# Patient Record
Sex: FEMALE | Race: BLACK OR AFRICAN AMERICAN | NOT HISPANIC OR LATINO | Employment: OTHER | URBAN - METROPOLITAN AREA
[De-identification: names, ages, dates, MRNs, and addresses within clinical notes are randomized per-mention and may not be internally consistent; named-entity substitution may affect disease eponyms.]

---

## 2017-01-03 ENCOUNTER — ANESTHESIA EVENT (OUTPATIENT)
Dept: PERIOP | Facility: HOSPITAL | Age: 72
End: 2017-01-03

## 2017-01-04 ENCOUNTER — APPOINTMENT (OUTPATIENT)
Dept: GENERAL RADIOLOGY | Facility: HOSPITAL | Age: 72
End: 2017-01-04

## 2017-01-04 ENCOUNTER — ANESTHESIA (OUTPATIENT)
Dept: PERIOP | Facility: HOSPITAL | Age: 72
End: 2017-01-04

## 2017-01-04 PROBLEM — M48.062 SPINAL STENOSIS, LUMBAR REGION, WITH NEUROGENIC CLAUDICATION: Status: ACTIVE | Noted: 2017-01-04

## 2017-01-04 PROCEDURE — 25010000002 FENTANYL CITRATE (PF) 100 MCG/2ML SOLUTION: Performed by: NURSE ANESTHETIST, CERTIFIED REGISTERED

## 2017-01-04 PROCEDURE — 76001 HC FLUORO OVER 1 HOUR: CPT

## 2017-01-04 PROCEDURE — 25010000002 DEXAMETHASONE PER 1 MG: Performed by: NURSE ANESTHETIST, CERTIFIED REGISTERED

## 2017-01-04 PROCEDURE — 25010000002 ONDANSETRON PER 1 MG: Performed by: NURSE ANESTHETIST, CERTIFIED REGISTERED

## 2017-01-04 PROCEDURE — 25010000002 PHENYLEPHRINE PER 1 ML: Performed by: NURSE ANESTHETIST, CERTIFIED REGISTERED

## 2017-01-04 PROCEDURE — 76000 FLUOROSCOPY <1 HR PHYS/QHP: CPT

## 2017-01-04 PROCEDURE — 25010000002 PROPOFOL 10 MG/ML EMULSION: Performed by: NURSE ANESTHETIST, CERTIFIED REGISTERED

## 2017-01-04 PROCEDURE — 25010000002 PROPOFOL 1000 MG/ML EMULSION: Performed by: NURSE ANESTHETIST, CERTIFIED REGISTERED

## 2017-01-04 RX ORDER — FENTANYL CITRATE 50 UG/ML
INJECTION, SOLUTION INTRAMUSCULAR; INTRAVENOUS AS NEEDED
Status: DISCONTINUED | OUTPATIENT
Start: 2017-01-04 | End: 2017-01-04 | Stop reason: SURG

## 2017-01-04 RX ORDER — DEXAMETHASONE SODIUM PHOSPHATE 10 MG/ML
INJECTION INTRAMUSCULAR; INTRAVENOUS AS NEEDED
Status: DISCONTINUED | OUTPATIENT
Start: 2017-01-04 | End: 2017-01-04 | Stop reason: SURG

## 2017-01-04 RX ORDER — ATRACURIUM BESYLATE 10 MG/ML
INJECTION, SOLUTION INTRAVENOUS AS NEEDED
Status: DISCONTINUED | OUTPATIENT
Start: 2017-01-04 | End: 2017-01-04 | Stop reason: SURG

## 2017-01-04 RX ORDER — PROPOFOL 10 MG/ML
VIAL (ML) INTRAVENOUS AS NEEDED
Status: DISCONTINUED | OUTPATIENT
Start: 2017-01-04 | End: 2017-01-04 | Stop reason: SURG

## 2017-01-04 RX ORDER — ONDANSETRON 2 MG/ML
INJECTION INTRAMUSCULAR; INTRAVENOUS AS NEEDED
Status: DISCONTINUED | OUTPATIENT
Start: 2017-01-04 | End: 2017-01-04 | Stop reason: SURG

## 2017-01-04 RX ORDER — LIDOCAINE HYDROCHLORIDE 10 MG/ML
INJECTION, SOLUTION EPIDURAL; INFILTRATION; INTRACAUDAL; PERINEURAL AS NEEDED
Status: DISCONTINUED | OUTPATIENT
Start: 2017-01-04 | End: 2017-01-04 | Stop reason: SURG

## 2017-01-04 RX ADMIN — FENTANYL CITRATE 100 MCG: 50 INJECTION, SOLUTION INTRAMUSCULAR; INTRAVENOUS at 11:10

## 2017-01-04 RX ADMIN — ONDANSETRON 4 MG: 2 INJECTION INTRAMUSCULAR; INTRAVENOUS at 10:58

## 2017-01-04 RX ADMIN — PROPOFOL 25 MCG/KG/MIN: 10 INJECTION, EMULSION INTRAVENOUS at 08:45

## 2017-01-04 RX ADMIN — SODIUM CHLORIDE, POTASSIUM CHLORIDE, SODIUM LACTATE AND CALCIUM CHLORIDE: 600; 310; 30; 20 INJECTION, SOLUTION INTRAVENOUS at 10:05

## 2017-01-04 RX ADMIN — PHENYLEPHRINE HYDROCHLORIDE 100 MCG: 10 INJECTION INTRAVENOUS at 09:05

## 2017-01-04 RX ADMIN — ATRACURIUM BESYLATE 30 MG: 10 INJECTION, SOLUTION INTRAVENOUS at 09:00

## 2017-01-04 RX ADMIN — PHENYLEPHRINE HYDROCHLORIDE 200 MCG: 10 INJECTION INTRAVENOUS at 09:15

## 2017-01-04 RX ADMIN — LIDOCAINE HYDROCHLORIDE 30 MG: 10 INJECTION, SOLUTION EPIDURAL; INFILTRATION; INTRACAUDAL; PERINEURAL at 08:26

## 2017-01-04 RX ADMIN — FENTANYL CITRATE 50 MCG: 50 INJECTION, SOLUTION INTRAMUSCULAR; INTRAVENOUS at 11:40

## 2017-01-04 RX ADMIN — FENTANYL CITRATE 100 MCG: 50 INJECTION, SOLUTION INTRAMUSCULAR; INTRAVENOUS at 08:26

## 2017-01-04 RX ADMIN — PHENYLEPHRINE HYDROCHLORIDE 1 MCG/KG/MIN: 10 INJECTION INTRAVENOUS at 09:31

## 2017-01-04 RX ADMIN — ATRACURIUM BESYLATE 40 MG: 10 INJECTION, SOLUTION INTRAVENOUS at 08:26

## 2017-01-04 RX ADMIN — PHENYLEPHRINE HYDROCHLORIDE 100 MCG: 10 INJECTION INTRAVENOUS at 09:09

## 2017-01-04 RX ADMIN — PHENYLEPHRINE HYDROCHLORIDE 100 MCG: 10 INJECTION INTRAVENOUS at 09:12

## 2017-01-04 RX ADMIN — PROPOFOL 100 MG: 10 INJECTION, EMULSION INTRAVENOUS at 08:26

## 2017-01-04 RX ADMIN — DEXAMETHASONE SODIUM PHOSPHATE 8 MG: 10 INJECTION INTRAMUSCULAR; INTRAVENOUS at 08:26

## 2017-01-04 NOTE — ANESTHESIA PREPROCEDURE EVALUATION
Anesthesia Evaluation     Patient summary reviewed and Nursing notes reviewed    Airway   Mallampati: I  TM distance: >3 FB  Neck ROM: full  Dental      Pulmonary    (+) sleep apnea,   Cardiovascular   (+) hypertension, CAD,     ECG reviewed    Neuro/Psych  (+) headaches, numbness,    GI/Hepatic/Renal/Endo    (+)  GERD, diabetes mellitus,     Musculoskeletal     (+) myalgias,   Abdominal    Substance History - negative use     OB/GYN negative ob/gyn ROS         Other   (+) arthritis                          Anesthesia Plan    ASA 3     general     intravenous induction   Anesthetic plan and risks discussed with patient.    Plan discussed with CRNA.

## 2017-01-04 NOTE — ANESTHESIA POSTPROCEDURE EVALUATION
Patient: Arminda Krishnan    Procedure Summary     Date Anesthesia Start Anesthesia Stop Room / Location    01/04/17 0823   [facility] / [facility]       Procedure Diagnosis Surgeon Provider    LUMBAR LAMINECTOMY AND DECOMRESSION  L3 AND L4 (N/A Spine Lumbar) Spinal stenosis, lumbar region, with neurogenic claudication  (Spinal stenosis, lumbar region, with neurogenic claudication [M48.06]) MD Arun Barrow MD          Anesthesia Type: general  Last vitals  /66   Temp   97.4   Pulse  84   Resp   16   SpO2   100     Post Anesthesia Care and Evaluation    Patient location during evaluation: PACU  Patient participation: complete - patient participated  Level of consciousness: awake and alert  Pain score: 0  Pain management: adequate  Airway patency: patent  Anesthetic complications: no  Cardiovascular status: hemodynamically stable and acceptable  Respiratory status: nonlabored ventilation, acceptable and nasal cannula  Hydration status: acceptable

## 2017-01-04 NOTE — ANESTHESIA PROCEDURE NOTES
Airway  Urgency: elective    Airway not difficult    General Information and Staff    Patient location during procedure: OR  Anesthesiologist: SHYLA MARTINEZ  CRNA: ARYAN BERGER    Indications and Patient Condition  Indications for airway management: airway protection    Preoxygenated: yes  MILS not maintained throughout  Mask difficulty assessment: 1 - vent by mask    Final Airway Details  Final airway type: endotracheal airway      Successful airway: ETT  Cuffed: yes   Successful intubation technique: direct laryngoscopy  Endotracheal tube insertion site: oral  Blade: Esteban  Blade size: #2  ETT size: 7.0 mm  Cormack-Lehane Classification: grade I - full view of glottis  Placement verified by: chest auscultation and capnometry   Cuff volume (mL): 5  Measured from: lips  ETT to lips (cm): 21  Number of attempts at approach: 1    Additional Comments  Negative epigastric sounds, Breath sound equal bilaterally with symmetric chest rise and fall

## 2017-02-06 ENCOUNTER — OFFICE VISIT (OUTPATIENT)
Dept: NEUROSURGERY | Facility: CLINIC | Age: 72
End: 2017-02-06

## 2017-02-06 VITALS
TEMPERATURE: 97.1 F | DIASTOLIC BLOOD PRESSURE: 62 MMHG | SYSTOLIC BLOOD PRESSURE: 122 MMHG | WEIGHT: 166 LBS | HEIGHT: 60 IN | BODY MASS INDEX: 32.59 KG/M2

## 2017-02-06 DIAGNOSIS — M48.062 SPINAL STENOSIS, LUMBAR REGION, WITH NEUROGENIC CLAUDICATION: ICD-10-CM

## 2017-02-06 DIAGNOSIS — M51.36 DEGENERATIVE DISC DISEASE, LUMBAR: Primary | ICD-10-CM

## 2017-02-06 PROCEDURE — 99024 POSTOP FOLLOW-UP VISIT: CPT | Performed by: PHYSICIAN ASSISTANT

## 2017-02-06 RX ORDER — LINAGLIPTIN 5 MG/1
TABLET, FILM COATED ORAL
COMMUNITY
Start: 2017-01-19 | End: 2018-02-27 | Stop reason: ALTCHOICE

## 2017-02-06 NOTE — PROGRESS NOTES
Subjective   Patient ID: Arminda Krishnan is a 71 y.o. female  [id number]    Chief Complaint   Patient presents with   • Follow-up   • Post-op       Patient Active Problem List   Diagnosis   • Cerebral vascular disease   • Degenerative disc disease, lumbar   • Degenerative disc disease, cervical   • Spinal stenosis, lumbar region, with neurogenic claudication       History of Present Illness   Patient is here for postop visit after undergoing lumbar decompression for spinal stenosis.  The patient reports that she is doing well, home physical therapy is coming once a week, she is doing some of her exercises on a daily basis and others she is not.  She has family who is living with her and helping her out.  She is using her walker when she is out and using a cane at the house.  After a little discussion she is not doing much walking for exercise and she is not doing her band strengthening exercises.    Review of Systems   Constitutional: Positive for appetite change.   HENT: Positive for congestion, dental problem and sinus pressure.    Eyes: Negative.    Respiratory: Negative.    Cardiovascular: Negative.    Gastrointestinal: Positive for constipation.   Endocrine: Positive for cold intolerance and polydipsia.   Genitourinary: Negative.    Musculoskeletal: Positive for joint swelling.   Allergic/Immunologic: Negative.    Neurological: Positive for headaches.   All other systems reviewed and are negative.      Physical Exam   Well-developed well-nourished American female in no apparent distress at the time of examination.  She looks well and states that she feels better now than she has in years    Neurologic Exam   Motor exam is intact.  She takes small light steps when ambulating, she is in a slightly flexed forward position on ambulation.      Medical Decision Making:      Since the patient is currently having physical therapy come to the house she is getting her strengthening exercises once a week I have  encouraged the patient she needs to be doing these on a daily basis and her family that is staying with her will be reporting to the patient's daughter who day referred to as the general.  At some point she may need to go to formal physical therapy for progression of her strengthening exercises.   Overall the patient is really doing well but slow to progress on her strengthening.  Our plan will be to see the patient back in the office in 6 weeks.      Gracie Perez PA-C

## 2017-03-01 ENCOUNTER — TELEPHONE (OUTPATIENT)
Dept: NEUROSURGERY | Facility: CLINIC | Age: 72
End: 2017-03-01

## 2017-03-01 NOTE — TELEPHONE ENCOUNTER
Would you ask Apro if she would mind seeing he patient tomorrow, if not, then call the patient and see if she can come in tomorrow. Thanks, DC

## 2017-03-01 NOTE — TELEPHONE ENCOUNTER
Called pt- no answers at any of the numbers provided. I did go ahead and place pt on with Apro for [phone]. I left pt a detailed message and asked them to call me directly in the am.

## 2017-03-01 NOTE — TELEPHONE ENCOUNTER
Spoke with patient.  She has had increase of back pain as well as left hip and leg pain since fall two weeks ago (she fell in bathroom).  She is able to move and stand on legs.  She denies any bowel or bladder changes from her baseline...she has had chronic urinary incontinence that is unchanged since the fall.  She currently denies loss of perineal sensation.  She denies numbness in legs.  She has chronic numbness in feet from peripheral neuropathy. Will discuss with Nay Perez/forward this message on to her.

## 2017-03-01 NOTE — TELEPHONE ENCOUNTER
Provider:  Davy  Caller:   Samuel  Phone #:  [phone]  Surgery:  lumbar laminectomy and decompression L3-4  Surgery Date: 1-4-17   Last visit:   2-6-17    TARA:         Reason for call:           ----- Message from Kat White sent at 3/1/2017 10:51 AM EST -----  Regarding: PA/DAVY  Contact: [phone]  Last seen:  2-6-17  Surgery:  1-4-17 -- lumbar laminectomy and decompression L3-4  Next appt:  3-21-17    Message:  Someone named Samuel called on patient's behalf, left a message on voicemail that the patient fell 2 weeks ago. She did not state the patient was having any symptoms or issues. They would like to move her appointment up from 3/21/17. Okay to reschedule?

## 2017-03-28 ENCOUNTER — OFFICE VISIT (OUTPATIENT)
Dept: NEUROSURGERY | Facility: CLINIC | Age: 72
End: 2017-03-28

## 2017-03-28 VITALS
TEMPERATURE: 97.3 F | SYSTOLIC BLOOD PRESSURE: 138 MMHG | HEART RATE: 85 BPM | WEIGHT: 162 LBS | DIASTOLIC BLOOD PRESSURE: 62 MMHG | BODY MASS INDEX: 31.8 KG/M2 | HEIGHT: 60 IN

## 2017-03-28 DIAGNOSIS — M48.061 SPINAL STENOSIS OF LUMBAR REGION: ICD-10-CM

## 2017-03-28 DIAGNOSIS — M48.062 SPINAL STENOSIS, LUMBAR REGION, WITH NEUROGENIC CLAUDICATION: Primary | ICD-10-CM

## 2017-03-28 DIAGNOSIS — Z98.890 S/P LAMINECTOMY: ICD-10-CM

## 2017-03-28 PROCEDURE — 99024 POSTOP FOLLOW-UP VISIT: CPT | Performed by: NEUROLOGICAL SURGERY

## 2017-03-28 NOTE — PROGRESS NOTES
Arminda Krishnan  1945  [id number]                       CURRENT WORKING DIAGNOSIS:  [S/P laminectomy for spinal stenosis ]         MEDICAL HISTORY SINCE LAST ENCOUNTER:  [She has shown significant improvement however is quite deconditioned.  Is going to take a while for her to become independent if she ever does.  She has multiple aches and pains but clearly markedly better than her preoperative status. ]           Past Medical History:   Diagnosis Date   • Anemia    • Arthritis    • Back problem    • CAD (coronary artery disease)    • Cancer     Right breast   • Chronic back pain    • Chronically on opiate therapy    • Diabetes mellitus     DX 14 years ago- checks fsbs weekly   • Fibromyalgia    • Gastroparesis    • GERD (gastroesophageal reflux disease)    • Headache     emotional/tension   • HTN (hypertension)    • Hypercholesteremia    • IBS (irritable bowel syndrome)    • Incontinence of urine     urgency   • Migraine headache    • Myalgia and myositis    • Peripheral neuropathy    • Sleep apnea    • Sleep apnea     does not wear cpap              Past Surgical History:   Procedure Laterality Date   • APPENDECTOMY     • BACK SURGERY      3 x per provided history   • BRAIN TUMOR EXCISION  1988   • BREAST BIOPSY     • CHOLECYSTECTOMY     • COLONOSCOPY      2015   • CRANIOTOMY FOR TUMOR     • EYE SURGERY      bilateral cataracts removed   • HEMORRHOIDECTOMY     • LUMBAR FUSION N/A 1/4/2017    Procedure: LUMBAR LAMINECTOMY AND DECOMRESSION  L3 AND L4;  Surgeon: Nishant Bhatti MD;  Location: [facility];  Service:    • TOTAL ABDOMINAL HYSTERECTOMY     • TRIGGER FINGER RELEASE                Family History   Problem Relation Age of Onset   • Cancer Other    • Diabetes Other    • Hyperlipidemia Other    • Heart attack Other    • Hypertension Other    • Heart attack Mother    • Cancer Father    • Heart attack Sister    • Cancer Brother    • Heart attack Sister    • Cancer Brother               Social History      Social History   • Marital status:      Spouse name: N/A   • Number of children: N/A   • Years of education: N/A     Occupational History   • Not on file.     Social History Main Topics   • Smoking status: Current Every Day Smoker     Packs/day: 0.50     Types: Cigarettes   • Smokeless tobacco: Never Used   • Alcohol use No   • Drug use: No   • Sexual activity: Defer     Other Topics Concern   • Not on file     Social History Narrative              Allergies   Allergen Reactions   • Ceclor [Cefaclor] Rash              Current Outpatient Prescriptions:   •  albuterol (PROVENTIL) (2.5 MG/3ML) 0.083% nebulizer solution, Take 2.5 mg by nebulization Every 4 (Four) Hours As Needed for wheezing or shortness of air., Disp: , Rfl:   •  amphetamine-dextroamphetamine (ADDERALL) 20 MG tablet, Take 30 mg by mouth 2 (Two) Times a Day. Morning and noon, Disp: , Rfl:   •  aspirin 81 MG EC tablet, Take 81 mg by mouth daily., Disp: , Rfl:   •  atorvastatin (LIPITOR) 40 MG tablet, Take 40 mg by mouth daily., Disp: , Rfl:   •  carvedilol (COREG) 25 MG tablet, Take 25 mg by mouth 2 (two) times a day with meals., Disp: , Rfl:   •  docusate sodium (COLACE) 250 MG capsule, Take 250 mg by mouth 2 (two) times a day., Disp: , Rfl:   •  DULoxetine (CYMBALTA) 60 MG capsule, Take 60 mg by mouth daily., Disp: , Rfl:   •  esomeprazole (NexIUM) 40 MG capsule, Take 40 mg by mouth every morning before breakfast., Disp: , Rfl:   •  ferrous sulfate 325 (65 FE) MG tablet, Take 325 mg by mouth 3 (three) times a day with meals., Disp: , Rfl:   •  fluticasone (FLONASE) 50 MCG/ACT nasal spray, 1 spray into each nostril Daily., Disp: , Rfl:   •  furosemide (LASIX) 40 MG tablet, Take 40 mg by mouth daily., Disp: , Rfl:   •  isosorbide mononitrate (IMDUR) 60 MG 24 hr tablet, Take 60 mg by mouth daily., Disp: , Rfl:   •  lidocaine (LIDODERM) 5 %, Place 1 patch on the skin every day. Remove & Discard patch within 12 hours or as directed by MD, Disp:  , Rfl:   •  losartan (COZAAR) 100 MG tablet, Take 100 mg by mouth daily., Disp: , Rfl:   •  meloxicam (MOBIC) 15 MG tablet, Take 15 mg by mouth daily., Disp: , Rfl:   •  methadone (DOLOPHINE) 10 MG tablet, Take 10 mg by mouth Every 8 (Eight) Hours As Needed for moderate pain (4-6) , , Disp: , Rfl:   •  montelukast (SINGULAIR) 10 MG tablet, Take 10 mg by mouth every night., Disp: , Rfl:   •  oxyCODONE-acetaminophen (PERCOCET) 7.5-325 MG per tablet, Take 1 tablet by mouth Every 6 (Six) Hours As Needed for severe pain (7-10)., Disp: 60 tablet, Rfl: 0  •  polyethylene glycol (MIRALAX) packet, Take 17 g by mouth daily., Disp: , Rfl:   •  potassium chloride (K-DUR,KLOR-CON) 10 MEQ ER tablet, Take 20 mEq by mouth 4 (Four) Times a Day., Disp: , Rfl:   •  pregabalin (LYRICA) 50 MG capsule, Take 50 mg by mouth 2 (two) times a day., Disp: , Rfl:   •  sitaGLIPtin-metFORMIN (JANUMET)  MG per tablet, Take 1 tablet by mouth 2 (two) times a day with meals., Disp: , Rfl:   •  tolterodine LA (DETROL LA) 4 MG 24 hr capsule, Take 4 mg by mouth Daily., Disp: , Rfl:   •  TRADJENTA 5 MG tablet tablet, , Disp: , Rfl:   •  vitamin D (ERGOCALCIFEROL) 32371 UNITS capsule capsule, Take 50,000 Units by mouth 1 (One) Time Per Week., Disp: , Rfl:          Review of Systems   Constitutional: Positive for activity change. Negative for appetite change, chills, diaphoresis, fatigue, fever and unexpected weight change.   HENT: Positive for congestion, postnasal drip and voice change. Negative for dental problem, drooling, ear discharge, ear pain, facial swelling, hearing loss, mouth sores, nosebleeds, rhinorrhea, sinus pressure, sneezing, sore throat, tinnitus and trouble swallowing.    Eyes: Positive for photophobia and visual disturbance. Negative for pain, discharge, redness and itching.   Respiratory: Positive for cough, shortness of breath and wheezing. Negative for apnea, choking, chest tightness and stridor.    Cardiovascular: Positive  "for leg swelling. Negative for chest pain and palpitations.   Gastrointestinal: Positive for nausea and vomiting. Negative for abdominal distention, abdominal pain, anal bleeding, blood in stool, constipation, diarrhea and rectal pain.   Endocrine: Positive for cold intolerance. Negative for heat intolerance, polydipsia, polyphagia and polyuria.   Genitourinary: Positive for difficulty urinating and urgency. Negative for decreased urine volume, dysuria, enuresis, flank pain, frequency, genital sores and hematuria.   Musculoskeletal: Positive for back pain. Negative for arthralgias, gait problem, joint swelling, myalgias, neck pain and neck stiffness.   Skin: Positive for rash. Negative for color change, pallor and wound.   Allergic/Immunologic: Negative for environmental allergies, food allergies and immunocompromised state.   Neurological: Positive for weakness, numbness and headaches. Negative for dizziness, tremors, seizures, syncope, facial asymmetry, speech difficulty and light-headedness.   Hematological: Negative for adenopathy. Bruises/bleeds easily.   Psychiatric/Behavioral: Negative for agitation, behavioral problems, confusion, decreased concentration, dysphoric mood, hallucinations, self-injury, sleep disturbance and suicidal ideas. The patient is not nervous/anxious and is not hyperactive.    All other systems reviewed and are negative.              Vitals:    03/28/17 1258   BP: 138/62   Pulse: 85   Temp: 97.3 °F (36.3 °C)   Weight: 162 lb (73.5 kg)   Height: 60\" (152.4 cm)               EXAMINATION: [She has generalized weakness however with formal testing of her lower extremity each group is quite powerful.  She is hyporeflexic.  Straight leg raising is negative. ]            MEDICAL DECISION MAKING: [ Status post laminectomy for spinal stenosis]           ASSESSMENT/DISPOSITION: [She needs continue with rehabilitation.  I have given her a prescription for physical therapy at her home town.  She will " call me in 6 weeks.              I APPRECIATE THE OPPORTUNITY OF THIS REFERRAL. PLEASE CALL IF ANY  QUESTIONS [phone]    Scribed for Nishant Bhatti MD by Mohsen Enriquez CMA. 3/28/2017  1:28 PM      I have read and concur with the information provided by the scribe.  Nishant Bhatti MD

## 2017-03-28 NOTE — PATIENT INSTRUCTIONS
In six weeks on a Monday, call Dr. Bhatti and leave a message with Yasmin (). Dr. Bhatti will call you back at the end of the day as soon as he can.

## 2017-05-22 ENCOUNTER — TELEPHONE (OUTPATIENT)
Dept: NEUROSURGERY | Facility: CLINIC | Age: 72
End: 2017-05-22

## 2018-02-27 ENCOUNTER — OFFICE VISIT (OUTPATIENT)
Dept: NEUROLOGY | Facility: CLINIC | Age: 73
End: 2018-02-27

## 2018-02-27 VITALS
HEIGHT: 60 IN | DIASTOLIC BLOOD PRESSURE: 68 MMHG | WEIGHT: 160 LBS | BODY MASS INDEX: 31.41 KG/M2 | SYSTOLIC BLOOD PRESSURE: 138 MMHG | HEART RATE: 84 BPM | OXYGEN SATURATION: 98 %

## 2018-02-27 DIAGNOSIS — G62.9 POLYNEUROPATHY: Primary | ICD-10-CM

## 2018-02-27 PROCEDURE — 99204 OFFICE O/P NEW MOD 45 MIN: CPT | Performed by: PSYCHIATRY & NEUROLOGY

## 2018-02-27 RX ORDER — LORATADINE 10 MG/1
10 TABLET ORAL DAILY
COMMUNITY
End: 2022-06-15

## 2018-02-27 RX ORDER — HYDROXYZINE PAMOATE 25 MG/1
25 CAPSULE ORAL 4 TIMES DAILY PRN
COMMUNITY
End: 2021-08-16

## 2018-02-27 RX ORDER — AMITRIPTYLINE HYDROCHLORIDE 10 MG/1
10 TABLET, FILM COATED ORAL NIGHTLY
Qty: 30 TABLET | Refills: 3 | Status: SHIPPED | OUTPATIENT
Start: 2018-02-27 | End: 2018-07-03 | Stop reason: SDUPTHER

## 2018-02-27 RX ORDER — NALOXONE HYDROCHLORIDE 4 MG/.1ML
1 SPRAY NASAL AS NEEDED
COMMUNITY

## 2018-02-27 RX ORDER — NITROGLYCERIN 400 UG/1
1 SPRAY ORAL
COMMUNITY
End: 2022-06-15

## 2018-02-27 RX ORDER — PANTOPRAZOLE SODIUM 40 MG/1
1 TABLET, DELAYED RELEASE ORAL DAILY
COMMUNITY
Start: 2018-02-13 | End: 2020-04-30

## 2018-02-27 RX ORDER — MIRTAZAPINE 30 MG/1
1 TABLET, FILM COATED ORAL DAILY
COMMUNITY
Start: 2018-02-13 | End: 2020-04-30

## 2018-02-27 RX ORDER — PROMETHAZINE HYDROCHLORIDE 25 MG/1
25 TABLET ORAL EVERY 6 HOURS PRN
COMMUNITY
End: 2020-04-30

## 2018-02-27 NOTE — PROGRESS NOTES
[facility] NEUROLOGY [address] CONSULTATION   History of Present Illness     Date: 2/27/2018    Patient Identification  Arminda Krishnan is a 72 y.o. female.    Patient information was obtained from patient.  History/Exam limitations: none.    CONSULTATION requested by: Aiden Spencer*      Chief Complaint   Pain (NP, in office today with c/o neuropathic pain in feet. Pt states sx started approx 2 years ago, have worsened. Pt c/o decreased quality of life due to pain, difficult to leave house )      History of Present Illness   This is a very pleasant 72 y.o. Female who comes in today with a greater than 2 year history of numbness in the lower extremities. This sensation began in the RLE but is now bilateral. The numbness is intermittent and followed by a burning and then coolness that will extend skilled nursing up the calf. She feels that she is unsteady when she walks, and currently walks with a ashley. She suffered a fall 4 weeks ago and state that her chronic back pain has worsened sense. She also reports pain with ambulation that localizes to the 1st and 2nd toe of the right foot. She says this pain will shoot up her leg. She states that she is not regular in taking her diabetic medication or with checking her blood sugars. She also reduced the number of doses she takes of her lyrica due to drowsiness.     PMH:   Past Medical History:   Diagnosis Date   • Anemia    • Arthritis    • Back problem    • CAD (coronary artery disease)    • Cancer     Right breast   • Chronic back pain    • Chronically on opiate therapy    • Diabetes mellitus     DX 14 years ago- checks fsbs weekly   • Fibromyalgia    • Gastroparesis    • GERD (gastroesophageal reflux disease)    • Headache     emotional/tension   • HTN (hypertension)    • Hypercholesteremia    • IBS (irritable bowel syndrome)    • Incontinence of urine     urgency   • Migraine headache    • Myalgia and myositis    • Peripheral neuropathy    • Sleep apnea    •  Sleep apnea     does not wear cpap       Past Surgical History:   Past Surgical History:   Procedure Laterality Date   • APPENDECTOMY     • BACK SURGERY      3 x per provided history   • BRAIN TUMOR EXCISION  1988   • BREAST BIOPSY     • CHOLECYSTECTOMY     • COLONOSCOPY      2015   • CRANIOTOMY FOR TUMOR     • EYE SURGERY      bilateral cataracts removed   • HEMORRHOIDECTOMY     • LUMBAR FUSION N/A 1/4/2017    Procedure: LUMBAR LAMINECTOMY AND DECOMRESSION  L3 AND L4;  Surgeon: Nishant Bhatti MD;  Location: [facility];  Service:    • TOTAL ABDOMINAL HYSTERECTOMY     • TRIGGER FINGER RELEASE         Family Hisotry:   Family History   Problem Relation Age of Onset   • Cancer Other    • Diabetes Other    • Hyperlipidemia Other    • Heart attack Other    • Hypertension Other    • Heart attack Mother    • Diabetes Mother    • Heart disease Mother    • Hypertension Mother    • Cancer Father    • Alcohol abuse Father    • Heart attack Sister    • Diabetes Sister    • Heart disease Sister    • Hypertension Sister    • Cancer Brother    • Diabetes Brother    • Hypertension Brother    • Heart attack Sister    • Stroke Sister    • Cancer Brother        Social History:   Social History     Social History   • Marital status:      Spouse name: N/A   • Number of children: N/A   • Years of education: N/A     Occupational History   • Not on file.     Social History Main Topics   • Smoking status: Current Every Day Smoker     Packs/day: 0.50     Types: Cigarettes   • Smokeless tobacco: Never Used   • Alcohol use No   • Drug use: No   • Sexual activity: Defer     Other Topics Concern   • Not on file     Social History Narrative       Medications:   Current Outpatient Prescriptions   Medication Sig Dispense Refill   • albuterol (PROVENTIL) (2.5 MG/3ML) 0.083% nebulizer solution Take 2.5 mg by nebulization Every 4 (Four) Hours As Needed for wheezing or shortness of air.     • amphetamine-dextroamphetamine (ADDERALL) 20 MG  tablet Take 30 mg by mouth 2 (Two) Times a Day. Morning and noon     • ANORO ELLIPTA 62.5-25 MCG/INH aerosol powder  inhaler      • aspirin 81 MG EC tablet Take 81 mg by mouth daily.     • atorvastatin (LIPITOR) 40 MG tablet Take 40 mg by mouth daily.     • carvedilol (COREG) 25 MG tablet Take 25 mg by mouth 2 (two) times a day with meals.     • docusate sodium (COLACE) 250 MG capsule Take 250 mg by mouth 2 (two) times a day.     • DULoxetine (CYMBALTA) 60 MG capsule Take 60 mg by mouth daily.     • fluticasone (FLONASE) 50 MCG/ACT nasal spray 1 spray into each nostril Daily.     • furosemide (LASIX) 40 MG tablet Take 40 mg by mouth daily.     • hydrOXYzine (VISTARIL) 25 MG capsule Take 25 mg by mouth 4 (Four) Times a Day As Needed for Itching.     • isosorbide mononitrate (IMDUR) 60 MG 24 hr tablet Take 60 mg by mouth daily.     • lidocaine (LIDODERM) 5 % Place 1 patch on the skin every day. Remove & Discard patch within 12 hours or as directed by MD     • lidocaine viscous (XYLOCAINE) 2 % solution Take  by mouth As Needed for Mild Pain .     • loratadine (CLARITIN) 10 MG tablet Take 10 mg by mouth Daily.     • losartan (COZAAR) 100 MG tablet Take 100 mg by mouth daily.     • LYRICA 75 MG capsule 1 capsule 2 (Two) Times a Day.     • methadone (DOLOPHINE) 10 MG tablet Take 10 mg by mouth Every 8 (Eight) Hours As Needed for moderate pain (4-6) ,      • mirtazapine (REMERON) 30 MG tablet 1 tablet Daily.     • montelukast (SINGULAIR) 10 MG tablet Take 10 mg by mouth every night.     • naloxone (NARCAN) 4 MG/0.1ML nasal spray 1 spray into each nostril As Needed.     • nitroglycerin (NITROLINGUAL) 0.4 MG/SPRAY spray Place 1 spray under the tongue Every 5 (Five) Minutes As Needed for Chest Pain.     • pantoprazole (PROTONIX) 40 MG EC tablet 1 tablet Daily.     • polyethylene glycol (MIRALAX) packet Take 17 g by mouth daily.     • potassium chloride (K-DUR,KLOR-CON) 10 MEQ ER tablet Take 20 mEq by mouth 4 (Four) Times a  Day.     • promethazine (PHENERGAN) 25 MG tablet Take 25 mg by mouth Every 6 (Six) Hours As Needed for Nausea or Vomiting.     • sitaGLIPtin-metFORMIN (JANUMET)  MG per tablet Take 1 tablet by mouth 2 (two) times a day with meals.     • tolterodine LA (DETROL LA) 4 MG 24 hr capsule Take 4 mg by mouth Daily.     • vitamin D (ERGOCALCIFEROL) 69685 UNITS capsule capsule Take 50,000 Units by mouth 1 (One) Time Per Week.     • amitriptyline (ELAVIL) 10 MG tablet Take 1 tablet by mouth Every Night. 30 tablet 3     No current facility-administered medications for this visit.        Allergy:   Allergies   Allergen Reactions   • Ceclor [Cefaclor] Rash       Review of Systems:  Review of Systems   Constitutional: Negative for chills and fever.   HENT: Negative for congestion, ear pain, hearing loss, rhinorrhea and sore throat.    Eyes: Negative for pain, discharge and redness.   Respiratory: Negative for cough, shortness of breath, wheezing and stridor.    Cardiovascular: Negative for chest pain, palpitations and leg swelling.   Gastrointestinal: Negative for abdominal pain, constipation, nausea and vomiting.   Endocrine: Negative for cold intolerance, heat intolerance and polyphagia.   Genitourinary: Negative for dysuria, flank pain, frequency and urgency.   Musculoskeletal: Positive for gait problem. Negative for joint swelling, myalgias, neck pain and neck stiffness.   Skin: Negative for pallor, rash and wound.   Allergic/Immunologic: Negative for environmental allergies.   Neurological: Positive for weakness and numbness. Negative for dizziness, tremors, seizures, syncope, facial asymmetry, speech difficulty, light-headedness and headaches.   Hematological: Negative for adenopathy.   Psychiatric/Behavioral: Positive for sleep disturbance. Negative for confusion and hallucinations. The patient is not nervous/anxious.        Physical Exam     Vitals:    02/27/18 0940   BP: 138/68   Pulse: 84   SpO2: 98%   Weight:  "72.6 kg (160 lb)   Height: 152.4 cm (60\")     GENERAL: Patient is pleasant, cooperative, appears to be stated age.  Body habitus is endomorphic.  SKIN AND EXTREMITIES:  No skin rashes or lesions are noted.  No cyanosis, clubbing or edema of the extremities.    HEAD:  Head is normocephalic and atraumatic.    NECK: Neck are non-tender without thyromegaly or adenopathy.  Carotic upstrokes are 1+/4.  No cranial or cervical bruits.  The neck is supple with a full range of motion.   ENT: palate elevate symmetrically, no evidence of high arch palate, tongue midline erythema in posterior pharynx, Mallampati Classification Class III   CARDIOVASCULAR:  Regular rate and rhythm with normal S1 and S2 without rub or gallop.  RESPIRATORY:  Clear to auscultation without wheezes or crackle   ABDOMEN:  Soft and non-tender, positive bowel sound without hepatosplenomegaly  BACK:  Back is straight without midline defect.    PSYCH:  Higher cortical function/mental status:  The patient is alert.  She is oriented x3 to time, place and person.  Recent and the remote memory appear normal.  The patient has a good fund of knowledge.  There is no visual or auditory hallucination or suicidal or homicidal ideation.  SPEECH:There is no gross evidence of aphasia, dysarthria or agnosia.      CRANIAL NERVES:  Pupils are 4mm, equal round reactive to light, reacting briskly to 2mm without afferent pupillary defect.  Visual fields are intact to confrontation testing.  Fundoscopic examination reveals sharp disk margins with normal vasculature.  No papilledema, hemorrhages or exudates.  Extraocular movements are full and smooth with normal pursuits and saccades.  No nystagmus noted.  The face is symmetric. palate elevate symmetrically, Tongue midline, positive gag reflex. The remainder of the cranial nerves are intact and symmetrical.    MOTOR: Strength is 5/5 throughout with normal tone and bulk with the following exceptions, 4/5 intrinsic muscles of the " hands and feet.  No involuntary movements noted.    Deep Tendon Reflexes: are 1/4 and symmetrical in the upper extremities, 1/4 and symmetrical at the knees and 0/4 and symmetrical at the Achilles tendon.  Plantar responses were down-going bilaterally.    SENSATION:  Stocking glove sensory deficit   COORDINATION AND GAIT:  Patient ambulates with a cautious gait  MUSCULOSKELETAL: Range of motion normal, no clubbing, cyanosis, or edema.  No joint swelling.            Records Reviewed: I have personally reviewed her previous medical record.    Arminda was seen today for pain.    Diagnoses and all orders for this visit:    Polyneuropathy    Other orders  -     amitriptyline (ELAVIL) 10 MG tablet; Take 1 tablet by mouth Every Night.      Treatments:  1. We have counseled patient the importance of tight blood sugar control  2.  Encourage patient should have a close blood sugar monitoring  Discussion:  I have counseled patient extensively on peripheral neuropathy.  The prevalence of peripheral neuropathy in a general practice is 8 percent in persons 55 years and older.    Peripheral neuropathy can be caused by a variety of systemic diseases, toxic exposures, medications, infections, and hereditary disorder. The most common treatable causes are diabetes, hypothyroidism, and nutritional deficiencies.  When a patient presents with symptoms of distal numbness, tingling and pain, or weakness, the first step is to determine whether the symptoms are the result of peripheral neuropathy or of other part of  the CNS, such as nerve roots, or nerve plexus. CNS lesions may be associated with other features, such as speech difficulty, double vision, ataxia, cranial nerve involvement, or, in cases of myelopathy, impairment of bowel and bladder functions. Deep tendon reflexes are usually brisk, and muscle tone is spastic. Lesions of the peripheral nerve roots are typically asymmetric, follow a dermatomal pattern of sensory symptoms, and may  have associated neck and low back pain. Lesions of the plexus are asymmetric with sensorimotor involvement of multiple nerves in one extremity. The presence of neuropathic symptoms, decreased ankle reflexes, and decreased distal sensations, regardless of distal muscle weakness and atrophy, makes the diagnosis of peripheral neuropathy likely.   The next step is to find the etiology and exclude potentially treatable causes, such as acquired toxic, nutritional, inflammatory, or immune-mediated demyelinating disorders. The neuropathies must be further characterized by onset and chronicity of symptoms, the pattern and extent of involvement, and the type of nerve fibers involved (i.e., sensory, motor, or autonomic).   The evaluation of a patient with peripheral neuropathy starts with simple blood tests, including a complete blood count, comprehensive metabolic profile, and measurement of erythrocyte sedimentation rate and fasting blood glucose, vitamin B12, and thyroid-stimulating hormone levels. Electrodiagnostic studies are recommended if the diagnosis remains unclear after initial diagnostic testing and a careful history and physical examination.  Treatment of peripheral neuropathy has two goals:  first, is to eliminate the offending agents, such as toxins or medications; correcting a nutritional deficiency; or treating the underlying disease (e.g., corticosteroid therapy for immune-mediated neuropathy). These steps are important to halt the progression of neuropathy, and they may improve symptoms. Second, is to help patients control troublesome symptoms of peripheral neuropathy, such as severe numbness and pain, as well as to alleviate disability resulting from weakness. Several pharmacologic options exist to treat neuropathic pain, including gabapentin [Neurontin], topiramate [Topamax], carbamazepine [Tegretol], pregabalin [Lyrica]) and antidepressants (e.g., amitriptyline). Topical patches and sprays containing  lidocaine (Lidoderm) or capsaicin (Zostrix) also may relieve pain in some patients.Other supportive measures, such as foot care, weight reduction, and shoe selection, may also be helpful.    This Document is signed by Alfredo Jules MD, FAAN, FAASM February 27, 20188:22 PM

## 2018-02-27 NOTE — PROGRESS NOTES
[facility] NEUROLOGY [place] CONSULTATION   History of Present Illness     Date: 2/27/2018    Patient Identification  Arminda Krishnan is a 72 y.o. female.    Patient information was obtained from patient.  History/Exam limitations: none.    CONSULTATION requested by: Aiden Spencer*      Chief Complaint   Pain (NP, in office today with c/o neuropathic pain in feet. Pt states sx started approx 2 years ago, have worsened. Pt c/o decreased quality of life due to pain, difficult to leave house )      History of Present Illness     PMH:   Past Medical History:   Diagnosis Date   • Anemia    • Arthritis    • Back problem    • CAD (coronary artery disease)    • Cancer     Right breast   • Chronic back pain    • Chronically on opiate therapy    • Diabetes mellitus     DX 14 years ago- checks fsbs weekly   • Fibromyalgia    • Gastroparesis    • GERD (gastroesophageal reflux disease)    • Headache     emotional/tension   • HTN (hypertension)    • Hypercholesteremia    • IBS (irritable bowel syndrome)    • Incontinence of urine     urgency   • Migraine headache    • Myalgia and myositis    • Peripheral neuropathy    • Sleep apnea    • Sleep apnea     does not wear cpap       Past Surgical History:   Past Surgical History:   Procedure Laterality Date   • APPENDECTOMY     • BACK SURGERY      3 x per provided history   • BRAIN TUMOR EXCISION  1988   • BREAST BIOPSY     • CHOLECYSTECTOMY     • COLONOSCOPY      2015   • CRANIOTOMY FOR TUMOR     • EYE SURGERY      bilateral cataracts removed   • HEMORRHOIDECTOMY     • LUMBAR FUSION N/A 1/4/2017    Procedure: LUMBAR LAMINECTOMY AND DECOMRESSION  L3 AND L4;  Surgeon: Nishant Bhatti MD;  Location: [facility];  Service:    • TOTAL ABDOMINAL HYSTERECTOMY     • TRIGGER FINGER RELEASE         Family Hisotry:   Family History   Problem Relation Age of Onset   • Cancer Other    • Diabetes Other    • Hyperlipidemia Other    • Heart attack Other    • Hypertension Other    •  Heart attack Mother    • Diabetes Mother    • Heart disease Mother    • Hypertension Mother    • Cancer Father    • Alcohol abuse Father    • Heart attack Sister    • Diabetes Sister    • Heart disease Sister    • Hypertension Sister    • Cancer Brother    • Diabetes Brother    • Hypertension Brother    • Heart attack Sister    • Stroke Sister    • Cancer Brother        Social History:   Social History     Social History   • Marital status:      Spouse name: N/A   • Number of children: N/A   • Years of education: N/A     Occupational History   • Not on file.     Social History Main Topics   • Smoking status: Current Every Day Smoker     Packs/day: 0.50     Types: Cigarettes   • Smokeless tobacco: Never Used   • Alcohol use No   • Drug use: No   • Sexual activity: Defer     Other Topics Concern   • Not on file     Social History Narrative       Medications:   Current Outpatient Prescriptions   Medication Sig Dispense Refill   • albuterol (PROVENTIL) (2.5 MG/3ML) 0.083% nebulizer solution Take 2.5 mg by nebulization Every 4 (Four) Hours As Needed for wheezing or shortness of air.     • amphetamine-dextroamphetamine (ADDERALL) 20 MG tablet Take 30 mg by mouth 2 (Two) Times a Day. Morning and noon     • ANORO ELLIPTA 62.5-25 MCG/INH aerosol powder  inhaler      • aspirin 81 MG EC tablet Take 81 mg by mouth daily.     • atorvastatin (LIPITOR) 40 MG tablet Take 40 mg by mouth daily.     • carvedilol (COREG) 25 MG tablet Take 25 mg by mouth 2 (two) times a day with meals.     • docusate sodium (COLACE) 250 MG capsule Take 250 mg by mouth 2 (two) times a day.     • DULoxetine (CYMBALTA) 60 MG capsule Take 60 mg by mouth daily.     • fluticasone (FLONASE) 50 MCG/ACT nasal spray 1 spray into each nostril Daily.     • furosemide (LASIX) 40 MG tablet Take 40 mg by mouth daily.     • hydrOXYzine (VISTARIL) 25 MG capsule Take 25 mg by mouth 4 (Four) Times a Day As Needed for Itching.     • isosorbide mononitrate (IMDUR)  "60 MG 24 hr tablet Take 60 mg by mouth daily.     • lidocaine (LIDODERM) 5 % Place 1 patch on the skin every day. Remove & Discard patch within 12 hours or as directed by MD     • lidocaine viscous (XYLOCAINE) 2 % solution Take  by mouth As Needed for Mild Pain .     • loratadine (CLARITIN) 10 MG tablet Take 10 mg by mouth Daily.     • losartan (COZAAR) 100 MG tablet Take 100 mg by mouth daily.     • LYRICA 75 MG capsule 1 capsule 2 (Two) Times a Day.     • methadone (DOLOPHINE) 10 MG tablet Take 10 mg by mouth Every 8 (Eight) Hours As Needed for moderate pain (4-6) ,      • mirtazapine (REMERON) 30 MG tablet 1 tablet Daily.     • montelukast (SINGULAIR) 10 MG tablet Take 10 mg by mouth every night.     • naloxone (NARCAN) 4 MG/0.1ML nasal spray 1 spray into each nostril As Needed.     • nitroglycerin (NITROLINGUAL) 0.4 MG/SPRAY spray Place 1 spray under the tongue Every 5 (Five) Minutes As Needed for Chest Pain.     • pantoprazole (PROTONIX) 40 MG EC tablet 1 tablet Daily.     • polyethylene glycol (MIRALAX) packet Take 17 g by mouth daily.     • potassium chloride (K-DUR,KLOR-CON) 10 MEQ ER tablet Take 20 mEq by mouth 4 (Four) Times a Day.     • promethazine (PHENERGAN) 25 MG tablet Take 25 mg by mouth Every 6 (Six) Hours As Needed for Nausea or Vomiting.     • sitaGLIPtin-metFORMIN (JANUMET)  MG per tablet Take 1 tablet by mouth 2 (two) times a day with meals.     • tolterodine LA (DETROL LA) 4 MG 24 hr capsule Take 4 mg by mouth Daily.     • vitamin D (ERGOCALCIFEROL) 97439 UNITS capsule capsule Take 50,000 Units by mouth 1 (One) Time Per Week.       No current facility-administered medications for this visit.        Allergy:   Allergies   Allergen Reactions   • Ceclor [Cefaclor] Rash       Review of Systems:  Review of Systems    Physical Exam     Vitals:    02/27/18 0940   BP: 138/68   Pulse: 84   SpO2: 98%   Weight: 72.6 kg (160 lb)   Height: 152.4 cm (60\")     GENERAL: Patient is pleasant, " cooperative, appears to be stated age.  Body habitus is endomorphic.  SKIN AND EXTREMITIES:  No skin rashes or lesions are noted.  No cyanosis, clubbing or edema of the extremities.    HEAD:  Head is normocephalic and atraumatic.    NECK: Neck are non-tender without thyromegaly or adenopathy.  Carotic upstrokes are 1+/4.  No cranial or cervical bruits.  The neck is supple with a full range of motion.   ENT: palate elevate symmetrically, no evidence of high arch palate, tongue midline erythema in posterior pharynx, Mallampati Classification Class III   CARDIOVASCULAR:  Regular rate and rhythm with normal S1 and S2 without rub or gallop.  RESPIRATORY:  Clear to auscultation without wheezes or crackle   ABDOMEN:  Soft and non-tender, positive bowel sound without hepatosplenomegaly  BACK:  Back is straight without midline defect.    PSYCH:  Higher cortical function/mental status:  The patient is alert.  She is oriented x3 to time, place and person.  Recent and the remote memory appear normal.  The patient has a good fund of knowledge.  There is no visual or auditory hallucination or suicidal or homicidal ideation.  SPEECH:There is no gross evidence of aphasia, dysarthria or agnosia.      CRANIAL NERVES:  Pupils are 4mm, equal round reactive to light, reacting briskly to 2mm without afferent pupillary defect.  Visual fields are intact to confrontation testing.  Fundoscopic examination reveals sharp disk margins with normal vasculature.  No papilledema, hemorrhages or exudates.  Extraocular movements are full and smooth with normal pursuits and saccades.  No nystagmus noted.  The face is symmetric. palate elevate symmetrically, Tongue midline, positive gag reflex. The remainder of the cranial nerves are intact and symmetrical.    MOTOR: Strength is 5/5 throughout with normal tone and bulk with the following exceptions, 4/5 intrinsic muscles of the hands and feet.  No involuntary movements noted.    Deep Tendon Reflexes:  are 2/4 and symmetrical in the upper extremities, 2/4 and symmetrical at the knees and 1/4 and symmetrical at the Achilles tendon.  Plantar responses were down-going bilaterally.    SENSATION:  Intact to pinprick, light touch, vibration and proprioception.  Coordination:  The patient normally performs finger-nose-finger, heel-to-knee-to-shin and rapid alternating movements in symmetrical fashion.    COORDINATION AND GAIT:  The patient walks with a narrow-based gait.  Patient is able to heel-toe and tandem walk forward and backwards without difficulty.  Romberg and monopedal  Romberg are negative.    MUSCULOSKELETAL: Range of motion normal, no clubbing, cyanosis, or edema.  No joint swelling.            Studies: I have personally reviewed the following and discussed with the patient.  Results for orders placed or performed during the hospital encounter of 01/04/17   OR Potassium   Result Value Ref Range    Potassium, OR 4.33 3.5 - 5.3 mmol/L   Hemoglobin A1c   Result Value Ref Range    Hemoglobin A1C 6.60 (H) 4.80 - 5.60 %   CBC Auto Differential   Result Value Ref Range    WBC 16.01 (H) 3.50 - 10.80 10*3/mm3    RBC 3.28 (L) 3.89 - 5.14 10*6/mm3    Hemoglobin 9.4 (L) 11.5 - 15.5 g/dL    Hematocrit 28.7 (L) 34.5 - 44.0 %    MCV 87.5 80.0 - 99.0 fL    MCH 28.7 27.0 - 31.0 pg    MCHC 32.8 32.0 - 36.0 g/dL    RDW 14.8 (H) 11.3 - 14.5 %    RDW-SD 47.7 37.0 - 54.0 fl    MPV 10.6 6.0 - 12.0 fL    Platelets 181 150 - 450 10*3/mm3    Neutrophil % 85.4 (H) 41.0 - 71.0 %    Lymphocyte % 8.0 (L) 24.0 - 44.0 %    Monocyte % 6.1 0.0 - 12.0 %    Eosinophil % 0.0 0.0 - 3.0 %    Basophil % 0.1 0.0 - 1.0 %    Immature Grans % 0.4 0.0 - 0.6 %    Neutrophils, Absolute 13.68 (H) 1.50 - 8.30 10*3/mm3    Lymphocytes, Absolute 1.28 0.60 - 4.80 10*3/mm3    Monocytes, Absolute 0.97 0.00 - 1.00 10*3/mm3    Eosinophils, Absolute 0.00 (L) 0.10 - 0.30 10*3/mm3    Basophils, Absolute 0.01 0.00 - 0.20 10*3/mm3    Immature Grans, Absolute 0.07 (H)  0.00 - 0.03 10*3/mm3   Basic Metabolic Panel   Result Value Ref Range    Glucose 169 (H) 70 - 100 mg/dL    BUN 27 (H) 9 - 23 mg/dL    Creatinine 1.40 (H) 0.60 - 1.30 mg/dL    Sodium 138 132 - 146 mmol/L    Potassium 4.0 3.5 - 5.5 mmol/L    Chloride 99 99 - 109 mmol/L    CO2 32.0 (H) 20.0 - 31.0 mmol/L    Calcium 9.2 8.7 - 10.4 mg/dL    eGFR  African Amer 45 (L) >60 mL/min/1.73    BUN/Creatinine Ratio 19.3 7.0 - 25.0    Anion Gap 7.0 3.0 - 11.0 mmol/L   CBC Auto Differential   Result Value Ref Range    WBC 14.95 (H) 3.50 - 10.80 10*3/mm3    RBC 2.91 (L) 3.89 - 5.14 10*6/mm3    Hemoglobin 8.4 (L) 11.5 - 15.5 g/dL    Hematocrit 25.5 (L) 34.5 - 44.0 %    MCV 87.6 80.0 - 99.0 fL    MCH 28.9 27.0 - 31.0 pg    MCHC 32.9 32.0 - 36.0 g/dL    RDW 14.8 (H) 11.3 - 14.5 %    RDW-SD 47.4 37.0 - 54.0 fl    MPV 10.5 6.0 - 12.0 fL    Platelets 179 150 - 450 10*3/mm3    Neutrophil % 63.2 41.0 - 71.0 %    Lymphocyte % 23.8 (L) 24.0 - 44.0 %    Monocyte % 11.7 0.0 - 12.0 %    Eosinophil % 0.7 0.0 - 3.0 %    Basophil % 0.2 0.0 - 1.0 %    Immature Grans % 0.4 0.0 - 0.6 %    Neutrophils, Absolute 9.45 (H) 1.50 - 8.30 10*3/mm3    Lymphocytes, Absolute 3.56 0.60 - 4.80 10*3/mm3    Monocytes, Absolute 1.75 (H) 0.00 - 1.00 10*3/mm3    Eosinophils, Absolute 0.10 0.10 - 0.30 10*3/mm3    Basophils, Absolute 0.03 0.00 - 0.20 10*3/mm3    Immature Grans, Absolute 0.06 (H) 0.00 - 0.03 10*3/mm3   POC Glucose Fingerstick   Result Value Ref Range    Glucose 107 70 - 130 mg/dL   POC Glucose Fingerstick   Result Value Ref Range    Glucose 443 (H) 70 - 130 mg/dL   POC Glucose Fingerstick   Result Value Ref Range    Glucose 415 (H) 70 - 130 mg/dL   POC Glucose Fingerstick   Result Value Ref Range    Glucose 256 (H) 70 - 130 mg/dL   POC Glucose Fingerstick   Result Value Ref Range    Glucose 196 (H) 70 - 130 mg/dL   POC Glucose Fingerstick   Result Value Ref Range    Glucose 170 (H) 70 - 130 mg/dL   POC Glucose Fingerstick   Result Value Ref Range     "Glucose 176 (H) 70 - 130 mg/dL   POC Glucose Fingerstick   Result Value Ref Range    Glucose 121 70 - 130 mg/dL   POC Glucose Fingerstick   Result Value Ref Range    Glucose 216 (H) 70 - 130 mg/dL   POC Glucose Fingerstick   Result Value Ref Range    Glucose 195 (H) 70 - 130 mg/dL   POC Glucose Fingerstick   Result Value Ref Range    Glucose 205 (H) 70 - 130 mg/dL   POC Glucose Fingerstick   Result Value Ref Range    Glucose 162 (H) 70 - 130 mg/dL   POC Glucose Fingerstick   Result Value Ref Range    Glucose 239 (H) 70 - 130 mg/dL   POC Glucose Fingerstick   Result Value Ref Range    Glucose 174 (H) 70 - 130 mg/dL   POC Glucose Fingerstick   Result Value Ref Range    Glucose 249 (H) 70 - 130 mg/dL   POC Glucose Fingerstick   Result Value Ref Range    Glucose 157 (H) 70 - 130 mg/dL   POC Glucose Fingerstick   Result Value Ref Range    Glucose 147 (H) 70 - 130 mg/dL   Type & Screen   Result Value Ref Range    ABO Type O     RH type Positive     Antibody Screen Negative        Review of Imaging: I have personally reviewed the following images and discussed with the patient.  No results found.      Records Reviewed: I have personally reviewed her previous medical record.    There are no diagnoses linked to this encounter.    Treatments:    Discussion:  Migraine Headache  Migraine headaches are a major public health problem affecting more than 28 million persons in this country. Nearly 25 percent of women and 9 percent of men experience disabling migraines.    Migraine treatment depends on the duration and severity of pain, associated symptoms, degree of disability, and initial response to therapy.  A widely prescribed and effective class of medications for migraines is the 5-HT1 receptor-specific agonists (\"triptans\").  Contraindications to their use include ischemic vascular conditions, vasospastic coronary disease, uncontrolled hypertension, or other significant cardiovascular disease.  Following appropriate " management of acute migraine, patients should be evaluated for initiation of preventive therapy. Factors that should prompt consideration of preventive therapy include the occurrence of two or more migraines per month with disability lasting three or more days per month; failure of, contraindication for, or adverse events from acute treatments; use of abortive medication more than twice per week; and uncommon migraine conditions (e.g., hemiplegic migraine, migraine with prolonged aura, migrainous infarction). Patient preference and cost also should be considered.  Evidence consistently supports the use of the beta blocker propranolol (Inderal) in migraine prophylaxis. Amitriptyline is a first-line agent for migraine prophylaxis and is the only antidepressant with consistent evidence supporting its effectiveness for this use. Divalproex (Depakote) and sodium valproate are well supported by evidence for use in migraine prevention.  Topamax has also been studies for migraine prophylaxis  in open label studies and double blind studies. Evidence does not support the use of diltiazem (Cardizem) in migraine prevention, and the evidence for several other calcium channel blockers, such as nifedipine (Procardia), is poor and suggests only modest effect  Menstrual Migraine can present a challenge to clinician.  Estrogen withdrawal has been shown to precipitate migraine headaches, and a sustained elevated level of estrogen will postpone the migraine. Transdermal estrogen started just before menstruation can provide a sustained low level of estrogen, decreasing the degree of estrogen decline, and thus may prevent induction of migraines    This Document is signed by Alfredo Jules MD, FAAN, FAASM February 27, 201810:06 AM

## 2018-07-06 RX ORDER — AMITRIPTYLINE HYDROCHLORIDE 10 MG/1
TABLET, FILM COATED ORAL
Qty: 30 TABLET | Refills: 3 | Status: SHIPPED | OUTPATIENT
Start: 2018-07-06 | End: 2018-10-23 | Stop reason: SDUPTHER

## 2018-10-24 RX ORDER — AMITRIPTYLINE HYDROCHLORIDE 10 MG/1
TABLET, FILM COATED ORAL
Qty: 30 TABLET | Refills: 3 | Status: SHIPPED | OUTPATIENT
Start: 2018-10-24 | End: 2021-08-16

## 2020-04-30 ENCOUNTER — OFFICE VISIT (OUTPATIENT)
Dept: GASTROENTEROLOGY | Facility: CLINIC | Age: 75
End: 2020-04-30

## 2020-04-30 DIAGNOSIS — N18.9 CHRONIC KIDNEY DISEASE, UNSPECIFIED CKD STAGE: ICD-10-CM

## 2020-04-30 DIAGNOSIS — Z72.0 TOBACCO USE: ICD-10-CM

## 2020-04-30 DIAGNOSIS — G62.9 POLYNEUROPATHY: ICD-10-CM

## 2020-04-30 DIAGNOSIS — K59.03 DRUG-INDUCED CONSTIPATION: Primary | ICD-10-CM

## 2020-04-30 DIAGNOSIS — I77.1 CELIAC ARTERY STENOSIS (HCC): ICD-10-CM

## 2020-04-30 DIAGNOSIS — R10.84 GENERALIZED ABDOMINAL PAIN: ICD-10-CM

## 2020-04-30 PROCEDURE — 99442 PR PHYS/QHP TELEPHONE EVALUATION 11-20 MIN: CPT | Performed by: INTERNAL MEDICINE

## 2020-04-30 RX ORDER — ESOMEPRAZOLE MAGNESIUM 40 MG/1
40 CAPSULE, DELAYED RELEASE ORAL
COMMUNITY
End: 2022-06-15

## 2020-04-30 NOTE — PROGRESS NOTES
PCP: Aiden Spencer MD    Chief Complaint   Patient presents with   • Constipation   • Abdominal Pain       History of Present Illness:   HPI  This was a telephone visit.  The patient consented for telephone visit.  Ms. Krishnan is a 75-year-old with a history of coronary disease, breast cancer, diabetes and hypertension.  She is on methadone for chronic back pain.  The patient has greater than a 60-pack-year history of tobacco use.  She has been followed over the years by Dr. Brunner for chronic constipation.  The records that were available demonstrate the patient was tried on Linzess without relief.  She also apparently took Amitiza without success with regard to regulating bowel function.  The patient is not sure if she has actually been on Relistor or Movantik in the past.  Her last colon examination was 2011 and there are no abnormalities other than hemorrhoids.  The patient will have 1 bowel movement a week that is large in caliber.  There is no history of maia blood in the stool.  She does admit to some occasional crampy abdominal pain that can occur 3 to 4 days a week.  The discomfort can be related to meals at times.  Ms. Krishnan denies any vomiting or nausea.  There is no history of unexplained weight loss.  She admits that due to her multiple medical problems she is not very ambulatory.  The patient does use a cane.  The patient states that she would generally try Dulcolax in addition to some Metamucil.  Ms. Krishnan admits that her water intake is not significant on a daily basis.  She primarily will drink fruit juice.  Her dietary habits include some vegetables but not on a daily basis.  Past Medical History:   Diagnosis Date   • Anemia    • Arthritis    • Back problem    • CAD (coronary artery disease)    • Cancer (CMS/HCC)     Right breast   • Chronic back pain    • Chronically on opiate therapy    • Diabetes mellitus (CMS/HCC)     DX 14 years ago- checks fsbs weekly   • Fibromyalgia     • Gastroparesis    • GERD (gastroesophageal reflux disease)    • Headache     emotional/tension   • HTN (hypertension)    • Hypercholesteremia    • IBS (irritable bowel syndrome)    • Incontinence of urine     urgency   • Migraine headache    • Myalgia and myositis    • Peripheral neuropathy    • Sleep apnea    • Sleep apnea     does not wear cpap       Past Surgical History:   Procedure Laterality Date   • APPENDECTOMY     • BACK SURGERY      3 x per provided history   • BRAIN TUMOR EXCISION  1988   • BREAST BIOPSY     • CHOLECYSTECTOMY     • COLONOSCOPY      2015   • CRANIOTOMY FOR TUMOR     • EYE SURGERY      bilateral cataracts removed   • HEMORRHOIDECTOMY     • LUMBAR FUSION N/A 1/4/2017    Procedure: LUMBAR LAMINECTOMY AND DECOMRESSION  L3 AND L4;  Surgeon: Nishant Bhatti MD;  Location: [facility];  Service:    • TOTAL ABDOMINAL HYSTERECTOMY     • TRIGGER FINGER RELEASE           Current Outpatient Medications:   •  albuterol (PROVENTIL) (2.5 MG/3ML) 0.083% nebulizer solution, Take 2.5 mg by nebulization Every 4 (Four) Hours As Needed for wheezing or shortness of air., Disp: , Rfl:   •  amitriptyline (ELAVIL) 10 MG tablet, TAKE ONE TABLET BY MOUTH AT BEDTIME, Disp: 30 tablet, Rfl: 3  •  amphetamine-dextroamphetamine (ADDERALL) 20 MG tablet, Take 30 mg by mouth 2 (Two) Times a Day. Morning and noon, Disp: , Rfl:   •  ANORO ELLIPTA 62.5-25 MCG/INH aerosol powder  inhaler, , Disp: , Rfl:   •  aspirin 81 MG EC tablet, Take 81 mg by mouth daily., Disp: , Rfl:   •  atorvastatin (LIPITOR) 40 MG tablet, Take 40 mg by mouth daily., Disp: , Rfl:   •  carvedilol (COREG) 25 MG tablet, Take 25 mg by mouth 2 (two) times a day with meals., Disp: , Rfl:   •  docusate sodium (COLACE) 250 MG capsule, Take 250 mg by mouth 2 (two) times a day., Disp: , Rfl:   •  esomeprazole (nexIUM) 40 MG capsule, Take 40 mg by mouth Every Morning Before Breakfast., Disp: , Rfl:   •  fluticasone (FLONASE) 50 MCG/ACT nasal spray, 1 spray  into each nostril Daily., Disp: , Rfl:   •  furosemide (LASIX) 40 MG tablet, Take 40 mg by mouth daily., Disp: , Rfl:   •  hydrOXYzine (VISTARIL) 25 MG capsule, Take 25 mg by mouth 4 (Four) Times a Day As Needed for Itching., Disp: , Rfl:   •  isosorbide mononitrate (IMDUR) 60 MG 24 hr tablet, Take 60 mg by mouth daily., Disp: , Rfl:   •  lidocaine (LIDODERM) 5 %, Place 1 patch on the skin every day. Remove & Discard patch within 12 hours or as directed by MD, Disp: , Rfl:   •  lidocaine viscous (XYLOCAINE) 2 % solution, Take  by mouth As Needed for Mild Pain ., Disp: , Rfl:   •  loratadine (CLARITIN) 10 MG tablet, Take 10 mg by mouth Daily., Disp: , Rfl:   •  losartan (COZAAR) 100 MG tablet, Take 100 mg by mouth daily., Disp: , Rfl:   •  methadone (DOLOPHINE) 10 MG tablet, Take 10 mg by mouth Every 8 (Eight) Hours As Needed for moderate pain (4-6) , , Disp: , Rfl:   •  montelukast (SINGULAIR) 10 MG tablet, Take 10 mg by mouth every night., Disp: , Rfl:   •  naloxone (NARCAN) 4 MG/0.1ML nasal spray, 1 spray into each nostril As Needed., Disp: , Rfl:   •  nitroglycerin (NITROLINGUAL) 0.4 MG/SPRAY spray, Place 1 spray under the tongue Every 5 (Five) Minutes As Needed for Chest Pain., Disp: , Rfl:   •  polyethylene glycol (MIRALAX) packet, Take 17 g by mouth daily., Disp: , Rfl:   •  potassium chloride (K-DUR,KLOR-CON) 10 MEQ ER tablet, Take 20 mEq by mouth 4 (Four) Times a Day., Disp: , Rfl:   •  SITagliptin (JANUVIA) 50 MG tablet, Take 50 mg by mouth Daily., Disp: , Rfl:   •  tolterodine LA (DETROL LA) 4 MG 24 hr capsule, Take 4 mg by mouth Daily., Disp: , Rfl:   •  vitamin D (ERGOCALCIFEROL) 90423 UNITS capsule capsule, Take 50,000 Units by mouth 1 (One) Time Per Week., Disp: , Rfl:     Allergies   Allergen Reactions   • Ceclor [Cefaclor] Rash       Family History   Problem Relation Age of Onset   • Cancer Other    • Diabetes Other    • Hyperlipidemia Other    • Heart attack Other    • Hypertension Other    • Heart  attack Mother    • Diabetes Mother    • Heart disease Mother    • Hypertension Mother    • Cancer Father    • Alcohol abuse Father    • Heart attack Sister    • Diabetes Sister    • Heart disease Sister    • Hypertension Sister    • Cancer Brother    • Diabetes Brother    • Hypertension Brother    • Heart attack Sister    • Stroke Sister    • Cancer Brother        Social History     Socioeconomic History   • Marital status:      Spouse name: Not on file   • Number of children: Not on file   • Years of education: Not on file   • Highest education level: Not on file   Tobacco Use   • Smoking status: Current Every Day Smoker     Packs/day: 0.50     Types: Cigarettes   • Smokeless tobacco: Never Used   Substance and Sexual Activity   • Alcohol use: No   • Drug use: No   • Sexual activity: Defer       Review of Systems   Constitutional: Negative for appetite change, fatigue, fever and unexpected weight change.   HENT: Negative for dental problem, mouth sores, postnasal drip, sneezing, trouble swallowing and voice change.    Eyes: Negative for pain, redness and itching.   Respiratory: Negative for cough, shortness of breath and wheezing.    Cardiovascular: Negative for chest pain, palpitations and leg swelling.   Gastrointestinal: Positive for abdominal pain and constipation. Negative for abdominal distention, anal bleeding, blood in stool, diarrhea, nausea, rectal pain and vomiting.   Endocrine: Negative for cold intolerance, heat intolerance, polydipsia and polyuria.   Genitourinary: Negative for dysuria, enuresis, flank pain, hematuria and urgency.   Musculoskeletal: Negative for arthralgias, joint swelling and myalgias.   Skin: Negative for color change, pallor and rash.   Allergic/Immunologic: Negative for environmental allergies, food allergies and immunocompromised state.   Neurological: Positive for numbness. Negative for dizziness, tremors, seizures, facial asymmetry and headaches.    Psychiatric/Behavioral: Negative for behavioral problems, dysphoric mood, hallucinations and self-injury.       There were no vitals filed for this visit.    Physical Exam    Arminda was seen today for constipation and abdominal pain.    Diagnoses and all orders for this visit:    Drug-induced constipation    Generalized abdominal pain  -     Duplex Mesenteric Complete CAR; Future    Celiac artery stenosis (CMS/HCC)  -     Duplex Mesenteric Complete CAR; Future    Tobacco use    Polyneuropathy    Chronic kidney disease, unspecified CKD stage    Patient has a history of constipation that is chronic.  The etiology is opioid-induced constipation.  This is complicated by diabetes mellitus and generalized neuropathy.  I reviewed CT scan from 6 years ago and there was evidence of severe celiac artery narrowing.  This patient with concomitant use of tobacco is high risk for intestinal ischemia.  There was also elevation of creatinine from 2017.  I do not have any labs to review to determine if the renal function is stable.  The patient is unaware of her lab data.      Plan: Will schedule the patient for a duplex scan to evaluate the celiac artery trunk and superior mesenteric artery.           Encouraged tobacco cessation.           Will ask for copy of labs from primary care office.           Consider the use of Relistor or Movantik.           Encouraged augmentation of water consumption to 4 glasses daily and enhance fruit and vegetable intake.    This was a telephone visit for 20 minutes.

## 2020-04-30 NOTE — PROGRESS NOTES
You have chosen to receive care through a telephone visit. Do you consent to use a telephone visit for your medical care today? Yes    PCP: Aiden Spencer MD    Chief Complaint   Patient presents with   • Constipation   • Abdominal Pain       History of Present Illness:   HPI    Past Medical History:   Diagnosis Date   • Anemia    • Arthritis    • Back problem    • CAD (coronary artery disease)    • Cancer (CMS/HCC)     Right breast   • Chronic back pain    • Chronically on opiate therapy    • Diabetes mellitus (CMS/HCC)     DX 14 years ago- checks fsbs weekly   • Fibromyalgia    • Gastroparesis    • GERD (gastroesophageal reflux disease)    • Headache     emotional/tension   • HTN (hypertension)    • Hypercholesteremia    • IBS (irritable bowel syndrome)    • Incontinence of urine     urgency   • Migraine headache    • Myalgia and myositis    • Peripheral neuropathy    • Sleep apnea    • Sleep apnea     does not wear cpap       Past Surgical History:   Procedure Laterality Date   • APPENDECTOMY     • BACK SURGERY      3 x per provided history   • BRAIN TUMOR EXCISION  1988   • BREAST BIOPSY     • CHOLECYSTECTOMY     • COLONOSCOPY      2015   • CRANIOTOMY FOR TUMOR     • EYE SURGERY      bilateral cataracts removed   • HEMORRHOIDECTOMY     • LUMBAR FUSION N/A 1/4/2017    Procedure: LUMBAR LAMINECTOMY AND DECOMRESSION  L3 AND L4;  Surgeon: Nishant Bhatti MD;  Location: [facility];  Service:    • TOTAL ABDOMINAL HYSTERECTOMY     • TRIGGER FINGER RELEASE           Current Outpatient Medications:   •  albuterol (PROVENTIL) (2.5 MG/3ML) 0.083% nebulizer solution, Take 2.5 mg by nebulization Every 4 (Four) Hours As Needed for wheezing or shortness of air., Disp: , Rfl:   •  amitriptyline (ELAVIL) 10 MG tablet, TAKE ONE TABLET BY MOUTH AT BEDTIME, Disp: 30 tablet, Rfl: 3  •  amphetamine-dextroamphetamine (ADDERALL) 20 MG tablet, Take 30 mg by mouth 2 (Two) Times a Day. Morning and noon, Disp: , Rfl:   •   ANORO ELLIPTA 62.5-25 MCG/INH aerosol powder  inhaler, , Disp: , Rfl:   •  aspirin 81 MG EC tablet, Take 81 mg by mouth daily., Disp: , Rfl:   •  atorvastatin (LIPITOR) 40 MG tablet, Take 40 mg by mouth daily., Disp: , Rfl:   •  carvedilol (COREG) 25 MG tablet, Take 25 mg by mouth 2 (two) times a day with meals., Disp: , Rfl:   •  docusate sodium (COLACE) 250 MG capsule, Take 250 mg by mouth 2 (two) times a day., Disp: , Rfl:   •  esomeprazole (nexIUM) 40 MG capsule, Take 40 mg by mouth Every Morning Before Breakfast., Disp: , Rfl:   •  fluticasone (FLONASE) 50 MCG/ACT nasal spray, 1 spray into each nostril Daily., Disp: , Rfl:   •  furosemide (LASIX) 40 MG tablet, Take 40 mg by mouth daily., Disp: , Rfl:   •  hydrOXYzine (VISTARIL) 25 MG capsule, Take 25 mg by mouth 4 (Four) Times a Day As Needed for Itching., Disp: , Rfl:   •  isosorbide mononitrate (IMDUR) 60 MG 24 hr tablet, Take 60 mg by mouth daily., Disp: , Rfl:   •  lidocaine (LIDODERM) 5 %, Place 1 patch on the skin every day. Remove & Discard patch within 12 hours or as directed by MD, Disp: , Rfl:   •  lidocaine viscous (XYLOCAINE) 2 % solution, Take  by mouth As Needed for Mild Pain ., Disp: , Rfl:   •  loratadine (CLARITIN) 10 MG tablet, Take 10 mg by mouth Daily., Disp: , Rfl:   •  losartan (COZAAR) 100 MG tablet, Take 100 mg by mouth daily., Disp: , Rfl:   •  methadone (DOLOPHINE) 10 MG tablet, Take 10 mg by mouth Every 8 (Eight) Hours As Needed for moderate pain (4-6) , , Disp: , Rfl:   •  montelukast (SINGULAIR) 10 MG tablet, Take 10 mg by mouth every night., Disp: , Rfl:   •  naloxone (NARCAN) 4 MG/0.1ML nasal spray, 1 spray into each nostril As Needed., Disp: , Rfl:   •  nitroglycerin (NITROLINGUAL) 0.4 MG/SPRAY spray, Place 1 spray under the tongue Every 5 (Five) Minutes As Needed for Chest Pain., Disp: , Rfl:   •  polyethylene glycol (MIRALAX) packet, Take 17 g by mouth daily., Disp: , Rfl:   •  potassium chloride (K-DUR,KLOR-CON) 10 MEQ ER  tablet, Take 20 mEq by mouth 4 (Four) Times a Day., Disp: , Rfl:   •  SITagliptin (JANUVIA) 50 MG tablet, Take 50 mg by mouth Daily., Disp: , Rfl:   •  tolterodine LA (DETROL LA) 4 MG 24 hr capsule, Take 4 mg by mouth Daily., Disp: , Rfl:   •  vitamin D (ERGOCALCIFEROL) 07003 UNITS capsule capsule, Take 50,000 Units by mouth 1 (One) Time Per Week., Disp: , Rfl:     Allergies   Allergen Reactions   • Ceclor [Cefaclor] Rash       Family History   Problem Relation Age of Onset   • Cancer Other    • Diabetes Other    • Hyperlipidemia Other    • Heart attack Other    • Hypertension Other    • Heart attack Mother    • Diabetes Mother    • Heart disease Mother    • Hypertension Mother    • Cancer Father    • Alcohol abuse Father    • Heart attack Sister    • Diabetes Sister    • Heart disease Sister    • Hypertension Sister    • Cancer Brother    • Diabetes Brother    • Hypertension Brother    • Heart attack Sister    • Stroke Sister    • Cancer Brother        Social History     Socioeconomic History   • Marital status:      Spouse name: Not on file   • Number of children: Not on file   • Years of education: Not on file   • Highest education level: Not on file   Tobacco Use   • Smoking status: Current Every Day Smoker     Packs/day: 0.50     Types: Cigarettes   • Smokeless tobacco: Never Used   Substance and Sexual Activity   • Alcohol use: No   • Drug use: No   • Sexual activity: Defer       Review of Systems    There were no vitals filed for this visit.    Physical Exam    There are no diagnoses linked to this encounter.      No follow-ups on file.

## 2020-05-13 ENCOUNTER — TELEPHONE (OUTPATIENT)
Dept: GASTROENTEROLOGY | Facility: CLINIC | Age: 75
End: 2020-05-13

## 2020-05-13 NOTE — TELEPHONE ENCOUNTER
Voicemail regarding patient test that was ordered from her telephone visit on 4/30/2020. Returned call in regards to vm, no answer, recording states this number needs  assistance

## 2020-06-08 ENCOUNTER — HOSPITAL ENCOUNTER (OUTPATIENT)
Dept: CARDIOLOGY | Facility: HOSPITAL | Age: 75
End: 2020-06-08

## 2020-07-02 ENCOUNTER — HOSPITAL ENCOUNTER (OUTPATIENT)
Dept: CARDIOLOGY | Facility: HOSPITAL | Age: 75
Discharge: HOME | End: 2020-07-02
Admitting: INTERNAL MEDICINE

## 2020-07-02 VITALS — BODY MASS INDEX: 31.41 KG/M2 | WEIGHT: 160 LBS | HEIGHT: 60 IN

## 2020-07-02 DIAGNOSIS — Z72.0 TOBACCO USE: ICD-10-CM

## 2020-07-02 DIAGNOSIS — R10.84 GENERALIZED ABDOMINAL PAIN: ICD-10-CM

## 2020-07-02 DIAGNOSIS — I77.1 CELIAC ARTERY STENOSIS (HCC): ICD-10-CM

## 2020-07-02 PROCEDURE — 93975 VASCULAR STUDY: CPT

## 2020-07-06 LAB
BH CV VAS SMA 1ST PP TIME: 15 MIN
BH CV VAS SMA 2ND PP TIME: 30 MIN
BH CV VAS SMA 3RD PP TIME: 45 MIN
BH CV VAS SMA AORTA EDV: 11.9 CM/S
BH CV VAS SMA AORTA PSV: 86.9 CM/S
BH CV VAS SMA CELIAC MID EDV: 119 CM/S
BH CV VAS SMA CELIAC MID PSV: 538 CM/S
BH CV VAS SMA CELIAC ORIGIN EDV: 122 CM/S
BH CV VAS SMA CELIAC ORIGIN PSV: 589 CM/S
BH CV VAS SMA CELIAC PROX EDV: 146 CM/S
BH CV VAS SMA CELIAC PROX PSV: 600 CM/S
BH CV VAS SMA HEPATIC EDV: 21.7 CM/S
BH CV VAS SMA HEPATIC PSV: 160 CM/S
BH CV VAS SMA IMA PSV: 229 CM/S
BH CV VAS SMA ORIGIN EDV: 26.9 CM/S
BH CV VAS SMA ORIGIN PSV: 275 CM/S
BH CV VAS SMA SMA DIST PSV: 143 CM/S
BH CV VAS SMA SMA MID EDV: 19.6 CM/S
BH CV VAS SMA SMA MID PSV: 147 CM/S
BH CV VAS SMA SMA PROX EDV: 24.4 CM/S
BH CV VAS SMA SMA PROX PSV: 270 CM/S
BH CV VAS SMA SPLENIC EDV: 19.2 CM/S
BH CV VAS SMA SPLENIC PSV: 110 CM/S

## 2020-07-07 ENCOUNTER — TELEPHONE (OUTPATIENT)
Dept: GASTROENTEROLOGY | Facility: CLINIC | Age: 75
End: 2020-07-07

## 2020-07-07 NOTE — TELEPHONE ENCOUNTER
I called and discussed the results of the duplex scan of the celiac and SMA.  There was evidence of high-grade stenosis involving the celiac artery trunk.  I discussed this case with the patient.  She does have some abdominal pain with meals and also complains of constipation.  The constipation is related to opioid medication.  She does agree to have a consultation with vascular surgery or radiology to discuss options.

## 2020-07-29 ENCOUNTER — PREP FOR SURGERY (OUTPATIENT)
Dept: OTHER | Facility: HOSPITAL | Age: 75
End: 2020-07-29

## 2020-07-29 ENCOUNTER — TELEPHONE (OUTPATIENT)
Dept: GASTROENTEROLOGY | Facility: CLINIC | Age: 75
End: 2020-07-29

## 2020-07-29 DIAGNOSIS — K55.1 INTESTINAL ANGINA (HCC): ICD-10-CM

## 2020-07-29 DIAGNOSIS — M48.062 SPINAL STENOSIS, LUMBAR REGION, WITH NEUROGENIC CLAUDICATION: Primary | ICD-10-CM

## 2020-07-29 NOTE — TELEPHONE ENCOUNTER
DR. DORSEY,  I SPOKE WITH ROXANNE REGARDING THIS PATIENT. DR. GAFFNEY WOULD LIKE TO HAVE PATIENT GET CT ABD PELVIS ANGIOGRAM BEFORE SCHEDULING. AFTER PATIENT HAS IMAGING DONE, SHE SAID  PLEASE CALL DR. GAFFNEY [phone] TO GET PATIENT SCHEDULED. HE LIKES TO SPEAK WITH PHYSICIANS ABOUT THE PATIENT.    THANKS   SEKOU    I HAVE ORDERED IMAGING FOR YOU TO SIGN.

## 2020-07-31 NOTE — TELEPHONE ENCOUNTER
I UNDERSTAND THAT, HIS  SPOKE WITH HIM AND HE WANTED THE IMAGING TO CHECK RENAL FAILURE BEFORE THEY SCHEDULE.

## 2020-08-20 DIAGNOSIS — R00.2 PALPITATIONS: Primary | ICD-10-CM

## 2020-10-12 ENCOUNTER — HOSPITAL ENCOUNTER (OUTPATIENT)
Dept: CT IMAGING | Facility: HOSPITAL | Age: 75
Discharge: HOME | End: 2020-10-12

## 2020-10-12 DIAGNOSIS — K55.1 INTESTINAL ANGINA (HCC): ICD-10-CM

## 2020-10-30 ENCOUNTER — APPOINTMENT (OUTPATIENT)
Dept: CT IMAGING | Facility: HOSPITAL | Age: 75
End: 2020-10-30

## 2020-11-05 ENCOUNTER — APPOINTMENT (OUTPATIENT)
Dept: CT IMAGING | Facility: HOSPITAL | Age: 75
End: 2020-11-05

## 2020-12-16 ENCOUNTER — APPOINTMENT (OUTPATIENT)
Dept: CT IMAGING | Facility: HOSPITAL | Age: 75
End: 2020-12-16

## 2021-03-04 ENCOUNTER — HOSPITAL ENCOUNTER (OUTPATIENT)
Dept: CT IMAGING | Facility: HOSPITAL | Age: 76
Discharge: HOME | End: 2021-03-04
Admitting: INTERNAL MEDICINE

## 2021-03-04 PROCEDURE — 74174 CTA ABD&PLVS W/CONTRAST: CPT

## 2021-03-04 PROCEDURE — 82565 ASSAY OF CREATININE: CPT

## 2021-03-04 PROCEDURE — 0 IOPAMIDOL PER 1 ML: Performed by: INTERNAL MEDICINE

## 2021-03-04 RX ADMIN — IOPAMIDOL 85 ML: 755 INJECTION, SOLUTION INTRAVENOUS at 18:09

## 2021-03-05 LAB — CREAT BLDA-MCNC: 1.6 MG/DL (ref 0.6–1.3)

## 2021-03-09 ENCOUNTER — TELEPHONE (OUTPATIENT)
Dept: GASTROENTEROLOGY | Facility: CLINIC | Age: 76
End: 2021-03-09

## 2021-03-09 NOTE — TELEPHONE ENCOUNTER
I called and left a message for Ms. Krishnan.  I stated that there is narrowing in the celiac artery which from a symptom standpoint can cause pain in the abdomen with meals.  The next step would be to consider a stent placed by interventional radiology or cardiology.

## 2021-03-09 NOTE — TELEPHONE ENCOUNTER
----- Message from Cr De Jesus MD sent at 3/8/2021  5:03 PM EST -----  Let MsJames Ariella know that there is stenosis that is severe at the celiac axis.  She will need to see Dr. Martinez with interventional radiology or  an interventional cardiologist.

## 2021-08-16 ENCOUNTER — OFFICE VISIT (OUTPATIENT)
Dept: GASTROENTEROLOGY | Facility: CLINIC | Age: 76
End: 2021-08-16

## 2021-08-16 VITALS
SYSTOLIC BLOOD PRESSURE: 124 MMHG | BODY MASS INDEX: 26.15 KG/M2 | HEIGHT: 60 IN | HEART RATE: 111 BPM | DIASTOLIC BLOOD PRESSURE: 76 MMHG | OXYGEN SATURATION: 97 % | WEIGHT: 133.2 LBS | TEMPERATURE: 97.8 F

## 2021-08-16 DIAGNOSIS — K59.04 CHRONIC IDIOPATHIC CONSTIPATION: Primary | ICD-10-CM

## 2021-08-16 DIAGNOSIS — K59.03 DRUG-INDUCED CONSTIPATION: ICD-10-CM

## 2021-08-16 DIAGNOSIS — R10.84 GENERALIZED ABDOMINAL PAIN: ICD-10-CM

## 2021-08-16 DIAGNOSIS — I77.1 CELIAC ARTERY STENOSIS (HCC): ICD-10-CM

## 2021-08-16 PROCEDURE — 99214 OFFICE O/P EST MOD 30 MIN: CPT | Performed by: NURSE PRACTITIONER

## 2021-08-16 RX ORDER — TRAMADOL HYDROCHLORIDE 50 MG/1
100 TABLET ORAL 2 TIMES DAILY PRN
COMMUNITY
Start: 2021-08-02 | End: 2023-02-16

## 2021-08-16 RX ORDER — HYDROCODONE BITARTRATE AND ACETAMINOPHEN 7.5; 325 MG/1; MG/1
1 TABLET ORAL 3 TIMES DAILY PRN
COMMUNITY
Start: 2021-08-02 | End: 2021-08-16

## 2021-08-16 RX ORDER — TEMAZEPAM 15 MG/1
15 CAPSULE ORAL
COMMUNITY
Start: 2021-07-27 | End: 2021-08-16

## 2021-08-16 RX ORDER — AMOXICILLIN 250 MG
2 CAPSULE ORAL DAILY
Qty: 60 TABLET | Refills: 11 | Status: SHIPPED | OUTPATIENT
Start: 2021-08-16 | End: 2022-02-16

## 2021-08-16 RX ORDER — DULOXETIN HYDROCHLORIDE 60 MG/1
60 CAPSULE, DELAYED RELEASE ORAL DAILY
COMMUNITY
Start: 2021-08-12 | End: 2022-09-02

## 2021-08-16 RX ORDER — LUBIPROSTONE 24 UG/1
24 CAPSULE ORAL 2 TIMES DAILY WITH MEALS
Qty: 60 CAPSULE | Refills: 5 | Status: SHIPPED | OUTPATIENT
Start: 2021-08-16 | End: 2022-02-16

## 2021-08-16 NOTE — PROGRESS NOTES
Follow Up      Patient Name: Jo-Ann Krishnan  : 1945   MRN: [id number]     Chief Complaint:    Chief Complaint   Patient presents with   • Constipation       History of Present Illness: Jo-Ann Krishnan is a 76 y.o. female who is here today for follow up on constipation.     Jo-Ann underwent CT angio abdomen in April due to postprandial abdominal pain which revealed severe stenosis of the celiac axis.  But he is on a statin, and aspirin, and blood pressure medication.  She does unfortunately use tobacco.  She is unsure if she would like a referral to vascular surgery at this time as she does not feel she can go through another surgery.  She continues to remain constipated.  Previously was on MiraLAX but cannot stand the taste any longer.  She believes she was on Amitiza in the past and does not remember if this worked.  It may have been too expensive.  She has had constipation most of her life even before pain medication.  Subjective      Review of Systems:   Review of Systems   Constitutional: Negative for appetite change and unexpected weight loss.   HENT: Negative for trouble swallowing.    Gastrointestinal: Positive for abdominal distention, abdominal pain and constipation. Negative for anal bleeding, blood in stool, diarrhea, nausea, rectal pain, vomiting, GERD and indigestion.       Medications:     Current Outpatient Medications:   •  empagliflozin (JARDIANCE) 25 MG tablet tablet, Take  by mouth Daily., Disp: , Rfl:   •  albuterol (PROVENTIL) (2.5 MG/3ML) 0.083% nebulizer solution, Take 2.5 mg by nebulization Every 4 (Four) Hours As Needed for wheezing or shortness of air., Disp: , Rfl:   •  ANORO ELLIPTA 62.5-25 MCG/INH aerosol powder  inhaler, , Disp: , Rfl:   •  aspirin 81 MG EC tablet, Take 81 mg by mouth daily., Disp: , Rfl:   •  atorvastatin (LIPITOR) 40 MG tablet, Take 40 mg by mouth daily., Disp: , Rfl:   •  carvedilol (COREG) 25 MG tablet, Take 25 mg by mouth 2 (two) times a day with  meals., Disp: , Rfl:   •  DULoxetine (CYMBALTA) 60 MG capsule, Take 60 mg by mouth Daily., Disp: , Rfl:   •  esomeprazole (nexIUM) 40 MG capsule, Take 40 mg by mouth Every Morning Before Breakfast., Disp: , Rfl:   •  fluticasone (FLONASE) 50 MCG/ACT nasal spray, 1 spray into each nostril Daily., Disp: , Rfl:   •  isosorbide mononitrate (IMDUR) 60 MG 24 hr tablet, Take 60 mg by mouth daily., Disp: , Rfl:   •  lidocaine (LIDODERM) 5 %, Place 1 patch on the skin every day. Remove & Discard patch within 12 hours or as directed by MD, Disp: , Rfl:   •  lidocaine viscous (XYLOCAINE) 2 % solution, Take  by mouth As Needed for Mild Pain ., Disp: , Rfl:   •  loratadine (CLARITIN) 10 MG tablet, Take 10 mg by mouth Daily., Disp: , Rfl:   •  losartan (COZAAR) 100 MG tablet, Take 50 mg by mouth Daily., Disp: , Rfl:   •  lubiprostone (Amitiza) 24 MCG capsule, Take 1 capsule by mouth 2 (Two) Times a Day With Meals., Disp: 60 capsule, Rfl: 5  •  naloxone (NARCAN) 4 MG/0.1ML nasal spray, 1 spray into each nostril As Needed., Disp: , Rfl:   •  nitroglycerin (NITROLINGUAL) 0.4 MG/SPRAY spray, Place 1 spray under the tongue Every 5 (Five) Minutes As Needed for Chest Pain., Disp: , Rfl:   •  sennosides-docusate (senna-docusate sodium) 8.6-50 MG per tablet, Take 2 tablets by mouth Daily., Disp: 60 tablet, Rfl: 11  •  traMADol (ULTRAM) 50 MG tablet, TAKE ONE TABLET BY MOUTH TWO TIMES A DAY AS NEEDED FOR PAIN, Disp: , Rfl:   •  vitamin D (ERGOCALCIFEROL) 53320 UNITS capsule capsule, Take 50,000 Units by mouth 1 (One) Time Per Week., Disp: , Rfl:     Allergies:   Allergies   Allergen Reactions   • Ceclor [Cefaclor] Rash       Social History:   Social History     Socioeconomic History   • Marital status:      Spouse name: Not on file   • Number of children: Not on file   • Years of education: Not on file   • Highest education level: Not on file   Tobacco Use   • Smoking status: Current Every Day Smoker     Packs/day: 0.50     Types:  "Cigarettes   • Smokeless tobacco: Never Used   Vaping Use   • Vaping Use: Never used   Substance and Sexual Activity   • Alcohol use: No   • Drug use: No   • Sexual activity: Defer        Surgical History:   Past Surgical History:   Procedure Laterality Date   • APPENDECTOMY     • BACK SURGERY      3 x per provided history   • BRAIN TUMOR EXCISION  1988   • BREAST BIOPSY     • CHOLECYSTECTOMY     • COLONOSCOPY      2015   • CRANIOTOMY FOR TUMOR     • EYE SURGERY      bilateral cataracts removed   • HEMORRHOIDECTOMY     • LUMBAR FUSION N/A 1/4/2017    Procedure: LUMBAR LAMINECTOMY AND DECOMRESSION  L3 AND L4;  Surgeon: Nishant Bhatti MD;  Location: [facility];  Service:    • TOTAL ABDOMINAL HYSTERECTOMY     • TRIGGER FINGER RELEASE          Medical History:   Past Medical History:   Diagnosis Date   • Anemia    • Arthritis    • Back problem    • CAD (coronary artery disease)    • Cancer (CMS/HCC)     Right breast   • Chronic back pain    • Chronically on opiate therapy    • Diabetes mellitus (CMS/HCC)     DX 14 years ago- checks fsbs weekly   • Fibromyalgia    • Gastroparesis    • GERD (gastroesophageal reflux disease)    • Headache     emotional/tension   • HTN (hypertension)    • Hypercholesteremia    • IBS (irritable bowel syndrome)    • Incontinence of urine     urgency   • Migraine headache    • Myalgia and myositis    • Peripheral neuropathy    • Sleep apnea    • Sleep apnea     does not wear cpap        Objective     Physical Exam:  Vital Signs:   Vitals:    08/16/21 1257   BP: 124/76   BP Location: Left arm   Patient Position: Sitting   Cuff Size: Adult   Pulse: 111   Temp: 97.8 °F (36.6 °C)   TempSrc: Temporal   SpO2: 97%   Weight: 60.4 kg (133 lb 3.2 oz)   Height: 152.4 cm (60\")     Body mass index is 26.01 kg/m².     Physical Exam  Vitals and nursing note reviewed.   Constitutional:       General: She is not in acute distress.     Appearance: She is well-developed.   Pulmonary:      Effort: Pulmonary " effort is normal. No accessory muscle usage or respiratory distress.   Abdominal:      General: Bowel sounds are normal. There is no distension.      Palpations: Abdomen is soft.      Tenderness: There is no abdominal tenderness.   Musculoskeletal:      Comments: In wheelchair   Skin:     Coloration: Skin is not pale.      Findings: No erythema.   Neurological:      Mental Status: She is alert and oriented to person, place, and time.   Psychiatric:         Speech: Speech normal.         Behavior: Behavior normal.         Thought Content: Thought content normal.         Judgment: Judgment normal.         Assessment / Plan      Assessment/Plan:   Diagnoses and all orders for this visit:    1. Chronic idiopathic constipation (Primary)  -     sennosides-docusate (senna-docusate sodium) 8.6-50 MG per tablet; Take 2 tablets by mouth Daily.  Dispense: 60 tablet; Refill: 11  We will treat both chronic idiopathic constipation and drug-induced constipation.  Will start senna docusate in addition to Amitiza.  Counseled on importance of hydration and adequate fruits and vegetables in diet  2. Drug-induced constipation  -     lubiprostone (Amitiza) 24 MCG capsule; Take 1 capsule by mouth 2 (Two) Times a Day With Meals.  Dispense: 60 capsule; Refill: 5  -     sennosides-docusate (senna-docusate sodium) 8.6-50 MG per tablet; Take 2 tablets by mouth Daily.  Dispense: 60 tablet; Refill: 11    3. Celiac artery stenosis (CMS/HCC)  -     Ambulatory Referral to Ct surg        -Patient reports she does not want to have any more surgeries.  We did discuss the high risk for intestinal ischemia as well as continued abdominal pain.  I advised the patient that there is an increased risk of death with intestinal ischemia.  I recommend she at least speak with the surgeon so she may fully understand all of her options.  She is agreeable to referral at this time.  .  4. Generalized abdominal pain  Secondary to # 1, 2, and 3         Follow Up:    Return in about 3 months (around 11/16/2021).    Plan of care reviewed with the patient at the conclusion of today's visit.  Education was provided regarding diagnosis, management, and any prescribed or recommended OTC medications.  Patient verbalized understanding of and agreement with management plan.          BALAJI Stewart  [facility]

## 2021-09-09 ENCOUNTER — OFFICE VISIT (OUTPATIENT)
Dept: CARDIAC SURGERY | Facility: CLINIC | Age: 76
End: 2021-09-09

## 2021-09-09 VITALS
HEART RATE: 84 BPM | DIASTOLIC BLOOD PRESSURE: 58 MMHG | BODY MASS INDEX: 27.13 KG/M2 | SYSTOLIC BLOOD PRESSURE: 117 MMHG | HEIGHT: 60 IN | OXYGEN SATURATION: 97 % | WEIGHT: 138.2 LBS | TEMPERATURE: 99.3 F

## 2021-09-09 DIAGNOSIS — M48.062 SPINAL STENOSIS, LUMBAR REGION, WITH NEUROGENIC CLAUDICATION: ICD-10-CM

## 2021-09-09 DIAGNOSIS — K55.1 SUPERIOR MESENTERIC ARTERY STENOSIS (HCC): ICD-10-CM

## 2021-09-09 DIAGNOSIS — I77.1 CELIAC ARTERY STENOSIS (HCC): Primary | ICD-10-CM

## 2021-09-09 PROCEDURE — 99204 OFFICE O/P NEW MOD 45 MIN: CPT | Performed by: STUDENT IN AN ORGANIZED HEALTH CARE EDUCATION/TRAINING PROGRAM

## 2021-09-09 RX ORDER — ACETAMINOPHEN 500 MG
500 TABLET ORAL EVERY 6 HOURS PRN
COMMUNITY

## 2021-09-09 RX ORDER — GABAPENTIN 100 MG/1
100 CAPSULE ORAL
COMMUNITY
Start: 2021-08-24 | End: 2022-06-15

## 2021-09-09 NOTE — PROGRESS NOTES
Date: 2021   Patient Name: Jo-Ann Krishnan  Address: [address] [place], [place] [address]  : 1945   Phone #: [unfilled]   MRN: [id number]     Referring Physician: No ref. provider found    Chief Complaint:    Chief Complaint   Patient presents with   • Consult     Referred by BALAJI Stewart for celiac artery stenosis        History of Present Illness: Jo-Ann Krishnan is a 76 y.o. female who is here today to establish care with Cardiothoracic Surgery.  Pleasant female who is undergoing a rough patch as she just lost a child.  She is accompanied by her sister.  She has had back flank abdominal and leg pain for quite some time.  She has not lost weight.  She was worked up by the gastroenterologist and found to have significant celiac stenosis and SMA stenosis.  She cannot tell me her food symptoms although she does say sometimes she has pain for weeks at a time but cannot really describe it.  In the office she doubled over with pain to 3 times but said it radiated from her back to her legs.  She has known back issues.  She has actually gained weight recently.    Review of Systems:   Review of Systems   Constitutional: Positive for malaise/fatigue. Negative for chills, fever, night sweats and weight loss.   HENT: Negative for hearing loss, odynophagia and sore throat.    Cardiovascular: Positive for chest pain and leg swelling. Negative for dyspnea on exertion, orthopnea and palpitations.   Respiratory: Positive for cough. Negative for hemoptysis.    Endocrine: Negative for cold intolerance, heat intolerance, polydipsia, polyphagia and polyuria.   Hematologic/Lymphatic: Does not bruise/bleed easily.   Skin: Negative for itching and rash.   Musculoskeletal: Positive for back pain and joint pain. Negative for joint swelling and myalgias.   Gastrointestinal: Positive for abdominal pain and constipation. Negative for diarrhea, hematemesis, hematochezia, melena, nausea and vomiting.    Genitourinary: Positive for hesitancy. Negative for dysuria, frequency and hematuria.   Neurological: Positive for headaches and loss of balance. Negative for focal weakness, numbness and seizures.   Psychiatric/Behavioral: Positive for depression. Negative for suicidal ideas.   All other systems reviewed and are negative.       Past Medical History:   Past Medical History:   Diagnosis Date   • Anemia    • Arthritis    • Back problem    • CAD (coronary artery disease)    • Cancer (CMS/HCC)     Right breast   • Chronic back pain    • Chronically on opiate therapy    • Depression    • Diabetes mellitus (CMS/HCC)     DX 14 years ago- checks fsbs weekly   • Fibromyalgia    • Gastroparesis    • GERD (gastroesophageal reflux disease)    • Headache     emotional/tension   • HTN (hypertension)    • Hypercholesteremia    • IBS (irritable bowel syndrome)    • Incontinence of urine     urgency   • Migraine headache    • Myalgia and myositis    • Peripheral neuropathy    • Sleep apnea    • Sleep apnea     does not wear cpap   • UTI (urinary tract infection)        Past Surgical History:   Past Surgical History:   Procedure Laterality Date   • APPENDECTOMY     • BACK SURGERY      3 x per provided history   • BRAIN TUMOR EXCISION  1988   • BREAST BIOPSY     • CARPAL TUNNEL RELEASE Bilateral    • CHOLECYSTECTOMY     • COLONOSCOPY      2015   • CRANIOTOMY FOR TUMOR     • EYE SURGERY      bilateral cataracts removed   • HEMORRHOIDECTOMY     • LUMBAR FUSION N/A 1/4/2017    Procedure: LUMBAR LAMINECTOMY AND DECOMRESSION  L3 AND L4;  Surgeon: Nishant Bhatti MD;  Location: [facility];  Service:    • TOTAL ABDOMINAL HYSTERECTOMY     • TRIGGER FINGER RELEASE         Family History:   Family History   Problem Relation Age of Onset   • Cancer Other    • Diabetes Other    • Hyperlipidemia Other    • Heart attack Other    • Hypertension Other    • Heart attack Mother    • Diabetes Mother    • Heart disease Mother    • Hypertension Mother     • Cancer Father    • Alcohol abuse Father    • Heart attack Sister    • Diabetes Sister    • Heart disease Sister    • Hypertension Sister    • Cancer Brother    • Diabetes Brother    • Hypertension Brother    • Heart attack Sister    • Stroke Sister    • Cancer Brother        Social History:   Social History     Socioeconomic History   • Marital status:      Spouse name: Not on file   • Number of children: 2   • Years of education: Not on file   • Highest education level: Not on file   Tobacco Use   • Smoking status: Current Every Day Smoker     Packs/day: 0.50     Years: 62.00     Pack years: 31.00     Types: Cigarettes   • Smokeless tobacco: Never Used   Vaping Use   • Vaping Use: Never used   Substance and Sexual Activity   • Alcohol use: No   • Drug use: No   • Sexual activity: Defer       Medications:     Current Outpatient Medications:   •  acetaminophen (TYLENOL) 500 MG tablet, Take 500 mg by mouth Every 6 (Six) Hours As Needed for Mild Pain ., Disp: , Rfl:   •  albuterol (PROVENTIL) (2.5 MG/3ML) 0.083% nebulizer solution, Take 2.5 mg by nebulization Every 4 (Four) Hours As Needed for wheezing or shortness of air., Disp: , Rfl:   •  ANORO ELLIPTA 62.5-25 MCG/INH aerosol powder  inhaler, , Disp: , Rfl:   •  aspirin 81 MG EC tablet, Take 81 mg by mouth daily., Disp: , Rfl:   •  atorvastatin (LIPITOR) 40 MG tablet, Take 40 mg by mouth daily., Disp: , Rfl:   •  carvedilol (COREG) 25 MG tablet, Take 25 mg by mouth 2 (two) times a day with meals., Disp: , Rfl:   •  DULoxetine (CYMBALTA) 60 MG capsule, Take 60 mg by mouth Daily., Disp: , Rfl:   •  esomeprazole (nexIUM) 40 MG capsule, Take 40 mg by mouth Every Morning Before Breakfast., Disp: , Rfl:   •  fluticasone (FLONASE) 50 MCG/ACT nasal spray, 1 spray into each nostril Daily., Disp: , Rfl:   •  gabapentin (NEURONTIN) 100 MG capsule, Take 100 mg by mouth every night at bedtime., Disp: , Rfl:   •  isosorbide mononitrate (IMDUR) 60 MG 24 hr tablet, Take  "60 mg by mouth daily., Disp: , Rfl:   •  lidocaine (LIDODERM) 5 %, Place 1 patch on the skin every day. Remove & Discard patch within 12 hours or as directed by MD, Disp: , Rfl:   •  lidocaine viscous (XYLOCAINE) 2 % solution, Take  by mouth As Needed for Mild Pain ., Disp: , Rfl:   •  losartan (COZAAR) 100 MG tablet, Take 50 mg by mouth Daily., Disp: , Rfl:   •  lubiprostone (Amitiza) 24 MCG capsule, Take 1 capsule by mouth 2 (Two) Times a Day With Meals., Disp: 60 capsule, Rfl: 5  •  naloxone (NARCAN) 4 MG/0.1ML nasal spray, 1 spray into each nostril As Needed., Disp: , Rfl:   •  nitroglycerin (NITROLINGUAL) 0.4 MG/SPRAY spray, Place 1 spray under the tongue Every 5 (Five) Minutes As Needed for Chest Pain., Disp: , Rfl:   •  sennosides-docusate (senna-docusate sodium) 8.6-50 MG per tablet, Take 2 tablets by mouth Daily., Disp: 60 tablet, Rfl: 11  •  SITagliptin (JANUVIA) 50 MG tablet, Take 50 mg by mouth Daily., Disp: , Rfl:   •  traMADol (ULTRAM) 50 MG tablet, TAKE ONE TABLET BY MOUTH TWO TIMES A DAY AS NEEDED FOR PAIN, Disp: , Rfl:   •  vitamin D (ERGOCALCIFEROL) 07768 UNITS capsule capsule, Take 50,000 Units by mouth 1 (One) Time Per Week., Disp: , Rfl:   •  empagliflozin (JARDIANCE) 25 MG tablet tablet, Take  by mouth Daily. (Patient not taking: Reported on 9/9/2021), Disp: , Rfl:   •  loratadine (CLARITIN) 10 MG tablet, Take 10 mg by mouth Daily. (Patient not taking: Reported on 9/9/2021), Disp: , Rfl:     Allergies:   Allergies   Allergen Reactions   • Ceclor [Cefaclor] Rash       Physical Exam:  Vital Signs:   Vitals:    09/09/21 0931   BP: 117/58   BP Location: Right arm   Patient Position: Sitting   Pulse: 84   Temp: 99.3 °F (37.4 °C)   SpO2: 97%   Weight: 62.7 kg (138 lb 3.2 oz)   Height: 152.4 cm (60\")     Body mass index is 26.99 kg/m².     Physical Exam  Vitals and nursing note reviewed.   Constitutional:       Appearance: She is well-developed. She is obese. She is not toxic-appearing.   HENT:      " Head: Normocephalic.   Eyes:      Conjunctiva/sclera: Conjunctivae normal.      Pupils: Pupils are equal, round, and reactive to light.   Neck:      Vascular: No carotid bruit or JVD.   Cardiovascular:      Rate and Rhythm: Normal rate and regular rhythm.      Pulses:           Carotid pulses are 2+ on the right side and 2+ on the left side.       Radial pulses are 2+ on the right side and 2+ on the left side.        Femoral pulses are 2+ on the right side and 2+ on the left side.       Popliteal pulses are 2+ on the right side and 2+ on the left side.        Dorsalis pedis pulses are 2+ on the right side and 2+ on the left side.        Posterior tibial pulses are 2+ on the right side and 2+ on the left side.      Heart sounds: Normal heart sounds. No murmur heard.   No systolic murmur is present.   No diastolic murmur is present.   No friction rub. No gallop.    Pulmonary:      Effort: Pulmonary effort is normal. No respiratory distress.      Breath sounds: Normal breath sounds. No wheezing, rhonchi or rales.   Chest:      Chest wall: No tenderness.   Abdominal:      General: Bowel sounds are normal. There is no distension.      Palpations: Abdomen is soft. There is no mass.      Tenderness: There is no abdominal tenderness. There is no guarding.   Musculoskeletal:         General: Normal range of motion.      Cervical back: Normal range of motion.   Lymphadenopathy:      Cervical: No cervical adenopathy.   Skin:     General: Skin is warm and dry.      Capillary Refill: Capillary refill takes less than 2 seconds.      Findings: No rash.   Neurological:      Mental Status: She is alert and oriented to person, place, and time.      Cranial Nerves: No cranial nerve deficit.   Psychiatric:         Speech: Speech normal.         Behavior: Behavior normal.         Thought Content: Thought content normal.         Judgment: Judgment normal.         Assessment/Plan:   A 76 y.o. female with the following:     Diagnoses and  all orders for this visit:    1. Celiac artery stenosis (CMS/HCC) (Primary)    2. Superior mesenteric artery stenosis (CMS/HCC)    3. Spinal stenosis, lumbar region, with neurogenic claudication        1.  She has symptomatic, chronic mesenteric ischemia.  She has celiac velocities greater than 600 and SMA velocities of 275.  She does have chronic abdominal pain.  She also has significant back pain that wraps into her legs.  When I told her that I did not think that a procedure would take care of all of her pains she became very hesitant to have anything else.  In addition she is very upset with recent loss of her child and says that she does not think she can undergo another surgery.  I told her that this is not an emergent procedure and while I would recommend that it does not have to be done at this time.  She is going to come back and see me in 3 months.  Anatomically, her celiac comes off in a nice angle be approached from the arm but is quite tight.  Her SMA with increased velocities do not have any large discrete lesion but could be considered for stenting to ameliorate any of her other symptoms if the celiac is unable to be accessed.  She does continue to smoke and does not have any plans to quit.  3.  She has chronic low back pain with radiculopathy.  She has pains that radiate from her back and her legs and this was demonstrated in the office.      Follow Up:   Return in about 3 months (around 12/9/2021).    The ROS, past medical history, surgical history, family history, social history and vitals were reviewed by myself and corrected as needed.      Nic Garcia MD  [facility] Cardioth[facility]acic [facility]   Electronically signed by Nic Garcia MD, 09/09/21, 9:43 AM EDT.

## 2021-12-23 ENCOUNTER — OFFICE VISIT (OUTPATIENT)
Dept: CARDIAC SURGERY | Facility: CLINIC | Age: 76
End: 2021-12-23

## 2021-12-23 VITALS
SYSTOLIC BLOOD PRESSURE: 128 MMHG | WEIGHT: 141 LBS | DIASTOLIC BLOOD PRESSURE: 72 MMHG | OXYGEN SATURATION: 98 % | TEMPERATURE: 98.8 F | HEIGHT: 60 IN | BODY MASS INDEX: 27.68 KG/M2 | HEART RATE: 99 BPM

## 2021-12-23 DIAGNOSIS — K55.1 MESENTERIC ANGINA (HCC): Primary | ICD-10-CM

## 2021-12-23 PROCEDURE — 99213 OFFICE O/P EST LOW 20 MIN: CPT | Performed by: STUDENT IN AN ORGANIZED HEALTH CARE EDUCATION/TRAINING PROGRAM

## 2021-12-23 RX ORDER — MULTIPLE VITAMINS W/ MINERALS TAB 9MG-400MCG
1 TAB ORAL DAILY
Status: ON HOLD | COMMUNITY
End: 2022-06-16

## 2021-12-23 NOTE — PROGRESS NOTES
Date: 2021   Patient Name: Jo-Ann Krishnan  Address: [address] [place], [place] [address]  : 1945   Phone #: [unfilled]   MRN: [id number]     Referring Physician: No ref. provider found    Chief Complaint:    Chief Complaint   Patient presents with   • Follow-up     3 month follow up. Pt states that she is having abdomin pains and back as well. Pt states that she fell 3 weeks and f/u w an x-ray of her back. Lower leg swelling right leg is worse.        History of Present Illness: Jo-Ann Krishnan is a 76 y.o. female who is here today for follow up for mesenteric angina.  This is a 3-month follow-up.  She continues to have pain and some food fear though she and her daughter offer alternative viewpoint.  She does say that she has poor appetite and can only once a day due to pain.  She has multiple sites of pain including her back and legs as well as her stomach.  It is worse after eating.  She does not have any changes in her weight.    Review of Systems:   Review of Systems   Constitutional: Positive for malaise/fatigue. Negative for chills, fever, night sweats and weight loss.   HENT: Negative for congestion, hearing loss, nosebleeds and odynophagia.    Cardiovascular: Positive for dyspnea on exertion, leg swelling and palpitations. Negative for chest pain, claudication, orthopnea and syncope.   Respiratory: Negative for cough, hemoptysis, shortness of breath and wheezing.    Endocrine: Negative for cold intolerance, heat intolerance, polydipsia, polyphagia and polyuria.   Hematologic/Lymphatic: Does not bruise/bleed easily.   Skin: Negative for itching, poor wound healing and rash.   Musculoskeletal: Positive for arthritis and back pain. Negative for joint pain, joint swelling and myalgias.   Gastrointestinal: Positive for bloating, abdominal pain and constipation. Negative for diarrhea, hematemesis, melena, nausea and vomiting.   Genitourinary: Negative for dysuria, frequency, hematuria,  nocturia and urgency.   Neurological: Positive for dizziness and loss of balance. Negative for light-headedness and numbness.   Psychiatric/Behavioral: Negative for depression and suicidal ideas. The patient has insomnia and is nervous/anxious.    Allergic/Immunologic: Positive for environmental allergies. Negative for HIV exposure.        I have reviewed and the following portions of the patient's history were updated as appropriate: past family history, past medical history, past social history, past surgical history and problem list.    Medications:     Current Outpatient Medications:   •  acetaminophen (TYLENOL) 500 MG tablet, Take 500 mg by mouth Every 6 (Six) Hours As Needed for Mild Pain ., Disp: , Rfl:   •  albuterol (PROVENTIL) (2.5 MG/3ML) 0.083% nebulizer solution, Take 2.5 mg by nebulization Every 4 (Four) Hours As Needed for wheezing or shortness of air., Disp: , Rfl:   •  ANORO ELLIPTA 62.5-25 MCG/INH aerosol powder  inhaler, , Disp: , Rfl:   •  aspirin 81 MG EC tablet, Take 81 mg by mouth daily., Disp: , Rfl:   •  atorvastatin (LIPITOR) 40 MG tablet, Take 40 mg by mouth daily., Disp: , Rfl:   •  carvedilol (COREG) 25 MG tablet, Take 25 mg by mouth 2 (two) times a day with meals., Disp: , Rfl:   •  DULoxetine (CYMBALTA) 60 MG capsule, Take 60 mg by mouth Daily., Disp: , Rfl:   •  empagliflozin (JARDIANCE) 25 MG tablet tablet, Take  by mouth Daily., Disp: , Rfl:   •  esomeprazole (nexIUM) 40 MG capsule, Take 40 mg by mouth Every Morning Before Breakfast., Disp: , Rfl:   •  fluticasone (FLONASE) 50 MCG/ACT nasal spray, 1 spray into each nostril Daily., Disp: , Rfl:   •  gabapentin (NEURONTIN) 100 MG capsule, Take 100 mg by mouth every night at bedtime., Disp: , Rfl:   •  isosorbide mononitrate (IMDUR) 60 MG 24 hr tablet, Take 60 mg by mouth daily., Disp: , Rfl:   •  lidocaine (LIDODERM) 5 %, Place 1 patch on the skin every day. Remove & Discard patch within 12 hours or as directed by MD, Disp: , Rfl:   •  " lidocaine viscous (XYLOCAINE) 2 % solution, Take  by mouth As Needed for Mild Pain ., Disp: , Rfl:   •  loratadine (CLARITIN) 10 MG tablet, Take 10 mg by mouth Daily., Disp: , Rfl:   •  losartan (COZAAR) 100 MG tablet, Take 50 mg by mouth Daily., Disp: , Rfl:   •  lubiprostone (Amitiza) 24 MCG capsule, Take 1 capsule by mouth 2 (Two) Times a Day With Meals., Disp: 60 capsule, Rfl: 5  •  multivitamin with minerals tablet tablet, Take 1 tablet by mouth Daily., Disp: , Rfl:   •  naloxone (NARCAN) 4 MG/0.1ML nasal spray, 1 spray into each nostril As Needed., Disp: , Rfl:   •  nitroglycerin (NITROLINGUAL) 0.4 MG/SPRAY spray, Place 1 spray under the tongue Every 5 (Five) Minutes As Needed for Chest Pain., Disp: , Rfl:   •  sennosides-docusate (senna-docusate sodium) 8.6-50 MG per tablet, Take 2 tablets by mouth Daily., Disp: 60 tablet, Rfl: 11  •  SITagliptin (JANUVIA) 50 MG tablet, Take 50 mg by mouth Daily., Disp: , Rfl:   •  traMADol (ULTRAM) 50 MG tablet, TAKE ONE TABLET BY MOUTH TWO TIMES A DAY AS NEEDED FOR PAIN, Disp: , Rfl:   •  vitamin D (ERGOCALCIFEROL) 25731 UNITS capsule capsule, Take 50,000 Units by mouth 1 (One) Time Per Week., Disp: , Rfl:     Allergies:   Allergies   Allergen Reactions   • Ceclor [Cefaclor] Rash       Physical Exam:  Vital Signs:   Vitals:    12/23/21 1024   BP: 128/72   Pulse: 99   Temp: 98.8 °F (37.1 °C)   SpO2: 98%   Weight: 64 kg (141 lb)   Height: 152.4 cm (60\")     Body mass index is 27.54 kg/m².     Physical Exam  Vitals and nursing note reviewed.   Constitutional:       Appearance: She is well-developed. She is not toxic-appearing.   HENT:      Head: Normocephalic.   Eyes:      Conjunctiva/sclera: Conjunctivae normal.      Pupils: Pupils are equal, round, and reactive to light.   Neck:      Vascular: No carotid bruit or JVD.   Cardiovascular:      Rate and Rhythm: Normal rate and regular rhythm.      Pulses:           Carotid pulses are 2+ on the right side and 2+ on the left " side.       Radial pulses are 2+ on the right side and 2+ on the left side.        Femoral pulses are 2+ on the right side and 2+ on the left side.       Popliteal pulses are 2+ on the right side and 2+ on the left side.        Dorsalis pedis pulses are 2+ on the right side and 2+ on the left side.        Posterior tibial pulses are 2+ on the right side and 2+ on the left side.      Heart sounds: Normal heart sounds. No murmur heard.   No systolic murmur is present.   No diastolic murmur is present.  No friction rub. No gallop.    Pulmonary:      Effort: Pulmonary effort is normal. No respiratory distress.      Breath sounds: Normal breath sounds. No wheezing, rhonchi or rales.   Chest:      Chest wall: No tenderness.   Abdominal:      General: Bowel sounds are normal. There is no distension.      Palpations: Abdomen is soft. There is no mass.      Tenderness: There is no abdominal tenderness. There is no guarding.   Musculoskeletal:         General: Normal range of motion.      Cervical back: Normal range of motion.   Lymphadenopathy:      Cervical: No cervical adenopathy.   Skin:     General: Skin is warm and dry.      Capillary Refill: Capillary refill takes less than 2 seconds.      Findings: No rash.   Neurological:      Mental Status: She is alert and oriented to person, place, and time.      Cranial Nerves: No cranial nerve deficit.   Psychiatric:         Speech: Speech normal.         Behavior: Behavior normal.         Thought Content: Thought content normal.         Judgment: Judgment normal.            Results:   I have reviewed all pertinent radiographic studies, laboratory results and/or pathology as available.   No radiology results for the last 7 days     Assessment/Plan:   Diagnoses and all orders for this visit:    1. 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  Her celiac velocities are greater than 600 and SMA is 275.  She is chronic abdominal pain.   She now says that it is concerning and would like to be considered for an intervention.  I would discuss this case with Dr. Neil for coordinated mesenteric revascularization.    Follow Up:   Return in about 4 weeks (around 1/20/2022).    The ROS, past medical history, surgical history, family history, social history and vitals were reviewed by myself and corrected as needed.      Nic Garcia MD  [facility]   Electronically signed by Nic Garcia MD, 12/23/21, 10:27 AM EST.

## 2022-01-11 ENCOUNTER — OFFICE VISIT (OUTPATIENT)
Dept: CARDIAC SURGERY | Facility: CLINIC | Age: 77
End: 2022-01-11

## 2022-01-11 VITALS
OXYGEN SATURATION: 98 % | TEMPERATURE: 97.8 F | BODY MASS INDEX: 29.25 KG/M2 | SYSTOLIC BLOOD PRESSURE: 130 MMHG | HEIGHT: 60 IN | WEIGHT: 149 LBS | DIASTOLIC BLOOD PRESSURE: 70 MMHG | HEART RATE: 63 BPM

## 2022-01-11 DIAGNOSIS — K55.1 MESENTERIC ARTERY STENOSIS: Primary | ICD-10-CM

## 2022-01-11 PROCEDURE — 99214 OFFICE O/P EST MOD 30 MIN: CPT | Performed by: THORACIC SURGERY (CARDIOTHORACIC VASCULAR SURGERY)

## 2022-01-11 NOTE — PROGRESS NOTES
01/11/2022  Patient Information  Jo-Ann Krishnan                                                                                          [address] [facility] [address]   1945  'PCP/Referring Physician'  Aiden Spencer MD  [phone]  No ref. provider found    Chief Complaint   Patient presents with   • Follow-up     Current pt and referred by Dr. Garcia for mesenteric/celiac stenosis. Pt states that she is very fatigued and gets SOB very easily.        History of Present Illness: 76-year-old -American female with a history of hypertension, hyperlipidemia, diabetes mellitus, right breast cancer, sleep apnea, coronary artery disease and chronic back pain/chronic opiate use who presents with abdominal pain. For the past several years, the patient notes persistent periumbilical pain. This pain is a sharp sensation radiating to the left flank that is exacerbated with p.o. intake. The patient has abdominal pain every day. Last year, the patient lost approximately 20 to 30 pounds. She denies nausea, vomiting, melena or hematochezia.      Patient Active Problem List   Diagnosis   • Cerebral vascular disease   • Degenerative disc disease, lumbar   • Degenerative disc disease, cervical   • Spinal stenosis, lumbar region, with neurogenic claudication     Past Medical History:   Diagnosis Date   • Anemia    • Arthritis    • Back problem    • CAD (coronary artery disease)    • Cancer (HCC)     Right breast   • Chronic back pain    • Chronically on opiate therapy    • Depression    • Diabetes mellitus (HCC)     DX 14 years ago- checks fsbs weekly   • Fibromyalgia    • Gastroparesis    • GERD (gastroesophageal reflux disease)    • Headache     emotional/tension   • HTN (hypertension)    • Hypercholesteremia    • IBS (irritable bowel syndrome)    • Incontinence of urine     urgency   • Migraine headache    • Myalgia and myositis    • Peripheral neuropathy    • Sleep apnea    • Sleep apnea      does not wear cpap   • UTI (urinary tract infection)      Past Surgical History:   Procedure Laterality Date   • APPENDECTOMY     • BACK SURGERY      3 x per provided history   • BRAIN TUMOR EXCISION  1988   • BREAST BIOPSY     • CARPAL TUNNEL RELEASE Bilateral    • CHOLECYSTECTOMY     • COLONOSCOPY      2015   • CRANIOTOMY FOR TUMOR     • EYE SURGERY      bilateral cataracts removed   • HEMORRHOIDECTOMY     • LUMBAR FUSION N/A 1/4/2017    Procedure: LUMBAR LAMINECTOMY AND DECOMRESSION  L3 AND L4;  Surgeon: Nishant Bhatti MD;  Location: [facility];  Service:    • TOTAL ABDOMINAL HYSTERECTOMY     • TRIGGER FINGER RELEASE         Current Outpatient Medications:   •  acetaminophen (TYLENOL) 500 MG tablet, Take 500 mg by mouth Every 6 (Six) Hours As Needed for Mild Pain ., Disp: , Rfl:   •  albuterol (PROVENTIL) (2.5 MG/3ML) 0.083% nebulizer solution, Take 2.5 mg by nebulization Every 4 (Four) Hours As Needed for wheezing or shortness of air., Disp: , Rfl:   •  ANORO ELLIPTA 62.5-25 MCG/INH aerosol powder  inhaler, , Disp: , Rfl:   •  aspirin 81 MG EC tablet, Take 81 mg by mouth daily., Disp: , Rfl:   •  atorvastatin (LIPITOR) 40 MG tablet, Take 40 mg by mouth daily., Disp: , Rfl:   •  carvedilol (COREG) 25 MG tablet, Take 25 mg by mouth 2 (two) times a day with meals., Disp: , Rfl:   •  DULoxetine (CYMBALTA) 60 MG capsule, Take 60 mg by mouth Daily., Disp: , Rfl:   •  empagliflozin (JARDIANCE) 25 MG tablet tablet, Take  by mouth Daily., Disp: , Rfl:   •  esomeprazole (nexIUM) 40 MG capsule, Take 40 mg by mouth Every Morning Before Breakfast., Disp: , Rfl:   •  fluticasone (FLONASE) 50 MCG/ACT nasal spray, 1 spray into each nostril Daily., Disp: , Rfl:   •  gabapentin (NEURONTIN) 100 MG capsule, Take 100 mg by mouth every night at bedtime., Disp: , Rfl:   •  isosorbide mononitrate (IMDUR) 60 MG 24 hr tablet, Take 60 mg by mouth daily., Disp: , Rfl:   •  lidocaine (LIDODERM) 5 %, Place 1 patch on the skin every day.  Remove & Discard patch within 12 hours or as directed by MD, Disp: , Rfl:   •  lidocaine viscous (XYLOCAINE) 2 % solution, Take  by mouth As Needed for Mild Pain ., Disp: , Rfl:   •  loratadine (CLARITIN) 10 MG tablet, Take 10 mg by mouth Daily., Disp: , Rfl:   •  losartan (COZAAR) 100 MG tablet, Take 50 mg by mouth Daily., Disp: , Rfl:   •  lubiprostone (Amitiza) 24 MCG capsule, Take 1 capsule by mouth 2 (Two) Times a Day With Meals., Disp: 60 capsule, Rfl: 5  •  multivitamin with minerals tablet tablet, Take 1 tablet by mouth Daily., Disp: , Rfl:   •  naloxone (NARCAN) 4 MG/0.1ML nasal spray, 1 spray into each nostril As Needed., Disp: , Rfl:   •  nitroglycerin (NITROLINGUAL) 0.4 MG/SPRAY spray, Place 1 spray under the tongue Every 5 (Five) Minutes As Needed for Chest Pain., Disp: , Rfl:   •  sennosides-docusate (senna-docusate sodium) 8.6-50 MG per tablet, Take 2 tablets by mouth Daily., Disp: 60 tablet, Rfl: 11  •  SITagliptin (JANUVIA) 50 MG tablet, Take 50 mg by mouth Daily., Disp: , Rfl:   •  traMADol (ULTRAM) 50 MG tablet, TAKE ONE TABLET BY MOUTH TWO TIMES A DAY AS NEEDED FOR PAIN, Disp: , Rfl:   •  vitamin D (ERGOCALCIFEROL) 88785 UNITS capsule capsule, Take 50,000 Units by mouth 1 (One) Time Per Week., Disp: , Rfl:   Allergies   Allergen Reactions   • Ceclor [Cefaclor] Rash     Social History     Socioeconomic History   • Marital status:    • Number of children: 2   Tobacco Use   • Smoking status: Current Every Day Smoker     Packs/day: 0.50     Years: 62.00     Pack years: 31.00     Types: Cigarettes   • Smokeless tobacco: Never Used   Vaping Use   • Vaping Use: Never used   Substance and Sexual Activity   • Alcohol use: No   • Drug use: No   • Sexual activity: Defer     Family History   Problem Relation Age of Onset   • Cancer Other    • Diabetes Other    • Hyperlipidemia Other    • Heart attack Other    • Hypertension Other    • Heart attack Mother    • Diabetes Mother    • Heart disease Mother  "   • Hypertension Mother    • Cancer Father    • Alcohol abuse Father    • Heart attack Sister    • Diabetes Sister    • Heart disease Sister    • Hypertension Sister    • Cancer Brother    • Diabetes Brother    • Hypertension Brother    • Heart attack Sister    • Stroke Sister    • Cancer Brother      Review of Systems   Constitutional: Positive for malaise/fatigue. Negative for chills, decreased appetite, fever, weight gain and weight loss.   HENT: Negative for hearing loss.    Cardiovascular: Positive for chest pain (chest pain in the sternum) and dyspnea on exertion. Negative for claudication, cyanosis, irregular heartbeat, leg swelling, near-syncope, orthopnea, palpitations, paroxysmal nocturnal dyspnea and syncope.   Endocrine: Negative for polydipsia, polyphagia and polyuria.   Hematologic/Lymphatic: Negative for adenopathy. Does not bruise/bleed easily.   Musculoskeletal: Positive for arthritis, back pain, joint pain (hios, shoulders and knees ), joint swelling and muscle weakness. Negative for falls, gout and myalgias.   Gastrointestinal: Positive for bloating, abdominal pain and heartburn. Negative for anorexia, dysphagia, nausea and vomiting.   Genitourinary: Negative for dysuria and hematuria.   Neurological: Positive for dizziness, loss of balance, numbness (bilateral numbness neuropathy) and weakness. Negative for focal weakness, headaches, seizures and vertigo.   Psychiatric/Behavioral: Negative for altered mental status, depression, substance abuse and suicidal ideas. The patient is nervous/anxious.    Allergic/Immunologic: Negative for HIV exposure and persistent infections.     Vitals:    01/11/22 1028   BP: 130/70   Pulse: 63   Temp: 97.8 °F (36.6 °C)   SpO2: 98%   Weight: 67.6 kg (149 lb)   Height: 152.4 cm (60\")      Physical Exam  Vitals reviewed.   Constitutional:       General: She is not in acute distress.     Appearance: She is well-developed. She is not diaphoretic.      Comments: " -American female who appears stated age and is present with her daughter. The patient is in a wheelchair.   HENT:      Head: Normocephalic and atraumatic.   Eyes:      General: No scleral icterus.     Conjunctiva/sclera: Conjunctivae normal.   Neck:      Vascular: No JVD.      Trachea: No tracheal deviation.   Cardiovascular:      Rate and Rhythm: Normal rate and regular rhythm.      Heart sounds: Normal heart sounds. No murmur heard.  No friction rub. No gallop.    Pulmonary:      Effort: Pulmonary effort is normal. No respiratory distress.      Breath sounds: Normal breath sounds. No wheezing or rales.   Abdominal:      General: There is no distension.      Palpations: Abdomen is soft. There is no mass.      Tenderness: There is abdominal tenderness. There is no guarding or rebound.      Comments: Mild diffuse tenderness on palpation. No rebound or guarding.   Musculoskeletal:         General: Normal range of motion.      Cervical back: Neck supple.   Skin:     General: Skin is warm and dry.      Findings: No erythema or rash.   Neurological:      Mental Status: She is alert and oriented to person, place, and time.   Psychiatric:         Behavior: Behavior normal.         Thought Content: Thought content normal.         Judgment: Judgment normal.         The ROS, past medical history, surgical history, family history, social history and vitals were reviewed by myself and corrected as needed.      Labs/Imaging:  -Mesenteric duplex performed 7/2/2020, personally reviewed, demonstrates elevated peak velocities in the celiac axis around 600 cm/s. There is elevation in the SMA with velocities measuring 275 cm/s. Findings are consistent with high-grade stenosis of the celiac artery and approximately 70% stenosis of the SMA.  -CTA of the abdomen and pelvis performed 3/4/2021, personally reviewed, demonstrates greater than 90% stenosis of the proximal celiac artery. Atherosclerosis is also present at the origin of  the superior mesenteric artery. On personal review of sagittal images 69 of 152, reveals at least 50% proximal SMA stenosis.    Assessment/Plan:   76-year-old -American female with a history of hypertension, hyperlipidemia, diabetes mellitus, right breast cancer, sleep apnea, coronary artery disease and chronic back pain/chronic opiate use who presents with abdominal pain. The patient has weight loss, postprandial abdominal pain and two-vessel disease of the mesenteric vasculature leading to chronic mesenteric ischemia.  I discussed with the patient possible angioplasty/stenting of the celiac artery and SMA as requested by Dr. Garcia as a second opinion.  The risks and benefits of the procedure were discussed with the patient including pain, bleeding, infection, stent restenosis/occlusion, life-threatening bowel ischemia, arm numbness/weakness, myocardial infarction and death.  I discussed with the patient the critical importance of smoking cessation and lifelong Plavix administration after stenting. The patient understood these risks and wished to proceed with surgery. Cardiac clearance will be obtained prior to surgery.    Patient Active Problem List   Diagnosis   • Cerebral vascular disease   • Degenerative disc disease, lumbar   • Degenerative disc disease, cervical   • Spinal stenosis, lumbar region, with neurogenic claudication

## 2022-02-07 ENCOUNTER — TELEPHONE (OUTPATIENT)
Dept: CARDIAC SURGERY | Facility: CLINIC | Age: 77
End: 2022-02-07

## 2022-02-07 NOTE — TELEPHONE ENCOUNTER
This pt is currently on Dr. Neil pending list, once the restrictions have been lifted from the hospital, I will contact the pt with surgery information.

## 2022-02-16 ENCOUNTER — OFFICE VISIT (OUTPATIENT)
Dept: GASTROENTEROLOGY | Facility: CLINIC | Age: 77
End: 2022-02-16

## 2022-02-16 VITALS
BODY MASS INDEX: 30.59 KG/M2 | TEMPERATURE: 97.5 F | DIASTOLIC BLOOD PRESSURE: 72 MMHG | SYSTOLIC BLOOD PRESSURE: 120 MMHG | HEIGHT: 60 IN | WEIGHT: 155.8 LBS | OXYGEN SATURATION: 97 % | HEART RATE: 81 BPM

## 2022-02-16 DIAGNOSIS — R10.84 GENERALIZED ABDOMINAL PAIN: ICD-10-CM

## 2022-02-16 DIAGNOSIS — K59.09 OTHER CONSTIPATION: Primary | ICD-10-CM

## 2022-02-16 DIAGNOSIS — I77.1 CELIAC ARTERY STENOSIS: ICD-10-CM

## 2022-02-16 PROCEDURE — 99214 OFFICE O/P EST MOD 30 MIN: CPT | Performed by: NURSE PRACTITIONER

## 2022-02-16 RX ORDER — TOLTERODINE 4 MG/1
4 CAPSULE, EXTENDED RELEASE ORAL DAILY
COMMUNITY
Start: 2022-01-31 | End: 2022-09-30 | Stop reason: SDUPTHER

## 2022-02-16 RX ORDER — PREDNISONE 20 MG/1
20 TABLET ORAL DAILY
COMMUNITY
Start: 2022-02-09 | End: 2022-06-15

## 2022-02-16 RX ORDER — LOSARTAN POTASSIUM 50 MG/1
50 TABLET ORAL DAILY
COMMUNITY
Start: 2022-01-25 | End: 2023-02-17

## 2022-02-16 RX ORDER — LACTULOSE 10 G/15ML
20 SOLUTION ORAL 2 TIMES DAILY
Qty: 946 ML | Refills: 5 | Status: SHIPPED | OUTPATIENT
Start: 2022-02-16 | End: 2022-06-15

## 2022-02-16 NOTE — PROGRESS NOTES
Follow Up      Patient Name: Jo-Ann Krishnan  : 1945   MRN: [id number]     Chief Complaint:    Chief Complaint   Patient presents with   • Follow-up     Chronic Idiopathic Constipation        History of Present Illness: Jo-Ann Krishnan is a 76 y.o. female who is here today for follow up on abdominal pain and constipation.     Jo-Ann is here today accompanied by her caregiver.  Unfortunately Jo-Ann has fallen asleep several times during exam and caregiver is on the telephone so history is limited.   She is no longer on senna or Amitiza.  Neither of these helped with her constipation.  Currently she is taking Ex-Lax every few days.  Her last BM was yesterday.  She is passing gas.  She is being considered for angioplasty and stenting of the celiac artery and SMA but is awaiting a surgical date.    Subjective      Review of Systems:   Review of Systems   Constitutional: Negative for appetite change and unexpected weight loss.   HENT: Negative for trouble swallowing.    Gastrointestinal: Positive for abdominal distention, abdominal pain and constipation. Negative for anal bleeding, blood in stool, diarrhea, nausea, rectal pain, vomiting, GERD and indigestion.       Medications:     Current Outpatient Medications:   •  acetaminophen (TYLENOL) 500 MG tablet, Take 500 mg by mouth Every 6 (Six) Hours As Needed for Mild Pain ., Disp: , Rfl:   •  albuterol (PROVENTIL) (2.5 MG/3ML) 0.083% nebulizer solution, Take 2.5 mg by nebulization Every 4 (Four) Hours As Needed for wheezing or shortness of air., Disp: , Rfl:   •  ANORO ELLIPTA 62.5-25 MCG/INH aerosol powder  inhaler, , Disp: , Rfl:   •  aspirin 81 MG EC tablet, Take 81 mg by mouth daily., Disp: , Rfl:   •  atorvastatin (LIPITOR) 40 MG tablet, Take 40 mg by mouth daily., Disp: , Rfl:   •  carvedilol (COREG) 25 MG tablet, Take 25 mg by mouth 2 (two) times a day with meals., Disp: , Rfl:   •  Diclofenac Sodium (VOLTAREN) 1 % gel gel, APPLY TO AFFECTED AREA  FOUR TIMES DAILY, Disp: , Rfl:   •  DULoxetine (CYMBALTA) 60 MG capsule, Take 60 mg by mouth Daily., Disp: , Rfl:   •  empagliflozin (JARDIANCE) 25 MG tablet tablet, Take  by mouth Daily., Disp: , Rfl:   •  esomeprazole (nexIUM) 40 MG capsule, Take 40 mg by mouth Every Morning Before Breakfast., Disp: , Rfl:   •  fluticasone (FLONASE) 50 MCG/ACT nasal spray, 1 spray into each nostril Daily., Disp: , Rfl:   •  gabapentin (NEURONTIN) 100 MG capsule, Take 100 mg by mouth every night at bedtime., Disp: , Rfl:   •  isosorbide mononitrate (IMDUR) 60 MG 24 hr tablet, Take 60 mg by mouth daily., Disp: , Rfl:   •  lidocaine (LIDODERM) 5 %, Place 1 patch on the skin every day. Remove & Discard patch within 12 hours or as directed by MD, Disp: , Rfl:   •  lidocaine viscous (XYLOCAINE) 2 % solution, Take  by mouth As Needed for Mild Pain ., Disp: , Rfl:   •  loratadine (CLARITIN) 10 MG tablet, Take 10 mg by mouth Daily., Disp: , Rfl:   •  losartan (COZAAR) 100 MG tablet, Take 50 mg by mouth Daily., Disp: , Rfl:   •  losartan (COZAAR) 50 MG tablet, Take 50 mg by mouth Daily., Disp: , Rfl:   •  multivitamin with minerals tablet tablet, Take 1 tablet by mouth Daily., Disp: , Rfl:   •  naloxone (NARCAN) 4 MG/0.1ML nasal spray, 1 spray into each nostril As Needed., Disp: , Rfl:   •  nitroglycerin (NITROLINGUAL) 0.4 MG/SPRAY spray, Place 1 spray under the tongue Every 5 (Five) Minutes As Needed for Chest Pain., Disp: , Rfl:   •  predniSONE (DELTASONE) 20 MG tablet, Take 20 mg by mouth Daily., Disp: , Rfl:   •  SITagliptin (JANUVIA) 50 MG tablet, Take 50 mg by mouth Daily., Disp: , Rfl:   •  tolterodine LA (DETROL LA) 4 MG 24 hr capsule, Take 4 mg by mouth Daily., Disp: , Rfl:   •  traMADol (ULTRAM) 50 MG tablet, TAKE ONE TABLET BY MOUTH TWO TIMES A DAY AS NEEDED FOR PAIN, Disp: , Rfl:   •  vitamin D (ERGOCALCIFEROL) 92234 UNITS capsule capsule, Take 50,000 Units by mouth 1 (One) Time Per Week., Disp: , Rfl:   •  lactulose (CHRONULAC)  10 GM/15ML solution, Take 30 mL by mouth 2 (Two) Times a Day., Disp: 946 mL, Rfl: 5    Allergies:   Allergies   Allergen Reactions   • Ceclor [Cefaclor] Rash       Social History:   Social History     Socioeconomic History   • Marital status:    • Number of children: 2   Tobacco Use   • Smoking status: Former Smoker     Packs/day: 0.50     Years: 62.00     Pack years: 31.00     Types: Cigarettes   • Smokeless tobacco: Never Used   • Tobacco comment: 1/17/2022 quit    Vaping Use   • Vaping Use: Never used   Substance and Sexual Activity   • Alcohol use: No   • Drug use: No   • Sexual activity: Defer        Surgical History:   Past Surgical History:   Procedure Laterality Date   • APPENDECTOMY     • BACK SURGERY      3 x per provided history   • BRAIN TUMOR EXCISION  1988   • BREAST BIOPSY     • CARPAL TUNNEL RELEASE Bilateral    • CHOLECYSTECTOMY     • COLONOSCOPY      2015   • CRANIOTOMY FOR TUMOR     • EYE SURGERY      bilateral cataracts removed   • HEMORRHOIDECTOMY     • LUMBAR FUSION N/A 1/4/2017    Procedure: LUMBAR LAMINECTOMY AND DECOMRESSION  L3 AND L4;  Surgeon: Nishant Bhatti MD;  Location: [facility];  Service:    • TOTAL ABDOMINAL HYSTERECTOMY     • TRIGGER FINGER RELEASE          Medical History:   Past Medical History:   Diagnosis Date   • Anemia    • Arthritis    • Back problem    • CAD (coronary artery disease)    • Cancer (HCC)     Right breast   • Chronic back pain    • Chronically on opiate therapy    • Depression    • Diabetes mellitus (HCC)     DX 14 years ago- checks fsbs weekly   • Fibromyalgia    • Gastroparesis    • GERD (gastroesophageal reflux disease)    • Headache     emotional/tension   • HTN (hypertension)    • Hypercholesteremia    • IBS (irritable bowel syndrome)    • Incontinence of urine     urgency   • Migraine headache    • Myalgia and myositis    • Peripheral neuropathy    • Sleep apnea    • Sleep apnea     does not wear cpap   • UTI (urinary tract infection)      "    Objective     Physical Exam:  Vital Signs:   Vitals:    02/16/22 1318   BP: 120/72   BP Location: Right arm   Patient Position: Sitting   Cuff Size: Adult   Pulse: 81   Temp: 97.5 °F (36.4 °C)   TempSrc: Temporal   SpO2: 97%   Weight: 70.7 kg (155 lb 12.8 oz)   Height: 152.4 cm (60\")     Body mass index is 30.43 kg/m².     Physical Exam  Vitals and nursing note reviewed.   Constitutional:       General: She is sleeping. She is not in acute distress.  Pulmonary:      Effort: Pulmonary effort is normal. No accessory muscle usage or respiratory distress.   Abdominal:      General: Bowel sounds are normal. There is no distension.      Palpations: Abdomen is soft.      Tenderness: There is no abdominal tenderness.   Musculoskeletal:      Comments: In wheelchair   Skin:     Coloration: Skin is not pale.      Findings: No erythema.   Neurological:      Mental Status: She is oriented to person, place, and time and easily aroused.   Psychiatric:         Speech: Speech normal.         Behavior: Behavior normal.         Thought Content: Thought content normal.         Judgment: Judgment normal.         Assessment / Plan      Assessment/Plan:   Diagnoses and all orders for this visit:    1. Other constipation (Primary)  -     lactulose (CHRONULAC) 10 GM/15ML solution; Take 30 mL by mouth 2 (Two) Times a Day.  Dispense: 946 mL; Refill: 5    2. Celiac artery stenosis (HCC)    3. Generalized abdominal pain       Continue with small frequent meals and adequate hydration.  Continue with CT surgery.  Has failed multiple other treatment options.  Would recommend trying lactulose for constipation.    Follow Up:   Return if symptoms worsen or fail to improve.    Plan of care reviewed with the patient at the conclusion of today's visit.  Education was provided regarding diagnosis, management, and any prescribed or recommended OTC medications.  Patient verbalized understanding of and agreement with management plan.     .     Noemí " BALAJI Machuca  [facility] [place] [facility]

## 2022-03-22 ENCOUNTER — PREP FOR SURGERY (OUTPATIENT)
Dept: OTHER | Facility: HOSPITAL | Age: 77
End: 2022-03-22

## 2022-03-22 DIAGNOSIS — K55.1 MESENTERIC ARTERY STENOSIS: Primary | ICD-10-CM

## 2022-04-06 ENCOUNTER — APPOINTMENT (OUTPATIENT)
Dept: PREADMISSION TESTING | Facility: HOSPITAL | Age: 77
End: 2022-04-06

## 2022-06-15 ENCOUNTER — PRE-ADMISSION TESTING (OUTPATIENT)
Dept: PREADMISSION TESTING | Facility: HOSPITAL | Age: 77
End: 2022-06-15

## 2022-06-15 VITALS — HEIGHT: 60 IN | BODY MASS INDEX: 32.12 KG/M2 | WEIGHT: 163.58 LBS

## 2022-06-15 DIAGNOSIS — K55.1 MESENTERIC ARTERY STENOSIS: ICD-10-CM

## 2022-06-15 LAB
ANION GAP SERPL CALCULATED.3IONS-SCNC: 8 MMOL/L (ref 5–15)
APTT PPP: 32.5 SECONDS (ref 22–39)
BUN SERPL-MCNC: 30 MG/DL (ref 8–23)
BUN/CREAT SERPL: 13.6 (ref 7–25)
CALCIUM SPEC-SCNC: 8.9 MG/DL (ref 8.6–10.5)
CHLORIDE SERPL-SCNC: 102 MMOL/L (ref 98–107)
CO2 SERPL-SCNC: 26 MMOL/L (ref 22–29)
CREAT SERPL-MCNC: 2.2 MG/DL (ref 0.57–1)
DEPRECATED RDW RBC AUTO: 52.1 FL (ref 37–54)
EGFRCR SERPLBLD CKD-EPI 2021: 22.3 ML/MIN/1.73
ERYTHROCYTE [DISTWIDTH] IN BLOOD BY AUTOMATED COUNT: 15.2 % (ref 12.3–15.4)
GLUCOSE SERPL-MCNC: 170 MG/DL (ref 65–99)
HBA1C MFR BLD: 7.1 % (ref 4.8–5.6)
HCT VFR BLD AUTO: 30.2 % (ref 34–46.6)
HGB BLD-MCNC: 9.5 G/DL (ref 12–15.9)
INR PPP: 1.14 (ref 0.84–1.13)
MCH RBC QN AUTO: 29.3 PG (ref 26.6–33)
MCHC RBC AUTO-ENTMCNC: 31.5 G/DL (ref 31.5–35.7)
MCV RBC AUTO: 93.2 FL (ref 79–97)
PLATELET # BLD AUTO: 207 10*3/MM3 (ref 140–450)
PMV BLD AUTO: 9.7 FL (ref 6–12)
POTASSIUM SERPL-SCNC: 4.6 MMOL/L (ref 3.5–5.2)
PROTHROMBIN TIME: 14.6 SECONDS (ref 11.4–14.4)
QT INTERVAL: 382 MS
QTC INTERVAL: 438 MS
RBC # BLD AUTO: 3.24 10*6/MM3 (ref 3.77–5.28)
SARS-COV-2 RNA PNL SPEC NAA+PROBE: NOT DETECTED
SODIUM SERPL-SCNC: 136 MMOL/L (ref 136–145)
WBC NRBC COR # BLD: 7.1 10*3/MM3 (ref 3.4–10.8)

## 2022-06-15 PROCEDURE — U0005 INFEC AGEN DETEC AMPLI PROBE: HCPCS

## 2022-06-15 PROCEDURE — 85610 PROTHROMBIN TIME: CPT

## 2022-06-15 PROCEDURE — 93005 ELECTROCARDIOGRAM TRACING: CPT

## 2022-06-15 PROCEDURE — 85730 THROMBOPLASTIN TIME PARTIAL: CPT

## 2022-06-15 PROCEDURE — 83036 HEMOGLOBIN GLYCOSYLATED A1C: CPT

## 2022-06-15 PROCEDURE — 93010 ELECTROCARDIOGRAM REPORT: CPT | Performed by: INTERNAL MEDICINE

## 2022-06-15 PROCEDURE — 80048 BASIC METABOLIC PNL TOTAL CA: CPT

## 2022-06-15 PROCEDURE — U0004 COV-19 TEST NON-CDC HGH THRU: HCPCS

## 2022-06-15 PROCEDURE — C9803 HOPD COVID-19 SPEC COLLECT: HCPCS

## 2022-06-15 PROCEDURE — 36415 COLL VENOUS BLD VENIPUNCTURE: CPT

## 2022-06-15 PROCEDURE — 85027 COMPLETE CBC AUTOMATED: CPT

## 2022-06-15 RX ORDER — BUSPIRONE HYDROCHLORIDE 5 MG/1
5 TABLET ORAL 3 TIMES DAILY
COMMUNITY

## 2022-06-15 RX ORDER — PANTOPRAZOLE SODIUM 40 MG/1
40 TABLET, DELAYED RELEASE ORAL DAILY
COMMUNITY

## 2022-06-15 RX ORDER — HYDROCODONE BITARTRATE AND ACETAMINOPHEN 7.5; 325 MG/1; MG/1
1 TABLET ORAL 2 TIMES DAILY PRN
COMMUNITY
End: 2022-10-31

## 2022-06-15 RX ORDER — INSULIN GLARGINE 300 U/ML
12 INJECTION, SOLUTION SUBCUTANEOUS 2 TIMES DAILY
COMMUNITY
End: 2023-02-16

## 2022-06-15 RX ORDER — MULTIPLE VITAMINS W/ MINERALS TAB 9MG-400MCG
1 TAB ORAL DAILY
COMMUNITY

## 2022-06-15 NOTE — PAT
Patient viewed general [facility] education video as instructed in their preoperative information received from their surgeon.  Patient stated the general [facility] education video was viewed in its entirety and survey completed.  Copies of [facility] general education handouts (Incentive Spirometry, Meds to Beds Program, Patient Belongings, Pre-op skin preparation instructions, Blood Glucose testing, Visitor policy, Surgery FAQ, Code H) distributed to patient if not printed. Education related to the PAT pass and skin preparation for surgery (if applicable) completed in [facility] as a reinforcement to PAT education video. Patient instructed to return PAT pass provided today as well as completed skin preparation sheet (if applicable) on the day of procedure.     Additionally if patient had not viewed video yet but intended to view it at home or in our waiting area, then referred them to the handout with QR code/link provided during PAT visit.  Instructed patient to complete survey after viewing the video in its entirety.  Encouraged patient/family to read [facility] general education handouts thoroughly and notify PAT staff with any questions or concerns. Patient verbalized understanding of all information and priority content.    Covid test done in PAT.

## 2022-06-16 ENCOUNTER — APPOINTMENT (OUTPATIENT)
Dept: GENERAL RADIOLOGY | Facility: HOSPITAL | Age: 77
End: 2022-06-16

## 2022-06-16 ENCOUNTER — ANESTHESIA EVENT (OUTPATIENT)
Dept: PERIOP | Facility: HOSPITAL | Age: 77
End: 2022-06-16

## 2022-06-16 ENCOUNTER — HOSPITAL ENCOUNTER (OUTPATIENT)
Facility: HOSPITAL | Age: 77
Discharge: HOME | End: 2022-06-17
Attending: THORACIC SURGERY (CARDIOTHORACIC VASCULAR SURGERY) | Admitting: THORACIC SURGERY (CARDIOTHORACIC VASCULAR SURGERY)

## 2022-06-16 ENCOUNTER — ANESTHESIA (OUTPATIENT)
Dept: PERIOP | Facility: HOSPITAL | Age: 77
End: 2022-06-16

## 2022-06-16 DIAGNOSIS — K55.1 MESENTERIC ARTERY STENOSIS: ICD-10-CM

## 2022-06-16 LAB
GLUCOSE BLDC GLUCOMTR-MCNC: 134 MG/DL (ref 70–130)
GLUCOSE BLDC GLUCOMTR-MCNC: 175 MG/DL (ref 70–130)

## 2022-06-16 PROCEDURE — 25010000002 PROPOFOL 10 MG/ML EMULSION: Performed by: NURSE ANESTHETIST, CERTIFIED REGISTERED

## 2022-06-16 PROCEDURE — 25010000002 HEPARIN (PORCINE) PER 1000 UNITS: Performed by: NURSE ANESTHETIST, CERTIFIED REGISTERED

## 2022-06-16 PROCEDURE — 82962 GLUCOSE BLOOD TEST: CPT

## 2022-06-16 PROCEDURE — C1887 CATHETER, GUIDING: HCPCS | Performed by: THORACIC SURGERY (CARDIOTHORACIC VASCULAR SURGERY)

## 2022-06-16 PROCEDURE — 63710000001 CARVEDILOL 12.5 MG TABLET: Performed by: PHYSICIAN ASSISTANT

## 2022-06-16 PROCEDURE — 25010000002 PROTAMINE SULFATE PER 10 MG: Performed by: NURSE ANESTHETIST, CERTIFIED REGISTERED

## 2022-06-16 PROCEDURE — C1894 INTRO/SHEATH, NON-LASER: HCPCS | Performed by: THORACIC SURGERY (CARDIOTHORACIC VASCULAR SURGERY)

## 2022-06-16 PROCEDURE — 25010000002 FENTANYL CITRATE (PF) 50 MCG/ML SOLUTION: Performed by: NURSE ANESTHETIST, CERTIFIED REGISTERED

## 2022-06-16 PROCEDURE — 25010000002 HEPARIN (PORCINE) PER 1000 UNITS: Performed by: THORACIC SURGERY (CARDIOTHORACIC VASCULAR SURGERY)

## 2022-06-16 PROCEDURE — 25010000002 IOPAMIDOL 61 % SOLUTION: Performed by: THORACIC SURGERY (CARDIOTHORACIC VASCULAR SURGERY)

## 2022-06-16 PROCEDURE — 37236 OPEN/PERQ PLACE STENT 1ST: CPT | Performed by: THORACIC SURGERY (CARDIOTHORACIC VASCULAR SURGERY)

## 2022-06-16 PROCEDURE — 25010000002 SUCCINYLCHOLINE PER 20 MG: Performed by: NURSE ANESTHETIST, CERTIFIED REGISTERED

## 2022-06-16 PROCEDURE — C1876 STENT, NON-COA/NON-COV W/DEL: HCPCS | Performed by: THORACIC SURGERY (CARDIOTHORACIC VASCULAR SURGERY)

## 2022-06-16 PROCEDURE — C1769 GUIDE WIRE: HCPCS | Performed by: THORACIC SURGERY (CARDIOTHORACIC VASCULAR SURGERY)

## 2022-06-16 PROCEDURE — 25010000002 ONDANSETRON PER 1 MG: Performed by: NURSE ANESTHETIST, CERTIFIED REGISTERED

## 2022-06-16 PROCEDURE — 25010000002 DEXAMETHASONE PER 1 MG: Performed by: NURSE ANESTHETIST, CERTIFIED REGISTERED

## 2022-06-16 PROCEDURE — A9270 NON-COVERED ITEM OR SERVICE: HCPCS | Performed by: PHYSICIAN ASSISTANT

## 2022-06-16 PROCEDURE — C1760 CLOSURE DEV, VASC: HCPCS | Performed by: THORACIC SURGERY (CARDIOTHORACIC VASCULAR SURGERY)

## 2022-06-16 PROCEDURE — 75625 CONTRAST EXAM ABDOMINL AORTA: CPT | Performed by: THORACIC SURGERY (CARDIOTHORACIC VASCULAR SURGERY)

## 2022-06-16 PROCEDURE — 63710000001 OXYBUTYNIN 5 MG TABLET: Performed by: PHYSICIAN ASSISTANT

## 2022-06-16 PROCEDURE — 63710000001 BUSPIRONE 10 MG TABLET: Performed by: PHYSICIAN ASSISTANT

## 2022-06-16 DEVICE — PREMOUNTED STENT SYSTEM
Type: IMPLANTABLE DEVICE | Site: ARTERIAL | Status: FUNCTIONAL
Brand: EXPRESS® LD ILIAC / BILIARY

## 2022-06-16 RX ORDER — TRAMADOL HYDROCHLORIDE 50 MG/1
100 TABLET ORAL 2 TIMES DAILY PRN
Status: DISCONTINUED | OUTPATIENT
Start: 2022-06-16 | End: 2022-06-17 | Stop reason: HOSPADM

## 2022-06-16 RX ORDER — HYDROCODONE BITARTRATE AND ACETAMINOPHEN 7.5; 325 MG/1; MG/1
1 TABLET ORAL EVERY 4 HOURS PRN
Status: DISCONTINUED | OUTPATIENT
Start: 2022-06-16 | End: 2022-06-17 | Stop reason: HOSPADM

## 2022-06-16 RX ORDER — PROPOFOL 10 MG/ML
VIAL (ML) INTRAVENOUS AS NEEDED
Status: DISCONTINUED | OUTPATIENT
Start: 2022-06-16 | End: 2022-06-16 | Stop reason: SURG

## 2022-06-16 RX ORDER — ASPIRIN 81 MG/1
81 TABLET ORAL DAILY
Status: DISCONTINUED | OUTPATIENT
Start: 2022-06-17 | End: 2022-06-17 | Stop reason: HOSPADM

## 2022-06-16 RX ORDER — SODIUM CHLORIDE 0.9 % (FLUSH) 0.9 %
3-10 SYRINGE (ML) INJECTION AS NEEDED
Status: DISCONTINUED | OUTPATIENT
Start: 2022-06-16 | End: 2022-06-16 | Stop reason: HOSPADM

## 2022-06-16 RX ORDER — ALBUTEROL SULFATE 2.5 MG/3ML
2.5 SOLUTION RESPIRATORY (INHALATION) EVERY 4 HOURS PRN
Status: DISCONTINUED | OUTPATIENT
Start: 2022-06-16 | End: 2022-06-17 | Stop reason: HOSPADM

## 2022-06-16 RX ORDER — ONDANSETRON 2 MG/ML
INJECTION INTRAMUSCULAR; INTRAVENOUS AS NEEDED
Status: DISCONTINUED | OUTPATIENT
Start: 2022-06-16 | End: 2022-06-16 | Stop reason: SURG

## 2022-06-16 RX ORDER — SODIUM CHLORIDE 0.9 % (FLUSH) 0.9 %
10 SYRINGE (ML) INJECTION AS NEEDED
Status: DISCONTINUED | OUTPATIENT
Start: 2022-06-16 | End: 2022-06-16 | Stop reason: HOSPADM

## 2022-06-16 RX ORDER — HYDROCODONE BITARTRATE AND ACETAMINOPHEN 5; 325 MG/1; MG/1
1 TABLET ORAL ONCE AS NEEDED
Status: DISCONTINUED | OUTPATIENT
Start: 2022-06-16 | End: 2022-06-16 | Stop reason: HOSPADM

## 2022-06-16 RX ORDER — DROPERIDOL 2.5 MG/ML
0.62 INJECTION, SOLUTION INTRAMUSCULAR; INTRAVENOUS
Status: DISCONTINUED | OUTPATIENT
Start: 2022-06-16 | End: 2022-06-16 | Stop reason: HOSPADM

## 2022-06-16 RX ORDER — ISOSORBIDE MONONITRATE 60 MG/1
60 TABLET, EXTENDED RELEASE ORAL DAILY
Status: DISCONTINUED | OUTPATIENT
Start: 2022-06-17 | End: 2022-06-17 | Stop reason: HOSPADM

## 2022-06-16 RX ORDER — HYDROMORPHONE HYDROCHLORIDE 1 MG/ML
0.5 INJECTION, SOLUTION INTRAMUSCULAR; INTRAVENOUS; SUBCUTANEOUS
Status: DISCONTINUED | OUTPATIENT
Start: 2022-06-16 | End: 2022-06-16 | Stop reason: HOSPADM

## 2022-06-16 RX ORDER — PROTAMINE SULFATE 10 MG/ML
INJECTION, SOLUTION INTRAVENOUS AS NEEDED
Status: DISCONTINUED | OUTPATIENT
Start: 2022-06-16 | End: 2022-06-16 | Stop reason: SURG

## 2022-06-16 RX ORDER — IPRATROPIUM BROMIDE AND ALBUTEROL SULFATE 2.5; .5 MG/3ML; MG/3ML
3 SOLUTION RESPIRATORY (INHALATION) ONCE AS NEEDED
Status: DISCONTINUED | OUTPATIENT
Start: 2022-06-16 | End: 2022-06-16 | Stop reason: HOSPADM

## 2022-06-16 RX ORDER — SODIUM CHLORIDE, SODIUM LACTATE, POTASSIUM CHLORIDE, CALCIUM CHLORIDE 600; 310; 30; 20 MG/100ML; MG/100ML; MG/100ML; MG/100ML
9 INJECTION, SOLUTION INTRAVENOUS CONTINUOUS
Status: DISCONTINUED | OUTPATIENT
Start: 2022-06-16 | End: 2022-06-17

## 2022-06-16 RX ORDER — ONDANSETRON 2 MG/ML
4 INJECTION INTRAMUSCULAR; INTRAVENOUS EVERY 6 HOURS PRN
Status: DISCONTINUED | OUTPATIENT
Start: 2022-06-16 | End: 2022-06-17 | Stop reason: HOSPADM

## 2022-06-16 RX ORDER — ACETAMINOPHEN 325 MG/1
650 TABLET ORAL EVERY 4 HOURS PRN
Status: DISCONTINUED | OUTPATIENT
Start: 2022-06-16 | End: 2022-06-17 | Stop reason: HOSPADM

## 2022-06-16 RX ORDER — ATORVASTATIN CALCIUM 40 MG/1
40 TABLET, FILM COATED ORAL DAILY
Status: DISCONTINUED | OUTPATIENT
Start: 2022-06-17 | End: 2022-06-17 | Stop reason: HOSPADM

## 2022-06-16 RX ORDER — BUSPIRONE HYDROCHLORIDE 10 MG/1
5 TABLET ORAL 3 TIMES DAILY
Status: DISCONTINUED | OUTPATIENT
Start: 2022-06-16 | End: 2022-06-17 | Stop reason: HOSPADM

## 2022-06-16 RX ORDER — LIDOCAINE HYDROCHLORIDE 10 MG/ML
0.5 INJECTION, SOLUTION EPIDURAL; INFILTRATION; INTRACAUDAL; PERINEURAL ONCE AS NEEDED
Status: COMPLETED | OUTPATIENT
Start: 2022-06-16 | End: 2022-06-16

## 2022-06-16 RX ORDER — DROPERIDOL 2.5 MG/ML
0.62 INJECTION, SOLUTION INTRAMUSCULAR; INTRAVENOUS ONCE AS NEEDED
Status: DISCONTINUED | OUTPATIENT
Start: 2022-06-16 | End: 2022-06-16 | Stop reason: HOSPADM

## 2022-06-16 RX ORDER — CLINDAMYCIN PHOSPHATE 900 MG/50ML
900 INJECTION INTRAVENOUS ONCE
Status: COMPLETED | OUTPATIENT
Start: 2022-06-16 | End: 2022-06-16

## 2022-06-16 RX ORDER — ONDANSETRON 2 MG/ML
4 INJECTION INTRAMUSCULAR; INTRAVENOUS ONCE AS NEEDED
Status: DISCONTINUED | OUTPATIENT
Start: 2022-06-16 | End: 2022-06-16 | Stop reason: HOSPADM

## 2022-06-16 RX ORDER — SODIUM CHLORIDE 0.9 % (FLUSH) 0.9 %
10 SYRINGE (ML) INJECTION EVERY 12 HOURS SCHEDULED
Status: DISCONTINUED | OUTPATIENT
Start: 2022-06-16 | End: 2022-06-16 | Stop reason: HOSPADM

## 2022-06-16 RX ORDER — PROMETHAZINE HYDROCHLORIDE 25 MG/1
25 SUPPOSITORY RECTAL ONCE AS NEEDED
Status: DISCONTINUED | OUTPATIENT
Start: 2022-06-16 | End: 2022-06-16 | Stop reason: HOSPADM

## 2022-06-16 RX ORDER — FENTANYL CITRATE 50 UG/ML
INJECTION, SOLUTION INTRAMUSCULAR; INTRAVENOUS AS NEEDED
Status: DISCONTINUED | OUTPATIENT
Start: 2022-06-16 | End: 2022-06-16 | Stop reason: SURG

## 2022-06-16 RX ORDER — INSULIN LISPRO 100 [IU]/ML
0-9 INJECTION, SOLUTION INTRAVENOUS; SUBCUTANEOUS
Status: DISCONTINUED | OUTPATIENT
Start: 2022-06-17 | End: 2022-06-17 | Stop reason: HOSPADM

## 2022-06-16 RX ORDER — MIDAZOLAM HYDROCHLORIDE 1 MG/ML
0.5 INJECTION INTRAMUSCULAR; INTRAVENOUS
Status: DISCONTINUED | OUTPATIENT
Start: 2022-06-16 | End: 2022-06-16 | Stop reason: HOSPADM

## 2022-06-16 RX ORDER — HEPARIN SODIUM 1000 [USP'U]/ML
INJECTION, SOLUTION INTRAVENOUS; SUBCUTANEOUS AS NEEDED
Status: DISCONTINUED | OUTPATIENT
Start: 2022-06-16 | End: 2022-06-16 | Stop reason: SURG

## 2022-06-16 RX ORDER — SODIUM CHLORIDE 9 MG/ML
INJECTION, SOLUTION INTRAVENOUS CONTINUOUS PRN
Status: DISCONTINUED | OUTPATIENT
Start: 2022-06-16 | End: 2022-06-16 | Stop reason: SURG

## 2022-06-16 RX ORDER — SODIUM CHLORIDE 0.9 % (FLUSH) 0.9 %
3 SYRINGE (ML) INJECTION EVERY 12 HOURS SCHEDULED
Status: DISCONTINUED | OUTPATIENT
Start: 2022-06-16 | End: 2022-06-16 | Stop reason: HOSPADM

## 2022-06-16 RX ORDER — CLOPIDOGREL BISULFATE 75 MG/1
75 TABLET ORAL DAILY
Status: DISCONTINUED | OUTPATIENT
Start: 2022-06-17 | End: 2022-06-17 | Stop reason: HOSPADM

## 2022-06-16 RX ORDER — FAMOTIDINE 10 MG/ML
20 INJECTION, SOLUTION INTRAVENOUS ONCE
Status: CANCELLED | OUTPATIENT
Start: 2022-06-16 | End: 2022-06-16

## 2022-06-16 RX ORDER — NICARDIPINE HYDROCHLORIDE 2.5 MG/ML
INJECTION INTRAVENOUS
Status: DISPENSED
Start: 2022-06-16 | End: 2022-06-17

## 2022-06-16 RX ORDER — DEXAMETHASONE SODIUM PHOSPHATE 4 MG/ML
INJECTION, SOLUTION INTRA-ARTICULAR; INTRALESIONAL; INTRAMUSCULAR; INTRAVENOUS; SOFT TISSUE AS NEEDED
Status: DISCONTINUED | OUTPATIENT
Start: 2022-06-16 | End: 2022-06-16 | Stop reason: SURG

## 2022-06-16 RX ORDER — SUCCINYLCHOLINE CHLORIDE 20 MG/ML
INJECTION INTRAMUSCULAR; INTRAVENOUS AS NEEDED
Status: DISCONTINUED | OUTPATIENT
Start: 2022-06-16 | End: 2022-06-16 | Stop reason: SURG

## 2022-06-16 RX ORDER — ROCURONIUM BROMIDE 10 MG/ML
INJECTION, SOLUTION INTRAVENOUS AS NEEDED
Status: DISCONTINUED | OUTPATIENT
Start: 2022-06-16 | End: 2022-06-16 | Stop reason: SURG

## 2022-06-16 RX ORDER — LOSARTAN POTASSIUM 50 MG/1
50 TABLET ORAL DAILY
Status: DISCONTINUED | OUTPATIENT
Start: 2022-06-17 | End: 2022-06-17 | Stop reason: HOSPADM

## 2022-06-16 RX ORDER — NICOTINE POLACRILEX 4 MG
15 LOZENGE BUCCAL
Status: DISCONTINUED | OUTPATIENT
Start: 2022-06-16 | End: 2022-06-17 | Stop reason: HOSPADM

## 2022-06-16 RX ORDER — OXYBUTYNIN CHLORIDE 5 MG/1
2.5 TABLET ORAL 2 TIMES DAILY
Status: DISCONTINUED | OUTPATIENT
Start: 2022-06-16 | End: 2022-06-17 | Stop reason: HOSPADM

## 2022-06-16 RX ORDER — LABETALOL HYDROCHLORIDE 5 MG/ML
5 INJECTION, SOLUTION INTRAVENOUS
Status: DISCONTINUED | OUTPATIENT
Start: 2022-06-16 | End: 2022-06-16 | Stop reason: HOSPADM

## 2022-06-16 RX ORDER — FAMOTIDINE 20 MG/1
20 TABLET, FILM COATED ORAL ONCE
Status: DISCONTINUED | OUTPATIENT
Start: 2022-06-16 | End: 2022-06-16

## 2022-06-16 RX ORDER — PANTOPRAZOLE SODIUM 40 MG/1
40 TABLET, DELAYED RELEASE ORAL DAILY
Status: DISCONTINUED | OUTPATIENT
Start: 2022-06-17 | End: 2022-06-17 | Stop reason: HOSPADM

## 2022-06-16 RX ORDER — SODIUM CHLORIDE 450 MG/100ML
100 INJECTION, SOLUTION INTRAVENOUS CONTINUOUS
Status: DISCONTINUED | OUTPATIENT
Start: 2022-06-16 | End: 2022-06-17

## 2022-06-16 RX ORDER — ONDANSETRON 4 MG/1
4 TABLET, FILM COATED ORAL EVERY 6 HOURS PRN
Status: DISCONTINUED | OUTPATIENT
Start: 2022-06-16 | End: 2022-06-17 | Stop reason: HOSPADM

## 2022-06-16 RX ORDER — FENTANYL CITRATE 50 UG/ML
50 INJECTION, SOLUTION INTRAMUSCULAR; INTRAVENOUS
Status: DISCONTINUED | OUTPATIENT
Start: 2022-06-16 | End: 2022-06-16 | Stop reason: HOSPADM

## 2022-06-16 RX ORDER — DULOXETIN HYDROCHLORIDE 60 MG/1
60 CAPSULE, DELAYED RELEASE ORAL DAILY
Status: DISCONTINUED | OUTPATIENT
Start: 2022-06-17 | End: 2022-06-17 | Stop reason: HOSPADM

## 2022-06-16 RX ORDER — PROMETHAZINE HYDROCHLORIDE 25 MG/1
25 TABLET ORAL ONCE AS NEEDED
Status: DISCONTINUED | OUTPATIENT
Start: 2022-06-16 | End: 2022-06-16 | Stop reason: HOSPADM

## 2022-06-16 RX ORDER — MORPHINE SULFATE 1 MG/ML
2 INJECTION, SOLUTION EPIDURAL; INTRATHECAL; INTRAVENOUS
Status: DISCONTINUED | OUTPATIENT
Start: 2022-06-16 | End: 2022-06-17 | Stop reason: HOSPADM

## 2022-06-16 RX ORDER — NALOXONE HCL 0.4 MG/ML
0.4 VIAL (ML) INJECTION AS NEEDED
Status: DISCONTINUED | OUTPATIENT
Start: 2022-06-16 | End: 2022-06-16 | Stop reason: HOSPADM

## 2022-06-16 RX ORDER — HYDRALAZINE HYDROCHLORIDE 20 MG/ML
5 INJECTION INTRAMUSCULAR; INTRAVENOUS
Status: DISCONTINUED | OUTPATIENT
Start: 2022-06-16 | End: 2022-06-16 | Stop reason: HOSPADM

## 2022-06-16 RX ORDER — CARVEDILOL 12.5 MG/1
25 TABLET ORAL 2 TIMES DAILY WITH MEALS
Status: DISCONTINUED | OUTPATIENT
Start: 2022-06-16 | End: 2022-06-17 | Stop reason: HOSPADM

## 2022-06-16 RX ORDER — DEXTROSE MONOHYDRATE 25 G/50ML
25 INJECTION, SOLUTION INTRAVENOUS
Status: DISCONTINUED | OUTPATIENT
Start: 2022-06-16 | End: 2022-06-17 | Stop reason: HOSPADM

## 2022-06-16 RX ORDER — LIDOCAINE HYDROCHLORIDE 10 MG/ML
INJECTION, SOLUTION EPIDURAL; INFILTRATION; INTRACAUDAL; PERINEURAL AS NEEDED
Status: DISCONTINUED | OUTPATIENT
Start: 2022-06-16 | End: 2022-06-16 | Stop reason: SURG

## 2022-06-16 RX ADMIN — HEPARIN SODIUM 5000 UNITS: 1000 INJECTION, SOLUTION INTRAVENOUS; SUBCUTANEOUS at 15:04

## 2022-06-16 RX ADMIN — ONDANSETRON 4 MG: 2 INJECTION INTRAMUSCULAR; INTRAVENOUS at 14:48

## 2022-06-16 RX ADMIN — ROCURONIUM BROMIDE 30 MG: 10 INJECTION, SOLUTION INTRAVENOUS at 15:30

## 2022-06-16 RX ADMIN — LIDOCAINE HYDROCHLORIDE 50 MG: 10 INJECTION, SOLUTION EPIDURAL; INFILTRATION; INTRACAUDAL; PERINEURAL at 14:27

## 2022-06-16 RX ADMIN — FENTANYL CITRATE 50 MCG: 50 INJECTION, SOLUTION INTRAMUSCULAR; INTRAVENOUS at 14:55

## 2022-06-16 RX ADMIN — SODIUM CHLORIDE: 9 INJECTION, SOLUTION INTRAVENOUS at 14:20

## 2022-06-16 RX ADMIN — PROPOFOL 130 MG: 10 INJECTION, EMULSION INTRAVENOUS at 14:27

## 2022-06-16 RX ADMIN — Medication 5 MG/HR: at 19:02

## 2022-06-16 RX ADMIN — DEXAMETHASONE SODIUM PHOSPHATE 4 MG: 4 INJECTION, SOLUTION INTRA-ARTICULAR; INTRALESIONAL; INTRAMUSCULAR; INTRAVENOUS; SOFT TISSUE at 14:38

## 2022-06-16 RX ADMIN — CLINDAMYCIN PHOSPHATE 900 MG: 900 INJECTION, SOLUTION INTRAVENOUS at 15:05

## 2022-06-16 RX ADMIN — FENTANYL CITRATE 50 MCG: 50 INJECTION, SOLUTION INTRAMUSCULAR; INTRAVENOUS at 15:47

## 2022-06-16 RX ADMIN — LIDOCAINE HYDROCHLORIDE 0.5 ML: 10 INJECTION, SOLUTION EPIDURAL; INFILTRATION; INTRACAUDAL; PERINEURAL at 13:54

## 2022-06-16 RX ADMIN — OXYBUTYNIN CHLORIDE 2.5 MG: 5 TABLET ORAL at 21:21

## 2022-06-16 RX ADMIN — CARVEDILOL 25 MG: 12.5 TABLET, FILM COATED ORAL at 21:21

## 2022-06-16 RX ADMIN — SUGAMMADEX 200 MG: 100 INJECTION, SOLUTION INTRAVENOUS at 15:58

## 2022-06-16 RX ADMIN — SUCCINYLCHOLINE CHLORIDE 100 MG: 20 INJECTION, SOLUTION INTRAMUSCULAR; INTRAVENOUS at 14:28

## 2022-06-16 RX ADMIN — BUSPIRONE HYDROCHLORIDE 5 MG: 10 TABLET ORAL at 21:20

## 2022-06-16 RX ADMIN — ROCURONIUM BROMIDE 20 MG: 10 INJECTION, SOLUTION INTRAVENOUS at 14:39

## 2022-06-16 RX ADMIN — PROTAMINE SULFATE 50 MG: 10 INJECTION, SOLUTION INTRAVENOUS at 15:56

## 2022-06-16 NOTE — ANESTHESIA PROCEDURE NOTES
Airway  Urgency: elective    Date/Time: 6/16/2022 2:28 PM  Airway not difficult    General Information and Staff    Patient location during procedure: OR  CRNA/CAA: Suha Mcnulty CRNA    Indications and Patient Condition  Indications for airway management: airway protection    Preoxygenated: yes  MILS not maintained throughout  Mask difficulty assessment: 0 - not attempted    Final Airway Details  Final airway type: endotracheal airway      Successful airway: ETT  Cuffed: yes   Successful intubation technique: direct laryngoscopy and RSI  Facilitating devices/methods: cricoid pressure  Endotracheal tube insertion site: oral  Blade: Griselda  Blade size: 3  ETT size (mm): 7.0  Cormack-Lehane Classification: grade I - full view of glottis  Placement verified by: chest auscultation and capnometry   Measured from: lips  ETT/EBT  to lips (cm): 20  Number of attempts at approach: 1  Assessment: lips, teeth, and gum same as pre-op and atraumatic intubation    Additional Comments  Negative epigastric sounds, Breath sound equal bilaterally with symmetric chest rise and fall

## 2022-06-16 NOTE — H&P
Pre-Op H&P  Jo-Ann Krishnan  [id number]  1945      Chief complaint: abd pain      Subjective:  Patient is a 77 y.o.female presents for scheduled surgery by Dr. Neil. She anticipates a DIAGNOSTIC ARTERIOGRAM MESENTERIC WITH POSSIBLE STENT PLACEMENT today. She has persistent periumbilical pain for several years. Pain is worse after meals. She denies nausea, vomiting or changes in bowel habits. CT abd 3/4/21 showed Severe stenosis identified at the origin of the celiac axis. The stenosis is greater than 90%.        Review of Systems:  Constitutional-- No fever, chills or sweats. + fatigue.  CV-- No chest pain, palpitation or syncope. +HTN, HLD, CAD  Resp-- No cough, hemoptysis. +SOB, BLAKE no cpap  Skin--No rashes or lesions      Allergies:   Allergies   Allergen Reactions   • Ceclor [Cefaclor] Rash         Home Meds:  Medications Prior to Admission   Medication Sig Dispense Refill Last Dose   • acetaminophen (TYLENOL) 500 MG tablet Take 500 mg by mouth Every 6 (Six) Hours As Needed for Mild Pain .      • albuterol (PROVENTIL) (2.5 MG/3ML) 0.083% nebulizer solution Take 2.5 mg by nebulization Every 4 (Four) Hours As Needed for wheezing or shortness of air.      • aspirin 81 MG EC tablet Take 81 mg by mouth daily.      • atorvastatin (LIPITOR) 40 MG tablet Take 40 mg by mouth daily.      • busPIRone (BUSPAR) 5 MG tablet Take 5 mg by mouth 3 (Three) Times a Day.      • carvedilol (COREG) 25 MG tablet Take 25 mg by mouth 2 (two) times a day with meals.      • DULoxetine (CYMBALTA) 60 MG capsule Take 60 mg by mouth Daily.      • Ferrous Sulfate 27 MG tablet Take 1 tablet by mouth Daily.      • HYDROcodone-acetaminophen (NORCO) 7.5-325 MG per tablet Take 1 tablet by mouth Every 6 (Six) Hours As Needed for Moderate Pain .      • Insulin Glargine, 1 Unit Dial, (Toujeo SoloStar) 300 UNIT/ML solution pen-injector injection Inject 10 Units under the skin into the appropriate area as directed Every Night. 10 units  nightly      • isosorbide mononitrate (IMDUR) 60 MG 24 hr tablet Take 60 mg by mouth daily.      • losartan (COZAAR) 50 MG tablet Take 50 mg by mouth Daily. Takes 1/2 tablet per pt.      • multivitamin with minerals (HAIR SKIN & NAILS ADVANCED PO) Take 1 tablet by mouth Daily.      • multivitamin with minerals tablet tablet Take 1 tablet by mouth Daily.      • naloxone (NARCAN) 4 MG/0.1ML nasal spray 1 spray into each nostril As Needed.      • pantoprazole (PROTONIX) 40 MG EC tablet Take 40 mg by mouth Daily.      • SITagliptin (JANUVIA) 50 MG tablet Take 50 mg by mouth Daily.      • tolterodine LA (DETROL LA) 4 MG 24 hr capsule Take 4 mg by mouth Daily.      • traMADol (ULTRAM) 50 MG tablet Take 50 mg by mouth Every 6 (Six) Hours As Needed.      • vitamin B-12 (CYANOCOBALAMIN) 1000 MCG tablet Take 1,000 mcg by mouth Daily.            PMH:   Past Medical History:   Diagnosis Date   • Anemia    • Arthritis    • Back problem    • CAD (coronary artery disease)    • Cancer (HCC)     Right breast   • Chronic back pain    • Chronically on opiate therapy    • Depression    • Diabetes mellitus (HCC)     DX 14 years ago- checks fsbs weekly   • Fibromyalgia    • Gastroparesis    • GERD (gastroesophageal reflux disease)    • Headache     emotional/tension   • History of transfusion     [facility]   • HTN (hypertension)    • Hypercholesteremia    • IBS (irritable bowel syndrome)    • Incontinence of urine     urgency   • Migraine headache    • Myalgia and myositis    • Peripheral neuropathy    • Sleep apnea    • Sleep apnea     does not wear cpap   • UTI (urinary tract infection)      PSH:    Past Surgical History:   Procedure Laterality Date   • APPENDECTOMY     • BACK SURGERY      3 x per provided history   • BRAIN TUMOR EXCISION  1988   • BREAST BIOPSY     • CARPAL TUNNEL RELEASE Bilateral    • CHOLECYSTECTOMY     • COLONOSCOPY      2015   • CRANIOTOMY FOR TUMOR     • EYE SURGERY      bilateral cataracts removed    • HEMORRHOIDECTOMY     • LUMBAR FUSION N/A 1/4/2017    Procedure: LUMBAR LAMINECTOMY AND DECOMRESSION  L3 AND L4;  Surgeon: Nishant Bhatti MD;  Location: [facility];  Service:    • TOTAL ABDOMINAL HYSTERECTOMY     • TRIGGER FINGER RELEASE         Immunization History:  Influenza: 2021  Pneumococcal: UTD  Tetanus: UTD  Covid 1 of 1 +booster: 2021    Social History:   Tobacco:   Social History     Tobacco Use   Smoking Status Current Every Day Smoker   • Packs/day: 1.00   • Years: 62.00   • Pack years: 62.00   • Types: Cigarettes   Smokeless Tobacco Never Used   Tobacco Comment    1/17/2022 quit       Alcohol:     Social History     Substance and Sexual Activity   Alcohol Use No         Physical Exam: VS: /81  HR 76  RR 16  T 96.6  Sat 97%RA      General Appearance:    Alert, cooperative, no distress, appears stated age   Head:    Normocephalic, without obvious abnormality, atraumatic   Lungs:     Clear to auscultation bilaterally, respirations unlabored    Heart:   Regular rate and rhythm, S1 and S2 normal    Abdomen:    Soft without tenderness   Extremities:   Extremities normal, atraumatic, no cyanosis or edema   Skin:   Skin color, texture, turgor normal, no rashes or lesions   Neurologic:   Grossly intact     Results Review:     LABS:  Lab Results   Component Value Date    WBC 7.10 06/15/2022    HGB 9.5 (L) 06/15/2022    HCT 30.2 (L) 06/15/2022    MCV 93.2 06/15/2022     06/15/2022    NEUTROABS 9.45 (H) 01/06/2017    GLUCOSE 170 (H) 06/15/2022    BUN 30 (H) 06/15/2022    CREATININE 2.20 (H) 06/15/2022    EGFRIFAFRI 45 (L) 01/06/2017     06/15/2022    K 4.6 06/15/2022     06/15/2022    CO2 26.0 06/15/2022    CALCIUM 8.9 06/15/2022    ALBUMIN 3.90 12/28/2016    AST 18 12/28/2016    ALT 5 (L) 12/28/2016    BILITOT 0.1 (L) 12/28/2016       RADIOLOGY:  3/4/21 CT angio:  FINDINGS: There is severe stenosis identified of the celiac axis. There  is mild narrowing identified of the origin of  the superior mesenteric  artery with only minimal calcification identified. No significant  stenosis identified of the origin of the superior mesenteric artery.  Both renal arteries are patent. No significant stenosis identified of  the origin. The YESICA is patent. Vascular calcifications seen within the  abdominal aorta. Small hiatal hernia identified. The lung bases are  grossly clear. The liver is homogeneous. No abdominal or retroperitoneal  lymphadenopathy. The abdominal portion of the gastrointestinal tract  within normal limits. Kidneys and adrenal glands within normal limits.     Pelvis: The pelvic organs are unremarkable. The pelvic portion of the  gastrointestinal tract within normal limits. No free fluid or free air.  No pelvic adenopathy. No abnormal mass or fluid collections identified.     IMPRESSION:  Severe stenosis identified at the origin of the celiac axis.  The stenosis is greater than 90%. There is mild narrowing identified and  atherosclerotic disease of the origin of the superior mesenteric artery  with no significant stenosis. YESICA is patent. There is no acute  intra-abdominal or pelvic abnormality present. Stool seen scattered  throughout the colon suggesting clinical presentation of constipation.  Degenerative changes seen within the spine.    I reviewed the patient's new clinical results.    Cancer Staging (if applicable)  Cancer Patient: __ yes __no __unknown; If yes, clinical stage T:__ N:__M:__, stage group or __N/A      Impression: mesenteric artery stenosis       Plan: DIAGNOSTIC ARTERIOGRAM MESENTERIC WITH POSSIBLE STENT PLACEMENT      BALAJI Chavez   6/16/2022   12:55 EDT

## 2022-06-16 NOTE — ANESTHESIA POSTPROCEDURE EVALUATION
Patient: Jo-Ann Krishnan    Procedure Summary     Date: 06/16/22 Room / Location: [facility] OR 01 / [facility]BR[facility]    Anesthesia Start: 1420 Anesthesia Stop:     Procedure: DIAGNOSTIC ARTERIOGRAM WITH CELIAC STENT PLACEMENT (N/A ) Diagnosis:       Mesenteric artery stenosis (HCC)      (Mesenteric artery stenosis (HCC) [K55.1])    Surgeons: Neo Neil MD Provider: Sharan Best MD    Anesthesia Type: general ASA Status: 3          Anesthesia Type: general    Vitals  No vitals data found for the desired time range.          Post Anesthesia Care and Evaluation    Patient location during evaluation: PACU  Patient participation: complete - patient participated  Level of consciousness: awake and alert  Pain score: 0  Pain management: adequate    Airway patency: patent  Anesthetic complications: No anesthetic complications  PONV Status: none  Cardiovascular status: hemodynamically stable and acceptable  Respiratory status: nonlabored ventilation, acceptable and nasal cannula  Hydration status: acceptable

## 2022-06-16 NOTE — ANESTHESIA PREPROCEDURE EVALUATION
Anesthesia Evaluation     Patient summary reviewed and Nursing notes reviewed   no history of anesthetic complications:  NPO Solid Status: > 8 hours  NPO Liquid Status: > 8 hours           Airway   Mallampati: II  TM distance: >3 FB  Neck ROM: full  No difficulty expected  Dental      Pulmonary - normal exam   (+) sleep apnea,   Cardiovascular - normal exam    (+) hypertension, CAD, PVD, hyperlipidemia,       Neuro/Psych  (+) headaches, numbness, psychiatric history,    GI/Hepatic/Renal/Endo    (+)  GERD,  diabetes mellitus,     Musculoskeletal     Abdominal    Substance History      OB/GYN          Other   arthritis,    history of cancer                    Anesthesia Plan    ASA 3     general     intravenous induction     Anesthetic plan, risks, benefits, and alternatives have been provided, discussed and informed consent has been obtained with: patient.    Plan discussed with CRNA.        CODE STATUS:

## 2022-06-17 VITALS
BODY MASS INDEX: 32 KG/M2 | HEART RATE: 88 BPM | WEIGHT: 163 LBS | HEIGHT: 60 IN | OXYGEN SATURATION: 97 % | RESPIRATION RATE: 18 BRPM | TEMPERATURE: 98.6 F | SYSTOLIC BLOOD PRESSURE: 131 MMHG | DIASTOLIC BLOOD PRESSURE: 61 MMHG

## 2022-06-17 LAB
ANION GAP SERPL CALCULATED.3IONS-SCNC: 10 MMOL/L (ref 5–15)
ANION GAP SERPL CALCULATED.3IONS-SCNC: 9 MMOL/L (ref 5–15)
BASOPHILS # BLD AUTO: 0.02 10*3/MM3 (ref 0–0.2)
BASOPHILS NFR BLD AUTO: 0.2 % (ref 0–1.5)
BUN SERPL-MCNC: 34 MG/DL (ref 8–23)
BUN SERPL-MCNC: 35 MG/DL (ref 8–23)
BUN/CREAT SERPL: 15.8 (ref 7–25)
BUN/CREAT SERPL: 18.3 (ref 7–25)
CALCIUM SPEC-SCNC: 8.7 MG/DL (ref 8.6–10.5)
CALCIUM SPEC-SCNC: 9 MG/DL (ref 8.6–10.5)
CHLORIDE SERPL-SCNC: 104 MMOL/L (ref 98–107)
CHLORIDE SERPL-SCNC: 104 MMOL/L (ref 98–107)
CO2 SERPL-SCNC: 22 MMOL/L (ref 22–29)
CO2 SERPL-SCNC: 23 MMOL/L (ref 22–29)
CREAT SERPL-MCNC: 1.91 MG/DL (ref 0.57–1)
CREAT SERPL-MCNC: 2.15 MG/DL (ref 0.57–1)
DEPRECATED RDW RBC AUTO: 51.1 FL (ref 37–54)
EGFRCR SERPLBLD CKD-EPI 2021: 22.9 ML/MIN/1.73
EGFRCR SERPLBLD CKD-EPI 2021: 26.4 ML/MIN/1.73
EOSINOPHIL # BLD AUTO: 0 10*3/MM3 (ref 0–0.4)
EOSINOPHIL NFR BLD AUTO: 0 % (ref 0.3–6.2)
ERYTHROCYTE [DISTWIDTH] IN BLOOD BY AUTOMATED COUNT: 15.1 % (ref 12.3–15.4)
GLUCOSE BLDC GLUCOMTR-MCNC: 206 MG/DL (ref 70–130)
GLUCOSE BLDC GLUCOMTR-MCNC: 269 MG/DL (ref 70–130)
GLUCOSE SERPL-MCNC: 161 MG/DL (ref 65–99)
GLUCOSE SERPL-MCNC: 274 MG/DL (ref 65–99)
HCT VFR BLD AUTO: 29.2 % (ref 34–46.6)
HGB BLD-MCNC: 9.2 G/DL (ref 12–15.9)
IMM GRANULOCYTES # BLD AUTO: 0.09 10*3/MM3 (ref 0–0.05)
IMM GRANULOCYTES NFR BLD AUTO: 0.8 % (ref 0–0.5)
LYMPHOCYTES # BLD AUTO: 1.14 10*3/MM3 (ref 0.7–3.1)
LYMPHOCYTES NFR BLD AUTO: 10.5 % (ref 19.6–45.3)
MCH RBC QN AUTO: 29 PG (ref 26.6–33)
MCHC RBC AUTO-ENTMCNC: 31.5 G/DL (ref 31.5–35.7)
MCV RBC AUTO: 92.1 FL (ref 79–97)
MONOCYTES # BLD AUTO: 0.48 10*3/MM3 (ref 0.1–0.9)
MONOCYTES NFR BLD AUTO: 4.4 % (ref 5–12)
NEUTROPHILS NFR BLD AUTO: 84.1 % (ref 42.7–76)
NEUTROPHILS NFR BLD AUTO: 9.12 10*3/MM3 (ref 1.7–7)
NRBC BLD AUTO-RTO: 0 /100 WBC (ref 0–0.2)
PLATELET # BLD AUTO: 200 10*3/MM3 (ref 140–450)
PMV BLD AUTO: 10 FL (ref 6–12)
POTASSIUM SERPL-SCNC: 4.5 MMOL/L (ref 3.5–5.2)
POTASSIUM SERPL-SCNC: 5.2 MMOL/L (ref 3.5–5.2)
RBC # BLD AUTO: 3.17 10*6/MM3 (ref 3.77–5.28)
SODIUM SERPL-SCNC: 136 MMOL/L (ref 136–145)
SODIUM SERPL-SCNC: 136 MMOL/L (ref 136–145)
WBC NRBC COR # BLD: 10.85 10*3/MM3 (ref 3.4–10.8)

## 2022-06-17 PROCEDURE — A9270 NON-COVERED ITEM OR SERVICE: HCPCS | Performed by: PHYSICIAN ASSISTANT

## 2022-06-17 PROCEDURE — 80048 BASIC METABOLIC PNL TOTAL CA: CPT

## 2022-06-17 PROCEDURE — 63710000001 ISOSORBIDE MONONITRATE 60 MG TABLET SUSTAINED-RELEASE 24 HOUR: Performed by: PHYSICIAN ASSISTANT

## 2022-06-17 PROCEDURE — 63710000001 INSULIN LISPRO (HUMAN) PER 5 UNITS: Performed by: PHYSICIAN ASSISTANT

## 2022-06-17 PROCEDURE — 63710000001 OXYBUTYNIN 5 MG TABLET: Performed by: PHYSICIAN ASSISTANT

## 2022-06-17 PROCEDURE — 99213 OFFICE O/P EST LOW 20 MIN: CPT

## 2022-06-17 PROCEDURE — 63710000001 PANTOPRAZOLE 40 MG TABLET DELAYED-RELEASE: Performed by: PHYSICIAN ASSISTANT

## 2022-06-17 PROCEDURE — 85025 COMPLETE CBC W/AUTO DIFF WBC: CPT | Performed by: PHYSICIAN ASSISTANT

## 2022-06-17 PROCEDURE — 82962 GLUCOSE BLOOD TEST: CPT

## 2022-06-17 PROCEDURE — 63710000001 BUSPIRONE 5 MG TABLET: Performed by: PHYSICIAN ASSISTANT

## 2022-06-17 PROCEDURE — 63710000001 ASPIRIN 81 MG TABLET DELAYED-RELEASE: Performed by: PHYSICIAN ASSISTANT

## 2022-06-17 PROCEDURE — 63710000001 CLOPIDOGREL 75 MG TABLET: Performed by: PHYSICIAN ASSISTANT

## 2022-06-17 PROCEDURE — 63710000001 CARVEDILOL 12.5 MG TABLET: Performed by: PHYSICIAN ASSISTANT

## 2022-06-17 PROCEDURE — 63710000001 LOSARTAN 50 MG TABLET: Performed by: PHYSICIAN ASSISTANT

## 2022-06-17 PROCEDURE — 63710000001 HYDROCODONE-ACETAMINOPHEN 7.5-325 MG TABLET: Performed by: PHYSICIAN ASSISTANT

## 2022-06-17 PROCEDURE — 63710000001 DULOXETINE 60 MG CAPSULE DELAYED-RELEASE PARTICLES: Performed by: PHYSICIAN ASSISTANT

## 2022-06-17 PROCEDURE — 80048 BASIC METABOLIC PNL TOTAL CA: CPT | Performed by: PHYSICIAN ASSISTANT

## 2022-06-17 PROCEDURE — 63710000001 ATORVASTATIN 40 MG TABLET: Performed by: PHYSICIAN ASSISTANT

## 2022-06-17 RX ORDER — CLOPIDOGREL BISULFATE 75 MG/1
75 TABLET ORAL DAILY
Qty: 30 TABLET | Refills: 6 | Status: SHIPPED | OUTPATIENT
Start: 2022-06-18

## 2022-06-17 RX ADMIN — CARVEDILOL 25 MG: 12.5 TABLET, FILM COATED ORAL at 09:22

## 2022-06-17 RX ADMIN — ATORVASTATIN CALCIUM 40 MG: 40 TABLET, FILM COATED ORAL at 09:23

## 2022-06-17 RX ADMIN — INSULIN LISPRO 4 UNITS: 100 INJECTION, SOLUTION INTRAVENOUS; SUBCUTANEOUS at 12:33

## 2022-06-17 RX ADMIN — HYDROCODONE BITARTRATE AND ACETAMINOPHEN 1 TABLET: 7.5; 325 TABLET ORAL at 14:18

## 2022-06-17 RX ADMIN — LOSARTAN POTASSIUM 50 MG: 50 TABLET, FILM COATED ORAL at 09:22

## 2022-06-17 RX ADMIN — BUSPIRONE HYDROCHLORIDE 5 MG: 10 TABLET ORAL at 09:24

## 2022-06-17 RX ADMIN — ISOSORBIDE MONONITRATE 60 MG: 60 TABLET, EXTENDED RELEASE ORAL at 09:22

## 2022-06-17 RX ADMIN — CLOPIDOGREL BISULFATE 75 MG: 75 TABLET ORAL at 09:22

## 2022-06-17 RX ADMIN — PANTOPRAZOLE SODIUM 40 MG: 40 TABLET, DELAYED RELEASE ORAL at 09:24

## 2022-06-17 RX ADMIN — ASPIRIN 81 MG: 81 TABLET, COATED ORAL at 09:23

## 2022-06-17 RX ADMIN — INSULIN LISPRO 6 UNITS: 100 INJECTION, SOLUTION INTRAVENOUS; SUBCUTANEOUS at 09:24

## 2022-06-17 RX ADMIN — DULOXETINE HYDROCHLORIDE 60 MG: 60 CAPSULE, DELAYED RELEASE ORAL at 12:32

## 2022-06-17 RX ADMIN — OXYBUTYNIN CHLORIDE 2.5 MG: 5 TABLET ORAL at 09:23

## 2022-06-17 NOTE — CASE MANAGEMENT/SOCIAL WORK
Continued Stay Note  [facility]     Patient Name: Jo-Ann Krishnan  MRN: [id number]  Today's Date: 6/17/2022    Admit Date: 6/16/2022     Discharge Plan     Row Name 06/17/22 1420       Plan    Plan Home    Patient/Family in Agreement with Plan yes    Final Discharge Disposition Code 01 - home or self-care    Final Note Spoke with patient at bedside.  Patient denied any needs, and said that her sister is going to transport her home.               Discharge Codes    No documentation.               Expected Discharge Date and Time     Expected Discharge Date Expected Discharge Time    Jun 17, 2022             Clarice Paulino RN

## 2022-06-17 NOTE — PROGRESS NOTES
[facility] Cardiothoracic Surgery In-Patient Progress Note     #1 Day Post-Op s/p celiac artery stent & CO2 aortogram     LOS: 0 days       Subjective  No complaints. Lethargic this morning. Able to answer all questions appropriately. On room air.      Objective  Vital Signs  Temp:  [96.6 °F (35.9 °C)-98.2 °F (36.8 °C)] 98.2 °F (36.8 °C)  Heart Rate:  [65-88] 88  Resp:  [14-18] 18  BP: ()/(46-87) 122/52    Physical Exam:   General Appearance: lethargic; appears stated age and cooperative   Lungs: respirations regular, respirations even and respirations unlabored   Heart: normal S1, S2, no murmur, no gallop, no rub and no click   Abd: Soft and non-tender    Skin: Incision c/d/i; no hematoma    Ext: warm and viable; dopplerable DP and PT bilaterally     Results   Results from last 7 days   Lab Units 06/17/22  0353   WBC 10*3/mm3 10.85*   HEMOGLOBIN g/dL 9.2*   HEMATOCRIT % 29.2*   PLATELETS 10*3/mm3 200     Results from last 7 days   Lab Units 06/17/22  0353   SODIUM mmol/L 136   POTASSIUM mmol/L 5.2   CHLORIDE mmol/L 104   CO2 mmol/L 22.0   BUN mg/dL 34*   CREATININE mg/dL 2.15*   GLUCOSE mg/dL 274*   CALCIUM mg/dL 8.7       Assessment  POD # 1 celiac artery stent & CO2 aortogram    Plan   D/C home today  Recheck BMP in 2 days with PCP  ASA/Plavix  Wean off of cardene gtt  D/C IVF  Ambulate      BALAJI Stanley  06/17/22  07:16 EDT

## 2022-06-17 NOTE — DISCHARGE SUMMARY
[facility] Cardiothoracic Surgery Discharge Summary    Name:  Jo-Ann Krishnan  MRN Number:  [id number]  Date of Admission: 6/16/2022  Date of Discharge:  6/17/2022    Referred By: Neo Neil MD  PCP: Aiden Spencer MD  IP Care Team:  Patient Care Team:  Aiden Spencer MD as PCP - General (Family Medicine)  Sujatha Carmona MD as Consulting Physician (Pain Medicine)  Cr De Jesus MD as Consulting Physician (Gastroenterology)  Noemí Machuca APRN as Nurse Practitioner (Nurse Practitioner)    Discharge Diagnosis:  Past Medical History:   Diagnosis Date   • Anemia    • Arthritis    • Back problem    • CAD (coronary artery disease)    • Cancer (HCC)     Right breast   • Chronic back pain    • Chronically on opiate therapy    • Depression    • Diabetes mellitus (HCC)     DX 14 years ago- checks fsbs weekly   • Fibromyalgia    • Gastroparesis    • GERD (gastroesophageal reflux disease)    • Headache     emotional/tension   • History of transfusion     [facility]   • HTN (hypertension)    • Hypercholesteremia    • IBS (irritable bowel syndrome)    • Incontinence of urine     urgency   • Migraine headache    • Myalgia and myositis    • Peripheral neuropathy    • Sleep apnea     does not wear cpap   • UTI (urinary tract infection)      Active Hospital Problems    Diagnosis  POA   • **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 For the past several  years, the patient notes persistent periumbilical pain. This pain is a sharp sensation radiating to the left flank that is exacerbated with p.o. intake. The patient has abdominal pain every day. Last year, the patient lost approximately 20 to 30 pounds. She denies nausea, vomiting, melena or hematochezia.    Hospital Course:  Patient was admitted to [facility] on 6/16/2022 for scheduled aortogram with CO2 contrast and celiac artery stent placement with Dr. Neil. She was extubated without complication and sent to the telemetry floor for further observation.   POD 1: No acute events overnight. She was weaned off of cardene drip.  Creatinine slightly elevated with morning labs at 2.15.  Recheck revealed creatinine decreased to 1.91.  Preoperatively was 2.20.  Plans to recheck BMP with PCP next week as well.  Encouraged patient and family member to push oral hydration due to patient being likely dehydrated. She was discharged home in stable condition with aspirin and Plavix.     Discharge Medications:     Discharge Medications      New Medications      Instructions Start Date   clopidogrel 75 MG tablet  Commonly known as: PLAVIX   75 mg, Oral, Daily   Start Date: June 18, 2022        Continue These Medications      Instructions Start Date   acetaminophen 500 MG tablet  Commonly known as: TYLENOL   500 mg, Oral, Every 6 Hours PRN      albuterol (2.5 MG/3ML) 0.083% nebulizer solution  Commonly known as: PROVENTIL   2.5 mg, Nebulization, Every 4 Hours PRN      aspirin 81 MG EC tablet   81 mg, Oral, Daily      atorvastatin 40 MG tablet  Commonly known as: LIPITOR   40 mg, Oral, Daily      busPIRone 5 MG tablet  Commonly known as: BUSPAR   5 mg, Oral, 3 Times Daily      carvedilol 25 MG tablet  Commonly known as: COREG   25 mg, Oral, 2 Times Daily With Meals      DULoxetine 60 MG capsule  Commonly known as: CYMBALTA   60 mg, Oral, Daily      Ferrous Sulfate 27 MG tablet   1 tablet, Oral, Daily      HYDROcodone-acetaminophen  7.5-325 MG per tablet  Commonly known as: NORCO   1 tablet, Oral, Every 6 Hours PRN      isosorbide mononitrate 60 MG 24 hr tablet  Commonly known as: IMDUR   60 mg, Oral, Daily      losartan 50 MG tablet  Commonly known as: COZAAR   50 mg, Oral, Daily      multivitamin with minerals tablet tablet   1 tablet, Oral, Daily      naloxone 4 MG/0.1ML nasal spray  Commonly known as: NARCAN   1 spray, Nasal, As Needed      pantoprazole 40 MG EC tablet  Commonly known as: PROTONIX   40 mg, Oral, Daily      SITagliptin 50 MG tablet  Commonly known as: JANUVIA   50 mg, Oral, Daily      tolterodine LA 4 MG 24 hr capsule  Commonly known as: DETROL LA   4 mg, Oral, Daily      Toujeo SoloStar 300 UNIT/ML solution pen-injector injection  Generic drug: Insulin Glargine (1 Unit Dial)   10 Units, Subcutaneous, Nightly, 10 units nightly      traMADol 50 MG tablet  Commonly known as: ULTRAM   100 mg, Oral, 2 Times Daily PRN      VITAMIN B-12 PO   2,500 mcg, Oral, Daily             Allergies:  Allergies   Allergen Reactions   • Ceclor [Cefaclor] Rash       Discharge Diet:  Diet Instructions     Diet: Consistent Carbohydrate, Cardiac      Discharge Diet:  Consistent Carbohydrate  Cardiac             Activity at Discharge:  Activity Instructions     Activity as Tolerated      Bathing Restrictions      Type of Restriction: Bathing    Bathing Restrictions: No Tub Bath    Driving Restrictions      Type of Restriction: Driving    Driving Restrictions: No Driving While Taking Narcotics    Lifting Restrictions      Type of Restriction: Lifting    Lifting Restrictions: Lifting Restriction (Indicate Limit)    Weight Limit (Pounds): 10    Length of Lifting Restriction: until seen at next follow-up appointment          Discharge Education:  Post-Op Education:  Patient was advised on responsible use of opioids in the post-surgical setting.  Patient was advised these medications are highly addictive.  Patient advised on proper disposal of unused  opioid medication and was urged to discard any unused opioid medication.  Wound Care:  Patient educated regarding care of post-operative wounds.  Patient is to wash with plain soap and water and pat dry.  Patient is not to use salves on the incision sites.  Patient instructed regarding the signs and symptoms of infection and when to call the office with any concerns.    Follow up Appointments:   No future appointments.  Additional Instructions for the Follow-ups that You Need to Schedule     Call MD With Problems / Concerns   As directed      Instructions:   Please call the CT Surgery office with any questions or concerns you may have [phone]    Order Comments: Instructions: Please call the [facility] Surgery office with any questions or concerns you may have [phone]          Discharge Follow-up with PCP   As directed       Currently Documented PCP:    Aiden Spencer MD    PCP Phone Number:    [phone]     Follow Up Details: Follow-up with your PCP within 1-2 weeks         Discharge Follow-up with Specialty: Cardiothoracic Surgery; 2 Weeks   As directed      Specialty: Cardiothoracic Surgery    Follow Up: 2 Weeks    Follow Up Details: Follow-up within 2 weeks                 BALAJI Lewis  06/17/22  13:52 EDT    Time: I spent 20 minutes on this discharge activity which included: face-to-face encounter with the patient, reviewing the data in the system, coordination of the care with the nursing staff as well as consultants, documentation, and entering orders.

## 2022-06-17 NOTE — OP NOTE
DATE OF PROCEDURE: 6/16/2022      PREOPERATIVE DIAGNOSES:  1. Celiac artery stenosis  2. Chronic mesenteric ischemia  3. Chronic kidney disease    POSTOPERATIVE DIAGNOSES:    1. Celiac artery stenosis  2. Chronic mesenteric ischemia  3. Chronic kidney disease    PROCEDURES PERFORMED:    1. Aortogram with carbon dioxide contrast  2. Celiac artery stent placement     SURGEON: Neo Neil MD        ASSISTANTS:    1. Artur Mckeon MD was responsible for performing the following activities: Retraction and their skilled assistance was necessary for the success of this case.  2. Tobin Carreno PA was responsible for performing the following activities: Retraction and Placing Dressing and their skilled assistance was necessary for the success of this case.      ANESTHESIA: General endotracheal anesthesia with Suha Mcnulty CRNA     ESTIMATED BLOOD LOSS:  Less than 10 mL     TOTAL FLUOROSCOPY TIME: 16:00     EXPOSURE: 2384 mGy     CONTRAST:  20 mL      INDICATIONS:  76-year-old -American female with a history of hypertension, hyperlipidemia, diabetes mellitus, right breast cancer, sleep apnea, coronary artery disease and chronic back pain/chronic opiate use who presented with abdominal pain.  She was found to have severe celiac artery stenosis and history consistent with chronic mesenteric ischemia.  The patient was felt to be a reasonable candidate for aortogram with celiac and possible SMA stent placement. The risks and benefits of surgery were discussed with the patient including pain, bleeding, infection, renal failure, myocardial infarction, loss of limb and death. The patient understood these risks and wished to proceed with surgery.       DESCRIPTION OF PROCEDURE:  The patient was taken to the operating room and placed under general endotracheal anesthesia.  She was prepped and draped in the usual sterile fashion and a timeout was performed including the patient's name, procedure and antibiotic  administration.  Left brachial artery access was obtained using bedside ultrasound guidance.  5000 units of heparin was administered systemically.  A 5 [place] sheath was placed using modified Seldinger technique and wire access of the proximal abdominal aorta was obtained using an angled glide wire.  Carbon dioxide was utilized to perform an aortogram that demonstrated critical 99% ostial stenosis to the celiac artery.  No significant stenosis was appreciated in the SMA.  The celiac artery was engaged with a Berenstein catheter and angled glide wire.  A selective angiogram was performed of the celiac artery given limited visualization on aortogram from the critical stenosis.  A Arreola wire was anchored in the celiac artery and sheath upsized to 6 Fr.  An 8 mm x 17 mm [employer] biliary stent was selected and deployed with balloon inflation.  Completion aortogram was performed with contrast to better define the anatomy and this demonstrated no residual stenosis.  I attempted to deploy a Perclose in the brachial artery and due to its small caliber, the foot plate would not open fully.  This was not deployed and decision was made to apply manual pressure after removing the sheath.  The patient was subsequently transported to recovery in stable condition, extubated.

## 2022-07-07 ENCOUNTER — OFFICE VISIT (OUTPATIENT)
Dept: CARDIAC SURGERY | Facility: CLINIC | Age: 77
End: 2022-07-07

## 2022-07-07 VITALS
WEIGHT: 166 LBS | HEART RATE: 86 BPM | TEMPERATURE: 98.7 F | HEIGHT: 60 IN | SYSTOLIC BLOOD PRESSURE: 148 MMHG | DIASTOLIC BLOOD PRESSURE: 88 MMHG | BODY MASS INDEX: 32.59 KG/M2 | OXYGEN SATURATION: 99 %

## 2022-07-07 DIAGNOSIS — I77.1 CELIAC ARTERY STENOSIS: Primary | ICD-10-CM

## 2022-07-07 PROBLEM — K55.1 MESENTERIC ARTERY STENOSIS: Status: RESOLVED | Noted: 2022-01-11 | Resolved: 2022-07-07

## 2022-07-07 PROCEDURE — 99215 OFFICE O/P EST HI 40 MIN: CPT | Performed by: NURSE PRACTITIONER

## 2022-07-07 NOTE — PROGRESS NOTES
"     [facility] Cardiothoracic Surgery Office Follow Up Note     Date of Encounter: 2022     Name: Jo-Ann Krishnan  : 1945     Referred By: No ref. provider found  PCP: Aiden Spencer MD    Chief Complaint:    Chief Complaint   Patient presents with   • Follow-up     Follow up for abdominal pain    • Hospital Follow Up Visit     Follow up for abdominal pain, s/p arteriogram w/ celiac stent 22-for celiac artery stenosis. Pt states that the pain has been going on for 4 days, it's constant and in the right side of the abdomin and the lower back also fatigue, weak and dizzy.        Subjective      History of Present Illness:    Jo-Ann Krishnan is a 77 y.o. female current smoker, hypertension, hyperlipidemia, diabetes mellitus, right breast cancer, chronic kidney disease, sleep apnea, CAD, chronic back pain/opiate use and weight loss/postprandial abdominal pain and two vessel chronic mesenteric ischemia s/p diagnostic arteriogram with celiac stent placement (no significant stenosis of the SMA noted) by Dr. Neil on 21.  Postoperatively, patient had slightly elevated creatinine on morning labs that improved with instructions to recheck BMP with PCP after discharge.  Patient was discharged on POD #1 with no readmissions.  Patient presents today in early follow-up for complaints of abdominal pain.  Patient reports 4-day history of worsening abdominal pain.  When asked if this is similar to her preoperative abdominal pain, she states \"I am not sure.\"  She reports that the pain is \"all over\" but worst to the left of her umbilical area.  She denies any fevers, chills, or body aches.  She denies any nausea, vomiting, or diarrhea.  She had recent BM last night.  She reports she has not been eating much, but did eat some soup last night and she stated the pain did not get worse specifically after eating.  She denies any dysuria or flank pain.  She has not been seen postoperatively by " her PCP. Patient is present in wheelchair with her sister and is reserved and somnolent between questions. She reports compliance with ASA/Plavix.    Review of Systems:  Review of Systems   Constitutional: Positive for malaise/fatigue. Negative for chills, decreased appetite, diaphoresis, fever, night sweats and weight loss.   HENT: Positive for congestion (allergies ). Negative for hoarse voice, sore throat and stridor.    Cardiovascular: Positive for palpitations. Negative for chest pain, claudication, dyspnea on exertion, irregular heartbeat, leg swelling, near-syncope, orthopnea, paroxysmal nocturnal dyspnea and syncope.   Respiratory: Negative for cough, hemoptysis, shortness of breath, sleep disturbances due to breathing, snoring, sputum production and wheezing.    Hematologic/Lymphatic: Negative for adenopathy and bleeding problem. Does not bruise/bleed easily.   Skin: Negative for color change, dry skin, itching, poor wound healing and rash.   Musculoskeletal: Positive for back pain, joint pain and neck pain. Negative for arthritis, falls and muscle weakness.   Gastrointestinal: Positive for abdominal pain. Negative for anorexia, constipation, diarrhea, hematochezia, melena, nausea and vomiting.   Neurological: Positive for dizziness, light-headedness and numbness (fingers and toes bilateral from time to time numb). Negative for difficulty with concentration, disturbances in coordination, loss of balance, seizures, vertigo and weakness.   Psychiatric/Behavioral: Positive for memory loss. Negative for altered mental status, depression and substance abuse. The patient has insomnia. The patient is not nervous/anxious.    Allergic/Immunologic: Positive for environmental allergies. Negative for persistent infections.       I have reviewed the following portions of the patient's history: allergies, current medications, past family history, past medical history, past social history, past surgical history, problem  list and ROS and confirm it's accurate.    Allergies:  Allergies   Allergen Reactions   • Ceclor [Cefaclor] Rash       Medications:      Current Outpatient Medications:   •  acetaminophen (TYLENOL) 500 MG tablet, Take 500 mg by mouth Every 6 (Six) Hours As Needed for Mild Pain ., Disp: , Rfl:   •  albuterol (PROVENTIL) (2.5 MG/3ML) 0.083% nebulizer solution, Take 2.5 mg by nebulization Every 4 (Four) Hours As Needed for wheezing or shortness of air., Disp: , Rfl:   •  aspirin 81 MG EC tablet, Take 81 mg by mouth daily., Disp: , Rfl:   •  atorvastatin (LIPITOR) 40 MG tablet, Take 40 mg by mouth daily., Disp: , Rfl:   •  busPIRone (BUSPAR) 5 MG tablet, Take 5 mg by mouth 3 (Three) Times a Day., Disp: , Rfl:   •  carvedilol (COREG) 25 MG tablet, Take 25 mg by mouth 2 (two) times a day with meals., Disp: , Rfl:   •  clopidogrel (PLAVIX) 75 MG tablet, Take 1 tablet by mouth Daily., Disp: 30 tablet, Rfl: 6  •  Cyanocobalamin (VITAMIN B-12 PO), Take 2,500 mcg by mouth Daily., Disp: , Rfl:   •  DULoxetine (CYMBALTA) 60 MG capsule, Take 60 mg by mouth Daily., Disp: , Rfl:   •  Ferrous Sulfate 27 MG tablet, Take 1 tablet by mouth Daily., Disp: , Rfl:   •  HYDROcodone-acetaminophen (NORCO) 7.5-325 MG per tablet, Take 1 tablet by mouth Every 6 (Six) Hours As Needed for Moderate Pain ., Disp: , Rfl:   •  Insulin Glargine, 1 Unit Dial, (Toujeo SoloStar) 300 UNIT/ML solution pen-injector injection, Inject 10 Units under the skin into the appropriate area as directed Every Night. 10 units nightly, Disp: , Rfl:   •  isosorbide mononitrate (IMDUR) 60 MG 24 hr tablet, Take 60 mg by mouth daily., Disp: , Rfl:   •  losartan (COZAAR) 50 MG tablet, Take 50 mg by mouth Daily., Disp: , Rfl:   •  multivitamin with minerals tablet tablet, Take 1 tablet by mouth Daily., Disp: , Rfl:   •  naloxone (NARCAN) 4 MG/0.1ML nasal spray, 1 spray into each nostril As Needed., Disp: , Rfl:   •  pantoprazole (PROTONIX) 40 MG EC tablet, Take 40 mg by mouth  Daily., Disp: , Rfl:   •  SITagliptin (JANUVIA) 50 MG tablet, Take 50 mg by mouth Daily., Disp: , Rfl:   •  tolterodine LA (DETROL LA) 4 MG 24 hr capsule, Take 4 mg by mouth Daily., Disp: , Rfl:   •  traMADol (ULTRAM) 50 MG tablet, Take 100 mg by mouth 2 (Two) Times a Day As Needed., Disp: , Rfl:     History:   Past Medical History:   Diagnosis Date   • Anemia    • Arthritis    • Back problem    • CAD (coronary artery disease)    • Cancer (HCC)     Right breast   • Chronic back pain    • Chronically on opiate therapy    • Depression    • Diabetes mellitus (HCC)     DX 14 years ago- checks fsbs weekly   • Fibromyalgia    • Gastroparesis    • GERD (gastroesophageal reflux disease)    • Headache     emotional/tension   • History of transfusion     [facility]   • HTN (hypertension)    • Hypercholesteremia    • IBS (irritable bowel syndrome)    • Incontinence of urine     urgency   • Migraine headache    • Myalgia and myositis    • Peripheral neuropathy    • Sleep apnea     does not wear cpap   • UTI (urinary tract infection)        Past Surgical History:   Procedure Laterality Date   • APPENDECTOMY     • ARTERIOGRAM MESENTERIC N/A 6/16/2022    Procedure: DIAGNOSTIC ARTERIOGRAM WITH CELIAC STENT PLACEMENT;  Surgeon: Neo Neil MD;  Location: [facility];  Service: Vascular;  Laterality: N/A;  FLUORO: 16 MIN  DOSE: 2384 MGY  CONTRAST:  20 ML   • BACK SURGERY      5x per patient   • BRAIN TUMOR EXCISION  1988   • BREAST BIOPSY     • CARPAL TUNNEL RELEASE Bilateral    • CHOLECYSTECTOMY     • COLONOSCOPY      2015   • CRANIOTOMY FOR TUMOR     • EYE SURGERY      bilateral cataracts removed   • HEMORRHOIDECTOMY     • LUMBAR FUSION N/A 01/04/2017    Procedure: LUMBAR LAMINECTOMY AND DECOMRESSION  L3 AND L4;  Surgeon: Nishant Bhatti MD;  Location: [facility] OR;  Service:    • TOTAL ABDOMINAL HYSTERECTOMY     • TRIGGER FINGER RELEASE         Social History     Socioeconomic History   • Marital status:  "   • Number of children: 2   Tobacco Use   • Smoking status: Current Every Day Smoker     Packs/day: 1.00     Years: 62.00     Pack years: 62.00     Types: Cigarettes   • Smokeless tobacco: Never Used   • Tobacco comment: 1/17/2022 quit    Vaping Use   • Vaping Use: Never used   Substance and Sexual Activity   • Alcohol use: No   • Drug use: No   • Sexual activity: Defer        Family History   Problem Relation Age of Onset   • Cancer Other    • Diabetes Other    • Hyperlipidemia Other    • Heart attack Other    • Hypertension Other    • Heart attack Mother    • Diabetes Mother    • Heart disease Mother    • Hypertension Mother    • Cancer Father    • Alcohol abuse Father    • Heart attack Sister    • Diabetes Sister    • Heart disease Sister    • Hypertension Sister    • Cancer Brother    • Diabetes Brother    • Hypertension Brother    • Heart attack Sister    • Stroke Sister    • Cancer Brother        Objective     Physical Exam:  Vitals:    07/07/22 1422   BP: 148/88   Pulse: 86   Temp: 98.7 °F (37.1 °C)   SpO2: 99%   Weight: 75.3 kg (166 lb)   Height: 152.4 cm (60\")      Body mass index is 32.42 kg/m².    Physical Exam  Vitals and nursing note reviewed.   Constitutional:       Appearance: Normal appearance. She is well-developed.   HENT:      Head: Normocephalic and atraumatic.   Eyes:      Pupils: Pupils are equal, round, and reactive to light.   Neck:      Vascular: No carotid bruit.   Cardiovascular:      Rate and Rhythm: Normal rate and regular rhythm.      Pulses: Normal pulses.      Heart sounds: Normal heart sounds, S1 normal and S2 normal. No murmur heard.  Pulmonary:      Effort: Pulmonary effort is normal.      Breath sounds: Normal breath sounds.   Abdominal:      General: Bowel sounds are normal. There is no distension.      Palpations: Abdomen is soft.      Tenderness: There is generalized abdominal tenderness. There is guarding.   Musculoskeletal:         General: No swelling.      Cervical " "back: Neck supple.      Right lower leg: No edema.      Left lower leg: No edema.   Skin:     General: Skin is warm and dry.      Capillary Refill: Capillary refill takes less than 2 seconds.      Findings: No bruising.      Comments: Groin incisions healed   Neurological:      General: No focal deficit present.      Mental Status: She is alert and oriented to person, place, and time. Mental status is at baseline.      GCS: GCS eye subscore is 4. GCS verbal subscore is 5. GCS motor subscore is 6.      Motor: Motor function is intact.      Coordination: Coordination is intact.      Gait: Gait is intact.   Psychiatric:         Mood and Affect: Mood normal.         Speech: Speech normal.         Behavior: Behavior normal. Behavior is cooperative.         Cognition and Memory: Cognition normal.         Imaging/Labs:    No radiology results for the last 30 days.       Assessment / Plan      Assessment / Plan:  Diagnoses and all orders for this visit:    1. Celiac artery stenosis (HCC) (Primary)       1. Chronic mesenteric ischemia s/p diagnostic arteriogram with celiac stent placement (no significant stenosis of the SMA noted) by Dr. Neil on 6/16/21:Patient presents today in early follow-up for complaints of abdominal pain.  Patient reports 4-day history of worsening abdominal pain.  When asked if this is similar to her preoperative abdominal pain, she states \"I am not sure.\"  She reports that the pain is \"all over\" but worst to the left of her umbilical area.  She denies any fevers, chills, or body aches.  She denies any nausea, vomiting, or diarrhea.  She had recent BM last night.  She reports she has not been eating much, but did eat some soup last night and she stated the pain did not get worse specifically after eating.  She denies any dysuria or flank pain.  She has not been seen postoperatively by her PCP. Patient is present in wheelchair with her sister and is reserved and somnolent between questions. Poor " historian and difficulty with characterizing her abdominal pain. On physical exam, she has diffuse abdominal tenderness. She is not in any acute distress with normal VS and has been able to tolerate PO fluids and some food. Patient did not want to go to the ER today. Will try to obtain CTA abdomen/pelvis in the next 7 days to further evaluate, complicated by CKD and elevated Cr above baseline at discharge. Since she has not had repeat labs, Will obtain BMP as well prior to obtaining CTA to ensure Cr okay. Will follow up with patient after results obtained. Instructed patient to go to ER for worsening abdominal pain, unable to tolerate PO, fevers, chills, body aches.   Continue ASA/Plavix.     Follow Up:   Return for F/u with imaging.   Or sooner for any further concerns or worsening sign and symptoms. If unable to reach us in the office please dial 911 or go to the nearest emergency department.      Fide CARPIO  [facility] Cardiothoracic Surgery    Time Spent: I spent 40 minutes caring for Jo-Ann on this date of service. This time includes time spent by me in the following activities: preparing for the visit, reviewing tests, obtaining and/or reviewing a separately obtained history, performing a medically appropriate examination and/or evaluation, counseling and educating the patient/family/caregiver, ordering medications, tests, or procedures, documenting information in the medical record, independently interpreting results and communicating that information with the patient/family/caregiver and care coordination.

## 2022-07-13 DIAGNOSIS — N28.9 ABNORMAL KIDNEY FUNCTION: Primary | ICD-10-CM

## 2022-07-14 ENCOUNTER — HOSPITAL ENCOUNTER (OUTPATIENT)
Dept: CT IMAGING | Facility: HOSPITAL | Age: 77
End: 2022-07-14

## 2022-07-21 ENCOUNTER — APPOINTMENT (OUTPATIENT)
Dept: CT IMAGING | Facility: HOSPITAL | Age: 77
End: 2022-07-21

## 2022-07-22 ENCOUNTER — HOSPITAL ENCOUNTER (OUTPATIENT)
Dept: CT IMAGING | Facility: HOSPITAL | Age: 77
Discharge: HOME | End: 2022-07-22
Admitting: RADIOLOGY

## 2022-07-22 VITALS
HEART RATE: 94 BPM | WEIGHT: 167.8 LBS | SYSTOLIC BLOOD PRESSURE: 131 MMHG | HEIGHT: 60 IN | TEMPERATURE: 96.8 F | OXYGEN SATURATION: 100 % | BODY MASS INDEX: 32.94 KG/M2 | DIASTOLIC BLOOD PRESSURE: 46 MMHG | RESPIRATION RATE: 18 BRPM

## 2022-07-22 DIAGNOSIS — I77.1 CELIAC ARTERY STENOSIS: ICD-10-CM

## 2022-07-22 PROCEDURE — 74174 CTA ABD&PLVS W/CONTRAST: CPT

## 2022-07-22 PROCEDURE — 0 IOPAMIDOL PER 1 ML: Performed by: NURSE PRACTITIONER

## 2022-07-22 RX ADMIN — SODIUM CHLORIDE 1000 ML: 9 INJECTION, SOLUTION INTRAVENOUS at 08:53

## 2022-07-22 RX ADMIN — IOPAMIDOL 75 ML: 755 INJECTION, SOLUTION INTRAVENOUS at 10:21

## 2022-07-26 ENCOUNTER — OFFICE VISIT (OUTPATIENT)
Dept: CARDIAC SURGERY | Facility: CLINIC | Age: 77
End: 2022-07-26

## 2022-07-26 VITALS
DIASTOLIC BLOOD PRESSURE: 60 MMHG | HEART RATE: 82 BPM | TEMPERATURE: 97.8 F | HEIGHT: 60 IN | BODY MASS INDEX: 32.77 KG/M2 | SYSTOLIC BLOOD PRESSURE: 112 MMHG | OXYGEN SATURATION: 99 %

## 2022-07-26 DIAGNOSIS — I77.1 STENOSIS OF CELIAC ARTERY: Primary | ICD-10-CM

## 2022-07-26 PROCEDURE — 99214 OFFICE O/P EST MOD 30 MIN: CPT | Performed by: NURSE PRACTITIONER

## 2022-07-26 NOTE — PROGRESS NOTES
[facility] Cardiothoracic Surgery Office Follow Up Note     Date of Encounter: 2022     Name: Jo-nAn Krishnan  : 1945     Referred By: No ref. provider found  PCP: Aiden Spencer MD    Chief Complaint:    Chief Complaint   Patient presents with   • Follow-up     Follow up after CTA abd/pel - s/p celiac artery stent        Subjective      History of Present Illness:    Jo-Ann Krishnan is a 77 y.o. female current smoker, hypertension, hyperlipidemia, diabetes mellitus, right breast cancer, chronic kidney disease, sleep apnea, CAD, chronic back pain/opiate use and weight loss/postprandial abdominal pain and two vessel chronic mesenteric ischemia s/p diagnostic arteriogram with celiac stent placement (no significant stenosis of the SMA noted) by Dr. Neil on 21.  Postoperatively, patient had slightly elevated creatinine on morning labs that improved with instructions to recheck BMP with PCP after discharge.  Patient was discharged on POD #1 with no readmissions. Patient was seen in early follow up 22 with complaints of abdominal pain. At that time, she was somnolent and could not characterize her pain and was indicating it was similar to her preoperative abdominal pain symptoms. CTA abdomen/pelvis was pursued and patient presents today for follow up to discuss results. Patient reports improvement in her abdominal abdominal pain but still reports lower abdominal/pelvic pain. She is reporting worsening urinary frequency but denies any dysuria or hematuria. She denies any postprandial abdominal pain, nausea, vomiting or diarrhea. She reports regular daily BMs. Denies any blood in her stool. She is much more alert and agreeable today than she was at last visit. She has been compliant with ASA/Plavix.     Review of Systems:  Review of Systems   Constitutional: Positive for malaise/fatigue. Negative for chills, decreased appetite, diaphoresis, fever, night sweats, weight gain  and weight loss.   HENT: Negative for hoarse voice.    Eyes: Negative for blurred vision, double vision and visual disturbance.   Cardiovascular: Negative for chest pain, claudication, dyspnea on exertion, irregular heartbeat, leg swelling, near-syncope, orthopnea, palpitations, paroxysmal nocturnal dyspnea and syncope.   Respiratory: Negative for cough, hemoptysis, shortness of breath, sputum production and wheezing.    Hematologic/Lymphatic: Negative for adenopathy and bleeding problem. Does not bruise/bleed easily.   Skin: Negative for color change, nail changes, poor wound healing and rash.   Musculoskeletal: Negative for back pain, falls and muscle cramps.   Gastrointestinal: Positive for abdominal pain. Negative for dysphagia and heartburn.   Genitourinary: Negative for flank pain.   Neurological: Positive for dizziness and weakness. Negative for brief paralysis, disturbances in coordination, focal weakness, headaches, light-headedness, loss of balance, numbness, paresthesias, sensory change and vertigo.   Psychiatric/Behavioral: Negative for depression and suicidal ideas.   Allergic/Immunologic: Negative for persistent infections.       I have reviewed the following portions of the patient's history: allergies, current medications, past family history, past medical history, past social history, past surgical history, problem list and ROS and confirm it's accurate.    Allergies:  Allergies   Allergen Reactions   • Ceclor [Cefaclor] Rash       Medications:      Current Outpatient Medications:   •  acetaminophen (TYLENOL) 500 MG tablet, Take 500 mg by mouth Every 6 (Six) Hours As Needed for Mild Pain ., Disp: , Rfl:   •  albuterol (PROVENTIL) (2.5 MG/3ML) 0.083% nebulizer solution, Take 2.5 mg by nebulization Every 4 (Four) Hours As Needed for wheezing or shortness of air., Disp: , Rfl:   •  aspirin 81 MG EC tablet, Take 81 mg by mouth daily., Disp: , Rfl:   •  atorvastatin (LIPITOR) 40 MG tablet, Take 40 mg by  mouth daily., Disp: , Rfl:   •  busPIRone (BUSPAR) 5 MG tablet, Take 5 mg by mouth 3 (Three) Times a Day., Disp: , Rfl:   •  carvedilol (COREG) 25 MG tablet, Take 25 mg by mouth 2 (two) times a day with meals., Disp: , Rfl:   •  clopidogrel (PLAVIX) 75 MG tablet, Take 1 tablet by mouth Daily., Disp: 30 tablet, Rfl: 6  •  Cyanocobalamin (VITAMIN B-12 PO), Take 2,500 mcg by mouth Daily., Disp: , Rfl:   •  DULoxetine (CYMBALTA) 60 MG capsule, Take 60 mg by mouth Daily., Disp: , Rfl:   •  Ferrous Sulfate 27 MG tablet, Take 1 tablet by mouth Daily., Disp: , Rfl:   •  HYDROcodone-acetaminophen (NORCO) 7.5-325 MG per tablet, Take 1 tablet by mouth Every 6 (Six) Hours As Needed for Moderate Pain ., Disp: , Rfl:   •  Insulin Glargine, 1 Unit Dial, (Toujeo SoloStar) 300 UNIT/ML solution pen-injector injection, Inject 10 Units under the skin into the appropriate area as directed Every Night. 10 units nightly, Disp: , Rfl:   •  isosorbide mononitrate (IMDUR) 60 MG 24 hr tablet, Take 60 mg by mouth daily., Disp: , Rfl:   •  losartan (COZAAR) 50 MG tablet, Take 50 mg by mouth Daily., Disp: , Rfl:   •  multivitamin with minerals tablet tablet, Take 1 tablet by mouth Daily., Disp: , Rfl:   •  naloxone (NARCAN) 4 MG/0.1ML nasal spray, 1 spray into each nostril As Needed., Disp: , Rfl:   •  pantoprazole (PROTONIX) 40 MG EC tablet, Take 40 mg by mouth Daily., Disp: , Rfl:   •  SITagliptin (JANUVIA) 50 MG tablet, Take 50 mg by mouth Daily., Disp: , Rfl:   •  tolterodine LA (DETROL LA) 4 MG 24 hr capsule, Take 4 mg by mouth Daily., Disp: , Rfl:   •  traMADol (ULTRAM) 50 MG tablet, Take 100 mg by mouth 2 (Two) Times a Day As Needed., Disp: , Rfl:     History:   Past Medical History:   Diagnosis Date   • Anemia    • Arthritis    • Back problem    • CAD (coronary artery disease)    • Cancer (HCC)     Right breast   • Chronic back pain    • Chronically on opiate therapy    • Depression    • Diabetes mellitus (HCC)     DX 14 years ago-  checks fsbs weekly   • Fibromyalgia    • Gastroparesis    • GERD (gastroesophageal reflux disease)    • Headache     emotional/tension   • History of transfusion     [facility]   • HTN (hypertension)    • Hypercholesteremia    • IBS (irritable bowel syndrome)    • Incontinence of urine     urgency   • Migraine headache    • Myalgia and myositis    • Peripheral neuropathy    • Sleep apnea     does not wear cpap   • UTI (urinary tract infection)        Past Surgical History:   Procedure Laterality Date   • APPENDECTOMY     • ARTERIOGRAM MESENTERIC N/A 6/16/2022    Procedure: DIAGNOSTIC ARTERIOGRAM WITH CELIAC STENT PLACEMENT;  Surgeon: Neo Neil MD;  Location: [facility];  Service: Vascular;  Laterality: N/A;  FLUORO: 16 MIN  DOSE: 2384 MGY  CONTRAST:  20 ML   • BACK SURGERY      5x per patient   • BRAIN TUMOR EXCISION  1988   • BREAST BIOPSY     • CARPAL TUNNEL RELEASE Bilateral    • CHOLECYSTECTOMY     • COLONOSCOPY      2015   • CRANIOTOMY FOR TUMOR     • EYE SURGERY      bilateral cataracts removed   • HEMORRHOIDECTOMY     • LUMBAR FUSION N/A 01/04/2017    Procedure: LUMBAR LAMINECTOMY AND DECOMRESSION  L3 AND L4;  Surgeon: Nishant Bhatti MD;  Location: [facility] OR;  Service:    • TOTAL ABDOMINAL HYSTERECTOMY     • TRIGGER FINGER RELEASE         Social History     Socioeconomic History   • Marital status:    • Number of children: 2   Tobacco Use   • Smoking status: Current Every Day Smoker     Packs/day: 1.00     Years: 62.00     Pack years: 62.00     Types: Cigarettes   • Smokeless tobacco: Never Used   • Tobacco comment: 1/17/2022 quit    Vaping Use   • Vaping Use: Never used   Substance and Sexual Activity   • Alcohol use: No   • Drug use: No   • Sexual activity: Defer        Family History   Problem Relation Age of Onset   • Cancer Other    • Diabetes Other    • Hyperlipidemia Other    • Heart attack Other    • Hypertension Other    • Heart attack Mother    • Diabetes Mother   "  • Heart disease Mother    • Hypertension Mother    • Cancer Father    • Alcohol abuse Father    • Heart attack Sister    • Diabetes Sister    • Heart disease Sister    • Hypertension Sister    • Cancer Brother    • Diabetes Brother    • Hypertension Brother    • Heart attack Sister    • Stroke Sister    • Cancer Brother        Objective     Physical Exam:  Vitals:    07/26/22 1531   BP: 112/60   BP Location: Right arm   Patient Position: Sitting   Pulse: 82   Temp: 97.8 °F (36.6 °C)   SpO2: 99%   Height: 152.4 cm (60\")      Body mass index is 32.77 kg/m².    Physical Exam  Vitals and nursing note reviewed.   Constitutional:       Appearance: Normal appearance. She is well-developed.   HENT:      Head: Normocephalic and atraumatic.   Eyes:      Pupils: Pupils are equal, round, and reactive to light.   Neck:      Vascular: No carotid bruit.   Cardiovascular:      Rate and Rhythm: Normal rate and regular rhythm.      Pulses: Normal pulses.      Heart sounds: Normal heart sounds, S1 normal and S2 normal. No murmur heard.  Pulmonary:      Effort: Pulmonary effort is normal.      Breath sounds: Normal breath sounds.   Abdominal:      General: Bowel sounds are normal. There is no distension.      Palpations: Abdomen is soft.      Tenderness: There is generalized abdominal tenderness and tenderness in the suprapubic area.   Musculoskeletal:         General: No swelling.      Cervical back: Neck supple.      Right lower leg: No edema.      Left lower leg: No edema.   Skin:     General: Skin is warm and dry.      Capillary Refill: Capillary refill takes less than 2 seconds.      Findings: No bruising.      Comments: Groin incisions healed   Neurological:      General: No focal deficit present.      Mental Status: She is alert and oriented to person, place, and time. Mental status is at baseline.      GCS: GCS eye subscore is 4. GCS verbal subscore is 5. GCS motor subscore is 6.      Motor: Motor function is intact.      " Coordination: Coordination is intact.      Gait: Gait is intact.   Psychiatric:         Mood and Affect: Mood normal.         Speech: Speech normal.         Behavior: Behavior normal. Behavior is cooperative.         Cognition and Memory: Cognition normal.         Imaging/Labs:    CT Angiogram Abdomen Pelvis    Result Date: 7/22/2022   1. Widely patent stented celiac trunk. No significant in-stent restenosis is observed at this time. 2. Patent superior mesenteric artery with scattered calcific plaquing. No obvious angiographic significant stenosis is suggested. 3. Patent inferior mesenteric artery, likely with a significant ostial stenosis present. 4. Unusual appearing linear filling defect within the distal right common iliac artery, potentially representing a localized intimal dissection. Similar findings were identified in this region on the prior study from March, 2021, lending to some degree of chronicity. Recommend correlation clinically. 5. Moderate grade atheromatous luminal irregularity throughout the abdominal aorta, especially distally, with creation of an angiographically borderline significant moderate grade stenosis just proximal to the aortic bifurcation. This is best seen on sagittal images. 6. Numerous additional findings, both vascular and nonvascular, as described above detail.  This report was finalized on 7/22/2022 4:38 PM by Teri Bah MD.       Op note 6/16/22:   The patient was taken to the operating room and placed under general endotracheal anesthesia.  She was prepped and draped in the usual sterile fashion and a timeout was performed including the patient's name, procedure and antibiotic administration.  Left brachial artery access was obtained using bedside ultrasound guidance.  5000 units of heparin was administered systemically.  A 5 Surinamese sheath was placed using modified Seldinger technique and wire access of the proximal abdominal aorta was obtained using an angled glide wire.   Carbon dioxide was utilized to perform an aortogram that demonstrated critical 99% ostial stenosis to the celiac artery.  No significant stenosis was appreciated in the SMA.  The celiac artery was engaged with a Berenstein catheter and angled glide wire.  A selective angiogram was performed of the celiac artery given limited visualization on aortogram from the critical stenosis.  A Arreola wire was anchored in the celiac artery and sheath upsized to 6 Fr.  An 8 mm x 17 mm [employer] biliary stent was selected and deployed with balloon inflation.  Completion aortogram was performed with contrast to better define the anatomy and this demonstrated no residual stenosis.  I attempted to deploy a Perclose in the brachial artery and due to its small caliber, the foot plate would not open fully.  This was not deployed and decision was made to apply manual pressure after removing the sheath.  The patient was subsequently transported to recovery in stable condition, extubated.      Assessment / Plan      Assessment / Plan:  Diagnoses and all orders for this visit:    1. Stenosis of celiac artery (HCC) (Primary)  -     CT Angiogram Abdomen Pelvis With & Without Contrast; Future       1. Chronic mesenteric ischemia s/p diagnostic arteriogram with celiac stent placement (no significant stenosis of the SMA noted) by Dr. Neil on 6/16/21:  Patient was seen in early follow up 7/7/22 with complaints of abdominal pain. At that time, she was somnolent and could not characterize her pain and was indicating it was similar to her preoperative abdominal pain symptoms. CTA abdomen/pelvis was pursued and patient presents today for follow up to discuss results. Patient reports improvement in her abdominal abdominal pain but still reports lower abdominal/pelvic pain. She is reporting worsening urinary frequency but denies any dysuria or hematuria. She denies any postprandial abdominal pain, nausea, vomiting or diarrhea. She reports  "regular daily BMs. Denies any blood in her stool. She is much more alert and agreeable today than she was at last visit, but is still a relatively poor historian.  Patient reporting she has abdominal pain \"all the time.\" She has been compliant with ASA/Plavix.  Discussed findings of CT abdomen/pelvis which reveal widely patent stented celiac trunk with no significant in-stent restenosis observed.  Imaging also notes unusual appearing linear filling defect within the distal right common iliac artery, potentially representing a localized intimal dissection. Noted that similar findings were identified in this region on the prior study from March, 2021, lending to some degree of chronicity.  Do not feel that patient's abdominal pain is related to any further mesenteric ischemia.  Advised that patient contact her PCP to rule out UTI based on her symptoms. Will plan to repeat imaging with CTA A/P in 3 months for surveillance of concern for localized intimal dissection.       Follow Up:   Return in about 3 months (around 10/26/2022).   Or sooner for any further concerns or worsening sign and symptoms. If unable to reach us in the office please dial 911 or go to the nearest emergency department.      Fide CARPIO  [facility] Cardiothoracic Surgery    Time Spent: I spent 32 minutes caring for Jo-Ann on this date of service. This time includes time spent by me in the following activities: preparing for the visit, reviewing tests, obtaining and/or reviewing a separately obtained history, performing a medically appropriate examination and/or evaluation, counseling and educating the patient/family/caregiver, ordering medications, tests, or procedures, referring and communicating with other health care professionals, documenting information in the medical record, independently interpreting results and communicating that information with the patient/family/caregiver and care coordination.    "

## 2022-09-02 RX ORDER — ISOSORBIDE MONONITRATE 60 MG/1
TABLET, EXTENDED RELEASE ORAL
Qty: 30 TABLET | Refills: 12 | Status: SHIPPED | OUTPATIENT
Start: 2022-09-02

## 2022-09-02 RX ORDER — DULOXETIN HYDROCHLORIDE 60 MG/1
CAPSULE, DELAYED RELEASE ORAL
Qty: 30 CAPSULE | Refills: 11 | Status: SHIPPED | OUTPATIENT
Start: 2022-09-02

## 2022-09-04 ENCOUNTER — HOSPITAL ENCOUNTER (EMERGENCY)
Facility: HOSPITAL | Age: 77
Discharge: HOME | End: 2022-09-04
Attending: EMERGENCY MEDICINE | Admitting: EMERGENCY MEDICINE

## 2022-09-04 ENCOUNTER — APPOINTMENT (OUTPATIENT)
Dept: CT IMAGING | Facility: HOSPITAL | Age: 77
End: 2022-09-04

## 2022-09-04 ENCOUNTER — APPOINTMENT (OUTPATIENT)
Dept: GENERAL RADIOLOGY | Facility: HOSPITAL | Age: 77
End: 2022-09-04

## 2022-09-04 VITALS
OXYGEN SATURATION: 100 % | SYSTOLIC BLOOD PRESSURE: 170 MMHG | HEIGHT: 60 IN | DIASTOLIC BLOOD PRESSURE: 67 MMHG | RESPIRATION RATE: 20 BRPM | BODY MASS INDEX: 31.41 KG/M2 | WEIGHT: 160 LBS | HEART RATE: 90 BPM | TEMPERATURE: 98.3 F

## 2022-09-04 DIAGNOSIS — Z72.0 TOBACCO USE: ICD-10-CM

## 2022-09-04 DIAGNOSIS — N18.9 CHRONIC KIDNEY DISEASE, UNSPECIFIED CKD STAGE: ICD-10-CM

## 2022-09-04 DIAGNOSIS — J44.1 COPD EXACERBATION: Primary | ICD-10-CM

## 2022-09-04 LAB
ALBUMIN SERPL-MCNC: 4.1 G/DL (ref 3.5–5.2)
ALBUMIN/GLOB SERPL: 0.9 G/DL
ALP SERPL-CCNC: 116 U/L (ref 39–117)
ALT SERPL W P-5'-P-CCNC: 10 U/L (ref 1–33)
ANION GAP SERPL CALCULATED.3IONS-SCNC: 10 MMOL/L (ref 5–15)
AST SERPL-CCNC: 17 U/L (ref 1–32)
BACTERIA UR QL AUTO: ABNORMAL /HPF
BASOPHILS # BLD AUTO: 0.06 10*3/MM3 (ref 0–0.2)
BASOPHILS NFR BLD AUTO: 0.8 % (ref 0–1.5)
BILIRUB SERPL-MCNC: 0.3 MG/DL (ref 0–1.2)
BILIRUB UR QL STRIP: NEGATIVE
BUN SERPL-MCNC: 39 MG/DL (ref 8–23)
BUN/CREAT SERPL: 19.2 (ref 7–25)
CALCIUM SPEC-SCNC: 9.8 MG/DL (ref 8.6–10.5)
CHLORIDE SERPL-SCNC: 105 MMOL/L (ref 98–107)
CLARITY UR: CLEAR
CO2 SERPL-SCNC: 24 MMOL/L (ref 22–29)
COLOR UR: YELLOW
CREAT SERPL-MCNC: 2.03 MG/DL (ref 0.57–1)
D-LACTATE SERPL-SCNC: 1.1 MMOL/L (ref 0.5–2)
DEPRECATED RDW RBC AUTO: 51 FL (ref 37–54)
EGFRCR SERPLBLD CKD-EPI 2021: 24.6 ML/MIN/1.73
EOSINOPHIL # BLD AUTO: 0.17 10*3/MM3 (ref 0–0.4)
EOSINOPHIL NFR BLD AUTO: 2.3 % (ref 0.3–6.2)
ERYTHROCYTE [DISTWIDTH] IN BLOOD BY AUTOMATED COUNT: 15.1 % (ref 12.3–15.4)
FLUAV RNA RESP QL NAA+PROBE: NOT DETECTED
FLUBV RNA RESP QL NAA+PROBE: NOT DETECTED
GLOBULIN UR ELPH-MCNC: 4.4 GM/DL
GLUCOSE SERPL-MCNC: 192 MG/DL (ref 65–99)
GLUCOSE UR STRIP-MCNC: NEGATIVE MG/DL
HCT VFR BLD AUTO: 31.2 % (ref 34–46.6)
HGB BLD-MCNC: 10 G/DL (ref 12–15.9)
HGB UR QL STRIP.AUTO: ABNORMAL
HOLD SPECIMEN: NORMAL
HYALINE CASTS UR QL AUTO: ABNORMAL /LPF
IMM GRANULOCYTES # BLD AUTO: 0.04 10*3/MM3 (ref 0–0.05)
IMM GRANULOCYTES NFR BLD AUTO: 0.5 % (ref 0–0.5)
KETONES UR QL STRIP: NEGATIVE
LEUKOCYTE ESTERASE UR QL STRIP.AUTO: NEGATIVE
LYMPHOCYTES # BLD AUTO: 1.39 10*3/MM3 (ref 0.7–3.1)
LYMPHOCYTES NFR BLD AUTO: 18.7 % (ref 19.6–45.3)
MCH RBC QN AUTO: 29.5 PG (ref 26.6–33)
MCHC RBC AUTO-ENTMCNC: 32.1 G/DL (ref 31.5–35.7)
MCV RBC AUTO: 92 FL (ref 79–97)
MONOCYTES # BLD AUTO: 0.85 10*3/MM3 (ref 0.1–0.9)
MONOCYTES NFR BLD AUTO: 11.4 % (ref 5–12)
NEUTROPHILS NFR BLD AUTO: 4.92 10*3/MM3 (ref 1.7–7)
NEUTROPHILS NFR BLD AUTO: 66.3 % (ref 42.7–76)
NITRITE UR QL STRIP: NEGATIVE
NRBC BLD AUTO-RTO: 0 /100 WBC (ref 0–0.2)
NT-PROBNP SERPL-MCNC: 255.1 PG/ML (ref 0–1800)
PH UR STRIP.AUTO: 6 [PH] (ref 5–8)
PLATELET # BLD AUTO: 223 10*3/MM3 (ref 140–450)
PMV BLD AUTO: 10.1 FL (ref 6–12)
POTASSIUM SERPL-SCNC: 5.1 MMOL/L (ref 3.5–5.2)
PROCALCITONIN SERPL-MCNC: 0.04 NG/ML (ref 0–0.25)
PROT SERPL-MCNC: 8.5 G/DL (ref 6–8.5)
PROT UR QL STRIP: ABNORMAL
RBC # BLD AUTO: 3.39 10*6/MM3 (ref 3.77–5.28)
RBC # UR STRIP: ABNORMAL /HPF
REF LAB TEST METHOD: ABNORMAL
SARS-COV-2 RNA RESP QL NAA+PROBE: NOT DETECTED
SODIUM SERPL-SCNC: 139 MMOL/L (ref 136–145)
SP GR UR STRIP: 1.01 (ref 1–1.03)
SQUAMOUS #/AREA URNS HPF: ABNORMAL /HPF
TROPONIN T SERPL-MCNC: <0.01 NG/ML (ref 0–0.03)
UROBILINOGEN UR QL STRIP: ABNORMAL
WBC # UR STRIP: ABNORMAL /HPF
WBC NRBC COR # BLD: 7.43 10*3/MM3 (ref 3.4–10.8)
WHOLE BLOOD HOLD COAG: NORMAL
WHOLE BLOOD HOLD SPECIMEN: NORMAL

## 2022-09-04 PROCEDURE — 83605 ASSAY OF LACTIC ACID: CPT | Performed by: PHYSICIAN ASSISTANT

## 2022-09-04 PROCEDURE — 87636 SARSCOV2 & INF A&B AMP PRB: CPT | Performed by: PHYSICIAN ASSISTANT

## 2022-09-04 PROCEDURE — 87040 BLOOD CULTURE FOR BACTERIA: CPT | Performed by: PHYSICIAN ASSISTANT

## 2022-09-04 PROCEDURE — 80053 COMPREHEN METABOLIC PANEL: CPT | Performed by: EMERGENCY MEDICINE

## 2022-09-04 PROCEDURE — 25010000002 METHYLPREDNISOLONE PER 125 MG: Performed by: PHYSICIAN ASSISTANT

## 2022-09-04 PROCEDURE — 94640 AIRWAY INHALATION TREATMENT: CPT

## 2022-09-04 PROCEDURE — 84145 PROCALCITONIN (PCT): CPT | Performed by: PHYSICIAN ASSISTANT

## 2022-09-04 PROCEDURE — 93005 ELECTROCARDIOGRAM TRACING: CPT | Performed by: EMERGENCY MEDICINE

## 2022-09-04 PROCEDURE — 71250 CT THORAX DX C-: CPT

## 2022-09-04 PROCEDURE — 83880 ASSAY OF NATRIURETIC PEPTIDE: CPT | Performed by: EMERGENCY MEDICINE

## 2022-09-04 PROCEDURE — 85025 COMPLETE CBC W/AUTO DIFF WBC: CPT | Performed by: EMERGENCY MEDICINE

## 2022-09-04 PROCEDURE — 36415 COLL VENOUS BLD VENIPUNCTURE: CPT

## 2022-09-04 PROCEDURE — 81001 URINALYSIS AUTO W/SCOPE: CPT | Performed by: PHYSICIAN ASSISTANT

## 2022-09-04 PROCEDURE — 71045 X-RAY EXAM CHEST 1 VIEW: CPT

## 2022-09-04 PROCEDURE — 84484 ASSAY OF TROPONIN QUANT: CPT | Performed by: EMERGENCY MEDICINE

## 2022-09-04 PROCEDURE — 96374 THER/PROPH/DIAG INJ IV PUSH: CPT

## 2022-09-04 PROCEDURE — 99284 EMERGENCY DEPT VISIT MOD MDM: CPT

## 2022-09-04 RX ORDER — METHYLPREDNISOLONE SODIUM SUCCINATE 125 MG/2ML
125 INJECTION, POWDER, LYOPHILIZED, FOR SOLUTION INTRAMUSCULAR; INTRAVENOUS ONCE
Status: COMPLETED | OUTPATIENT
Start: 2022-09-04 | End: 2022-09-04

## 2022-09-04 RX ORDER — IPRATROPIUM BROMIDE AND ALBUTEROL SULFATE 2.5; .5 MG/3ML; MG/3ML
3 SOLUTION RESPIRATORY (INHALATION) ONCE
Status: COMPLETED | OUTPATIENT
Start: 2022-09-04 | End: 2022-09-04

## 2022-09-04 RX ORDER — PREDNISONE 20 MG/1
40 TABLET ORAL DAILY
Qty: 6 TABLET | Refills: 0 | Status: SHIPPED | OUTPATIENT
Start: 2022-09-04 | End: 2022-09-06

## 2022-09-04 RX ORDER — SODIUM CHLORIDE 0.9 % (FLUSH) 0.9 %
10 SYRINGE (ML) INJECTION AS NEEDED
Status: DISCONTINUED | OUTPATIENT
Start: 2022-09-04 | End: 2022-09-04 | Stop reason: HOSPADM

## 2022-09-04 RX ORDER — LEVOFLOXACIN 500 MG/1
500 TABLET, FILM COATED ORAL EVERY OTHER DAY
COMMUNITY
End: 2022-09-07

## 2022-09-04 RX ORDER — ALBUTEROL SULFATE 90 UG/1
2 AEROSOL, METERED RESPIRATORY (INHALATION) EVERY 4 HOURS PRN
COMMUNITY

## 2022-09-04 RX ADMIN — METHYLPREDNISOLONE SODIUM SUCCINATE 125 MG: 125 INJECTION, POWDER, FOR SOLUTION INTRAMUSCULAR; INTRAVENOUS at 14:50

## 2022-09-04 RX ADMIN — IPRATROPIUM BROMIDE AND ALBUTEROL SULFATE 3 ML: .5; 3 SOLUTION RESPIRATORY (INHALATION) at 14:20

## 2022-09-04 NOTE — ED PROVIDER NOTES
Subjective   Pt is a 76 yo female presenting to ED with complaints of SOB. PMHx significant for CAD, HTN, HLD, DM (insulin), BLAKE, Gastroparesis, IBS, Neuropathy, Breast cancer, Migraines and CBP. Pt reports being SOB with productive cough for about a week. She went to [facility] on Wednesday and was diagnosed with pneumonia. She has been taking Levaquin with little relief. She also has been using a Ventolin inhaler that was prescribed but has not been using her Neb treatments because she wasn't sure she was supposed to do both. She denies fever, congestion, sore throat, CP, N/V/D, abdominal pain or leg swelling. She does not wear O2 at home. She uses tobacco and denies ETOH or drug use.       History provided by:  Medical records and patient      Review of Systems   Constitutional: Positive for fatigue. Negative for chills and fever.   HENT: Negative for congestion, sore throat and trouble swallowing.    Eyes: Negative for visual disturbance.   Respiratory: Positive for cough and shortness of breath.    Cardiovascular: Negative for chest pain and leg swelling.   Gastrointestinal: Negative for abdominal pain, diarrhea, nausea and vomiting.   Genitourinary: Negative for difficulty urinating, dysuria, flank pain and hematuria.   Musculoskeletal: Negative for arthralgias and back pain.   Skin: Negative for rash and wound.   Neurological: Negative for dizziness, syncope, speech difficulty, weakness, numbness and headaches.   Psychiatric/Behavioral: Negative for confusion.   All other systems reviewed and are negative.      Past Medical History:   Diagnosis Date   • Anemia    • Arthritis    • Back problem    • CAD (coronary artery disease)    • Cancer (HCC)     Right breast   • Chronic back pain    • Chronically on opiate therapy    • Depression    • Diabetes mellitus (HCC)     DX 14 years ago- checks fsbs weekly   • Fibromyalgia    • Gastroparesis    • GERD (gastroesophageal reflux disease)    • Headache      emotional/tension   • History of transfusion     [facility]   • HTN (hypertension)    • Hypercholesteremia    • IBS (irritable bowel syndrome)    • Incontinence of urine     urgency   • Migraine headache    • Myalgia and myositis    • Peripheral neuropathy    • Sleep apnea     does not wear cpap   • UTI (urinary tract infection)        Allergies   Allergen Reactions   • Ceclor [Cefaclor] Rash       Past Surgical History:   Procedure Laterality Date   • APPENDECTOMY     • ARTERIOGRAM MESENTERIC N/A 6/16/2022    Procedure: DIAGNOSTIC ARTERIOGRAM WITH CELIAC STENT PLACEMENT;  Surgeon: Neo Neil MD;  Location: [facility];  Service: Vascular;  Laterality: N/A;  FLUORO: 16 MIN  DOSE: 2384 MGY  CONTRAST:  20 ML   • BACK SURGERY      5x per patient   • BRAIN TUMOR EXCISION  1988   • BREAST BIOPSY     • CARPAL TUNNEL RELEASE Bilateral    • CHOLECYSTECTOMY     • COLONOSCOPY      2015   • CRANIOTOMY FOR TUMOR     • EYE SURGERY      bilateral cataracts removed   • HEMORRHOIDECTOMY     • LUMBAR FUSION N/A 01/04/2017    Procedure: LUMBAR LAMINECTOMY AND DECOMRESSION  L3 AND L4;  Surgeon: Nishant Bhatti MD;  Location: [facility] OR;  Service:    • TOTAL ABDOMINAL HYSTERECTOMY     • TRIGGER FINGER RELEASE         Family History   Problem Relation Age of Onset   • Cancer Other    • Diabetes Other    • Hyperlipidemia Other    • Heart attack Other    • Hypertension Other    • Heart attack Mother    • Diabetes Mother    • Heart disease Mother    • Hypertension Mother    • Cancer Father    • Alcohol abuse Father    • Heart attack Sister    • Diabetes Sister    • Heart disease Sister    • Hypertension Sister    • Cancer Brother    • Diabetes Brother    • Hypertension Brother    • Heart attack Sister    • Stroke Sister    • Cancer Brother        Social History     Socioeconomic History   • Marital status:    • Number of children: 2   Tobacco Use   • Smoking status: Current Every Day Smoker     Packs/day:  1.00     Years: 62.00     Pack years: 62.00     Types: Cigarettes   • Smokeless tobacco: Never Used   • Tobacco comment: 1/17/2022 quit    Vaping Use   • Vaping Use: Never used   Substance and Sexual Activity   • Alcohol use: No   • Drug use: No   • Sexual activity: Defer           Objective   Physical Exam  Vitals and nursing note reviewed.   Constitutional:       Appearance: She is well-developed.   HENT:      Head: Atraumatic.      Nose: Nose normal.   Eyes:      General: Lids are normal.      Conjunctiva/sclera: Conjunctivae normal.      Pupils: Pupils are equal, round, and reactive to light.   Cardiovascular:      Rate and Rhythm: Normal rate and regular rhythm.      Heart sounds: Normal heart sounds.   Pulmonary:      Effort: Tachypnea, accessory muscle usage and respiratory distress (mild, speaking in short phrases) present.      Breath sounds: Wheezing (diffuse) present.   Abdominal:      General: There is no distension.      Palpations: Abdomen is soft.      Tenderness: There is no abdominal tenderness. There is no guarding or rebound.   Musculoskeletal:         General: No tenderness. Normal range of motion.      Cervical back: Normal range of motion and neck supple.   Skin:     General: Skin is warm and dry.      Findings: No erythema or rash.   Neurological:      Mental Status: She is alert and oriented to person, place, and time.      Sensory: No sensory deficit.   Psychiatric:         Speech: Speech normal.         Behavior: Behavior normal.         Procedures           ED Course      Re-examined patient several times in ED. Pt has had persistent wheezing but states she feels better and vitals stable. She has Neb at home but hasn't been using. Discussed need to use every 4-6 hours at home. Pt given Solumedrol in ED and will dc home on 3 days of Prednisone. Discharged home on pulse ox and she understands to return to ED if O2 92 or below. Family in ED with patient and agreeable. Offered admission for  COPD exacerbation but she declined.     Discussed patient with Dr. Miller who is agreeable with ED course and tx plan.     Reviewed old records.     Recent Results (from the past 24 hour(s))   ECG 12 Lead    Collection Time: 09/04/22  1:35 PM   Result Value Ref Range    QT Interval 354 ms    QTC Interval 415 ms   COVID-19 and FLU A/B PCR - Swab, Nasopharynx    Collection Time: 09/04/22  1:43 PM    Specimen: Nasopharynx; Swab   Result Value Ref Range    COVID19 Not Detected Not Detected - Ref. Range    Influenza A PCR Not Detected Not Detected    Influenza B PCR Not Detected Not Detected   Comprehensive Metabolic Panel    Collection Time: 09/04/22  1:53 PM    Specimen: Blood   Result Value Ref Range    Glucose 192 (H) 65 - 99 mg/dL    BUN 39 (H) 8 - 23 mg/dL    Creatinine 2.03 (H) 0.57 - 1.00 mg/dL    Sodium 139 136 - 145 mmol/L    Potassium 5.1 3.5 - 5.2 mmol/L    Chloride 105 98 - 107 mmol/L    CO2 24.0 22.0 - 29.0 mmol/L    Calcium 9.8 8.6 - 10.5 mg/dL    Total Protein 8.5 6.0 - 8.5 g/dL    Albumin 4.10 3.50 - 5.20 g/dL    ALT (SGPT) 10 1 - 33 U/L    AST (SGOT) 17 1 - 32 U/L    Alkaline Phosphatase 116 39 - 117 U/L    Total Bilirubin 0.3 0.0 - 1.2 mg/dL    Globulin 4.4 gm/dL    A/G Ratio 0.9 g/dL    BUN/Creatinine Ratio 19.2 7.0 - 25.0    Anion Gap 10.0 5.0 - 15.0 mmol/L    eGFR 24.6 (L) >60.0 mL/min/1.73   BNP    Collection Time: 09/04/22  1:53 PM    Specimen: Blood   Result Value Ref Range    proBNP 255.1 0.0 - 1,800.0 pg/mL   Troponin    Collection Time: 09/04/22  1:53 PM    Specimen: Blood   Result Value Ref Range    Troponin T <0.010 0.000 - 0.030 ng/mL   Green Top (Gel)    Collection Time: 09/04/22  1:53 PM   Result Value Ref Range    Extra Tube Hold for add-ons.    Lavender Top    Collection Time: 09/04/22  1:53 PM   Result Value Ref Range    Extra Tube     Gold Top - SST    Collection Time: 09/04/22  1:53 PM   Result Value Ref Range    Extra Tube Hold for add-ons.    Light Blue Top    Collection Time:  09/04/22  1:53 PM   Result Value Ref Range    Extra Tube Hold for add-ons.    CBC Auto Differential    Collection Time: 09/04/22  1:53 PM    Specimen: Blood   Result Value Ref Range    WBC 7.43 3.40 - 10.80 10*3/mm3    RBC 3.39 (L) 3.77 - 5.28 10*6/mm3    Hemoglobin 10.0 (L) 12.0 - 15.9 g/dL    Hematocrit 31.2 (L) 34.0 - 46.6 %    MCV 92.0 79.0 - 97.0 fL    MCH 29.5 26.6 - 33.0 pg    MCHC 32.1 31.5 - 35.7 g/dL    RDW 15.1 12.3 - 15.4 %    RDW-SD 51.0 37.0 - 54.0 fl    MPV 10.1 6.0 - 12.0 fL    Platelets 223 140 - 450 10*3/mm3    Neutrophil % 66.3 42.7 - 76.0 %    Lymphocyte % 18.7 (L) 19.6 - 45.3 %    Monocyte % 11.4 5.0 - 12.0 %    Eosinophil % 2.3 0.3 - 6.2 %    Basophil % 0.8 0.0 - 1.5 %    Immature Grans % 0.5 0.0 - 0.5 %    Neutrophils, Absolute 4.92 1.70 - 7.00 10*3/mm3    Lymphocytes, Absolute 1.39 0.70 - 3.10 10*3/mm3    Monocytes, Absolute 0.85 0.10 - 0.90 10*3/mm3    Eosinophils, Absolute 0.17 0.00 - 0.40 10*3/mm3    Basophils, Absolute 0.06 0.00 - 0.20 10*3/mm3    Immature Grans, Absolute 0.04 0.00 - 0.05 10*3/mm3    nRBC 0.0 0.0 - 0.2 /100 WBC   Lactic Acid, Plasma    Collection Time: 09/04/22  1:53 PM    Specimen: Blood   Result Value Ref Range    Lactate 1.1 0.5 - 2.0 mmol/L   Procalcitonin    Collection Time: 09/04/22  1:53 PM    Specimen: Blood   Result Value Ref Range    Procalcitonin 0.04 0.00 - 0.25 ng/mL   Urinalysis With Culture If Indicated - Urine, Clean Catch    Collection Time: 09/04/22  4:23 PM    Specimen: Urine, Clean Catch   Result Value Ref Range    Color, UA Yellow Yellow, Straw    Appearance, UA Clear Clear    pH, UA 6.0 5.0 - 8.0    Specific Gravity, UA 1.014 1.001 - 1.030    Glucose, UA Negative Negative    Ketones, UA Negative Negative    Bilirubin, UA Negative Negative    Blood, UA Moderate (2+) (A) Negative    Protein, UA Trace (A) Negative    Leuk Esterase, UA Negative Negative    Nitrite, UA Negative Negative    Urobilinogen, UA 0.2 E.U./dL 0.2 - 1.0 E.U./dL   Urinalysis,  Microscopic Only - Urine, Clean Catch    Collection Time: 09/04/22  4:23 PM    Specimen: Urine, Clean Catch   Result Value Ref Range    RBC, UA Too Numerous to Count (A) None Seen, 0-2 /HPF    WBC, UA 0-2 None Seen, 0-2 /HPF    Bacteria, UA None Seen None Seen, Trace /HPF    Squamous Epithelial Cells, UA 0-2 None Seen, 0-2 /HPF    Hyaline Casts, UA None Seen 0 - 6 /LPF    Methodology Automated Microscopy      Note: In addition to lab results from this visit, the labs listed above may include labs taken at another facility or during a different encounter within the last 24 hours. Please correlate lab times with ED admission and discharge times for further clarification of the services performed during this visit.    CT Chest Without Contrast Diagnostic   Final Result   No acute intrathoracic findings.       This report was finalized on 9/4/2022 5:23 PM by Jhony Cha MD.          XR Chest 1 View   Final Result   Mild cardiomegaly without acute cardiopulmonary findings.       This report was finalized on 9/4/2022 2:33 PM by Jhony Cha MD.            Vitals:    09/04/22 1530 09/04/22 1600 09/04/22 1608 09/04/22 1733   BP: 155/90 170/67     BP Location:       Patient Position:       Pulse: 78 80 81 90   Resp:   20 20   Temp:       TempSrc:       SpO2: 94% 93% 93% 100%   Weight:       Height:         Medications   sodium chloride 0.9 % flush 10 mL (has no administration in time range)   methylPREDNISolone sodium succinate (SOLU-Medrol) injection 125 mg (125 mg Intravenous Given 9/4/22 1450)   ipratropium-albuterol (DUO-NEB) nebulizer solution 3 mL (3 mL Nebulization Given 9/4/22 1420)     ECG/EMG Results (last 24 hours)     Procedure Component Value Units Date/Time    ECG 12 Lead [[id number]] Collected: 09/04/22 1335     Updated: 09/04/22 1335     QT Interval 354 ms      QTC Interval 415 ms     Narrative:      Test Reason : SOA Protocol  Blood Pressure :   */*   mmHG  Vent. Rate :  83 BPM     Atrial Rate :  83  BPM     P-R Int : 126 ms          QRS Dur :  72 ms      QT Int : 354 ms       P-R-T Axes :  71  57  55 degrees     QTc Int : 415 ms    Normal sinus rhythm  Normal ECG  When compared with ECG of 15-BILLIE-2022 14:45,  No significant change was found    Referred By:            Confirmed By:         ECG 12 Lead   Preliminary Result   Test Reason : SOA Protocol   Blood Pressure :   */*   mmHG   Vent. Rate :  83 BPM     Atrial Rate :  83 BPM      P-R Int : 126 ms          QRS Dur :  72 ms       QT Int : 354 ms       P-R-T Axes :  71  57  55 degrees      QTc Int : 415 ms      Normal sinus rhythm   Normal ECG   When compared with ECG of 15-BILLIE-2022 14:45,   No significant change was found      Referred By:            Confirmed By:           DISCHARGE    Patient discharged in stable condition.    Reviewed implications of results, diagnosis, meds, responsibility to follow up, warning signs and symptoms of possible worsening, potential complications and reasons to return to ER.    Patient/Family voiced understanding of above instructions.    Discussed plan for discharge, as there is no emergent indication for admission.  Pt/family is agreeable and understands need for follow up and possible repeat testing.  Pt/family is aware that discharge does not mean that nothing is wrong but that it indicates no emergency is currently present that requires admission and they must continue care with follow-up as given below or with a physician of their choice.     FOLLOW-UP  Aiden Spencer MD  [facility] [place] [address] [phone]    Schedule an appointment as soon as possible for a visit       [facility] Emergency Department  [address] [facility] [address] [phone]    If symptoms worsen         Medication List      New Prescriptions    predniSONE 20 MG tablet  Commonly known as: DELTASONE  Take 2 tablets by mouth Daily for 3 days.           Where to Get Your Medications       These medications were sent to [place], [place] - [address] - [phone] PH - [phone] FX  [address] [facility] [address]    Phone: [phone]   · predniSONE 20 MG tablet                                            MDM    Final diagnoses:   COPD exacerbation (HCC)   Chronic kidney disease, unspecified CKD stage   Tobacco use       ED Disposition  ED Disposition     ED Disposition   Discharge    Condition   Stable    Comment   --             Aiden Spencer MD  [facility] [place]  [place] [id number] [phone]    Schedule an appointment as soon as possible for a visit       [facility] Emergency Department  [address] [facility] [address] [phone]    If symptoms worsen         Medication List      New Prescriptions    predniSONE 20 MG tablet  Commonly known as: DELTASONE  Take 2 tablets by mouth Daily for 3 days.           Where to Get Your Medications      These medications were sent to [place], [place] - [address] - [phone]  - [phone] FX  [address] [facility] [address]    Phone: [phone]   · predniSONE 20 MG tablet          Katelyn Aguirre PA  09/04/22 173

## 2022-09-06 ENCOUNTER — TELEPHONE (OUTPATIENT)
Dept: OTHER | Facility: HOSPITAL | Age: 77
End: 2022-09-06

## 2022-09-06 ENCOUNTER — OFFICE VISIT (OUTPATIENT)
Dept: FAMILY MEDICINE CLINIC | Facility: CLINIC | Age: 77
End: 2022-09-06

## 2022-09-06 ENCOUNTER — TELEPHONE (OUTPATIENT)
Dept: FAMILY MEDICINE CLINIC | Facility: CLINIC | Age: 77
End: 2022-09-06

## 2022-09-06 VITALS
TEMPERATURE: 97.8 F | SYSTOLIC BLOOD PRESSURE: 180 MMHG | RESPIRATION RATE: 22 BRPM | BODY MASS INDEX: 31.41 KG/M2 | HEART RATE: 84 BPM | OXYGEN SATURATION: 94 % | WEIGHT: 160 LBS | HEIGHT: 60 IN | DIASTOLIC BLOOD PRESSURE: 74 MMHG

## 2022-09-06 DIAGNOSIS — J44.1 CHRONIC OBSTRUCTIVE PULMONARY DISEASE WITH ACUTE EXACERBATION: Primary | ICD-10-CM

## 2022-09-06 PROCEDURE — 99214 OFFICE O/P EST MOD 30 MIN: CPT | Performed by: FAMILY MEDICINE

## 2022-09-06 PROCEDURE — 94640 AIRWAY INHALATION TREATMENT: CPT | Performed by: FAMILY MEDICINE

## 2022-09-06 RX ORDER — IPRATROPIUM BROMIDE AND ALBUTEROL SULFATE 2.5; .5 MG/3ML; MG/3ML
3 SOLUTION RESPIRATORY (INHALATION) EVERY 4 HOURS PRN
Qty: 360 ML | Refills: 11 | Status: SHIPPED | OUTPATIENT
Start: 2022-09-06 | End: 2022-09-30 | Stop reason: SDUPTHER

## 2022-09-06 RX ORDER — ATORVASTATIN CALCIUM 40 MG/1
TABLET, FILM COATED ORAL
Qty: 30 TABLET | Refills: 3 | Status: SHIPPED | OUTPATIENT
Start: 2022-09-06 | End: 2022-09-24

## 2022-09-06 RX ORDER — PREDNISONE 20 MG/1
60 TABLET ORAL DAILY
Qty: 15 TABLET | Refills: 0 | Status: SHIPPED | OUTPATIENT
Start: 2022-09-06 | End: 2022-09-30 | Stop reason: SDUPTHER

## 2022-09-06 RX ORDER — IPRATROPIUM BROMIDE AND ALBUTEROL SULFATE 2.5; .5 MG/3ML; MG/3ML
3 SOLUTION RESPIRATORY (INHALATION)
Status: SHIPPED | OUTPATIENT
Start: 2022-09-06

## 2022-09-06 NOTE — PROGRESS NOTES
Follow Up Office Visit      Date: 2022   Patient Name: Jo-Ann Krishnan  : 1945   MRN: [id number]     Chief Complaint:    Chief Complaint   Patient presents with   • Hospital Follow Up Visit     pnemonia       History of Present Illness: Jo-Ann Krishnan is a 77 y.o. female who is here today for follow-up of recent emergency room visits.  Patient was in her usual state of health until approximately 2 weeks prior to presentation to the clinic when she developed a complaint of cough wheeze and shortness of breath.  Patient was seen in 2 separate emergency departments.  He did have a chest x-ray obtained that stated that she had a pneumonia and was started on levofloxacin as well as prednisone.  She was also felt to have a COPD exacerbation and was started on nebulization treatment.    Since starting her treatments she has not had much improvement of her symptomatology.  However she did not have the prednisone or her to [place] until 2 days after her emergency room visit.  She remains short of breath and has a cough that does keep her awake at night.  She states that her cough is productive and is described as thick but without any blood.  She does continue to wheeze and has noted decrease in activity because of this.  She denies any change to bowel bladder habits or other symptomatology.  He does have occasional chest pain associated with the cough but is not associated with nausea vomiting or diaphoresis.  No other complaints of been noted currently.    Subjective      Review of Systems:   Review of Systems   Constitutional: Positive for fatigue. Negative for activity change and appetite change.   Respiratory: Positive for cough, shortness of breath and wheezing.    Cardiovascular: Positive for chest pain. Negative for palpitations and leg swelling.   Gastrointestinal: Negative for abdominal pain, constipation, diarrhea, nausea and vomiting.   Genitourinary: Negative for dysuria, flank pain,  frequency and urgency.   Neurological: Positive for weakness. Negative for dizziness and memory problem.       I have reviewed the patients family history, social history, past medical history, past surgical history and have updated it as appropriate.     Medications:     Current Outpatient Medications:   •  acetaminophen (TYLENOL) 500 MG tablet, Take 500 mg by mouth Every 6 (Six) Hours As Needed for Mild Pain ., Disp: , Rfl:   •  albuterol (PROVENTIL) (2.5 MG/3ML) 0.083% nebulizer solution, Take 2.5 mg by nebulization Every 4 (Four) Hours As Needed for wheezing or shortness of air., Disp: , Rfl:   •  albuterol sulfate  (90 Base) MCG/ACT inhaler, Inhale 2 puffs Every 4 (Four) Hours As Needed for Wheezing., Disp: , Rfl:   •  aspirin 81 MG EC tablet, Take 81 mg by mouth daily., Disp: , Rfl:   •  busPIRone (BUSPAR) 5 MG tablet, Take 5 mg by mouth 3 (Three) Times a Day., Disp: , Rfl:   •  carvedilol (COREG) 25 MG tablet, Take 25 mg by mouth 2 (two) times a day with meals., Disp: , Rfl:   •  clopidogrel (PLAVIX) 75 MG tablet, Take 1 tablet by mouth Daily., Disp: 30 tablet, Rfl: 6  •  Cyanocobalamin (VITAMIN B-12 PO), Take 2,500 mcg by mouth Daily., Disp: , Rfl:   •  DULoxetine (CYMBALTA) 60 MG capsule, TAKE ONE CAPSULE BY MOUTH EVERY DAY, Disp: 30 capsule, Rfl: 11  •  Ferrous Sulfate 27 MG tablet, Take 1 tablet by mouth Daily., Disp: , Rfl:   •  HYDROcodone-acetaminophen (NORCO) 7.5-325 MG per tablet, Take 1 tablet by mouth 2 (Two) Times a Day As Needed for Moderate Pain., Disp: , Rfl:   •  Insulin Glargine, 1 Unit Dial, (Toujeo SoloStar) 300 UNIT/ML solution pen-injector injection, Inject 10 Units under the skin into the appropriate area as directed Every Night. 10 units nightly, Disp: , Rfl:   •  isosorbide mononitrate (IMDUR) 60 MG 24 hr tablet, TAKE ONE TABLET DAILY, Disp: 30 tablet, Rfl: 12  •  losartan (COZAAR) 50 MG tablet, Take 50 mg by mouth Daily., Disp: , Rfl:   •  multivitamin with minerals tablet  "tablet, Take 1 tablet by mouth Daily., Disp: , Rfl:   •  naloxone (NARCAN) 4 MG/0.1ML nasal spray, 1 spray into each nostril As Needed., Disp: , Rfl:   •  pantoprazole (PROTONIX) 40 MG EC tablet, Take 40 mg by mouth Daily., Disp: , Rfl:   •  SITagliptin (JANUVIA) 50 MG tablet, Take 50 mg by mouth Daily., Disp: , Rfl:   •  tolterodine LA (DETROL LA) 4 MG 24 hr capsule, Take 4 mg by mouth Daily., Disp: , Rfl:   •  traMADol (ULTRAM) 50 MG tablet, Take 100 mg by mouth 2 (Two) Times a Day As Needed., Disp: , Rfl:   •  atorvastatin (LIPITOR) 40 MG tablet, TAKE ONE TABLET BY MOUTH EVERY DAY, Disp: 30 tablet, Rfl: 3  •  ipratropium-albuterol (DUO-NEB) 0.5-2.5 mg/3 ml nebulizer, Take 3 mL by nebulization Every 4 (Four) Hours As Needed for Wheezing., Disp: 360 mL, Rfl: 11  •  predniSONE (DELTASONE) 20 MG tablet, Take 3 tablets by mouth Daily., Disp: 15 tablet, Rfl: 0    Current Facility-Administered Medications:   •  ipratropium-albuterol (DUO-NEB) nebulizer solution 3 mL, 3 mL, Nebulization, 4x Daily - RT, Aiden Spencer MD, 3 mL at 09/08/22 1328    Allergies:   Allergies   Allergen Reactions   • Ceclor [Cefaclor] Rash       Immunizations:   Immunization History   Administered Date(s) Administered   • COVID-19 (VINNIE) 03/16/2021   • COVID-19 (UNSPECIFIED) 03/01/2021, 04/01/2021   • Fluzone High Dose =>65 Years ([facility] ONLY) 10/16/2018   • Fluzone Quad >6mos (Multi-dose) 11/15/2016, 02/05/2020   • Pneumococcal Conjugate 13-Valent (PCV13) 07/07/2016        Objective     Physical Exam: Please see above  Vital Signs:   Vitals:    09/06/22 1610   BP: 180/74   Pulse: 84   Resp: 22   Temp: 97.8 °F (36.6 °C)   SpO2: 94%   Weight: 72.6 kg (160 lb)   Height: 152.4 cm (60\")     Body mass index is 31.25 kg/m².  BMI is >= 30 and <35. (Class 1 Obesity). The following options were offered after discussion;: nutrition counseling/recommendations       Physical Exam  Constitutional:       Appearance: Normal appearance.   HENT: "      Head: Normocephalic and atraumatic.      Nose: Nose normal.      Mouth/Throat:      Pharynx: Oropharynx is clear.   Eyes:      Extraocular Movements: Extraocular movements intact.      Pupils: Pupils are equal, round, and reactive to light.   Neck:      Thyroid: No thyroid mass or thyromegaly.      Trachea: Trachea normal.   Cardiovascular:      Rate and Rhythm: Normal rate and regular rhythm.      Pulses: Normal pulses. No decreased pulses.      Heart sounds: Normal heart sounds.   Pulmonary:      Effort: Pulmonary effort is normal. Prolonged expiration present.      Breath sounds: Decreased air movement present. Examination of the right-upper field reveals decreased breath sounds and wheezing. Examination of the left-upper field reveals decreased breath sounds and wheezing. Examination of the right-middle field reveals decreased breath sounds and wheezing. Examination of the right-lower field reveals decreased breath sounds and wheezing. Examination of the left-lower field reveals decreased breath sounds. Decreased breath sounds and wheezing present.   Abdominal:      General: Abdomen is flat. Bowel sounds are normal.      Palpations: Abdomen is soft.      Tenderness: There is no abdominal tenderness.   Musculoskeletal:      Cervical back: Neck supple.      Right lower leg: No edema.      Left lower leg: No edema.   Lymphadenopathy:      Cervical: No cervical adenopathy.   Skin:     General: Skin is warm and dry.   Neurological:      General: No focal deficit present.      Mental Status: She is alert and oriented to person, place, and time.      Cranial Nerves: Cranial nerves are intact.      Sensory: Sensation is intact.      Motor: Motor function is intact.      Coordination: Coordination is intact.   Psychiatric:         Attention and Perception: Attention normal.         Mood and Affect: Mood normal.         Speech: Speech normal.         Behavior: Behavior normal.         Procedures    Results:   Labs:    Hemoglobin A1C   Date Value Ref Range Status   06/15/2022 7.10 (H) 4.80 - 5.60 % Final        POCT Results (if applicable):   Results for orders placed or performed during the hospital encounter of 09/04/22   Blood Culture - Blood, Arm, Left    Specimen: Arm, Left; Blood   Result Value Ref Range    Blood Culture No growth at 3 days    Blood Culture - Blood, Arm, Right    Specimen: Arm, Right; Blood   Result Value Ref Range    Blood Culture No growth at 3 days    COVID-19 and FLU A/B PCR - Swab, Nasopharynx    Specimen: Nasopharynx; Swab   Result Value Ref Range    COVID19 Not Detected Not Detected - Ref. Range    Influenza A PCR Not Detected Not Detected    Influenza B PCR Not Detected Not Detected   Comprehensive Metabolic Panel    Specimen: Blood   Result Value Ref Range    Glucose 192 (H) 65 - 99 mg/dL    BUN 39 (H) 8 - 23 mg/dL    Creatinine 2.03 (H) 0.57 - 1.00 mg/dL    Sodium 139 136 - 145 mmol/L    Potassium 5.1 3.5 - 5.2 mmol/L    Chloride 105 98 - 107 mmol/L    CO2 24.0 22.0 - 29.0 mmol/L    Calcium 9.8 8.6 - 10.5 mg/dL    Total Protein 8.5 6.0 - 8.5 g/dL    Albumin 4.10 3.50 - 5.20 g/dL    ALT (SGPT) 10 1 - 33 U/L    AST (SGOT) 17 1 - 32 U/L    Alkaline Phosphatase 116 39 - 117 U/L    Total Bilirubin 0.3 0.0 - 1.2 mg/dL    Globulin 4.4 gm/dL    A/G Ratio 0.9 g/dL    BUN/Creatinine Ratio 19.2 7.0 - 25.0    Anion Gap 10.0 5.0 - 15.0 mmol/L    eGFR 24.6 (L) >60.0 mL/min/1.73   BNP    Specimen: Blood   Result Value Ref Range    proBNP 255.1 0.0 - 1,800.0 pg/mL   Troponin    Specimen: Blood   Result Value Ref Range    Troponin T <0.010 0.000 - 0.030 ng/mL   CBC Auto Differential    Specimen: Blood   Result Value Ref Range    WBC 7.43 3.40 - 10.80 10*3/mm3    RBC 3.39 (L) 3.77 - 5.28 10*6/mm3    Hemoglobin 10.0 (L) 12.0 - 15.9 g/dL    Hematocrit 31.2 (L) 34.0 - 46.6 %    MCV 92.0 79.0 - 97.0 fL    MCH 29.5 26.6 - 33.0 pg    MCHC 32.1 31.5 - 35.7 g/dL    RDW 15.1 12.3 - 15.4 %    RDW-SD 51.0 37.0 - 54.0 fl     MPV 10.1 6.0 - 12.0 fL    Platelets 223 140 - 450 10*3/mm3    Neutrophil % 66.3 42.7 - 76.0 %    Lymphocyte % 18.7 (L) 19.6 - 45.3 %    Monocyte % 11.4 5.0 - 12.0 %    Eosinophil % 2.3 0.3 - 6.2 %    Basophil % 0.8 0.0 - 1.5 %    Immature Grans % 0.5 0.0 - 0.5 %    Neutrophils, Absolute 4.92 1.70 - 7.00 10*3/mm3    Lymphocytes, Absolute 1.39 0.70 - 3.10 10*3/mm3    Monocytes, Absolute 0.85 0.10 - 0.90 10*3/mm3    Eosinophils, Absolute 0.17 0.00 - 0.40 10*3/mm3    Basophils, Absolute 0.06 0.00 - 0.20 10*3/mm3    Immature Grans, Absolute 0.04 0.00 - 0.05 10*3/mm3    nRBC 0.0 0.0 - 0.2 /100 WBC   Urinalysis With Culture If Indicated - Urine, Clean Catch    Specimen: Urine, Clean Catch   Result Value Ref Range    Color, UA Yellow Yellow, Straw    Appearance, UA Clear Clear    pH, UA 6.0 5.0 - 8.0    Specific Gravity, UA 1.014 1.001 - 1.030    Glucose, UA Negative Negative    Ketones, UA Negative Negative    Bilirubin, UA Negative Negative    Blood, UA Moderate (2+) (A) Negative    Protein, UA Trace (A) Negative    Leuk Esterase, UA Negative Negative    Nitrite, UA Negative Negative    Urobilinogen, UA 0.2 E.U./dL 0.2 - 1.0 E.U./dL   Lactic Acid, Plasma    Specimen: Blood   Result Value Ref Range    Lactate 1.1 0.5 - 2.0 mmol/L   Procalcitonin    Specimen: Blood   Result Value Ref Range    Procalcitonin 0.04 0.00 - 0.25 ng/mL   Urinalysis, Microscopic Only - Urine, Clean Catch    Specimen: Urine, Clean Catch   Result Value Ref Range    RBC, UA Too Numerous to Count (A) None Seen, 0-2 /HPF    WBC, UA 0-2 None Seen, 0-2 /HPF    Bacteria, UA None Seen None Seen, Trace /HPF    Squamous Epithelial Cells, UA 0-2 None Seen, 0-2 /HPF    Hyaline Casts, UA None Seen 0 - 6 /LPF    Methodology Automated Microscopy    ECG 12 Lead   Result Value Ref Range    QT Interval 354 ms    QTC Interval 415 ms   Green Top (Gel)   Result Value Ref Range    Extra Tube Hold for add-ons.    Lavender Top   Result Value Ref Range    Extra Tube      Gold Top - SST   Result Value Ref Range    Extra Tube Hold for add-ons.    Gray Top   Result Value Ref Range    Extra Tube Hold for add-ons.    Light Blue Top   Result Value Ref Range    Extra Tube Hold for add-ons.        Imaging:   No valid procedures specified.     Measures:   Advanced Care Planning:   Will discuss at next appointment.    Smoking Cessation:   less than 3 minutes spent counseling Will try to cut down    Assessment / Plan      Assessment/Plan:   Diagnoses and all orders for this visit:    1. Chronic obstructive pulmonary disease with acute exacerbation (HCC) (Primary)  Assessment & Plan:  Patient returns to clinic after she has been seen in the emergency department twice for treatment of her COPD exacerbation.  She was noted to be wheezing upon examination and did receive a DuoNeb while in the office that does show slight improvement.  She has been instructed to continue with the treatments every 4 hours while at home and to increase her prednisone dose up to 80 mg today and 60 mg tomorrow.  She has again been instructed to discontinue her smoking and we will reassess her tomorrow.  If patient has any worsening of her symptoms she is to present to the emergency department for further evaluation and treatment.      Orders:  -     ipratropium-albuterol (DUO-NEB) nebulizer solution 3 mL  -     ipratropium-albuterol (DUO-NEB) 0.5-2.5 mg/3 ml nebulizer; Take 3 mL by nebulization Every 4 (Four) Hours As Needed for Wheezing.  Dispense: 360 mL; Refill: 11  -     predniSONE (DELTASONE) 20 MG tablet; Take 3 tablets by mouth Daily.  Dispense: 15 tablet; Refill: 0  -     Discontinue: HYDROcod Polst-CPM Polst ER (Tussionex Pennkinetic ER) 10-8 MG/5ML ER suspension; Take 5 mL by mouth Every 12 (Twelve) Hours As Needed (as needed for cough).  Dispense: 120 mL; Refill: 0   Procedure note.  Sent received a DuoNeb (0.5 mg / 3 mg per 3 mL) nebulization treatment while in the office.  He was noted to be quite  dyspneic as well as diffuse wheeze prior to the procedure.  He was noted to have slight improvement after procedure.  Tolerated well.      Follow Up:   Return in about 1 day (around 9/7/2022).      At [facility], we believe that sharing information builds trust and better relationships. You are receiving this note because you recently visited [facility]. It is possible you will see health information before a provider has talked with you about it. This kind of information can be easy to misunderstand. To help you fully understand what it means for your health, we urge you to discuss this note with your provider.    Aiden Spencer MD  [facility]

## 2022-09-06 NOTE — TELEPHONE ENCOUNTER
Patient's sister has called office requesting cough syrup to be called in for patient.    Patient's sister is not aware of what the name of the medication is.    Verified current phone number and pharmacy on file for patient.

## 2022-09-06 NOTE — ASSESSMENT & PLAN NOTE
Patient returns to clinic after she has been seen in the emergency department twice for treatment of her COPD exacerbation.  She was noted to be wheezing upon examination and did receive a DuoNeb while in the office that does show slight improvement.  She has been instructed to continue with the treatments every 4 hours while at home and to increase her prednisone dose up to 80 mg today and 60 mg tomorrow.  She has again been instructed to discontinue her smoking and we will reassess her tomorrow.  If patient has any worsening of her symptoms she is to present to the emergency department for further evaluation and treatment.

## 2022-09-06 NOTE — TELEPHONE ENCOUNTER
COPD Nurse Navigator called patient in reference to recent ED visit.  Patient reports that symptoms are about the same.  Patient reports that they have the medications they were prescribed.  She started them this morning.  Patient verbalized understanding of need to follow up with provider and schedule an appointment.  COPD action plan reviewed.  NN continues to follow.

## 2022-09-07 ENCOUNTER — OFFICE VISIT (OUTPATIENT)
Dept: FAMILY MEDICINE CLINIC | Facility: CLINIC | Age: 77
End: 2022-09-07

## 2022-09-07 VITALS
SYSTOLIC BLOOD PRESSURE: 160 MMHG | HEIGHT: 60 IN | WEIGHT: 156 LBS | RESPIRATION RATE: 18 BRPM | OXYGEN SATURATION: 96 % | DIASTOLIC BLOOD PRESSURE: 90 MMHG | HEART RATE: 82 BPM | BODY MASS INDEX: 30.63 KG/M2

## 2022-09-07 DIAGNOSIS — Z72.0 TOBACCO ABUSE: ICD-10-CM

## 2022-09-07 DIAGNOSIS — J44.1 CHRONIC OBSTRUCTIVE PULMONARY DISEASE WITH ACUTE EXACERBATION: Primary | ICD-10-CM

## 2022-09-07 PROCEDURE — 99213 OFFICE O/P EST LOW 20 MIN: CPT | Performed by: FAMILY MEDICINE

## 2022-09-07 NOTE — PROGRESS NOTES
Follow Up Office Visit      Date: 2022   Patient Name: Jo-Ann Krishnan  : 1945   MRN: [id number]     Chief Complaint:    Chief Complaint   Patient presents with   • Shortness of Breath     Follow up from yesterday's visit       History of Present Illness: Jo-Ann Krishnan is a 77 y.o. female who is here today for for recent clinic appointment yesterday for her exacerbation of her COPD.  Patient did receive a nebulization treatment in the office that did improve her symptoms with DuoNeb.  She was increased her dose of prednisone and continue with the duo nebs at home every 4 hours.    Patient returns today feeling much improved.  She states that she still continues to have a cough but with minimal wheeze.  She denies any production with respect to her cough including hemoptysis.  She does continue to remain short of breath and does continue to have decrease in activity.  Her appetite has been unchanged.  She has continued to require sleeping in the recliner because of her cough.  She has not had any side effects of DuoNeb and does continue to do well with the prednisone.  She denies any chest pain PND or orthopnea.  Her bowels and bladder have been unchanged.  No other counts complaints have been noted present time.    Subjective      Review of Systems:   Review of Systems   Constitutional: Positive for fatigue. Negative for activity change and appetite change.   Respiratory: Positive for cough, chest tightness, shortness of breath and wheezing.    Cardiovascular: Negative for chest pain, palpitations and leg swelling.   Gastrointestinal: Negative for abdominal pain, constipation, diarrhea, nausea and vomiting.   Genitourinary: Negative for dysuria, flank pain, frequency and urgency.   Musculoskeletal: Positive for arthralgias.   Neurological: Positive for weakness. Negative for dizziness and memory problem.   Psychiatric/Behavioral: Positive for hallucinations. Negative for agitation.       I  have reviewed the patients family history, social history, past medical history, past surgical history and have updated it as appropriate.     Medications:     Current Outpatient Medications:   •  acetaminophen (TYLENOL) 500 MG tablet, Take 500 mg by mouth Every 6 (Six) Hours As Needed for Mild Pain ., Disp: , Rfl:   •  albuterol (PROVENTIL) (2.5 MG/3ML) 0.083% nebulizer solution, Take 2.5 mg by nebulization Every 4 (Four) Hours As Needed for wheezing or shortness of air., Disp: , Rfl:   •  albuterol sulfate  (90 Base) MCG/ACT inhaler, Inhale 2 puffs Every 4 (Four) Hours As Needed for Wheezing., Disp: , Rfl:   •  aspirin 81 MG EC tablet, Take 81 mg by mouth daily., Disp: , Rfl:   •  atorvastatin (LIPITOR) 40 MG tablet, TAKE ONE TABLET BY MOUTH EVERY DAY, Disp: 30 tablet, Rfl: 3  •  busPIRone (BUSPAR) 5 MG tablet, Take 5 mg by mouth 3 (Three) Times a Day., Disp: , Rfl:   •  carvedilol (COREG) 25 MG tablet, Take 25 mg by mouth 2 (two) times a day with meals., Disp: , Rfl:   •  clopidogrel (PLAVIX) 75 MG tablet, Take 1 tablet by mouth Daily., Disp: 30 tablet, Rfl: 6  •  Cyanocobalamin (VITAMIN B-12 PO), Take 2,500 mcg by mouth Daily., Disp: , Rfl:   •  DULoxetine (CYMBALTA) 60 MG capsule, TAKE ONE CAPSULE BY MOUTH EVERY DAY, Disp: 30 capsule, Rfl: 11  •  Ferrous Sulfate 27 MG tablet, Take 1 tablet by mouth Daily., Disp: , Rfl:   •  HYDROcodone-acetaminophen (NORCO) 7.5-325 MG per tablet, Take 1 tablet by mouth 2 (Two) Times a Day As Needed for Moderate Pain., Disp: , Rfl:   •  Insulin Glargine, 1 Unit Dial, (Toujeo SoloStar) 300 UNIT/ML solution pen-injector injection, Inject 10 Units under the skin into the appropriate area as directed Every Night. 10 units nightly, Disp: , Rfl:   •  ipratropium-albuterol (DUO-NEB) 0.5-2.5 mg/3 ml nebulizer, Take 3 mL by nebulization Every 4 (Four) Hours As Needed for Wheezing., Disp: 360 mL, Rfl: 11  •  isosorbide mononitrate (IMDUR) 60 MG 24 hr tablet, TAKE ONE TABLET DAILY,  "Disp: 30 tablet, Rfl: 12  •  losartan (COZAAR) 50 MG tablet, Take 50 mg by mouth Daily., Disp: , Rfl:   •  multivitamin with minerals tablet tablet, Take 1 tablet by mouth Daily., Disp: , Rfl:   •  naloxone (NARCAN) 4 MG/0.1ML nasal spray, 1 spray into each nostril As Needed., Disp: , Rfl:   •  pantoprazole (PROTONIX) 40 MG EC tablet, Take 40 mg by mouth Daily., Disp: , Rfl:   •  predniSONE (DELTASONE) 20 MG tablet, Take 3 tablets by mouth Daily., Disp: 15 tablet, Rfl: 0  •  SITagliptin (JANUVIA) 50 MG tablet, Take 50 mg by mouth Daily., Disp: , Rfl:   •  tolterodine LA (DETROL LA) 4 MG 24 hr capsule, Take 4 mg by mouth Daily., Disp: , Rfl:   •  traMADol (ULTRAM) 50 MG tablet, Take 100 mg by mouth 2 (Two) Times a Day As Needed., Disp: , Rfl:     Current Facility-Administered Medications:   •  ipratropium-albuterol (DUO-NEB) nebulizer solution 3 mL, 3 mL, Nebulization, 4x Daily - RT, Aiden Spencer MD    Allergies:   Allergies   Allergen Reactions   • Ceclor [Cefaclor] Rash       Immunizations:   Immunization History   Administered Date(s) Administered   • COVID-19 (VINNIE) 03/16/2021   • COVID-19 (UNSPECIFIED) 03/01/2021, 04/01/2021   • Fluzone High Dose =>65 Years ([facility] ONLY) 10/16/2018   • Fluzone Quad >6mos (Multi-dose) 11/15/2016, 02/05/2020   • Pneumococcal Conjugate 13-Valent (PCV13) 07/07/2016        Objective     Physical Exam: Please see above  Vital Signs:   Vitals:    09/07/22 1549   BP: 160/90   BP Location: Left arm   Pulse: 82   Resp: 18   SpO2: 96%   Weight: 70.8 kg (156 lb)   Height: 152.4 cm (60\")     Body mass index is 30.47 kg/m².  BMI is >= 30 and <35. (Class 1 Obesity). The following options were offered after discussion;: exercise counseling/recommendations and nutrition counseling/recommendations       Physical Exam  Vitals and nursing note reviewed.   Constitutional:       Appearance: Normal appearance.   HENT:      Head: Normocephalic and atraumatic.      Nose: Nose normal. "      Mouth/Throat:      Pharynx: Oropharynx is clear.   Eyes:      Extraocular Movements: Extraocular movements intact.      Pupils: Pupils are equal, round, and reactive to light.   Neck:      Thyroid: No thyroid mass or thyromegaly.      Trachea: Trachea normal.   Cardiovascular:      Rate and Rhythm: Normal rate and regular rhythm.      Pulses: Normal pulses. No decreased pulses.      Heart sounds: Normal heart sounds.   Pulmonary:      Effort: Pulmonary effort is normal.      Breath sounds: Examination of the right-upper field reveals decreased breath sounds. Examination of the left-upper field reveals decreased breath sounds. Examination of the right-middle field reveals decreased breath sounds. Examination of the right-lower field reveals decreased breath sounds and wheezing. Examination of the left-lower field reveals decreased breath sounds and wheezing. Decreased breath sounds and wheezing present.   Abdominal:      General: Abdomen is flat. Bowel sounds are normal.      Palpations: Abdomen is soft.      Tenderness: There is no abdominal tenderness.   Musculoskeletal:      Cervical back: Neck supple.      Right lower leg: No edema.      Left lower leg: No edema.   Lymphadenopathy:      Cervical: No cervical adenopathy.   Skin:     General: Skin is warm and dry.   Neurological:      General: No focal deficit present.      Mental Status: She is alert and oriented to person, place, and time.      Cranial Nerves: Cranial nerves are intact.      Sensory: Sensation is intact.      Motor: Motor function is intact.      Coordination: Coordination is intact.   Psychiatric:         Attention and Perception: Attention normal.         Mood and Affect: Mood normal.         Speech: Speech normal.         Behavior: Behavior normal.         Procedures    Results:   Labs:   Hemoglobin A1C   Date Value Ref Range Status   06/15/2022 7.10 (H) 4.80 - 5.60 % Final        POCT Results (if applicable):   Results for orders placed  or performed during the hospital encounter of 09/04/22   Blood Culture - Blood, Arm, Left    Specimen: Arm, Left; Blood   Result Value Ref Range    Blood Culture No growth at 3 days    Blood Culture - Blood, Arm, Right    Specimen: Arm, Right; Blood   Result Value Ref Range    Blood Culture No growth at 3 days    COVID-19 and FLU A/B PCR - Swab, Nasopharynx    Specimen: Nasopharynx; Swab   Result Value Ref Range    COVID19 Not Detected Not Detected - Ref. Range    Influenza A PCR Not Detected Not Detected    Influenza B PCR Not Detected Not Detected   Comprehensive Metabolic Panel    Specimen: Blood   Result Value Ref Range    Glucose 192 (H) 65 - 99 mg/dL    BUN 39 (H) 8 - 23 mg/dL    Creatinine 2.03 (H) 0.57 - 1.00 mg/dL    Sodium 139 136 - 145 mmol/L    Potassium 5.1 3.5 - 5.2 mmol/L    Chloride 105 98 - 107 mmol/L    CO2 24.0 22.0 - 29.0 mmol/L    Calcium 9.8 8.6 - 10.5 mg/dL    Total Protein 8.5 6.0 - 8.5 g/dL    Albumin 4.10 3.50 - 5.20 g/dL    ALT (SGPT) 10 1 - 33 U/L    AST (SGOT) 17 1 - 32 U/L    Alkaline Phosphatase 116 39 - 117 U/L    Total Bilirubin 0.3 0.0 - 1.2 mg/dL    Globulin 4.4 gm/dL    A/G Ratio 0.9 g/dL    BUN/Creatinine Ratio 19.2 7.0 - 25.0    Anion Gap 10.0 5.0 - 15.0 mmol/L    eGFR 24.6 (L) >60.0 mL/min/1.73   BNP    Specimen: Blood   Result Value Ref Range    proBNP 255.1 0.0 - 1,800.0 pg/mL   Troponin    Specimen: Blood   Result Value Ref Range    Troponin T <0.010 0.000 - 0.030 ng/mL   CBC Auto Differential    Specimen: Blood   Result Value Ref Range    WBC 7.43 3.40 - 10.80 10*3/mm3    RBC 3.39 (L) 3.77 - 5.28 10*6/mm3    Hemoglobin 10.0 (L) 12.0 - 15.9 g/dL    Hematocrit 31.2 (L) 34.0 - 46.6 %    MCV 92.0 79.0 - 97.0 fL    MCH 29.5 26.6 - 33.0 pg    MCHC 32.1 31.5 - 35.7 g/dL    RDW 15.1 12.3 - 15.4 %    RDW-SD 51.0 37.0 - 54.0 fl    MPV 10.1 6.0 - 12.0 fL    Platelets 223 140 - 450 10*3/mm3    Neutrophil % 66.3 42.7 - 76.0 %    Lymphocyte % 18.7 (L) 19.6 - 45.3 %    Monocyte % 11.4 5.0  - 12.0 %    Eosinophil % 2.3 0.3 - 6.2 %    Basophil % 0.8 0.0 - 1.5 %    Immature Grans % 0.5 0.0 - 0.5 %    Neutrophils, Absolute 4.92 1.70 - 7.00 10*3/mm3    Lymphocytes, Absolute 1.39 0.70 - 3.10 10*3/mm3    Monocytes, Absolute 0.85 0.10 - 0.90 10*3/mm3    Eosinophils, Absolute 0.17 0.00 - 0.40 10*3/mm3    Basophils, Absolute 0.06 0.00 - 0.20 10*3/mm3    Immature Grans, Absolute 0.04 0.00 - 0.05 10*3/mm3    nRBC 0.0 0.0 - 0.2 /100 WBC   Urinalysis With Culture If Indicated - Urine, Clean Catch    Specimen: Urine, Clean Catch   Result Value Ref Range    Color, UA Yellow Yellow, Straw    Appearance, UA Clear Clear    pH, UA 6.0 5.0 - 8.0    Specific Gravity, UA 1.014 1.001 - 1.030    Glucose, UA Negative Negative    Ketones, UA Negative Negative    Bilirubin, UA Negative Negative    Blood, UA Moderate (2+) (A) Negative    Protein, UA Trace (A) Negative    Leuk Esterase, UA Negative Negative    Nitrite, UA Negative Negative    Urobilinogen, UA 0.2 E.U./dL 0.2 - 1.0 E.U./dL   Lactic Acid, Plasma    Specimen: Blood   Result Value Ref Range    Lactate 1.1 0.5 - 2.0 mmol/L   Procalcitonin    Specimen: Blood   Result Value Ref Range    Procalcitonin 0.04 0.00 - 0.25 ng/mL   Urinalysis, Microscopic Only - Urine, Clean Catch    Specimen: Urine, Clean Catch   Result Value Ref Range    RBC, UA Too Numerous to Count (A) None Seen, 0-2 /HPF    WBC, UA 0-2 None Seen, 0-2 /HPF    Bacteria, UA None Seen None Seen, Trace /HPF    Squamous Epithelial Cells, UA 0-2 None Seen, 0-2 /HPF    Hyaline Casts, UA None Seen 0 - 6 /LPF    Methodology Automated Microscopy    ECG 12 Lead   Result Value Ref Range    QT Interval 354 ms    QTC Interval 415 ms   Green Top (Gel)   Result Value Ref Range    Extra Tube Hold for add-ons.    Lavender Top   Result Value Ref Range    Extra Tube     Gold Top - SST   Result Value Ref Range    Extra Tube Hold for add-ons.    Gray Top   Result Value Ref Range    Extra Tube Hold for add-ons.    Light Blue Top    Result Value Ref Range    Extra Tube Hold for add-ons.        Imaging:   No valid procedures specified.     Measures:   Advanced Care Planning:   Patient does not have an advance directive, information provided.    Smoking Cessation:   less than 3 minutes spent counseling Agreeable to stop    Assessment / Plan      Assessment/Plan:   Diagnoses and all orders for this visit:    1. Chronic obstructive pulmonary disease with acute exacerbation (HCC) (Primary)  Assessment & Plan:  Patient is much improved compared to yesterday.  She does continue to have some decrease in breath sounds but with only minimal wheeze.  She has been instructed to continue with 60 mg of prednisone daily until she follows up in 2 days.  She will also continue with the DuoNeb nebulization treatments every 4 hours.  If her symptoms does worsen or she becomes hypoxic while at home she is to present to the emergency department for possible admission.        2. Tobacco abuse  Assessment & Plan:  Patient states that she has not smoked a cigarette for the previous 6 days.  She states that she has been unable to smoke due to her current pulmonary condition.  Patient is amendable to discontinue her smoking.  We did give her instruction on steps that she can take and have offered medications which she wishes to decline at present time.  If she does have any questions or wishes to start a medication she will contact the office.        Follow Up:   Return in about 2 days (around 9/9/2022).      At [facility], we believe that sharing information builds trust and better relationships. You are receiving this note because you recently visited [facility]. It is possible you will see health information before a provider has talked with you about it. This kind of information can be easy to misunderstand. To help you fully understand what it means for your health, we urge you to discuss this note with your provider.    Aiden Spencer MD  [facility]  [place]

## 2022-09-07 NOTE — PATIENT INSTRUCTIONS
Tobacco Use Disorder  Tobacco use disorder (TUD) occurs when a person craves, seeks, and uses tobacco, regardless of the consequences. This disorder can cause problems with mental and physical health. It can affect your ability to have healthy relationships, and it can keep you from meeting your responsibilities at work, home, or school.  Tobacco may be:  Smoked as a cigarette or cigar.  Inhaled using e-cigarettes.  Smoked in a pipe or hookah.  Chewed as smokeless tobacco.  Inhaled into the nostrils as snuff.  Tobacco products contain a dangerous chemical called nicotine, which is very addictive. Nicotine triggers hormones that make the body feel stimulated and works on areas of the brain that make you feel good. These effects can make it hard for people to quit nicotine.  Tobacco contains many other unsafe chemicals that can damage almost every organ in the body. Smoking tobacco also puts others in danger due to fire risk and possible health problems caused by breathing in secondhand smoke.  What are the signs or symptoms?  Symptoms of TUD may include:  Being unable to slow down or stop your tobacco use.  Spending an abnormal amount of time getting or using tobacco.  Craving tobacco. Cravings may last for up to 6 months after quitting.  Tobacco use that:  Interferes with your work, school, or home life.  Interferes with your personal and social relationships.  Makes you give up activities that you once enjoyed or found important.  Using tobacco even though you know that it is:  Dangerous or bad for your health or someone else's health.  Causing problems in your life.  Needing more and more of the substance to get the same effect (developing tolerance).  Experiencing unpleasant symptoms if you do not use the substance (withdrawal). Withdrawal symptoms may include:  Depressed, anxious, or irritable mood.  Difficulty concentrating.  Increased appetite.  Restlessness or trouble sleeping.  Using the substance to avoid  withdrawal.  How is this diagnosed?  This condition may be diagnosed based on:  Your current and past tobacco use. Your health care provider may ask questions about how your tobacco use affects your life.  A physical exam.  You may be diagnosed with TUD if you have at least two symptoms within a 12-month period.  How is this treated?  This condition is treated by stopping tobacco use. Many people are unable to quit on their own and need help. Treatment may include:  Nicotine replacement therapy (NRT). NRT provides nicotine without the other harmful chemicals in tobacco. NRT gradually lowers the dosage of nicotine in the body and reduces withdrawal symptoms. NRT is available as:  Over-the-counter gums, lozenges, and skin patches.  Prescription mouth inhalers and nasal sprays.  Medicine that acts on the brain to reduce cravings and withdrawal symptoms.  A type of talk therapy that examines your triggers for tobacco use, how to avoid them, and how to cope with cravings (behavioral therapy).  Hypnosis. This may help with withdrawal symptoms.  Joining a support group for others coping with TUD.  The best treatment for TUD is usually a combination of medicine, talk therapy, and support groups. Recovery can be a long process. Many people start using tobacco again after stopping (relapse). If you relapse, it does not mean that treatment will not work.  Follow these instructions at home:    Lifestyle  Do not use any products that contain nicotine or tobacco, such as cigarettes and e-cigarettes.  Avoid things that trigger tobacco use as much as you can. Triggers include people and situations that usually cause you to use tobacco.  Avoid drinks that contain caffeine, including coffee. These may worsen some withdrawal symptoms.  Find ways to manage stress. Wanting to smoke may cause stress, and stress can make you want to smoke. Relaxation techniques such as deep breathing, meditation, and yoga may help.  Attend support groups  as needed. These groups are an important part of long-term recovery for many people.  General instructions  Take over-the-counter and prescription medicines only as told by your health care provider.  Check with your health care provider before taking any new prescription or over-the-counter medicines.  Decide on a friend, family member, or smoking quit-line (such as 1-800-QUIT-NOW in the [place] that you can call or text when you feel the urge to smoke or when you need help coping with cravings.  Keep all follow-up visits as told by your health care provider and therapist. This is important.  Contact a health care provider if:  You are not able to take your medicines as prescribed.  Your symptoms get worse, even with treatment.  Summary  Tobacco use disorder (TUD) occurs when a person craves, seeks, and uses tobacco regardless of the consequences.  This condition may be diagnosed based on your current and past tobacco use and a physical exam.  Many people are unable to quit on their own and need help. Recovery can be a long process.  The most effective treatment for TUD is usually a combination of medicine, talk therapy, and support groups.  This information is not intended to replace advice given to you by your health care provider. Make sure you discuss any questions you have with your health care provider.  Document Revised: 12/05/2018 Document Reviewed: 12/05/2018  [employer] Patient Education © 2021 [employer]   Advance Care Planning and Advance Directives     You make decisions on a daily basis - decisions about where you want to live, your career, your home, your life. Perhaps one of the most important decisions you face is your choice for future medical care. Take time to talk with your family and your healthcare team and start planning today.  Advance Care Planning is a process that can help you:  Understand possible future healthcare decisions in light of your own experiences  Reflect on those decision in  light of your goals and values  Discuss your decisions with those closest to you and the healthcare professionals that care for you  Make a plan by creating a document that reflects your wishes    Surrogate Decision Maker  In the event of a medical emergency, which has left you unable to communicate or to make your own decisions, you would need someone to make decisions for you.  It is important to discuss your preferences for medical treatment with this person while you are in good health.     Qualities of a surrogate decision maker:  Willing to take on this role and responsibility  Knows what you want for future medical care  Willing to follow your wishes even if they don't agree with them  Able to make difficult medical decisions under stressful circumstances    Advance Directives  These are legal documents you can create that will guide your healthcare team and decision maker(s) when needed. These documents can be stored in the electronic medical record.    Living Will - a legal document to guide your care if you have a terminal condition or a serious illness and are unable to communicate. States vary by statute in document names/types, but most forms may include one or more of the following:        -  Directions regarding life-prolonging treatments        -  Directions regarding artificially provided nutrition/hydration        -  Choosing a healthcare decision maker        -  Direction regarding organ/tissue donation    Durable Power of  for Healthcare - this document names an -in-fact to make medical decisions for you, but it may also allow this person to make personal and financial decisions for you. Please seek the advice of an  if you need this type of document.    **Advance Directives are not required and no one may discriminate against you if you do not sign one.    Medical Orders  Many states allow specific forms/orders signed by your physician to record your wishes for medical  treatment in your current state of health. This form, signed in personal communication with your physician, addresses resuscitation and other medical interventions that you may or may not want.      For more information or to schedule a time with a [facility] Advance Care Planning Facilitator contact: [facility] or call [phone] and someone will contact you directly.

## 2022-09-08 RX ADMIN — IPRATROPIUM BROMIDE AND ALBUTEROL SULFATE 3 ML: 2.5; .5 SOLUTION RESPIRATORY (INHALATION) at 13:28

## 2022-09-08 NOTE — ASSESSMENT & PLAN NOTE
Patient is much improved compared to yesterday.  She does continue to have some decrease in breath sounds but with only minimal wheeze.  She has been instructed to continue with 60 mg of prednisone daily until she follows up in 2 days.  She will also continue with the DuoNeb nebulization treatments every 4 hours.  If her symptoms does worsen or she becomes hypoxic while at home she is to present to the emergency department for possible admission.

## 2022-09-08 NOTE — ASSESSMENT & PLAN NOTE
Patient states that she has not smoked a cigarette for the previous 6 days.  She states that she has been unable to smoke due to her current pulmonary condition.  Patient is amendable to discontinue her smoking.  We did give her instruction on steps that she can take and have offered medications which she wishes to decline at present time.  If she does have any questions or wishes to start a medication she will contact the office.

## 2022-09-09 ENCOUNTER — OFFICE VISIT (OUTPATIENT)
Dept: FAMILY MEDICINE CLINIC | Facility: CLINIC | Age: 77
End: 2022-09-09

## 2022-09-09 VITALS
OXYGEN SATURATION: 99 % | HEART RATE: 79 BPM | SYSTOLIC BLOOD PRESSURE: 138 MMHG | BODY MASS INDEX: 30.23 KG/M2 | HEIGHT: 60 IN | WEIGHT: 154 LBS | DIASTOLIC BLOOD PRESSURE: 74 MMHG | TEMPERATURE: 97.7 F

## 2022-09-09 DIAGNOSIS — Z72.0 TOBACCO ABUSE: ICD-10-CM

## 2022-09-09 DIAGNOSIS — R35.89 POLYURIA: ICD-10-CM

## 2022-09-09 DIAGNOSIS — J44.1 CHRONIC OBSTRUCTIVE PULMONARY DISEASE WITH ACUTE EXACERBATION: Primary | ICD-10-CM

## 2022-09-09 LAB
BACTERIA SPEC AEROBE CULT: NORMAL
BACTERIA SPEC AEROBE CULT: NORMAL

## 2022-09-09 PROCEDURE — 99213 OFFICE O/P EST LOW 20 MIN: CPT | Performed by: FAMILY MEDICINE

## 2022-09-09 NOTE — PROGRESS NOTES
Follow Up Office Visit      Date: 2022   Patient Name: Jo-Ann Krishnan  : 1945   MRN: [id number]     Chief Complaint:    Chief Complaint   Patient presents with   • Follow-up   • Cough       History of Present Illness: Jo-Ann Krishnan is a 77 y.o. female who is here today for follow-up of her recent clinic appointment for a COPD exacerbation.  Since last being seen she has been taking the the prednisone and the nebulization treatments every 4 hours.  She states she is much improved.  She does continue with her productive cough but has noted no fever or blood in her sputum.  Patient is activity has improved, eating has improved, sleep has not improved as well as her overall mental health.  Patient believes that she can discontinue her smoking as she has not had a cigarette now for 7 days.  Patient also has been doing well with respect to    Subjective      Review of Systems:   Review of Systems   Constitutional: Negative for activity change, appetite change and fatigue.   Respiratory: Positive for cough and shortness of breath.    Cardiovascular: Negative for chest pain, palpitations and leg swelling.   Gastrointestinal: Negative for abdominal pain, constipation, diarrhea, nausea and vomiting.   Genitourinary: Positive for frequency. Negative for dysuria, flank pain and urgency.   Neurological: Positive for weakness. Negative for dizziness and memory problem.       I have reviewed the patients family history, social history, past medical history, past surgical history and have updated it as appropriate.     Medications:     Current Outpatient Medications:   •  acetaminophen (TYLENOL) 500 MG tablet, Take 500 mg by mouth Every 6 (Six) Hours As Needed for Mild Pain ., Disp: , Rfl:   •  albuterol (PROVENTIL) (2.5 MG/3ML) 0.083% nebulizer solution, Take 2.5 mg by nebulization Every 4 (Four) Hours As Needed for wheezing or shortness of air., Disp: , Rfl:   •  albuterol sulfate  (90 Base)  MCG/ACT inhaler, Inhale 2 puffs Every 4 (Four) Hours As Needed for Wheezing., Disp: , Rfl:   •  aspirin 81 MG EC tablet, Take 81 mg by mouth daily., Disp: , Rfl:   •  atorvastatin (LIPITOR) 40 MG tablet, TAKE ONE TABLET BY MOUTH EVERY DAY, Disp: 30 tablet, Rfl: 3  •  busPIRone (BUSPAR) 5 MG tablet, Take 5 mg by mouth 3 (Three) Times a Day., Disp: , Rfl:   •  carvedilol (COREG) 25 MG tablet, Take 25 mg by mouth 2 (two) times a day with meals., Disp: , Rfl:   •  clopidogrel (PLAVIX) 75 MG tablet, Take 1 tablet by mouth Daily., Disp: 30 tablet, Rfl: 6  •  Cyanocobalamin (VITAMIN B-12 PO), Take 2,500 mcg by mouth Daily., Disp: , Rfl:   •  DULoxetine (CYMBALTA) 60 MG capsule, TAKE ONE CAPSULE BY MOUTH EVERY DAY, Disp: 30 capsule, Rfl: 11  •  Ferrous Sulfate 27 MG tablet, Take 1 tablet by mouth Daily., Disp: , Rfl:   •  HYDROcodone-acetaminophen (NORCO) 7.5-325 MG per tablet, Take 1 tablet by mouth 2 (Two) Times a Day As Needed for Moderate Pain., Disp: , Rfl:   •  Insulin Glargine, 1 Unit Dial, (Toujeo SoloStar) 300 UNIT/ML solution pen-injector injection, Inject 10 Units under the skin into the appropriate area as directed Every Night. 10 units nightly, Disp: , Rfl:   •  ipratropium-albuterol (DUO-NEB) 0.5-2.5 mg/3 ml nebulizer, Take 3 mL by nebulization Every 4 (Four) Hours As Needed for Wheezing., Disp: 360 mL, Rfl: 11  •  isosorbide mononitrate (IMDUR) 60 MG 24 hr tablet, TAKE ONE TABLET DAILY, Disp: 30 tablet, Rfl: 12  •  losartan (COZAAR) 50 MG tablet, Take 50 mg by mouth Daily., Disp: , Rfl:   •  multivitamin with minerals tablet tablet, Take 1 tablet by mouth Daily., Disp: , Rfl:   •  naloxone (NARCAN) 4 MG/0.1ML nasal spray, 1 spray into each nostril As Needed., Disp: , Rfl:   •  pantoprazole (PROTONIX) 40 MG EC tablet, Take 40 mg by mouth Daily., Disp: , Rfl:   •  predniSONE (DELTASONE) 20 MG tablet, Take 3 tablets by mouth Daily., Disp: 15 tablet, Rfl: 0  •  SITagliptin (JANUVIA) 50 MG tablet, Take 50 mg by  "mouth Daily., Disp: , Rfl:   •  tolterodine LA (DETROL LA) 4 MG 24 hr capsule, Take 4 mg by mouth Daily., Disp: , Rfl:   •  traMADol (ULTRAM) 50 MG tablet, Take 100 mg by mouth 2 (Two) Times a Day As Needed., Disp: , Rfl:     Current Facility-Administered Medications:   •  ipratropium-albuterol (DUO-NEB) nebulizer solution 3 mL, 3 mL, Nebulization, 4x Daily - RT, Aiden Spencer MD, 3 mL at 09/08/22 1328    Allergies:   Allergies   Allergen Reactions   • Ceclor [Cefaclor] Rash       Immunizations:   Immunization History   Administered Date(s) Administered   • COVID-19 (VINNIE) 03/16/2021   • COVID-19 (UNSPECIFIED) 03/01/2021, 04/01/2021   • Fluzone High Dose =>65 Years ([facility] ONLY) 10/16/2018   • Fluzone Quad >6mos (Multi-dose) 11/15/2016, 02/05/2020   • Pneumococcal Conjugate 13-Valent (PCV13) 07/07/2016        Objective     Physical Exam: Please see above  Vital Signs:   Vitals:    09/09/22 1201   BP: 138/74   Pulse: 79   Temp: 97.7 °F (36.5 °C)   SpO2: 99%   Weight: 69.9 kg (154 lb)   Height: 152.4 cm (60\")   PainSc: 0-No pain     Body mass index is 30.08 kg/m².  We did not discuss her weight loss during today's visit.  We have previously addressed.       Physical Exam  Vitals and nursing note reviewed.   Constitutional:       Appearance: Normal appearance.   HENT:      Head: Normocephalic and atraumatic.      Nose: Nose normal.      Mouth/Throat:      Pharynx: Oropharynx is clear.   Eyes:      Extraocular Movements: Extraocular movements intact.      Pupils: Pupils are equal, round, and reactive to light.   Neck:      Thyroid: No thyroid mass or thyromegaly.      Trachea: Trachea normal.   Cardiovascular:      Rate and Rhythm: Normal rate and regular rhythm.      Pulses: Normal pulses. No decreased pulses.      Heart sounds: Normal heart sounds.   Pulmonary:      Effort: Pulmonary effort is normal.      Breath sounds: Normal breath sounds.   Abdominal:      General: Abdomen is flat. Bowel sounds " are normal.      Palpations: Abdomen is soft.      Tenderness: There is no abdominal tenderness.   Musculoskeletal:      Cervical back: Neck supple.      Right lower leg: No edema.      Left lower leg: No edema.   Lymphadenopathy:      Cervical: No cervical adenopathy.   Skin:     General: Skin is warm and dry.   Neurological:      General: No focal deficit present.      Mental Status: She is alert and oriented to person, place, and time.      Cranial Nerves: Cranial nerves are intact.      Sensory: Sensation is intact.      Motor: Motor function is intact.      Coordination: Coordination is intact.   Psychiatric:         Attention and Perception: Attention normal.         Mood and Affect: Mood normal.         Speech: Speech normal.         Behavior: Behavior normal.         Procedures    Results:   Labs:   Hemoglobin A1C   Date Value Ref Range Status   06/15/2022 7.10 (H) 4.80 - 5.60 % Final        POCT Results (if applicable):   Results for orders placed or performed during the hospital encounter of 09/04/22   Blood Culture - Blood, Arm, Left    Specimen: Arm, Left; Blood   Result Value Ref Range    Blood Culture No growth at 5 days    Blood Culture - Blood, Arm, Right    Specimen: Arm, Right; Blood   Result Value Ref Range    Blood Culture No growth at 5 days    COVID-19 and FLU A/B PCR - Swab, Nasopharynx    Specimen: Nasopharynx; Swab   Result Value Ref Range    COVID19 Not Detected Not Detected - Ref. Range    Influenza A PCR Not Detected Not Detected    Influenza B PCR Not Detected Not Detected   Comprehensive Metabolic Panel    Specimen: Blood   Result Value Ref Range    Glucose 192 (H) 65 - 99 mg/dL    BUN 39 (H) 8 - 23 mg/dL    Creatinine 2.03 (H) 0.57 - 1.00 mg/dL    Sodium 139 136 - 145 mmol/L    Potassium 5.1 3.5 - 5.2 mmol/L    Chloride 105 98 - 107 mmol/L    CO2 24.0 22.0 - 29.0 mmol/L    Calcium 9.8 8.6 - 10.5 mg/dL    Total Protein 8.5 6.0 - 8.5 g/dL    Albumin 4.10 3.50 - 5.20 g/dL    ALT (SGPT) 10  1 - 33 U/L    AST (SGOT) 17 1 - 32 U/L    Alkaline Phosphatase 116 39 - 117 U/L    Total Bilirubin 0.3 0.0 - 1.2 mg/dL    Globulin 4.4 gm/dL    A/G Ratio 0.9 g/dL    BUN/Creatinine Ratio 19.2 7.0 - 25.0    Anion Gap 10.0 5.0 - 15.0 mmol/L    eGFR 24.6 (L) >60.0 mL/min/1.73   BNP    Specimen: Blood   Result Value Ref Range    proBNP 255.1 0.0 - 1,800.0 pg/mL   Troponin    Specimen: Blood   Result Value Ref Range    Troponin T <0.010 0.000 - 0.030 ng/mL   CBC Auto Differential    Specimen: Blood   Result Value Ref Range    WBC 7.43 3.40 - 10.80 10*3/mm3    RBC 3.39 (L) 3.77 - 5.28 10*6/mm3    Hemoglobin 10.0 (L) 12.0 - 15.9 g/dL    Hematocrit 31.2 (L) 34.0 - 46.6 %    MCV 92.0 79.0 - 97.0 fL    MCH 29.5 26.6 - 33.0 pg    MCHC 32.1 31.5 - 35.7 g/dL    RDW 15.1 12.3 - 15.4 %    RDW-SD 51.0 37.0 - 54.0 fl    MPV 10.1 6.0 - 12.0 fL    Platelets 223 140 - 450 10*3/mm3    Neutrophil % 66.3 42.7 - 76.0 %    Lymphocyte % 18.7 (L) 19.6 - 45.3 %    Monocyte % 11.4 5.0 - 12.0 %    Eosinophil % 2.3 0.3 - 6.2 %    Basophil % 0.8 0.0 - 1.5 %    Immature Grans % 0.5 0.0 - 0.5 %    Neutrophils, Absolute 4.92 1.70 - 7.00 10*3/mm3    Lymphocytes, Absolute 1.39 0.70 - 3.10 10*3/mm3    Monocytes, Absolute 0.85 0.10 - 0.90 10*3/mm3    Eosinophils, Absolute 0.17 0.00 - 0.40 10*3/mm3    Basophils, Absolute 0.06 0.00 - 0.20 10*3/mm3    Immature Grans, Absolute 0.04 0.00 - 0.05 10*3/mm3    nRBC 0.0 0.0 - 0.2 /100 WBC   Urinalysis With Culture If Indicated - Urine, Clean Catch    Specimen: Urine, Clean Catch   Result Value Ref Range    Color, UA Yellow Yellow, Straw    Appearance, UA Clear Clear    pH, UA 6.0 5.0 - 8.0    Specific Gravity, UA 1.014 1.001 - 1.030    Glucose, UA Negative Negative    Ketones, UA Negative Negative    Bilirubin, UA Negative Negative    Blood, UA Moderate (2+) (A) Negative    Protein, UA Trace (A) Negative    Leuk Esterase, UA Negative Negative    Nitrite, UA Negative Negative    Urobilinogen, UA 0.2 E.U./dL 0.2 -  1.0 E.U./dL   Lactic Acid, Plasma    Specimen: Blood   Result Value Ref Range    Lactate 1.1 0.5 - 2.0 mmol/L   Procalcitonin    Specimen: Blood   Result Value Ref Range    Procalcitonin 0.04 0.00 - 0.25 ng/mL   Urinalysis, Microscopic Only - Urine, Clean Catch    Specimen: Urine, Clean Catch   Result Value Ref Range    RBC, UA Too Numerous to Count (A) None Seen, 0-2 /HPF    WBC, UA 0-2 None Seen, 0-2 /HPF    Bacteria, UA None Seen None Seen, Trace /HPF    Squamous Epithelial Cells, UA 0-2 None Seen, 0-2 /HPF    Hyaline Casts, UA None Seen 0 - 6 /LPF    Methodology Automated Microscopy    ECG 12 Lead   Result Value Ref Range    QT Interval 354 ms    QTC Interval 415 ms   Green Top (Gel)   Result Value Ref Range    Extra Tube Hold for add-ons.    Lavender Top   Result Value Ref Range    Extra Tube     Gold Top - SST   Result Value Ref Range    Extra Tube Hold for add-ons.    Gray Top   Result Value Ref Range    Extra Tube Hold for add-ons.    Light Blue Top   Result Value Ref Range    Extra Tube Hold for add-ons.        Imaging:   No valid procedures specified.     Measures:   Advanced Care Planning:   Given last visit.    Smoking Cessation:   Patient has been smoke-free for 7 days.  We have continue to encourage her to remain off cigarettes.    Assessment / Plan      Assessment/Plan:   Diagnoses and all orders for this visit:    1. Chronic obstructive pulmonary disease with acute exacerbation (HCC) (Primary)   Patient is doing much better at present time with respect to her COPD exacerbation.  Patient is no longer wheezing and states her cough is greatly improved.  We have again again encouraged her to continue with her prednisone taper and using the nebulization treatments on a 6-hour basis.  If she does have any worsening symptoms before we follow-up in the next couple weeks she needs to contact us or go to the emergency department for further evaluation and treatment.    2. Tobacco abuse   I am greatly pleased  that she has been smoke-free for the past 7 days.  We again have encouraged her to remain off the cigarettes and if she chooses to try any other treatment options we will pursue it at that time.    3. Polyuria   Patient has had an increase in polyuria most likely due to the prednisone.  We will monitor her symptoms and if her urination does not change she will contact us.    Follow Up:   Return in about 3 weeks (around 9/30/2022) for Recheck.      At [facility], we believe that sharing information builds trust and better relationships. You are receiving this note because you recently visited [facility]. It is possible you will see health information before a provider has talked with you about it. This kind of information can be easy to misunderstand. To help you fully understand what it means for your health, we urge you to discuss this note with your provider.    Aiden Spencer MD  [facility]

## 2022-09-13 LAB
QT INTERVAL: 354 MS
QTC INTERVAL: 415 MS

## 2022-09-16 ENCOUNTER — TELEPHONE (OUTPATIENT)
Dept: FAMILY MEDICINE CLINIC | Facility: CLINIC | Age: 77
End: 2022-09-16

## 2022-09-16 NOTE — TELEPHONE ENCOUNTER
PATIENT HAVING RUNNY NOSE.  NO OTHER SYMPTOMS.  IS THERE A MEDICATION SHE CAN TAKE?      PLEASE CALL [phone]

## 2022-09-16 NOTE — TELEPHONE ENCOUNTER
Patient may take Flonase to help with allergy symptoms.  If it is not due to allergies and she wishes to try Astelin that product is over-the-counter now.  Contact us with fever or other worsening symptoms.

## 2022-09-24 RX ORDER — ATORVASTATIN CALCIUM 40 MG/1
TABLET, FILM COATED ORAL
Qty: 30 TABLET | Refills: 3 | Status: SHIPPED | OUTPATIENT
Start: 2022-09-24

## 2022-09-30 ENCOUNTER — OFFICE VISIT (OUTPATIENT)
Dept: FAMILY MEDICINE CLINIC | Facility: CLINIC | Age: 77
End: 2022-09-30

## 2022-09-30 VITALS
OXYGEN SATURATION: 99 % | DIASTOLIC BLOOD PRESSURE: 62 MMHG | HEIGHT: 60 IN | SYSTOLIC BLOOD PRESSURE: 132 MMHG | WEIGHT: 155 LBS | BODY MASS INDEX: 30.43 KG/M2 | HEART RATE: 87 BPM | TEMPERATURE: 97.2 F

## 2022-09-30 DIAGNOSIS — E11.649 TYPE 2 DIABETES MELLITUS WITH HYPOGLYCEMIA WITHOUT COMA, WITH LONG-TERM CURRENT USE OF INSULIN: ICD-10-CM

## 2022-09-30 DIAGNOSIS — E11.9 TYPE 2 DIABETES MELLITUS WITHOUT COMPLICATION, WITH LONG-TERM CURRENT USE OF INSULIN: Primary | ICD-10-CM

## 2022-09-30 DIAGNOSIS — Z79.4 TYPE 2 DIABETES MELLITUS WITHOUT COMPLICATION, WITH LONG-TERM CURRENT USE OF INSULIN: Primary | ICD-10-CM

## 2022-09-30 DIAGNOSIS — E11.65 TYPE 2 DIABETES MELLITUS WITH HYPERGLYCEMIA, WITH LONG-TERM CURRENT USE OF INSULIN: ICD-10-CM

## 2022-09-30 DIAGNOSIS — Z79.4 TYPE 2 DIABETES MELLITUS WITH HYPERGLYCEMIA, WITH LONG-TERM CURRENT USE OF INSULIN: ICD-10-CM

## 2022-09-30 DIAGNOSIS — Z79.4 TYPE 2 DIABETES MELLITUS WITH HYPOGLYCEMIA WITHOUT COMA, WITH LONG-TERM CURRENT USE OF INSULIN: ICD-10-CM

## 2022-09-30 PROCEDURE — 99214 OFFICE O/P EST MOD 30 MIN: CPT | Performed by: FAMILY MEDICINE

## 2022-09-30 RX ORDER — PROCHLORPERAZINE 25 MG/1
SUPPOSITORY RECTAL
Qty: 2 EACH | Refills: 11 | Status: SHIPPED | OUTPATIENT
Start: 2022-09-30

## 2022-09-30 RX ORDER — FLUTICASONE PROPIONATE 50 MCG
2 SPRAY, SUSPENSION (ML) NASAL DAILY
COMMUNITY
Start: 2022-09-24 | End: 2023-02-16

## 2022-09-30 RX ORDER — ACETAMINOPHEN 500 MG
1000 TABLET ORAL
COMMUNITY
End: 2022-09-30 | Stop reason: SDUPTHER

## 2022-09-30 RX ORDER — BUSPIRONE HYDROCHLORIDE 5 MG/1
TABLET ORAL
COMMUNITY
End: 2022-09-30 | Stop reason: SDUPTHER

## 2022-09-30 RX ORDER — PROCHLORPERAZINE 25 MG/1
1 SUPPOSITORY RECTAL DAILY
Qty: 1 EACH | Refills: 11 | Status: SHIPPED | OUTPATIENT
Start: 2022-09-30

## 2022-09-30 RX ORDER — ASPIRIN 81 MG/1
81 TABLET, CHEWABLE ORAL
COMMUNITY
End: 2022-09-30 | Stop reason: SDUPTHER

## 2022-09-30 RX ORDER — DULOXETIN HYDROCHLORIDE 60 MG/1
CAPSULE, DELAYED RELEASE ORAL
COMMUNITY
End: 2022-09-30 | Stop reason: SDUPTHER

## 2022-09-30 NOTE — PROGRESS NOTES
Follow Up Office Visit      Date: 2022   Patient Name: Jo-Ann Krishnan  : 1945   MRN: [id number]     Chief Complaint:    Chief Complaint   Patient presents with   • Follow-up     ED visit yesterday, blood sugar elevated       History of Present Illness: Jo-Ann Krishnan is a 77 y.o. female who is here today for follow-up of her recent emergency room visit yesterday.  Patient was in her usual state of health until yesterday when she drank a large quantity of lemonade that has been sweetened with sugar.  Apparently she became sluggish and disoriented and had a fingerstick blood sugar that read as high.  Patient was given subcu insulin by her sister and they immediately presented to the emergency department where it was noted that her blood sugar was in the 200 range.  It was noted that she was dehydrated and she was given little bit of IV fluids.  Since that time patient has been doing better.  She remains weak but does not have the confusion that she had in the past.  She does admit that she drinks fruit juices on a routine basis and has increased sugars afterwards.  She is also having some episodes of hypoglycemia when she uses extra insulin to accommodate her dietary indiscretions.  She will have problems with her memory when her blood sugars are elevated.  She has noted that her urine output as well as oral intake is changed by the foods that she eats.  No    No other problems been no at present time.  She denies chest pain, shortness of breath, PND orthopnea.  She denies any change in bowel bladder habits including melena, hematemesis or hematochezia.  She has been eating relatively well for her and has been sleeping well for her.    Subjective      Review of Systems:   Review of Systems    I have reviewed the patients family history, social history, past medical history, past surgical history and have updated it as appropriate.     Medications:     Current Outpatient Medications:   •   acetaminophen (TYLENOL) 500 MG tablet, Take 500 mg by mouth Every 6 (Six) Hours As Needed for Mild Pain ., Disp: , Rfl:   •  albuterol (PROVENTIL) (2.5 MG/3ML) 0.083% nebulizer solution, Take 2.5 mg by nebulization Every 4 (Four) Hours As Needed for wheezing or shortness of air., Disp: , Rfl:   •  albuterol sulfate  (90 Base) MCG/ACT inhaler, Inhale 2 puffs Every 4 (Four) Hours As Needed for Wheezing., Disp: , Rfl:   •  aspirin 81 MG EC tablet, Take 81 mg by mouth daily., Disp: , Rfl:   •  atorvastatin (LIPITOR) 40 MG tablet, TAKE ONE TABLET BY MOUTH EVERY DAY, Disp: 30 tablet, Rfl: 3  •  busPIRone (BUSPAR) 5 MG tablet, Take 5 mg by mouth 3 (Three) Times a Day., Disp: , Rfl:   •  carvedilol (COREG) 25 MG tablet, Take 25 mg by mouth 2 (two) times a day with meals., Disp: , Rfl:   •  clopidogrel (PLAVIX) 75 MG tablet, Take 1 tablet by mouth Daily., Disp: 30 tablet, Rfl: 6  •  Cyanocobalamin (VITAMIN B-12 PO), Take 2,500 mcg by mouth Daily., Disp: , Rfl:   •  DULoxetine (CYMBALTA) 60 MG capsule, TAKE ONE CAPSULE BY MOUTH EVERY DAY, Disp: 30 capsule, Rfl: 11  •  Ferrous Sulfate 27 MG tablet, Take 1 tablet by mouth Daily., Disp: , Rfl:   •  fluticasone (FLONASE) 50 MCG/ACT nasal spray, 2 sprays by Each Nare route Daily., Disp: , Rfl:   •  HYDROcodone-acetaminophen (NORCO) 7.5-325 MG per tablet, Take 1 tablet by mouth 2 (Two) Times a Day As Needed for Moderate Pain., Disp: , Rfl:   •  Insulin Glargine, 1 Unit Dial, (Toujeo SoloStar) 300 UNIT/ML solution pen-injector injection, Inject 10 Units under the skin into the appropriate area as directed Every Night. 10 units nightly, Disp: , Rfl:   •  isosorbide mononitrate (IMDUR) 60 MG 24 hr tablet, TAKE ONE TABLET DAILY, Disp: 30 tablet, Rfl: 12  •  losartan (COZAAR) 50 MG tablet, Take 50 mg by mouth Daily., Disp: , Rfl:   •  naloxone (NARCAN) 4 MG/0.1ML nasal spray, 1 spray into each nostril As Needed., Disp: , Rfl:   •  pantoprazole (PROTONIX) 40 MG EC tablet, Take 40  "mg by mouth Daily., Disp: , Rfl:   •  SITagliptin (JANUVIA) 50 MG tablet, Take 50 mg by mouth Daily., Disp: , Rfl:   •  traMADol (ULTRAM) 50 MG tablet, Take 100 mg by mouth 2 (Two) Times a Day As Needed., Disp: , Rfl:   •  insulin aspart (novoLOG) 100 UNIT/ML injection, Inject 10 Units under the skin into the appropriate area as directed 3 (Three) Times a Day Before Meals., Disp: 15 mL, Rfl: 12  •  multivitamin with minerals tablet tablet, Take 1 tablet by mouth Daily., Disp: , Rfl:     Current Facility-Administered Medications:   •  ipratropium-albuterol (DUO-NEB) nebulizer solution 3 mL, 3 mL, Nebulization, 4x Daily - RT, Aiden Spencer MD, 3 mL at 09/08/22 1328    Allergies:   Allergies   Allergen Reactions   • Ceclor [Cefaclor] Rash       Immunizations:   Immunization History   Administered Date(s) Administered   • COVID-19 (VINNIE) 03/16/2021   • COVID-19 (UNSPECIFIED) 03/01/2021, 04/01/2021   • Fluzone High Dose =>65 Years ([facility] ONLY) 10/16/2018   • Fluzone Quad >6mos (Multi-dose) 11/15/2016, 02/05/2020   • Pneumococcal Conjugate 13-Valent (PCV13) 07/07/2016        Objective     Physical Exam: Please see above  Vital Signs:   Vitals:    09/30/22 1112   BP: 132/62   Pulse: 87   Temp: 97.2 °F (36.2 °C)   SpO2: 99%   Weight: 70.3 kg (155 lb)   Height: 152.4 cm (60\")     Body mass index is 30.27 kg/m².  BMI is >= 30 and <35. (Class 1 Obesity). The following options were offered after discussion;: exercise counseling/recommendations and nutrition counseling/recommendations       Physical Exam    Procedures    Results:   Labs:   Hemoglobin A1C   Date Value Ref Range Status   06/15/2022 7.10 (H) 4.80 - 5.60 % Final        POCT Results (if applicable):   Results for orders placed or performed during the hospital encounter of 09/04/22   Blood Culture - Blood, Arm, Left    Specimen: Arm, Left; Blood   Result Value Ref Range    Blood Culture No growth at 5 days    Blood Culture - Blood, Arm, Right    " Specimen: Arm, Right; Blood   Result Value Ref Range    Blood Culture No growth at 5 days    COVID-19 and FLU A/B PCR - Swab, Nasopharynx    Specimen: Nasopharynx; Swab   Result Value Ref Range    COVID19 Not Detected Not Detected - Ref. Range    Influenza A PCR Not Detected Not Detected    Influenza B PCR Not Detected Not Detected   Comprehensive Metabolic Panel    Specimen: Blood   Result Value Ref Range    Glucose 192 (H) 65 - 99 mg/dL    BUN 39 (H) 8 - 23 mg/dL    Creatinine 2.03 (H) 0.57 - 1.00 mg/dL    Sodium 139 136 - 145 mmol/L    Potassium 5.1 3.5 - 5.2 mmol/L    Chloride 105 98 - 107 mmol/L    CO2 24.0 22.0 - 29.0 mmol/L    Calcium 9.8 8.6 - 10.5 mg/dL    Total Protein 8.5 6.0 - 8.5 g/dL    Albumin 4.10 3.50 - 5.20 g/dL    ALT (SGPT) 10 1 - 33 U/L    AST (SGOT) 17 1 - 32 U/L    Alkaline Phosphatase 116 39 - 117 U/L    Total Bilirubin 0.3 0.0 - 1.2 mg/dL    Globulin 4.4 gm/dL    A/G Ratio 0.9 g/dL    BUN/Creatinine Ratio 19.2 7.0 - 25.0    Anion Gap 10.0 5.0 - 15.0 mmol/L    eGFR 24.6 (L) >60.0 mL/min/1.73   BNP    Specimen: Blood   Result Value Ref Range    proBNP 255.1 0.0 - 1,800.0 pg/mL   Troponin    Specimen: Blood   Result Value Ref Range    Troponin T <0.010 0.000 - 0.030 ng/mL   CBC Auto Differential    Specimen: Blood   Result Value Ref Range    WBC 7.43 3.40 - 10.80 10*3/mm3    RBC 3.39 (L) 3.77 - 5.28 10*6/mm3    Hemoglobin 10.0 (L) 12.0 - 15.9 g/dL    Hematocrit 31.2 (L) 34.0 - 46.6 %    MCV 92.0 79.0 - 97.0 fL    MCH 29.5 26.6 - 33.0 pg    MCHC 32.1 31.5 - 35.7 g/dL    RDW 15.1 12.3 - 15.4 %    RDW-SD 51.0 37.0 - 54.0 fl    MPV 10.1 6.0 - 12.0 fL    Platelets 223 140 - 450 10*3/mm3    Neutrophil % 66.3 42.7 - 76.0 %    Lymphocyte % 18.7 (L) 19.6 - 45.3 %    Monocyte % 11.4 5.0 - 12.0 %    Eosinophil % 2.3 0.3 - 6.2 %    Basophil % 0.8 0.0 - 1.5 %    Immature Grans % 0.5 0.0 - 0.5 %    Neutrophils, Absolute 4.92 1.70 - 7.00 10*3/mm3    Lymphocytes, Absolute 1.39 0.70 - 3.10 10*3/mm3     Monocytes, Absolute 0.85 0.10 - 0.90 10*3/mm3    Eosinophils, Absolute 0.17 0.00 - 0.40 10*3/mm3    Basophils, Absolute 0.06 0.00 - 0.20 10*3/mm3    Immature Grans, Absolute 0.04 0.00 - 0.05 10*3/mm3    nRBC 0.0 0.0 - 0.2 /100 WBC   Urinalysis With Culture If Indicated - Urine, Clean Catch    Specimen: Urine, Clean Catch   Result Value Ref Range    Color, UA Yellow Yellow, Straw    Appearance, UA Clear Clear    pH, UA 6.0 5.0 - 8.0    Specific Gravity, UA 1.014 1.001 - 1.030    Glucose, UA Negative Negative    Ketones, UA Negative Negative    Bilirubin, UA Negative Negative    Blood, UA Moderate (2+) (A) Negative    Protein, UA Trace (A) Negative    Leuk Esterase, UA Negative Negative    Nitrite, UA Negative Negative    Urobilinogen, UA 0.2 E.U./dL 0.2 - 1.0 E.U./dL   Lactic Acid, Plasma    Specimen: Blood   Result Value Ref Range    Lactate 1.1 0.5 - 2.0 mmol/L   Procalcitonin    Specimen: Blood   Result Value Ref Range    Procalcitonin 0.04 0.00 - 0.25 ng/mL   Urinalysis, Microscopic Only - Urine, Clean Catch    Specimen: Urine, Clean Catch   Result Value Ref Range    RBC, UA Too Numerous to Count (A) None Seen, 0-2 /HPF    WBC, UA 0-2 None Seen, 0-2 /HPF    Bacteria, UA None Seen None Seen, Trace /HPF    Squamous Epithelial Cells, UA 0-2 None Seen, 0-2 /HPF    Hyaline Casts, UA None Seen 0 - 6 /LPF    Methodology Automated Microscopy    ECG 12 Lead   Result Value Ref Range    QT Interval 354 ms    QTC Interval 415 ms   Green Top (Gel)   Result Value Ref Range    Extra Tube Hold for add-ons.    Lavender Top   Result Value Ref Range    Extra Tube     Gold Top - SST   Result Value Ref Range    Extra Tube Hold for add-ons.    Gray Top   Result Value Ref Range    Extra Tube Hold for add-ons.    Light Blue Top   Result Value Ref Range    Extra Tube Hold for add-ons.        Imaging:   No valid procedures specified.     Measures:   Advanced Care Planning:   Patient does not have an advance directive, information  provided.    Smoking Cessation:   less than 3 minutes spent counseling Has reduced Tobbacos use    Assessment / Plan      Assessment/Plan:   Diagnoses and all orders for this visit:    1. Type 2 diabetes mellitus without complication, with long-term current use of insulin ([facility]) (Primary)   Patient does have some dietary indiscretions with respect to controlling her blood sugars.  She is still continuing to take her basal insulin at night as well as 3 doses of short acting during the day pending on her oral intake.  Her sister has had to use sliding scale to supplement when she does eat or drink foods that contain large amount of carbohydrates.  She was to check her blood sugars 4 times a day but is having difficulty with this because of the discomfort.  Patient will have close monitoring of her blood sugars.  With her continued highs and lows requiring intervention (emergency room visits) we will make arrangements for her to have a continuous glucose monitor.    2. Type 2 diabetes mellitus with hypoglycemia without coma, with long-term current use of insulin ([facility])   We again have encouraged her to be cautious with her insulin dosing.  It appears that due to her inconsistent diet that she may have problems with hypoglycemia that is going undetected.  We will make arrangements for a continuous glucose monitor and we will see what this reveals.    3. Type 2 diabetes mellitus with hyperglycemia, with long-term current use of insulin ([facility])   Patient was instructed to no longer partake of any fruit juices.  This increases her blood sugars which increases the blood viscosity leading up to not only neurologic complications but other possibility is with renal and cardiovascular events.  We have encouraged her to drink water and to monitor her blood sugars and to avoid things that will increase her blood sugars.  She is to check her blood sugars 3-4 times a day and the use of the continuous glucose monitor will greatly  benefit her as she will be more attuned to what is making her sugars become elevated.  Other orders  -     insulin aspart (novoLOG) 100 UNIT/ML injection; Inject 10 Units under the skin into the appropriate area as directed 3 (Three) Times a Day Before Meals.  Dispense: 15 mL; Refill: 12        Follow Up:   Return in about 4 weeks (around 10/28/2022) for Recheck.      At [facility], we believe that sharing information builds trust and better relationships. You are receiving this note because you recently visited [facility]. It is possible you will see health information before a provider has talked with you about it. This kind of information can be easy to misunderstand. To help you fully understand what it means for your health, we urge you to discuss this note with your provider.    Aiden Spencer MD  [facility]

## 2022-10-12 ENCOUNTER — TELEPHONE (OUTPATIENT)
Dept: FAMILY MEDICINE CLINIC | Facility: CLINIC | Age: 77
End: 2022-10-12

## 2022-10-12 NOTE — TELEPHONE ENCOUNTER
Patient's sister dropped by the office requesting clarification on dosing of Novolog and Toujeo.    Patient is taking 10 units of toujeo at bedtime.    Novolog per sliding scale 2 times daily    150-200 = 2units  201-250 = 4units  251-300= 6units  301-350 = 8units  351-400 = 10 units  > 401 = 12 units    Checking blood sugar 3-4 times daily    Reports blood sugar has been running 300-500    Last night blood sugar was 531  Gave 12 units of Novolog    This morning 331  Gave 12 units of novolog    Gives novolog 2 times daily    Patient's sister just wants to know what to do?    Is she giving accurately?    Any other instructions?    Concerned about blood sugar readings

## 2022-10-14 DIAGNOSIS — I77.1 CELIAC ARTERY STENOSIS: Primary | ICD-10-CM

## 2022-10-31 ENCOUNTER — OFFICE VISIT (OUTPATIENT)
Dept: FAMILY MEDICINE CLINIC | Facility: CLINIC | Age: 77
End: 2022-10-31

## 2022-10-31 VITALS
SYSTOLIC BLOOD PRESSURE: 132 MMHG | TEMPERATURE: 98 F | BODY MASS INDEX: 30.63 KG/M2 | RESPIRATION RATE: 16 BRPM | HEIGHT: 60 IN | WEIGHT: 156 LBS | HEART RATE: 75 BPM | OXYGEN SATURATION: 97 % | DIASTOLIC BLOOD PRESSURE: 70 MMHG

## 2022-10-31 DIAGNOSIS — E78.2 MIXED HYPERLIPIDEMIA: ICD-10-CM

## 2022-10-31 DIAGNOSIS — E53.8 B12 DEFICIENCY: ICD-10-CM

## 2022-10-31 DIAGNOSIS — E11.649 TYPE 2 DIABETES MELLITUS WITH HYPOGLYCEMIA WITHOUT COMA, WITH LONG-TERM CURRENT USE OF INSULIN: Primary | ICD-10-CM

## 2022-10-31 DIAGNOSIS — I10 PRIMARY HYPERTENSION: ICD-10-CM

## 2022-10-31 DIAGNOSIS — D50.9 IRON DEFICIENCY ANEMIA, UNSPECIFIED IRON DEFICIENCY ANEMIA TYPE: ICD-10-CM

## 2022-10-31 DIAGNOSIS — I77.1 CELIAC ARTERY STENOSIS: ICD-10-CM

## 2022-10-31 DIAGNOSIS — Z23 NEED FOR INFLUENZA VACCINATION: ICD-10-CM

## 2022-10-31 DIAGNOSIS — Z79.4 TYPE 2 DIABETES MELLITUS WITH HYPOGLYCEMIA WITHOUT COMA, WITH LONG-TERM CURRENT USE OF INSULIN: Primary | ICD-10-CM

## 2022-10-31 DIAGNOSIS — M81.0 SENILE OSTEOPOROSIS: ICD-10-CM

## 2022-10-31 PROCEDURE — 90662 IIV NO PRSV INCREASED AG IM: CPT | Performed by: FAMILY MEDICINE

## 2022-10-31 PROCEDURE — 90732 PPSV23 VACC 2 YRS+ SUBQ/IM: CPT | Performed by: FAMILY MEDICINE

## 2022-10-31 PROCEDURE — 99214 OFFICE O/P EST MOD 30 MIN: CPT | Performed by: FAMILY MEDICINE

## 2022-10-31 PROCEDURE — G0009 ADMIN PNEUMOCOCCAL VACCINE: HCPCS | Performed by: FAMILY MEDICINE

## 2022-10-31 PROCEDURE — G0008 ADMIN INFLUENZA VIRUS VAC: HCPCS | Performed by: FAMILY MEDICINE

## 2022-10-31 RX ORDER — LANCETS 33 GAUGE
EACH MISCELLANEOUS
COMMUNITY
Start: 2022-10-14

## 2022-10-31 NOTE — PATIENT INSTRUCTIONS
Patient is to take 10 units of Toujeo twice daily.  She is to monitor her blood sugars and increase the Toujeo 1 unit every 3 days until her fasting blood sugar is less than 120.  She is to continue with her NovoLog 10 units before each meal and sliding scale as necessary.

## 2022-10-31 NOTE — PROGRESS NOTES
Follow Up Office Visit      Date: 10/31/2022   Patient Name: Jo-Ann Krishnan  : 1945   MRN: [id number]     Chief Complaint:    Chief Complaint   Patient presents with   • Hyperlipidemia     3 month check up   • Diabetes       History of Present Illness: Jo-Ann Krishnan is a 77 y.o. female who is here today for reevaluation of her diabetes.  Patient has been not following her diet as she should in the past with consumption of carbohydrates.  Her blood sugars will fluctuate from the 100 range to 340 pending on what she is eating.  She is taking her Toujeo in the evening as well as her NovoLog during the day but more on a sliding scale.  She has not had any episodes that she believes would be hypoglycemic in nature.  Patient does not follow a diet routine.  She does eat large amounts of carbs and will occasionally only eat 1 meal a day.  She denies any urinary tract symptomatology including polyuria/polydipsia.  She has not had change in bowel or bladder habits that would include melena, hematochezia or hematemesis.  She does occasionally have headaches but does not have any other neurologic complaints associated with it.  She denies chest pain, shortness of breath, PND, orthopnea, pedal edema or palpitations.  She has been taking her medications as prescribed.    Subjective      Review of Systems:   Review of Systems   Constitutional: Negative for activity change, appetite change and fatigue.   Respiratory: Negative for cough and shortness of breath.    Cardiovascular: Negative for chest pain, palpitations and leg swelling.   Gastrointestinal: Negative for abdominal pain, constipation, diarrhea, nausea and vomiting.   Genitourinary: Negative for dysuria, flank pain, frequency and urgency.   Neurological: Negative for dizziness, weakness and memory problem.       I have reviewed the patients family history, social history, past medical history, past surgical history and have updated it as appropriate.      Medications:     Current Outpatient Medications:   •  acetaminophen (TYLENOL) 500 MG tablet, Take 500 mg by mouth Every 6 (Six) Hours As Needed for Mild Pain ., Disp: , Rfl:   •  albuterol (PROVENTIL) (2.5 MG/3ML) 0.083% nebulizer solution, Take 2.5 mg by nebulization Every 4 (Four) Hours As Needed for wheezing or shortness of air., Disp: , Rfl:   •  albuterol sulfate  (90 Base) MCG/ACT inhaler, Inhale 2 puffs Every 4 (Four) Hours As Needed for Wheezing., Disp: , Rfl:   •  aspirin 81 MG EC tablet, Take 81 mg by mouth daily., Disp: , Rfl:   •  atorvastatin (LIPITOR) 40 MG tablet, TAKE ONE TABLET BY MOUTH EVERY DAY, Disp: 30 tablet, Rfl: 3  •  busPIRone (BUSPAR) 5 MG tablet, Take 5 mg by mouth 3 (Three) Times a Day., Disp: , Rfl:   •  carvedilol (COREG) 25 MG tablet, Take 25 mg by mouth 2 (two) times a day with meals., Disp: , Rfl:   •  clopidogrel (PLAVIX) 75 MG tablet, Take 1 tablet by mouth Daily., Disp: 30 tablet, Rfl: 6  •  Comfort EZ Pen Needles 32G X 4 MM misc, , Disp: , Rfl:   •  Continuous Blood Gluc  (Dexcom G6 ) device, 1 each Daily., Disp: 1 each, Rfl: 11  •  Continuous Blood Gluc Sensor (Dexcom G6 Sensor), Every 10 (Ten) Days., Disp: 2 each, Rfl: 11  •  Cyanocobalamin (VITAMIN B-12 PO), Take 2,500 mcg by mouth Daily., Disp: , Rfl:   •  Diclofenac Sodium (VOLTAREN) 1 % gel gel, APPLY TO AFFECTED AREA FOUR TIMES DAILY, Disp: , Rfl:   •  DULoxetine (CYMBALTA) 60 MG capsule, TAKE ONE CAPSULE BY MOUTH EVERY DAY, Disp: 30 capsule, Rfl: 11  •  Ferrous Sulfate 27 MG tablet, Take 1 tablet by mouth Daily., Disp: , Rfl:   •  fluticasone (FLONASE) 50 MCG/ACT nasal spray, 2 sprays by Each Nare route Daily., Disp: , Rfl:   •  insulin aspart (novoLOG) 100 UNIT/ML injection, Inject 10 Units under the skin into the appropriate area as directed 3 (Three) Times a Day Before Meals., Disp: 15 mL, Rfl: 12  •  Insulin Glargine, 1 Unit Dial, (Toujeo SoloStar) 300 UNIT/ML solution pen-injector  "injection, Inject 12 Units under the skin into the appropriate area as directed 2 (Two) Times a Day. 10 units nightly, Disp: , Rfl:   •  isosorbide mononitrate (IMDUR) 60 MG 24 hr tablet, TAKE ONE TABLET DAILY, Disp: 30 tablet, Rfl: 12  •  losartan (COZAAR) 50 MG tablet, Take 50 mg by mouth Daily., Disp: , Rfl:   •  multivitamin with minerals tablet tablet, Take 1 tablet by mouth Daily., Disp: , Rfl:   •  naloxone (NARCAN) 4 MG/0.1ML nasal spray, 1 spray into each nostril As Needed., Disp: , Rfl:   •  pantoprazole (PROTONIX) 40 MG EC tablet, Take 40 mg by mouth Daily., Disp: , Rfl:   •  SITagliptin (JANUVIA) 50 MG tablet, Take 50 mg by mouth Daily., Disp: , Rfl:   •  traMADol (ULTRAM) 50 MG tablet, Take 100 mg by mouth 2 (Two) Times a Day As Needed., Disp: , Rfl:     Current Facility-Administered Medications:   •  ipratropium-albuterol (DUO-NEB) nebulizer solution 3 mL, 3 mL, Nebulization, 4x Daily - RT, Aiden Spencer MD, 3 mL at 09/08/22 1328    Allergies:   Allergies   Allergen Reactions   • Ceclor [Cefaclor] Rash       Immunizations:   Immunization History   Administered Date(s) Administered   • COVID-19 (VINNIE) 03/16/2021   • COVID-19 (UNSPECIFIED) 03/01/2021, 04/01/2021   • Fluzone High Dose =>65 Years ([facility] ONLY) 10/16/2018   • Fluzone High-Dose 65+yrs 10/31/2022   • Fluzone Quad >6mos (Multi-dose) 11/15/2016, 02/05/2020   • Pneumococcal Conjugate 13-Valent (PCV13) 07/07/2016   • Pneumococcal Polysaccharide (PPSV23) 10/31/2022        Objective     Physical Exam: Please see above  Vital Signs:   Vitals:    10/31/22 1546   BP: 132/70   BP Location: Left arm   Patient Position: Sitting   Cuff Size: Adult   Pulse: 75   Resp: 16   Temp: 98 °F (36.7 °C)   SpO2: 97%   Weight: 70.8 kg (156 lb)   Height: 152.4 cm (60\")     Body mass index is 30.47 kg/m².  BMI is >= 30 and <35. (Class 1 Obesity). The following options were offered after discussion;: exercise counseling/recommendations and nutrition " counseling/recommendations       Physical Exam  Vitals and nursing note reviewed.   Constitutional:       Appearance: Normal appearance.   HENT:      Head: Normocephalic and atraumatic.      Nose: Nose normal.      Mouth/Throat:      Pharynx: Oropharynx is clear.   Eyes:      Extraocular Movements: Extraocular movements intact.      Pupils: Pupils are equal, round, and reactive to light.   Neck:      Thyroid: No thyroid mass or thyromegaly.      Trachea: Trachea normal.   Cardiovascular:      Rate and Rhythm: Normal rate and regular rhythm.      Pulses: Normal pulses. No decreased pulses.      Heart sounds: Normal heart sounds.   Pulmonary:      Effort: Pulmonary effort is normal.      Breath sounds: Normal breath sounds.   Abdominal:      General: Abdomen is flat. Bowel sounds are normal.      Palpations: Abdomen is soft.      Tenderness: There is no abdominal tenderness.   Musculoskeletal:      Cervical back: Neck supple.      Right lower leg: No edema.      Left lower leg: No edema.   Lymphadenopathy:      Cervical: No cervical adenopathy.   Skin:     General: Skin is warm and dry.   Neurological:      General: No focal deficit present.      Mental Status: She is alert and oriented to person, place, and time.      Cranial Nerves: Cranial nerves are intact.      Sensory: Sensation is intact.      Motor: Motor function is intact.      Coordination: Coordination is intact.   Psychiatric:         Attention and Perception: Attention normal.         Mood and Affect: Mood normal.         Speech: Speech normal.         Behavior: Behavior normal.         Procedures    Results:   Labs:   Hemoglobin A1C   Date Value Ref Range Status   06/15/2022 7.10 (H) 4.80 - 5.60 % Final        POCT Results (if applicable):   Results for orders placed or performed during the hospital encounter of 09/04/22   Blood Culture - Blood, Arm, Left    Specimen: Arm, Left; Blood   Result Value Ref Range    Blood Culture No growth at 5 days     Blood Culture - Blood, Arm, Right    Specimen: Arm, Right; Blood   Result Value Ref Range    Blood Culture No growth at 5 days    COVID-19 and FLU A/B PCR - Swab, Nasopharynx    Specimen: Nasopharynx; Swab   Result Value Ref Range    COVID19 Not Detected Not Detected - Ref. Range    Influenza A PCR Not Detected Not Detected    Influenza B PCR Not Detected Not Detected   Comprehensive Metabolic Panel    Specimen: Blood   Result Value Ref Range    Glucose 192 (H) 65 - 99 mg/dL    BUN 39 (H) 8 - 23 mg/dL    Creatinine 2.03 (H) 0.57 - 1.00 mg/dL    Sodium 139 136 - 145 mmol/L    Potassium 5.1 3.5 - 5.2 mmol/L    Chloride 105 98 - 107 mmol/L    CO2 24.0 22.0 - 29.0 mmol/L    Calcium 9.8 8.6 - 10.5 mg/dL    Total Protein 8.5 6.0 - 8.5 g/dL    Albumin 4.10 3.50 - 5.20 g/dL    ALT (SGPT) 10 1 - 33 U/L    AST (SGOT) 17 1 - 32 U/L    Alkaline Phosphatase 116 39 - 117 U/L    Total Bilirubin 0.3 0.0 - 1.2 mg/dL    Globulin 4.4 gm/dL    A/G Ratio 0.9 g/dL    BUN/Creatinine Ratio 19.2 7.0 - 25.0    Anion Gap 10.0 5.0 - 15.0 mmol/L    eGFR 24.6 (L) >60.0 mL/min/1.73   BNP    Specimen: Blood   Result Value Ref Range    proBNP 255.1 0.0 - 1,800.0 pg/mL   Troponin    Specimen: Blood   Result Value Ref Range    Troponin T <0.010 0.000 - 0.030 ng/mL   CBC Auto Differential    Specimen: Blood   Result Value Ref Range    WBC 7.43 3.40 - 10.80 10*3/mm3    RBC 3.39 (L) 3.77 - 5.28 10*6/mm3    Hemoglobin 10.0 (L) 12.0 - 15.9 g/dL    Hematocrit 31.2 (L) 34.0 - 46.6 %    MCV 92.0 79.0 - 97.0 fL    MCH 29.5 26.6 - 33.0 pg    MCHC 32.1 31.5 - 35.7 g/dL    RDW 15.1 12.3 - 15.4 %    RDW-SD 51.0 37.0 - 54.0 fl    MPV 10.1 6.0 - 12.0 fL    Platelets 223 140 - 450 10*3/mm3    Neutrophil % 66.3 42.7 - 76.0 %    Lymphocyte % 18.7 (L) 19.6 - 45.3 %    Monocyte % 11.4 5.0 - 12.0 %    Eosinophil % 2.3 0.3 - 6.2 %    Basophil % 0.8 0.0 - 1.5 %    Immature Grans % 0.5 0.0 - 0.5 %    Neutrophils, Absolute 4.92 1.70 - 7.00 10*3/mm3    Lymphocytes, Absolute  1.39 0.70 - 3.10 10*3/mm3    Monocytes, Absolute 0.85 0.10 - 0.90 10*3/mm3    Eosinophils, Absolute 0.17 0.00 - 0.40 10*3/mm3    Basophils, Absolute 0.06 0.00 - 0.20 10*3/mm3    Immature Grans, Absolute 0.04 0.00 - 0.05 10*3/mm3    nRBC 0.0 0.0 - 0.2 /100 WBC   Urinalysis With Culture If Indicated - Urine, Clean Catch    Specimen: Urine, Clean Catch   Result Value Ref Range    Color, UA Yellow Yellow, Straw    Appearance, UA Clear Clear    pH, UA 6.0 5.0 - 8.0    Specific Gravity, UA 1.014 1.001 - 1.030    Glucose, UA Negative Negative    Ketones, UA Negative Negative    Bilirubin, UA Negative Negative    Blood, UA Moderate (2+) (A) Negative    Protein, UA Trace (A) Negative    Leuk Esterase, UA Negative Negative    Nitrite, UA Negative Negative    Urobilinogen, UA 0.2 E.U./dL 0.2 - 1.0 E.U./dL   Lactic Acid, Plasma    Specimen: Blood   Result Value Ref Range    Lactate 1.1 0.5 - 2.0 mmol/L   Procalcitonin    Specimen: Blood   Result Value Ref Range    Procalcitonin 0.04 0.00 - 0.25 ng/mL   Urinalysis, Microscopic Only - Urine, Clean Catch    Specimen: Urine, Clean Catch   Result Value Ref Range    RBC, UA Too Numerous to Count (A) None Seen, 0-2 /HPF    WBC, UA 0-2 None Seen, 0-2 /HPF    Bacteria, UA None Seen None Seen, Trace /HPF    Squamous Epithelial Cells, UA 0-2 None Seen, 0-2 /HPF    Hyaline Casts, UA None Seen 0 - 6 /LPF    Methodology Automated Microscopy    ECG 12 Lead   Result Value Ref Range    QT Interval 354 ms    QTC Interval 415 ms   Green Top (Gel)   Result Value Ref Range    Extra Tube Hold for add-ons.    Lavender Top   Result Value Ref Range    Extra Tube     Gold Top - SST   Result Value Ref Range    Extra Tube Hold for add-ons.    Gray Top   Result Value Ref Range    Extra Tube Hold for add-ons.    Light Blue Top   Result Value Ref Range    Extra Tube Hold for add-ons.        Imaging:   No valid procedures specified.     Measures:   Advanced Care Planning:   Patient does not have an advance  directive, information provided.    Smoking Cessation:   less than 3 minutes spent counseling Not agreeable to stopping    Assessment / Plan      Assessment/Plan:   Diagnoses and all orders for this visit:    1. Type 2 diabetes mellitus with hypoglycemia without coma, with long-term current use of insulin (HCC) (Primary)  Patient's blood sugars do continue to fluctuate up and down.  She is erratic with respect to her eating habits and I am uncertain if we can keep her on a scheduled basis.  We will continue her on the Toujeo but we will switch her over to a twice a day regimen monitoring her blood sugars we will slowly increasing 2 units every 3 days until her fasting sugars are less than 120.  We have also instructed that she is to take her NovoLog prior to meals but may continue with the sliding scale afterwards recording exactly the amount of insulin that she is taking at that time.  We will see what her hemoglobin A1c reveals and we will plan on seeing her in the next couple weeks for further adjustments at that time.  -     Hemoglobin A1c; Future  -     Hepatitis C Antibody; Future  -     MicroAlbumin, Urine, Random - Urine, Clean Catch; Future  -     Hemoglobin A1c  -     Hepatitis C Antibody  -     MicroAlbumin, Urine, Random - Urine, Clean Catch    2. Mixed hyperlipidemia  Patient will have a lipid profile obtained.  She does have a history of her previous CVA as well as a celiac stenosis that required a stent placement.  We will aim for an LDL of less than 70.  -     Lipid Panel; Future  -     Lipid Panel    3. Primary hypertension  Her blood pressure is not elevated today.  We will continue to monitor her levels and see what this reveals.  It may be necessary for us to make further adjustments but we will continue her on her current regiment.  -     CBC (No Diff); Future  -     Comprehensive Metabolic Panel; Future  -     TSH Rfx On Abnormal To Free T4; Future  -     CBC (No Diff)  -     Comprehensive  Metabolic Panel  -     TSH Rfx On Abnormal To Free T4    4. Iron deficiency anemia, unspecified iron deficiency anemia type  Patient is taking 1 iron pill a day.  She will have her CBC obtained as well as iron studies and will make adjustments based on these findings  -     Iron; Future  -     Ferritin; Future  -     Iron Profile; Future  -     Cancel: Iron  -     Ferritin  -     Iron Profile    5. B12 deficiency  Patient does continue to take her oral B12.  We will obtain levels and see what this reveals.  -     Vitamin B12; Future  -     Vitamin B12    6. Need for influenza vaccination  Patient received a Pneumovax as well as high-dose Fluzone today.  -     Pneumococcal Polysaccharide Vaccine 23-Valent Greater Than or Equal To 3yo Subcutaneous / IM  -     Fluzone High-Dose 65+yrs ([id number])    7. Celiac artery stenosis (HCC)   Patient had a stent placed in May.  She is still on Plavix and we will continue to maintain this until 1 years time.  It may be necessary since she has underlying cardiovascular disease that continuation long-term may be warranted.    8. Senile osteoporosis  Patient has not had a bone density obtained in quite some time.  We will obtain a bone density to see where she stands especially since her frequent falling.  We will monitor and treat accordingly to the results.  -     DEXA Bone Density Axial; Future        Follow Up:   Return in about 4 weeks (around 11/28/2022).      At [facility], we believe that sharing information builds trust and better relationships. You are receiving this note because you recently visited [facility]. It is possible you will see health information before a provider has talked with you about it. This kind of information can be easy to misunderstand. To help you fully understand what it means for your health, we urge you to discuss this note with your provider.    Aiden Spencer MD  [facility]

## 2022-11-04 ENCOUNTER — HOSPITAL ENCOUNTER (OUTPATIENT)
Dept: CT IMAGING | Facility: HOSPITAL | Age: 77
Discharge: HOME | End: 2022-11-04
Admitting: NURSE PRACTITIONER

## 2022-11-04 DIAGNOSIS — I77.1 CELIAC ARTERY STENOSIS: ICD-10-CM

## 2022-11-04 PROCEDURE — 74174 CTA ABD&PLVS W/CONTRAST: CPT

## 2022-11-04 PROCEDURE — 0 IOPAMIDOL PER 1 ML: Performed by: NURSE PRACTITIONER

## 2022-11-04 RX ORDER — CARVEDILOL 25 MG/1
TABLET ORAL
Qty: 180 TABLET | Refills: 11 | Status: SHIPPED | OUTPATIENT
Start: 2022-11-04

## 2022-11-04 RX ADMIN — IOPAMIDOL 85 ML: 755 INJECTION, SOLUTION INTRAVENOUS at 14:55

## 2022-11-15 ENCOUNTER — OFFICE VISIT (OUTPATIENT)
Dept: CARDIAC SURGERY | Facility: CLINIC | Age: 77
End: 2022-11-15

## 2022-11-15 VITALS
BODY MASS INDEX: 31.8 KG/M2 | DIASTOLIC BLOOD PRESSURE: 70 MMHG | HEIGHT: 60 IN | SYSTOLIC BLOOD PRESSURE: 116 MMHG | WEIGHT: 162 LBS | OXYGEN SATURATION: 99 % | HEART RATE: 85 BPM | TEMPERATURE: 97.8 F

## 2022-11-15 DIAGNOSIS — I77.1 CELIAC ARTERY STENOSIS: Primary | ICD-10-CM

## 2022-11-15 PROCEDURE — 99024 POSTOP FOLLOW-UP VISIT: CPT | Performed by: REGISTERED NURSE

## 2022-11-15 RX ORDER — NICOTINE 21 MG/24HR
1 PATCH, TRANSDERMAL 24 HOURS TRANSDERMAL EVERY 24 HOURS
Qty: 28 PATCH | Refills: 0 | Status: SHIPPED | OUTPATIENT
Start: 2022-11-15 | End: 2023-02-16

## 2022-11-15 NOTE — PROGRESS NOTES
[facility] Cardiothoracic Surgery Office Follow Up Note    Date of Encounter: 11/15/2022     Name: Jo-Ann Krishnan  : 1945     Referred By: No ref. provider found  PCP: Aiden Spencer MD    Chief Complaint:    Chief Complaint   Patient presents with   • Follow-up     3 month follow up with CTA abd/pel for celiac stenosis       Subjective      History of Present Illness:    It was nice to see Jo-Ann Krishnan accompanied by her sister in follow up.  She is a 77 y.o. female with PMH significant for tobacco use,hypertension, hyperlipidemia, diabetes mellitus, right breast cancer, chronic kidney disease, sleep apnea, CAD, chronic back pain/opiate use and weight loss/postprandial abdominal pain and two vessel chronic mesenteric ischemia .  Patient is s/p diagnostic arteriogram with celiac stent placement (no significant stenosis of the SMA noted) by Dr. Neil on 2021.  See op report for full details.  Postoperatively, patient had slightly elevated creatinine on morning labs that later improved.  On POD #1 patient had met required criteria and deemed appropriate for discharge home.  Patient without readmissions.     Ms. Krishnan was seen in early follow up 22 with complaints of abdominal pain.  At that time, she was somnolent and could not characterize her pain and was indicating it was similar to her preoperative abdominal pain symptoms.  CTA abdomen/pelvis was pursued.  Patient was seen again on 22 following CTA.  She reported improvement in her abdominal pain but still reported lower abdominal/pelvic pain. She is reported worsening urinary frequency but denied any dysuria or hematuria.  She denied any postprandial abdominal pain, nausea, vomiting or diarrhea. She reported regular daily BMs.     Patient presents to office today for follow up.  Her biggest complaints are similar to those described in her last visit.  She continues to complain of lower abdominal pain  and back pain.  She reports some dysuria and urgency but denies hematuria.  She denies fever, chills, malaise.  Patient continues to smoke 1ppd but is interested in discussing cessation.  Patient reports irregular bowel habits for which she uses ex-lax as needed.     Review of Systems:  Review of Systems   Constitutional: Negative for chills, decreased appetite, diaphoresis, fever, malaise/fatigue, night sweats, weight gain and weight loss.   HENT: Negative for hoarse voice.    Eyes: Negative for blurred vision, double vision and visual disturbance.   Cardiovascular: Positive for claudication and leg swelling. Negative for chest pain, dyspnea on exertion, irregular heartbeat, near-syncope, orthopnea, palpitations, paroxysmal nocturnal dyspnea and syncope.   Respiratory: Negative for cough, hemoptysis, shortness of breath, sputum production and wheezing.    Hematologic/Lymphatic: Negative for adenopathy and bleeding problem. Does not bruise/bleed easily.   Skin: Negative for color change, nail changes, poor wound healing and rash.   Musculoskeletal: Negative for back pain, falls and muscle cramps.   Gastrointestinal: Positive for abdominal pain and constipation. Negative for dysphagia and heartburn.   Genitourinary: Negative for flank pain.   Neurological: Negative for brief paralysis, disturbances in coordination, dizziness, focal weakness, headaches, light-headedness, loss of balance, numbness, paresthesias, sensory change, vertigo and weakness.   Psychiatric/Behavioral: Negative for depression and suicidal ideas.   Allergic/Immunologic: Negative for persistent infections.       I have reviewed the following portions of the patient's history: allergies, current medications, past family history, past medical history, past social history, past surgical history and problem list and confirm it's accurate.    Allergies:  Allergies   Allergen Reactions   • Ceclor [Cefaclor] Rash       Medications:      Current Outpatient  Medications:   •  acetaminophen (TYLENOL) 500 MG tablet, Take 500 mg by mouth Every 6 (Six) Hours As Needed for Mild Pain ., Disp: , Rfl:   •  albuterol (PROVENTIL) (2.5 MG/3ML) 0.083% nebulizer solution, Take 2.5 mg by nebulization Every 4 (Four) Hours As Needed for wheezing or shortness of air., Disp: , Rfl:   •  albuterol sulfate  (90 Base) MCG/ACT inhaler, Inhale 2 puffs Every 4 (Four) Hours As Needed for Wheezing., Disp: , Rfl:   •  aspirin 81 MG EC tablet, Take 81 mg by mouth daily., Disp: , Rfl:   •  atorvastatin (LIPITOR) 40 MG tablet, TAKE ONE TABLET BY MOUTH EVERY DAY, Disp: 30 tablet, Rfl: 3  •  busPIRone (BUSPAR) 5 MG tablet, Take 5 mg by mouth 3 (Three) Times a Day., Disp: , Rfl:   •  carvedilol (COREG) 25 MG tablet, TAKE ONE TABLET BY MOUTH TWO TIMES A DAY, Disp: 180 tablet, Rfl: 11  •  clopidogrel (PLAVIX) 75 MG tablet, Take 1 tablet by mouth Daily., Disp: 30 tablet, Rfl: 6  •  Comfort EZ Pen Needles 32G X 4 MM misc, , Disp: , Rfl:   •  Continuous Blood Gluc  (Dexcom G6 ) device, 1 each Daily., Disp: 1 each, Rfl: 11  •  Continuous Blood Gluc Sensor (Dexcom G6 Sensor), Every 10 (Ten) Days., Disp: 2 each, Rfl: 11  •  Cyanocobalamin (VITAMIN B-12 PO), Take 2,500 mcg by mouth Daily., Disp: , Rfl:   •  Diclofenac Sodium (VOLTAREN) 1 % gel gel, APPLY TO AFFECTED AREA FOUR TIMES DAILY, Disp: , Rfl:   •  DULoxetine (CYMBALTA) 60 MG capsule, TAKE ONE CAPSULE BY MOUTH EVERY DAY, Disp: 30 capsule, Rfl: 11  •  Ferrous Sulfate 27 MG tablet, Take 1 tablet by mouth Daily., Disp: , Rfl:   •  fluticasone (FLONASE) 50 MCG/ACT nasal spray, 2 sprays by Each Nare route Daily., Disp: , Rfl:   •  insulin aspart (novoLOG) 100 UNIT/ML injection, Inject 10 Units under the skin into the appropriate area as directed 3 (Three) Times a Day Before Meals., Disp: 15 mL, Rfl: 12  •  Insulin Glargine, 1 Unit Dial, (Toujeo SoloStar) 300 UNIT/ML solution pen-injector injection, Inject 12 Units under the skin into  the appropriate area as directed 2 (Two) Times a Day. 10 units nightly, Disp: , Rfl:   •  isosorbide mononitrate (IMDUR) 60 MG 24 hr tablet, TAKE ONE TABLET DAILY, Disp: 30 tablet, Rfl: 12  •  losartan (COZAAR) 50 MG tablet, Take 50 mg by mouth Daily., Disp: , Rfl:   •  multivitamin with minerals tablet tablet, Take 1 tablet by mouth Daily., Disp: , Rfl:   •  naloxone (NARCAN) 4 MG/0.1ML nasal spray, 1 spray into each nostril As Needed., Disp: , Rfl:   •  pantoprazole (PROTONIX) 40 MG EC tablet, Take 40 mg by mouth Daily., Disp: , Rfl:   •  SITagliptin (JANUVIA) 50 MG tablet, Take 50 mg by mouth Daily., Disp: , Rfl:   •  traMADol (ULTRAM) 50 MG tablet, Take 100 mg by mouth 2 (Two) Times a Day As Needed., Disp: , Rfl:   •  nicotine (Nicoderm CQ) 21 MG/24HR patch, Place 1 patch on the skin as directed by provider Daily., Disp: 28 patch, Rfl: 0    Current Facility-Administered Medications:   •  ipratropium-albuterol (DUO-NEB) nebulizer solution 3 mL, 3 mL, Nebulization, 4x Daily - RT, Aiden Spencer MD, 3 mL at 09/08/22 1328    History:   Past Medical History:   Diagnosis Date   • Anemia    • Arthritis    • Back problem    • CAD (coronary artery disease)    • Cancer (HCC)     Right breast   • Chronic back pain    • Chronically on opiate therapy    • Depression    • Diabetes mellitus (HCC)     DX 14 years ago- checks fsbs weekly   • Fibromyalgia    • Gastroparesis    • GERD (gastroesophageal reflux disease)    • Headache     emotional/tension   • History of transfusion     [facility]   • HTN (hypertension)    • Hypercholesteremia    • IBS (irritable bowel syndrome)    • Incontinence of urine     urgency   • Migraine headache    • Myalgia and myositis    • Peripheral neuropathy    • Sleep apnea     does not wear cpap   • UTI (urinary tract infection)        Past Surgical History:   Procedure Laterality Date   • APPENDECTOMY     • ARTERIOGRAM MESENTERIC N/A 6/16/2022    Procedure: DIAGNOSTIC  "ARTERIOGRAM WITH CELIAC STENT PLACEMENT;  Surgeon: Neo Neil MD;  Location: [facility];  Service: Vascular;  Laterality: N/A;  FLUORO: 16 MIN  DOSE: 2384 MGY  CONTRAST:  20 ML   • BACK SURGERY      5x per patient   • BRAIN TUMOR EXCISION  1988   • BREAST BIOPSY     • CARPAL TUNNEL RELEASE Bilateral    • CHOLECYSTECTOMY     • COLONOSCOPY      2015   • CRANIOTOMY FOR TUMOR     • EYE SURGERY      bilateral cataracts removed   • HEMORRHOIDECTOMY     • LUMBAR FUSION N/A 01/04/2017    Procedure: LUMBAR LAMINECTOMY AND DECOMRESSION  L3 AND L4;  Surgeon: Nishant Bhatti MD;  Location: [facility] OR;  Service:    • TOTAL ABDOMINAL HYSTERECTOMY     • TRIGGER FINGER RELEASE         Social History     Socioeconomic History   • Marital status:    • Number of children: 2   Tobacco Use   • Smoking status: Every Day     Packs/day: 1.00     Years: 62.00     Pack years: 62.00     Types: Cigarettes   • Smokeless tobacco: Never   • Tobacco comments:     1/17/2022 quit    Vaping Use   • Vaping Use: Never used   Substance and Sexual Activity   • Alcohol use: No   • Drug use: No   • Sexual activity: Defer        Family History   Problem Relation Age of Onset   • Cancer Other    • Diabetes Other    • Hyperlipidemia Other    • Heart attack Other    • Hypertension Other    • Heart attack Mother    • Diabetes Mother    • Heart disease Mother    • Hypertension Mother    • Cancer Father    • Alcohol abuse Father    • Heart attack Sister    • Diabetes Sister    • Heart disease Sister    • Hypertension Sister    • Cancer Brother    • Diabetes Brother    • Hypertension Brother    • Heart attack Sister    • Stroke Sister    • Cancer Brother        Objective     Physical Exam:  Vitals:    11/15/22 1329   BP: 116/70   BP Location: Left arm   Patient Position: Sitting   Pulse: 85   Temp: 97.8 °F (36.6 °C)   SpO2: 99%   Weight: 73.5 kg (162 lb)   Height: 152.4 cm (60\")      Body mass index is 31.64 kg/m².    Physical Exam  Vitals " reviewed.   Constitutional:       General: She is not in acute distress.     Appearance: She is well-groomed. She is not ill-appearing.   Cardiovascular:      Rate and Rhythm: Normal rate and regular rhythm.      Heart sounds: Normal heart sounds, S1 normal and S2 normal.   Pulmonary:      Effort: Pulmonary effort is normal.      Breath sounds: Normal breath sounds.   Abdominal:      General: Bowel sounds are normal.      Palpations: Abdomen is soft.   Musculoskeletal:      Right lower leg: Edema present.      Left lower leg: Edema present.   Skin:     General: Skin is warm and dry.      Capillary Refill: Capillary refill takes less than 2 seconds.   Neurological:      Mental Status: She is alert and oriented to person, place, and time.      Comments: In wheelchair   Psychiatric:         Behavior: Behavior normal. Behavior is cooperative.         Thought Content: Thought content normal.         Cognition and Memory: Cognition normal.         Imaging/Labs:  CT Angiogram Abdomen Pelvis    Result Date: 11/7/2022  Impression: 1. Status post celiac artery endovascular stenting with a patent stent. There is minimal in-stent intimal hyperplasia at the distal end of the stent. 2. Patent superior mesenteric artery. 3. Likely occluded inferior mesenteric artery at the ostium. 4. Focal stenosis of the distal abdominal aorta with a focal ulcerated plaque with dissection just above the bifurcation as described above. No significant interval change. 5. 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 No significant interval change. 6. Severe stenosis of the right renal artery ostial and proximal segments. 7. Atrophic left kidney. 8. Thickening of the gastric wall, likely because of nondistention. If there is a clinical concern, endoscopic evaluation should be considered. 9. Unusual thickening of the terminal rectum/anus. Clinical correlation and evaluation  is suggested. 10. Please see above regarding the musculoskeletal system.  Thank you for the opportunity to assist you in the care of this patient.  This report was finalized on 11/7/2022 8:29 AM by Rashawn Richardson MD.       I personally reviewed these films and report.     Assessment / Plan      Assessment / Plan:  Diagnoses and all orders for this visit:    1. Celiac artery stenosis (HCC) (Primary)    Other orders  -     nicotine (Nicoderm CQ) 21 MG/24HR patch; Place 1 patch on the skin as directed by provider Daily.  Dispense: 28 patch; Refill: 0    2. S/p celiac artery stent     S/p diagnostic arteriogram with celiac stent placement (no significant stenosis of the SMA noted) by Dr. Neil on June 16, 2021.    Postoperatively, patient had slightly elevated creatinine on morning labs that later improved.  On POD #1 patient had met required criteria and deemed appropriate for discharge home.  Patient without readmissions.   Ms. Krishnan was seen in early follow up 7/7/22 with complaints of abdominal pain.  At that time, she was somnolent and could not characterize her pain and was indicating it was similar to her preoperative abdominal pain symptoms.  CTA abdomen/pelvis was pursued.   Patient was seen again on 7/26/22 following CTA.  She reported improvement in her abdominal pain but still reported lower abdominal/pelvic pain. She reported worsening urinary frequency but denied any dysuria or hematuria.  She denied any postprandial abdominal pain, nausea, vomiting or diarrhea. She reported regular daily BMs.   Patient presents to office today for follow up.  Her biggest complaints are similar to those described in her last visit.  She continues to complain of lower abdominal pain and back pain.  She reports some dysuria and urgency but denies hematuria.  Patient reports irregular bowel habits for which she uses ex-lax as needed. She denies fever, chills, malaise. -- We discussed application of heat and OTC  medications for her back pain.  Encouraged patient to schedule follow appointment with PCP to assess for UTI based on her symptoms.  Discussed daily oral bowel regimen with patient to promote regular bowel habits.   There was no significant interval change on most recent CTA abdomen/pelvis.   Patient continues to smoke 1ppd but is interested in discussing cessation.  Order for nicotine patches place.    Continue Plavix regimen.  Will plan repeat imaging with CTA abdomen/pelvis in ~6 months for surveillance.       Follow Up:   Return in about 6 months (around 5/15/2023) for CTA abdomen/pelvis.   Or sooner for any further concerns or worsening sign and symptoms.  Patient encouraged to call the office with any questions or concerns.     Thank you for allowing me to participate in your care.  Best Regards,    BALAJI Estrella  [facility] Cardiothoracic Surgery  11/18/22  15:44 EST

## 2022-11-30 ENCOUNTER — OFFICE VISIT (OUTPATIENT)
Dept: FAMILY MEDICINE CLINIC | Facility: CLINIC | Age: 77
End: 2022-11-30

## 2022-11-30 VITALS
OXYGEN SATURATION: 96 % | DIASTOLIC BLOOD PRESSURE: 78 MMHG | TEMPERATURE: 98 F | WEIGHT: 161 LBS | SYSTOLIC BLOOD PRESSURE: 132 MMHG | RESPIRATION RATE: 17 BRPM | HEART RATE: 80 BPM | BODY MASS INDEX: 31.44 KG/M2

## 2022-11-30 DIAGNOSIS — E11.65 TYPE 2 DIABETES MELLITUS WITH HYPERGLYCEMIA, WITH LONG-TERM CURRENT USE OF INSULIN: ICD-10-CM

## 2022-11-30 DIAGNOSIS — M81.0 SENILE OSTEOPOROSIS: Primary | ICD-10-CM

## 2022-11-30 DIAGNOSIS — M50.30 DEGENERATIVE DISC DISEASE, CERVICAL: ICD-10-CM

## 2022-11-30 DIAGNOSIS — Z79.4 TYPE 2 DIABETES MELLITUS WITH HYPERGLYCEMIA, WITH LONG-TERM CURRENT USE OF INSULIN: ICD-10-CM

## 2022-11-30 PROCEDURE — 99214 OFFICE O/P EST MOD 30 MIN: CPT | Performed by: FAMILY MEDICINE

## 2022-11-30 NOTE — PROGRESS NOTES
Follow Up Office Visit      Date: 2022   Patient Name: Jo-Ann Krishnan  : 1945   MRN: [id number]     Chief Complaint:    Chief Complaint   Patient presents with   • Diabetes     Pt states her Blood Sugars are now under contol       History of Present Illness: Jo-Ann Krishnan is a 77 y.o. female who is here today for follow-up of her diabetes.  Per patient report she is doing much better with respect to her blood sugars.  She has not had any elevation greater than 200.  Her morning sugars are doing well as well as her afternoon sugars.  Patient has not received the continuous glucose monitor yet.  She does continue to have symptoms with respect to her headache.  I am uncertain if this is due to her previous craniotomy or if is due to her neck.  She is scheduled to have an MRI performed at  under anesthesia on .  Patient does continue to smoke but had seen the vascular surgeon who felt that her celiac disease was stable at present time.  Patient has been more active but does continue to have issues with being unsteady.  She has bumped into several items recently and has a little bit of right-sided hip pain.  She denies any other complaints currently.  She has not had cardiac symptomatology or any neurologic complaints.    Subjective      Review of Systems:   Review of Systems   Constitutional: Positive for fatigue. Negative for activity change and appetite change.   Respiratory: Negative for cough and shortness of breath.    Cardiovascular: Negative for chest pain, palpitations and leg swelling.   Gastrointestinal: Negative for abdominal pain, constipation, diarrhea, nausea and vomiting.   Genitourinary: Negative for dysuria, flank pain, frequency and urgency.   Neurological: Positive for weakness. Negative for dizziness and memory problem.       I have reviewed the patients family history, social history, past medical history, past surgical history and have updated it as  appropriate.     Medications:     Current Outpatient Medications:   •  acetaminophen (TYLENOL) 500 MG tablet, Take 500 mg by mouth Every 6 (Six) Hours As Needed for Mild Pain ., Disp: , Rfl:   •  albuterol (PROVENTIL) (2.5 MG/3ML) 0.083% nebulizer solution, Take 2.5 mg by nebulization Every 4 (Four) Hours As Needed for wheezing or shortness of air., Disp: , Rfl:   •  albuterol sulfate  (90 Base) MCG/ACT inhaler, Inhale 2 puffs Every 4 (Four) Hours As Needed for Wheezing., Disp: , Rfl:   •  aspirin 81 MG EC tablet, Take 81 mg by mouth daily., Disp: , Rfl:   •  atorvastatin (LIPITOR) 40 MG tablet, TAKE ONE TABLET BY MOUTH EVERY DAY, Disp: 30 tablet, Rfl: 3  •  busPIRone (BUSPAR) 5 MG tablet, Take 5 mg by mouth 3 (Three) Times a Day., Disp: , Rfl:   •  carvedilol (COREG) 25 MG tablet, TAKE ONE TABLET BY MOUTH TWO TIMES A DAY, Disp: 180 tablet, Rfl: 11  •  clopidogrel (PLAVIX) 75 MG tablet, Take 1 tablet by mouth Daily., Disp: 30 tablet, Rfl: 6  •  Comfort EZ Pen Needles 32G X 4 MM misc, , Disp: , Rfl:   •  Continuous Blood Gluc  (Dexcom G6 ) device, 1 each Daily., Disp: 1 each, Rfl: 11  •  Continuous Blood Gluc Sensor (Dexcom G6 Sensor), Every 10 (Ten) Days., Disp: 2 each, Rfl: 11  •  Cyanocobalamin (VITAMIN B-12 PO), Take 2,500 mcg by mouth Daily., Disp: , Rfl:   •  Diclofenac Sodium (VOLTAREN) 1 % gel gel, APPLY TO AFFECTED AREA FOUR TIMES DAILY, Disp: , Rfl:   •  DULoxetine (CYMBALTA) 60 MG capsule, TAKE ONE CAPSULE BY MOUTH EVERY DAY, Disp: 30 capsule, Rfl: 11  •  Ferrous Sulfate 27 MG tablet, Take 1 tablet by mouth Daily., Disp: , Rfl:   •  fluticasone (FLONASE) 50 MCG/ACT nasal spray, 2 sprays by Each Nare route Daily., Disp: , Rfl:   •  insulin aspart (novoLOG) 100 UNIT/ML injection, Inject 10 Units under the skin into the appropriate area as directed 3 (Three) Times a Day Before Meals., Disp: 15 mL, Rfl: 12  •  Insulin Glargine, 1 Unit Dial, (Toujeo SoloStar) 300 UNIT/ML solution  pen-injector injection, Inject 12 Units under the skin into the appropriate area as directed 2 (Two) Times a Day. 10 units nightly, Disp: , Rfl:   •  isosorbide mononitrate (IMDUR) 60 MG 24 hr tablet, TAKE ONE TABLET DAILY, Disp: 30 tablet, Rfl: 12  •  losartan (COZAAR) 50 MG tablet, Take 50 mg by mouth Daily., Disp: , Rfl:   •  multivitamin with minerals tablet tablet, Take 1 tablet by mouth Daily., Disp: , Rfl:   •  naloxone (NARCAN) 4 MG/0.1ML nasal spray, 1 spray into each nostril As Needed., Disp: , Rfl:   •  nicotine (Nicoderm CQ) 21 MG/24HR patch, Place 1 patch on the skin as directed by provider Daily., Disp: 28 patch, Rfl: 0  •  pantoprazole (PROTONIX) 40 MG EC tablet, Take 40 mg by mouth Daily., Disp: , Rfl:   •  SITagliptin (JANUVIA) 50 MG tablet, Take 50 mg by mouth Daily., Disp: , Rfl:   •  traMADol (ULTRAM) 50 MG tablet, Take 100 mg by mouth 2 (Two) Times a Day As Needed., Disp: , Rfl:     Current Facility-Administered Medications:   •  ipratropium-albuterol (DUO-NEB) nebulizer solution 3 mL, 3 mL, Nebulization, 4x Daily - RT, Aiden Spencer MD, 3 mL at 09/08/22 1328    Allergies:   Allergies   Allergen Reactions   • Ceclor [Cefaclor] Rash       Immunizations:   Immunization History   Administered Date(s) Administered   • COVID-19 (VINNIE) 03/16/2021   • COVID-19 (UNSPECIFIED) 03/01/2021, 04/01/2021   • Fluzone High Dose =>65 Years ([employer] ONLY) 10/16/2018   • Fluzone High-Dose 65+yrs 10/31/2022   • Fluzone Quad >6mos (Multi-dose) 11/15/2016, 02/05/2020   • Pneumococcal Conjugate 13-Valent (PCV13) 07/07/2016   • Pneumococcal Polysaccharide (PPSV23) 10/31/2022        Objective     Physical Exam: Please see above  Vital Signs:   Vitals:    11/30/22 1548   BP: 132/78   BP Location: Left arm   Patient Position: Sitting   Cuff Size: Adult   Pulse: 80   Resp: 17   Temp: 98 °F (36.7 °C)   SpO2: 96%   Weight: 73 kg (161 lb)     Body mass index is 31.44 kg/m².  BMI is >= 30 and <35. (Class 1  Obesity). The following options were offered after discussion;: exercise counseling/recommendations and nutrition counseling/recommendations       Physical Exam  Vitals and nursing note reviewed.   Constitutional:       Appearance: Normal appearance.   HENT:      Head: Normocephalic and atraumatic.      Nose: Nose normal.      Mouth/Throat:      Pharynx: Oropharynx is clear.   Eyes:      Extraocular Movements: Extraocular movements intact.      Pupils: Pupils are equal, round, and reactive to light.   Neck:      Thyroid: No thyroid mass or thyromegaly.      Trachea: Trachea normal.   Cardiovascular:      Rate and Rhythm: Normal rate and regular rhythm.      Pulses: Normal pulses. No decreased pulses.      Heart sounds: Normal heart sounds.   Pulmonary:      Effort: Pulmonary effort is normal.      Breath sounds: Normal breath sounds.   Abdominal:      General: Abdomen is flat. Bowel sounds are normal.      Palpations: Abdomen is soft.      Tenderness: There is no abdominal tenderness.   Musculoskeletal:      Cervical back: Neck supple.      Right lower leg: No edema.      Left lower leg: No edema.   Lymphadenopathy:      Cervical: No cervical adenopathy.   Skin:     General: Skin is warm and dry.   Neurological:      General: No focal deficit present.      Mental Status: She is alert and oriented to person, place, and time.      Cranial Nerves: Cranial nerves are intact.      Sensory: Sensation is intact.      Motor: Motor function is intact.      Coordination: Coordination is intact.   Psychiatric:         Attention and Perception: Attention normal.         Mood and Affect: Mood normal.         Speech: Speech normal.         Behavior: Behavior normal.         Procedures    Results:   Labs:   POCT Results (if applicable):     Imaging:   No valid procedures specified.     Measures:   Advanced Care Planning:   Patient does not have an advance directive, information provided.    Smoking Cessation:   less than 3 minutes  spent counseling Not agreeable to stopping    Assessment / Plan      Assessment/Plan:   Diagnoses and all orders for this visit:    1. Senile osteoporosis (Primary)  Patient has yet to receive her bone density.  This will be arranged and she will have it obtained.  We need to know the results to see if any of her symptoms with respect to her hip is related to some previous trauma in the past.  -     DEXA Bone Density Axial; Future    2. Type 2 diabetes mellitus with hyperglycemia, with long-term current use of insulin (HCC)   Her diabetes is doing better at present time per her report.  She is adjusting her insulin and has been doing well with her current regimen.  She does not have any episodes of hypoglycemia currently but will occasionally have some increase in blood sugars but not above 200.  We will continue on her current regimen.  I will have family check with the pharmacy with respect to the continuous glucose monitor and we may need to do an outside third-party source.  We will reassess in the next several weeks.    3. Degenerative disc disease, cervical   Patient does have degenerative disc disease of her neck which may be causing some of her headache symptomatology.  We have discussed the options of using gabapentin but this has caused some difficulties in the past.  We will see what the MRI of her head reveals and may need to make further adjustments.  She is being followed by Dr. Machado who has been prescribing her tramadol with some success.  She has not had any injections currently and may have some benefit.      Follow Up:   Return in about 20 days (around 12/20/2022) for Recheck.      At [facility], we believe that sharing information builds trust and better relationships. You are receiving this note because you recently visited [facility]. It is possible you will see health information before a provider has talked with you about it. This kind of information can be easy to misunderstand. To  help you fully understand what it means for your health, we urge you to discuss this note with your provider.    Aiden Spencer MD  [facility]

## 2023-01-11 ENCOUNTER — OFFICE VISIT (OUTPATIENT)
Dept: FAMILY MEDICINE CLINIC | Facility: CLINIC | Age: 78
End: 2023-01-11
Payer: MEDICARE

## 2023-01-11 VITALS
DIASTOLIC BLOOD PRESSURE: 70 MMHG | TEMPERATURE: 98 F | BODY MASS INDEX: 31.25 KG/M2 | RESPIRATION RATE: 17 BRPM | OXYGEN SATURATION: 98 % | HEART RATE: 82 BPM | WEIGHT: 160 LBS | SYSTOLIC BLOOD PRESSURE: 136 MMHG

## 2023-01-11 DIAGNOSIS — E11.43 TYPE 2 DIABETES MELLITUS WITH DIABETIC AUTONOMIC NEUROPATHY, WITH LONG-TERM CURRENT USE OF INSULIN: ICD-10-CM

## 2023-01-11 DIAGNOSIS — J12.89 INFLUENZAL BRONCHOPNEUMONIA: ICD-10-CM

## 2023-01-11 DIAGNOSIS — Z79.4 TYPE 2 DIABETES MELLITUS WITH DIABETIC AUTONOMIC NEUROPATHY, WITH LONG-TERM CURRENT USE OF INSULIN: ICD-10-CM

## 2023-01-11 DIAGNOSIS — J11.08 INFLUENZAL BRONCHOPNEUMONIA: ICD-10-CM

## 2023-01-11 DIAGNOSIS — R06.00 ACUTE DYSPNEA: Primary | ICD-10-CM

## 2023-01-11 LAB
EXPIRATION DATE: ABNORMAL
FLUAV AG UPPER RESP QL IA.RAPID: NOT DETECTED
FLUBV AG UPPER RESP QL IA.RAPID: DETECTED
GLUCOSE BLDC GLUCOMTR-MCNC: 199 MG/DL (ref 70–130)
INTERNAL CONTROL: ABNORMAL
Lab: ABNORMAL
SARS-COV-2 AG UPPER RESP QL IA.RAPID: NOT DETECTED

## 2023-01-11 PROCEDURE — 99214 OFFICE O/P EST MOD 30 MIN: CPT | Performed by: PHYSICIAN ASSISTANT

## 2023-01-11 PROCEDURE — 87428 SARSCOV & INF VIR A&B AG IA: CPT | Performed by: PHYSICIAN ASSISTANT

## 2023-01-11 PROCEDURE — 82962 GLUCOSE BLOOD TEST: CPT | Performed by: PHYSICIAN ASSISTANT

## 2023-01-11 RX ORDER — FLUTICASONE PROPIONATE 50 MCG
1 SPRAY, SUSPENSION (ML) NASAL DAILY
COMMUNITY

## 2023-01-11 RX ORDER — ATORVASTATIN CALCIUM 40 MG/1
40 TABLET, FILM COATED ORAL
COMMUNITY
End: 2023-02-16

## 2023-01-11 RX ORDER — AMOXICILLIN AND CLAVULANATE POTASSIUM 875; 125 MG/1; MG/1
1 TABLET, FILM COATED ORAL 2 TIMES DAILY
Qty: 20 TABLET | Refills: 0 | Status: SHIPPED | OUTPATIENT
Start: 2023-01-11 | End: 2023-02-16

## 2023-01-12 DIAGNOSIS — J12.89 INFLUENZAL BRONCHOPNEUMONIA: Primary | ICD-10-CM

## 2023-01-12 DIAGNOSIS — J11.08 INFLUENZAL BRONCHOPNEUMONIA: Primary | ICD-10-CM

## 2023-01-12 NOTE — PROGRESS NOTES
Follow Up Office Visit      Date: 2023   Patient Name: Jo-Ann Krishnan  : 1945   MRN: [id number]     Chief Complaint:    Chief Complaint   Patient presents with   • Cough   • URI   • Night Sweats     Onset last week     • Chills       History of Present Illness: Jo-Ann Krishnan is a 77 y.o. female who is here today for one week history of flu like symptoms.  Her fever and chills has improved but she still has a deep productive cough.  She has not smoked for two days because of cough.  She has wheeze if not using nebs every six hours and aching pain with cough.  Her activity has been decreased due to cough and dyspnea.  Her roommate has been ill as well.    Subjective      Review of Systems:   Review of Systems   HENT: Positive for congestion, postnasal drip, rhinorrhea, sinus pressure and sneezing.    Respiratory: Positive for cough, shortness of breath and wheezing.    Cardiovascular: Negative.    Gastrointestinal: Negative.        I have reviewed the patients family history, social history, past medical history, past surgical history and have updated it as appropriate.     Medications:     Current Outpatient Medications:   •  acetaminophen (TYLENOL) 500 MG tablet, Take 500 mg by mouth Every 6 (Six) Hours As Needed for Mild Pain ., Disp: , Rfl:   •  albuterol (PROVENTIL) (2.5 MG/3ML) 0.083% nebulizer solution, Take 2.5 mg by nebulization Every 4 (Four) Hours As Needed for wheezing or shortness of air., Disp: , Rfl:   •  albuterol sulfate  (90 Base) MCG/ACT inhaler, Inhale 2 puffs Every 4 (Four) Hours As Needed for Wheezing., Disp: , Rfl:   •  amoxicillin-clavulanate (Augmentin) 875-125 MG per tablet, Take 1 tablet by mouth 2 (Two) Times a Day., Disp: 20 tablet, Rfl: 0  •  aspirin 81 MG EC tablet, Take 81 mg by mouth daily., Disp: , Rfl:   •  atorvastatin (LIPITOR) 40 MG tablet, TAKE ONE TABLET BY MOUTH EVERY DAY, Disp: 30 tablet, Rfl: 3  •  atorvastatin (LIPITOR) 40 MG tablet, Take 40  mg by mouth., Disp: , Rfl:   •  busPIRone (BUSPAR) 5 MG tablet, Take 5 mg by mouth 3 (Three) Times a Day., Disp: , Rfl:   •  carvedilol (COREG) 25 MG tablet, TAKE ONE TABLET BY MOUTH TWO TIMES A DAY, Disp: 180 tablet, Rfl: 11  •  clopidogrel (PLAVIX) 75 MG tablet, Take 1 tablet by mouth Daily., Disp: 30 tablet, Rfl: 6  •  Comfort EZ Pen Needles 32G X 4 MM misc, , Disp: , Rfl:   •  Comfort EZ Pen Needles 32G X 4 MM misc, USE AS DIRECTED DAILY, Disp: , Rfl:   •  Continuous Blood Gluc  (Dexcom G6 ) device, 1 each Daily., Disp: 1 each, Rfl: 11  •  Continuous Blood Gluc Sensor (Dexcom G6 Sensor), Every 10 (Ten) Days., Disp: 2 each, Rfl: 11  •  cyanocobalamin ([employer] Vitamin B-12) 1000 MCG tablet, Take 2,500 mcg by mouth., Disp: , Rfl:   •  Cyanocobalamin (VITAMIN B-12 PO), Take 2,500 mcg by mouth Daily., Disp: , Rfl:   •  Diclofenac Sodium (VOLTAREN) 1 % gel gel, APPLY TO AFFECTED AREA FOUR TIMES DAILY, Disp: , Rfl:   •  DULoxetine (CYMBALTA) 60 MG capsule, TAKE ONE CAPSULE BY MOUTH EVERY DAY, Disp: 30 capsule, Rfl: 11  •  Ferrous Sulfate 27 MG tablet, Take 1 tablet by mouth Daily., Disp: , Rfl:   •  fluticasone (FLONASE) 50 MCG/ACT nasal spray, 2 sprays by Each Nare route Daily., Disp: , Rfl:   •  fluticasone (FLONASE) 50 MCG/ACT nasal spray, 1 spray into the nostril(s) as directed by provider Daily., Disp: , Rfl:   •  insulin aspart (novoLOG) 100 UNIT/ML injection, Inject 10 Units under the skin into the appropriate area as directed 3 (Three) Times a Day Before Meals., Disp: 15 mL, Rfl: 12  •  Insulin Glargine, 1 Unit Dial, (Toujeo SoloStar) 300 UNIT/ML solution pen-injector injection, Inject 12 Units under the skin into the appropriate area as directed 2 (Two) Times a Day. 10 units nightly, Disp: , Rfl:   •  isosorbide mononitrate (IMDUR) 60 MG 24 hr tablet, TAKE ONE TABLET DAILY, Disp: 30 tablet, Rfl: 12  •  losartan (COZAAR) 50 MG tablet, Take 50 mg by mouth Daily., Disp: , Rfl:   •  multivitamin with  minerals tablet tablet, Take 1 tablet by mouth Daily., Disp: , Rfl:   •  naloxone (NARCAN) 4 MG/0.1ML nasal spray, 1 spray into each nostril As Needed., Disp: , Rfl:   •  nicotine (Nicoderm CQ) 21 MG/24HR patch, Place 1 patch on the skin as directed by provider Daily., Disp: 28 patch, Rfl: 0  •  pantoprazole (PROTONIX) 40 MG EC tablet, Take 40 mg by mouth Daily., Disp: , Rfl:   •  SITagliptin (JANUVIA) 50 MG tablet, Take 50 mg by mouth Daily., Disp: , Rfl:   •  traMADol (ULTRAM) 50 MG tablet, Take 100 mg by mouth 2 (Two) Times a Day As Needed., Disp: , Rfl:   •  True Metrix Blood Glucose Test test strip, Daily. for testing, Disp: , Rfl:     Current Facility-Administered Medications:   •  ipratropium-albuterol (DUO-NEB) nebulizer solution 3 mL, 3 mL, Nebulization, 4x Daily - RT, Aiden Spencer MD, 3 mL at 09/08/22 1328    Allergies:   Allergies   Allergen Reactions   • Ceclor [Cefaclor] Rash       Objective     Physical Exam: Please see above  Vital Signs:   Vitals:    01/11/23 1543   BP: 136/70   Pulse: 82   Resp: 17   Temp: 98 °F (36.7 °C)   SpO2: 98%   Weight: 72.6 kg (160 lb)     Body mass index is 31.25 kg/m².    Physical Exam  Vitals reviewed.   Constitutional:       General: She is not in acute distress.     Appearance: She is not ill-appearing or toxic-appearing.   Cardiovascular:      Rate and Rhythm: Normal rate and regular rhythm.      Heart sounds: No murmur heard.    No friction rub. No gallop.   Pulmonary:      Breath sounds: Wheezing (increased posteriorly) present.   Neurological:      Mental Status: She is alert.         Procedures    Assessment / Plan      Assessment/Plan:   1. Acute dyspnea  Secondary to influenza B Brochonchopneumonia  - POCT SARS-CoV-2 Antigen KEN + Flu  - POCT Glucose    2. Type 2 diabetes mellitus with diabetic autonomic neuropathy, with long-term current use of insulin (HCC)  FSBS here is acceptable  - POCT Glucose    3. Influenzal bronchopneumonia  Will cover for  secondary Staph.Patient will be reevalauted tomorrow and may require hospitalization.  - amoxicillin-clavulanate (Augmentin) 875-125 MG per tablet; Take 1 tablet by mouth 2 (Two) Times a Day.  Dispense: 20 tablet; Refill: 0       Follow Up:   No follow-ups on file.      At [facility], we believe that sharing information builds trust and better relationships. You are receiving this note because you recently visited [facility]. It is possible you will see health information before a provider has talked with you about it. This kind of information can be easy to misunderstand. To help you fully understand what it means for your health, we urge you to discuss this note with your provider.    Paula Spencer PA-C  [facility]

## 2023-01-16 ENCOUNTER — TELEPHONE (OUTPATIENT)
Dept: FAMILY MEDICINE CLINIC | Facility: CLINIC | Age: 78
End: 2023-01-16
Payer: MEDICARE

## 2023-01-16 ENCOUNTER — OFFICE VISIT (OUTPATIENT)
Dept: FAMILY MEDICINE CLINIC | Facility: CLINIC | Age: 78
End: 2023-01-16
Payer: MEDICARE

## 2023-01-16 VITALS
RESPIRATION RATE: 16 BRPM | OXYGEN SATURATION: 97 % | WEIGHT: 165 LBS | TEMPERATURE: 98 F | SYSTOLIC BLOOD PRESSURE: 132 MMHG | DIASTOLIC BLOOD PRESSURE: 76 MMHG | HEART RATE: 75 BPM | BODY MASS INDEX: 32.22 KG/M2

## 2023-01-16 DIAGNOSIS — J10.1 INFLUENZA B: Primary | ICD-10-CM

## 2023-01-16 PROCEDURE — 99213 OFFICE O/P EST LOW 20 MIN: CPT

## 2023-01-16 NOTE — PROGRESS NOTES
Office Note     Name: Jo-Ann Krishnan    : 1945     MRN: [id number]     Chief Complaint  Influenza B follow-up    History of Present Illness:  Jo-Ann Krishnan is a 77 y.o. female who presents today for follow-up of Influenza B. She was seen in the office five days ago and tested positive for Influenza B. She was started on Augmentin.  She reports she is tolerating medication well.  She reports that she feels better today in comparison to yesterday, but still not 100%.  She reports that she does feel achy all over.  She is continuing to have an intermittently productive cough.  Her sister reports that she has been intermittently wheezy.  She has also been doing Flonase and nebulizer treatments.  She does report that the nebulizer treatments help chest tightness.  She does report experiencing some shortness of breath while she is coughing, but denies shortness of breath at rest or with activity.  She does report occasional chills, but has had no measured fever. She denies any chest pain.  She reports she is drinking plenty of water and is eating well.  She reports that her oxygen at home when she has checked it has been in the high 90s.  Of note, she has smoked cigarettes since the age of 14.      Subjective     Review of Systems:   Review of Systems   Constitutional: Positive for chills and fatigue. Negative for appetite change and fever.   HENT: Positive for congestion and rhinorrhea. Negative for sore throat.    Respiratory: Positive for cough, chest tightness and wheezing. Negative for shortness of breath.    Cardiovascular: Negative for chest pain and leg swelling.   Gastrointestinal: Negative for abdominal pain, constipation, diarrhea, nausea and vomiting.   Musculoskeletal: Positive for arthralgias and myalgias.   Neurological: Positive for headache. Negative for dizziness, syncope and light-headedness.       I have reviewed the patients family history, social history, past medical history,  past surgical history and have updated it as appropriate.     Past Medical History:   Past Medical History:   Diagnosis Date   • Anemia    • Arthritis    • Back problem    • CAD (coronary artery disease)    • Cancer (HCC)     Right breast   • Chronic back pain    • Chronically on opiate therapy    • Depression    • Diabetes mellitus (HCC)     DX 14 years ago- checks fsbs weekly   • Fibromyalgia    • Gastroparesis    • GERD (gastroesophageal reflux disease)    • Headache     emotional/tension   • History of transfusion     [facility]   • HTN (hypertension)    • Hypercholesteremia    • IBS (irritable bowel syndrome)    • Incontinence of urine     urgency   • Migraine headache    • Myalgia and myositis    • Peripheral neuropathy    • Sleep apnea     does not wear cpap   • UTI (urinary tract infection)        Past Surgical History:   Past Surgical History:   Procedure Laterality Date   • APPENDECTOMY     • ARTERIOGRAM MESENTERIC N/A 6/16/2022    Procedure: DIAGNOSTIC ARTERIOGRAM WITH CELIAC STENT PLACEMENT;  Surgeon: Neo Neil MD;  Location: [facility];  Service: Vascular;  Laterality: N/A;  FLUORO: 16 MIN  DOSE: 2384 MGY  CONTRAST:  20 ML   • BACK SURGERY      5x per patient   • BRAIN TUMOR EXCISION  1988   • BREAST BIOPSY     • CARPAL TUNNEL RELEASE Bilateral    • CHOLECYSTECTOMY     • COLONOSCOPY      2015   • CRANIOTOMY FOR TUMOR     • EYE SURGERY      bilateral cataracts removed   • HEMORRHOIDECTOMY     • LUMBAR FUSION N/A 01/04/2017    Procedure: LUMBAR LAMINECTOMY AND DECOMRESSION  L3 AND L4;  Surgeon: Nishant Bhatti MD;  Location: [facility];  Service:    • TOTAL ABDOMINAL HYSTERECTOMY     • TRIGGER FINGER RELEASE         Family History:   Family History   Problem Relation Age of Onset   • Cancer Other    • Diabetes Other    • Hyperlipidemia Other    • Heart attack Other    • Hypertension Other    • Heart attack Mother    • Diabetes Mother    • Heart disease Mother    • Hypertension  Mother    • Cancer Father    • Alcohol abuse Father    • Heart attack Sister    • Diabetes Sister    • Heart disease Sister    • Hypertension Sister    • Cancer Brother    • Diabetes Brother    • Hypertension Brother    • Heart attack Sister    • Stroke Sister    • Cancer Brother        Social History:   Social History     Socioeconomic History   • Marital status:    • Number of children: 2   Tobacco Use   • Smoking status: Every Day     Packs/day: 1.00     Years: 62.00     Pack years: 62.00     Types: Cigarettes     Passive exposure: Past   • Smokeless tobacco: Never   •         Vaping Use   • Vaping Use: Never used   Substance and Sexual Activity   • Alcohol use: No   • Drug use: No   • Sexual activity: Defer       Immunizations:   Immunization History   Administered Date(s) Administered   • COVID-19 (VINNIE) 03/16/2021   • COVID-19 (UNSPECIFIED) 03/01/2021, 04/01/2021   • Fluzone High Dose =>65 Years ([employer] ONLY) 10/16/2018   • Fluzone High-Dose 65+yrs 10/31/2022   • Fluzone Quad >6mos (Multi-dose) 11/15/2016, 02/05/2020   • Pneumococcal Conjugate 13-Valent (PCV13) 07/07/2016   • Pneumococcal Polysaccharide (PPSV23) 10/31/2022        Medications:     Current Outpatient Medications:   •  acetaminophen (TYLENOL) 500 MG tablet, Take 500 mg by mouth Every 6 (Six) Hours As Needed for Mild Pain ., Disp: , Rfl:   •  albuterol (PROVENTIL) (2.5 MG/3ML) 0.083% nebulizer solution, Take 2.5 mg by nebulization Every 4 (Four) Hours As Needed for wheezing or shortness of air., Disp: , Rfl:   •  albuterol sulfate  (90 Base) MCG/ACT inhaler, Inhale 2 puffs Every 4 (Four) Hours As Needed for Wheezing., Disp: , Rfl:   •  amoxicillin-clavulanate (Augmentin) 875-125 MG per tablet, Take 1 tablet by mouth 2 (Two) Times a Day., Disp: 20 tablet, Rfl: 0  •  aspirin 81 MG EC tablet, Take 81 mg by mouth daily., Disp: , Rfl:   •  atorvastatin (LIPITOR) 40 MG tablet, TAKE ONE TABLET BY MOUTH EVERY DAY, Disp: 30 tablet, Rfl:  3  •  atorvastatin (LIPITOR) 40 MG tablet, Take 40 mg by mouth., Disp: , Rfl:   •  busPIRone (BUSPAR) 5 MG tablet, Take 5 mg by mouth 3 (Three) Times a Day., Disp: , Rfl:   •  carvedilol (COREG) 25 MG tablet, TAKE ONE TABLET BY MOUTH TWO TIMES A DAY, Disp: 180 tablet, Rfl: 11  •  clopidogrel (PLAVIX) 75 MG tablet, Take 1 tablet by mouth Daily., Disp: 30 tablet, Rfl: 6  •  Comfort EZ Pen Needles 32G X 4 MM misc, , Disp: , Rfl:   •  Comfort EZ Pen Needles 32G X 4 MM misc, USE AS DIRECTED DAILY, Disp: , Rfl:   •  Continuous Blood Gluc  (Dexcom G6 ) device, 1 each Daily., Disp: 1 each, Rfl: 11  •  Continuous Blood Gluc Sensor (Dexcom G6 Sensor), Every 10 (Ten) Days., Disp: 2 each, Rfl: 11  •  cyanocobalamin ([employer] Vitamin B-12) 1000 MCG tablet, Take 2,500 mcg by mouth., Disp: , Rfl:   •  Cyanocobalamin (VITAMIN B-12 PO), Take 2,500 mcg by mouth Daily., Disp: , Rfl:   •  Diclofenac Sodium (VOLTAREN) 1 % gel gel, APPLY TO AFFECTED AREA FOUR TIMES DAILY, Disp: , Rfl:   •  DULoxetine (CYMBALTA) 60 MG capsule, TAKE ONE CAPSULE BY MOUTH EVERY DAY, Disp: 30 capsule, Rfl: 11  •  Ferrous Sulfate 27 MG tablet, Take 1 tablet by mouth Daily., Disp: , Rfl:   •  fluticasone (FLONASE) 50 MCG/ACT nasal spray, 2 sprays by Each Nare route Daily., Disp: , Rfl:   •  fluticasone (FLONASE) 50 MCG/ACT nasal spray, 1 spray into the nostril(s) as directed by provider Daily., Disp: , Rfl:   •  insulin aspart (novoLOG) 100 UNIT/ML injection, Inject 10 Units under the skin into the appropriate area as directed 3 (Three) Times a Day Before Meals., Disp: 15 mL, Rfl: 12  •  Insulin Glargine, 1 Unit Dial, (Toujeo SoloStar) 300 UNIT/ML solution pen-injector injection, Inject 12 Units under the skin into the appropriate area as directed 2 (Two) Times a Day. 10 units nightly, Disp: , Rfl:   •  isosorbide mononitrate (IMDUR) 60 MG 24 hr tablet, TAKE ONE TABLET DAILY, Disp: 30 tablet, Rfl: 12  •  losartan (COZAAR) 50 MG tablet, Take 50 mg by  "mouth Daily., Disp: , Rfl:   •  multivitamin with minerals tablet tablet, Take 1 tablet by mouth Daily., Disp: , Rfl:   •  naloxone (NARCAN) 4 MG/0.1ML nasal spray, 1 spray into each nostril As Needed., Disp: , Rfl:   •  nicotine (Nicoderm CQ) 21 MG/24HR patch, Place 1 patch on the skin as directed by provider Daily., Disp: 28 patch, Rfl: 0  •  pantoprazole (PROTONIX) 40 MG EC tablet, Take 40 mg by mouth Daily., Disp: , Rfl:   •  SITagliptin (JANUVIA) 50 MG tablet, Take 50 mg by mouth Daily., Disp: , Rfl:   •  traMADol (ULTRAM) 50 MG tablet, Take 100 mg by mouth 2 (Two) Times a Day As Needed., Disp: , Rfl:   •  True Metrix Blood Glucose Test test strip, Daily. for testing, Disp: , Rfl:     Current Facility-Administered Medications:   •  ipratropium-albuterol (DUO-NEB) nebulizer solution 3 mL, 3 mL, Nebulization, 4x Daily - RT, Aiden Spencer MD, 3 mL at 09/08/22 1328    Allergies:   Allergies   Allergen Reactions   • Ceclor [Cefaclor] Rash       Objective     Vital Signs  Vitals:    01/16/23 1558   BP: 132/76   BP Location: Left arm   Patient Position: Sitting   Cuff Size: Adult   Pulse: 75   Resp: 16   Temp: 98 °F (36.7 °C)   SpO2: 97%   Weight: 74.8 kg (165 lb)     Estimated body mass index is 32.22 kg/m² as calculated from the following:    Height as of 11/15/22: 152.4 cm (60\").    Weight as of this encounter: 74.8 kg (165 lb).          Physical Exam  Vitals and nursing note reviewed.   Constitutional:       General: She is not in acute distress.     Appearance: Normal appearance. She is not ill-appearing, toxic-appearing or diaphoretic.   HENT:      Head: Normocephalic and atraumatic.      Right Ear: Ear canal and external ear normal. Tympanic membrane is not perforated, erythematous, retracted or bulging.      Left Ear: Ear canal and external ear normal. Tympanic membrane is not perforated, erythematous, retracted or bulging.      Nose: No congestion or rhinorrhea.      Mouth/Throat:      Mouth: " Mucous membranes are moist.      Pharynx: Uvula midline. No pharyngeal swelling, oropharyngeal exudate or posterior oropharyngeal erythema.      Tonsils: No tonsillar exudate.   Eyes:      General:         Right eye: No discharge.         Left eye: No discharge.      Extraocular Movements: Extraocular movements intact.   Cardiovascular:      Rate and Rhythm: Normal rate and regular rhythm.      Heart sounds: No murmur heard.    No friction rub. No gallop.   Pulmonary:      Effort: No respiratory distress.      Breath sounds: No wheezing, rhonchi or rales.      Comments: Decreased breath sounds throughout  Musculoskeletal:      Cervical back: Normal range of motion. No rigidity.      Right lower leg: No edema.      Left lower leg: No edema.   Skin:     General: Skin is warm.   Neurological:      Mental Status: She is alert.      Gait: Gait normal.   Psychiatric:         Mood and Affect: Mood normal.         Thought Content: Thought content normal.          Assessment and Plan     1. Influenza B  -Vitals within normal limits  -Finish course of Augmentin.  -Chest x-ray completed today.  We will contact her when we have results.  -Continue with supportive treatment, increasing hydration, Vicks vapor rub, nebulizer treatments 2-3 times per day, heating pads for body aches.  -She should return to care if symptoms worsen, she develops shortness of air, high fever or chills, cough becomes more frequent and more productive, or any other new concerning symptom.  -My supervising physician, her PCP, did perform a brief physical exam on the patient.  He is in agreement with current treatment plan.       Follow Up  Return if symptoms worsen or fail to improve.    Ritu Gómez PA-C  [facility] [place] [facility]

## 2023-01-19 ENCOUNTER — TELEPHONE (OUTPATIENT)
Dept: FAMILY MEDICINE CLINIC | Facility: CLINIC | Age: 78
End: 2023-01-19

## 2023-02-16 ENCOUNTER — OFFICE VISIT (OUTPATIENT)
Dept: FAMILY MEDICINE CLINIC | Facility: CLINIC | Age: 78
End: 2023-02-16
Payer: MEDICARE

## 2023-02-16 VITALS
RESPIRATION RATE: 17 BRPM | SYSTOLIC BLOOD PRESSURE: 110 MMHG | HEIGHT: 60 IN | WEIGHT: 159 LBS | OXYGEN SATURATION: 99 % | TEMPERATURE: 98 F | BODY MASS INDEX: 31.22 KG/M2 | DIASTOLIC BLOOD PRESSURE: 70 MMHG | HEART RATE: 68 BPM

## 2023-02-16 DIAGNOSIS — F17.210 CIGARETTE NICOTINE DEPENDENCE WITHOUT COMPLICATION: ICD-10-CM

## 2023-02-16 DIAGNOSIS — Z12.11 COLON CANCER SCREENING: ICD-10-CM

## 2023-02-16 DIAGNOSIS — Z91.81 AT HIGH RISK FOR FALLS: ICD-10-CM

## 2023-02-16 DIAGNOSIS — D50.9 IRON DEFICIENCY ANEMIA, UNSPECIFIED IRON DEFICIENCY ANEMIA TYPE: ICD-10-CM

## 2023-02-16 DIAGNOSIS — I10 PRIMARY HYPERTENSION: ICD-10-CM

## 2023-02-16 DIAGNOSIS — E11.43 TYPE 2 DIABETES MELLITUS WITH DIABETIC AUTONOMIC NEUROPATHY, WITH LONG-TERM CURRENT USE OF INSULIN: ICD-10-CM

## 2023-02-16 DIAGNOSIS — E78.2 MIXED HYPERLIPIDEMIA: ICD-10-CM

## 2023-02-16 DIAGNOSIS — Z79.4 TYPE 2 DIABETES MELLITUS WITH DIABETIC AUTONOMIC NEUROPATHY, WITH LONG-TERM CURRENT USE OF INSULIN: ICD-10-CM

## 2023-02-16 DIAGNOSIS — J44.1 CHRONIC OBSTRUCTIVE PULMONARY DISEASE WITH ACUTE EXACERBATION: ICD-10-CM

## 2023-02-16 DIAGNOSIS — Z00.00 WELL ADULT EXAM: Primary | ICD-10-CM

## 2023-02-16 LAB
DEPRECATED RDW RBC AUTO: 46.8 FL (ref 37–54)
ERYTHROCYTE [DISTWIDTH] IN BLOOD BY AUTOMATED COUNT: 15 % (ref 12.3–15.4)
HBA1C MFR BLD: 7.6 % (ref 4.8–5.6)
HCT VFR BLD AUTO: 28.8 % (ref 34–46.6)
HGB BLD-MCNC: 9.3 G/DL (ref 12–15.9)
MCH RBC QN AUTO: 28.7 PG (ref 26.6–33)
MCHC RBC AUTO-ENTMCNC: 32.3 G/DL (ref 31.5–35.7)
MCV RBC AUTO: 88.9 FL (ref 79–97)
PLATELET # BLD AUTO: 186 10*3/MM3 (ref 140–450)
PMV BLD AUTO: 10.9 FL (ref 6–12)
RBC # BLD AUTO: 3.24 10*6/MM3 (ref 3.77–5.28)
WBC NRBC COR # BLD: 9 10*3/MM3 (ref 3.4–10.8)

## 2023-02-16 PROCEDURE — 80053 COMPREHEN METABOLIC PANEL: CPT | Performed by: FAMILY MEDICINE

## 2023-02-16 PROCEDURE — G0439 PPPS, SUBSEQ VISIT: HCPCS | Performed by: FAMILY MEDICINE

## 2023-02-16 PROCEDURE — 36415 COLL VENOUS BLD VENIPUNCTURE: CPT | Performed by: FAMILY MEDICINE

## 2023-02-16 PROCEDURE — 1170F FXNL STATUS ASSESSED: CPT | Performed by: FAMILY MEDICINE

## 2023-02-16 PROCEDURE — 1159F MED LIST DOCD IN RCRD: CPT | Performed by: FAMILY MEDICINE

## 2023-02-16 PROCEDURE — 85027 COMPLETE CBC AUTOMATED: CPT | Performed by: FAMILY MEDICINE

## 2023-02-16 PROCEDURE — 82728 ASSAY OF FERRITIN: CPT | Performed by: FAMILY MEDICINE

## 2023-02-16 PROCEDURE — 83540 ASSAY OF IRON: CPT | Performed by: FAMILY MEDICINE

## 2023-02-16 PROCEDURE — 80061 LIPID PANEL: CPT | Performed by: FAMILY MEDICINE

## 2023-02-16 PROCEDURE — 83036 HEMOGLOBIN GLYCOSYLATED A1C: CPT | Performed by: FAMILY MEDICINE

## 2023-02-16 PROCEDURE — 94010 BREATHING CAPACITY TEST: CPT | Performed by: FAMILY MEDICINE

## 2023-02-16 PROCEDURE — 99214 OFFICE O/P EST MOD 30 MIN: CPT | Performed by: FAMILY MEDICINE

## 2023-02-16 PROCEDURE — 84466 ASSAY OF TRANSFERRIN: CPT | Performed by: FAMILY MEDICINE

## 2023-02-16 RX ORDER — HYDROCODONE BITARTRATE AND ACETAMINOPHEN 7.5; 325 MG/1; MG/1
TABLET ORAL
COMMUNITY
Start: 2023-01-26

## 2023-02-16 NOTE — PATIENT INSTRUCTIONS
Fall Prevention in the Home, Adult  Falls can cause injuries and affect people of all ages. There are many simple things that you can do to make your home safe and to help prevent falls. Ask for help when making these changes, if needed.  What actions can I take to prevent falls?  General instructions  • Use good lighting in all rooms. Replace any light bulbs that burn out, turn on lights if it is dark, and use night-lights.  • Place frequently used items in easy-to-reach places. Lower the shelves around your home if necessary.  • Set up furniture so that there are clear paths around it. Avoid moving your furniture around.  • Remove throw rugs and other tripping hazards from the floor.  • Avoid walking on wet floors.  • Fix any uneven floor surfaces.  • Add color or contrast paint or tape to grab bars and handrails in your home. Place contrasting color strips on the first and last steps of staircases.  • When you use a stepladder, make sure that it is completely opened and that the sides and supports are firmly locked. Have someone hold the ladder while you are using it. Do not climb a closed stepladder.  • Know where your pets are when moving through your home.  What can I do in the bathroom?     • Keep the floor dry. Immediately clean up any water that is on the floor.  • Remove soap buildup in the tub or shower regularly.  • Use nonskid mats or decals on the floor of the tub or shower.  • Attach bath mats securely with double-sided, nonslip rug tape.  • If you need to sit down while you are in the shower, use a plastic, nonslip stool.  • Install grab bars by the toilet and in the tub and shower. Do not use towel bars as grab bars.  What can I do in the bedroom?  • Make sure that a bedside light is easy to reach.  • Do not use oversized bedding that reaches the floor.  • Have a firm chair that has side arms to use for getting dressed.  What can I do in the kitchen?  • Clean up any spills right away.  • If you  need to reach for something above you, use a sturdy step stool that has a grab bar.  • Keep electrical cables out of the way.  • Do not use floor polish or wax that makes floors slippery. If you must use wax, make sure that it is non-skid floor wax.  What can I do with my stairs?  • Do not leave any items on the stairs.  • Make sure that you have a light switch at the top and the bottom of the stairs. Have them installed if you do not have them.  • Make sure that there are handrails on both sides of the stairs. Fix handrails that are broken or loose. Make sure that handrails are as long as the staircases.  • Install non-slip stair treads on all stairs in your home.  • Avoid having throw rugs at the top or bottom of stairs, or secure the rugs with carpet tape to prevent them from moving.  • Choose a carpet design that does not hide the edge of steps on the stairs.  • Check any carpeting to make sure that it is firmly attached to the stairs. Fix any carpet that is loose or worn.  What can I do on the outside of my home?  • Use bright outdoor lighting.  • Regularly repair the edges of walkways and driveways and fix any cracks.  • Remove high doorway thresholds.  • Trim any shrubbery on the main path into your home.  • Regularly check that handrails are securely fastened and in good repair. Both sides of all steps should have handrails.  • Install guardrails along the edges of any raised decks or porches.  • Clear walkways of debris and clutter, including tools and rocks.  • Have leaves, snow, and ice cleared regularly.  • Use sand or salt on walkways during winter months.  • In the garage, clean up any spills right away, including grease or oil spills.  What other actions can I take?  • Wear closed-toe shoes that fit well and support your feet. Wear shoes that have rubber soles or low heels.  • Use mobility aids as needed, such as canes, walkers, scooters, and crutches.  • Review your medicines with your health care  provider. Some medicines can cause dizziness or changes in blood pressure, which increase your risk of falling.  Talk with your health care provider about other ways that you can decrease your risk of falls. This may include working with a physical therapist or  to improve your strength, balance, and endurance.  Where to find more information  • Centers for Disease Control and Prevention, STEAD[place]: www.cdc.gov  • National [place] on Aging: www.scotty.nih.gov  Contact a health care provider if:  • You are afraid of falling at home.  • You feel weak, drowsy, or dizzy at home.  • You fall at home.  Summary  • There are many simple things that you can do to make your home safe and to help prevent falls.  • Ways to make your home safe include removing tripping hazards and installing grab bars in the bathroom.  • Ask for help when making these changes in your home.  This information is not intended to replace advice given to you by your health care provider. Make sure you discuss any questions you have with your health care provider.  Document Revised: 09/19/2022 Document Reviewed: 07/21/2021  Elsevier Patient Education © 2022 [employer]

## 2023-02-16 NOTE — PROGRESS NOTES
The ABCs of the Annual Wellness Visit  Subsequent Medicare Wellness Visit    Subjective    Jo-Ann Krishnan is a 77 y.o. female who presents for a Subsequent Medicare Wellness Visit.  Patient has taken her medications and has not had any side effects currently.  She does continue with her usual appetite, activity and sleep.    The following portions of the patient's history were reviewed and   updated as appropriate: allergies, current medications, past family history, past medical history, past social history, past surgical history and problem list.    Compared to one year ago, the patient feels her physical   health is the same.    Compared to one year ago, the patient feels her mental   health is the same.    Recent Hospitalizations:  She was not admitted to the hospital during the last year.       Current Medical Providers:  Patient Care Team:  Aiden Spencer MD as PCP - General (Family Medicine)  Sujatha Carmona MD as Consulting Physician (Pain Medicine)  Cr De Jesus MD as Consulting Physician (Gastroenterology)  Noemí Machuca APRN as Nurse Practitioner (Nurse Practitioner)    Outpatient Medications Prior to Visit   Medication Sig Dispense Refill   • acetaminophen (TYLENOL) 500 MG tablet Take 500 mg by mouth Every 6 (Six) Hours As Needed for Mild Pain .     • albuterol (PROVENTIL) (2.5 MG/3ML) 0.083% nebulizer solution Take 2.5 mg by nebulization Every 4 (Four) Hours As Needed for wheezing or shortness of air.     • albuterol sulfate  (90 Base) MCG/ACT inhaler Inhale 2 puffs Every 4 (Four) Hours As Needed for Wheezing.     • aspirin 81 MG EC tablet Take 81 mg by mouth daily.     • atorvastatin (LIPITOR) 40 MG tablet TAKE ONE TABLET BY MOUTH EVERY DAY 30 tablet 3   • busPIRone (BUSPAR) 5 MG tablet Take 5 mg by mouth 3 (Three) Times a Day.     • carvedilol (COREG) 25 MG tablet TAKE ONE TABLET BY MOUTH TWO TIMES A  tablet 11   • clopidogrel (PLAVIX) 75 MG tablet Take  1 tablet by mouth Daily. 30 tablet 6   • Comfort EZ Pen Needles 32G X 4 MM misc      • Comfort EZ Pen Needles 32G X 4 MM misc USE AS DIRECTED DAILY     • Continuous Blood Gluc  (Dexcom G6 ) device 1 each Daily. 1 each 11   • Continuous Blood Gluc Sensor (Dexcom G6 Sensor) Every 10 (Ten) Days. 2 each 11   • Cyanocobalamin (VITAMIN B-12 PO) Take 2,500 mcg by mouth Daily.     • Diclofenac Sodium (VOLTAREN) 1 % gel gel APPLY TO AFFECTED AREA FOUR TIMES DAILY     • DULoxetine (CYMBALTA) 60 MG capsule TAKE ONE CAPSULE BY MOUTH EVERY DAY 30 capsule 11   • fluticasone (FLONASE) 50 MCG/ACT nasal spray 1 spray into the nostril(s) as directed by provider Daily.     • HYDROcodone-acetaminophen (NORCO) 7.5-325 MG per tablet TAKE ONE TABLET TWO TIMES A DAY AS NEEDED FOR PAIN     • isosorbide mononitrate (IMDUR) 60 MG 24 hr tablet TAKE ONE TABLET DAILY 30 tablet 12   • losartan (COZAAR) 50 MG tablet Take 50 mg by mouth Daily.     • multivitamin with minerals tablet tablet Take 1 tablet by mouth Daily.     • naloxone (NARCAN) 4 MG/0.1ML nasal spray 1 spray into each nostril As Needed.     • pantoprazole (PROTONIX) 40 MG EC tablet Take 40 mg by mouth Daily.     • SITagliptin (JANUVIA) 50 MG tablet Take 50 mg by mouth Daily.     • True Metrix Blood Glucose Test test strip Daily. for testing     • amoxicillin-clavulanate (Augmentin) 875-125 MG per tablet Take 1 tablet by mouth 2 (Two) Times a Day. 20 tablet 0   • atorvastatin (LIPITOR) 40 MG tablet Take 40 mg by mouth.     • cyanocobalamin ([employer] Vitamin B-12) 1000 MCG tablet Take 2,500 mcg by mouth.     • Ferrous Sulfate 27 MG tablet Take 1 tablet by mouth Daily.     • fluticasone (FLONASE) 50 MCG/ACT nasal spray 2 sprays by Each Nare route Daily.     • insulin aspart (novoLOG) 100 UNIT/ML injection Inject 10 Units under the skin into the appropriate area as directed 3 (Three) Times a Day Before Meals. 15 mL 12   • Insulin Glargine, 1 Unit Dial, (Toujeo SoloStar) 300  "UNIT/ML solution pen-injector injection Inject 12 Units under the skin into the appropriate area as directed 2 (Two) Times a Day. 10 units nightly     • nicotine (Nicoderm CQ) 21 MG/24HR patch Place 1 patch on the skin as directed by provider Daily. 28 patch 0   • traMADol (ULTRAM) 50 MG tablet Take 100 mg by mouth 2 (Two) Times a Day As Needed.       Facility-Administered Medications Prior to Visit   Medication Dose Route Frequency Provider Last Rate Last Admin   • ipratropium-albuterol (DUO-NEB) nebulizer solution 3 mL  3 mL Nebulization 4x Daily - RT Aiden Spencer MD   3 mL at 09/08/22 1328       Opioid medication/s are on active medication list.  and I have evaluated her active treatment plan and pain score trends (see table).  There were no vitals filed for this visit.  I have reviewed the chart for potential of high risk medication and harmful drug interactions in the elderly.            Aspirin is on active medication list. Aspirin use is not indicated based on review of current medical condition/s. Risk of harm outweighs potential benefits. Patient instructed to discontinue this medication.  .      Patient Active Problem List   Diagnosis   • Cerebral vascular disease   • Degenerative disc disease, lumbar   • Degenerative disc disease, cervical   • Spinal stenosis, lumbar region, with neurogenic claudication   • Chronic obstructive pulmonary disease with acute exacerbation (HCC)   • Tobacco abuse     Advance Care Planning  Advance Directive is not on file.  ACP discussion was held with the patient during this visit. Patient does not have an advance directive, information provided.     Objective    Vitals:    02/16/23 1349   BP: 110/70   BP Location: Left arm   Patient Position: Sitting   Cuff Size: Adult   Pulse: 68   Resp: 17   Temp: 98 °F (36.7 °C)   SpO2: 99%   Weight: 72.1 kg (159 lb)   Height: 152.4 cm (60\")     Estimated body mass index is 31.05 kg/m² as calculated from the following:    " "Height as of this encounter: 152.4 cm (60\").    Weight as of this encounter: 72.1 kg (159 lb).    BMI is >= 30 and <35. (Class 1 Obesity). The following options were offered after discussion;: exercise counseling/recommendations and nutrition counseling/recommendations      Does the patient have evidence of cognitive impairment? No    Lab Results   Component Value Date    TRIG 112 02/16/2023    HDL 41 02/16/2023    LDL 57 02/16/2023    VLDL 21 02/16/2023    HGBA1C 7.60 (H) 02/16/2023        HEALTH RISK ASSESSMENT    Smoking Status:  Social History     Tobacco Use   Smoking Status Every Day   • Packs/day: 1.00   • Years: 62.00   • Pack years: 62.00   • Types: Cigarettes   • Passive exposure: Past   Smokeless Tobacco Never   Tobacco Comments    1/17/2022 quit      Alcohol Consumption:  Social History     Substance and Sexual Activity   Alcohol Use No     Fall Risk Screen:    STEADI Fall Risk Assessment was completed, and patient is at HIGH risk for falls. Assessment completed on:2/16/2023    Depression Screening:  PHQ-2/PHQ-9 Depression Screening 9/7/2022   Little Interest or Pleasure in Doing Things 0-->not at all   Feeling Down, Depressed or Hopeless 0-->not at all   PHQ-9: Brief Depression Severity Measure Score 0       Health Habits and Functional and Cognitive Screening:  Functional & Cognitive Status 2/16/2023   Do you have difficulty preparing food and eating? No   Do you have difficulty bathing yourself, getting dressed or grooming yourself? No   Do you have difficulty using the toilet? No   Do you have difficulty moving around from place to place? No   Do you have trouble with steps or getting out of a bed or a chair? No   Current Diet Well Balanced Diet   Dental Exam Up to date   Eye Exam Up to date   Exercise (times per week) 0 times per week   Current Exercises Include No Regular Exercise   Do you need help using the phone?  No   Are you deaf or do you have serious difficulty hearing?  No   Do you need " help with transportation? Yes   Do you need help shopping? Yes   Do you need help preparing meals?  Yes   Do you need help with housework?  Yes   Do you need help with laundry? No   Do you need help taking your medications? Yes   Do you need help managing money? No   Do you ever drive or ride in a car without wearing a seat belt? No   Have you felt unusual stress, anger or loneliness in the last month? No   Who do you live with? Sibling   If you need help, do you have trouble finding someone available to you? No   Have you been bothered in the last four weeks by sexual problems? No   Do you have difficulty concentrating, remembering or making decisions? No       Age-appropriate Screening Schedule:  Refer to the list below for future screening recommendations based on patient's age, sex and/or medical conditions. Orders for these recommended tests are listed in the plan section. The patient has been provided with a written plan.    Health Maintenance   Topic Date Due   • DIABETIC EYE EXAM  Never done   • TDAP/TD VACCINES (1 - Tdap) 02/16/2024 (Originally 3/6/1964)   • ZOSTER VACCINE (1 of 2) 02/16/2024 (Originally [birth year]/6/1995)   • HEMOGLOBIN A1C  08/16/2023   • URINE MICROALBUMIN  11/01/2023   • LIPID PANEL  02/16/2024   • MAMMOGRAM  05/03/2024   • DXA SCAN  01/16/2025   • INFLUENZA VACCINE  Completed                CMS Preventative Services Quick Reference  Risk Factors Identified During Encounter  Fall Risk-High or Moderate: Discussed Fall Prevention in the home and Information on Fall Prevention Shared in After Visit Summary  The above risks/problems have been discussed with the patient.  Pertinent information has been shared with the patient in the After Visit Summary.  An After Visit Summary and PPPS were made available to the patient.    Follow Up:   Next Medicare Wellness visit to be scheduled in 1 year.       Additional E&M Note during same encounter follows:  Patient has multiple medical problems which are  significant and separately identifiable that require additional work above and beyond the Medicare Wellness Visit.      Chief Complaint  Medicare Wellness-subsequent (Bilateral feet pain and low back pain    pt is walking with cane today)    Subjective        HPI  Jo-Ann Krishnan is also being seen today for evaluation of her multiple medical conditions.  She does continue to have diffuse arthritis symptomatology with the back pain joint swelling muscle pains as well as difficulty walking.  She does have problems with her sleep secondary to this and will occasionally have dizziness and weakness most likely due to her history of narcolepsy.  She is following Dr. Machado at present time.  He has not suggested surgery currently but he is in the process of making arrangements for her to receive injections.  She has been receiving pain medicine from him but she rarely takes this due to some of the side effects.  She does continue to have problems with constipation probably a result of her multiple medications.  She does have intermittent cough but she does continue to smoke.  Leg swelling has been an issue but not currently.  She has continued to take her medications as prescribed except for her diabetic medications.  She has been trying to modify her diet but has not been checking her blood sugars nor has she been receiving her insulin.  She has been doing better with respect to her alertness.  She does continue to use the cane as she walks.  She has been receiving the iron infusion by her nephrologist but also continues to take her oral supplementation.    Review of Systems   Constitutional: Negative for activity change, appetite change and fatigue.   Respiratory: Positive for cough. Negative for chest tightness, shortness of breath and wheezing.    Cardiovascular: Positive for leg swelling. Negative for chest pain and palpitations.   Gastrointestinal: Positive for constipation. Negative for abdominal distention,  "abdominal pain, blood in stool, diarrhea, nausea and vomiting.   Genitourinary: Positive for enuresis. Negative for difficulty urinating, dysuria, frequency, hematuria and urgency.   Musculoskeletal: Positive for arthralgias, back pain, gait problem, joint swelling and myalgias.   Neurological: Positive for dizziness and weakness. Negative for tremors, syncope and light-headedness.   Psychiatric/Behavioral: Positive for sleep disturbance. Negative for dysphoric mood. The patient is not nervous/anxious.        Objective   Vital Signs:  /70 (BP Location: Left arm, Patient Position: Sitting, Cuff Size: Adult)   Pulse 68   Temp 98 °F (36.7 °C)   Resp 17   Ht 152.4 cm (60\")   Wt 72.1 kg (159 lb)   SpO2 99%   BMI 31.05 kg/m²     Physical Exam  Vitals and nursing note reviewed.   Constitutional:       Appearance: Normal appearance.   HENT:      Head: Normocephalic and atraumatic.      Nose: Nose normal.      Mouth/Throat:      Pharynx: Oropharynx is clear.   Eyes:      Extraocular Movements: Extraocular movements intact.      Pupils: Pupils are equal, round, and reactive to light.   Neck:      Thyroid: No thyroid mass or thyromegaly.      Trachea: Trachea normal.   Cardiovascular:      Rate and Rhythm: Normal rate and regular rhythm.      Pulses: Normal pulses. No decreased pulses.      Heart sounds: Normal heart sounds.   Pulmonary:      Effort: Pulmonary effort is normal.      Breath sounds: Normal breath sounds.   Abdominal:      General: Abdomen is flat. Bowel sounds are normal.      Palpations: Abdomen is soft.      Tenderness: There is no abdominal tenderness.   Musculoskeletal:      Cervical back: Neck supple.      Right lower leg: No edema.      Left lower leg: No edema.   Lymphadenopathy:      Cervical: No cervical adenopathy.   Skin:     General: Skin is warm and dry.   Neurological:      General: No focal deficit present.      Mental Status: She is alert and oriented to person, place, and time.      " Sensory: Sensation is intact.      Motor: Motor function is intact.      Coordination: Coordination is intact.   Psychiatric:         Attention and Perception: Attention normal.         Mood and Affect: Mood normal.         Speech: Speech normal.         Behavior: Behavior normal.                Pulmonary Function Test  Performed by: Aiden Spencer MD  Authorized by: Aiden Spencer MD       Interpretation   Overall comments: Poor effort.  Please refer to scanned document.            Assessment and Plan   Diagnoses and all orders for this visit:    1. Well adult exam (Primary)   Patient did have a well adult exam today that did not reveal any increase in her anxiety and depression.  Her pain has been under relatively good control at present time.  She is still seeing Dr. Machado for this and will be having injections in the near future.  Patient is at increasing fall risk.  She also does continue to smoke and we have discussed anticipatory guidance as well as safety issues.  Patient does not wish to have a colonoscopy performed.  We have encouraged her to contact the pharmacist with respect to the shingle vaccine and tetanus booster.    2. Type 2 diabetes mellitus with diabetic autonomic neuropathy, with long-term current use of insulin (HCC)   Patient has not been taking her medication as prescribed.  We will obtain a level and if it does show that there is some elevation we will try to reinitiate treatment.  She cannot take certain medications because of tolerance as well has her renal insufficiency.  We may just have to go back on a basal insulin.  -     Hemoglobin A1c; Future  -     Hemoglobin A1c    3. Mixed hyperlipidemia   Patient has tolerated her lipid lowering agent currently.  Since she has had previous CVA as well as diabetes we will continue to adjust to keep her LDL is less than 70  .-     Comprehensive Metabolic Panel; Future  -     Lipid Panel; Future  -     Comprehensive  Metabolic Panel  -     Lipid Panel    4. Primary hypertension   Patient's blood pressure is doing well present time.  No adjustments are necessary currently.    5. Iron deficiency anemia, unspecified iron deficiency anemia type   Patient is receiving IV iron.  We have advised that she may discontinue her on oral supplementation.  We will obtain iron levels and will pursue as necessary.  -     CBC (No Diff); Future  -     Iron; Future  -     Ferritin; Future  -     Iron Profile; Future  -     CBC (No Diff)  -     Cancel: Iron  -     Ferritin  -     Iron Profile    6. Chronic obstructive pulmonary disease with acute exacerbation (HCC)   Patient did have a PFT today.  She did not do very well on the technique can I am uncertain if we can completely rely on its value.  She did show some obstructive component but has a little bit of restrictive component possibly due to her body habitus.  We will encouraged her to continue to wean off of her cigarettes.  She has not used any eardrops at her in quite some times and does not necessarily wish to restart currently.  We will monitor her symptoms and if she does have worsening symptoms of her COPD or an exacerbation we will initiate at that time.  -     Pulmonary Function Test    7. Cigarette nicotine dependence without complication   Patient does continue to smoke.  We have again encouraged her to discontinue her smoking.  We we will try to send in patches to see if this is of benefit.  She has used this in the past with some success.  Patient did have a CT of her chest obtained September 4, 2022.  Patient will be turning 78 in several weeks.  We will still try to obtain a repeat CT scan this fall.    8. Colon cancer screening   Patient does not wish to have any colon cancer screening performed at present time.    9. At high risk for falls   Patient does have a high risk of falls but this has improved since last time.  She is more alert and has not been taking as many  medications that has been causing drowsiness.  We did encourage her to continue to use the fall preventions at home.  She has not received recent physical therapy and does believe it does help on occasion.  We will continue to monitor symptoms and treat accordingly.             Follow Up   Return in about 3 months (around 5/16/2023) for Recheck.  Patient was given instructions and counseling regarding her condition or for health maintenance advice. Please see specific information pulled into the AVS if appropriate.

## 2023-02-17 LAB
ALBUMIN SERPL-MCNC: 4 G/DL (ref 3.5–5.2)
ALBUMIN/GLOB SERPL: 1.2 G/DL
ALP SERPL-CCNC: 94 U/L (ref 39–117)
ALT SERPL W P-5'-P-CCNC: 6 U/L (ref 1–33)
ANION GAP SERPL CALCULATED.3IONS-SCNC: 7.2 MMOL/L (ref 5–15)
AST SERPL-CCNC: 17 U/L (ref 1–32)
BILIRUB SERPL-MCNC: <0.2 MG/DL (ref 0–1.2)
BUN SERPL-MCNC: 23 MG/DL (ref 8–23)
BUN/CREAT SERPL: 11.1 (ref 7–25)
CALCIUM SPEC-SCNC: 9 MG/DL (ref 8.6–10.5)
CHLORIDE SERPL-SCNC: 106 MMOL/L (ref 98–107)
CHOLEST SERPL-MCNC: 119 MG/DL (ref 0–200)
CO2 SERPL-SCNC: 24.8 MMOL/L (ref 22–29)
CREAT SERPL-MCNC: 2.08 MG/DL (ref 0.57–1)
EGFRCR SERPLBLD CKD-EPI 2021: 23.8 ML/MIN/1.73
FERRITIN SERPL-MCNC: 1319 NG/ML (ref 13–150)
GLOBULIN UR ELPH-MCNC: 3.4 GM/DL
GLUCOSE SERPL-MCNC: 133 MG/DL (ref 65–99)
HDLC SERPL-MCNC: 41 MG/DL (ref 40–60)
IRON 24H UR-MRATE: 126 MCG/DL (ref 37–145)
IRON SATN MFR SERPL: 49 % (ref 20–50)
LDLC SERPL CALC-MCNC: 57 MG/DL (ref 0–100)
LDLC/HDLC SERPL: 1.36 {RATIO}
POTASSIUM SERPL-SCNC: 3.9 MMOL/L (ref 3.5–5.2)
PROT SERPL-MCNC: 7.4 G/DL (ref 6–8.5)
SODIUM SERPL-SCNC: 138 MMOL/L (ref 136–145)
TIBC SERPL-MCNC: 258 MCG/DL (ref 298–536)
TRANSFERRIN SERPL-MCNC: 173 MG/DL (ref 200–360)
TRIGL SERPL-MCNC: 112 MG/DL (ref 0–150)
VLDLC SERPL-MCNC: 21 MG/DL (ref 5–40)

## 2023-02-17 RX ORDER — INSULIN GLARGINE 300 U/ML
10 INJECTION, SOLUTION SUBCUTANEOUS NIGHTLY
Qty: 15 ML | Refills: 11
Start: 2023-02-17

## 2023-02-17 RX ORDER — NICOTINE 21 MG/24HR
1 PATCH, TRANSDERMAL 24 HOURS TRANSDERMAL EVERY 24 HOURS
Qty: 28 EACH | Refills: 0 | Status: SHIPPED | OUTPATIENT
Start: 2023-02-17

## 2023-02-17 RX ORDER — LOSARTAN POTASSIUM 50 MG/1
TABLET ORAL
Qty: 90 TABLET | Refills: 3 | Status: SHIPPED | OUTPATIENT
Start: 2023-02-17

## 2023-04-03 ENCOUNTER — TELEPHONE (OUTPATIENT)
Dept: FAMILY MEDICINE CLINIC | Facility: CLINIC | Age: 78
End: 2023-04-03
Payer: MEDICARE

## 2023-04-03 DIAGNOSIS — Z91.81 AT HIGH RISK FOR FALLS: Primary | ICD-10-CM

## 2023-04-03 DIAGNOSIS — M51.36 DEGENERATIVE DISC DISEASE, LUMBAR: ICD-10-CM

## 2023-04-03 DIAGNOSIS — M81.0 SENILE OSTEOPOROSIS: ICD-10-CM

## 2023-04-03 NOTE — TELEPHONE ENCOUNTER
Caller: AKHIL    Relationship to patient: Niece/Nephew    Best call back number: [phone]    Patient is needing: AKHIL STATED THAT PATIENT WOULD NEED A LIFT CHAIR AND WOULD LIKE TO HAVE IT WRITTEN OUT SO THAT SHE COULD COME GET IT TO TAKE IT TO WHEREVER SHE NEEDS TO GET PATIENT ONE    PLEASE ADVISE

## 2023-04-05 DIAGNOSIS — I67.9 CEREBRAL VASCULAR DISEASE: Primary | ICD-10-CM

## 2023-04-10 ENCOUNTER — TELEPHONE (OUTPATIENT)
Dept: FAMILY MEDICINE CLINIC | Facility: CLINIC | Age: 78
End: 2023-04-10
Payer: MEDICARE

## 2023-04-10 DIAGNOSIS — M48.062 SPINAL STENOSIS, LUMBAR REGION, WITH NEUROGENIC CLAUDICATION: Primary | ICD-10-CM

## 2023-04-24 ENCOUNTER — TELEPHONE (OUTPATIENT)
Dept: FAMILY MEDICINE CLINIC | Facility: CLINIC | Age: 78
End: 2023-04-24
Payer: MEDICARE

## 2023-04-24 RX ORDER — ALBUTEROL SULFATE 90 UG/1
2 AEROSOL, METERED RESPIRATORY (INHALATION) EVERY 6 HOURS PRN
Qty: 18 G | Refills: 11 | Status: SHIPPED | OUTPATIENT
Start: 2023-04-24

## 2023-04-24 NOTE — TELEPHONE ENCOUNTER
Incoming Refill Request      Medication requested (name and dose): VENTOLIN INHALER     Pharmacy where request should be sent: [facility]    Additional details provided by patient: NA    Best call back number: [phone]    Does the patient have less than a 3 day supply:  [x] Yes  [] No    Marlin Still Rep  04/24/23, 08:08 EDT

## 2023-05-09 ENCOUNTER — OFFICE VISIT (OUTPATIENT)
Dept: FAMILY MEDICINE CLINIC | Facility: CLINIC | Age: 78
End: 2023-05-09
Payer: MEDICARE

## 2023-05-09 VITALS
RESPIRATION RATE: 20 BRPM | WEIGHT: 158 LBS | TEMPERATURE: 97 F | SYSTOLIC BLOOD PRESSURE: 140 MMHG | DIASTOLIC BLOOD PRESSURE: 82 MMHG | BODY MASS INDEX: 31.02 KG/M2 | HEIGHT: 60 IN | HEART RATE: 82 BPM | OXYGEN SATURATION: 97 %

## 2023-05-09 DIAGNOSIS — J44.1 CHRONIC OBSTRUCTIVE PULMONARY DISEASE WITH ACUTE EXACERBATION: Primary | ICD-10-CM

## 2023-05-09 DIAGNOSIS — R06.00 ACUTE DYSPNEA: ICD-10-CM

## 2023-05-09 RX ORDER — PREDNISONE 10 MG/1
60 TABLET ORAL DAILY
Qty: 18 TABLET | Refills: 0 | Status: SHIPPED | OUTPATIENT
Start: 2023-05-09

## 2023-05-09 RX ORDER — AMOXICILLIN AND CLAVULANATE POTASSIUM 875; 125 MG/1; MG/1
1 TABLET, FILM COATED ORAL 2 TIMES DAILY
Qty: 20 TABLET | Refills: 0 | Status: SHIPPED | OUTPATIENT
Start: 2023-05-09

## 2023-05-09 NOTE — PROGRESS NOTES
Follow Up Office Visit      Date: 2023   Patient Name: Jo-Ann Krishnan  : 1945   MRN: [id number]     Chief Complaint:    Chief Complaint   Patient presents with   • Cough     Wheezing   for approx 7 days       History of Present Illness: Jo-Ann Krishnan is a 78 y.o. female who is here today for evaluation of a 1 week history of having wheezing.  Patient was in her usual state of health until 1 week ago when she developed some increasing cough with wheeze.  She discontinued smoking at that time but persist having a cough and wheeze.  The cough does have some mild production that initially started to be yellowish and now appears to be more of a thick whitish appearance.  She denies any blood at present time with respect to hemoptysis but she has had some nasal bleeding.  Patient does continue to wheeze despite the use of her albuterol nebulization.  Patient has only been using intermittent with her last breathing treatment being performed last night.  She denies any chest pain at present time.  She does have shortness of breath.  She has not had any gastrointestinal symptoms such as nausea or, vomiting or diarrhea.  Her activity has been limited due to her cough.  She also has had difficulty with respect to her sleep.  Appetite is relatively unchanged.  Patient does continue to have her usual arthralgias and myalgias.    Subjective      Review of Systems:   Review of Systems   Constitutional: Negative for activity change, appetite change, fatigue and fever.   HENT: Positive for congestion.    Respiratory: Positive for cough, shortness of breath and wheezing. Negative for chest tightness.    Cardiovascular: Negative for chest pain, palpitations and leg swelling.   Gastrointestinal: Negative for abdominal pain, blood in stool, constipation, diarrhea, nausea, vomiting, GERD and indigestion.   Genitourinary: Negative for dysuria, flank pain, frequency and urgency.   Musculoskeletal: Positive for  arthralgias and myalgias.   Neurological: Negative for dizziness, weakness and memory problem.   Psychiatric/Behavioral: Negative for sleep disturbance.       I have reviewed the patients family history, social history, past medical history, past surgical history and have updated it as appropriate.     Medications:     Current Outpatient Medications:   •  acetaminophen (TYLENOL) 500 MG tablet, Take 500 mg by mouth Every 6 (Six) Hours As Needed for Mild Pain ., Disp: , Rfl:   •  albuterol (PROVENTIL) (2.5 MG/3ML) 0.083% nebulizer solution, Take 2.5 mg by nebulization Every 4 (Four) Hours As Needed for wheezing or shortness of air., Disp: , Rfl:   •  albuterol sulfate  (90 Base) MCG/ACT inhaler, Inhale 2 puffs Every 6 (Six) Hours As Needed for Wheezing., Disp: 18 g, Rfl: 11  •  amoxicillin-clavulanate (Augmentin) 875-125 MG per tablet, Take 1 tablet by mouth 2 (Two) Times a Day., Disp: 20 tablet, Rfl: 0  •  aspirin 81 MG EC tablet, Take 81 mg by mouth daily., Disp: , Rfl:   •  atorvastatin (LIPITOR) 40 MG tablet, TAKE ONE TABLET BY MOUTH EVERY DAY, Disp: 30 tablet, Rfl: 3  •  busPIRone (BUSPAR) 5 MG tablet, Take 5 mg by mouth 3 (Three) Times a Day., Disp: , Rfl:   •  carvedilol (COREG) 25 MG tablet, TAKE ONE TABLET BY MOUTH TWO TIMES A DAY, Disp: 180 tablet, Rfl: 11  •  clopidogrel (PLAVIX) 75 MG tablet, Take 1 tablet by mouth Daily., Disp: 30 tablet, Rfl: 6  •  Comfort EZ Pen Needles 32G X 4 MM misc, , Disp: , Rfl:   •  Comfort EZ Pen Needles 32G X 4 MM misc, USE AS DIRECTED DAILY, Disp: , Rfl:   •  Continuous Blood Gluc  (Dexcom G6 ) device, 1 each Daily., Disp: 1 each, Rfl: 11  •  Continuous Blood Gluc Sensor (Dexcom G6 Sensor), Every 10 (Ten) Days., Disp: 2 each, Rfl: 11  •  Cyanocobalamin (VITAMIN B-12 PO), Take 2,500 mcg by mouth Daily., Disp: , Rfl:   •  Diclofenac Sodium (VOLTAREN) 1 % gel gel, APPLY TO AFFECTED AREA FOUR TIMES DAILY, Disp: 100 g, Rfl: 12  •  DULoxetine (CYMBALTA) 60 MG  capsule, TAKE ONE CAPSULE BY MOUTH EVERY DAY, Disp: 30 capsule, Rfl: 11  •  fluticasone (FLONASE) 50 MCG/ACT nasal spray, 1 spray into the nostril(s) as directed by provider Daily., Disp: , Rfl:   •  HYDROcodone-acetaminophen (NORCO) 7.5-325 MG per tablet, TAKE ONE TABLET TWO TIMES A DAY AS NEEDED FOR PAIN, Disp: , Rfl:   •  Insulin Glargine, 1 Unit Dial, (Toujeo SoloStar) 300 UNIT/ML solution pen-injector injection, Inject 10 Units under the skin into the appropriate area as directed Every Night. 10 units nightly, Disp: 15 mL, Rfl: 11  •  isosorbide mononitrate (IMDUR) 60 MG 24 hr tablet, TAKE ONE TABLET DAILY, Disp: 30 tablet, Rfl: 12  •  losartan (COZAAR) 50 MG tablet, TAKE ONE TABLET BY MOUTH EVERY DAY, Disp: 90 tablet, Rfl: 3  •  multivitamin with minerals tablet tablet, Take 1 tablet by mouth Daily., Disp: , Rfl:   •  naloxone (NARCAN) 4 MG/0.1ML nasal spray, 1 spray into each nostril As Needed., Disp: , Rfl:   •  nicotine (Nicoderm CQ) 21 MG/24HR patch, Place 1 patch on the skin as directed by provider Daily., Disp: 28 each, Rfl: 0  •  pantoprazole (PROTONIX) 40 MG EC tablet, Take 40 mg by mouth Daily., Disp: , Rfl:   •  predniSONE (DELTASONE) 10 MG tablet, Take 6 tablets by mouth Daily., Disp: 18 tablet, Rfl: 0  •  SITagliptin (JANUVIA) 50 MG tablet, Take 50 mg by mouth Daily., Disp: , Rfl:   •  True Metrix Blood Glucose Test test strip, Daily. for testing, Disp: , Rfl:     Current Facility-Administered Medications:   •  ipratropium-albuterol (DUO-NEB) nebulizer solution 3 mL, 3 mL, Nebulization, 4x Daily - RT, Aiden Spencer MD, 3 mL at 09/08/22 1328    Allergies:   Allergies   Allergen Reactions   • Ceclor [Cefaclor] Rash       Immunizations:   Immunization History   Administered Date(s) Administered   • COVID-19 (VINNIE) 03/16/2021   • COVID-19 (UNSPECIFIED) 03/01/2021, 04/01/2021   • Fluzone High Dose =>65 Years ([facility] ONLY) 10/16/2018   • Fluzone High-Dose 65+yrs 10/31/2022   • Fluzone  "Quad >6mos (Multi-dose) 11/15/2016, 02/05/2020   • Pneumococcal Conjugate 13-Valent (PCV13) 07/07/2016   • Pneumococcal Polysaccharide (PPSV23) 10/31/2022        Objective     Physical Exam: Please see above  Vital Signs:   Vitals:    05/09/23 1535   BP: 140/82   BP Location: Left arm   Patient Position: Sitting   Cuff Size: Adult   Pulse: 82   Resp: 20   Temp: 97 °F (36.1 °C)   SpO2: 97%   Weight: 71.7 kg (158 lb)   Height: 152.4 cm (60\")     Body mass index is 30.86 kg/m².  BMI is >= 30 and <35. (Class 1 Obesity). The following options were offered after discussion;: exercise counseling/recommendations and nutrition counseling/recommendations       Physical Exam  Vitals and nursing note reviewed.   Constitutional:       Appearance: Normal appearance.   HENT:      Head: Normocephalic and atraumatic.      Nose: Nose normal.      Mouth/Throat:      Pharynx: Oropharynx is clear.   Eyes:      Extraocular Movements: Extraocular movements intact.      Pupils: Pupils are equal, round, and reactive to light.   Neck:      Thyroid: No thyroid mass or thyromegaly.      Trachea: Trachea normal.   Cardiovascular:      Rate and Rhythm: Normal rate and regular rhythm.      Pulses: Normal pulses. No decreased pulses.      Heart sounds: Normal heart sounds.   Pulmonary:      Effort: Pulmonary effort is normal.      Breath sounds: Examination of the right-upper field reveals wheezing. Examination of the left-upper field reveals wheezing. Examination of the right-middle field reveals wheezing. Examination of the left-middle field reveals wheezing. Examination of the right-lower field reveals wheezing. Examination of the left-lower field reveals wheezing. Wheezing present.   Abdominal:      General: Abdomen is flat. Bowel sounds are normal.      Palpations: Abdomen is soft.      Tenderness: There is no abdominal tenderness.   Musculoskeletal:      Cervical back: Neck supple.      Right lower leg: No edema.      Left lower leg: No " edema.   Lymphadenopathy:      Cervical: No cervical adenopathy.   Skin:     General: Skin is warm and dry.   Neurological:      General: No focal deficit present.      Mental Status: She is alert and oriented to person, place, and time.      Sensory: Sensation is intact.      Motor: Motor function is intact.      Coordination: Coordination is intact.   Psychiatric:         Attention and Perception: Attention normal.         Mood and Affect: Mood normal.         Speech: Speech normal.         Behavior: Behavior normal.         Procedures    Results:   Labs:   Hemoglobin A1C   Date Value Ref Range Status   02/16/2023 7.60 (H) 4.80 - 5.60 % Final        POCT Results (if applicable):   Results for orders placed or performed in visit on 02/16/23   Hemoglobin A1c    Specimen: Arm, Right; Blood   Result Value Ref Range    Hemoglobin A1C 7.60 (H) 4.80 - 5.60 %   Comprehensive Metabolic Panel    Specimen: Arm, Right; Blood   Result Value Ref Range    Glucose 133 (H) 65 - 99 mg/dL    BUN 23 8 - 23 mg/dL    Creatinine 2.08 (H) 0.57 - 1.00 mg/dL    Sodium 138 136 - 145 mmol/L    Potassium 3.9 3.5 - 5.2 mmol/L    Chloride 106 98 - 107 mmol/L    CO2 24.8 22.0 - 29.0 mmol/L    Calcium 9.0 8.6 - 10.5 mg/dL    Total Protein 7.4 6.0 - 8.5 g/dL    Albumin 4.0 3.5 - 5.2 g/dL    ALT (SGPT) 6 1 - 33 U/L    AST (SGOT) 17 1 - 32 U/L    Alkaline Phosphatase 94 39 - 117 U/L    Total Bilirubin <0.2 0.0 - 1.2 mg/dL    Globulin 3.4 gm/dL    A/G Ratio 1.2 g/dL    BUN/Creatinine Ratio 11.1 7.0 - 25.0    Anion Gap 7.2 5.0 - 15.0 mmol/L    eGFR 23.8 (L) >60.0 mL/min/1.73   Lipid Panel    Specimen: Arm, Right; Blood   Result Value Ref Range    Total Cholesterol 119 0 - 200 mg/dL    Triglycerides 112 0 - 150 mg/dL    HDL Cholesterol 41 40 - 60 mg/dL    LDL Cholesterol  57 0 - 100 mg/dL    VLDL Cholesterol 21 5 - 40 mg/dL    LDL/HDL Ratio 1.36    CBC (No Diff)    Specimen: Arm, Right; Blood   Result Value Ref Range    WBC 9.00 3.40 - 10.80 10*3/mm3     RBC 3.24 (L) 3.77 - 5.28 10*6/mm3    Hemoglobin 9.3 (L) 12.0 - 15.9 g/dL    Hematocrit 28.8 (L) 34.0 - 46.6 %    MCV 88.9 79.0 - 97.0 fL    MCH 28.7 26.6 - 33.0 pg    MCHC 32.3 31.5 - 35.7 g/dL    RDW 15.0 12.3 - 15.4 %    RDW-SD 46.8 37.0 - 54.0 fl    MPV 10.9 6.0 - 12.0 fL    Platelets 186 140 - 450 10*3/mm3   Ferritin    Specimen: Arm, Right; Blood   Result Value Ref Range    Ferritin 1,319.00 (H) 13.00 - 150.00 ng/mL   Iron Profile    Specimen: Arm, Right; Blood   Result Value Ref Range    Iron 126 37 - 145 mcg/dL    Iron Saturation 49 20 - 50 %    Transferrin 173 (L) 200 - 360 mg/dL    TIBC 258 (L) 298 - 536 mcg/dL       Imaging:   No valid procedures specified.     Measures:   Advanced Care Planning:   Patient does not have an advance directive, information provided.    Smoking Cessation:   less than 3 minutes spent counseling Will try to cut down    Assessment / Plan      Assessment/Plan:   Diagnoses and all orders for this visit:    1. Chronic obstructive pulmonary disease with acute exacerbation (Primary)   Patient is having worsening of her symptoms over the previous week.  She is having significant mount of wheeze at present time.  We have discussed options and have decided to pursue with a course of prednisone as well as oral antibiotics.  She is to immediately return home and do a nebulization treatment.  She is to do that every 4 hours and to be reassessed tomorrow morning.  If she shows worsening symptoms over the course of the night including low oxygen level she is to present immediately to the emergency department for evaluation and treatment.  -     predniSONE (DELTASONE) 10 MG tablet; Take 6 tablets by mouth Daily.  Dispense: 18 tablet; Refill: 0  -     amoxicillin-clavulanate (Augmentin) 875-125 MG per tablet; Take 1 tablet by mouth 2 (Two) Times a Day.  Dispense: 20 tablet; Refill: 0    2. Acute dyspnea   Patient is acutely short of breath with activity and improves with rest.  She does have  resting shortness of breath but this is due to the obstructive component of her wheeze.  Likely she has discontinued smoking for the previous 7 days and hopefully she will not be able to restart.  We will monitor closely and if she has other problems or complaints further assessment treatment will be necessary before her follow-up tomorrow.       Follow Up:   No follow-ups on file.      At [facility], we believe that sharing information builds trust and better relationships. You are receiving this note because you recently visited [facility]. It is possible you will see health information before a provider has talked with you about it. This kind of information can be easy to misunderstand. To help you fully understand what it means for your health, we urge you to discuss this note with your provider.    Aiden Spencer MD  [facility]

## 2023-05-18 ENCOUNTER — HOSPITAL ENCOUNTER (OUTPATIENT)
Dept: CT IMAGING | Facility: HOSPITAL | Age: 78
Discharge: HOME | End: 2023-05-18
Payer: MEDICARE

## 2023-05-18 ENCOUNTER — TELEPHONE (OUTPATIENT)
Dept: FAMILY MEDICINE CLINIC | Facility: CLINIC | Age: 78
End: 2023-05-18

## 2023-05-18 DIAGNOSIS — K21.9 GASTROESOPHAGEAL REFLUX DISEASE WITHOUT ESOPHAGITIS: Primary | ICD-10-CM

## 2023-05-18 DIAGNOSIS — I67.9 CEREBRAL VASCULAR DISEASE: ICD-10-CM

## 2023-05-18 RX ORDER — PANTOPRAZOLE SODIUM 40 MG/1
40 TABLET, DELAYED RELEASE ORAL 2 TIMES DAILY
Qty: 180 TABLET | Refills: 3 | Status: SHIPPED | OUTPATIENT
Start: 2023-05-18

## 2023-05-18 NOTE — TELEPHONE ENCOUNTER
Caller: Delbert Mcdermott    Relationship: Emergency Contact    Best call back number:  [phone]    What are your current symptoms: INDIGESTION   BURNING IN HER CHEST   REQUESTING A MEDICATION FOR THIS      How long have you been experiencing symptoms: A FEW DAYS     Have you had these symptoms before:    [] Yes  [x] No    Have you been treated for these symptoms before:   [] Yes  [x] No    If a prescription is needed, what is your preferred pharmacy and phone number:  [facility]      Additional notes: RAGINI WENT TO DO THE CAT SCAN TODAY AND THEY SAID HER KIDNEY FUNCTIONS WERE TO LOW

## 2023-05-19 ENCOUNTER — TELEPHONE (OUTPATIENT)
Dept: CARDIAC SURGERY | Facility: CLINIC | Age: 78
End: 2023-05-19
Payer: MEDICARE

## 2023-05-19 NOTE — TELEPHONE ENCOUNTER
PTS CTA CANCELLED 05/18/23 DUE TO LOW GFR. CALLED PTS SISTER, SHE STATED PT ALREADY SEES A NEPHROLOGIST. PTS SISTER WILL SET UP APPT FOR PT TO FOLLOE UP WITH NEPHROLOGY THEN CALL BACK TO [facility] CTA IF CLEARED.

## 2023-05-22 LAB — CREAT BLDA-MCNC: 2.3 MG/DL (ref 0.6–1.3)

## 2023-07-20 PROCEDURE — 80053 COMPREHEN METABOLIC PANEL: CPT

## 2023-07-20 PROCEDURE — 83036 HEMOGLOBIN GLYCOSYLATED A1C: CPT

## 2023-07-25 ENCOUNTER — OFFICE VISIT (OUTPATIENT)
Dept: FAMILY MEDICINE CLINIC | Facility: CLINIC | Age: 78
End: 2023-07-25
Payer: MEDICARE

## 2023-07-25 ENCOUNTER — TELEPHONE (OUTPATIENT)
Dept: FAMILY MEDICINE CLINIC | Facility: CLINIC | Age: 78
End: 2023-07-25

## 2023-07-25 DIAGNOSIS — Z79.4 TYPE 2 DIABETES MELLITUS WITH DIABETIC AUTONOMIC NEUROPATHY, WITH LONG-TERM CURRENT USE OF INSULIN: ICD-10-CM

## 2023-07-25 DIAGNOSIS — E11.43 TYPE 2 DIABETES MELLITUS WITH DIABETIC AUTONOMIC NEUROPATHY, WITH LONG-TERM CURRENT USE OF INSULIN: ICD-10-CM

## 2023-07-25 DIAGNOSIS — L08.9 SKIN INFECTION: Primary | ICD-10-CM

## 2023-07-25 DIAGNOSIS — N18.4 STAGE 4 CHRONIC KIDNEY DISEASE: ICD-10-CM

## 2023-07-25 RX ORDER — DOXYCYCLINE HYCLATE 100 MG/1
100 CAPSULE ORAL 2 TIMES DAILY
Qty: 20 CAPSULE | Refills: 0 | Status: SHIPPED | OUTPATIENT
Start: 2023-07-25 | End: 2023-08-04

## 2023-07-25 NOTE — TELEPHONE ENCOUNTER
Caller: Delbert Mcdermott    Relationship to patient: Emergency Contact    Best call back number: [phone]     ELLIOT'TS SISTER IS CALLING TO STATE THAT SHE SAW [facility] TODAY AND IS NOT SURE WHAT INSULIN TO GIVE HER.  WHATEVER SHE NEEDS TO TAKE NEEDS TO BE CALLED IN AS THEY ARE ALL .  SHE IS SUPPOSED TO TAKE IT TONIGHT AND THE DRUG STORE CLOSES AT 6.  PLEASE ADVISE.   09-Aug-2022

## 2023-07-25 NOTE — PROGRESS NOTES
Office Note     Name: Jo-Ann Krishnan    : 1945     MRN: [id number]     Chief Complaint  Lower leg bug bites    History of Present Illness:  Jo-Ann Krishnan is a 78 y.o. female who presents today for concerns regarding bug bites to her lower legs.  She reports that nearly her entire life, whenever she gets mosquito bites, they will develop into large blisters that then burst and leave raw skin underneath.  When she was seen in the office last week, she had very large blisters and some raw areas.  She was encouraged to apply Silvadene to the raw areas after blisters burst and to abstain from being outside during high mosquito time, such as early morning hours and late nights.  She comes back today because she feels like these areas are looking worse.  She has had some purulent drainage on the bandage she is keeping around the areas.  She has been applying Silvadene as directed.  They are mildly tender to touch.  She has not had any fever or chills.  She does have chronic leg pain but no worsening of her usual pain.  She denies any history of MRSA.  She is allergic to Ceclor but denies any other known antibiotic allergies.    Also of note, from her recent blood work, her hemoglobin A1c has gotten worse.  She does admit that she stopped giving herself her insulin more than 1 month ago.  She reports she did this because the injections were painful.  She also does not have an appointment set up with her nephrologist.    Subjective     Review of Systems:   Review of Systems   Constitutional:  Negative for chills and fever.   Respiratory:  Negative for shortness of breath.    Cardiovascular:  Negative for chest pain.   Skin:  Positive for wound.     I have reviewed the patients family history, social history, past medical history, past surgical history and have updated it as appropriate.     Past Medical History:   Past Medical History:   Diagnosis Date    Anemia     Arthritis     Back problem     CAD  (coronary artery disease)     Cancer     Right breast    Chronic back pain     Chronically on opiate therapy     Depression     Diabetes mellitus     DX 14 years ago- checks fsbs weekly    Fibromyalgia     Gastroparesis     GERD (gastroesophageal reflux disease)     Headache     emotional/tension    History of transfusion     [facility]    HTN (hypertension)     Hypercholesteremia     IBS (irritable bowel syndrome)     Incontinence of urine     urgency    Migraine headache     Myalgia and myositis     Peripheral neuropathy     Sleep apnea     does not wear cpap    UTI (urinary tract infection)        Past Surgical History:   Past Surgical History:   Procedure Laterality Date    APPENDECTOMY      ARTERIOGRAM MESENTERIC N/A 6/16/2022    Procedure: DIAGNOSTIC ARTERIOGRAM WITH CELIAC STENT PLACEMENT;  Surgeon: Neo Neil MD;  Location:  [facility];  Service: Vascular;  Laterality: N/A;  FLUORO: 16 MIN  DOSE: 2384 MGY  CONTRAST:  20 ML    BACK SURGERY      5x per patient    BRAIN TUMOR EXCISION  1988    BREAST BIOPSY      CARPAL TUNNEL RELEASE Bilateral     CHOLECYSTECTOMY      COLONOSCOPY      2015    CRANIOTOMY FOR TUMOR      EYE SURGERY      bilateral cataracts removed    HEMORRHOIDECTOMY      LUMBAR FUSION N/A 01/04/2017    Procedure: LUMBAR LAMINECTOMY AND DECOMRESSION  L3 AND L4;  Surgeon: Nishant Bhatti MD;  Location:  [facility];  Service:     TOTAL ABDOMINAL HYSTERECTOMY      TRIGGER FINGER RELEASE         Family History:   Family History   Problem Relation Age of Onset    Cancer Other     Diabetes Other     Hyperlipidemia Other     Heart attack Other     Hypertension Other     Heart attack Mother     Diabetes Mother     Heart disease Mother     Hypertension Mother     Cancer Father     Alcohol abuse Father     Heart attack Sister     Diabetes Sister     Heart disease Sister     Hypertension Sister     Cancer Brother     Diabetes Brother     Hypertension Brother     Heart attack Sister      Stroke Sister     Cancer Brother        Social History:   Social History     Socioeconomic History    Marital status:     Number of children: 2   Tobacco Use    Smoking status: Every Day     Packs/day: 1.00     Years: 62.00     Pack years: 62.00     Types: Cigarettes     Passive exposure: Past    Smokeless tobacco: Never    Tobacco comments:     1/17/2022 quit    Vaping Use    Vaping Use: Never used   Substance and Sexual Activity    Alcohol use: No    Drug use: No    Sexual activity: Defer       Immunizations:   Immunization History   Administered Date(s) Administered    COVID-19 (VINNIE) 03/16/2021    COVID-19 (UNSPECIFIED) 03/01/2021, 04/01/2021    Fluzone High Dose =>65 Years ([employer] ONLY) 10/16/2018    Fluzone High-Dose 65+yrs 10/31/2022    Fluzone Quad >6mos (Multi-dose) 11/15/2016, 02/05/2020    Pneumococcal Conjugate 13-Valent (PCV13) 07/07/2016    Pneumococcal Polysaccharide (PPSV23) 10/31/2022        Medications:     Current Outpatient Medications:     acetaminophen (TYLENOL) 500 MG tablet, Take 1 tablet by mouth Every 6 (Six) Hours As Needed for Mild Pain., Disp: , Rfl:     albuterol (PROVENTIL) (2.5 MG/3ML) 0.083% nebulizer solution, Take 2.5 mg by nebulization Every 4 (Four) Hours As Needed for Wheezing or Shortness of Air., Disp: , Rfl:     albuterol sulfate  (90 Base) MCG/ACT inhaler, Inhale 2 puffs Every 6 (Six) Hours As Needed for Wheezing., Disp: 18 g, Rfl: 11    aspirin 81 MG EC tablet, Take 1 tablet by mouth Daily., Disp: , Rfl:     atorvastatin (LIPITOR) 40 MG tablet, TAKE ONE TABLET BY MOUTH EVERY DAY, Disp: 30 tablet, Rfl: 12    busPIRone (BUSPAR) 5 MG tablet, Take 1 tablet by mouth 3 (Three) Times a Day., Disp: , Rfl:     carvedilol (COREG) 25 MG tablet, TAKE ONE TABLET BY MOUTH TWO TIMES A DAY, Disp: 180 tablet, Rfl: 11    cetirizine (zyrTEC) 10 MG tablet, Take 0.5 tablets by mouth Daily., Disp: 30 tablet, Rfl: 0    clopidogrel (PLAVIX) 75 MG tablet, Take 1 tablet by  mouth Daily., Disp: 30 tablet, Rfl: 6    Comfort EZ Pen Needles 32G X 4 MM misc, , Disp: , Rfl:     Comfort EZ Pen Needles 32G X 4 MM misc, USE AS DIRECTED DAILY, Disp: , Rfl:     Continuous Blood Gluc  (Dexcom G6 ) device, 1 each Daily., Disp: 1 each, Rfl: 11    Continuous Blood Gluc Sensor (Dexcom G6 Sensor), Every 10 (Ten) Days., Disp: 2 each, Rfl: 11    Cyanocobalamin (VITAMIN B-12 PO), Take 2,500 mcg by mouth Daily., Disp: , Rfl:     Diclofenac Sodium (VOLTAREN) 1 % gel gel, APPLY TO AFFECTED AREA FOUR TIMES DAILY, Disp: 100 g, Rfl: 12    DULoxetine (CYMBALTA) 60 MG capsule, TAKE ONE CAPSULE BY MOUTH EVERY DAY, Disp: 30 capsule, Rfl: 11    famotidine (PEPCID) 10 MG tablet, Take 1 tablet by mouth Every Other Day., Disp: 15 tablet, Rfl: 0    fluticasone (FLONASE) 50 MCG/ACT nasal spray, 1 spray into the nostril(s) as directed by provider Daily., Disp: , Rfl:     gabapentin (NEURONTIN) 100 MG capsule, Take 1 capsule by mouth Every Night. For 1 week, then increase to 1 capsule twice daily for 1 week, then increase to 1 capsule 3 times daily., Disp: 90 capsule, Rfl: 3    HYDROcodone-acetaminophen (NORCO) 7.5-325 MG per tablet, TAKE ONE TABLET TWO TIMES A DAY AS NEEDED FOR PAIN, Disp: , Rfl:     Insulin Glargine, 1 Unit Dial, (Toujeo SoloStar) 300 UNIT/ML solution pen-injector injection, Inject 10 Units under the skin into the appropriate area as directed Every Night. 10 units nightly, Disp: 15 mL, Rfl: 11    isosorbide mononitrate (IMDUR) 60 MG 24 hr tablet, TAKE ONE TABLET DAILY, Disp: 30 tablet, Rfl: 12    losartan (COZAAR) 50 MG tablet, TAKE ONE TABLET BY MOUTH EVERY DAY, Disp: 90 tablet, Rfl: 3    multivitamin with minerals tablet tablet, Take 1 tablet by mouth Daily., Disp: , Rfl:     naloxone (NARCAN) 4 MG/0.1ML nasal spray, 1 spray into the nostril(s) as directed by provider As Needed., Disp: , Rfl:     nicotine (Nicoderm CQ) 21 MG/24HR patch, Place 1 patch on the skin as directed by provider  "Daily., Disp: 28 each, Rfl: 0    pantoprazole (PROTONIX) 40 MG EC tablet, Take 1 tablet by mouth 2 (Two) Times a Day., Disp: 180 tablet, Rfl: 3    SITagliptin (JANUVIA) 50 MG tablet, Take 1 tablet by mouth Daily., Disp: , Rfl:     True Metrix Blood Glucose Test test strip, Daily. for testing, Disp: , Rfl:     doxycycline (VIBRAMYCIN) 100 MG capsule, Take 1 capsule by mouth 2 (Two) Times a Day for 10 days., Disp: 20 capsule, Rfl: 0    SSD 1 % cream, APPLY TO AFFECTED AREA AS DIRECTED, Disp: 400 g, Rfl: 11    Current Facility-Administered Medications:     ipratropium-albuterol (DUO-NEB) nebulizer solution 3 mL, 3 mL, Nebulization, 4x Daily - RT, Aiden Spencer MD, 3 mL at 09/08/22 1328    Allergies:   Allergies   Allergen Reactions    Ceclor [Cefaclor] Rash       Objective     Vital Signs  Vitals:    07/25/23 1612   BP: 120/82   BP Location: Right arm   Patient Position: Sitting   Cuff Size: Adult   Pulse: 68   Resp: 16   Temp: 97.5 °F (36.4 °C)   SpO2: 98%   Weight: 75.8 kg (167 lb)   Height: 152.4 cm (60\")   PainSc:   8   PainLoc: Leg     Estimated body mass index is 32.61 kg/m² as calculated from the following:    Height as of this encounter: 152.4 cm (60\").    Weight as of this encounter: 75.8 kg (167 lb).            Physical Exam  Vitals and nursing note reviewed.   Constitutional:       General: She is not in acute distress.     Appearance: Normal appearance. She is not ill-appearing, toxic-appearing or diaphoretic.   HENT:      Head: Normocephalic and atraumatic.   Eyes:      Extraocular Movements: Extraocular movements intact.   Cardiovascular:      Rate and Rhythm: Normal rate and regular rhythm.      Heart sounds: No murmur heard.    No friction rub. No gallop.   Pulmonary:      Effort: Pulmonary effort is normal. No respiratory distress.      Breath sounds: No wheezing, rhonchi or rales.   Musculoskeletal:      Cervical back: Normal range of motion.   Skin:     Coloration: Skin is not pale.      "        Comments: No localized swelling or streaking   Neurological:      Mental Status: She is alert and oriented to person, place, and time. Mental status is at baseline.   Psychiatric:         Mood and Affect: Mood normal.         Thought Content: Thought content normal.        Assessment and Plan     1. Skin infection  -Prescription for doxycycline has been sent to the pharmacy to treat infection due to surrounding erythema to wound and purulence.  -Doxycycline can make her more sensitive to sun, she should wear sunscreen and skin coverings such as hat and long sleeves when she is outside if exposed to the sun.  -We did discuss symptoms of worsening infection such as fever or chills, streaking, or worsening of wounds.  If she has any of this, she needs to return to care.  -We will follow-up with the patient in 1 week to ensure that these areas are improving.  - doxycycline (VIBRAMYCIN) 100 MG capsule; Take 1 capsule by mouth 2 (Two) Times a Day for 10 days.  Dispense: 20 capsule; Refill: 0    2. Type 2 diabetes mellitus with diabetic autonomic neuropathy, with long-term current use of insulin  -I did stress the importance of treating her diabetes adequately and discussed the health consequences of untreated diabetes.  -Refill of her insulin glargine has been sent to the pharmacy.  We will resume her on her previous dosing, 10 units nightly.  -I also stressed the importance of a low carbohydrate diet and what types of food to avoid for better glycemic control.  -The patient verbalizes understanding and does agree to restarting her insulin.  - Insulin Glargine, 1 Unit Dial, (Toujeo SoloStar) 300 UNIT/ML solution pen-injector injection; Inject 10 Units under the skin into the appropriate area as directed Every Night. 10 units nightly  Dispense: 15 mL; Refill: 11    3. Stage 4 chronic kidney disease  -I have stressed the importance of regular follow-up with her nephrologist, Dr. Saleh.  -The patient and her sister  report that they will make an appointment to see nephrologist.  -We did discuss the importance of abstaining from ibuprofen and other things that are damaging to the kidneys.    Follow Up  Return in about 1 week (around 8/1/2023) for Recheck.    KEVON Valladares

## 2023-07-26 VITALS
HEART RATE: 68 BPM | RESPIRATION RATE: 16 BRPM | BODY MASS INDEX: 32.79 KG/M2 | WEIGHT: 167 LBS | TEMPERATURE: 97.5 F | SYSTOLIC BLOOD PRESSURE: 120 MMHG | DIASTOLIC BLOOD PRESSURE: 82 MMHG | HEIGHT: 60 IN | OXYGEN SATURATION: 98 %

## 2023-07-26 DIAGNOSIS — E11.43 TYPE 2 DIABETES MELLITUS WITH DIABETIC AUTONOMIC NEUROPATHY, WITH LONG-TERM CURRENT USE OF INSULIN: ICD-10-CM

## 2023-07-26 DIAGNOSIS — Z79.4 TYPE 2 DIABETES MELLITUS WITH DIABETIC AUTONOMIC NEUROPATHY, WITH LONG-TERM CURRENT USE OF INSULIN: ICD-10-CM

## 2023-07-26 RX ORDER — SILVER SULFADIAZINE 1 %
CREAM (GRAM) TOPICAL
Qty: 400 G | Refills: 11 | Status: SHIPPED | OUTPATIENT
Start: 2023-07-26

## 2023-07-26 RX ORDER — INSULIN GLARGINE 300 U/ML
10 INJECTION, SOLUTION SUBCUTANEOUS NIGHTLY
Qty: 15 ML | Refills: 11 | Status: SHIPPED | OUTPATIENT
Start: 2023-07-26 | End: 2023-07-27 | Stop reason: CLARIF

## 2023-07-26 RX ORDER — INSULIN GLARGINE 300 U/ML
10 INJECTION, SOLUTION SUBCUTANEOUS NIGHTLY
Qty: 15 ML | Refills: 11
Start: 2023-07-26 | End: 2023-07-26 | Stop reason: SDUPTHER

## 2023-07-26 NOTE — TELEPHONE ENCOUNTER
PT SISTER RICA CALLED BACK IN TO CHECK STATUS FOR RPT INSULIN MEDICATION. PT WILL NEED MORE INSULIN CALLED BACK IN TO PHARMACY: [facility] - [place], [place] - [facility] [address] - [phone]  - [phone] FX    ALSO STATED THAT THAT SHE NOT SURE WHICH ONE SHE IS SUPPOSE TO BE ON AND REQUEST THE SMALLEST NEEDLES,CAUSE THE BIGGER ONE'S HURT HER.    PLEASE ADVISE.CALL BACK:[phone]

## 2023-07-27 DIAGNOSIS — Z79.4 TYPE 2 DIABETES MELLITUS WITH DIABETIC AUTONOMIC NEUROPATHY, WITH LONG-TERM CURRENT USE OF INSULIN: Primary | ICD-10-CM

## 2023-07-27 DIAGNOSIS — E11.43 TYPE 2 DIABETES MELLITUS WITH DIABETIC AUTONOMIC NEUROPATHY, WITH LONG-TERM CURRENT USE OF INSULIN: Primary | ICD-10-CM

## 2023-07-27 RX ORDER — INSULIN DETEMIR 100 [IU]/ML
10 INJECTION, SOLUTION SUBCUTANEOUS NIGHTLY
Qty: 3 ML | Refills: 11 | Status: SHIPPED | OUTPATIENT
Start: 2023-07-27

## 2023-08-01 ENCOUNTER — TELEPHONE (OUTPATIENT)
Dept: FAMILY MEDICINE CLINIC | Facility: CLINIC | Age: 78
End: 2023-08-01

## 2023-08-01 ENCOUNTER — OFFICE VISIT (OUTPATIENT)
Dept: FAMILY MEDICINE CLINIC | Facility: CLINIC | Age: 78
End: 2023-08-01
Payer: MEDICARE

## 2023-08-01 VITALS
BODY MASS INDEX: 31.69 KG/M2 | RESPIRATION RATE: 16 BRPM | OXYGEN SATURATION: 97 % | HEART RATE: 80 BPM | SYSTOLIC BLOOD PRESSURE: 124 MMHG | TEMPERATURE: 96.9 F | DIASTOLIC BLOOD PRESSURE: 66 MMHG | WEIGHT: 161.4 LBS | HEIGHT: 60 IN

## 2023-08-01 DIAGNOSIS — L12.0 BULLOUS PEMPHIGOID: ICD-10-CM

## 2023-08-01 DIAGNOSIS — M17.11 PRIMARY OSTEOARTHRITIS OF RIGHT KNEE: ICD-10-CM

## 2023-08-01 DIAGNOSIS — L08.9 SKIN INFECTION: Primary | ICD-10-CM

## 2023-08-01 PROCEDURE — 1159F MED LIST DOCD IN RCRD: CPT | Performed by: FAMILY MEDICINE

## 2023-08-01 PROCEDURE — 1160F RVW MEDS BY RX/DR IN RCRD: CPT | Performed by: FAMILY MEDICINE

## 2023-08-01 PROCEDURE — 99214 OFFICE O/P EST MOD 30 MIN: CPT | Performed by: FAMILY MEDICINE

## 2023-08-01 NOTE — TELEPHONE ENCOUNTER
Caller: Delbert Mcdermott    Relationship to patient: Emergency Contact    Best call back number: [phone]    Patient is needing: TO LET YOU KNOW THEY ARE RUNNING A FEW MINUTES LATE. I DID EXPLAIN ABOUT BEING HERE WITHIN 15 MINUTES OR WILL HAVE TO RESCHEDULE.  STATES THEY SHOULD BE HERE BY 20 TILL.

## 2023-08-07 ENCOUNTER — TELEPHONE (OUTPATIENT)
Dept: FAMILY MEDICINE CLINIC | Facility: CLINIC | Age: 78
End: 2023-08-07
Payer: MEDICARE

## 2023-08-07 NOTE — TELEPHONE ENCOUNTER
Yellow is still serous fluid.  If fluid is thick and white and then will imply pus.  She would then need to be seen for that.  But clear yellow fluid is normal serous fluid.  Not infection.

## 2023-08-07 NOTE — TELEPHONE ENCOUNTER
Caller: Delbert Mcdermott    Relationship: Emergency Contact    Best call back number: [phone]     What was the call regarding: PATIENT'S SISTER CALLED STATING THAT THE PATIENT'S BLISTER IN LEG IS YELLOW.  SHE ASKED IF THE PATIENT NEEDED TO BE SEEN SOONER THAT 8-14-23.

## 2023-08-08 NOTE — TELEPHONE ENCOUNTER
SPOKE WITH RICA, ADVISED OF PCP MESSAGE, DESCRIBES FLUID AS THICK AND YELLOW, PATIENT CLEANED WITH PEROXIDE AND SEEMS TO BE BETTER TODAY. NO YELLOW DRAINAGE TODAY.

## 2023-08-15 ENCOUNTER — OFFICE VISIT (OUTPATIENT)
Dept: FAMILY MEDICINE CLINIC | Facility: CLINIC | Age: 78
End: 2023-08-15
Payer: MEDICARE

## 2023-08-15 VITALS
OXYGEN SATURATION: 96 % | HEIGHT: 60 IN | TEMPERATURE: 96.9 F | BODY MASS INDEX: 31.88 KG/M2 | HEART RATE: 79 BPM | SYSTOLIC BLOOD PRESSURE: 110 MMHG | WEIGHT: 162.4 LBS | DIASTOLIC BLOOD PRESSURE: 48 MMHG

## 2023-08-15 DIAGNOSIS — G44.031 INTRACTABLE EPISODIC PAROXYSMAL HEMICRANIA: ICD-10-CM

## 2023-08-15 DIAGNOSIS — R42 DIZZINESS: ICD-10-CM

## 2023-08-15 DIAGNOSIS — E11.9 TYPE 2 DIABETES MELLITUS WITHOUT COMPLICATION, WITH LONG-TERM CURRENT USE OF INSULIN: ICD-10-CM

## 2023-08-15 DIAGNOSIS — L12.0 BULLOUS PEMPHIGOID: Primary | ICD-10-CM

## 2023-08-15 DIAGNOSIS — Z79.4 TYPE 2 DIABETES MELLITUS WITHOUT COMPLICATION, WITH LONG-TERM CURRENT USE OF INSULIN: ICD-10-CM

## 2023-08-15 PROCEDURE — 1160F RVW MEDS BY RX/DR IN RCRD: CPT | Performed by: FAMILY MEDICINE

## 2023-08-15 PROCEDURE — 86256 FLUORESCENT ANTIBODY TITER: CPT | Performed by: FAMILY MEDICINE

## 2023-08-15 PROCEDURE — 86255 FLUORESCENT ANTIBODY SCREEN: CPT | Performed by: FAMILY MEDICINE

## 2023-08-15 PROCEDURE — 99214 OFFICE O/P EST MOD 30 MIN: CPT | Performed by: FAMILY MEDICINE

## 2023-08-15 PROCEDURE — 1159F MED LIST DOCD IN RCRD: CPT | Performed by: FAMILY MEDICINE

## 2023-08-15 NOTE — PROGRESS NOTES
Follow Up Office Visit      Date: 08/15/2023   Patient Name: Jo-Ann Krishnan  : 1945   MRN: [id number]     Chief Complaint:    Chief Complaint   Patient presents with    Follow-up       History of Present Illness: Jo-Ann Krishnan is a 78 y.o. female who is here today for follow-up of her lower extremity lesions.  Patient does continue to have some bullous formation with some new formation.  Patient has not had any fever or other complaints.  She has finished the antibiotics without improvement of her symptoms.  She does use Silvadene to help dress the wounds.  Patient is also been having problems with headaches.  She is also having difficulty with balance issues.  It has been approximately 7 years since her last MRI.  Her last arranged MRI was not completed secondary to her not being able to go through the machine due to claustrophobia.  Otherwise she has been doing relatively well.  She has continue with her usual activity, appetite and sleep.  Besides dizziness she has no other neurologic complaints.  She has otherwise been doing well with respect to her diabetes.  She has not had any other cardiovascular, respiratory, gastrointestinal, urologic or neurologic complaints.    Subjective      Review of Systems:   Review of Systems   Constitutional:  Negative for activity change, appetite change and fatigue.   Respiratory:  Negative for cough and shortness of breath.    Cardiovascular:  Negative for chest pain, palpitations and leg swelling.   Gastrointestinal:  Negative for abdominal pain, constipation, diarrhea, nausea and vomiting.   Genitourinary:  Negative for dysuria, flank pain, frequency and urgency.   Musculoskeletal:  Positive for arthralgias and myalgias.   Skin:  Positive for rash and wound.   Neurological:  Positive for headache. Negative for dizziness, tremors, weakness, light-headedness and memory problem.   Psychiatric/Behavioral:  Negative for depressed mood and stress. The patient is  not nervous/anxious.      I have reviewed the patients family history, social history, past medical history, past surgical history and have updated it as appropriate.     Medications:     Current Outpatient Medications:     acetaminophen (TYLENOL) 500 MG tablet, Take 1 tablet by mouth Every 6 (Six) Hours As Needed for Mild Pain., Disp: , Rfl:     albuterol (PROVENTIL) (2.5 MG/3ML) 0.083% nebulizer solution, Take 2.5 mg by nebulization Every 4 (Four) Hours As Needed for Wheezing or Shortness of Air., Disp: , Rfl:     albuterol sulfate  (90 Base) MCG/ACT inhaler, Inhale 2 puffs Every 6 (Six) Hours As Needed for Wheezing., Disp: 18 g, Rfl: 11    aspirin 81 MG EC tablet, Take 1 tablet by mouth Daily., Disp: , Rfl:     atorvastatin (LIPITOR) 40 MG tablet, TAKE ONE TABLET BY MOUTH EVERY DAY, Disp: 30 tablet, Rfl: 12    busPIRone (BUSPAR) 5 MG tablet, Take 1 tablet by mouth 3 (Three) Times a Day., Disp: , Rfl:     carvedilol (COREG) 25 MG tablet, TAKE ONE TABLET BY MOUTH TWO TIMES A DAY, Disp: 180 tablet, Rfl: 11    cetirizine (zyrTEC) 10 MG tablet, Take 0.5 tablets by mouth Daily., Disp: 30 tablet, Rfl: 0    clopidogrel (PLAVIX) 75 MG tablet, Take 1 tablet by mouth Daily., Disp: 30 tablet, Rfl: 6    Comfort EZ Pen Needles 32G X 4 MM misc, , Disp: , Rfl:     Comfort EZ Pen Needles 32G X 4 MM misc, USE AS DIRECTED DAILY, Disp: , Rfl:     Continuous Blood Gluc  (Dexcom G6 ) device, 1 each Daily., Disp: 1 each, Rfl: 11    Continuous Blood Gluc Sensor (Dexcom G6 Sensor), Every 10 (Ten) Days., Disp: 2 each, Rfl: 11    Cyanocobalamin (VITAMIN B-12 PO), Take 2,500 mcg by mouth Daily., Disp: , Rfl:     Diclofenac Sodium (VOLTAREN) 1 % gel gel, APPLY TO AFFECTED AREA FOUR TIMES DAILY, Disp: 100 g, Rfl: 12    DULoxetine (CYMBALTA) 60 MG capsule, TAKE ONE CAPSULE BY MOUTH EVERY DAY, Disp: 30 capsule, Rfl: 11    famotidine (PEPCID) 10 MG tablet, Take 1 tablet by mouth Every Other Day., Disp: 15 tablet, Rfl: 0     fluticasone (FLONASE) 50 MCG/ACT nasal spray, 1 spray into the nostril(s) as directed by provider Daily., Disp: , Rfl:     HYDROcodone-acetaminophen (NORCO) 7.5-325 MG per tablet, TAKE ONE TABLET TWO TIMES A DAY AS NEEDED FOR PAIN, Disp: , Rfl:     insulin detemir (Levemir) 100 UNIT/ML injection, Inject 10 Units under the skin into the appropriate area as directed Every Night., Disp: 3 mL, Rfl: 11    isosorbide mononitrate (IMDUR) 60 MG 24 hr tablet, TAKE ONE TABLET DAILY, Disp: 30 tablet, Rfl: 12    losartan (COZAAR) 50 MG tablet, TAKE ONE TABLET BY MOUTH EVERY DAY, Disp: 90 tablet, Rfl: 3    multivitamin with minerals tablet tablet, Take 1 tablet by mouth Daily., Disp: , Rfl:     mupirocin (BACTROBAN) 2 % ointment, Apply 1 application  topically to the appropriate area as directed 3 (Three) Times a Day., Disp: 30 g, Rfl: 0    naloxone (NARCAN) 4 MG/0.1ML nasal spray, 1 spray into the nostril(s) as directed by provider As Needed., Disp: , Rfl:     nicotine (Nicoderm CQ) 21 MG/24HR patch, Place 1 patch on the skin as directed by provider Daily., Disp: 28 each, Rfl: 0    pantoprazole (PROTONIX) 40 MG EC tablet, Take 1 tablet by mouth 2 (Two) Times a Day., Disp: 180 tablet, Rfl: 3    SSD 1 % cream, APPLY TO AFFECTED AREA AS DIRECTED, Disp: 400 g, Rfl: 11    True Metrix Blood Glucose Test test strip, Daily. for testing, Disp: , Rfl:     Current Facility-Administered Medications:     ipratropium-albuterol (DUO-NEB) nebulizer solution 3 mL, 3 mL, Nebulization, 4x Daily - RT, Aiden Spencer MD, 3 mL at 09/08/22 1328    Allergies:   Allergies   Allergen Reactions    Ceclor [Cefaclor] Rash       Immunizations:   Immunization History   Administered Date(s) Administered    COVID-19 (VINNIE) 03/16/2021    COVID-19 (UNSPECIFIED) 03/01/2021, 04/01/2021    Fluzone High Dose =>65 Years ([facility]) 10/16/2018    Fluzone High-Dose 65+yrs 10/31/2022    Fluzone Quad >6mos (Multi-dose) 11/15/2016, 02/05/2020     "Pneumococcal Conjugate 13-Valent (PCV13) 07/07/2016    Pneumococcal Polysaccharide (PPSV23) 10/31/2022        Objective     Physical Exam: Please see above  Vital Signs:   Vitals:    08/15/23 1601   BP: 110/48   BP Location: Left arm   Patient Position: Sitting   Cuff Size: Adult   Pulse: 79   Temp: 96.9 øF (36.1 øC)   SpO2: 96%   Weight: 73.7 kg (162 lb 6.4 oz)   Height: 152.4 cm (60\")  Comment: patient stated     Body mass index is 31.72 kg/mý.          Physical Exam  Vitals and nursing note reviewed.   Constitutional:       Appearance: Normal appearance.   HENT:      Head: Normocephalic and atraumatic.      Nose: Nose normal.      Mouth/Throat:      Pharynx: Oropharynx is clear.   Eyes:      Extraocular Movements: Extraocular movements intact.      Pupils: Pupils are equal, round, and reactive to light.   Neck:      Thyroid: No thyroid mass or thyromegaly.      Trachea: Trachea normal.   Cardiovascular:      Rate and Rhythm: Normal rate and regular rhythm.      Pulses: Normal pulses. No decreased pulses.      Heart sounds: Normal heart sounds.   Pulmonary:      Effort: Pulmonary effort is normal.      Breath sounds: Normal breath sounds.   Abdominal:      General: Abdomen is flat. Bowel sounds are normal.      Palpations: Abdomen is soft.      Tenderness: There is no abdominal tenderness.   Musculoskeletal:      Cervical back: Neck supple.      Right lower leg: No edema.      Left lower leg: No edema.   Lymphadenopathy:      Cervical: No cervical adenopathy.   Skin:     General: Skin is warm and dry.      Findings: Lesion and rash present. Rash is vesicular.      Comments: Patient does continue to have new presentation of vesicles of her lower legs.  Where the bullae have ruptured she does have an erythematous base without any signs of infection.   Neurological:      General: No focal deficit present.      Mental Status: She is alert and oriented to person, place, and time.      Sensory: Sensation is intact.      " Motor: Motor function is intact.      Coordination: Coordination is intact.   Psychiatric:         Attention and Perception: Attention normal.         Mood and Affect: Mood normal.         Speech: Speech normal.         Behavior: Behavior normal.       Procedures    Results:   Labs:   Hemoglobin A1C   Date Value Ref Range Status   07/20/2023 8.10 (H) 4.80 - 5.60 % Final        POCT Results (if applicable):   Results for orders placed or performed in visit on 07/20/23   Comprehensive Metabolic Panel    Specimen: Arm, Right; Blood   Result Value Ref Range    Glucose 155 (H) 65 - 99 mg/dL    BUN 31 (H) 8 - 23 mg/dL    Creatinine 2.35 (H) 0.57 - 1.00 mg/dL    Sodium 137 136 - 145 mmol/L    Potassium 5.2 3.5 - 5.2 mmol/L    Chloride 102 98 - 107 mmol/L    CO2 24.7 22.0 - 29.0 mmol/L    Calcium 9.5 8.6 - 10.5 mg/dL    Total Protein 7.3 6.0 - 8.5 g/dL    Albumin 4.1 3.5 - 5.2 g/dL    ALT (SGPT) 10 1 - 33 U/L    AST (SGOT) 20 1 - 32 U/L    Alkaline Phosphatase 91 39 - 117 U/L    Total Bilirubin <0.2 0.0 - 1.2 mg/dL    Globulin 3.2 gm/dL    A/G Ratio 1.3 g/dL    BUN/Creatinine Ratio 13.2 7.0 - 25.0    Anion Gap 10.3 5.0 - 15.0 mmol/L    eGFR 20.5 (L) >60.0 mL/min/1.73   Hemoglobin A1c    Specimen: Arm, Right; Blood   Result Value Ref Range    Hemoglobin A1C 8.10 (H) 4.80 - 5.60 %       Imaging:   No valid procedures specified.     Measures:   Advanced Care Planning:   Patient does not have an advance directive, information provided.    Smoking Cessation:   less than 3 minutes spent counseling Will try to cut down    Assessment / Plan      Assessment/Plan:   Diagnoses and all orders for this visit:    1. Bullous pemphigoid (Primary)  Patient does have findings most consistent with bullous pemphigoid.  We have discussed options and I do suspect that it may be possibly caused by Januvia.  We will discontinue the Januvia and obtain laboratory data.  We will not obtain a biopsy at present time.  We will see how this does with  next couple weeks with reassessment.  We will try to avoid steroids at present time due to her diabetes.  -     Bullous Pemphigoid 180 & 230; Future  -     Bullous Pemphigoid 180 & 230    2. Type 2 diabetes mellitus without complication, with long-term current use of insulin   I do suspect that the pemphigoid is secondary to Januvia.  We will discontinue the Januvia and will just continue her on her insulin.  She will obtain fingerstick blood sugars and will let us know if they run high and we will need to make adjustments at that time.    3. Intractable episodic paroxysmal hemicrania  Patient does continue have issues with headaches.  She has been having some problems with dizziness associated with it.  We had tried to obtain MRIs in the past due to her previous brain tumor but was unable to finish because of claustrophobia.  We will make arrangements for MRI again and hopefully will be able to take the Valium prior to the scan.  -     MRI Brain Without Contrast; Future    4. Dizziness  We will arrange for MRI as discussed.-     MRI Brain Without Contrast; Future        Follow Up:   Return Scheduled August 29, 2023.      At [facility], we believe that sharing information builds trust and better relationships. You are receiving this note because you recently visited [facility]. It is possible you will see health information before a provider has talked with you about it. This kind of information can be easy to misunderstand. To help you fully understand what it means for your health, we urge you to discuss this note with your provider.    Aiden Spencer MD  [facility]

## 2023-08-22 LAB
BMZ BP180 IGG SERPL-ACNC: 2.3 U/ML
BMZ BP230 IGG SERPL-ACNC: 3.1 U/ML
DPYD GENE MUT ANL BLD/T: NORMAL
Lab: NORMAL
PATHOLOGIST NAME: NORMAL

## 2023-08-24 DIAGNOSIS — L29.9 ITCHING: ICD-10-CM

## 2023-08-24 RX ORDER — CETIRIZINE HYDROCHLORIDE 10 MG/1
5 TABLET ORAL DAILY
Qty: 15 TABLET | Refills: 12 | OUTPATIENT
Start: 2023-08-24

## 2023-09-11 ENCOUNTER — OFFICE VISIT (OUTPATIENT)
Dept: FAMILY MEDICINE CLINIC | Facility: CLINIC | Age: 78
End: 2023-09-11
Payer: MEDICARE

## 2023-09-11 VITALS
SYSTOLIC BLOOD PRESSURE: 106 MMHG | OXYGEN SATURATION: 98 % | HEART RATE: 81 BPM | TEMPERATURE: 97.5 F | WEIGHT: 162.8 LBS | BODY MASS INDEX: 31.96 KG/M2 | HEIGHT: 60 IN | DIASTOLIC BLOOD PRESSURE: 56 MMHG

## 2023-09-11 DIAGNOSIS — M25.551 BILATERAL HIP PAIN: ICD-10-CM

## 2023-09-11 DIAGNOSIS — M51.36 DEGENERATIVE DISC DISEASE, LUMBAR: ICD-10-CM

## 2023-09-11 DIAGNOSIS — M25.552 BILATERAL HIP PAIN: ICD-10-CM

## 2023-09-11 DIAGNOSIS — Z79.4 TYPE 2 DIABETES MELLITUS WITH HYPERGLYCEMIA, WITH LONG-TERM CURRENT USE OF INSULIN: ICD-10-CM

## 2023-09-11 DIAGNOSIS — L12.0 BULLOUS PEMPHIGOID: Primary | ICD-10-CM

## 2023-09-11 DIAGNOSIS — E11.65 TYPE 2 DIABETES MELLITUS WITH HYPERGLYCEMIA, WITH LONG-TERM CURRENT USE OF INSULIN: ICD-10-CM

## 2023-09-11 DIAGNOSIS — N18.4 STAGE 4 CHRONIC KIDNEY DISEASE: ICD-10-CM

## 2023-09-11 RX ORDER — DIAZEPAM 5 MG/1
5 TABLET ORAL ONCE AS NEEDED
Qty: 2 TABLET | Refills: 0 | Status: SHIPPED | OUTPATIENT
Start: 2023-09-11

## 2023-09-11 NOTE — PROGRESS NOTES
Follow Up Office Visit      Date: 2023   Patient Name: Jo-Ann Krishnan  : 1945   MRN: [id number]     Chief Complaint:    Chief Complaint   Patient presents with    Follow-up     2 week follow up       History of Present Illness: Jo-Ann Krishnan is a 78 y.o. female who is here today for follow-up.  Patient has continued to have the rash despite the use of prednisone.  She has weaned herself off due to the increase in her blood sugars.  The pemphigoid test did come back as negative.  It does appear that this could be related to insect bite as she originally believes.  She is otherwise been doing relatively well except she does continue to have pain in her hip region.  She does not know if this is secondary to her back or if she has some underlying hip arthritis.  It is not problematic when she is sitting still.  It becomes a problem when she walks or if she tries any activity.  She does have severe disc disease but is unable to discontinue her Plavix until she has a CT scan obtained of her abdomen to see if she still has decreased blood of her mesenteric arteries.  She does continue with her other medication as prescribed without any side effects.  She has continue with her usual activity, appetite and sleep.  Patient denies any other cardiovascular, respiratory, gastrointestinal, urologic or neurologic complaints.    Subjective      Review of Systems:   Review of Systems   Constitutional:  Negative for activity change, appetite change and fatigue.   Respiratory:  Negative for cough and shortness of breath.    Cardiovascular:  Negative for chest pain, palpitations and leg swelling.   Gastrointestinal:  Negative for abdominal pain, constipation, diarrhea, nausea and vomiting.   Genitourinary:  Negative for dysuria, flank pain, frequency and urgency.   Musculoskeletal:  Positive for arthralgias and myalgias.   Skin:  Positive for rash.   Neurological:  Negative for dizziness, weakness and memory  problem.   Psychiatric/Behavioral:  Negative for sleep disturbance and depressed mood. The patient is not nervous/anxious.      I have reviewed the patients family history, social history, past medical history, past surgical history and have updated it as appropriate.     Medications:     Current Outpatient Medications:     acetaminophen (TYLENOL) 500 MG tablet, Take 1 tablet by mouth Every 6 (Six) Hours As Needed for Mild Pain., Disp: , Rfl:     albuterol (PROVENTIL) (2.5 MG/3ML) 0.083% nebulizer solution, Take 2.5 mg by nebulization Every 4 (Four) Hours As Needed for Wheezing or Shortness of Air., Disp: , Rfl:     albuterol sulfate  (90 Base) MCG/ACT inhaler, Inhale 2 puffs Every 6 (Six) Hours As Needed for Wheezing., Disp: 18 g, Rfl: 11    aspirin 81 MG EC tablet, Take 1 tablet by mouth Daily., Disp: , Rfl:     atorvastatin (LIPITOR) 40 MG tablet, TAKE ONE TABLET BY MOUTH EVERY DAY, Disp: 30 tablet, Rfl: 12    busPIRone (BUSPAR) 5 MG tablet, Take 1 tablet by mouth 3 (Three) Times a Day., Disp: , Rfl:     carvedilol (COREG) 25 MG tablet, TAKE ONE TABLET BY MOUTH TWO TIMES A DAY, Disp: 180 tablet, Rfl: 11    cetirizine (zyrTEC) 10 MG tablet, Take 0.5 tablets by mouth Daily., Disp: 30 tablet, Rfl: 0    clopidogrel (PLAVIX) 75 MG tablet, Take 1 tablet by mouth Daily., Disp: 30 tablet, Rfl: 6    Comfort EZ Pen Needles 32G X 4 MM misc, , Disp: , Rfl:     Comfort EZ Pen Needles 32G X 4 MM misc, USE AS DIRECTED DAILY, Disp: , Rfl:     Continuous Blood Gluc  (Dexcom G6 ) device, 1 each Daily., Disp: 1 each, Rfl: 11    Continuous Blood Gluc Sensor (Dexcom G6 Sensor), Every 10 (Ten) Days., Disp: 2 each, Rfl: 11    Cyanocobalamin (VITAMIN B-12 PO), Take 2,500 mcg by mouth Daily., Disp: , Rfl:     diazePAM (VALIUM) 5 MG tablet, Take 1 tablet by mouth 1 (One) Time As Needed for Anxiety for up to 1 dose., Disp: 2 tablet, Rfl: 0    Diclofenac Sodium (VOLTAREN) 1 % gel gel, APPLY TO AFFECTED AREA FOUR TIMES  DAILY, Disp: 100 g, Rfl: 12    DULoxetine (CYMBALTA) 60 MG capsule, TAKE ONE CAPSULE BY MOUTH EVERY DAY, Disp: 30 capsule, Rfl: 11    famotidine (PEPCID) 10 MG tablet, Take 1 tablet by mouth Every Other Day., Disp: 15 tablet, Rfl: 0    fluticasone (FLONASE) 50 MCG/ACT nasal spray, 1 spray into the nostril(s) as directed by provider Daily., Disp: , Rfl:     HYDROcodone-acetaminophen (NORCO) 7.5-325 MG per tablet, TAKE ONE TABLET TWO TIMES A DAY AS NEEDED FOR PAIN, Disp: , Rfl:     insulin detemir (Levemir) 100 UNIT/ML injection, Inject 10 Units under the skin into the appropriate area as directed Every Night., Disp: 3 mL, Rfl: 11    isosorbide mononitrate (IMDUR) 60 MG 24 hr tablet, TAKE ONE TABLET DAILY, Disp: 30 tablet, Rfl: 12    losartan (COZAAR) 50 MG tablet, TAKE ONE TABLET BY MOUTH EVERY DAY, Disp: 90 tablet, Rfl: 3    multivitamin with minerals tablet tablet, Take 1 tablet by mouth Daily., Disp: , Rfl:     mupirocin (BACTROBAN) 2 % ointment, Apply 1 application  topically to the appropriate area as directed 3 (Three) Times a Day., Disp: 30 g, Rfl: 0    naloxone (NARCAN) 4 MG/0.1ML nasal spray, 1 spray into the nostril(s) as directed by provider As Needed., Disp: , Rfl:     nicotine (Nicoderm CQ) 21 MG/24HR patch, Place 1 patch on the skin as directed by provider Daily., Disp: 28 each, Rfl: 0    pantoprazole (PROTONIX) 40 MG EC tablet, Take 1 tablet by mouth 2 (Two) Times a Day., Disp: 180 tablet, Rfl: 3    SSD 1 % cream, APPLY TO AFFECTED AREA AS DIRECTED, Disp: 400 g, Rfl: 11    True Metrix Blood Glucose Test test strip, Daily. for testing, Disp: , Rfl:     Current Facility-Administered Medications:     ipratropium-albuterol (DUO-NEB) nebulizer solution 3 mL, 3 mL, Nebulization, 4x Daily - RT, Aiden Spencer MD, 3 mL at 09/08/22 1328    Allergies:   Allergies   Allergen Reactions    Ceclor [Cefaclor] Rash       Immunizations:   Immunization History   Administered Date(s) Administered    COVID-19  "(VINNIE) 03/16/2021    COVID-19 (UNSPECIFIED) 03/01/2021, 04/01/2021    Fluzone High Dose =>65 Years ([facility] ONLY) 10/16/2018    Fluzone High-Dose 65+yrs 10/31/2022    Fluzone Quad >6mos (Multi-dose) 11/15/2016, 02/05/2020    Pneumococcal Conjugate 13-Valent (PCV13) 07/07/2016    Pneumococcal Polysaccharide (PPSV23) 10/31/2022        Objective     Physical Exam: Please see above  Vital Signs:   Vitals:    09/11/23 1514   BP: 106/56   BP Location: Left arm   Patient Position: Sitting   Cuff Size: Adult   Pulse: 81   Temp: 97.5 °F (36.4 °C)   TempSrc: Temporal   SpO2: 98%   Weight: 73.8 kg (162 lb 12.8 oz)   Height: 152.4 cm (60\")     Body mass index is 31.79 kg/m².          Physical Exam  Vitals and nursing note reviewed.   Constitutional:       Appearance: Normal appearance.   HENT:      Head: Normocephalic and atraumatic.      Nose: Nose normal.      Mouth/Throat:      Pharynx: Oropharynx is clear.   Eyes:      Extraocular Movements: Extraocular movements intact.      Pupils: Pupils are equal, round, and reactive to light.   Neck:      Thyroid: No thyroid mass or thyromegaly.      Trachea: Trachea normal.   Cardiovascular:      Rate and Rhythm: Normal rate and regular rhythm.      Pulses: Normal pulses. No decreased pulses.      Heart sounds: Normal heart sounds.   Pulmonary:      Effort: Pulmonary effort is normal.      Breath sounds: Normal breath sounds.   Abdominal:      General: Abdomen is flat. Bowel sounds are normal.      Palpations: Abdomen is soft.      Tenderness: There is no abdominal tenderness.   Musculoskeletal:      Cervical back: Neck supple.      Right lower leg: No edema.      Left lower leg: No edema.   Lymphadenopathy:      Cervical: No cervical adenopathy.   Skin:     General: Skin is warm and dry.   Neurological:      General: No focal deficit present.      Mental Status: She is alert and oriented to person, place, and time.      Sensory: Sensation is intact.      Motor: Motor function is " intact.      Coordination: Coordination is intact.   Psychiatric:         Attention and Perception: Attention normal.         Mood and Affect: Mood normal.         Speech: Speech normal.         Behavior: Behavior normal.       Procedures    Results:   Labs:   Hemoglobin A1C   Date Value Ref Range Status   07/20/2023 8.10 (H) 4.80 - 5.60 % Final        POCT Results (if applicable):   Results for orders placed or performed in visit on 07/20/23   Comprehensive Metabolic Panel    Specimen: Arm, Right; Blood   Result Value Ref Range    Glucose 155 (H) 65 - 99 mg/dL    BUN 31 (H) 8 - 23 mg/dL    Creatinine 2.35 (H) 0.57 - 1.00 mg/dL    Sodium 137 136 - 145 mmol/L    Potassium 5.2 3.5 - 5.2 mmol/L    Chloride 102 98 - 107 mmol/L    CO2 24.7 22.0 - 29.0 mmol/L    Calcium 9.5 8.6 - 10.5 mg/dL    Total Protein 7.3 6.0 - 8.5 g/dL    Albumin 4.1 3.5 - 5.2 g/dL    ALT (SGPT) 10 1 - 33 U/L    AST (SGOT) 20 1 - 32 U/L    Alkaline Phosphatase 91 39 - 117 U/L    Total Bilirubin <0.2 0.0 - 1.2 mg/dL    Globulin 3.2 gm/dL    A/G Ratio 1.3 g/dL    BUN/Creatinine Ratio 13.2 7.0 - 25.0    Anion Gap 10.3 5.0 - 15.0 mmol/L    eGFR 20.5 (L) >60.0 mL/min/1.73   Hemoglobin A1c    Specimen: Arm, Right; Blood   Result Value Ref Range    Hemoglobin A1C 8.10 (H) 4.80 - 5.60 %       Imaging:   No valid procedures specified.     Measures:   Advanced Care Planning:   Patient does not have an advance directive, information provided.    Smoking Cessation:   less than 3 minutes spent counseling Will try to cut down    Assessment / Plan      Assessment/Plan:   Diagnoses and all orders for this visit:    1. Bullous pemphigoid (Primary)   Appears that patient may not have pemphigoid after all.  She has weaned herself off the prednisone and has continued to have the lesions despite treatment.  Laboratory tests were negative.  She still believes it is due to insect bites due to mosquitoes.  I am still uncertain as to the source of this but if she is right  in her theory within as the weather gets cooler she will have resolution of her insect bites.  We again have encouraged her to avoid being outside in the early morning hours or evening that would increase her risk.  Other possibility if it is due to insect bites could be fleas inside the household etc.  We will continue to monitor and address.    2. Type 2 diabetes mellitus with hyperglycemia, with long-term current use of insulin   Patient states her blood sugars have improved since she has discontinued her prednisone.  We will make no modification at present time.    3. Stage 4 chronic kidney disease   Patient kidney function test remained poor.  She does have a scheduled follow-up with the nephrologist in the near future.  Unfortunately she is unable to have the CT of her abdomen to assess her mesenteric arteries to see if she can come off of the Plavix.  We will continue have her push fluids and keep close follow-up with the nephrologist.    4. Degenerative disc disease, lumbar  Patient may have degenerative disc disease that may be the source of her hip pain.  Nonetheless we will obtain x-rays of her hip to assure as there is no underlying pathology.  Dr. Machado does want to do injections of her back but she cannot go off the Plavix until she knows that her mesenteric arteries are clear.  Unfortunately we are in a stalemate.  We will continue to monitor and will treat accordingly.  Patient does have a scheduled MRI tomorrow.  We will provide Valium for sedation.  -     diazePAM (VALIUM) 5 MG tablet; Take 1 tablet by mouth 1 (One) Time As Needed for Anxiety for up to 1 dose.  Dispense: 2 tablet; Refill: 0    5.  Bilateral hip pain   Patient may have some underlying arthritis of her hips.  We will obtain x-rays to assure as there is no underlying pathology.  I do suspect that her hip pain could be secondary to her degenerative disc disease of her lumbar region.    Follow Up:   No follow-ups on file.      At  [facility], we believe that sharing information builds trust and better relationships. You are receiving this note because you recently visited [facility]. It is possible you will see health information before a provider has talked with you about it. This kind of information can be easy to misunderstand. To help you fully understand what it means for your health, we urge you to discuss this note with your provider.    Aiden Spencer MD  [facility] - Fort WayneAnswers submitted by the patient for this visit:  Primary Reason for Visit (Submitted on 9/10/2023)  What is the primary reason for your visit?: Rash  Rash Questionnaire (Submitted on 9/10/2023)  Chief Complaint: Rash  Chronicity: recurrent  Onset: more than 1 year ago  Progression since onset: unchanged  Characteristics: blistering, burning, pain, redness, swelling, bruising, draining, itchiness, peeling, scaling  Exposed to: an insect bite/sting  anorexia: No

## 2023-09-12 ENCOUNTER — HOSPITAL ENCOUNTER (OUTPATIENT)
Dept: MRI IMAGING | Facility: HOSPITAL | Age: 78
Discharge: HOME | End: 2023-09-12
Payer: MEDICARE

## 2023-09-12 ENCOUNTER — HOSPITAL ENCOUNTER (OUTPATIENT)
Dept: GENERAL RADIOLOGY | Facility: HOSPITAL | Age: 78
Discharge: HOME | End: 2023-09-12
Payer: MEDICARE

## 2023-09-12 DIAGNOSIS — R42 DIZZINESS: ICD-10-CM

## 2023-09-12 DIAGNOSIS — G44.031 INTRACTABLE EPISODIC PAROXYSMAL HEMICRANIA: ICD-10-CM

## 2023-09-12 DIAGNOSIS — M25.552 BILATERAL HIP PAIN: ICD-10-CM

## 2023-09-12 DIAGNOSIS — M25.551 BILATERAL HIP PAIN: ICD-10-CM

## 2023-09-12 PROCEDURE — 70551 MRI BRAIN STEM W/O DYE: CPT

## 2023-09-12 PROCEDURE — 73521 X-RAY EXAM HIPS BI 2 VIEWS: CPT

## 2023-09-14 NOTE — PROGRESS NOTES
CALLED TO PROVIDE PT WITH RESULTS, SPOKE WITH RICA (ON [facility]),   SHE STATED THAT SHE WOULD PASS ALONG THE RESULTS TO [facility] AND WOULD GET BACK WITH US IF SHE WANTS TO BE REFERRED TO [facility]TH[facility].

## 2023-09-18 RX ORDER — ISOSORBIDE MONONITRATE 60 MG/1
TABLET, EXTENDED RELEASE ORAL
Qty: 90 TABLET | Refills: 3 | Status: SHIPPED | OUTPATIENT
Start: 2023-09-18

## 2023-09-18 RX ORDER — DULOXETIN HYDROCHLORIDE 60 MG/1
CAPSULE, DELAYED RELEASE ORAL
Qty: 90 CAPSULE | Refills: 3 | Status: SHIPPED | OUTPATIENT
Start: 2023-09-18

## 2023-09-18 RX ORDER — SITAGLIPTIN 50 MG/1
TABLET, FILM COATED ORAL
Qty: 90 TABLET | Refills: 3 | Status: SHIPPED | OUTPATIENT
Start: 2023-09-18

## 2023-10-02 DIAGNOSIS — I10 PRIMARY HYPERTENSION: ICD-10-CM

## 2023-10-02 DIAGNOSIS — D50.9 IRON DEFICIENCY ANEMIA, UNSPECIFIED IRON DEFICIENCY ANEMIA TYPE: ICD-10-CM

## 2023-10-02 DIAGNOSIS — E78.2 MIXED HYPERLIPIDEMIA: Primary | ICD-10-CM

## 2023-10-02 PROCEDURE — 84466 ASSAY OF TRANSFERRIN: CPT | Performed by: FAMILY MEDICINE

## 2023-10-02 PROCEDURE — 82607 VITAMIN B-12: CPT | Performed by: FAMILY MEDICINE

## 2023-10-02 PROCEDURE — 83540 ASSAY OF IRON: CPT | Performed by: FAMILY MEDICINE

## 2023-10-02 PROCEDURE — 84443 ASSAY THYROID STIM HORMONE: CPT | Performed by: FAMILY MEDICINE

## 2023-10-02 PROCEDURE — 82728 ASSAY OF FERRITIN: CPT | Performed by: FAMILY MEDICINE

## 2023-10-02 PROCEDURE — 80061 LIPID PANEL: CPT | Performed by: FAMILY MEDICINE

## 2023-10-02 PROCEDURE — 85027 COMPLETE CBC AUTOMATED: CPT | Performed by: FAMILY MEDICINE

## 2023-10-03 ENCOUNTER — TELEPHONE (OUTPATIENT)
Dept: FAMILY MEDICINE CLINIC | Facility: CLINIC | Age: 78
End: 2023-10-03
Payer: MEDICARE

## 2023-10-03 NOTE — TELEPHONE ENCOUNTER
----- Message from Aiden Spencer MD sent at 10/3/2023  6:50 AM EDT -----  Kidney tests have worsened.  B12 is acceptable.  Patient remains anemic probably due to chronic disease.  Her iron studies are relatively unchanged.  Her cholesterol has worsened slightly.  She needs to make sure she takes her medication as prescribed.  Patient needs to keep follow-up with Dr. Saleh.

## 2023-11-01 RX ORDER — FLUTICASONE PROPIONATE 50 MCG
2 SPRAY, SUSPENSION (ML) NASAL DAILY
Qty: 16 G | Refills: 12 | Status: SHIPPED | OUTPATIENT
Start: 2023-11-01

## 2023-11-29 ENCOUNTER — TELEPHONE (OUTPATIENT)
Dept: FAMILY MEDICINE CLINIC | Facility: CLINIC | Age: 78
End: 2023-11-29
Payer: MEDICARE

## 2023-11-29 RX ORDER — HYDROCODONE BITARTRATE AND ACETAMINOPHEN 7.5; 325 MG/1; MG/1
TABLET ORAL
OUTPATIENT
Start: 2023-11-29

## 2023-11-29 NOTE — TELEPHONE ENCOUNTER
Incoming Refill Request      Medication requested (name and dose): HYDROCODONE     Pharmacy where request should be sent: [facility]    Additional details provided by patient: NA    Best call back number: [phone]    Does the patient have less than a 3 day supply:  [] Yes  [] No NA    Marlin Still Rep  11/29/23, 10:35 EST

## 2023-11-29 NOTE — TELEPHONE ENCOUNTER
Patient is no longer taking methadone.  This was discontinued when she was having problems with falling and confusion.  If patient is needing narcotic and she is unable to get to get it from Dr. Machado, she will need to schedule appointment to discuss options.

## 2023-12-06 ENCOUNTER — TELEPHONE (OUTPATIENT)
Dept: FAMILY MEDICINE CLINIC | Facility: CLINIC | Age: 78
End: 2023-12-06

## 2023-12-06 ENCOUNTER — OFFICE VISIT (OUTPATIENT)
Dept: FAMILY MEDICINE CLINIC | Facility: CLINIC | Age: 78
End: 2023-12-06
Payer: MEDICARE

## 2023-12-06 VITALS
HEART RATE: 82 BPM | BODY MASS INDEX: 31.1 KG/M2 | HEIGHT: 60 IN | TEMPERATURE: 97.7 F | WEIGHT: 158.4 LBS | SYSTOLIC BLOOD PRESSURE: 124 MMHG | DIASTOLIC BLOOD PRESSURE: 64 MMHG | OXYGEN SATURATION: 100 % | RESPIRATION RATE: 16 BRPM

## 2023-12-06 DIAGNOSIS — E11.65 TYPE 2 DIABETES MELLITUS WITH HYPERGLYCEMIA, WITH LONG-TERM CURRENT USE OF INSULIN: Primary | ICD-10-CM

## 2023-12-06 DIAGNOSIS — Z79.4 TYPE 2 DIABETES MELLITUS WITH HYPERGLYCEMIA, WITH LONG-TERM CURRENT USE OF INSULIN: Primary | ICD-10-CM

## 2023-12-06 DIAGNOSIS — I10 PRIMARY HYPERTENSION: ICD-10-CM

## 2023-12-06 DIAGNOSIS — Z23 NEED FOR INFLUENZA VACCINATION: ICD-10-CM

## 2023-12-06 DIAGNOSIS — N18.4 STAGE 4 CHRONIC KIDNEY DISEASE: ICD-10-CM

## 2023-12-06 DIAGNOSIS — E78.2 MIXED HYPERLIPIDEMIA: ICD-10-CM

## 2023-12-06 DIAGNOSIS — M51.36 DEGENERATIVE DISC DISEASE, LUMBAR: ICD-10-CM

## 2023-12-06 DIAGNOSIS — F17.210 CIGARETTE NICOTINE DEPENDENCE WITHOUT COMPLICATION: ICD-10-CM

## 2023-12-06 LAB
EXPIRATION DATE: ABNORMAL
HBA1C MFR BLD: 7.4 % (ref 4.5–5.7)
Lab: ABNORMAL

## 2023-12-06 RX ORDER — PROCHLORPERAZINE 25 MG/1
SUPPOSITORY RECTAL
Qty: 2 EACH | Refills: 11 | Status: SHIPPED | OUTPATIENT
Start: 2023-12-06

## 2023-12-06 RX ORDER — HYDROCODONE BITARTRATE AND ACETAMINOPHEN 7.5; 325 MG/1; MG/1
1 TABLET ORAL EVERY 12 HOURS PRN
Qty: 20 TABLET | Refills: 0 | Status: SHIPPED | OUTPATIENT
Start: 2023-12-06

## 2023-12-06 RX ORDER — PROCHLORPERAZINE 25 MG/1
1 SUPPOSITORY RECTAL DAILY
Qty: 1 EACH | Refills: 11 | Status: SHIPPED | OUTPATIENT
Start: 2023-12-06

## 2023-12-13 ENCOUNTER — TELEPHONE (OUTPATIENT)
Dept: FAMILY MEDICINE CLINIC | Facility: CLINIC | Age: 78
End: 2023-12-13

## 2023-12-13 DIAGNOSIS — E11.65 TYPE 2 DIABETES MELLITUS WITH HYPERGLYCEMIA, WITH LONG-TERM CURRENT USE OF INSULIN: Primary | ICD-10-CM

## 2023-12-13 DIAGNOSIS — Z79.4 TYPE 2 DIABETES MELLITUS WITH HYPERGLYCEMIA, WITH LONG-TERM CURRENT USE OF INSULIN: Primary | ICD-10-CM

## 2023-12-13 RX ORDER — ACYCLOVIR 400 MG/1
1 TABLET ORAL DAILY
Qty: 1 EACH | Refills: 0 | Status: SHIPPED | OUTPATIENT
Start: 2023-12-13

## 2023-12-13 RX ORDER — PROCHLORPERAZINE 25 MG/1
1 SUPPOSITORY RECTAL DAILY
Qty: 3 EACH | Refills: 3 | Status: SHIPPED | OUTPATIENT
Start: 2023-12-13

## 2023-12-13 RX ORDER — ACYCLOVIR 400 MG/1
1 TABLET ORAL
Qty: 9 EACH | Refills: 3 | Status: SHIPPED | OUTPATIENT
Start: 2023-12-13

## 2023-12-13 NOTE — TELEPHONE ENCOUNTER
Caller: [facility]    Relationship:     Best call back number: [phone]     What medication are you requesting: DEXCOM 7 SENSOR AND     What are your current symptoms: DIABETES    How long have you been experiencing symptoms: ??    Have you had these symptoms before:    [x] Yes  [] No    Have you been treated for these symptoms before:   [x] Yes  [] No    If a prescription is needed, what is your preferred pharmacy and phone number:    FAX SENT [phone], DID YOU RECEIVE IT?  SENDING ANOTHER.

## 2023-12-20 ENCOUNTER — LAB (OUTPATIENT)
Dept: FAMILY MEDICINE CLINIC | Facility: CLINIC | Age: 78
End: 2023-12-20
Payer: MEDICARE

## 2023-12-20 ENCOUNTER — OFFICE VISIT (OUTPATIENT)
Dept: FAMILY MEDICINE CLINIC | Facility: CLINIC | Age: 78
End: 2023-12-20
Payer: MEDICARE

## 2023-12-20 VITALS
OXYGEN SATURATION: 100 % | BODY MASS INDEX: 31.37 KG/M2 | HEIGHT: 60 IN | RESPIRATION RATE: 16 BRPM | TEMPERATURE: 97.1 F | DIASTOLIC BLOOD PRESSURE: 76 MMHG | HEART RATE: 79 BPM | SYSTOLIC BLOOD PRESSURE: 150 MMHG | WEIGHT: 159.8 LBS

## 2023-12-20 DIAGNOSIS — N30.01 ACUTE CYSTITIS WITH HEMATURIA: ICD-10-CM

## 2023-12-20 DIAGNOSIS — R41.0 CONFUSION: ICD-10-CM

## 2023-12-20 DIAGNOSIS — N18.30 STAGE 3 CHRONIC KIDNEY DISEASE, UNSPECIFIED WHETHER STAGE 3A OR 3B CKD: ICD-10-CM

## 2023-12-20 DIAGNOSIS — R41.0 CONFUSION: Primary | ICD-10-CM

## 2023-12-20 LAB
ALBUMIN SERPL-MCNC: 4.3 G/DL (ref 3.5–5.2)
ALBUMIN/GLOB SERPL: 1.3 G/DL
ALP SERPL-CCNC: 105 U/L (ref 39–117)
ALT SERPL W P-5'-P-CCNC: 11 U/L (ref 1–33)
ANION GAP SERPL CALCULATED.3IONS-SCNC: 10.1 MMOL/L (ref 5–15)
AST SERPL-CCNC: 22 U/L (ref 1–32)
BILIRUB BLD-MCNC: NEGATIVE MG/DL
BILIRUB SERPL-MCNC: <0.2 MG/DL (ref 0–1.2)
BUN SERPL-MCNC: 28 MG/DL (ref 8–23)
BUN/CREAT SERPL: 12.7 (ref 7–25)
CALCIUM SPEC-SCNC: 9.8 MG/DL (ref 8.6–10.5)
CHLORIDE SERPL-SCNC: 104 MMOL/L (ref 98–107)
CLARITY, POC: ABNORMAL
CO2 SERPL-SCNC: 24.9 MMOL/L (ref 22–29)
COLOR UR: ABNORMAL
CREAT SERPL-MCNC: 2.2 MG/DL (ref 0.57–1)
DEPRECATED RDW RBC AUTO: 48.4 FL (ref 37–54)
EGFRCR SERPLBLD CKD-EPI 2021: 22.2 ML/MIN/1.73
ERYTHROCYTE [DISTWIDTH] IN BLOOD BY AUTOMATED COUNT: 14.5 % (ref 12.3–15.4)
EXPIRATION DATE: ABNORMAL
GLOBULIN UR ELPH-MCNC: 3.4 GM/DL
GLUCOSE SERPL-MCNC: 104 MG/DL (ref 65–99)
GLUCOSE UR STRIP-MCNC: NEGATIVE MG/DL
HCT VFR BLD AUTO: 34.6 % (ref 34–46.6)
HGB BLD-MCNC: 11.2 G/DL (ref 12–15.9)
KETONES UR QL: NEGATIVE
LEUKOCYTE EST, POC: NEGATIVE
Lab: ABNORMAL
MCH RBC QN AUTO: 29.5 PG (ref 26.6–33)
MCHC RBC AUTO-ENTMCNC: 32.4 G/DL (ref 31.5–35.7)
MCV RBC AUTO: 91.1 FL (ref 79–97)
NITRITE UR-MCNC: NEGATIVE MG/ML
PH UR: 6 [PH] (ref 5–8)
PLATELET # BLD AUTO: 208 10*3/MM3 (ref 140–450)
PMV BLD AUTO: 10.6 FL (ref 6–12)
POTASSIUM SERPL-SCNC: 4.4 MMOL/L (ref 3.5–5.2)
PROT SERPL-MCNC: 7.7 G/DL (ref 6–8.5)
PROT UR STRIP-MCNC: ABNORMAL MG/DL
RBC # BLD AUTO: 3.8 10*6/MM3 (ref 3.77–5.28)
RBC # UR STRIP: ABNORMAL /UL
SODIUM SERPL-SCNC: 139 MMOL/L (ref 136–145)
SP GR UR: 1.02 (ref 1–1.03)
UROBILINOGEN UR QL: ABNORMAL
WBC NRBC COR # BLD AUTO: 8.16 10*3/MM3 (ref 3.4–10.8)

## 2023-12-20 PROCEDURE — 81003 URINALYSIS AUTO W/O SCOPE: CPT | Performed by: PHYSICIAN ASSISTANT

## 2023-12-20 PROCEDURE — 85027 COMPLETE CBC AUTOMATED: CPT | Performed by: PHYSICIAN ASSISTANT

## 2023-12-20 PROCEDURE — 87147 CULTURE TYPE IMMUNOLOGIC: CPT | Performed by: PHYSICIAN ASSISTANT

## 2023-12-20 PROCEDURE — 85007 BL SMEAR W/DIFF WBC COUNT: CPT | Performed by: PHYSICIAN ASSISTANT

## 2023-12-20 PROCEDURE — 80053 COMPREHEN METABOLIC PANEL: CPT | Performed by: PHYSICIAN ASSISTANT

## 2023-12-20 PROCEDURE — 99214 OFFICE O/P EST MOD 30 MIN: CPT | Performed by: PHYSICIAN ASSISTANT

## 2023-12-20 PROCEDURE — 87086 URINE CULTURE/COLONY COUNT: CPT | Performed by: PHYSICIAN ASSISTANT

## 2023-12-20 RX ORDER — AMOXICILLIN AND CLAVULANATE POTASSIUM 875; 125 MG/1; MG/1
1 TABLET, FILM COATED ORAL 2 TIMES DAILY
Qty: 20 TABLET | Refills: 0 | Status: SHIPPED | OUTPATIENT
Start: 2023-12-20

## 2023-12-20 NOTE — PROGRESS NOTES
Follow Up Office Visit      Date: 2023   Patient Name: Jo-Ann Krishnan  : 1945   MRN: [id number]     Chief Complaint:    Chief Complaint   Patient presents with    Altered Mental Status     Confusion x 3-4 days.    Urinary Incontinence       History of Present Illness: Jo-Ann Krishnan is a 78 y.o. female who is here today for 3 days of increasing confusion now associated with urinary continence.  She reports that she has had some chills late in the evening and some low back pain which is pretty routine for her.  But has had hard time staying on topic with conversations and is having more trouble with her memory than usual.  She is awaiting a follow-up appoint with Dr. Saleh for some increasing problems with her creatinine clearance.  She has had problems with increasing inability to hold her bladder for the past 3 to 4 days as well    Subjective      Review of Systems:   Review of Systems   Constitutional:  Positive for appetite change, chills and fatigue. Negative for fever.   Eyes: Negative.    Respiratory: Negative.     Cardiovascular: Negative.    Neurological:  Positive for headache, memory problem and confusion.     I have reviewed the patients family history, social history, past medical history, past surgical history and have updated it as appropriate.     Medications:     Current Outpatient Medications:     acetaminophen (TYLENOL) 500 MG tablet, Take 1 tablet by mouth Every 6 (Six) Hours As Needed for Mild Pain., Disp: , Rfl:     albuterol (PROVENTIL) (2.5 MG/3ML) 0.083% nebulizer solution, Take 2.5 mg by nebulization Every 4 (Four) Hours As Needed for Wheezing or Shortness of Air., Disp: , Rfl:     albuterol sulfate  (90 Base) MCG/ACT inhaler, Inhale 2 puffs Every 6 (Six) Hours As Needed for Wheezing., Disp: 18 g, Rfl: 11    aspirin 81 MG EC tablet, Take 1 tablet by mouth Daily., Disp: , Rfl:     atorvastatin (LIPITOR) 40 MG tablet, TAKE ONE TABLET BY MOUTH EVERY DAY, Disp:  30 tablet, Rfl: 12    busPIRone (BUSPAR) 5 MG tablet, Take 1 tablet by mouth 3 (Three) Times a Day., Disp: , Rfl:     carvedilol (COREG) 25 MG tablet, TAKE ONE TABLET BY MOUTH TWO TIMES A DAY, Disp: 180 tablet, Rfl: 11    cetirizine (zyrTEC) 10 MG tablet, Take 0.5 tablets by mouth Daily., Disp: 30 tablet, Rfl: 0    clopidogrel (PLAVIX) 75 MG tablet, Take 1 tablet by mouth Daily., Disp: 30 tablet, Rfl: 6    Comfort EZ Pen Needles 32G X 4 MM misc, , Disp: , Rfl:     Comfort EZ Pen Needles 32G X 4 MM misc, USE AS DIRECTED DAILY, Disp: , Rfl:     Continuous Blood Gluc  (Dexcom G6 ) device, Use 1 each Daily., Disp: 1 each, Rfl: 11    Continuous Blood Gluc  (Dexcom G7 ) device, Use 1 each Daily., Disp: 1 each, Rfl: 0    Continuous Blood Gluc Sensor (Dexcom G6 Sensor), Use Every 10 (Ten) Days., Disp: 2 each, Rfl: 11    Continuous Blood Gluc Sensor (Dexcom G7 Sensor) misc, Use 1 each Every 10 (Ten) Days., Disp: 9 each, Rfl: 3    Continuous Blood Gluc Transmit (Dexcom G6 Transmitter) misc, Use 1 each Daily., Disp: 3 each, Rfl: 3    Cyanocobalamin (VITAMIN B-12 PO), Take 2,500 mcg by mouth Daily., Disp: , Rfl:     Diclofenac Sodium (VOLTAREN) 1 % gel gel, APPLY TO AFFECTED AREA FOUR TIMES DAILY, Disp: 100 g, Rfl: 12    DULoxetine (CYMBALTA) 60 MG capsule, TAKE ONE CAPSULE BY MOUTH EVERY DAY, Disp: 90 capsule, Rfl: 3    famotidine (PEPCID) 10 MG tablet, Take 1 tablet by mouth Every Other Day., Disp: 15 tablet, Rfl: 0    fluticasone (FLONASE) 50 MCG/ACT nasal spray, USE 2 SPRAYS IN EACH NOSTRIL ONCE DAILY, Disp: 16 g, Rfl: 12    HYDROcodone-acetaminophen (NORCO) 7.5-325 MG per tablet, Take 1 tablet by mouth Every 12 (Twelve) Hours As Needed for Moderate Pain., Disp: 20 tablet, Rfl: 0    insulin detemir (Levemir) 100 UNIT/ML injection, Inject 10 Units under the skin into the appropriate area as directed Every Night., Disp: 3 mL, Rfl: 11    isosorbide mononitrate (IMDUR) 60 MG 24 hr tablet, TAKE  "ONE TABLET BY MOUTH EVERY DAY, Disp: 90 tablet, Rfl: 3    losartan (COZAAR) 50 MG tablet, TAKE ONE TABLET BY MOUTH EVERY DAY, Disp: 90 tablet, Rfl: 3    multivitamin with minerals tablet tablet, Take 1 tablet by mouth Daily., Disp: , Rfl:     naloxone (NARCAN) 4 MG/0.1ML nasal spray, 1 spray into the nostril(s) as directed by provider As Needed., Disp: , Rfl:     nicotine (Nicoderm CQ) 21 MG/24HR patch, Place 1 patch on the skin as directed by provider Daily., Disp: 28 each, Rfl: 0    pantoprazole (PROTONIX) 40 MG EC tablet, Take 1 tablet by mouth 2 (Two) Times a Day., Disp: 180 tablet, Rfl: 3    SITagliptin (Januvia) 50 MG tablet, TAKE 1 TABLET BY MOUTH DAILY, Disp: 90 tablet, Rfl: 3    SSD 1 % cream, APPLY TO AFFECTED AREA AS DIRECTED, Disp: 400 g, Rfl: 11    True Metrix Blood Glucose Test test strip, Daily. for testing, Disp: , Rfl:     amoxicillin-clavulanate (AUGMENTIN) 875-125 MG per tablet, Take 1 tablet by mouth 2 (Two) Times a Day., Disp: 20 tablet, Rfl: 0    Current Facility-Administered Medications:     ipratropium-albuterol (DUO-NEB) nebulizer solution 3 mL, 3 mL, Nebulization, 4x Daily - RT, Aiden Spencer MD, 3 mL at 09/08/22 1328    Allergies:   Allergies   Allergen Reactions    Ceclor [Cefaclor] Rash       Objective     Physical Exam: Please see above  Vital Signs:   Vitals:    12/20/23 1531   BP: 150/76   BP Location: Left arm   Patient Position: Sitting   Cuff Size: Adult   Pulse: 79   Resp: 16   Temp: 97.1 °F (36.2 °C)   TempSrc: Temporal   SpO2: 100%   Weight: 72.5 kg (159 lb 12.8 oz)   Height: 152.4 cm (60\")     Body mass index is 31.21 kg/m².    Physical Exam  Vitals and nursing note reviewed.   Constitutional:       General: She is not in acute distress.     Appearance: She is not ill-appearing or toxic-appearing.   Eyes:      Extraocular Movements: Extraocular movements intact.      Pupils: Pupils are equal, round, and reactive to light.      Comments: Eyelids edematous "   Cardiovascular:      Rate and Rhythm: Normal rate and regular rhythm.      Heart sounds: No murmur heard.     No friction rub. No gallop.   Pulmonary:      Effort: Pulmonary effort is normal. No respiratory distress.      Breath sounds: Normal breath sounds. No wheezing or rales.   Abdominal:      Tenderness: There is right CVA tenderness and left CVA tenderness.   Neurological:      Mental Status: She is alert.     Procedures    Assessment / Plan      Assessment/Plan:   1. Confusion  Issues may be associated with either UTI or changes in her renal function.  We will evaluate and follow  - POCT urinalysis dipstick, automated  - CBC With Manual Differential; Future  - Urine Culture - Urine, Urine, Clean Catch; Future    2. Stage 3 chronic kidney disease, unspecified whether stage 3a or 3b CKD  With increasing confusion and 3+ hematuria we can we will follow creatinine to make sure she has not had a sudden bump up or changes in her electrolyte balance  - Comprehensive Metabolic Panel; Future  - Urine Culture - Urine, Urine, Clean Catch; Future    3. Acute cystitis with hematuria  With some chills and acute changes in her mentation.  We will treat with Augmentin and follow  - amoxicillin-clavulanate (AUGMENTIN) 875-125 MG per tablet; Take 1 tablet by mouth 2 (Two) Times a Day.  Dispense: 20 tablet; Refill: 0       Follow Up:   No follow-ups on file.      At [facility], we believe that sharing information builds trust and better relationships. You are receiving this note because you recently visited [facility]. It is possible you will see health information before a provider has talked with you about it. This kind of information can be easy to misunderstand. To help you fully understand what it means for your health, we urge you to discuss this note with your provider.    Paula Spencer PA-C  [facility]

## 2023-12-21 LAB
BACTERIA SPEC AEROBE CULT: ABNORMAL
DACRYOCYTES BLD QL SMEAR: ABNORMAL
EOSINOPHIL # BLD MANUAL: 0.08 10*3/MM3 (ref 0–0.4)
EOSINOPHIL NFR BLD MANUAL: 1 % (ref 0.3–6.2)
LYMPHOCYTES # BLD MANUAL: 2.45 10*3/MM3 (ref 0.7–3.1)
LYMPHOCYTES NFR BLD MANUAL: 11 % (ref 5–12)
MONOCYTES # BLD: 0.9 10*3/MM3 (ref 0.1–0.9)
NEUTROPHILS # BLD AUTO: 4.73 10*3/MM3 (ref 1.7–7)
NEUTROPHILS NFR BLD MANUAL: 58 % (ref 42.7–76)
NRBC SPEC MANUAL: 2 /100 WBC (ref 0–0.2)
OVALOCYTES BLD QL SMEAR: ABNORMAL
PLAT MORPH BLD: NORMAL
POIKILOCYTOSIS BLD QL SMEAR: ABNORMAL
POLYCHROMASIA BLD QL SMEAR: ABNORMAL
VARIANT LYMPHS NFR BLD MANUAL: 30 % (ref 19.6–45.3)
WBC MORPH BLD: NORMAL

## 2024-01-02 ENCOUNTER — OFFICE VISIT (OUTPATIENT)
Dept: FAMILY MEDICINE CLINIC | Facility: CLINIC | Age: 79
End: 2024-01-02
Payer: MEDICARE

## 2024-01-02 VITALS
HEIGHT: 60 IN | OXYGEN SATURATION: 95 % | TEMPERATURE: 98 F | BODY MASS INDEX: 31.8 KG/M2 | SYSTOLIC BLOOD PRESSURE: 118 MMHG | DIASTOLIC BLOOD PRESSURE: 70 MMHG | RESPIRATION RATE: 18 BRPM | HEART RATE: 78 BPM | WEIGHT: 162 LBS

## 2024-01-02 DIAGNOSIS — Z79.4 TYPE 2 DIABETES MELLITUS WITH HYPERGLYCEMIA, WITH LONG-TERM CURRENT USE OF INSULIN: Primary | ICD-10-CM

## 2024-01-02 DIAGNOSIS — E11.22 TYPE 2 DIABETES MELLITUS WITH STAGE 4 CHRONIC KIDNEY DISEASE, WITH LONG-TERM CURRENT USE OF INSULIN: ICD-10-CM

## 2024-01-02 DIAGNOSIS — F17.210 CIGARETTE NICOTINE DEPENDENCE WITHOUT COMPLICATION: ICD-10-CM

## 2024-01-02 DIAGNOSIS — E78.2 MIXED HYPERLIPIDEMIA: ICD-10-CM

## 2024-01-02 DIAGNOSIS — I10 PRIMARY HYPERTENSION: ICD-10-CM

## 2024-01-02 DIAGNOSIS — L20.9 ATOPIC DERMATITIS OF SCALP: ICD-10-CM

## 2024-01-02 DIAGNOSIS — E11.65 TYPE 2 DIABETES MELLITUS WITH HYPERGLYCEMIA, WITH LONG-TERM CURRENT USE OF INSULIN: Primary | ICD-10-CM

## 2024-01-02 DIAGNOSIS — N18.4 STAGE 4 CHRONIC KIDNEY DISEASE: ICD-10-CM

## 2024-01-02 DIAGNOSIS — Z79.4 TYPE 2 DIABETES MELLITUS WITH STAGE 4 CHRONIC KIDNEY DISEASE, WITH LONG-TERM CURRENT USE OF INSULIN: ICD-10-CM

## 2024-01-02 DIAGNOSIS — N18.4 TYPE 2 DIABETES MELLITUS WITH STAGE 4 CHRONIC KIDNEY DISEASE, WITH LONG-TERM CURRENT USE OF INSULIN: ICD-10-CM

## 2024-01-02 PROBLEM — J44.1 CHRONIC OBSTRUCTIVE PULMONARY DISEASE WITH ACUTE EXACERBATION: Status: RESOLVED | Noted: 2022-09-06 | Resolved: 2024-01-02

## 2024-01-02 RX ORDER — FLUOCINOLONE ACETONIDE 0.11 MG/ML
20 OIL TOPICAL DAILY
Qty: 118.28 ML | Refills: 1 | Status: SHIPPED | OUTPATIENT
Start: 2024-01-02

## 2024-01-02 NOTE — PROGRESS NOTES
Follow Up Office Visit      Date: 2024   Patient Name: Jo-Ann Krishnan  : 1945   MRN: [id number]     Chief Complaint:    Chief Complaint   Patient presents with    Follow-up     Follow up on UTI states that she is unable to leave urine sample    Diabetes    Chronic Kidney Disease       History of Present Illness: Jo-Ann Krishnan is a 78 y.o. female who is here today for follow-up.  Patient has been doing relatively well since last being seen but does continue to have her usual arthralgias and myalgias.  She is not currently using any insulin and has had some increase in her blood sugars secondary to her dietary indiscretions.  She is still continues to drink juice as well and sugary containing drinks such as soft drinks.  She does continue to eat candy.  Otherwise her sugars have been doing relatively well up to this point.  She does continue to take her other medications as prescribed without side effects.  She has recently finished a course of antibiotics that did appear to be beneficial.  She has continue with her usual activity, appetite and sleep.  Patient denies any other cardiovascular, respiratory, gastrointestinal, urologic or neurologic complaints.    Subjective      Review of Systems:   Review of Systems   Constitutional:  Negative for activity change, appetite change and fatigue.   Respiratory:  Negative for cough and shortness of breath.    Cardiovascular:  Negative for chest pain, palpitations and leg swelling.   Gastrointestinal:  Negative for abdominal distention, abdominal pain, constipation, diarrhea, nausea, vomiting, GERD and indigestion.   Genitourinary:  Negative for dysuria, flank pain, frequency and urgency.   Musculoskeletal:  Positive for arthralgias and myalgias.   Neurological:  Negative for dizziness, tremors, weakness, light-headedness, numbness, memory problem and confusion.   Psychiatric/Behavioral:  Negative for sleep disturbance.        I have reviewed the  patients family history, social history, past medical history, past surgical history and have updated it as appropriate.     Medications:     Current Outpatient Medications:     acetaminophen (TYLENOL) 500 MG tablet, Take 1 tablet by mouth Every 6 (Six) Hours As Needed for Mild Pain., Disp: , Rfl:     albuterol (PROVENTIL) (2.5 MG/3ML) 0.083% nebulizer solution, Take 2.5 mg by nebulization Every 4 (Four) Hours As Needed for Wheezing or Shortness of Air., Disp: , Rfl:     albuterol sulfate  (90 Base) MCG/ACT inhaler, Inhale 2 puffs Every 6 (Six) Hours As Needed for Wheezing., Disp: 18 g, Rfl: 11    aspirin 81 MG EC tablet, Take 1 tablet by mouth Daily., Disp: , Rfl:     atorvastatin (LIPITOR) 40 MG tablet, TAKE ONE TABLET BY MOUTH EVERY DAY, Disp: 30 tablet, Rfl: 12    busPIRone (BUSPAR) 5 MG tablet, Take 1 tablet by mouth 3 (Three) Times a Day., Disp: , Rfl:     carvedilol (COREG) 25 MG tablet, TAKE ONE TABLET BY MOUTH TWO TIMES A DAY, Disp: 180 tablet, Rfl: 11    cetirizine (zyrTEC) 10 MG tablet, Take 0.5 tablets by mouth Daily., Disp: 30 tablet, Rfl: 0    clopidogrel (PLAVIX) 75 MG tablet, Take 1 tablet by mouth Daily., Disp: 30 tablet, Rfl: 6    Comfort EZ Pen Needles 32G X 4 MM misc, , Disp: , Rfl:     Comfort EZ Pen Needles 32G X 4 MM misc, USE AS DIRECTED DAILY, Disp: , Rfl:     Continuous Blood Gluc  (Dexcom G6 ) device, Use 1 each Daily., Disp: 1 each, Rfl: 11    Continuous Blood Gluc  (Dexcom G7 ) device, Use 1 each Daily., Disp: 1 each, Rfl: 0    Continuous Blood Gluc Sensor (Dexcom G6 Sensor), Use Every 10 (Ten) Days., Disp: 2 each, Rfl: 11    Continuous Blood Gluc Sensor (Dexcom G7 Sensor) misc, Use 1 each Every 10 (Ten) Days., Disp: 9 each, Rfl: 3    Continuous Blood Gluc Transmit (Dexcom G6 Transmitter) misc, Use 1 each Daily., Disp: 3 each, Rfl: 3    Cyanocobalamin (VITAMIN B-12 PO), Take 2,500 mcg by mouth Daily., Disp: , Rfl:     Diclofenac Sodium (VOLTAREN) 1  % gel gel, APPLY TO AFFECTED AREA FOUR TIMES DAILY, Disp: 100 g, Rfl: 12    DULoxetine (CYMBALTA) 60 MG capsule, TAKE ONE CAPSULE BY MOUTH EVERY DAY, Disp: 90 capsule, Rfl: 3    famotidine (PEPCID) 10 MG tablet, Take 1 tablet by mouth Every Other Day., Disp: 15 tablet, Rfl: 0    fluticasone (FLONASE) 50 MCG/ACT nasal spray, USE 2 SPRAYS IN EACH NOSTRIL ONCE DAILY, Disp: 16 g, Rfl: 12    HYDROcodone-acetaminophen (NORCO) 7.5-325 MG per tablet, Take 1 tablet by mouth Every 12 (Twelve) Hours As Needed for Moderate Pain., Disp: 20 tablet, Rfl: 0    isosorbide mononitrate (IMDUR) 60 MG 24 hr tablet, TAKE ONE TABLET BY MOUTH EVERY DAY, Disp: 90 tablet, Rfl: 3    losartan (COZAAR) 50 MG tablet, TAKE ONE TABLET BY MOUTH EVERY DAY, Disp: 90 tablet, Rfl: 3    multivitamin with minerals tablet tablet, Take 1 tablet by mouth Daily., Disp: , Rfl:     naloxone (NARCAN) 4 MG/0.1ML nasal spray, 1 spray into the nostril(s) as directed by provider As Needed., Disp: , Rfl:     nicotine (Nicoderm CQ) 21 MG/24HR patch, Place 1 patch on the skin as directed by provider Daily., Disp: 28 each, Rfl: 0    pantoprazole (PROTONIX) 40 MG EC tablet, Take 1 tablet by mouth 2 (Two) Times a Day., Disp: 180 tablet, Rfl: 3    SITagliptin (Januvia) 50 MG tablet, TAKE 1 TABLET BY MOUTH DAILY, Disp: 90 tablet, Rfl: 3    SSD 1 % cream, APPLY TO AFFECTED AREA AS DIRECTED, Disp: 400 g, Rfl: 11    True Metrix Blood Glucose Test test strip, Daily. for testing, Disp: , Rfl:     Fluocinolone Acetonide Scalp 0.01 % oil, Apply 20 drops topically Daily., Disp: 118.28 mL, Rfl: 1    Current Facility-Administered Medications:     ipratropium-albuterol (DUO-NEB) nebulizer solution 3 mL, 3 mL, Nebulization, 4x Daily - RT, Aiden Spencer MD, 3 mL at 09/08/22 1328    Allergies:   Allergies   Allergen Reactions    Ceclor [Cefaclor] Rash       Immunizations:   Immunization History   Administered Date(s) Administered    COVID-19 ([facility]) 03/16/2021    COVID-19  "(UNSPECIFIED) 03/01/2021, 04/01/2021    Fluzone High Dose =>65 Years ([facility] ONLY) 10/16/2018    Fluzone High-Dose 65+yrs 10/31/2022, 12/06/2023    Fluzone Quad >6mos (Multi-dose) 11/15/2016, 02/05/2020    Pneumococcal Conjugate 13-Valent (PCV13) 07/07/2016    Pneumococcal Polysaccharide (PPSV23) 10/31/2022        Objective     Physical Exam: Please see above  Vital Signs:   Vitals:    01/02/24 1410   BP: 118/70   BP Location: Left arm   Patient Position: Sitting   Cuff Size: Adult   Pulse: 78   Resp: 18   Temp: 98 °F (36.7 °C)   TempSrc: Temporal   SpO2: 95%   Weight: 73.5 kg (162 lb)   Height: 152.4 cm (60\")     Body mass index is 31.64 kg/m².          Physical Exam  Vitals and nursing note reviewed.   Constitutional:       Appearance: Normal appearance.   HENT:      Head: Normocephalic and atraumatic.      Nose: Nose normal.      Mouth/Throat:      Pharynx: Oropharynx is clear.   Eyes:      Extraocular Movements: Extraocular movements intact.      Pupils: Pupils are equal, round, and reactive to light.   Neck:      Thyroid: No thyroid mass or thyromegaly.      Trachea: Trachea normal.   Cardiovascular:      Rate and Rhythm: Normal rate and regular rhythm.      Pulses: Normal pulses. No decreased pulses.      Heart sounds: Normal heart sounds.   Pulmonary:      Effort: Pulmonary effort is normal.      Breath sounds: Normal breath sounds.   Abdominal:      General: Abdomen is flat. Bowel sounds are normal.      Palpations: Abdomen is soft.      Tenderness: There is no abdominal tenderness.   Musculoskeletal:      Cervical back: Neck supple.      Right lower leg: No edema.      Left lower leg: No edema.   Lymphadenopathy:      Cervical: No cervical adenopathy.   Skin:     General: Skin is warm and dry.   Neurological:      General: No focal deficit present.      Mental Status: She is alert and oriented to person, place, and time.      Sensory: Sensation is intact.      Motor: Motor function is intact.      " Coordination: Coordination is intact.   Psychiatric:         Attention and Perception: Attention normal.         Mood and Affect: Mood normal.         Speech: Speech normal.         Behavior: Behavior normal.         Procedures    Results:   Labs:   Hemoglobin A1C   Date Value Ref Range Status   12/06/2023 7.4 (A) 4.5 - 5.7 % Final   07/20/2023 8.10 (H) 4.80 - 5.60 % Final     TSH   Date Value Ref Range Status   10/02/2023 0.627 0.270 - 4.200 uIU/mL Final        POCT Results (if applicable):   Results for orders placed or performed in visit on 12/20/23   Urine Culture - Urine, Urine, Clean Catch    Specimen: Urine, Clean Catch   Result Value Ref Range    Urine Culture 50,000 CFU/mL Streptococcus agalactiae (Group B) (A)    Comprehensive Metabolic Panel    Specimen: Arm, Right; Blood   Result Value Ref Range    Glucose 104 (H) 65 - 99 mg/dL    BUN 28 (H) 8 - 23 mg/dL    Creatinine 2.20 (H) 0.57 - 1.00 mg/dL    Sodium 139 136 - 145 mmol/L    Potassium 4.4 3.5 - 5.2 mmol/L    Chloride 104 98 - 107 mmol/L    CO2 24.9 22.0 - 29.0 mmol/L    Calcium 9.8 8.6 - 10.5 mg/dL    Total Protein 7.7 6.0 - 8.5 g/dL    Albumin 4.3 3.5 - 5.2 g/dL    ALT (SGPT) 11 1 - 33 U/L    AST (SGOT) 22 1 - 32 U/L    Alkaline Phosphatase 105 39 - 117 U/L    Total Bilirubin <0.2 0.0 - 1.2 mg/dL    Globulin 3.4 gm/dL    A/G Ratio 1.3 g/dL    BUN/Creatinine Ratio 12.7 7.0 - 25.0    Anion Gap 10.1 5.0 - 15.0 mmol/L    eGFR 22.2 (L) >60.0 mL/min/1.73   CBC Auto Differential    Specimen: Arm, Right; Blood   Result Value Ref Range    WBC 8.16 3.40 - 10.80 10*3/mm3    RBC 3.80 3.77 - 5.28 10*6/mm3    Hemoglobin 11.2 (L) 12.0 - 15.9 g/dL    Hematocrit 34.6 34.0 - 46.6 %    MCV 91.1 79.0 - 97.0 fL    MCH 29.5 26.6 - 33.0 pg    MCHC 32.4 31.5 - 35.7 g/dL    RDW 14.5 12.3 - 15.4 %    RDW-SD 48.4 37.0 - 54.0 fl    MPV 10.6 6.0 - 12.0 fL    Platelets 208 140 - 450 10*3/mm3   Manual Differential    Specimen: Arm, Right; Blood   Result Value Ref Range     Neutrophil % 58.0 42.7 - 76.0 %    Lymphocyte % 30.0 19.6 - 45.3 %    Monocyte % 11.0 5.0 - 12.0 %    Eosinophil % 1.0 0.3 - 6.2 %    Neutrophils Absolute 4.73 1.70 - 7.00 10*3/mm3    Lymphocytes Absolute 2.45 0.70 - 3.10 10*3/mm3    Monocytes Absolute 0.90 0.10 - 0.90 10*3/mm3    Eosinophils Absolute 0.08 0.00 - 0.40 10*3/mm3    nRBC 2.0 (H) 0.0 - 0.2 /100 WBC    Dacrocytes Slight/1+ None Seen    Ovalocytes Slight/1+ None Seen    Poikilocytes Slight/1+ None Seen    Polychromasia Slight/1+ None Seen    WBC Morphology Normal Normal    Platelet Morphology Normal Normal       Imaging:   No valid procedures specified.     Measures:   Advanced Care Planning:   Patient does not have an advance directive, information provided.    Smoking Cessation:   less than 3 minutes spent counseling Will try to cut down    Assessment / Plan      Assessment/Plan:   Diagnoses and all orders for this visit:    1. Type 2 diabetes mellitus with hyperglycemia, with long-term current use of insulin (Primary)   Patient's blood sugars does continue to fluctuate.  She does have insulin prescribed and has been holding off for right now.  We have encouraged her to continue to monitor her sugars as she is doing and if she does continue to have high blood sugars during meals we will have to start short acting insulin with meals.  However, it appears that her blood sugars are only elevated when she has dietary indiscretion such as eating candy as well as drinking sugary containing soft drinks/juice.  We have encouraged her to discontinue this habit and to monitor her blood sugars will follow-up in about 5 to 6 weeks.  We may need to add a short acting insulin at that time.    2. Stage 4 chronic kidney disease  Patient does have a scheduled appointment with a nephrologist in February.  We will await the results of the obtaining current laboratory data until she is being seen with the nephrologist.  We will continue to monitor and address.  We have  encouraged her to do better with respect to her fluid intake as well as treatment of her diabetes.  -     MicroAlbumin, Urine, Random - Urine, Clean Catch; Future  -     Urinalysis With Culture If Indicated -; Future  -     Comprehensive Metabolic Panel; Future    3. Mixed hyperlipidemia  We will obtain a lipid profile when she has lab drawn for the nephrologist.  We will make adjustments as necessary trying to keep her LDL less than 70.  -     Lipid Panel; Future    4. Primary hypertension   Blood pressure is doing well at present time.  No adjustments are necessary.    5. Cigarette nicotine dependence without complication   Patient does continue to smoke.  We will continue to monitor and will treat accordingly.  We have encouraged her to discontinue and will provide medical assistance if necessary.    6. Atopic dermatitis of scalp  Patient is having itching on her scalp as well as several parts of her body.  It could be due to dry skin.  We have encouraged her to apply lotion as well as Vaseline as necessary.  We will use steroid drops for her scalp and we will see if this shows improvement.  We will continue to monitor and she will contact us with any questions or concerns.  -     Fluocinolone Acetonide Scalp 0.01 % oil; Apply 20 drops topically Daily.  Dispense: 118.28 mL; Refill: 1        Follow Up:   Return in about 6 weeks (around 2/13/2024).      At [facility], we believe that sharing information builds trust and better relationships. You are receiving this note because you recently visited [facility]. It is possible you will see health information before a provider has talked with you about it. This kind of information can be easy to misunderstand. To help you fully understand what it means for your health, we urge you to discuss this note with your provider.    Aiden Spencer MD  [facility]

## 2024-01-16 ENCOUNTER — TELEPHONE (OUTPATIENT)
Dept: FAMILY MEDICINE CLINIC | Facility: CLINIC | Age: 79
End: 2024-01-16

## 2024-01-16 DIAGNOSIS — J44.1 CHRONIC OBSTRUCTIVE PULMONARY DISEASE WITH ACUTE EXACERBATION: ICD-10-CM

## 2024-01-16 RX ORDER — HYDROCODONE POLISTIREX AND CHLORPHENIRAMINE POLISTIREX 10; 8 MG/5ML; MG/5ML
5 SUSPENSION, EXTENDED RELEASE ORAL EVERY 12 HOURS PRN
Qty: 120 ML | Refills: 0 | Status: SHIPPED | OUTPATIENT
Start: 2024-01-16 | End: 2024-01-16

## 2024-01-16 RX ORDER — BROMPHENIRAMINE MALEATE, PSEUDOEPHEDRINE HYDROCHLORIDE, AND DEXTROMETHORPHAN HYDROBROMIDE 2; 30; 10 MG/5ML; MG/5ML; MG/5ML
SYRUP ORAL
Qty: 120 ML | Refills: 2 | Status: SHIPPED | OUTPATIENT
Start: 2024-01-16

## 2024-01-17 RX ORDER — CARVEDILOL 25 MG/1
TABLET ORAL
Qty: 180 TABLET | Refills: 3 | Status: SHIPPED | OUTPATIENT
Start: 2024-01-17

## 2024-01-19 ENCOUNTER — TELEPHONE (OUTPATIENT)
Dept: GASTROENTEROLOGY | Facility: CLINIC | Age: 79
End: 2024-01-19
Payer: MEDICARE

## 2024-01-19 NOTE — TELEPHONE ENCOUNTER
Hub staff attempted to follow warm transfer process and was unsuccessful     Caller: URMILA    Relationship to patient: OTHER    Best call back number: [phone]     Patient is needing:  IMAGING CALLED TO LET YOU KNOW THAT MS[facility] NO[facility]'S CT WITH CONTRAST HAD TO BE CANCELLED DUE TO HER LABS. PLEASE REVIEW AND ADVISE.

## 2024-01-23 NOTE — TELEPHONE ENCOUNTER
I have not seen this patient since 2022 and did not order any imaging at that time.  I am wondering if they meant to send this to someone else?

## 2024-01-25 ENCOUNTER — LAB (OUTPATIENT)
Dept: FAMILY MEDICINE CLINIC | Facility: CLINIC | Age: 79
End: 2024-01-25
Payer: MEDICARE

## 2024-01-25 DIAGNOSIS — N18.4 CHRONIC KIDNEY DISEASE, STAGE IV (SEVERE): Primary | ICD-10-CM

## 2024-01-25 LAB
ANION GAP SERPL CALCULATED.3IONS-SCNC: 12.4 MMOL/L (ref 5–15)
BILIRUB UR QL STRIP: NEGATIVE
BUN SERPL-MCNC: 32 MG/DL (ref 8–23)
BUN/CREAT SERPL: 12 (ref 7–25)
CALCIUM SPEC-SCNC: 9.6 MG/DL (ref 8.6–10.5)
CHLORIDE SERPL-SCNC: 103 MMOL/L (ref 98–107)
CLARITY UR: CLEAR
CO2 SERPL-SCNC: 23.6 MMOL/L (ref 22–29)
COLOR UR: YELLOW
CREAT SERPL-MCNC: 2.66 MG/DL (ref 0.57–1)
EGFRCR SERPLBLD CKD-EPI 2021: 17.6 ML/MIN/1.73
GLUCOSE SERPL-MCNC: 125 MG/DL (ref 65–99)
GLUCOSE UR STRIP-MCNC: NEGATIVE MG/DL
HCT VFR BLD AUTO: 31.7 % (ref 34–46.6)
HGB BLD-MCNC: 10.3 G/DL (ref 12–15.9)
HGB UR QL STRIP.AUTO: ABNORMAL
KETONES UR QL STRIP: NEGATIVE
LEUKOCYTE ESTERASE UR QL STRIP.AUTO: ABNORMAL
NITRITE UR QL STRIP: NEGATIVE
PH UR STRIP.AUTO: 6 [PH] (ref 5–8)
POTASSIUM SERPL-SCNC: 4.2 MMOL/L (ref 3.5–5.2)
PROT UR QL STRIP: ABNORMAL
SODIUM SERPL-SCNC: 139 MMOL/L (ref 136–145)
SP GR UR STRIP: 1.02 (ref 1–1.03)
UROBILINOGEN UR QL STRIP: ABNORMAL

## 2024-01-25 PROCEDURE — 85014 HEMATOCRIT: CPT | Performed by: NURSE PRACTITIONER

## 2024-01-25 PROCEDURE — 80048 BASIC METABOLIC PNL TOTAL CA: CPT | Performed by: NURSE PRACTITIONER

## 2024-01-25 PROCEDURE — 85018 HEMOGLOBIN: CPT | Performed by: NURSE PRACTITIONER

## 2024-01-25 PROCEDURE — 81003 URINALYSIS AUTO W/O SCOPE: CPT | Performed by: NURSE PRACTITIONER

## 2024-01-30 ENCOUNTER — LAB (OUTPATIENT)
Dept: FAMILY MEDICINE CLINIC | Facility: CLINIC | Age: 79
End: 2024-01-30
Payer: MEDICARE

## 2024-01-30 DIAGNOSIS — N18.4 CHRONIC RENAL DISEASE, STAGE IV: Primary | ICD-10-CM

## 2024-01-30 LAB
ALBUMIN UR-MCNC: 4.1 MG/DL
COLLECT DURATION TIME UR: 24 HRS
MICROALBUMIN 24H UR-MRATE: 30.8 MG/24 HRS (ref 0–25)
SPECIMEN VOL 24H UR: 750 ML

## 2024-01-30 PROCEDURE — 82043 UR ALBUMIN QUANTITATIVE: CPT | Performed by: NURSE PRACTITIONER

## 2024-02-07 ENCOUNTER — TELEPHONE (OUTPATIENT)
Dept: FAMILY MEDICINE CLINIC | Facility: CLINIC | Age: 79
End: 2024-02-07
Payer: MEDICARE

## 2024-02-07 DIAGNOSIS — N39.3 STRESS INCONTINENCE OF URINE: Primary | ICD-10-CM

## 2024-02-07 RX ORDER — TOLTERODINE 2 MG/1
2 CAPSULE, EXTENDED RELEASE ORAL DAILY
Qty: 90 CAPSULE | Refills: 3 | Status: SHIPPED | OUTPATIENT
Start: 2024-02-07

## 2024-02-07 NOTE — TELEPHONE ENCOUNTER
RICA ASKING FOR LITTLE BLUE PILL TO KEEP JEANNE  FROM URINATING SO MUCH (WETTING ON HERSELF), THEY ASKED DR WHITAKER FOR IT, HE TOLD THEM TO HAVE HER ASK YOU FOR IT, VAL WOULD NOT FILL BECAUSE THE SCRIPT WAS SO OLD    GI DORINA SAYS SHE DOES NOT KNOW WHAT IT IS?

## 2024-02-19 RX ORDER — CLOPIDOGREL BISULFATE 75 MG/1
75 TABLET ORAL DAILY
Qty: 30 TABLET | Refills: 11 | Status: SHIPPED | OUTPATIENT
Start: 2024-02-19

## 2024-02-20 ENCOUNTER — OFFICE VISIT (OUTPATIENT)
Dept: FAMILY MEDICINE CLINIC | Facility: CLINIC | Age: 79
End: 2024-02-20
Payer: MEDICARE

## 2024-02-20 ENCOUNTER — LAB (OUTPATIENT)
Dept: FAMILY MEDICINE CLINIC | Facility: CLINIC | Age: 79
End: 2024-02-20
Payer: MEDICARE

## 2024-02-20 VITALS
WEIGHT: 163.4 LBS | RESPIRATION RATE: 16 BRPM | DIASTOLIC BLOOD PRESSURE: 60 MMHG | HEIGHT: 60 IN | BODY MASS INDEX: 32.08 KG/M2 | OXYGEN SATURATION: 100 % | HEART RATE: 80 BPM | TEMPERATURE: 97.1 F | SYSTOLIC BLOOD PRESSURE: 120 MMHG

## 2024-02-20 DIAGNOSIS — F17.210 CIGARETTE NICOTINE DEPENDENCE WITHOUT COMPLICATION: ICD-10-CM

## 2024-02-20 DIAGNOSIS — Z79.4 TYPE 2 DIABETES MELLITUS WITH HYPERGLYCEMIA, WITH LONG-TERM CURRENT USE OF INSULIN: ICD-10-CM

## 2024-02-20 DIAGNOSIS — I77.1 CELIAC ARTERY STENOSIS: ICD-10-CM

## 2024-02-20 DIAGNOSIS — Z23 NEED FOR ZOSTER VACCINATION: ICD-10-CM

## 2024-02-20 DIAGNOSIS — Z00.00 WELL ADULT EXAM: ICD-10-CM

## 2024-02-20 DIAGNOSIS — N18.4 STAGE 4 CHRONIC KIDNEY DISEASE: ICD-10-CM

## 2024-02-20 DIAGNOSIS — Z00.00 WELL ADULT EXAM: Primary | ICD-10-CM

## 2024-02-20 DIAGNOSIS — J44.1 CHRONIC OBSTRUCTIVE PULMONARY DISEASE WITH ACUTE EXACERBATION: ICD-10-CM

## 2024-02-20 DIAGNOSIS — Z91.81 AT HIGH RISK FOR FALLS: ICD-10-CM

## 2024-02-20 DIAGNOSIS — Z51.81 ENCOUNTER FOR THERAPEUTIC DRUG LEVEL MONITORING: ICD-10-CM

## 2024-02-20 DIAGNOSIS — E11.65 TYPE 2 DIABETES MELLITUS WITH HYPERGLYCEMIA, WITH LONG-TERM CURRENT USE OF INSULIN: ICD-10-CM

## 2024-02-20 DIAGNOSIS — E78.2 MIXED HYPERLIPIDEMIA: ICD-10-CM

## 2024-02-20 DIAGNOSIS — I10 PRIMARY HYPERTENSION: ICD-10-CM

## 2024-02-20 LAB
ALBUMIN SERPL-MCNC: 4 G/DL (ref 3.5–5.2)
ALBUMIN/GLOB SERPL: 1.2 G/DL
ALP SERPL-CCNC: 83 U/L (ref 39–117)
ALT SERPL W P-5'-P-CCNC: 10 U/L (ref 1–33)
ANION GAP SERPL CALCULATED.3IONS-SCNC: 12.9 MMOL/L (ref 5–15)
AST SERPL-CCNC: 24 U/L (ref 1–32)
BILIRUB SERPL-MCNC: <0.2 MG/DL (ref 0–1.2)
BUN SERPL-MCNC: 31 MG/DL (ref 8–23)
BUN/CREAT SERPL: 12.4 (ref 7–25)
CALCIUM SPEC-SCNC: 9.1 MG/DL (ref 8.6–10.5)
CHLORIDE SERPL-SCNC: 105 MMOL/L (ref 98–107)
CHOLEST SERPL-MCNC: 146 MG/DL (ref 0–200)
CO2 SERPL-SCNC: 21.1 MMOL/L (ref 22–29)
CREAT SERPL-MCNC: 2.49 MG/DL (ref 0.57–1)
DEPRECATED RDW RBC AUTO: 50.2 FL (ref 37–54)
EGFRCR SERPLBLD CKD-EPI 2021: 19.1 ML/MIN/1.73
ERYTHROCYTE [DISTWIDTH] IN BLOOD BY AUTOMATED COUNT: 15.4 % (ref 12.3–15.4)
GLOBULIN UR ELPH-MCNC: 3.4 GM/DL
GLUCOSE SERPL-MCNC: 136 MG/DL (ref 65–99)
HBA1C MFR BLD: 6.8 % (ref 4.8–5.6)
HCT VFR BLD AUTO: 27.9 % (ref 34–46.6)
HDLC SERPL-MCNC: 38 MG/DL (ref 40–60)
HGB BLD-MCNC: 9.2 G/DL (ref 12–15.9)
LDLC SERPL CALC-MCNC: 74 MG/DL (ref 0–100)
LDLC/HDLC SERPL: 1.76 {RATIO}
MCH RBC QN AUTO: 29.7 PG (ref 26.6–33)
MCHC RBC AUTO-ENTMCNC: 33 G/DL (ref 31.5–35.7)
MCV RBC AUTO: 90 FL (ref 79–97)
PLATELET # BLD AUTO: 199 10*3/MM3 (ref 140–450)
PMV BLD AUTO: 10.9 FL (ref 6–12)
POTASSIUM SERPL-SCNC: 4.8 MMOL/L (ref 3.5–5.2)
PROT SERPL-MCNC: 7.4 G/DL (ref 6–8.5)
RBC # BLD AUTO: 3.1 10*6/MM3 (ref 3.77–5.28)
SODIUM SERPL-SCNC: 139 MMOL/L (ref 136–145)
TRIGL SERPL-MCNC: 205 MG/DL (ref 0–150)
VLDLC SERPL-MCNC: 34 MG/DL (ref 5–40)
WBC NRBC COR # BLD AUTO: 7.37 10*3/MM3 (ref 3.4–10.8)

## 2024-02-20 PROCEDURE — 82607 VITAMIN B-12: CPT | Performed by: FAMILY MEDICINE

## 2024-02-20 PROCEDURE — 83036 HEMOGLOBIN GLYCOSYLATED A1C: CPT | Performed by: FAMILY MEDICINE

## 2024-02-20 PROCEDURE — 85027 COMPLETE CBC AUTOMATED: CPT | Performed by: FAMILY MEDICINE

## 2024-02-20 PROCEDURE — 80061 LIPID PANEL: CPT | Performed by: FAMILY MEDICINE

## 2024-02-20 PROCEDURE — 80053 COMPREHEN METABOLIC PANEL: CPT | Performed by: FAMILY MEDICINE

## 2024-02-20 PROCEDURE — 84443 ASSAY THYROID STIM HORMONE: CPT | Performed by: FAMILY MEDICINE

## 2024-02-20 RX ORDER — CLOSTRIDIUM TETANI TOXOID ANTIGEN (FORMALDEHYDE INACTIVATED), CORYNEBACTERIUM DIPHTHERIAE TOXOID ANTIGEN (FORMALDEHYDE INACTIVATED), BORDETELLA PERTUSSIS TOXOID ANTIGEN (GLUTARALDEHYDE INACTIVATED), BORDETELLA PERTUSSIS FILAMENTOUS HEMAGGLUTININ ANTIGEN (FORMALDEHYDE INACTIVATED), BORDETELLA PERTUSSIS PERTACTIN ANTIGEN, AND BORDETELLA PERTUSSIS FIMBRIAE 2/3 ANTIGEN 5; 2; 2.5; 5; 3; 5 [LF]/.5ML; [LF]/.5ML; UG/.5ML; UG/.5ML; UG/.5ML; UG/.5ML
0.5 INJECTION, SUSPENSION INTRAMUSCULAR ONCE
Qty: 0.5 ML | Refills: 0 | Status: SHIPPED | OUTPATIENT
Start: 2024-02-20 | End: 2024-02-20

## 2024-02-20 NOTE — PROGRESS NOTES
The ABCs of the Annual Wellness Visit  Subsequent Medicare Wellness Visit    Subjective    JEANNE NOEL is a 78 y.o. female who presents for a Subsequent Medicare Wellness Visit.    The following portions of the patient's history were reviewed and   updated as appropriate: allergies, current medications, past family history, past medical history, past social history, past surgical history, and problem list.    Compared to one year ago, the patient feels her physical   health is the same.    Compared to one year ago, the patient feels her mental   health is the same.    Recent Hospitalizations:  She was not admitted to the hospital during the last year.       Current Medical Providers:  Patient Care Team:  Aiden Spencer MD as PCP - General (Family Medicine)  Sujatha Carmona MD as Consulting Physician (Pain Medicine)  Cr De Jesus MD as Consulting Physician (Gastroenterology)  Noemí Machuca APRN as Nurse Practitioner (Nurse Practitioner)    Outpatient Medications Prior to Visit   Medication Sig Dispense Refill    acetaminophen (TYLENOL) 500 MG tablet Take 1 tablet by mouth Every 6 (Six) Hours As Needed for Mild Pain.      albuterol (PROVENTIL) (2.5 MG/3ML) 0.083% nebulizer solution Take 2.5 mg by nebulization Every 4 (Four) Hours As Needed for Wheezing or Shortness of Air.      albuterol sulfate  (90 Base) MCG/ACT inhaler Inhale 2 puffs Every 6 (Six) Hours As Needed for Wheezing. 18 g 11    aspirin 81 MG EC tablet Take 1 tablet by mouth Daily.      atorvastatin (LIPITOR) 40 MG tablet TAKE ONE TABLET BY MOUTH EVERY DAY 30 tablet 12    busPIRone (BUSPAR) 5 MG tablet Take 1 tablet by mouth 3 (Three) Times a Day.      carvedilol (COREG) 25 MG tablet TAKE ONE TABLET BY MOUTH TWO TIMES A  tablet 3    cetirizine (zyrTEC) 10 MG tablet Take 0.5 tablets by mouth Daily. 30 tablet 0    clopidogrel (PLAVIX) 75 MG tablet TAKE ONE TABLET BY MOUTH EVERY DAY 30 tablet 11    Comfort EZ  Pen Needles 32G X 4 MM misc       Comfort EZ Pen Needles 32G X 4 MM misc USE AS DIRECTED DAILY      Continuous Blood Gluc  (Dexcom G6 ) device Use 1 each Daily. 1 each 11    Continuous Blood Gluc  (Dexcom G7 ) device Use 1 each Daily. 1 each 0    Continuous Blood Gluc Sensor (Dexcom G6 Sensor) Use Every 10 (Ten) Days. 2 each 11    Continuous Blood Gluc Sensor (Dexcom G7 Sensor) misc Use 1 each Every 10 (Ten) Days. 9 each 3    Continuous Blood Gluc Transmit (Dexcom G6 Transmitter) misc Use 1 each Daily. 3 each 3    Cyanocobalamin (VITAMIN B-12 PO) Take 2,500 mcg by mouth Daily.      Diclofenac Sodium (VOLTAREN) 1 % gel gel APPLY TO AFFECTED AREA FOUR TIMES DAILY 100 g 12    DULoxetine (CYMBALTA) 60 MG capsule TAKE ONE CAPSULE BY MOUTH EVERY DAY 90 capsule 3    famotidine (PEPCID) 10 MG tablet Take 1 tablet by mouth Every Other Day. 15 tablet 0    Fluocinolone Acetonide Scalp 0.01 % oil Apply 20 drops topically Daily. 118.28 mL 1    fluticasone (FLONASE) 50 MCG/ACT nasal spray USE 2 SPRAYS IN EACH NOSTRIL ONCE DAILY 16 g 12    HYDROcodone-acetaminophen (NORCO) 7.5-325 MG per tablet Take 1 tablet by mouth Every 12 (Twelve) Hours As Needed for Moderate Pain. 20 tablet 0    isosorbide mononitrate (IMDUR) 60 MG 24 hr tablet TAKE ONE TABLET BY MOUTH EVERY DAY 90 tablet 3    losartan (COZAAR) 50 MG tablet TAKE ONE TABLET BY MOUTH EVERY DAY 90 tablet 3    multivitamin with minerals tablet tablet Take 1 tablet by mouth Daily.      naloxone (NARCAN) 4 MG/0.1ML nasal spray 1 spray into the nostril(s) as directed by provider As Needed.      nicotine (Nicoderm CQ) 21 MG/24HR patch Place 1 patch on the skin as directed by provider Daily. 28 each 0    pantoprazole (PROTONIX) 40 MG EC tablet Take 1 tablet by mouth 2 (Two) Times a Day. 180 tablet 3    SITagliptin (Januvia) 50 MG tablet TAKE 1 TABLET BY MOUTH DAILY 90 tablet 3    SSD 1 % cream APPLY TO AFFECTED AREA AS DIRECTED 400 g 11    tolterodine  "LA (DETROL LA) 2 MG 24 hr capsule Take 1 capsule by mouth Daily. 90 capsule 3    True Metrix Blood Glucose Test test strip Daily. for testing      brompheniramine-pseudoephedrine-DM 30-2-10 MG/5ML syrup TAKE 2 TEASPOONSFUL BY MOUTH EVERY 4 HOURS AS NEEDED FOR COUGH. 120 mL 2     Facility-Administered Medications Prior to Visit   Medication Dose Route Frequency Provider Last Rate Last Admin    ipratropium-albuterol (DUO-NEB) nebulizer solution 3 mL  3 mL Nebulization 4x Daily - RT Aiden Spencer MD   3 mL at 09/08/22 1328       Opioid medication/s are on active medication list.  and I have evaluated her active treatment plan and pain score trends (see table).  There were no vitals filed for this visit.  I have reviewed the chart for potential of high risk medication and harmful drug interactions in the elderly.          Aspirin is on active medication list. Aspirin use is indicated based on review of current medical condition/s. Pros and cons of this therapy have been discussed today. Benefits of this medication outweigh potential harm.  Patient has been encouraged to continue taking this medication.  .      Patient Active Problem List   Diagnosis    Cerebral vascular disease    Degenerative disc disease, lumbar    Degenerative disc disease, cervical    Spinal stenosis, lumbar region, with neurogenic claudication    Tobacco abuse     Advance Care Planning   Advance Care Planning     Advance Directive is not on file.  ACP discussion was held with the patient during this visit. Patient does not have an advance directive, information provided.     Objective    Vitals:    02/20/24 1510   BP: 120/60   BP Location: Left arm   Patient Position: Sitting   Cuff Size: Adult   Pulse: 80   Resp: 16   Temp: 97.1 °F (36.2 °C)   TempSrc: Temporal   SpO2: 100%   Weight: 74.1 kg (163 lb 6.4 oz)   Height: 152.4 cm (60\")     Estimated body mass index is 31.91 kg/m² as calculated from the following:    Height as of this " "encounter: 152.4 cm (60\").    Weight as of this encounter: 74.1 kg (163 lb 6.4 oz).           Does the patient have evidence of cognitive impairment? No    Lab Results   Component Value Date    TRIG 205 (H) 2024    HDL 38 (L) 2024    LDL 74 2024    VLDL 34 2024    HGBA1C 6.80 (H) 2024        HEALTH RISK ASSESSMENT    Smoking Status:  Social History     Tobacco Use   Smoking Status Every Day    Packs/day: 0.50    Years: 62.00    Additional pack years: 0.00    Total pack years: 31.00    Types: Cigarettes    Passive exposure: Past   Smokeless Tobacco Never   Tobacco Comments    2022 quit      Alcohol Consumption:  Social History     Substance and Sexual Activity   Alcohol Use No     Fall Risk Screen:    BA Fall Risk Assessment was completed, and patient is at HIGH risk for falls. Assessment completed on:2024    Depression Screenin/20/2024     3:00 PM   PHQ-2/PHQ-9 Depression Screening   Little Interest or Pleasure in Doing Things 0-->not at all   Feeling Down, Depressed or Hopeless 0-->not at all   PHQ-9: Brief Depression Severity Measure Score 0       Health Habits and Functional and Cognitive Screenin/20/2024     3:00 PM   Functional & Cognitive Status   Do you have difficulty preparing food and eating? No   Do you have difficulty bathing yourself, getting dressed or grooming yourself? No   Do you have difficulty using the toilet? No   Do you have difficulty moving around from place to place? No   Do you have trouble with steps or getting out of a bed or a chair? No   Current Diet Well Balanced Diet   Dental Exam Up to date   Eye Exam Up to date   Exercise (times per week) 7 times per week   Current Exercises Include Walking   Do you need help using the phone?  No   Are you deaf or do you have serious difficulty hearing?  No   Do you need help to go to places out of walking distance? Yes   Do you need help shopping? Yes   Do you need help preparing meals?  " Yes   Do you need help with housework?  Yes   Do you need help with laundry? No   Do you need help taking your medications? Yes   Do you need help managing money? No   Do you ever drive or ride in a car without wearing a seat belt? No   Have you felt unusual stress, anger or loneliness in the last month? No   Who do you live with? Sibling   If you need help, do you have trouble finding someone available to you? No   Have you been bothered in the last four weeks by sexual problems? No   Do you have difficulty concentrating, remembering or making decisions? No       Age-appropriate Screening Schedule:  Refer to the list below for future screening recommendations based on patient's age, sex and/or medical conditions. Orders for these recommended tests are listed in the plan section. The patient has been provided with a written plan.    Health Maintenance   Topic Date Due    TDAP/TD VACCINES (1 - Tdap) Never done    ZOSTER VACCINE (1 of 2) Never done    RSV Vaccine - Adults (1 - 1-dose 60+ series) Never done    COVID-19 Vaccine (4 - 2023-24 season) 12/06/2024 (Originally 9/1/2023)    BMI FOLLOWUP  05/09/2024    HEMOGLOBIN A1C  08/20/2024    DIABETIC EYE EXAM  12/05/2024    DXA SCAN  01/16/2025    URINE MICROALBUMIN  01/30/2025    ANNUAL WELLNESS VISIT  02/20/2025    LIPID PANEL  02/20/2025    INFLUENZA VACCINE  Completed    Pneumococcal Vaccine 65+  Completed    COLORECTAL CANCER SCREENING  Discontinued                  CMS Preventative Services Quick Reference  Risk Factors Identified During Encounter  Chronic Pain: Natural history and expected course discussed. Questions answered.  Fall Risk-High or Moderate: Discussed Fall Prevention in the home and Information on Fall Prevention Shared in After Visit Summary  Immunizations Discussed/Encouraged: Tdap, Shingrix, and RSV (Respiratory Syncytial Virus)  Tobacco Use/Dependance Risk (use dotphrase .tobaccocessation for documentation)  The above risks/problems have been  discussed with the patient.  Pertinent information has been shared with the patient in the After Visit Summary.  An After Visit Summary and PPPS were made available to the patient.    Follow Up:   Next Medicare Wellness visit to be scheduled in 1 year.       Additional E&M Note during same encounter follows:  Patient has multiple medical problems which are significant and separately identifiable that require additional work above and beyond the Medicare Wellness Visit.      Chief Complaint  Annual Exam and Diabetes    Subjective        Diabetes  Pertinent negatives for hypoglycemia include no dizziness, headaches, nervousness/anxiousness, seizures or tremors. Associated symptoms include fatigue and weakness. Pertinent negatives for diabetes include no chest pain.     JEANNE NOEL is also being seen today for follow-up of her chronic medical conditions.  Patient states she has been doing relatively well except for her usual fatigue.  She does continue to have her chronic low back pain and is currently being evaluated by Dr. Machado.  She is uncertain of what the next test is.  Patient is also having some problems with her abdominal complaints very similar to what she had prior to her celiac artery stent placement.  Patient has not seen Dr. Neil in quite some time.  Patient also states that she does continue to follow with the nephrologist.  She is uncertain of his most recent recommendations.  Patient does continue to have her usual arthralgias and myalgias.  She is still having pain in her abdomen and, back as well as radiculopathy.  She does continue to take her medications as prescribed without apparent side effects.  Her activity level, appetite and sleep are relatively unchanged.  Patient denies any other cardiovascular, respiratory, gastrointestinal, urologic or neurologic complaints.    Review of Systems   Constitutional:  Positive for fatigue. Negative for activity change and appetite change.  "  Respiratory:  Negative for cough, chest tightness, shortness of breath and wheezing.    Cardiovascular:  Negative for chest pain, palpitations and leg swelling.   Gastrointestinal:  Positive for abdominal pain. Negative for abdominal distention, blood in stool, constipation, diarrhea, nausea and vomiting.   Genitourinary:  Negative for difficulty urinating, dysuria, enuresis, frequency, hematuria and urgency.   Musculoskeletal:  Positive for arthralgias, back pain, gait problem, joint swelling and myalgias.   Neurological:  Positive for weakness. Negative for dizziness, tremors, seizures, syncope, light-headedness and numbness.   Psychiatric/Behavioral:  Negative for dysphoric mood and sleep disturbance. The patient is not nervous/anxious.        Objective   Vital Signs:  /60 (BP Location: Left arm, Patient Position: Sitting, Cuff Size: Adult)   Pulse 80   Temp 97.1 °F (36.2 °C) (Temporal)   Resp 16   Ht 152.4 cm (60\")   Wt 74.1 kg (163 lb 6.4 oz)   SpO2 100%   BMI 31.91 kg/m²     Physical Exam  Vitals and nursing note reviewed.   Constitutional:       Appearance: Normal appearance.   HENT:      Head: Normocephalic and atraumatic.      Nose: Nose normal.      Mouth/Throat:      Pharynx: Oropharynx is clear.   Eyes:      Extraocular Movements: Extraocular movements intact.      Pupils: Pupils are equal, round, and reactive to light.   Neck:      Thyroid: No thyroid mass or thyromegaly.      Trachea: Trachea normal.   Cardiovascular:      Rate and Rhythm: Normal rate and regular rhythm.      Pulses: Normal pulses. No decreased pulses.      Heart sounds: Normal heart sounds.   Pulmonary:      Effort: Pulmonary effort is normal.      Breath sounds: Normal breath sounds.   Abdominal:      General: Abdomen is flat. Bowel sounds are normal.      Palpations: Abdomen is soft.      Tenderness: There is no abdominal tenderness.   Musculoskeletal:      Cervical back: Neck supple.      Right lower leg: No edema. "      Left lower leg: No edema.   Lymphadenopathy:      Cervical: No cervical adenopathy.   Skin:     General: Skin is warm and dry.   Neurological:      General: No focal deficit present.      Mental Status: She is alert and oriented to person, place, and time.      Sensory: Sensation is intact.      Motor: Motor function is intact.      Coordination: Coordination is intact.   Psychiatric:         Attention and Perception: Attention normal.         Mood and Affect: Mood normal.         Speech: Speech normal.         Behavior: Behavior normal.          The following data was reviewed by: Aiden Spencer MD on 02/20/2024:  Common labs          1/25/2024    10:21 1/30/2024    14:27 2/20/2024    16:10   Common Labs   Glucose 125   136    BUN 32   31    Creatinine 2.66   2.49    Sodium 139   139    Potassium 4.2   4.8    Chloride 103   105    Calcium 9.6   9.1    Albumin   4.0    Total Bilirubin   <0.2    Alkaline Phosphatase   83    AST (SGOT)   24    ALT (SGPT)   10    WBC   7.37    Hemoglobin 10.3   9.2    Hematocrit 31.7   27.9    Platelets   199    Total Cholesterol   146    Triglycerides   205    HDL Cholesterol   38    LDL Cholesterol    74    Hemoglobin A1C   6.80    Microalbumin, Urine  4.1                  Assessment and Plan   Diagnoses and all orders for this visit:    1. Well adult exam (Primary)  Patient did have a wellness exam performed today.  Her function and cognition are diminished.  Her anxiety/depression are acceptable.  She does have increased risk and fall risk.  We have discussed anticipatory guidance as well as safety concerns.  We will continue to monitor her symptoms and if she has other problems or complaints prior to her next scheduled follow-up she will contact us.  -     CBC (No Diff); Future  -     Comprehensive Metabolic Panel; Future  -     Hemoglobin A1c; Future  -     Lipid Panel; Future  -     Urinalysis without microscopic (no culture) - Urine, Clean Catch; Future  -      Urine Drug Screen - Urine, Clean Catch; Future  -     Vitamin B12; Future  -     TSH Rfx On Abnormal To Free T4; Future    2. Stage 4 chronic kidney disease   Patient has recently seen Dr. Saleh.  She is uncertain what he suggested.  We will obtain a copy of his most recent clinic appointment and will make adjustments based on these findings.    3. Mixed hyperlipidemia   Patient will have a lipid profile obtained.  We will make adjustments to keep her LDL less than 70.  We will obtain a liver function test and will pursue and treat accordingly.    4. Chronic obstructive pulmonary disease with acute exacerbation   Patient states that she is stable with respect to her COPD.  She does continue to smoke.  She has not had any flare recently and does continue to use her albuterol as necessary.  Patient is 78 years old and no longer requires the annual CT screening for pulmonary nodules unless she becomes symptomatic.    5. Type 2 diabetes mellitus with hyperglycemia, with long-term current use of insulin   Patient has done better with respect to her diabetes.  We will obtain hemoglobin A1c will make adjustments based on his findings.  Hopefully her hemoglobin A1c is less than 7.5%.    6. Primary hypertension   Patient's blood pressure is doing well at present time.  She is tolerating her current regimen.  We will obtain renal function and will make adjustments based on these findings.    7. Cigarette nicotine dependence without complication   We have requested that patient discontinue her smoking.  We will provide medical assistance if requested.    8. Need for zoster vaccination  We have discussed the risk and benefits of several vaccines.  We will send the vaccines to the pharmacy.  She will consider receiving his vaccines but not all at 1 time.  We will further monitor as necessary.  -     Zoster Vac Recomb Adjuvanted 50 MCG/0.5ML reconstituted suspension; Inject 0.5 mL into the appropriate muscle as directed by  prescriber 1 (One) Time for 1 dose.  Dispense: 1 each; Refill: 0  -     Tdap (Adacel) 5-2-15.5 LF-MCG/0.5 injection; Inject 0.5 mL into the appropriate muscle as directed by prescriber 1 (One) Time for 1 dose.  Dispense: 0.5 mL; Refill: 0  -     RSVPreF3 Vac Recomb Adjuvanted (AREXVY) 120 MCG/0.5ML reconstituted suspension injection; Inject 0.5 mL into the appropriate muscle as directed by prescriber 1 (One) Time for 1 dose.  Dispense: 0.5 mL; Refill: 0    9. 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  She did have an initial arrangements for a CT angiogram of her abdomen back in April of last year but has been unable to receive this due to her renal disease.  We will make referral back to Dr. Neil to see what he suggests the next approaches.  She does continue to take Plavix on a daily basis.  Unfortunately, patient does continue to smoke.  -     Ambulatory Referral to Cardiothoracic Surgery    11.  High risk for fall   Patient does continue to have increased risk of falling.  We have encouraged her to use walker/cane's as necessary.  She does not wish to proceed with physical therapy at present time.  We will continue to monitor and will encourage compliance.         Follow Up   No follow-ups on file.  Patient was given instructions and counseling regarding her condition or for health maintenance advice. Please see specific information pulled into the AVS if appropriate.

## 2024-02-20 NOTE — PATIENT INSTRUCTIONS
Fall Prevention in the Home, Adult  Falls can cause injuries and affect people of all ages. There are many simple things that you can do to make your home safe and to help prevent falls.  If you need it, ask for help making these changes.  What actions can I take to prevent falls?  General information  Use good lighting in all rooms. Make sure to:  Replace any light bulbs that burn out.  Turn on lights if it is dark and use night-lights.  Keep items that you use often in easy-to-reach places. Lower the shelves around your home if needed.  Move furniture so that there are clear paths around it.  Do not keep throw rugs or other things on the floor that can make you trip.  If any of your floors are uneven, fix them.  Add color or contrast paint or tape to clearly teto and help you see:  Grab bars or handrails.  First and last steps of staircases.  Where the edge of each step is.  If you use a ladder or stepladder:  Make sure that it is fully opened. Do not climb a closed ladder.  Make sure the sides of the ladder are locked in place.  Have someone hold the ladder while you use it.  Know where your pets are as you move through your home.  What can I do in the bathroom?         Keep the floor dry. Clean up any water that is on the floor right away.  Remove soap buildup in the bathtub or shower. Buildup makes bathtubs and showers slippery.  Use non-skid mats or decals on the floor of the bathtub or shower.  Attach bath mats securely with double-sided, non-slip rug tape.  If you need to sit down while you are in the shower, use a non-slip stool.  Install grab bars by the toilet and in the bathtub and shower. Do not use towel bars as grab bars.  What can I do in the bedroom?  Make sure that you have a light by your bed that is easy to reach.  Do not use any sheets or blankets on your bed that hang to the floor.  Have a firm bench or chair with side arms that you can use for support when you get dressed.  What can I do in  the kitchen?  Clean up any spills right away.  If you need to reach something above you, use a sturdy step stool that has a grab bar.  Keep electrical cables out of the way.  Do not use floor polish or wax that makes floors slippery.  What can I do with my stairs?  Do not leave anything on the stairs.  Make sure that you have a light switch at the top and the bottom of the stairs. Have them installed if you do not have them.  Make sure that there are handrails on both sides of the stairs. Fix handrails that are broken or loose. Make sure that handrails are as long as the staircases.  Install non-slip stair treads on all stairs in your home if they do not have carpet.  Avoid having throw rugs at the top or bottom of stairs, or secure the rugs with carpet tape to prevent them from moving.  Choose a carpet design that does not hide the edge of steps on the stairs. Make sure that carpet is firmly attached to the stairs. Fix any carpet that is loose or worn.  What can I do on the outside of my home?  Use bright outdoor lighting.  Repair the edges of walkways and driveways and fix any cracks. Clear paths of anything that can make you trip, such as tools or rocks.  Add color or contrast paint or tape to clearly teto and help you see high doorway thresholds.  Trim any bushes or trees on the main path into your home.  Check that handrails are securely fastened and in good repair. Both sides of all steps should have handrails.  Install guardrails along the edges of any raised decks or porches.  Have leaves, snow, and ice cleared regularly. Use sand, salt, or ice melt on walkways during winter months if you live where there is ice and snow.  In the garage, clean up any spills right away, including grease or oil spills.  What other actions can I take?  Review your medicines with your health care provider. Some medicines can make you confused or feel dizzy. This can increase your chance of falling.  Wear closed-toe shoes that  fit well and support your feet. Wear shoes that have rubber soles and low heels.  Use a cane, walker, scooter, or crutches that help you move around if needed.  Talk with your provider about other ways that you can decrease your risk of falls. This may include seeing a physical therapist to learn to do exercises to improve movement and strength.  Where to find more information  Centers for Disease Control and Prevention, [place]: cdc.gov  [employer] [place] on Aging: scotty.nih.gov  National [place] on Aging: scotty.nih.gov  Contact a health care provider if:  You are afraid of falling at home.  You feel weak, drowsy, or dizzy at home.  You fall at home.  Get help right away if you:  Lose consciousness or have trouble moving after a fall.  Have a fall that causes a head injury.  These symptoms may be an emergency. Get help right away. Call 911.  Do not wait to see if the symptoms will go away.  Do not drive yourself to the hospital.  This information is not intended to replace advice given to you by your health care provider. Make sure you discuss any questions you have with your health care provider.  Document Revised: 08/21/2023 Document Reviewed: 08/21/2023  Elsevier Patient Education © 2023 [employer].

## 2024-02-21 ENCOUNTER — LAB (OUTPATIENT)
Dept: FAMILY MEDICINE CLINIC | Facility: CLINIC | Age: 79
End: 2024-02-21
Payer: MEDICARE

## 2024-02-21 DIAGNOSIS — Z00.00 WELL ADULT EXAM: ICD-10-CM

## 2024-02-21 DIAGNOSIS — Z51.81 ENCOUNTER FOR THERAPEUTIC DRUG LEVEL MONITORING: ICD-10-CM

## 2024-02-21 LAB
AMPHET+METHAMPHET UR QL: NEGATIVE
AMPHETAMINES UR QL: NEGATIVE
BARBITURATES UR QL SCN: NEGATIVE
BENZODIAZ UR QL SCN: NEGATIVE
BUPRENORPHINE SERPL-MCNC: NEGATIVE NG/ML
CANNABINOIDS SERPL QL: NEGATIVE
COCAINE UR QL: NEGATIVE
FENTANYL UR-MCNC: NEGATIVE NG/ML
METHADONE UR QL SCN: NEGATIVE
OPIATES UR QL: POSITIVE
OXYCODONE UR QL SCN: NEGATIVE
PCP UR QL SCN: NEGATIVE
TRICYCLICS UR QL SCN: NEGATIVE
TSH SERPL DL<=0.05 MIU/L-ACNC: 0.45 UIU/ML (ref 0.27–4.2)
VIT B12 BLD-MCNC: 1086 PG/ML (ref 211–946)

## 2024-02-21 PROCEDURE — 81003 URINALYSIS AUTO W/O SCOPE: CPT | Performed by: FAMILY MEDICINE

## 2024-02-21 PROCEDURE — 80307 DRUG TEST PRSMV CHEM ANLYZR: CPT | Performed by: FAMILY MEDICINE

## 2024-02-22 LAB
BILIRUB UR QL STRIP: NEGATIVE
CLARITY UR: CLEAR
COLOR UR: ABNORMAL
GLUCOSE UR STRIP-MCNC: NEGATIVE MG/DL
HGB UR QL STRIP.AUTO: ABNORMAL
KETONES UR QL STRIP: NEGATIVE
LEUKOCYTE ESTERASE UR QL STRIP.AUTO: NEGATIVE
NITRITE UR QL STRIP: NEGATIVE
PH UR STRIP.AUTO: 6 [PH] (ref 5–8)
PROT UR QL STRIP: NEGATIVE
SP GR UR STRIP: <=1.005 (ref 1–1.03)
UROBILINOGEN UR QL STRIP: ABNORMAL

## 2024-02-27 DIAGNOSIS — E11.65 TYPE 2 DIABETES MELLITUS WITH HYPERGLYCEMIA, WITH LONG-TERM CURRENT USE OF INSULIN: ICD-10-CM

## 2024-02-27 DIAGNOSIS — Z79.4 TYPE 2 DIABETES MELLITUS WITH HYPERGLYCEMIA, WITH LONG-TERM CURRENT USE OF INSULIN: ICD-10-CM

## 2024-02-27 RX ORDER — PROCHLORPERAZINE 25 MG/1
SUPPOSITORY RECTAL
Qty: 3 EACH | Refills: 11 | Status: SHIPPED | OUTPATIENT
Start: 2024-02-27

## 2024-03-13 ENCOUNTER — TELEPHONE (OUTPATIENT)
Dept: FAMILY MEDICINE CLINIC | Facility: CLINIC | Age: 79
End: 2024-03-13
Payer: MEDICARE

## 2024-03-13 RX ORDER — AMOXICILLIN 875 MG/1
875 TABLET, COATED ORAL 2 TIMES DAILY
Qty: 20 TABLET | Refills: 0 | Status: SHIPPED | OUTPATIENT
Start: 2024-03-13

## 2024-03-13 NOTE — TELEPHONE ENCOUNTER
Caller: Delbert Mcdermott    Relationship: Emergency Contact    Best call back number: [phone]     What medication are you requesting: ANTIBIOTIC AS DISCUSSED AT APPT    What are your current symptoms: SORE THROAT    How long have you been experiencing symptoms:     Have you had these symptoms before:    [x] Yes  [] No    Have you been treated for these symptoms before:   [x] Yes  [] No    If a prescription is needed, what is your preferred pharmacy and phone number:  [facility]     Additional notes:THE DR TOLD THE PATIENT HE WOULD CALL IT IN

## 2024-03-14 DIAGNOSIS — I77.1 STENOSIS OF CELIAC ARTERY: Primary | ICD-10-CM

## 2024-03-14 DIAGNOSIS — K55.1 MESENTERIC ARTERY STENOSIS: ICD-10-CM

## 2024-03-18 ENCOUNTER — TELEPHONE (OUTPATIENT)
Dept: FAMILY MEDICINE CLINIC | Facility: CLINIC | Age: 79
End: 2024-03-18
Payer: MEDICARE

## 2024-03-18 DIAGNOSIS — Z79.4 TYPE 2 DIABETES MELLITUS WITH HYPERGLYCEMIA, WITH LONG-TERM CURRENT USE OF INSULIN: ICD-10-CM

## 2024-03-18 DIAGNOSIS — E11.65 TYPE 2 DIABETES MELLITUS WITH HYPERGLYCEMIA, WITH LONG-TERM CURRENT USE OF INSULIN: ICD-10-CM

## 2024-03-18 NOTE — TELEPHONE ENCOUNTER
Caller: Rica Mcdermott    Relationship: Emergency Contact    Best call back number     [phone]       What was the call regarding:   PATIENT'S (SISTER) RICA STATED THAT PATIENT WAS INFORMED BY THE PHARMACY THAT HER MEDICATION IS NEEDING A PRIOR AUTHORIZATION IN ORDER FOR INSURANCE TO COVER     Continuous Blood Gluc Transmit (Dexcom G6 Transmitter) misc       Continuous Blood Gluc Sensor (Dexcom G7 Sensor) misc       Continuous Blood Gluc  (Dexcom G7 ) device

## 2024-03-20 ENCOUNTER — OFFICE VISIT (OUTPATIENT)
Dept: FAMILY MEDICINE CLINIC | Facility: CLINIC | Age: 79
End: 2024-03-20
Payer: MEDICARE

## 2024-03-20 VITALS
TEMPERATURE: 98 F | DIASTOLIC BLOOD PRESSURE: 70 MMHG | BODY MASS INDEX: 30.94 KG/M2 | HEIGHT: 60 IN | RESPIRATION RATE: 18 BRPM | WEIGHT: 157.6 LBS | SYSTOLIC BLOOD PRESSURE: 116 MMHG

## 2024-03-20 DIAGNOSIS — G62.9 NEUROPATHY: Primary | ICD-10-CM

## 2024-03-20 DIAGNOSIS — F17.210 CIGARETTE NICOTINE DEPENDENCE WITHOUT COMPLICATION: ICD-10-CM

## 2024-03-20 DIAGNOSIS — M51.36 DEGENERATIVE DISC DISEASE, LUMBAR: ICD-10-CM

## 2024-03-20 DIAGNOSIS — Z91.81 AT HIGH RISK FOR FALLS: ICD-10-CM

## 2024-03-20 DIAGNOSIS — Z79.4 TYPE 2 DIABETES MELLITUS WITH HYPERGLYCEMIA, WITH LONG-TERM CURRENT USE OF INSULIN: ICD-10-CM

## 2024-03-20 DIAGNOSIS — N18.4 STAGE 4 CHRONIC KIDNEY DISEASE: ICD-10-CM

## 2024-03-20 DIAGNOSIS — F41.9 ANXIETY: ICD-10-CM

## 2024-03-20 DIAGNOSIS — E11.65 TYPE 2 DIABETES MELLITUS WITH HYPERGLYCEMIA, WITH LONG-TERM CURRENT USE OF INSULIN: ICD-10-CM

## 2024-03-20 RX ORDER — PREGABALIN 25 MG/1
25 CAPSULE ORAL 2 TIMES DAILY
Qty: 60 CAPSULE | Refills: 0 | Status: SHIPPED | OUTPATIENT
Start: 2024-03-20

## 2024-03-20 RX ORDER — NICOTINE 21 MG/24HR
1 PATCH, TRANSDERMAL 24 HOURS TRANSDERMAL EVERY 24 HOURS
Qty: 28 EACH | Refills: 1 | Status: SHIPPED | OUTPATIENT
Start: 2024-03-20

## 2024-03-20 NOTE — PROGRESS NOTES
Follow Up Office Visit      Date: 2024   Patient Name: JEANNE NOEL  : 1945   MRN: [id number]     Chief Complaint:    Chief Complaint   Patient presents with    nerve pain    Back Pain    Nicotine Dependence     Something to stop smoking       History of Present Illness: JEANNE NOEL is a 79 y.o. female who is here today for assessment of her neuropathy.  Patient states that she does continue to have significant amount of neck and lower back pain with associated neuropathy.  She does continue to follow-up with Dr. Machado who is making arrangements for further imaging studies as well as possible injections.  We are still waiting on clearance of whether not she could be discontinue with respect to her Plavix.  She does continue to have intermittent abdominal pain but this appears to be her right-sided present time and not necessarily associated with meals.  Patient does continue to have her neuropathic pain.  He does not appear to be anything as beneficial at present time.  Patient does continue to smoke but is willing to discontinue.  She is not requesting assistance.  No other issues have been noted as she does continue to take her other medications as prescribed.  She denies any change in her activity, appetite or sleep.  Besides her usual arthralgias, myalgias, back and neck pain she has not had any other cardiovascular, respiratory, gastrointestinal, urologic or neurologic complaints.    Subjective      Review of Systems:   Review of Systems   Constitutional:  Negative for activity change, appetite change and fatigue.   Respiratory:  Negative for cough and shortness of breath.    Cardiovascular:  Negative for chest pain, palpitations and leg swelling.   Gastrointestinal:  Negative for abdominal pain, constipation, diarrhea, nausea, vomiting, GERD and indigestion.   Genitourinary:  Negative for dysuria, flank pain, frequency and urgency.   Musculoskeletal:  Positive for arthralgias, back  pain, gait problem, myalgias and neck pain. Negative for joint swelling.   Neurological:  Negative for dizziness, weakness and memory problem.   Psychiatric/Behavioral:  Negative for sleep disturbance. The patient is not nervous/anxious.        I have reviewed the patients family history, social history, past medical history, past surgical history and have updated it as appropriate.     Medications:     Current Outpatient Medications:     acetaminophen (TYLENOL) 500 MG tablet, Take 1 tablet by mouth Every 6 (Six) Hours As Needed for Mild Pain., Disp: , Rfl:     albuterol (PROVENTIL) (2.5 MG/3ML) 0.083% nebulizer solution, Take 2.5 mg by nebulization Every 4 (Four) Hours As Needed for Wheezing or Shortness of Air., Disp: , Rfl:     albuterol sulfate  (90 Base) MCG/ACT inhaler, Inhale 2 puffs Every 6 (Six) Hours As Needed for Wheezing., Disp: 18 g, Rfl: 11    aspirin 81 MG EC tablet, Take 1 tablet by mouth Daily., Disp: , Rfl:     atorvastatin (LIPITOR) 40 MG tablet, TAKE ONE TABLET BY MOUTH EVERY DAY, Disp: 30 tablet, Rfl: 12    busPIRone (BUSPAR) 5 MG tablet, Take 1 tablet by mouth 3 (Three) Times a Day., Disp: , Rfl:     carvedilol (COREG) 25 MG tablet, TAKE ONE TABLET BY MOUTH TWO TIMES A DAY, Disp: 180 tablet, Rfl: 3    cetirizine (zyrTEC) 10 MG tablet, Take 0.5 tablets by mouth Daily., Disp: 30 tablet, Rfl: 0    clopidogrel (PLAVIX) 75 MG tablet, TAKE ONE TABLET BY MOUTH EVERY DAY, Disp: 30 tablet, Rfl: 11    Comfort EZ Pen Needles 32G X 4 MM misc, , Disp: , Rfl:     Comfort EZ Pen Needles 32G X 4 MM misc, USE AS DIRECTED DAILY, Disp: , Rfl:     Continuous Blood Gluc  (Dexcom G6 ) device, Use 1 each Daily., Disp: 1 each, Rfl: 11    Continuous Blood Gluc  (Dexcom G7 ) device, Use 1 each Daily., Disp: 1 each, Rfl: 0    Continuous Blood Gluc Sensor (Dexcom G6 Sensor), CHANGE EVERY 10 (TEN) DAYS., Disp: 3 each, Rfl: 11    Continuous Blood Gluc Sensor (Dexcom G7 Sensor) misc,  Use 1 each Every 10 (Ten) Days., Disp: 9 each, Rfl: 3    Continuous Blood Gluc Transmit (Dexcom G6 Transmitter) misc, Use 1 each Daily., Disp: 3 each, Rfl: 3    Cyanocobalamin (VITAMIN B-12 PO), Take 2,500 mcg by mouth Daily., Disp: , Rfl:     Diclofenac Sodium (VOLTAREN) 1 % gel gel, APPLY TO AFFECTED AREA FOUR TIMES DAILY, Disp: 100 g, Rfl: 12    DULoxetine (CYMBALTA) 60 MG capsule, TAKE ONE CAPSULE BY MOUTH EVERY DAY, Disp: 90 capsule, Rfl: 3    famotidine (PEPCID) 10 MG tablet, Take 1 tablet by mouth Every Other Day., Disp: 15 tablet, Rfl: 0    Fluocinolone Acetonide Scalp 0.01 % oil, Apply 20 drops topically Daily., Disp: 118.28 mL, Rfl: 1    fluticasone (FLONASE) 50 MCG/ACT nasal spray, USE 2 SPRAYS IN EACH NOSTRIL ONCE DAILY, Disp: 16 g, Rfl: 12    HYDROcodone-acetaminophen (NORCO) 7.5-325 MG per tablet, Take 1 tablet by mouth Every 12 (Twelve) Hours As Needed for Moderate Pain., Disp: 20 tablet, Rfl: 0    isosorbide mononitrate (IMDUR) 60 MG 24 hr tablet, TAKE ONE TABLET BY MOUTH EVERY DAY, Disp: 90 tablet, Rfl: 3    losartan (COZAAR) 50 MG tablet, TAKE ONE TABLET BY MOUTH EVERY DAY, Disp: 90 tablet, Rfl: 3    multivitamin with minerals tablet tablet, Take 1 tablet by mouth Daily., Disp: , Rfl:     naloxone (NARCAN) 4 MG/0.1ML nasal spray, 1 spray into the nostril(s) as directed by provider As Needed., Disp: , Rfl:     pantoprazole (PROTONIX) 40 MG EC tablet, Take 1 tablet by mouth 2 (Two) Times a Day., Disp: 180 tablet, Rfl: 3    SITagliptin (Januvia) 50 MG tablet, TAKE 1 TABLET BY MOUTH DAILY, Disp: 90 tablet, Rfl: 3    SSD 1 % cream, APPLY TO AFFECTED AREA AS DIRECTED, Disp: 400 g, Rfl: 11    tolterodine LA (DETROL LA) 2 MG 24 hr capsule, Take 1 capsule by mouth Daily., Disp: 90 capsule, Rfl: 3    True Metrix Blood Glucose Test test strip, Daily. for testing, Disp: , Rfl:     nicotine (Nicoderm CQ) 21 MG/24HR patch, Place 1 patch on the skin as directed by provider Daily., Disp: 28 each, Rfl: 1     "nicotine polacrilex (Nicorette) 4 MG gum, Chew 1 each As Needed for Smoking Cessation., Disp: 220 each, Rfl: 1    pregabalin (Lyrica) 25 MG capsule, Take 1 capsule by mouth 2 (Two) Times a Day., Disp: 60 capsule, Rfl: 0    Current Facility-Administered Medications:     ipratropium-albuterol (DUO-NEB) nebulizer solution 3 mL, 3 mL, Nebulization, 4x Daily - RT, Aiden Spencer MD, 3 mL at 09/08/22 1328    Allergies:   Allergies   Allergen Reactions    Ceclor [Cefaclor] Rash       Immunizations:   Immunization History   Administered Date(s) Administered    COVID-19 (VINNIE) 03/16/2021    COVID-19 (UNSPECIFIED) 03/01/2021, 04/01/2021    Fluzone High Dose =>65 Years ([facility] ONLY) 10/16/2018    Fluzone High-Dose 65+yrs 10/31/2022, 12/06/2023    Fluzone Quad >6mos (Multi-dose) 11/15/2016, 02/05/2020    Pneumococcal Conjugate 13-Valent (PCV13) 07/07/2016    Pneumococcal Polysaccharide (PPSV23) 10/31/2022        Objective     Physical Exam: Please see above  Vital Signs:   Vitals:    03/20/24 1658   BP: 116/70   BP Location: Left arm   Patient Position: Sitting   Cuff Size: Adult   Resp: 18   Temp: 98 °F (36.7 °C)   TempSrc: Temporal   Weight: 71.5 kg (157 lb 9.6 oz)   Height: 152.4 cm (60\")     Body mass index is 30.78 kg/m².          Physical Exam  Vitals and nursing note reviewed.   Constitutional:       Appearance: Normal appearance.   HENT:      Head: Normocephalic and atraumatic.      Nose: Nose normal.      Mouth/Throat:      Pharynx: Oropharynx is clear.   Eyes:      Extraocular Movements: Extraocular movements intact.      Pupils: Pupils are equal, round, and reactive to light.   Neck:      Thyroid: No thyroid mass or thyromegaly.      Trachea: Trachea normal.   Cardiovascular:      Rate and Rhythm: Normal rate and regular rhythm.      Pulses: Normal pulses. No decreased pulses.      Heart sounds: Normal heart sounds.   Pulmonary:      Effort: Pulmonary effort is normal.      Breath sounds: Normal " breath sounds.   Abdominal:      General: Abdomen is flat. Bowel sounds are normal.      Palpations: Abdomen is soft.      Tenderness: There is no abdominal tenderness.   Musculoskeletal:      Cervical back: Neck supple.      Right lower leg: No edema.      Left lower leg: No edema.   Lymphadenopathy:      Cervical: No cervical adenopathy.   Skin:     General: Skin is warm and dry.   Neurological:      General: No focal deficit present.      Mental Status: She is alert and oriented to person, place, and time.      Sensory: Sensation is intact.      Motor: Motor function is intact.      Coordination: Coordination is intact.   Psychiatric:         Attention and Perception: Attention normal.         Mood and Affect: Mood normal.         Speech: Speech normal.         Behavior: Behavior normal.         Procedures    Results:   Labs:   Hemoglobin A1C   Date Value Ref Range Status   02/20/2024 6.80 (H) 4.80 - 5.60 % Final     TSH   Date Value Ref Range Status   02/20/2024 0.452 0.270 - 4.200 uIU/mL Final        POCT Results (if applicable):   Results for orders placed or performed in visit on 02/21/24   Urinalysis without microscopic (no culture) - Urine, Clean Catch    Specimen: Urine, Clean Catch   Result Value Ref Range    Color, UA Straw Yellow, Straw    Appearance, UA Clear Clear    pH, UA 6.0 5.0 - 8.0    Specific Gravity, UA <=1.005 1.005 - 1.030    Glucose, UA Negative Negative    Ketones, UA Negative Negative    Bilirubin, UA Negative Negative    Blood, UA Trace (A) Negative    Protein, UA Negative Negative    Leuk Esterase, UA Negative Negative    Nitrite, UA Negative Negative    Urobilinogen, UA 0.2 E.U./dL 0.2 - 1.0 E.U./dL   Urine Drug Screen - Urine, Clean Catch    Specimen: Urine, Clean Catch   Result Value Ref Range    THC, Screen, Urine Negative Negative    Phencyclidine (PCP), Urine Negative Negative    Cocaine Screen, Urine Negative Negative    Methamphetamine, Ur Negative Negative    Opiate Screen  Positive (A) Negative    Amphetamine Screen, Urine Negative Negative    Benzodiazepine Screen, Urine Negative Negative    Tricyclic Antidepressants Screen Negative Negative    Methadone Screen, Urine Negative Negative    Barbiturates Screen, Urine Negative Negative    Oxycodone Screen, Urine Negative Negative    Buprenorphine, Screen, Urine Negative Negative   Fentanyl, Urine - Urine, Clean Catch    Specimen: Urine, Clean Catch   Result Value Ref Range    Fentanyl, Urine Negative Negative       Imaging:   No valid procedures specified.     Measures:   Advanced Care Planning:   Patient does not have an advance directive, information provided.    Smoking Cessation:   less than 3 minutes spent counseling Agreeable to stop    Assessment / Plan      Assessment/Plan:   Diagnoses and all orders for this visit:    1. Neuropathy (Primary)  Patient does continue to have neuropathy despite her current regimen.  She has been taking duloxetine but does not believe it has been beneficial with respect to her neuropathic pain.  She has tried narcotics as well as other modalities and has not had improvement.  She was on gabapentin in the past but had to be discontinued due to the side effects.  We have discussed options and have decided to try low-dose of Lyrica.  She understands the risk and benefits of medications and we will start just at 25 mg twice daily with the possibility of increasing as necessary.  She will continue to monitor her symptoms and if she has other problems or complaints prior to her next scheduled follow-up she will contact us.  -     pregabalin (Lyrica) 25 MG capsule; Take 1 capsule by mouth 2 (Two) Times a Day.  Dispense: 60 capsule; Refill: 0    2. 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  We have  discussed risk and benefits and we have decided to try a the NicoDerm with a subsequent Nicorette gum if needed.  Patient has had this in the past with relatively good success.  We will initiate the medication and she does understand the risk and benefits.  We will pursue with considering reducing the dose after 1 month's time.  -     nicotine (Nicoderm CQ) 21 MG/24HR patch; Place 1 patch on the skin as directed by provider Daily.  Dispense: 28 each; Refill: 1  -     nicotine polacrilex (Nicorette) 4 MG gum; Chew 1 each As Needed for Smoking Cessation.  Dispense: 220 each; Refill: 1    3. Stage 4 chronic kidney disease   Patient does continue to follow with the nephrologist.  We have encouraged her to limit her sodium intake and to continue to push fluids.  She understands that she cannot take anti-inflammatories.  We will continue to monitor and if she has other problems or complaints further assessment treatment will be necessary.    4. Type 2 diabetes mellitus with hyperglycemia, with long-term current use of insulin   Patient's most recent hemoglobin A1c has improved from 7.4 down to 6.8%.  8 months ago her level was noted to be 8.1%.  We were not making adjustments in her medications and it does appear that she is doing well at present time.  We will continue to monitor her symptoms as well as continuation of her current regiment.  If she has other problems or complaints further assessment treatment will be necessary.    5. At high risk for falls   Patient does continue to have high risk of falling but has not fallen recently.  We have discussed options and will continue on her current regiment of assistance.  She will use a walker and/or cane as necessary.  We will continue to monitor and will treat accordingly.  She does not wish to proceed with physical therapy at present time.  She is being evaluated by the neurosurgeon.    6. Degenerative disc disease, lumbar   Patient is having significant degenerative  disc disease at present time.  We will continue to encourage follow-up with Dr. Machado.  We will pursue with the use of Lyrica and will treat accordingly.    7. Anxiety  Patient has been on multiple medications in the past with different side effects excetra.  We have discussed options and will obtain a GeneSight test.  -     GeneSight - Swab,; Future        Follow Up:   No follow-ups on file.      At [facility], we believe that sharing information builds trust and better relationships. You are receiving this note because you recently visited [facility]. It is possible you will see health information before a provider has talked with you about it. This kind of information can be easy to misunderstand. To help you fully understand what it means for your health, we urge you to discuss this note with your provider.    Aiden Spencer MD  [facility]

## 2024-03-21 ENCOUNTER — LAB (OUTPATIENT)
Dept: FAMILY MEDICINE CLINIC | Facility: CLINIC | Age: 79
End: 2024-03-21
Payer: MEDICARE

## 2024-03-21 ENCOUNTER — PRIOR AUTHORIZATION (OUTPATIENT)
Dept: FAMILY MEDICINE CLINIC | Facility: CLINIC | Age: 79
End: 2024-03-21
Payer: MEDICARE

## 2024-03-21 DIAGNOSIS — R10.9 FLANK PAIN: Primary | ICD-10-CM

## 2024-03-21 LAB
BILIRUB BLD-MCNC: NEGATIVE MG/DL
CLARITY, POC: CLEAR
COLOR UR: YELLOW
GLUCOSE UR STRIP-MCNC: NEGATIVE MG/DL
KETONES UR QL: NEGATIVE
LEUKOCYTE EST, POC: NEGATIVE
NITRITE UR-MCNC: NEGATIVE MG/ML
PH UR: 6 [PH] (ref 5–8)
PROT UR STRIP-MCNC: NEGATIVE MG/DL
RBC # UR STRIP: NEGATIVE /UL
SP GR UR: 1.01 (ref 1–1.03)
UROBILINOGEN UR QL: NORMAL

## 2024-03-21 PROCEDURE — 81002 URINALYSIS NONAUTO W/O SCOPE: CPT | Performed by: FAMILY MEDICINE

## 2024-03-21 RX ORDER — ACYCLOVIR 400 MG/1
1 TABLET ORAL DAILY
Qty: 1 EACH | Refills: 0 | Status: SHIPPED | OUTPATIENT
Start: 2024-03-21

## 2024-03-21 RX ORDER — ACYCLOVIR 400 MG/1
1 TABLET ORAL
Qty: 9 EACH | Refills: 3 | Status: SHIPPED | OUTPATIENT
Start: 2024-03-21

## 2024-03-21 RX ORDER — PROCHLORPERAZINE 25 MG/1
1 SUPPOSITORY RECTAL DAILY
Qty: 3 EACH | Refills: 3 | Status: SHIPPED | OUTPATIENT
Start: 2024-03-21

## 2024-03-22 ENCOUNTER — TELEPHONE (OUTPATIENT)
Dept: FAMILY MEDICINE CLINIC | Facility: CLINIC | Age: 79
End: 2024-03-22
Payer: MEDICARE

## 2024-03-25 ENCOUNTER — TELEPHONE (OUTPATIENT)
Dept: FAMILY MEDICINE CLINIC | Facility: CLINIC | Age: 79
End: 2024-03-25
Payer: MEDICARE

## 2024-03-25 DIAGNOSIS — M51.36 DEGENERATIVE DISC DISEASE, LUMBAR: ICD-10-CM

## 2024-03-25 RX ORDER — HYDROCODONE BITARTRATE AND ACETAMINOPHEN 7.5; 325 MG/1; MG/1
1 TABLET ORAL EVERY 12 HOURS PRN
Qty: 20 TABLET | Refills: 0 | Status: SHIPPED | OUTPATIENT
Start: 2024-03-25

## 2024-03-25 NOTE — TELEPHONE ENCOUNTER
Caller: Delbert Mcdermott    Relationship: Emergency Contact    Best call back number: [phone]      What was the call regarding: PATIENT'S SISTER CALLED STATING THAT THE PATIENT'S PRESCRIPTION FOR A PAIN MEDICATION WAS NOT SENT TO THE PHARMACY. SHE STATED THAT THE PATIENT DIDN'T HAVE HER MEDICATION ALL WEEKEND.     [place] [address] [address] - [phone]  - [phone] FX

## 2024-04-02 ENCOUNTER — OFFICE VISIT (OUTPATIENT)
Dept: FAMILY MEDICINE CLINIC | Facility: CLINIC | Age: 79
End: 2024-04-02
Payer: MEDICARE

## 2024-04-02 VITALS
SYSTOLIC BLOOD PRESSURE: 108 MMHG | DIASTOLIC BLOOD PRESSURE: 62 MMHG | HEIGHT: 60 IN | TEMPERATURE: 98.7 F | BODY MASS INDEX: 31.06 KG/M2 | HEART RATE: 98 BPM | OXYGEN SATURATION: 95 % | WEIGHT: 158.2 LBS

## 2024-04-02 DIAGNOSIS — N18.4 STAGE 4 CHRONIC KIDNEY DISEASE: ICD-10-CM

## 2024-04-02 DIAGNOSIS — E11.65 TYPE 2 DIABETES MELLITUS WITH HYPERGLYCEMIA, WITH LONG-TERM CURRENT USE OF INSULIN: ICD-10-CM

## 2024-04-02 DIAGNOSIS — G62.9 NEUROPATHY: ICD-10-CM

## 2024-04-02 DIAGNOSIS — M51.36 DEGENERATIVE DISC DISEASE, LUMBAR: Primary | ICD-10-CM

## 2024-04-02 DIAGNOSIS — Z91.81 AT HIGH RISK FOR FALLS: ICD-10-CM

## 2024-04-02 DIAGNOSIS — F17.210 CIGARETTE NICOTINE DEPENDENCE WITHOUT COMPLICATION: ICD-10-CM

## 2024-04-02 DIAGNOSIS — Z79.4 TYPE 2 DIABETES MELLITUS WITH HYPERGLYCEMIA, WITH LONG-TERM CURRENT USE OF INSULIN: ICD-10-CM

## 2024-04-02 DIAGNOSIS — F41.9 ANXIETY: ICD-10-CM

## 2024-04-02 RX ORDER — PREGABALIN 25 MG/1
25 CAPSULE ORAL 2 TIMES DAILY
Qty: 60 CAPSULE | Refills: 0 | Status: SHIPPED | OUTPATIENT
Start: 2024-04-02

## 2024-04-02 NOTE — PROGRESS NOTES
Follow Up Office Visit      Date: 2024   Patient Name: JEANNE NOEL  : 1945   MRN: [id number]     Chief Complaint:    Chief Complaint   Patient presents with    Depression     Genesight results     Pain    Diabetes       History of Present Illness: JEANNE NOEL is a 79 y.o. female who is here today for follow-up of her multiple medical complaints.  She does continue to have her usual arthralgias and myalgias and is having difficulty with her back and neck pain.  She does continue to follow-up with Dr. Machado who is working on pursuing other treatment options.  She does have appointment within the next couple weeks.  Otherwise she has continued to take her medications as prescribed without any side effects.  She has been using the pain medicine intermittently with some success.  Patient has now started the Lyrica at present time.  She still continues to have her radiculopathy.  Patient has continue with her usual activity, appetite and sleep.  She denies any other cardiovascular, respiratory, gastrointestinal, urologic neurologic complaints.    Subjective      Review of Systems:   Review of Systems   Constitutional:  Negative for activity change, appetite change and fatigue.   Respiratory:  Negative for cough and shortness of breath.    Cardiovascular:  Negative for chest pain, palpitations and leg swelling.   Gastrointestinal:  Negative for abdominal pain, constipation, diarrhea, nausea and vomiting.   Genitourinary:  Negative for dysuria, flank pain, frequency and urgency.   Musculoskeletal:  Positive for arthralgias, back pain and myalgias.   Neurological:  Negative for dizziness, weakness and memory problem.   Psychiatric/Behavioral:  Negative for sleep disturbance.        I have reviewed the patients family history, social history, past medical history, past surgical history and have updated it as appropriate.     Medications:     Current Outpatient Medications:     pregabalin (Lyrica)  25 MG capsule, Take 1 capsule by mouth 2 (Two) Times a Day., Disp: 60 capsule, Rfl: 0    acetaminophen (TYLENOL) 500 MG tablet, Take 1 tablet by mouth Every 6 (Six) Hours As Needed for Mild Pain., Disp: , Rfl:     albuterol (PROVENTIL) (2.5 MG/3ML) 0.083% nebulizer solution, Take 2.5 mg by nebulization Every 4 (Four) Hours As Needed for Wheezing or Shortness of Air., Disp: , Rfl:     albuterol sulfate  (90 Base) MCG/ACT inhaler, Inhale 2 puffs Every 6 (Six) Hours As Needed for Wheezing., Disp: 18 g, Rfl: 11    aspirin 81 MG EC tablet, Take 1 tablet by mouth Daily., Disp: , Rfl:     atorvastatin (LIPITOR) 40 MG tablet, TAKE ONE TABLET BY MOUTH EVERY DAY, Disp: 30 tablet, Rfl: 12    busPIRone (BUSPAR) 5 MG tablet, Take 1 tablet by mouth 3 (Three) Times a Day., Disp: , Rfl:     carvedilol (COREG) 25 MG tablet, TAKE ONE TABLET BY MOUTH TWO TIMES A DAY, Disp: 180 tablet, Rfl: 3    cetirizine (zyrTEC) 10 MG tablet, Take 0.5 tablets by mouth Daily., Disp: 30 tablet, Rfl: 0    clopidogrel (PLAVIX) 75 MG tablet, TAKE ONE TABLET BY MOUTH EVERY DAY, Disp: 30 tablet, Rfl: 11    Comfort EZ Pen Needles 32G X 4 MM misc, , Disp: , Rfl:     Comfort EZ Pen Needles 32G X 4 MM misc, USE AS DIRECTED DAILY, Disp: , Rfl:     Continuous Blood Gluc  (Dexcom G6 ) device, Use 1 each Daily., Disp: 1 each, Rfl: 11    Continuous Blood Gluc  (Dexcom G7 ) device, Use 1 each Daily., Disp: 1 each, Rfl: 0    Continuous Blood Gluc Sensor (Dexcom G6 Sensor), CHANGE EVERY 10 (TEN) DAYS., Disp: 3 each, Rfl: 11    Continuous Blood Gluc Sensor (Dexcom G7 Sensor) misc, Use 1 each Every 10 (Ten) Days., Disp: 9 each, Rfl: 3    Continuous Blood Gluc Transmit (Dexcom G6 Transmitter) misc, Use 1 each Daily., Disp: 3 each, Rfl: 3    Cyanocobalamin (VITAMIN B-12 PO), Take 2,500 mcg by mouth Daily., Disp: , Rfl:     Diclofenac Sodium (VOLTAREN) 1 % gel gel, APPLY TO AFFECTED AREA FOUR TIMES DAILY, Disp: 100 g, Rfl: 12     DULoxetine (CYMBALTA) 60 MG capsule, TAKE ONE CAPSULE BY MOUTH EVERY DAY, Disp: 90 capsule, Rfl: 3    famotidine (PEPCID) 10 MG tablet, Take 1 tablet by mouth Every Other Day., Disp: 15 tablet, Rfl: 0    Fluocinolone Acetonide Scalp 0.01 % oil, Apply 20 drops topically Daily., Disp: 118.28 mL, Rfl: 1    fluticasone (FLONASE) 50 MCG/ACT nasal spray, USE 2 SPRAYS IN EACH NOSTRIL ONCE DAILY, Disp: 16 g, Rfl: 12    HYDROcodone-acetaminophen (NORCO) 7.5-325 MG per tablet, Take 1 tablet by mouth Every 12 (Twelve) Hours As Needed for Moderate Pain., Disp: 20 tablet, Rfl: 0    isosorbide mononitrate (IMDUR) 60 MG 24 hr tablet, TAKE ONE TABLET BY MOUTH EVERY DAY, Disp: 90 tablet, Rfl: 3    losartan (COZAAR) 50 MG tablet, TAKE ONE TABLET BY MOUTH EVERY DAY, Disp: 90 tablet, Rfl: 3    multivitamin with minerals tablet tablet, Take 1 tablet by mouth Daily., Disp: , Rfl:     naloxone (NARCAN) 4 MG/0.1ML nasal spray, 1 spray into the nostril(s) as directed by provider As Needed., Disp: , Rfl:     nicotine polacrilex (Nicorette) 4 MG gum, Chew 1 each As Needed for Smoking Cessation., Disp: 220 each, Rfl: 1    pantoprazole (PROTONIX) 40 MG EC tablet, Take 1 tablet by mouth 2 (Two) Times a Day., Disp: 180 tablet, Rfl: 3    SITagliptin (Januvia) 50 MG tablet, TAKE 1 TABLET BY MOUTH DAILY, Disp: 90 tablet, Rfl: 3    SSD 1 % cream, APPLY TO AFFECTED AREA AS DIRECTED, Disp: 400 g, Rfl: 11    tolterodine LA (DETROL LA) 2 MG 24 hr capsule, Take 1 capsule by mouth Daily., Disp: 90 capsule, Rfl: 3    True Metrix Blood Glucose Test test strip, Daily. for testing, Disp: , Rfl:     Current Facility-Administered Medications:     ipratropium-albuterol (DUO-NEB) nebulizer solution 3 mL, 3 mL, Nebulization, 4x Daily - RT, Aiden Spencer MD, 3 mL at 09/08/22 1328    Allergies:   Allergies   Allergen Reactions    Ceclor [Cefaclor] Rash       Immunizations:   Immunization History   Administered Date(s) Administered    COVID-19 ([facility])  "03/16/2021    COVID-19 (UNSPECIFIED) 03/01/2021, 04/01/2021    Fluzone High Dose =>65 Years ([facility] ONLY) 10/16/2018    Fluzone High-Dose 65+yrs 10/31/2022, 12/06/2023    Fluzone Quad >6mos (Multi-dose) 11/15/2016, 02/05/2020    Pneumococcal Conjugate 13-Valent (PCV13) 07/07/2016    Pneumococcal Polysaccharide (PPSV23) 10/31/2022        Objective     Physical Exam: Please see above  Vital Signs:   Vitals:    04/02/24 1527   BP: 108/62   BP Location: Left arm   Patient Position: Sitting   Cuff Size: Adult   Pulse: 98   Temp: 98.7 °F (37.1 °C)   TempSrc: Temporal   SpO2: 95%   Weight: 71.8 kg (158 lb 3.2 oz)   Height: 152.4 cm (60\")     Body mass index is 30.9 kg/m².          Physical Exam  Vitals and nursing note reviewed.   Constitutional:       Appearance: Normal appearance.   HENT:      Head: Normocephalic and atraumatic.      Nose: Nose normal.      Mouth/Throat:      Pharynx: Oropharynx is clear.   Eyes:      Extraocular Movements: Extraocular movements intact.      Pupils: Pupils are equal, round, and reactive to light.   Neck:      Thyroid: No thyroid mass or thyromegaly.      Trachea: Trachea normal.   Cardiovascular:      Rate and Rhythm: Normal rate and regular rhythm.      Pulses: Normal pulses. No decreased pulses.      Heart sounds: Normal heart sounds.   Pulmonary:      Effort: Pulmonary effort is normal.      Breath sounds: Normal breath sounds.   Abdominal:      General: Abdomen is flat. Bowel sounds are normal.      Palpations: Abdomen is soft.      Tenderness: There is no abdominal tenderness.   Musculoskeletal:      Cervical back: Neck supple.      Right lower leg: No edema.      Left lower leg: No edema.   Lymphadenopathy:      Cervical: No cervical adenopathy.   Skin:     General: Skin is warm and dry.   Neurological:      General: No focal deficit present.      Mental Status: She is alert and oriented to person, place, and time.      Sensory: Sensation is intact.      Motor: Motor function is " intact.      Coordination: Coordination is intact.   Psychiatric:         Attention and Perception: Attention normal.         Mood and Affect: Mood normal.         Speech: Speech normal.         Behavior: Behavior normal.         Procedures    Results:   Labs:   Hemoglobin A1C   Date Value Ref Range Status   02/20/2024 6.80 (H) 4.80 - 5.60 % Final     TSH   Date Value Ref Range Status   02/20/2024 0.452 0.270 - 4.200 uIU/mL Final        POCT Results (if applicable):   Results for orders placed or performed in visit on 03/21/24   POC Urinalysis Dipstick    Specimen: Urine   Result Value Ref Range    Color Yellow Yellow, Straw, Dark Yellow, Leana    Clarity, UA Clear Clear    Glucose, UA Negative Negative mg/dL    Bilirubin Negative Negative    Ketones, UA Negative Negative    Specific Gravity  1.015 1.005 - 1.030    Blood, UA Negative Negative    pH, Urine 6.0 5.0 - 8.0    Protein, POC Negative Negative mg/dL    Urobilinogen, UA Normal Normal, 0.2 E.U./dL    Leukocytes Negative Negative    Nitrite, UA Negative Negative       Imaging:   No valid procedures specified.     Measures:   Advanced Care Planning:   Patient does not have an advance directive, information provided.    Smoking Cessation:   less than 3 minutes spent counseling Will try to cut down    Assessment / Plan      Assessment/Plan:   Diagnoses and all orders for this visit:    1. Degenerative disc disease, lumbar (Primary)   Patient does continue have problems with her degenerative disc disease.  She does continue to follow-up with Dr. Machado.  She is scheduled to see him within the next couple weeks.  I am uncertain at present time her approach that she will be doing as it appears that her compression fracture has healed.  We will wait to see what he reassesses.  We will obtain a copy of his most recent office note and will pursue and treat accordingly.    2. Neuropathy  Patient does have some problems with her neuropathy.  She has yet to start the  Lyrica.  We have encouraged her to try the Lyrica and to watch for side effects.  She has had problems with gabapentin in the past with respect to drowsiness but did believe that it helped with her radiculopathy.  We will start slowly and increase as necessary.  -     pregabalin (Lyrica) 25 MG capsule; Take 1 capsule by mouth 2 (Two) Times a Day.  Dispense: 60 capsule; Refill: 0    3. Anxiety   Patient is anxiety is doing relatively well at present time.  We have encouraged her to take the BuSpar as prescribed and to treat accordingly.  We may need to increase the dosing up to 3 times a day.  She will slowly increase and will modify as necessary.    4. Cigarette nicotine dependence without complication   Patient does need to discontinue her smoking.  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  Dr. Machado will not do any procedures until she is off the Plavix.  We have encouraged her that if she discontinues smoking maybe they will consider not pursuing any antiplatelet therapy at least while she gets injections performed.  We have encouraged her to use the patch and/or gum as necessary.  She will continue to strive to do so.    5. Type 2 diabetes mellitus with hyperglycemia, with long-term current use of insulin   Patient's blood sugars do fluctuate.  She is monitoring her sugars more closely but has not recorded any numbers.  We have encouraged her to not only monitor her blood sugars but to record the blood sugars so we can make adjustments as necessary.  She will record some blood sugars before her next scheduled follow-up in the next couple weeks.    6. Stage 4 chronic kidney disease   Patient's kidney function remains a problem.  We have encouraged her to push fluids and discontinue her smoking and avoid sodium.  She cannot have any contrast dye study performed at present time.  Her abdominal symptoms have improved so, hopefully she is not having any ischemic colitis  symptomatology.    7. At high risk for falls   Patient continues to remain at high risk for falls.  We have encouraged her to use her walker as well as cane and to monitor her reaction to the Lyrica.  If she has worsening symptoms she will contact us.      Follow Up:   Return in about 16 days (around 4/18/2024).      At [facility], we believe that sharing information builds trust and better relationships. You are receiving this note because you recently visited [facility]. It is possible you will see health information before a provider has talked with you about it. This kind of information can be easy to misunderstand. To help you fully understand what it means for your health, we urge you to discuss this note with your provider.    Aiden Spencer MD  [facility]

## 2024-04-10 ENCOUNTER — TELEPHONE (OUTPATIENT)
Dept: FAMILY MEDICINE CLINIC | Facility: CLINIC | Age: 79
End: 2024-04-10

## 2024-04-11 DIAGNOSIS — M51.36 DEGENERATIVE DISC DISEASE, LUMBAR: Primary | ICD-10-CM

## 2024-04-16 ENCOUNTER — TELEPHONE (OUTPATIENT)
Dept: FAMILY MEDICINE CLINIC | Facility: CLINIC | Age: 79
End: 2024-04-16
Payer: MEDICARE

## 2024-04-16 NOTE — TELEPHONE ENCOUNTER
"REFERRAL TO DERMATOLOGY FOR \"BITES\", SHE HAS SEEN PREVIOUSLY FOR, LIZBET DAVID FOR HER APPOINTMENT ,   "

## 2024-04-22 ENCOUNTER — OFFICE VISIT (OUTPATIENT)
Dept: FAMILY MEDICINE CLINIC | Facility: CLINIC | Age: 79
End: 2024-04-22
Payer: MEDICARE

## 2024-04-22 VITALS
OXYGEN SATURATION: 100 % | SYSTOLIC BLOOD PRESSURE: 124 MMHG | WEIGHT: 161.8 LBS | HEIGHT: 60 IN | TEMPERATURE: 97.5 F | HEART RATE: 75 BPM | DIASTOLIC BLOOD PRESSURE: 56 MMHG | BODY MASS INDEX: 31.77 KG/M2

## 2024-04-22 DIAGNOSIS — R06.2 WHEEZING: ICD-10-CM

## 2024-04-22 DIAGNOSIS — F17.210 CIGARETTE NICOTINE DEPENDENCE WITHOUT COMPLICATION: ICD-10-CM

## 2024-04-22 DIAGNOSIS — G62.9 NEUROPATHY: ICD-10-CM

## 2024-04-22 DIAGNOSIS — M51.36 DEGENERATIVE DISC DISEASE, LUMBAR: Primary | ICD-10-CM

## 2024-04-22 DIAGNOSIS — F41.9 ANXIETY: ICD-10-CM

## 2024-04-22 DIAGNOSIS — Z91.81 AT HIGH RISK FOR FALLS: ICD-10-CM

## 2024-04-22 DIAGNOSIS — I77.1 CELIAC ARTERY STENOSIS: ICD-10-CM

## 2024-04-22 DIAGNOSIS — L13.9 BULLOUS DERMATITIS: ICD-10-CM

## 2024-04-22 PROCEDURE — 1160F RVW MEDS BY RX/DR IN RCRD: CPT | Performed by: FAMILY MEDICINE

## 2024-04-22 PROCEDURE — 99214 OFFICE O/P EST MOD 30 MIN: CPT | Performed by: FAMILY MEDICINE

## 2024-04-22 PROCEDURE — 1159F MED LIST DOCD IN RCRD: CPT | Performed by: FAMILY MEDICINE

## 2024-04-22 PROCEDURE — G2211 COMPLEX E/M VISIT ADD ON: HCPCS | Performed by: FAMILY MEDICINE

## 2024-04-22 RX ORDER — PREDNISONE 10 MG/1
60 TABLET ORAL DAILY
Qty: 18 TABLET | Refills: 0 | Status: SHIPPED | OUTPATIENT
Start: 2024-04-22

## 2024-04-22 NOTE — PROGRESS NOTES
Follow Up Office Visit      Date: 2024   Patient Name: JEANNE NOEL  : 1945   MRN: [id number]     Chief Complaint:    Chief Complaint   Patient presents with   • Follow-up     Headaches  Various body aches       History of Present Illness: JEANNE NOEL is a 79 y.o. female who is here today for follow-up.  Patient has recently started the Lyrica at night and does appear to be doing better with respect to her sleep.  She does continue with arthralgias and myalgias but may be doing little bit better.  She still is having some problems during the day.  She has recently had an MRI of her cervical spine but has not follow-up with Dr. Machado.  Patient states that she has not had any side effects of the Lyrica as it does appear to be beneficial without any increase in dizziness, cognitive dysfunction, respiratory suppression excetra.  Patient does continue to take her other medications as prescribed without side effects.  She has not had any other changes in her activity, appetite and sleep.  Patient denies any other cardiovascular, respiratory, gastrointestinal, urologic or neurologic complaints.    Subjective      Review of Systems:   Review of Systems   Constitutional:  Negative for activity change, appetite change and fatigue.   Respiratory:  Negative for cough, shortness of breath and wheezing.    Cardiovascular:  Negative for chest pain, palpitations and leg swelling.   Gastrointestinal:  Negative for abdominal distention, abdominal pain, blood in stool, constipation, diarrhea, nausea, vomiting and GERD.   Genitourinary:  Negative for dysuria, flank pain, frequency and urgency.   Musculoskeletal:  Positive for arthralgias and myalgias. Negative for gait problem.   Neurological:  Positive for headache. Negative for dizziness, tremors, weakness, light-headedness, numbness and memory problem.   Psychiatric/Behavioral:  Negative for sleep disturbance. The patient is not nervous/anxious.        I  have reviewed the patients family history, social history, past medical history, past surgical history and have updated it as appropriate.     Medications:     Current Outpatient Medications:   •  acetaminophen (TYLENOL) 500 MG tablet, Take 1 tablet by mouth Every 6 (Six) Hours As Needed for Mild Pain., Disp: , Rfl:   •  albuterol (PROVENTIL) (2.5 MG/3ML) 0.083% nebulizer solution, Take 2.5 mg by nebulization Every 4 (Four) Hours As Needed for Wheezing or Shortness of Air., Disp: , Rfl:   •  albuterol sulfate  (90 Base) MCG/ACT inhaler, Inhale 2 puffs Every 6 (Six) Hours As Needed for Wheezing., Disp: 18 g, Rfl: 11  •  aspirin 81 MG EC tablet, Take 1 tablet by mouth Daily., Disp: , Rfl:   •  atorvastatin (LIPITOR) 40 MG tablet, TAKE ONE TABLET BY MOUTH EVERY DAY, Disp: 30 tablet, Rfl: 12  •  busPIRone (BUSPAR) 5 MG tablet, Take 1 tablet by mouth 3 (Three) Times a Day., Disp: , Rfl:   •  carvedilol (COREG) 25 MG tablet, TAKE ONE TABLET BY MOUTH TWO TIMES A DAY, Disp: 180 tablet, Rfl: 3  •  cetirizine (zyrTEC) 10 MG tablet, Take 0.5 tablets by mouth Daily., Disp: 30 tablet, Rfl: 0  •  clopidogrel (PLAVIX) 75 MG tablet, TAKE ONE TABLET BY MOUTH EVERY DAY, Disp: 30 tablet, Rfl: 11  •  Comfort EZ Pen Needles 32G X 4 MM misc, , Disp: , Rfl:   •  Comfort EZ Pen Needles 32G X 4 MM misc, USE AS DIRECTED DAILY, Disp: , Rfl:   •  Continuous Blood Gluc  (Dexcom G6 ) device, Use 1 each Daily., Disp: 1 each, Rfl: 11  •  Continuous Blood Gluc  (Dexcom G7 ) device, Use 1 each Daily., Disp: 1 each, Rfl: 0  •  Continuous Blood Gluc Sensor (Dexcom G6 Sensor), CHANGE EVERY 10 (TEN) DAYS., Disp: 3 each, Rfl: 11  •  Continuous Blood Gluc Sensor (Dexcom G7 Sensor) misc, Use 1 each Every 10 (Ten) Days., Disp: 9 each, Rfl: 3  •  Continuous Blood Gluc Transmit (Dexcom G6 Transmitter) misc, Use 1 each Daily., Disp: 3 each, Rfl: 3  •  Cyanocobalamin (VITAMIN B-12 PO), Take 2,500 mcg by mouth Daily., Disp:  , Rfl:   •  Diclofenac Sodium (VOLTAREN) 1 % gel gel, APPLY TO AFFECTED AREA FOUR TIMES DAILY, Disp: 100 g, Rfl: 12  •  DULoxetine (CYMBALTA) 60 MG capsule, TAKE ONE CAPSULE BY MOUTH EVERY DAY, Disp: 90 capsule, Rfl: 3  •  famotidine (PEPCID) 10 MG tablet, Take 1 tablet by mouth Every Other Day., Disp: 15 tablet, Rfl: 0  •  Fluocinolone Acetonide Scalp 0.01 % oil, Apply 20 drops topically Daily., Disp: 118.28 mL, Rfl: 1  •  fluticasone (FLONASE) 50 MCG/ACT nasal spray, USE 2 SPRAYS IN EACH NOSTRIL ONCE DAILY, Disp: 16 g, Rfl: 12  •  HYDROcodone-acetaminophen (NORCO) 7.5-325 MG per tablet, Take 1 tablet by mouth Every 12 (Twelve) Hours As Needed for Moderate Pain., Disp: 20 tablet, Rfl: 0  •  isosorbide mononitrate (IMDUR) 60 MG 24 hr tablet, TAKE ONE TABLET BY MOUTH EVERY DAY, Disp: 90 tablet, Rfl: 3  •  losartan (COZAAR) 50 MG tablet, TAKE ONE TABLET BY MOUTH EVERY DAY, Disp: 90 tablet, Rfl: 3  •  multivitamin with minerals tablet tablet, Take 1 tablet by mouth Daily., Disp: , Rfl:   •  naloxone (NARCAN) 4 MG/0.1ML nasal spray, 1 spray into the nostril(s) as directed by provider As Needed., Disp: , Rfl:   •  nicotine polacrilex (Nicorette) 4 MG gum, Chew 1 each As Needed for Smoking Cessation., Disp: 220 each, Rfl: 1  •  pantoprazole (PROTONIX) 40 MG EC tablet, Take 1 tablet by mouth 2 (Two) Times a Day., Disp: 180 tablet, Rfl: 3  •  pregabalin (Lyrica) 25 MG capsule, Take 1 capsule by mouth 2 (Two) Times a Day., Disp: 60 capsule, Rfl: 0  •  SITagliptin (Januvia) 50 MG tablet, TAKE 1 TABLET BY MOUTH DAILY, Disp: 90 tablet, Rfl: 3  •  SSD 1 % cream, APPLY TO AFFECTED AREA AS DIRECTED, Disp: 400 g, Rfl: 11  •  tolterodine LA (DETROL LA) 2 MG 24 hr capsule, Take 1 capsule by mouth Daily., Disp: 90 capsule, Rfl: 3  •  True Metrix Blood Glucose Test test strip, Daily. for testing, Disp: , Rfl:   •  predniSONE (DELTASONE) 10 MG tablet, Take 6 tablets by mouth Daily., Disp: 18 tablet, Rfl: 0    Current  "Facility-Administered Medications:   •  ipratropium-albuterol (DUO-NEB) nebulizer solution 3 mL, 3 mL, Nebulization, 4x Daily - RT, Aiden Spencer MD, 3 mL at 09/08/22 1328    Allergies:   Allergies   Allergen Reactions   • Ceclor [Cefaclor] Rash       Immunizations:   Immunization History   Administered Date(s) Administered   • COVID-19 (VINNIE) 03/16/2021   • COVID-19 (UNSPECIFIED) 03/01/2021, 04/01/2021   • Fluzone High Dose =>65 Years ([facility] ONLY) 10/16/2018   • Fluzone High-Dose 65+yrs 10/31/2022, 12/06/2023   • Fluzone Quad >6mos (Multi-dose) 11/15/2016, 02/05/2020   • Pneumococcal Conjugate 13-Valent (PCV13) 07/07/2016   • Pneumococcal Polysaccharide (PPSV23) 10/31/2022        Objective     Physical Exam: Please see above  Vital Signs:   Vitals:    04/22/24 1455   BP: 124/56   Pulse: 75   Temp: 97.5 °F (36.4 °C)   SpO2: 100%   Weight: 73.4 kg (161 lb 12.8 oz)   Height: 152.4 cm (60\")     Body mass index is 31.6 kg/m².          Physical Exam  Vitals and nursing note reviewed.   Constitutional:       Appearance: Normal appearance.   HENT:      Head: Normocephalic and atraumatic.      Nose: Nose normal.      Mouth/Throat:      Pharynx: Oropharynx is clear.   Eyes:      Extraocular Movements: Extraocular movements intact.      Pupils: Pupils are equal, round, and reactive to light.   Neck:      Thyroid: No thyroid mass or thyromegaly.      Trachea: Trachea normal.   Cardiovascular:      Rate and Rhythm: Normal rate and regular rhythm.      Pulses: Normal pulses. No decreased pulses.      Heart sounds: Normal heart sounds.   Pulmonary:      Effort: Pulmonary effort is normal.      Breath sounds: Normal breath sounds.   Abdominal:      General: Abdomen is flat. Bowel sounds are normal.      Palpations: Abdomen is soft.      Tenderness: There is no abdominal tenderness.   Musculoskeletal:      Cervical back: Neck supple.      Right lower leg: No edema.      Left lower leg: No edema.   Lymphadenopathy: "      Cervical: No cervical adenopathy.   Skin:     General: Skin is warm and dry.   Neurological:      General: No focal deficit present.      Mental Status: She is alert and oriented to person, place, and time.      Sensory: Sensation is intact.      Motor: Motor function is intact.      Coordination: Coordination is intact.   Psychiatric:         Attention and Perception: Attention normal.         Mood and Affect: Mood normal.         Speech: Speech normal.         Behavior: Behavior normal.         Procedures    Results:   Labs:   Hemoglobin A1C   Date Value Ref Range Status   02/20/2024 6.80 (H) 4.80 - 5.60 % Final     TSH   Date Value Ref Range Status   02/20/2024 0.452 0.270 - 4.200 uIU/mL Final        POCT Results (if applicable):   Results for orders placed or performed in visit on 03/21/24   POC Urinalysis Dipstick    Specimen: Urine   Result Value Ref Range    Color Yellow Yellow, Straw, Dark Yellow, [place]    Clarity, UA Clear Clear    Glucose, UA Negative Negative mg/dL    Bilirubin Negative Negative    Ketones, UA Negative Negative    Specific Gravity  1.015 1.005 - 1.030    Blood, UA Negative Negative    pH, Urine 6.0 5.0 - 8.0    Protein, POC Negative Negative mg/dL    Urobilinogen, UA Normal Normal, 0.2 E.U./dL    Leukocytes Negative Negative    Nitrite, UA Negative Negative       Imaging:   No valid procedures specified.     Measures:   Advanced Care Planning:   Patient does not have an advance directive, information provided.    Smoking Cessation:   less than 3 minutes spent counseling Will try to cut down    Assessment / Plan      Assessment/Plan:   Diagnoses and all orders for this visit:    1. Degenerative disc disease, lumbar (Primary)   Patient already has degenerative disc disease of the lumbar spine but apparently has had some difficulty with her cervical region.  She has had recent MRI obtain through Dr. Machado's office and is scheduled to follow-up in the near future.  I do suspect that  "some of her headaches may secondarily be from her cervical disc disease.  We will continue with the Lyrica as it does appear to be beneficial.  Will continue to monitor her symptoms and if she has other problems before her next scheduled follow-up she will contact us.    2. Neuropathy   Patient does appear to be having neuropathic pain has improved with Lyrica.  We have encouraged her to increase the dose Lyrica to 25 mg twice daily.  We will continue to monitor her symptoms and will make adjustments as necessary.    3. Anxiety   Patient does appear to be doing better with respect to her anxiety.  We will continue with the current regimen and monitor.  It appears that she is tolerating the BuSpar at present time.  No adjustments are necessary at present time.    4. Cigarette nicotine dependence without complication   Patient does continue to smoke.  We have encouraged her to continue to reduce her symptoms Werkmeister stents.  We will provide medical assistance if necessary.  We will continue to monitor and will treat accordingly.    5. At high risk for falls   Patient does have a history of high risk of falls but has not had increased risk since she has been taking the Lyrica.  We will continue with the current regimen and monitor.  She has other problems or complaints further assessment treatment will be necessary.    6. Celiac artery stenosis   Patient does continue to have abdominal pain.  She does suspect this may be due to to her \"blockage\" in her abdomen.  It does not necessarily appear that her symptoms is always related to meals.  She does have a scheduled follow-up with the CT surgeon.  We will wait their assessment to discuss further options.    7. Bullous dermatitis  Patient is now having recurrent bullae form on her lower extremities.  She did not have these during the winter months and had not developed during the summer.  She believes that this is secondary to what she describes as mosquito bites.  " She did have a workup with respect to both pemphigoid that was negative.  We have discussed options and will refer her to dermatologist to see if there is any other possible underlying pathology.  With the addition of prednisone for her breathing may help improvement in her skin lesions.  -     Ambulatory Referral to Dermatology    8. Wheezing  Patient has had a little bit of cough and drainage but does have a little bit of wheeze.  We have discussed options and will pursue with a course of steroids.  Patient states this has not bothered her sugars recently.  We will continue to monitor closely and if she has other problems or plans further assessment treatment will be necessary.  -     predniSONE (DELTASONE) 10 MG tablet; Take 6 tablets by mouth Daily.  Dispense: 18 tablet; Refill: 0        Follow Up:   Return in about 29 days (around 5/21/2024).      At [facility], we believe that sharing information builds trust and better relationships. You are receiving this note because you recently visited [facility]. It is possible you will see health information before a provider has talked with you about it. This kind of information can be easy to misunderstand. To help you fully understand what it means for your health, we urge you to discuss this note with your provider.    Aiden Spencer MD  [facility]

## 2024-04-30 DIAGNOSIS — M51.36 DEGENERATIVE DISC DISEASE, LUMBAR: ICD-10-CM

## 2024-04-30 RX ORDER — HYDROCODONE BITARTRATE AND ACETAMINOPHEN 7.5; 325 MG/1; MG/1
1 TABLET ORAL EVERY 12 HOURS PRN
Qty: 20 TABLET | Refills: 0 | Status: SHIPPED | OUTPATIENT
Start: 2024-04-30

## 2024-04-30 NOTE — TELEPHONE ENCOUNTER
Caller: Delbert Mcdermott    Relationship: Emergency Contact, SISTER    Best call back number: [phone]     Requested Prescriptions:   Requested Prescriptions     Pending Prescriptions Disp Refills    HYDROcodone-acetaminophen (NORCO) 7.5-325 MG per tablet 20 tablet 0     Sig: Take 1 tablet by mouth Every 12 (Twelve) Hours As Needed for Moderate Pain.        [address] [facility] [address] [facility] [phone] [facility] [phone]      Last office visit with prescribing clinician: 4/22/2024   Last telemedicine visit with prescribing clinician: Visit date not found   Next office visit with prescribing clinician: 5/21/2024     Additional details provided by patient: PLEASE SEND REFILLS. PATIENT OUT OF MEDICATION FOR WEEKS.    Does the patient have less than a 3 day supply:  [x] Yes  [] No    Would you like a call back once the refill request has been completed: [] Yes [x] No    If the office needs to give you a call back, can they leave a voicemail: [x] Yes [] No    Marlin Bosch Rep   04/30/24 12:31 EDT

## 2024-05-07 ENCOUNTER — TELEPHONE (OUTPATIENT)
Dept: FAMILY MEDICINE CLINIC | Facility: CLINIC | Age: 79
End: 2024-05-07
Payer: MEDICARE

## 2024-05-08 ENCOUNTER — OFFICE VISIT (OUTPATIENT)
Dept: FAMILY MEDICINE CLINIC | Facility: CLINIC | Age: 79
End: 2024-05-08
Payer: MEDICARE

## 2024-05-08 VITALS
DIASTOLIC BLOOD PRESSURE: 68 MMHG | HEART RATE: 91 BPM | OXYGEN SATURATION: 95 % | WEIGHT: 163.2 LBS | TEMPERATURE: 97.8 F | SYSTOLIC BLOOD PRESSURE: 126 MMHG | BODY MASS INDEX: 32.04 KG/M2 | HEIGHT: 60 IN

## 2024-05-08 DIAGNOSIS — G62.9 NEUROPATHY: ICD-10-CM

## 2024-05-08 DIAGNOSIS — R55 SYNCOPE, UNSPECIFIED SYNCOPE TYPE: Primary | ICD-10-CM

## 2024-05-08 DIAGNOSIS — Z86.73 HISTORY OF CVA (CEREBROVASCULAR ACCIDENT): ICD-10-CM

## 2024-05-08 DIAGNOSIS — M51.36 DEGENERATIVE DISC DISEASE, LUMBAR: ICD-10-CM

## 2024-05-08 PROCEDURE — G2211 COMPLEX E/M VISIT ADD ON: HCPCS | Performed by: FAMILY MEDICINE

## 2024-05-08 PROCEDURE — 99214 OFFICE O/P EST MOD 30 MIN: CPT | Performed by: FAMILY MEDICINE

## 2024-05-08 PROCEDURE — 1159F MED LIST DOCD IN RCRD: CPT | Performed by: FAMILY MEDICINE

## 2024-05-08 PROCEDURE — 1125F AMNT PAIN NOTED PAIN PRSNT: CPT | Performed by: FAMILY MEDICINE

## 2024-05-08 PROCEDURE — 1160F RVW MEDS BY RX/DR IN RCRD: CPT | Performed by: FAMILY MEDICINE

## 2024-05-15 ENCOUNTER — TELEPHONE (OUTPATIENT)
Dept: FAMILY MEDICINE CLINIC | Facility: CLINIC | Age: 79
End: 2024-05-15
Payer: MEDICARE

## 2024-05-15 NOTE — TELEPHONE ENCOUNTER
BRANDON FROM [facility] HAS CALLED TO REQUEST AN EXTENSION ON HH ORDERS. NEEDS A RETURN CALL FOR VERBAL ORDERS.   ONCE A WEEK FOR TWO WEEKS  [phone]

## 2024-05-16 ENCOUNTER — HOSPITAL ENCOUNTER (OUTPATIENT)
Dept: CARDIOLOGY | Facility: HOSPITAL | Age: 79
Discharge: HOME | End: 2024-05-16
Admitting: NURSE PRACTITIONER
Payer: MEDICARE

## 2024-05-16 VITALS — BODY MASS INDEX: 32 KG/M2 | WEIGHT: 163 LBS | HEIGHT: 60 IN

## 2024-05-16 DIAGNOSIS — I77.1 STENOSIS OF CELIAC ARTERY: ICD-10-CM

## 2024-05-16 DIAGNOSIS — K55.1 MESENTERIC ARTERY STENOSIS: ICD-10-CM

## 2024-05-16 LAB
BH CV VAS SMA AORTA DIAMETER: 1.8 CM
BH CV VAS SMA AORTA EDV: 12.7 CM/S
BH CV VAS SMA AORTA PSV: 76.2 CM/S
BH CV VAS SMA CELIAC DIST EDV: 20.4 CM/S
BH CV VAS SMA CELIAC DIST PSV: 215.1 CM/S
BH CV VAS SMA CELIAC MID EDV: 17.2 CM/S
BH CV VAS SMA CELIAC MID PSV: 211.6 CM/S
BH CV VAS SMA CELIAC ORIGIN EDV: 20.5 CM/S
BH CV VAS SMA CELIAC ORIGIN PSV: 153.5 CM/S
BH CV VAS SMA HEPATIC EDV: 16.4 CM/S
BH CV VAS SMA HEPATIC PSV: 133.5 CM/S
BH CV VAS SMA ORIGIN EDV: 2.3 CM/S
BH CV VAS SMA ORIGIN PSV: 138 CM/S
BH CV VAS SMA SMA MID PSV: 175.5 CM/S
BH CV VAS SMA SMA PROX PSV: 223.4 CM/S
BH CV VAS SMA SPLENIC PSV: 62.4 CM/S

## 2024-05-16 PROCEDURE — 93975 VASCULAR STUDY: CPT

## 2024-05-20 NOTE — PROGRESS NOTES
..     [facility] Cardiothoracic Surgery Office Follow Up Note    Date of Encounter: 2024     Name: Arminda Krishnan  : 1945     Referred By: No ref. 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 S/p stent . Pt states that she is very fatigued and SOB with bilateral lower leg swelling.        Subjective      History of Present Illness:    It was nice to see Arminda Krishnan in follow up.  She is a pleasant 79 y.o. female with PMH significant for tobacco use,hypertension, hyperlipidemia, diabetes mellitus, right breast cancer, chronic kidney disease, sleep apnea, CAD, chronic back pain/opiate use and weight loss/postprandial abdominal pain and two vessel chronic mesenteric ischemia s/p diagnostic arteriogram with celiac stent placement (no significant stenosis of the SMA noted) by Dr. Neil on 2021.     Patient was last seen in office by myself on 11/15/2022 with complaints of lower abdominal pain and back pain.  There was no significant interval change on CTA abdomen/pelvis.  Plan was made for CTA with re-evaluation in 6 months which patient did not follow through with.  Ms. Krishnan presents today for surveillance with imaging, accompanied by her sister.  She underwent a mesenteric duplex on  which was concerning for possible celiac in-stent disease with hemodynamically significant stenosis at the distal end of the stent.   Patient denies abdominal pain, nausea, vomiting, and food aversion.  Her biggest complaint is lower back pain.      Review of Systems:  Review of Systems   Constitutional: Positive for decreased appetite (50/50) and malaise/fatigue. Negative for chills and fever.   HENT:  Positive for congestion (allergies).    Cardiovascular:  Positive for dyspnea on exertion, leg swelling (bilateral leg  swelling) and palpitations (sometimes a fast heart beat). Negative for chest pain, claudication, irregular heartbeat, near-syncope, orthopnea and syncope.   Respiratory:  Negative for cough, hemoptysis, shortness of breath, sputum production and wheezing.    Hematologic/Lymphatic: Negative for bleeding problem. Bruises/bleeds easily.   Skin:  Positive for poor wound healing. Negative for color change and rash.   Musculoskeletal:  Positive for arthritis, back pain, falls (w/i  the last 2 weeks unsure what caused her to fall), joint pain, joint swelling and muscle weakness.   Gastrointestinal:  Positive for constipation. Negative for abdominal pain, diarrhea, nausea and vomiting.   Neurological:  Positive for dizziness, headaches, loss of balance, numbness (neuropathy) and weakness. Negative for focal weakness and paresthesias.   Psychiatric/Behavioral:  Positive for depression. The patient has insomnia (poor sleep quality).    Allergic/Immunologic: Positive for environmental allergies.       I have reviewed the following portions of the patient's history: problem list, current medications, allergies, past surgical history, past medical history, past social history, past family history, and ROS and confirm it's accurate.    Allergies:  Allergies   Allergen Reactions    Ceclor [Cefaclor] Rash       Medications:      Current Outpatient Medications:     acetaminophen (TYLENOL) 500 MG tablet, Take 1 tablet by mouth Every 6 (Six) Hours As Needed for Mild Pain., Disp: , Rfl:     albuterol (PROVENTIL) (2.5 MG/3ML) 0.083% nebulizer solution, Take 2.5 mg by nebulization Every 4 (Four) Hours As Needed for Wheezing or Shortness of Air., Disp: , Rfl:     albuterol sulfate  (90 Base) MCG/ACT inhaler, Inhale 2 puffs Every 6 (Six) Hours As Needed for Wheezing., Disp: 18 g, Rfl: 11    aspirin 81 MG EC tablet, Take 1 tablet by mouth Daily., Disp: , Rfl:     atorvastatin (LIPITOR) 80 MG tablet, Take 1 tablet by mouth Daily.,  Disp: 90 tablet, Rfl: 3    busPIRone (BUSPAR) 5 MG tablet, Take 1 tablet by mouth 3 (Three) Times a Day., Disp: , Rfl:     carvedilol (COREG) 25 MG tablet, TAKE ONE TABLET BY MOUTH TWO TIMES A DAY, Disp: 180 tablet, Rfl: 3    cetirizine (zyrTEC) 10 MG tablet, Take 0.5 tablets by mouth Daily., Disp: 30 tablet, Rfl: 0    clopidogrel (PLAVIX) 75 MG tablet, TAKE ONE TABLET BY MOUTH EVERY DAY, Disp: 30 tablet, Rfl: 11    Comfort EZ Pen Needles 32G X 4 MM misc, , Disp: , Rfl:     Comfort EZ Pen Needles 32G X 4 MM misc, USE AS DIRECTED DAILY, Disp: , Rfl:     Continuous Blood Gluc  (Dexcom G6 ) device, Use 1 each Daily., Disp: 1 each, Rfl: 11    Continuous Blood Gluc  (Dexcom G7 ) device, Use 1 each Daily., Disp: 1 each, Rfl: 0    Continuous Blood Gluc Sensor (Dexcom G6 Sensor), CHANGE EVERY 10 (TEN) DAYS., Disp: 3 each, Rfl: 11    Continuous Blood Gluc Sensor (Dexcom G7 Sensor) misc, Use 1 each Every 10 (Ten) Days., Disp: 9 each, Rfl: 3    Continuous Blood Gluc Transmit (Dexcom G6 Transmitter) misc, Use 1 each Daily., Disp: 3 each, Rfl: 3    Diclofenac Sodium (VOLTAREN) 1 % gel gel, APPLY TO AFFECTED AREA FOUR TIMES DAILY, Disp: 100 g, Rfl: 12    famotidine (PEPCID) 10 MG tablet, Take 1 tablet by mouth Every Other Day., Disp: 15 tablet, Rfl: 0    fluticasone (FLONASE) 50 MCG/ACT nasal spray, USE 2 SPRAYS IN EACH NOSTRIL ONCE DAILY, Disp: 16 g, Rfl: 12    HYDROcodone-acetaminophen (NORCO) 7.5-325 MG per tablet, Take 1 tablet by mouth Every 12 (Twelve) Hours As Needed for Moderate Pain., Disp: 20 tablet, Rfl: 0    isosorbide mononitrate (IMDUR) 60 MG 24 hr tablet, TAKE ONE TABLET BY MOUTH EVERY DAY, Disp: 90 tablet, Rfl: 3    losartan (COZAAR) 50 MG tablet, TAKE ONE TABLET BY MOUTH EVERY DAY, Disp: 90 tablet, Rfl: 3    multivitamin with minerals tablet tablet, Take 1 tablet by mouth Daily., Disp: , Rfl:     naloxone (NARCAN) 4 MG/0.1ML nasal spray, 1 spray into the nostril(s) as directed by  provider As Needed., Disp: , Rfl:     pantoprazole (PROTONIX) 40 MG EC tablet, Take 1 tablet by mouth 2 (Two) Times a Day., Disp: 180 tablet, Rfl: 3    pregabalin (Lyrica) 25 MG capsule, Take 1 capsule by mouth 2 (Two) Times a Day., Disp: 60 capsule, Rfl: 0    SITagliptin (Januvia) 50 MG tablet, TAKE 1 TABLET BY MOUTH DAILY, Disp: 90 tablet, Rfl: 3    SSD 1 % cream, APPLY TO AFFECTED AREA AS DIRECTED, Disp: 400 g, Rfl: 11    tolterodine LA (DETROL LA) 2 MG 24 hr capsule, Take 1 capsule by mouth Daily., Disp: 90 capsule, Rfl: 3    True Metrix Blood Glucose Test test strip, Daily. for testing, Disp: , Rfl:     Current Facility-Administered Medications:     ipratropium-albuterol (DUO-NEB) nebulizer solution 3 mL, 3 mL, Nebulization, 4x Daily - RT, Aiden Spencer MD, 3 mL at 09/08/22 1328    History:   Past Medical History:   Diagnosis Date    Anemia     Arthritis     Back problem     CAD (coronary artery disease)     Cancer     Right breast    Chronic back pain     Chronically on opiate therapy     Depression     Diabetes mellitus     DX 14 years ago- checks fsbs weekly    Fibromyalgia     Gastroparesis     GERD (gastroesophageal reflux disease)     Headache     emotional/tension    History of transfusion     [facility]    HTN (hypertension)     Hypercholesteremia     IBS (irritable bowel syndrome)     Incontinence of urine     urgency    Migraine headache     Myalgia and myositis     Peripheral neuropathy     Sleep apnea     does not wear cpap    UTI (urinary tract infection)        Past Surgical History:   Procedure Laterality Date    APPENDECTOMY      ARTERIOGRAM MESENTERIC N/A 6/16/2022    Procedure: DIAGNOSTIC ARTERIOGRAM WITH CELIAC STENT PLACEMENT;  Surgeon: Neo Neil MD;  Location: [facility];  Service: Vascular;  Laterality: N/A;  FLUORO: 16 MIN  DOSE: 2384 MGY  CONTRAST:  20 ML    BACK SURGERY      5x per patient    BRAIN TUMOR EXCISION  1988    BREAST BIOPSY      [facility]  "TUNNEL RELEASE Bilateral     CHOLECYSTECTOMY      COLONOSCOPY      2015    CRANIOTOMY FOR TUMOR      EYE SURGERY      bilateral cataracts removed    HEMORRHOIDECTOMY      LUMBAR FUSION N/A 01/04/2017    Procedure: LUMBAR LAMINECTOMY AND DECOMRESSION  L3 AND L4;  Surgeon: Nishant Bhatti MD;  Location: [facility];  Service:     TOTAL ABDOMINAL HYSTERECTOMY      TRIGGER FINGER RELEASE         Social History     Socioeconomic History    Marital status:     Number of children: 2   Tobacco Use    Smoking status: Every Day     Current packs/day: 0.50     Average packs/day: 0.5 packs/day for 62.0 years (31.0 ttl pk-yrs)     Types: Cigarettes     Passive exposure: Past    Smokeless tobacco: Never    Tobacco comments:     1/17/2022 quit    Vaping Use    Vaping status: Never Used   Substance and Sexual Activity    Alcohol use: No    Drug use: No    Sexual activity: Not Currently        Family History   Problem Relation Age of Onset    Cancer Other     Diabetes Other     Hyperlipidemia Other     Heart attack Other     Hypertension Other     Heart attack Mother     Diabetes Mother     Heart disease Mother     Hypertension Mother     Cancer Father     Alcohol abuse Father     Heart attack Sister     Diabetes Sister     Heart disease Sister     Hypertension Sister     Cancer Brother     Diabetes Brother     Hypertension Brother     Heart attack Sister     Stroke Sister     Cancer Brother        Objective     Physical Exam:  Vitals:    05/21/24 1317 05/21/24 1318   BP: 122/72 112/62   BP Location: Right arm Left arm   Pulse: 82    Temp: 98.4 °F (36.9 °C)    SpO2: 97%    Weight: 72.6 kg (160 lb)    Height: 152.4 cm (60\")  Comment: per pt       Body mass index is 31.25 kg/m².    Physical Exam  Vitals reviewed.   Constitutional:       General: She is not in acute distress.     Appearance: Normal appearance. She is not ill-appearing.   Cardiovascular:      Rate and Rhythm: Normal rate and regular rhythm.      Heart sounds: No " murmur heard.  Pulmonary:      Effort: Pulmonary effort is normal.      Breath sounds: Normal breath sounds.   Musculoskeletal:      Comments: Wheelchair    Neurological:      General: No focal deficit present.      Mental Status: She is alert and oriented to person, place, and time.   Psychiatric:         Mood and Affect: Mood normal.         Behavior: Behavior normal.         Thought Content: Thought content normal.         Imaging/Labs:  Duplex Mesenteric Complete CAR (05/16/2024 09:36)   Impression: There is suggestion of hemodynamically significant (70% or above) stenosis in the distal segment of the celiac artery. The celiac artery stent is not clearly visualized on the current exam. However there was suspicion of mild in-stent stenosis on the previous CTA dated 11/4/2022. This might represent progression of in-stent disease with hemodynamically significant stenosis at the distal end of the stent. The superior mesenteric artery shows no significant stenosis. Electronically Signed: Rashawn Richardson MD  5/16/2024 2:51 PM EDT  Workstation ID: [id number]      ..I personally reviewed this report.    CT Angiogram Abdomen Pelvis (11/04/2022 14:53)   Impression: 1. Status post celiac artery endovascular stenting with a patent stent. There is minimal in-stent intimal hyperplasia at the distal end of the stent. 2. Patent superior mesenteric artery. 3. Likely occluded inferior mesenteric artery at the ostium. 4. Focal stenosis of the distal abdominal aorta with a focal ulcerated plaque with dissection just above the bifurcation as described above. No significant interval change. 5. 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 No significant interval change. 6. Severe stenosis of the right renal artery ostial and proximal segments. 7. Atrophic left kidney. 8. Thickening of the gastric wall, likely because of nondistention. If there is a clinical  concern, endoscopic evaluation should be considered. 9. Unusual thickening of the terminal rectum/anus. Clinical correlation and evaluation is suggested. 10. Please see above regarding the musculoskeletal system. Thank you for the opportunity to assist you in the care of this patient. This report was finalized on 11/7/2022 8:29 AM by Rashawn Richardson MD.   Assessment / Plan    Assessment / Plan:  Celiac artery stenosis  S/p diagnostic arteriogram with celiac stent placement (no significant stenosis of the SMA noted) by Dr. Neil on 6/16/2021  Last seen in office by johnathan sofia 11/15/2022 with complaints of lower abdominal pain and back pain which she reported at all previous visits.   No significant interval change on CTA abdomen/pelvis.  Plan was made for CTA with re-evaluation in 6 months which patient did not follow through with.   Presents today for surveillance with imaging.  Underwent a mesenteric duplex on 5/16 which was concerning for possible celiac in-stent disease with hemodynamically significant stenosis at the distal end of the stent.   Denies abdominal pain, nausea, vomiting, and food aversion.   Biggest complaints is back pain.   Dr. Neil recommended CTA abdomen/pelvis.   Elevated creatinine.  Follows with Nephrology who recommended CT without.     Follow Up:   We will plan for follow up after imaging.   Patient encouraged to call the office with any questions or concerns.     Thank you for allowing me to participate in your care.  Best Regards,    BALAJI Estrella  [facility] Cardiothoracic Surgery  05/23/24  12:10 EDT    I spent 23 minutes caring for Ms. Krishnan on this date of service. This time includes time spent by me in the following activities: preparing for the visit, reviewing tests, obtaining and/or reviewing a separately obtained history, performing a medically appropriate examination and/or evaluation, counseling and educating the patient/family/caregiver, ordering medications,  tests, or procedures, referring and communicating with other health care professionals, documenting information in the medical record, independently interpreting results and communicating that information with the patient/family/caregiver, and care coordination.

## 2024-05-21 ENCOUNTER — OFFICE VISIT (OUTPATIENT)
Dept: CARDIAC SURGERY | Facility: CLINIC | Age: 79
End: 2024-05-21
Payer: MEDICARE

## 2024-05-21 ENCOUNTER — OFFICE VISIT (OUTPATIENT)
Dept: FAMILY MEDICINE CLINIC | Facility: CLINIC | Age: 79
End: 2024-05-21
Payer: MEDICARE

## 2024-05-21 ENCOUNTER — LAB (OUTPATIENT)
Dept: FAMILY MEDICINE CLINIC | Facility: CLINIC | Age: 79
End: 2024-05-21
Payer: MEDICARE

## 2024-05-21 VITALS
HEIGHT: 60 IN | TEMPERATURE: 98.7 F | DIASTOLIC BLOOD PRESSURE: 54 MMHG | WEIGHT: 160 LBS | OXYGEN SATURATION: 96 % | SYSTOLIC BLOOD PRESSURE: 118 MMHG | BODY MASS INDEX: 31.41 KG/M2 | HEART RATE: 78 BPM

## 2024-05-21 VITALS
BODY MASS INDEX: 31.41 KG/M2 | TEMPERATURE: 98.4 F | DIASTOLIC BLOOD PRESSURE: 62 MMHG | WEIGHT: 160 LBS | OXYGEN SATURATION: 97 % | HEIGHT: 60 IN | SYSTOLIC BLOOD PRESSURE: 112 MMHG | HEART RATE: 82 BPM

## 2024-05-21 DIAGNOSIS — E11.65 TYPE 2 DIABETES MELLITUS WITH HYPERGLYCEMIA, WITH LONG-TERM CURRENT USE OF INSULIN: ICD-10-CM

## 2024-05-21 DIAGNOSIS — I10 PRIMARY HYPERTENSION: ICD-10-CM

## 2024-05-21 DIAGNOSIS — Z79.4 TYPE 2 DIABETES MELLITUS WITH HYPERGLYCEMIA, WITH LONG-TERM CURRENT USE OF INSULIN: ICD-10-CM

## 2024-05-21 DIAGNOSIS — Z79.4 TYPE 2 DIABETES MELLITUS WITH HYPERGLYCEMIA, WITH LONG-TERM CURRENT USE OF INSULIN: Primary | ICD-10-CM

## 2024-05-21 DIAGNOSIS — N18.4 STAGE 4 CHRONIC KIDNEY DISEASE: ICD-10-CM

## 2024-05-21 DIAGNOSIS — Z23 NEED FOR ZOSTER VACCINATION: ICD-10-CM

## 2024-05-21 DIAGNOSIS — G62.9 NEUROPATHY: ICD-10-CM

## 2024-05-21 DIAGNOSIS — I77.1 CELIAC ARTERY STENOSIS: ICD-10-CM

## 2024-05-21 DIAGNOSIS — E11.65 TYPE 2 DIABETES MELLITUS WITH HYPERGLYCEMIA, WITH LONG-TERM CURRENT USE OF INSULIN: Primary | ICD-10-CM

## 2024-05-21 DIAGNOSIS — I77.1 STENOSIS OF CELIAC ARTERY: Primary | ICD-10-CM

## 2024-05-21 DIAGNOSIS — E78.2 MIXED HYPERLIPIDEMIA: ICD-10-CM

## 2024-05-21 DIAGNOSIS — L13.9 BULLOUS DERMATITIS: ICD-10-CM

## 2024-05-21 DIAGNOSIS — F17.210 CIGARETTE NICOTINE DEPENDENCE WITHOUT COMPLICATION: ICD-10-CM

## 2024-05-21 PROBLEM — N18.9 CHRONIC KIDNEY DISEASE: Status: ACTIVE | Noted: 2024-05-21

## 2024-05-21 PROBLEM — D64.9 CHRONIC ANEMIA: Status: ACTIVE | Noted: 2024-05-21

## 2024-05-21 LAB
ALBUMIN SERPL-MCNC: 4.1 G/DL (ref 3.5–5.2)
ALBUMIN/GLOB SERPL: 1.3 G/DL
ALP SERPL-CCNC: 80 U/L (ref 39–117)
ALT SERPL W P-5'-P-CCNC: 10 U/L (ref 1–33)
ANION GAP SERPL CALCULATED.3IONS-SCNC: 11 MMOL/L (ref 5–15)
AST SERPL-CCNC: 19 U/L (ref 1–32)
BILIRUB SERPL-MCNC: <0.2 MG/DL (ref 0–1.2)
BUN SERPL-MCNC: 40 MG/DL (ref 8–23)
BUN/CREAT SERPL: 14.8 (ref 7–25)
CALCIUM SPEC-SCNC: 9.2 MG/DL (ref 8.6–10.5)
CHLORIDE SERPL-SCNC: 107 MMOL/L (ref 98–107)
CHOLEST SERPL-MCNC: 121 MG/DL (ref 0–200)
CO2 SERPL-SCNC: 22 MMOL/L (ref 22–29)
CREAT SERPL-MCNC: 2.71 MG/DL (ref 0.57–1)
DEPRECATED RDW RBC AUTO: 44.9 FL (ref 37–54)
EGFRCR SERPLBLD CKD-EPI 2021: 17.1 ML/MIN/1.73
ERYTHROCYTE [DISTWIDTH] IN BLOOD BY AUTOMATED COUNT: 13 % (ref 12.3–15.4)
GLOBULIN UR ELPH-MCNC: 3.1 GM/DL
GLUCOSE SERPL-MCNC: 106 MG/DL (ref 65–99)
HBA1C MFR BLD: 7.1 % (ref 4.8–5.6)
HCT VFR BLD AUTO: 27.8 % (ref 34–46.6)
HDLC SERPL-MCNC: 35 MG/DL (ref 40–60)
HGB BLD-MCNC: 8.8 G/DL (ref 12–15.9)
LDLC SERPL CALC-MCNC: 63 MG/DL (ref 0–100)
LDLC/HDLC SERPL: 1.74 {RATIO}
MCH RBC QN AUTO: 30.1 PG (ref 26.6–33)
MCHC RBC AUTO-ENTMCNC: 31.7 G/DL (ref 31.5–35.7)
MCV RBC AUTO: 95.2 FL (ref 79–97)
PLATELET # BLD AUTO: 218 10*3/MM3 (ref 140–450)
PMV BLD AUTO: 10.8 FL (ref 6–12)
POTASSIUM SERPL-SCNC: 4.8 MMOL/L (ref 3.5–5.2)
PROT SERPL-MCNC: 7.2 G/DL (ref 6–8.5)
PTH-INTACT SERPL-MCNC: 144 PG/ML (ref 15–65)
RBC # BLD AUTO: 2.92 10*6/MM3 (ref 3.77–5.28)
SODIUM SERPL-SCNC: 140 MMOL/L (ref 136–145)
TRIGL SERPL-MCNC: 125 MG/DL (ref 0–150)
VLDLC SERPL-MCNC: 23 MG/DL (ref 5–40)
WBC NRBC COR # BLD AUTO: 7.28 10*3/MM3 (ref 3.4–10.8)

## 2024-05-21 PROCEDURE — 1159F MED LIST DOCD IN RCRD: CPT | Performed by: FAMILY MEDICINE

## 2024-05-21 PROCEDURE — 99214 OFFICE O/P EST MOD 30 MIN: CPT | Performed by: FAMILY MEDICINE

## 2024-05-21 PROCEDURE — 83036 HEMOGLOBIN GLYCOSYLATED A1C: CPT | Performed by: FAMILY MEDICINE

## 2024-05-21 PROCEDURE — 1160F RVW MEDS BY RX/DR IN RCRD: CPT | Performed by: FAMILY MEDICINE

## 2024-05-21 PROCEDURE — 80061 LIPID PANEL: CPT | Performed by: FAMILY MEDICINE

## 2024-05-21 PROCEDURE — 85027 COMPLETE CBC AUTOMATED: CPT | Performed by: FAMILY MEDICINE

## 2024-05-21 PROCEDURE — 83970 ASSAY OF PARATHORMONE: CPT | Performed by: FAMILY MEDICINE

## 2024-05-21 PROCEDURE — 1125F AMNT PAIN NOTED PAIN PRSNT: CPT | Performed by: FAMILY MEDICINE

## 2024-05-21 PROCEDURE — G2211 COMPLEX E/M VISIT ADD ON: HCPCS | Performed by: FAMILY MEDICINE

## 2024-05-21 PROCEDURE — 80053 COMPREHEN METABOLIC PANEL: CPT | Performed by: FAMILY MEDICINE

## 2024-05-21 PROCEDURE — 36415 COLL VENOUS BLD VENIPUNCTURE: CPT | Performed by: FAMILY MEDICINE

## 2024-05-21 RX ORDER — ATORVASTATIN CALCIUM 80 MG/1
80 TABLET, FILM COATED ORAL DAILY
Qty: 90 TABLET | Refills: 3 | Status: SHIPPED | OUTPATIENT
Start: 2024-05-21

## 2024-05-21 RX ORDER — CLOSTRIDIUM TETANI TOXOID ANTIGEN (FORMALDEHYDE INACTIVATED), CORYNEBACTERIUM DIPHTHERIAE TOXOID ANTIGEN (FORMALDEHYDE INACTIVATED), BORDETELLA PERTUSSIS TOXOID ANTIGEN (GLUTARALDEHYDE INACTIVATED), BORDETELLA PERTUSSIS FILAMENTOUS HEMAGGLUTININ ANTIGEN (FORMALDEHYDE INACTIVATED), BORDETELLA PERTUSSIS PERTACTIN ANTIGEN, AND BORDETELLA PERTUSSIS FIMBRIAE 2/3 ANTIGEN 5; 2; 2.5; 5; 3; 5 [LF]/.5ML; [LF]/.5ML; UG/.5ML; UG/.5ML; UG/.5ML; UG/.5ML
0.5 INJECTION, SUSPENSION INTRAMUSCULAR ONCE
Qty: 0.5 ML | Refills: 0 | Status: SHIPPED | OUTPATIENT
Start: 2024-05-21 | End: 2024-05-21

## 2024-05-21 NOTE — PROGRESS NOTES
Follow Up Office Visit      Date: 2024   Patient Name: Arminda Krishnan  : 1945   MRN: [id number]     Chief Complaint:    Chief Complaint   Patient presents with    Syncope       History of Present Illness: Arminda Krishnan is a 79 y.o. female who is here today for follow-up.  Patient has been doing relatively well since last being seen with time problems noted but has developed an ulceration of her left lower leg where she had the previous bullae.  She has been dressing but has unfortunately had some increasing drainage from the site.  She denies any fever or chills or other complaints.  She has had some improved blood sugars recently with her blood sugar this morning being 124.  She denies any other issues currently.  She has not had any more syncopal episodes.  She does have a scheduled follow-up with the cardiothoracic surgeon today after she had an ultrasound that revealed possible restenosis of her previous celiac stent.  Patient is not having any symptoms of abdominal complaints with eating at present time but does continue to have pain in her lower back region.  Patient denies any problems with her current medications regiment.  Patient has continue with her usual activity, appetite and sleep.  Patient denies any other cardiovascular, respiratory, gastrointestinal, urologic or neurologic complaints.    Subjective      Review of Systems:   Review of Systems   Constitutional:  Negative for activity change, appetite change and fatigue.   Respiratory:  Negative for cough, shortness of breath and wheezing.    Cardiovascular:  Negative for chest pain, palpitations and leg swelling.   Gastrointestinal:  Negative for abdominal pain, blood in stool, constipation, diarrhea, nausea, vomiting, GERD and indigestion.   Genitourinary:  Negative for dysuria, flank pain, frequency and urgency.   Musculoskeletal:  Positive for arthralgias, back pain, gait problem, joint swelling and myalgias.    Neurological:  Negative for dizziness, tremors, seizures, syncope, weakness, light-headedness, numbness, headache and memory problem.   Psychiatric/Behavioral:  Negative for sleep disturbance and depressed mood. The patient is not nervous/anxious.        I have reviewed the patients family history, social history, past medical history, past surgical history and have updated it as appropriate.     Medications:     Current Outpatient Medications:     acetaminophen (TYLENOL) 500 MG tablet, Take 1 tablet by mouth Every 6 (Six) Hours As Needed for Mild Pain., Disp: , Rfl:     albuterol (PROVENTIL) (2.5 MG/3ML) 0.083% nebulizer solution, Take 2.5 mg by nebulization Every 4 (Four) Hours As Needed for Wheezing or Shortness of Air., Disp: , Rfl:     albuterol sulfate  (90 Base) MCG/ACT inhaler, Inhale 2 puffs Every 6 (Six) Hours As Needed for Wheezing., Disp: 18 g, Rfl: 11    aspirin 81 MG EC tablet, Take 1 tablet by mouth Daily., Disp: , Rfl:     atorvastatin (LIPITOR) 40 MG tablet, TAKE ONE TABLET BY MOUTH EVERY DAY, Disp: 30 tablet, Rfl: 12    busPIRone (BUSPAR) 5 MG tablet, Take 1 tablet by mouth 3 (Three) Times a Day., Disp: , Rfl:     carvedilol (COREG) 25 MG tablet, TAKE ONE TABLET BY MOUTH TWO TIMES A DAY, Disp: 180 tablet, Rfl: 3    cetirizine (zyrTEC) 10 MG tablet, Take 0.5 tablets by mouth Daily., Disp: 30 tablet, Rfl: 0    clopidogrel (PLAVIX) 75 MG tablet, TAKE ONE TABLET BY MOUTH EVERY DAY, Disp: 30 tablet, Rfl: 11    Comfort EZ Pen Needles 32G X 4 MM misc, , Disp: , Rfl:     Comfort EZ Pen Needles 32G X 4 MM misc, USE AS DIRECTED DAILY, Disp: , Rfl:     Continuous Blood Gluc  (Dexcom G6 ) device, Use 1 each Daily., Disp: 1 each, Rfl: 11    Continuous Blood Gluc  (Dexcom G7 ) device, Use 1 each Daily., Disp: 1 each, Rfl: 0    Continuous Blood Gluc Sensor (Dexcom G6 Sensor), CHANGE EVERY 10 (TEN) DAYS., Disp: 3 each, Rfl: 11    Continuous Blood Gluc Sensor (Dexcom G7  Sensor) misc, Use 1 each Every 10 (Ten) Days., Disp: 9 each, Rfl: 3    Continuous Blood Gluc Transmit (Dexcom G6 Transmitter) misc, Use 1 each Daily., Disp: 3 each, Rfl: 3    Diclofenac Sodium (VOLTAREN) 1 % gel gel, APPLY TO AFFECTED AREA FOUR TIMES DAILY, Disp: 100 g, Rfl: 12    DULoxetine (CYMBALTA) 60 MG capsule, TAKE ONE CAPSULE BY MOUTH EVERY DAY (Patient not taking: Reported on 5/8/2024), Disp: 90 capsule, Rfl: 3    famotidine (PEPCID) 10 MG tablet, Take 1 tablet by mouth Every Other Day., Disp: 15 tablet, Rfl: 0    fluticasone (FLONASE) 50 MCG/ACT nasal spray, USE 2 SPRAYS IN EACH NOSTRIL ONCE DAILY, Disp: 16 g, Rfl: 12    HYDROcodone-acetaminophen (NORCO) 7.5-325 MG per tablet, Take 1 tablet by mouth Every 12 (Twelve) Hours As Needed for Moderate Pain., Disp: 20 tablet, Rfl: 0    isosorbide mononitrate (IMDUR) 60 MG 24 hr tablet, TAKE ONE TABLET BY MOUTH EVERY DAY, Disp: 90 tablet, Rfl: 3    losartan (COZAAR) 50 MG tablet, TAKE ONE TABLET BY MOUTH EVERY DAY, Disp: 90 tablet, Rfl: 3    multivitamin with minerals tablet tablet, Take 1 tablet by mouth Daily., Disp: , Rfl:     naloxone (NARCAN) 4 MG/0.1ML nasal spray, 1 spray into the nostril(s) as directed by provider As Needed., Disp: , Rfl:     pantoprazole (PROTONIX) 40 MG EC tablet, Take 1 tablet by mouth 2 (Two) Times a Day., Disp: 180 tablet, Rfl: 3    predniSONE (DELTASONE) 10 MG tablet, Take 6 tablets by mouth Daily., Disp: 18 tablet, Rfl: 0    pregabalin (Lyrica) 25 MG capsule, Take 1 capsule by mouth 2 (Two) Times a Day., Disp: 60 capsule, Rfl: 0    RSVPreF3 Vac Recomb Adjuvanted (AREXVY) 120 MCG/0.5ML reconstituted suspension injection, Inject 0.5 mL into the appropriate muscle as directed by prescriber 1 (One) Time for 1 dose., Disp: 0.5 mL, Rfl: 0    SITagliptin (Januvia) 50 MG tablet, TAKE 1 TABLET BY MOUTH DAILY, Disp: 90 tablet, Rfl: 3    SSD 1 % cream, APPLY TO AFFECTED AREA AS DIRECTED, Disp: 400 g, Rfl: 11    Tdap (Adacel) 5-2-15.5  "LF-MCG/0.5 injection, Inject 0.5 mL into the appropriate muscle as directed by prescriber 1 (One) Time for 1 dose., Disp: 0.5 mL, Rfl: 0    tolterodine LA (DETROL LA) 2 MG 24 hr capsule, Take 1 capsule by mouth Daily., Disp: 90 capsule, Rfl: 3    True Metrix Blood Glucose Test test strip, Daily. for testing, Disp: , Rfl:     Zoster Vac Recomb Adjuvanted 50 MCG/0.5ML reconstituted suspension, Inject 0.5 mL into the appropriate muscle as directed by prescriber 1 (One) Time for 1 dose., Disp: 1 each, Rfl: 0    Current Facility-Administered Medications:     ipratropium-albuterol (DUO-NEB) nebulizer solution 3 mL, 3 mL, Nebulization, 4x Daily - RT, Aiden Spencer MD, 3 mL at 09/08/22 1328    Allergies:   Allergies   Allergen Reactions    Ceclor [Cefaclor] Rash       Immunizations:   Immunization History   Administered Date(s) Administered    COVID-19 ([facility]) 03/16/2021    COVID-19 (UNSPECIFIED) 03/01/2021, 04/01/2021    Fluzone High Dose =>65 Years ([facility] ONLY) 10/16/2018    Fluzone High-Dose 65+yrs 10/31/2022, 12/06/2023    Fluzone Quad >6mos (Multi-dose) 11/15/2016, 02/05/2020    Pneumococcal Conjugate 13-Valent (PCV13) 07/07/2016    Pneumococcal Polysaccharide (PPSV23) 10/31/2022        Objective     Physical Exam: Please see above  Vital Signs:   Vitals:    05/21/24 0947   BP: 118/54   BP Location: Left arm   Patient Position: Sitting   Cuff Size: Adult   Pulse: 78   Temp: 98.7 °F (37.1 °C)   TempSrc: Temporal   SpO2: 96%   Weight: 72.6 kg (160 lb)   Height: 152.4 cm (60\")     Body mass index is 31.25 kg/m².          Physical Exam  Vitals and nursing note reviewed.   Constitutional:       Appearance: Normal appearance.   HENT:      Head: Normocephalic and atraumatic.      Nose: Nose normal.      Mouth/Throat:      Pharynx: Oropharynx is clear.   Eyes:      Extraocular Movements: Extraocular movements intact.      Pupils: Pupils are equal, round, and reactive to light.   Neck:      Thyroid: No thyroid " mass or thyromegaly.      Trachea: Trachea normal.   Cardiovascular:      Rate and Rhythm: Normal rate and regular rhythm.      Pulses: Normal pulses. No decreased pulses.      Heart sounds: Normal heart sounds.   Pulmonary:      Effort: Pulmonary effort is normal.      Breath sounds: Normal breath sounds.   Abdominal:      General: Abdomen is flat. Bowel sounds are normal.      Palpations: Abdomen is soft.      Tenderness: There is no abdominal tenderness.   Musculoskeletal:      Cervical back: Neck supple.      Right lower leg: No edema.      Left lower leg: No edema.   Lymphadenopathy:      Cervical: No cervical adenopathy.   Skin:     General: Skin is warm and dry.   Neurological:      General: No focal deficit present.      Mental Status: She is alert and oriented to person, place, and time.      Sensory: Sensation is intact.      Motor: Motor function is intact.      Coordination: Coordination is intact.   Psychiatric:         Attention and Perception: Attention normal.         Mood and Affect: Mood normal.         Speech: Speech normal.         Behavior: Behavior normal.         Procedures    Results:   Labs:   Hemoglobin A1C   Date Value Ref Range Status   02/20/2024 6.80 (H) 4.80 - 5.60 % Final     TSH   Date Value Ref Range Status   02/20/2024 0.452 0.270 - 4.200 uIU/mL Final        POCT Results (if applicable):   Results for orders placed or performed during the hospital encounter of 05/16/24   Duplex Mesenteric Complete CAR   Result Value Ref Range    SMA SMA Prox .4 cm/s    SMA SMA Mid .5 cm/s    SMA Aorta PSV 76.20 cm/s    SMA Aorta EDV 12.70 cm/s    BH CV VAS SMA AORTA DIAMETER 1.80 cm    SMA Celiac Origin .50 cm/s    SMA Celiac Origin EDV 20.50 cm/s    SMA Celiac Mid .60 cm/s    SMA Celiac Mid EDV 17.20 cm/s    SMA Celiac Dist .10 cm/s    SMA Celiac Dist EDV 20.40 cm/s    SMA origin .00 cm/s    SMA origin EDV 2.30 cm/s    SMA Hepatic .50 cm/s    SMA  Hepatic EDV 16.40 cm/s    SMA Splenic PSV 62.40 cm/s       Imaging:   No valid procedures specified.     Measures:   Advanced Care Planning:   Patient does not have an advance directive, information provided.    Smoking Cessation:   less than 3 minutes spent counseling Will try to cut down    Assessment / Plan      Assessment/Plan:   Diagnoses and all orders for this visit:    1. Type 2 diabetes mellitus with hyperglycemia, with long-term current use of insulin (Primary)  Patient appears to be modifying her diet a little bit with respect to her diabetes.  Her blood sugars have been doing better with recent modifications.  We will obtain hemoglobin A1c and will make adjustments based on these findings.  Our goal is for her hemoglobin A1c to be less than 7%.  -     Hemoglobin A1c; Future    2. Mixed hyperlipidemia  Patient does-continue to take her cholesterol medication as prescribed.  If she does have restenosis of her stent placement we will increase her Lipitor from 40 to 80 mg daily.  She has been encouraged to modify her diet and we will continue to pursue with following her laboratory data/liver function test and adjust to keep her LDL less than 70.       Lipid Panel; Future    3. Primary hypertension  Blood pressure is doing well at present time.  She is tolerating her medications currently.  We will continue on current regimen and monitor and if she has other problems or plans further assessment treatment will be necessary.  We will obtain renal function and will pursue to treat cording.  -     Comprehensive Metabolic Panel; Future    4. Neuropathy   Patient does continue to have problems with neuropathy currently.  She is having significant symptoms with respect to her lower back and has been encouraged to have possible injections.  However, these will not be performed until she can discontinue the Plavix.  We will continue with the current regimen and monitor.  She will continue with the specialist and will  pursue as necessary.    5. Cigarette nicotine dependence without complication   Patient has been encouraged to discontinue smoking.  We did discuss that the reocclusion of her celiac stent most likely is the result of her tobacco usage.  Patient has tried numerous things in the past for smoking sensation.  She is not using anything at present time.  We will continue to encourage discontinuation.    6. Bullous dermatitis   Patient does have an erythematous ulceration of her left lower extremity where a bullae has ruptured.  We have discussed options and will apply Unna boot to her left lower leg with close observation and follow-up in 1 week's time.  If she has no significant healing it may be necessary for us to continue with dressing changes on a weekly basis through home health.  Patient will follow-up in 1 week's time.    7. Celiac artery stenosis   Patient had repeat ultrasound that did reveal that she has a possible restenosis of a recent stent placed in her celiac artery.  It does appear that she has 70% stenosis.  Patient is asymptomatic at present time.  She does continue to smoke.  She will keep her scheduled follow-up with the CT surgeon to discuss options.  She understands that any smoking after stent placement would most likely lead to restenosis.  She will continue on Plavix and we will increase Lipitor as discussed.    8. Stage 4 chronic kidney disease  Patient is to see the nephrologist in about 3 months.  She recently has seen Dr. Saleh who feels that things are stable.  We will repeat her CMP as well as her other will laboratory data and will pursue and treat accordingly.  She will continue to push fluids and avoid all anti-inflammatories.  -     PTH, Intact; Future  -     CBC (No Diff); Future    9. Need for zoster vaccination  We have discussed risk and benefits of the Tdap, RSV and shingle vaccine.  We will send a request to the pharmacy.-     Tdap (Adacel) 5-2-15.5 LF-MCG/0.5 injection; Inject  0.5 mL into the appropriate muscle as directed by prescriber 1 (One) Time for 1 dose.  Dispense: 0.5 mL; Refill: 0  -     RSVPreF3 Vac Recomb Adjuvanted (AREXVY) 120 MCG/0.5ML reconstituted suspension injection; Inject 0.5 mL into the appropriate muscle as directed by prescriber 1 (One) Time for 1 dose.  Dispense: 0.5 mL; Refill: 0  -     Zoster Vac Recomb Adjuvanted 50 MCG/0.5ML reconstituted suspension; Inject 0.5 mL into the appropriate muscle as directed by prescriber 1 (One) Time for 1 dose.  Dispense: 1 each; Refill: 0        Follow Up:   Return in about 1 week (around 5/28/2024).      At [facility], we believe that sharing information builds trust and better relationships. You are receiving this note because you recently visited [facility]. It is possible you will see health information before a provider has talked with you about it. This kind of information can be easy to misunderstand. To help you fully understand what it means for your health, we urge you to discuss this note with your provider.    Aiden Spencer MD  [facility]

## 2024-05-22 DIAGNOSIS — I77.1 CELIAC ARTERY STENOSIS: Primary | ICD-10-CM

## 2024-05-28 ENCOUNTER — OFFICE VISIT (OUTPATIENT)
Dept: FAMILY MEDICINE CLINIC | Facility: CLINIC | Age: 79
End: 2024-05-28
Payer: MEDICARE

## 2024-05-28 VITALS
HEART RATE: 79 BPM | WEIGHT: 157.8 LBS | BODY MASS INDEX: 30.98 KG/M2 | SYSTOLIC BLOOD PRESSURE: 138 MMHG | OXYGEN SATURATION: 100 % | DIASTOLIC BLOOD PRESSURE: 60 MMHG | TEMPERATURE: 97.8 F | HEIGHT: 60 IN | RESPIRATION RATE: 18 BRPM

## 2024-05-28 DIAGNOSIS — L13.9 BULLOUS DERMATITIS: ICD-10-CM

## 2024-05-28 DIAGNOSIS — E11.65 TYPE 2 DIABETES MELLITUS WITH HYPERGLYCEMIA, WITH LONG-TERM CURRENT USE OF INSULIN: Primary | ICD-10-CM

## 2024-05-28 DIAGNOSIS — M79.601 RIGHT ARM PAIN: ICD-10-CM

## 2024-05-28 DIAGNOSIS — I10 PRIMARY HYPERTENSION: ICD-10-CM

## 2024-05-28 DIAGNOSIS — G62.9 NEUROPATHY: ICD-10-CM

## 2024-05-28 DIAGNOSIS — Z79.4 TYPE 2 DIABETES MELLITUS WITH HYPERGLYCEMIA, WITH LONG-TERM CURRENT USE OF INSULIN: Primary | ICD-10-CM

## 2024-05-28 PROCEDURE — 1160F RVW MEDS BY RX/DR IN RCRD: CPT | Performed by: FAMILY MEDICINE

## 2024-05-28 PROCEDURE — G2211 COMPLEX E/M VISIT ADD ON: HCPCS | Performed by: FAMILY MEDICINE

## 2024-05-28 PROCEDURE — 1159F MED LIST DOCD IN RCRD: CPT | Performed by: FAMILY MEDICINE

## 2024-05-28 PROCEDURE — 1125F AMNT PAIN NOTED PAIN PRSNT: CPT | Performed by: FAMILY MEDICINE

## 2024-05-28 PROCEDURE — 99214 OFFICE O/P EST MOD 30 MIN: CPT | Performed by: FAMILY MEDICINE

## 2024-05-28 RX ORDER — ALBUTEROL SULFATE 90 UG/1
2 AEROSOL, METERED RESPIRATORY (INHALATION) EVERY 6 HOURS PRN
Qty: 18 G | Refills: 11 | Status: SHIPPED | OUTPATIENT
Start: 2024-05-28

## 2024-05-28 NOTE — PROGRESS NOTES
SPOKE WITH PT'S SISTER ABOUT RESULTS, THEY CONFIRMED UNDERSTANDING, WOULD LIKE TO KNOW WHAT YOU SUGGEST SHE TAKES TO HELP WITH THE ARTHRITIS. MESSAGE SENT TO PCP.

## 2024-05-28 NOTE — PROGRESS NOTES
"    Follow Up Office Visit      Date: 2024   Patient Name: Arminda Krishnan  : 1945   MRN: [id number]     Chief Complaint:    Chief Complaint   Patient presents with    Follow-up    Numbness     To the arm. Been going on for about a week     Upper Extremity Issue     Hand and arm swelling       History of Present Illness: Arminda Krishnan is a 79 y.o. female who is here today for follow-up of the swelling with ulceration of her left lower leg.  Patient was seen last week and did have an open wound and was treated with Unna boot.  Apparently it was causing some discomfort and they removed it after about 4 days.  The lesion has now healed.  There does not appear to be any other new lesions.  She is doing well otherwise.  Patient has been having some problems with her right arm \"bothering her\".  Patient did fall recently injuring her right arm and has had some problems with her hand and arm swelling with a little bit of numbness.  This is different from any radiculopathy that she has had in the past.  She states that she is still able to use her right arm but it is just a little bit more difficult.  She has no other complaints at present time.  She does continue to take her medications as prescribed.  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  She is trying to cut back on her smoking and does continue to take her Plavix as well as her other medications faithfully without side effects.  She denies any change in her usual activity, appetite sleep.  Patient denies any other cardiovascular, respiratory, gastrointestinal, urologic or neurologic complaints.    Subjective      Review of Systems:   Review of Systems   Constitutional:  Negative for activity change, appetite change and fatigue.   Respiratory:  Negative for cough, chest tightness, shortness of breath and wheezing.  "   Cardiovascular:  Negative for chest pain, palpitations and leg swelling.   Gastrointestinal:  Positive for blood in stool. Negative for abdominal pain, constipation, diarrhea, nausea, vomiting, GERD and indigestion.   Genitourinary:  Negative for dysuria, flank pain, frequency, hematuria and urgency.   Musculoskeletal:  Positive for arthralgias, gait problem, joint swelling and myalgias. Negative for back pain.   Neurological:  Positive for numbness. Negative for dizziness, tremors, seizures, syncope, weakness, light-headedness, headache, memory problem and confusion.   Psychiatric/Behavioral:  Negative for sleep disturbance.        I have reviewed the patients family history, social history, past medical history, past surgical history and have updated it as appropriate.     Medications:     Current Outpatient Medications:     acetaminophen (TYLENOL) 500 MG tablet, Take 1 tablet by mouth Every 6 (Six) Hours As Needed for Mild Pain., Disp: , Rfl:     albuterol (PROVENTIL) (2.5 MG/3ML) 0.083% nebulizer solution, Take 2.5 mg by nebulization Every 4 (Four) Hours As Needed for Wheezing or Shortness of Air., Disp: , Rfl:     aspirin 81 MG EC tablet, Take 1 tablet by mouth Daily., Disp: , Rfl:     atorvastatin (LIPITOR) 80 MG tablet, Take 1 tablet by mouth Daily., Disp: 90 tablet, Rfl: 3    busPIRone (BUSPAR) 5 MG tablet, Take 1 tablet by mouth 3 (Three) Times a Day., Disp: , Rfl:     carvedilol (COREG) 25 MG tablet, TAKE ONE TABLET BY MOUTH TWO TIMES A DAY, Disp: 180 tablet, Rfl: 3    cetirizine (zyrTEC) 10 MG tablet, Take 0.5 tablets by mouth Daily., Disp: 30 tablet, Rfl: 0    clopidogrel (PLAVIX) 75 MG tablet, TAKE ONE TABLET BY MOUTH EVERY DAY, Disp: 30 tablet, Rfl: 11    Comfort EZ Pen Needles 32G X 4 MM misc, , Disp: , Rfl:     Comfort EZ Pen Needles 32G X 4 MM misc, USE AS DIRECTED DAILY, Disp: , Rfl:     Continuous Blood Gluc  (Dexcom G6 ) device, Use 1 each Daily., Disp: 1 each, Rfl: 11     Continuous Blood Gluc  (Dexcom G7 ) device, Use 1 each Daily., Disp: 1 each, Rfl: 0    Continuous Blood Gluc Sensor (Dexcom G6 Sensor), CHANGE EVERY 10 (TEN) DAYS., Disp: 3 each, Rfl: 11    Continuous Blood Gluc Sensor (Dexcom G7 Sensor) misc, Use 1 each Every 10 (Ten) Days., Disp: 9 each, Rfl: 3    Continuous Blood Gluc Transmit (Dexcom G6 Transmitter) misc, Use 1 each Daily., Disp: 3 each, Rfl: 3    Diclofenac Sodium (VOLTAREN) 1 % gel gel, APPLY TO AFFECTED AREA FOUR TIMES DAILY, Disp: 100 g, Rfl: 12    famotidine (PEPCID) 10 MG tablet, Take 1 tablet by mouth Every Other Day., Disp: 15 tablet, Rfl: 0    fluticasone (FLONASE) 50 MCG/ACT nasal spray, USE 2 SPRAYS IN EACH NOSTRIL ONCE DAILY, Disp: 16 g, Rfl: 12    HYDROcodone-acetaminophen (NORCO) 7.5-325 MG per tablet, Take 1 tablet by mouth Every 12 (Twelve) Hours As Needed for Moderate Pain., Disp: 20 tablet, Rfl: 0    isosorbide mononitrate (IMDUR) 60 MG 24 hr tablet, TAKE ONE TABLET BY MOUTH EVERY DAY, Disp: 90 tablet, Rfl: 3    losartan (COZAAR) 50 MG tablet, TAKE ONE TABLET BY MOUTH EVERY DAY, Disp: 90 tablet, Rfl: 3    multivitamin with minerals tablet tablet, Take 1 tablet by mouth Daily., Disp: , Rfl:     naloxone (NARCAN) 4 MG/0.1ML nasal spray, 1 spray into the nostril(s) as directed by provider As Needed., Disp: , Rfl:     pantoprazole (PROTONIX) 40 MG EC tablet, Take 1 tablet by mouth 2 (Two) Times a Day., Disp: 180 tablet, Rfl: 3    pregabalin (Lyrica) 25 MG capsule, Take 1 capsule by mouth 2 (Two) Times a Day., Disp: 60 capsule, Rfl: 0    SITagliptin (Januvia) 50 MG tablet, TAKE 1 TABLET BY MOUTH DAILY, Disp: 90 tablet, Rfl: 3    SSD 1 % cream, APPLY TO AFFECTED AREA AS DIRECTED, Disp: 400 g, Rfl: 11    tolterodine LA (DETROL LA) 2 MG 24 hr capsule, Take 1 capsule by mouth Daily., Disp: 90 capsule, Rfl: 3    True Metrix Blood Glucose Test test strip, Daily. for testing, Disp: , Rfl:     Ventolin  (90 Base) MCG/ACT inhaler,  "INHALE 2 PUFFS EVERY 6 (SIX) HOURS AS NEEDED FOR WHEEZING., Disp: 18 g, Rfl: 11    Current Facility-Administered Medications:     ipratropium-albuterol (DUO-NEB) nebulizer solution 3 mL, 3 mL, Nebulization, 4x Daily - RT, Aiden Spencer MD, 3 mL at 09/08/22 1328    Allergies:   Allergies   Allergen Reactions    Ceclor [Cefaclor] Rash       Immunizations:   Immunization History   Administered Date(s) Administered    COVID-19 ([facility]INNIE) 03/16/2021    COVID-19 (UNSPECIFIED) 03/01/2021, 04/01/2021    Fluzone High Dose =>65 Years ([facility] ONLY) 10/16/2018    Fluzone High-Dose 65+yrs 10/31/2022, 12/06/2023    Fluzone Quad >6mos (Multi-dose) 11/15/2016, 02/05/2020    Pneumococcal Conjugate 13-Valent (PCV13) 07/07/2016    Pneumococcal Polysaccharide (PPSV23) 10/31/2022        Objective     Physical Exam: Please see above  Vital Signs:   Vitals:    05/28/24 1502   BP: 138/60   BP Location: Left arm   Patient Position: Sitting   Cuff Size: Adult   Pulse: 79   Resp: 18   Temp: 97.8 °F (36.6 °C)   TempSrc: Temporal   SpO2: 100%   Weight: 71.6 kg (157 lb 12.8 oz)   Height: 152.4 cm (60\")   PainSc:   7   PainLoc: Back     Body mass index is 30.82 kg/m².          Physical Exam  Vitals and nursing note reviewed.   Constitutional:       Appearance: Normal appearance.   HENT:      Head: Normocephalic and atraumatic.      Nose: Nose normal.      Mouth/Throat:      Pharynx: Oropharynx is clear.   Eyes:      Extraocular Movements: Extraocular movements intact.      Pupils: Pupils are equal, round, and reactive to light.   Neck:      Thyroid: No thyroid mass or thyromegaly.      Trachea: Trachea normal.   Cardiovascular:      Rate and Rhythm: Normal rate and regular rhythm.      Pulses: Normal pulses. No decreased pulses.      Heart sounds: Normal heart sounds.   Pulmonary:      Effort: Pulmonary effort is normal.      Breath sounds: Normal breath sounds.   Abdominal:      General: Abdomen is flat. Bowel sounds are normal.     "  Palpations: Abdomen is soft.      Tenderness: There is no abdominal tenderness.   Musculoskeletal:      Cervical back: Neck supple.      Right lower leg: No edema.      Left lower leg: No edema.   Lymphadenopathy:      Cervical: No cervical adenopathy.   Skin:     General: Skin is warm and dry.   Neurological:      General: No focal deficit present.      Mental Status: She is alert and oriented to person, place, and time.      Sensory: Sensation is intact.      Motor: Motor function is intact.      Coordination: Coordination is intact.   Psychiatric:         Attention and Perception: Attention normal.         Mood and Affect: Mood normal.         Speech: Speech normal.         Behavior: Behavior normal.         Procedures    Results:   Labs:   Hemoglobin A1C   Date Value Ref Range Status   05/21/2024 7.10 (H) 4.80 - 5.60 % Final     TSH   Date Value Ref Range Status   02/20/2024 0.452 0.270 - 4.200 uIU/mL Final        POCT Results (if applicable):   Results for orders placed or performed in visit on 05/21/24   Comprehensive Metabolic Panel    Specimen: Arm, Right; Blood   Result Value Ref Range    Glucose 106 (H) 65 - 99 mg/dL    BUN 40 (H) 8 - 23 mg/dL    Creatinine 2.71 (H) 0.57 - 1.00 mg/dL    Sodium 140 136 - 145 mmol/L    Potassium 4.8 3.5 - 5.2 mmol/L    Chloride 107 98 - 107 mmol/L    CO2 22.0 22.0 - 29.0 mmol/L    Calcium 9.2 8.6 - 10.5 mg/dL    Total Protein 7.2 6.0 - 8.5 g/dL    Albumin 4.1 3.5 - 5.2 g/dL    ALT (SGPT) 10 1 - 33 U/L    AST (SGOT) 19 1 - 32 U/L    Alkaline Phosphatase 80 39 - 117 U/L    Total Bilirubin <0.2 0.0 - 1.2 mg/dL    Globulin 3.1 gm/dL    A/G Ratio 1.3 g/dL    BUN/Creatinine Ratio 14.8 7.0 - 25.0    Anion Gap 11.0 5.0 - 15.0 mmol/L    eGFR 17.1 (L) >60.0 mL/min/1.73   Hemoglobin A1c    Specimen: Arm, Right; Blood   Result Value Ref Range    Hemoglobin A1C 7.10 (H) 4.80 - 5.60 %   PTH, Intact    Specimen: Arm, Right; Blood   Result Value Ref Range    PTH, Intact 144.0 (H) 15.0 -  65.0 pg/mL   Lipid Panel    Specimen: Arm, Right; Blood   Result Value Ref Range    Total Cholesterol 121 0 - 200 mg/dL    Triglycerides 125 0 - 150 mg/dL    HDL Cholesterol 35 (L) 40 - 60 mg/dL    LDL Cholesterol  63 0 - 100 mg/dL    VLDL Cholesterol 23 5 - 40 mg/dL    LDL/HDL Ratio 1.74    CBC (No Diff)    Specimen: Arm, Right; Blood   Result Value Ref Range    WBC 7.28 3.40 - 10.80 10*3/mm3    RBC 2.92 (L) 3.77 - 5.28 10*6/mm3    Hemoglobin 8.8 (L) 12.0 - 15.9 g/dL    Hematocrit 27.8 (L) 34.0 - 46.6 %    MCV 95.2 79.0 - 97.0 fL    MCH 30.1 26.6 - 33.0 pg    MCHC 31.7 31.5 - 35.7 g/dL    RDW 13.0 12.3 - 15.4 %    RDW-SD 44.9 37.0 - 54.0 fl    MPV 10.8 6.0 - 12.0 fL    Platelets 218 140 - 450 10*3/mm3       Imaging:   No valid procedures specified.     Measures:   Advanced Care Planning:   Patient does not have an advance directive, information provided.    Smoking Cessation:   less than 3 minutes spent counseling Will try to cut down    Assessment / Plan      Assessment/Plan:   Diagnoses and all orders for this visit:    1. Type 2 diabetes mellitus with hyperglycemia, with long-term current use of insulin (Primary)   Patient's last hemoglobin A1c was elevated to 7.1%.  We have encouraged her to watch her caloric intake and to continue with her diabetic medications.  May be necessary for us to further modify based on the continuation of her hemoglobin A1c.  She will try to do better and we will follow-up in the next couple weeks after she has further imaging test performed.    2. Primary hypertension   Patient's blood pressure is doing well at present time.  We will continue with the current regimen and monitor.  It does not appear that she is having syncopal episodes secondary to orthostasis.  We will continue to monitor and will treat accordingly.    3. Neuropathy   Patient does have her usual neuropathy but is having some difficulty with her right arm.  I am uncertain if she has had an acute injury or even  possible any some fractures due to the most recent fall.  We will obtain x-rays to assure his there is no underlying pathology.  We may consider further evaluation with respect to assessing for neuropathy of her right upper extremities.  Patient has had radiculopathy in the past of her neck that Dr. Machado suggest intervention.  Unfortunately, she is unable to have any injections performed due to her continuation of Plavix for her celiac artery stenosis.  She does have a scheduled appointment with a pain specialist in the near future for possible other treatment modalities.  She has had problems with medications in the past including gabapentin/narcotics.  I am uncertain of the best approach at present time and will defer to the specialist.    4. Bullous dermatitis   Patient's lower leg lesion has healed.  She has no other new lesions at present time.  We will continue close monitoring of her symptoms and will treat accordingly.  She does have a scheduled follow-up with a dermatologist in the near future.    5. Right arm pain  Patient did fall last week injuring her right arm.  She does have worse symptoms at her right wrist but has complaints from her shoulder to her elbow and her hand.  We will obtain x-rays to assure us there is no underlying occult fracture.  It may just be arthritis but further assessment will be necessary.  -     XR Shoulder 2+ View Right; Future  -     Cancel: XR Elbow 2 View Right (In Office)  -     XR Wrist 3+ View Right; Future  -     XR Humerus Right; Future  -     XR Elbow 3+ View Right        Follow Up:   Return in about 24 days (around 6/21/2024).      At [facility], we believe that sharing information builds trust and better relationships. You are receiving this note because you recently visited [facility]. It is possible you will see health information before a provider has talked with you about it. This kind of information can be easy to misunderstand. To help you fully  understand what it means for your health, we urge you to discuss this note with your provider.    Aiden Spencer MD  [facility]

## 2024-05-29 DIAGNOSIS — M51.36 DEGENERATIVE DISC DISEASE, LUMBAR: ICD-10-CM

## 2024-05-29 RX ORDER — HYDROCODONE BITARTRATE AND ACETAMINOPHEN 7.5; 325 MG/1; MG/1
1 TABLET ORAL EVERY 12 HOURS PRN
Qty: 20 TABLET | Refills: 0 | Status: SHIPPED | OUTPATIENT
Start: 2024-05-29

## 2024-06-06 ENCOUNTER — HOSPITAL ENCOUNTER (OUTPATIENT)
Dept: CT IMAGING | Facility: HOSPITAL | Age: 79
Discharge: HOME | End: 2024-06-06
Admitting: REGISTERED NURSE
Payer: MEDICARE

## 2024-06-06 DIAGNOSIS — I77.1 CELIAC ARTERY STENOSIS: ICD-10-CM

## 2024-06-06 PROCEDURE — 74176 CT ABD & PELVIS W/O CONTRAST: CPT

## 2024-06-12 ENCOUNTER — TELEPHONE (OUTPATIENT)
Dept: FAMILY MEDICINE CLINIC | Facility: CLINIC | Age: 79
End: 2024-06-12
Payer: MEDICARE

## 2024-06-12 NOTE — TELEPHONE ENCOUNTER
----- Message from Aiden Spencer sent at 6/12/2024  5:18 PM EDT -----  Echo did not reveal any abnormality.  We will need to see her back after she has the other test done.  It appears that she will have those things finished around July 18.  Scheduling appointment after that time would be advisable.

## 2024-06-12 NOTE — TELEPHONE ENCOUNTER
Caller: Delbert Mcdermott    Relationship: Emergency Contact, SISTER    Best call back number: [phone]     Which medication are you concerned about: THE STOP SMOKING PATCH AND THE DEXCOM 7    Who prescribed you this medication: BOUGHT THEM OVER THE COUNTER    What are your concerns: WANTS TO KNOW IF IT IS OK TO WEAR BOTH PATCHES AT THE SAME TIME.    PLEASE CALL

## 2024-06-12 NOTE — TELEPHONE ENCOUNTER
VERBALLY PROVIDED RESULTS TO RICA, SHE CONFIRMED UNDERSTANDING, SCHEDULED PT AN APPOINTMENT TO BE SEEN ON 7/25/2024. PT CANCELLED EEG BUT THEY WILL CALL TO GET THAT RESCHEDULED ASAP.

## 2024-06-17 DIAGNOSIS — G62.9 NEUROPATHY: ICD-10-CM

## 2024-06-17 RX ORDER — PREGABALIN 25 MG/1
25 CAPSULE ORAL 2 TIMES DAILY
Qty: 60 CAPSULE | Refills: 0 | Status: SHIPPED | OUTPATIENT
Start: 2024-06-17 | End: 2024-06-19 | Stop reason: SDUPTHER

## 2024-06-19 ENCOUNTER — LAB (OUTPATIENT)
Dept: FAMILY MEDICINE CLINIC | Facility: CLINIC | Age: 79
End: 2024-06-19
Payer: MEDICARE

## 2024-06-19 ENCOUNTER — OFFICE VISIT (OUTPATIENT)
Dept: FAMILY MEDICINE CLINIC | Facility: CLINIC | Age: 79
End: 2024-06-19
Payer: MEDICARE

## 2024-06-19 VITALS
DIASTOLIC BLOOD PRESSURE: 60 MMHG | RESPIRATION RATE: 16 BRPM | SYSTOLIC BLOOD PRESSURE: 140 MMHG | TEMPERATURE: 98 F | HEART RATE: 73 BPM | OXYGEN SATURATION: 98 % | BODY MASS INDEX: 30.04 KG/M2 | WEIGHT: 153 LBS | HEIGHT: 60 IN

## 2024-06-19 DIAGNOSIS — N18.4 STAGE 4 CHRONIC KIDNEY DISEASE: ICD-10-CM

## 2024-06-19 DIAGNOSIS — F41.9 ANXIETY: ICD-10-CM

## 2024-06-19 DIAGNOSIS — R79.9 ABNORMAL BLOOD CHEMISTRY: Primary | ICD-10-CM

## 2024-06-19 DIAGNOSIS — F17.210 CIGARETTE NICOTINE DEPENDENCE WITHOUT COMPLICATION: ICD-10-CM

## 2024-06-19 DIAGNOSIS — G62.9 NEUROPATHY: ICD-10-CM

## 2024-06-19 DIAGNOSIS — S49.91XA INJURY OF RIGHT SHOULDER, INITIAL ENCOUNTER: Primary | ICD-10-CM

## 2024-06-19 DIAGNOSIS — R53.81 MALAISE: ICD-10-CM

## 2024-06-19 DIAGNOSIS — L30.8 ACUTE VESICULAR DERMATITIS: ICD-10-CM

## 2024-06-19 DIAGNOSIS — M79.601 RIGHT ARM PAIN: ICD-10-CM

## 2024-06-19 DIAGNOSIS — M51.36 DEGENERATIVE DISC DISEASE, LUMBAR: ICD-10-CM

## 2024-06-19 DIAGNOSIS — E78.5 HYPERLIPIDEMIA, UNSPECIFIED HYPERLIPIDEMIA TYPE: ICD-10-CM

## 2024-06-19 LAB
ALBUMIN SERPL-MCNC: 4.1 G/DL (ref 3.5–5.2)
ALBUMIN/GLOB SERPL: 1.3 G/DL
ALP SERPL-CCNC: 77 U/L (ref 39–117)
ALT SERPL W P-5'-P-CCNC: 8 U/L (ref 1–33)
ANION GAP SERPL CALCULATED.3IONS-SCNC: 11 MMOL/L (ref 5–15)
AST SERPL-CCNC: 17 U/L (ref 1–32)
BASOPHILS # BLD AUTO: 0.08 10*3/MM3 (ref 0–0.2)
BASOPHILS NFR BLD AUTO: 1.1 % (ref 0–1.5)
BILIRUB SERPL-MCNC: 0.2 MG/DL (ref 0–1.2)
BUN SERPL-MCNC: 29 MG/DL (ref 8–23)
BUN/CREAT SERPL: 11.7 (ref 7–25)
CALCIUM SPEC-SCNC: 9.2 MG/DL (ref 8.6–10.5)
CHLORIDE SERPL-SCNC: 106 MMOL/L (ref 98–107)
CO2 SERPL-SCNC: 23 MMOL/L (ref 22–29)
CREAT SERPL-MCNC: 2.48 MG/DL (ref 0.57–1)
DEPRECATED RDW RBC AUTO: 50.3 FL (ref 37–54)
EGFRCR SERPLBLD CKD-EPI 2021: 19.1 ML/MIN/1.73
EOSINOPHIL # BLD AUTO: 0.13 10*3/MM3 (ref 0–0.4)
EOSINOPHIL NFR BLD AUTO: 1.9 % (ref 0.3–6.2)
ERYTHROCYTE [DISTWIDTH] IN BLOOD BY AUTOMATED COUNT: 14.3 % (ref 12.3–15.4)
GLOBULIN UR ELPH-MCNC: 3.2 GM/DL
GLUCOSE SERPL-MCNC: 129 MG/DL (ref 65–99)
HCT VFR BLD AUTO: 28.8 % (ref 34–46.6)
HGB BLD-MCNC: 9.2 G/DL (ref 12–15.9)
IMM GRANULOCYTES # BLD AUTO: 0.03 10*3/MM3 (ref 0–0.05)
IMM GRANULOCYTES NFR BLD AUTO: 0.4 % (ref 0–0.5)
LYMPHOCYTES # BLD AUTO: 2.36 10*3/MM3 (ref 0.7–3.1)
LYMPHOCYTES NFR BLD AUTO: 33.9 % (ref 19.6–45.3)
MCH RBC QN AUTO: 30.7 PG (ref 26.6–33)
MCHC RBC AUTO-ENTMCNC: 31.9 G/DL (ref 31.5–35.7)
MCV RBC AUTO: 96 FL (ref 79–97)
MONOCYTES # BLD AUTO: 0.9 10*3/MM3 (ref 0.1–0.9)
MONOCYTES NFR BLD AUTO: 12.9 % (ref 5–12)
NEUTROPHILS NFR BLD AUTO: 3.46 10*3/MM3 (ref 1.7–7)
NEUTROPHILS NFR BLD AUTO: 49.8 % (ref 42.7–76)
NRBC BLD AUTO-RTO: 0 /100 WBC (ref 0–0.2)
PLATELET # BLD AUTO: 183 10*3/MM3 (ref 140–450)
PMV BLD AUTO: 10.9 FL (ref 6–12)
POTASSIUM SERPL-SCNC: 4.2 MMOL/L (ref 3.5–5.2)
PROT SERPL-MCNC: 7.3 G/DL (ref 6–8.5)
RBC # BLD AUTO: 3 10*6/MM3 (ref 3.77–5.28)
SODIUM SERPL-SCNC: 140 MMOL/L (ref 136–145)
WBC NRBC COR # BLD AUTO: 6.96 10*3/MM3 (ref 3.4–10.8)

## 2024-06-19 PROCEDURE — 1160F RVW MEDS BY RX/DR IN RCRD: CPT | Performed by: FAMILY MEDICINE

## 2024-06-19 PROCEDURE — 84433 ASY THIOPURIN S-MTHYLTRNSFRS: CPT | Performed by: SPECIALIST

## 2024-06-19 PROCEDURE — 99214 OFFICE O/P EST MOD 30 MIN: CPT | Performed by: FAMILY MEDICINE

## 2024-06-19 PROCEDURE — 85025 COMPLETE CBC W/AUTO DIFF WBC: CPT | Performed by: SPECIALIST

## 2024-06-19 PROCEDURE — 36415 COLL VENOUS BLD VENIPUNCTURE: CPT | Performed by: FAMILY MEDICINE

## 2024-06-19 PROCEDURE — 1159F MED LIST DOCD IN RCRD: CPT | Performed by: FAMILY MEDICINE

## 2024-06-19 PROCEDURE — 82306 VITAMIN D 25 HYDROXY: CPT | Performed by: FAMILY MEDICINE

## 2024-06-19 PROCEDURE — 80053 COMPREHEN METABOLIC PANEL: CPT | Performed by: SPECIALIST

## 2024-06-19 PROCEDURE — G2211 COMPLEX E/M VISIT ADD ON: HCPCS | Performed by: FAMILY MEDICINE

## 2024-06-19 PROCEDURE — 1125F AMNT PAIN NOTED PAIN PRSNT: CPT | Performed by: FAMILY MEDICINE

## 2024-06-19 RX ORDER — PREGABALIN 25 MG/1
50 CAPSULE ORAL NIGHTLY
Start: 2024-06-19

## 2024-06-19 RX ORDER — HYDROCODONE BITARTRATE AND ACETAMINOPHEN 7.5; 325 MG/1; MG/1
0.5 TABLET ORAL EVERY 12 HOURS PRN
Start: 2024-06-19

## 2024-06-19 NOTE — PROGRESS NOTES
Follow Up Office Visit      Date: 2024   Patient Name: Arminda Krishnan  : 1945   MRN: [id number]     Chief Complaint:    Chief Complaint   Patient presents with    Fall     Fell 3-4 times in past week.       History of Present Illness: Arminda Krishnan is a 79 y.o. female who is here today for follow-up.  Patient has recently fallen out of bed and has injured her right shoulder and arm region.  Patient did not hit her head and has not had any loss of consciousness.  Patient did this approximately a week ago.  She has had persistent pain since that time.  She denies any numbness or tingling sensation of her upper extremities.  Patient has not had any other complaints noted present time.  She has continue with her medications as prescribed without side effects.  She does not believe that the medications are causing her fall.  She does continue to sleep mainly during the day and does not sleep very well at night.  Patient has continue with her usual activity, appetite and sleep.  She denies any other cardiovascular, respiratory, gastrointestinal, urologic or neurologic complaints.    Subjective      Review of Systems:   Review of Systems   Constitutional:  Negative for activity change, appetite change and fatigue.   Respiratory:  Negative for cough, chest tightness, shortness of breath and wheezing.    Cardiovascular:  Negative for chest pain, palpitations and leg swelling.   Gastrointestinal:  Negative for abdominal distention, abdominal pain, blood in stool, constipation, diarrhea, nausea, vomiting, GERD and indigestion.   Genitourinary:  Negative for dysuria, flank pain, frequency and urgency.   Musculoskeletal:  Positive for arthralgias, gait problem and myalgias.   Neurological:  Negative for dizziness, weakness and memory problem.   Psychiatric/Behavioral:  Negative for sleep disturbance and depressed mood. The patient is not nervous/anxious.        I have reviewed the patients family  history, social history, past medical history, past surgical history and have updated it as appropriate.     Medications:     Current Outpatient Medications:     acetaminophen (TYLENOL) 500 MG tablet, Take 1 tablet by mouth Every 6 (Six) Hours As Needed for Mild Pain., Disp: , Rfl:     albuterol (PROVENTIL) (2.5 MG/3ML) 0.083% nebulizer solution, Take 2.5 mg by nebulization Every 4 (Four) Hours As Needed for Wheezing or Shortness of Air., Disp: , Rfl:     aspirin 81 MG EC tablet, Take 1 tablet by mouth Daily., Disp: , Rfl:     atorvastatin (LIPITOR) 80 MG tablet, Take 1 tablet by mouth Daily., Disp: 90 tablet, Rfl: 3    busPIRone (BUSPAR) 5 MG tablet, Take 1 tablet by mouth 3 (Three) Times a Day., Disp: , Rfl:     carvedilol (COREG) 25 MG tablet, TAKE ONE TABLET BY MOUTH TWO TIMES A DAY, Disp: 180 tablet, Rfl: 3    cetirizine (zyrTEC) 10 MG tablet, Take 0.5 tablets by mouth Daily., Disp: 30 tablet, Rfl: 0    clopidogrel (PLAVIX) 75 MG tablet, TAKE ONE TABLET BY MOUTH EVERY DAY, Disp: 30 tablet, Rfl: 11    Comfort EZ Pen Needles 32G X 4 MM misc, , Disp: , Rfl:     Comfort EZ Pen Needles 32G X 4 MM misc, USE AS DIRECTED DAILY, Disp: , Rfl:     Continuous Blood Gluc  (Dexcom G6 ) device, Use 1 each Daily., Disp: 1 each, Rfl: 11    Continuous Blood Gluc  (Dexcom G7 ) device, Use 1 each Daily., Disp: 1 each, Rfl: 0    Continuous Blood Gluc Sensor (Dexcom G6 Sensor), CHANGE EVERY 10 (TEN) DAYS., Disp: 3 each, Rfl: 11    Continuous Blood Gluc Sensor (Dexcom G7 Sensor) misc, Use 1 each Every 10 (Ten) Days., Disp: 9 each, Rfl: 3    Continuous Blood Gluc Transmit (Dexcom G6 Transmitter) misc, Use 1 each Daily., Disp: 3 each, Rfl: 3    Diclofenac Sodium (VOLTAREN) 1 % gel gel, APPLY TO AFFECTED AREA FOUR TIMES DAILY, Disp: 100 g, Rfl: 12    famotidine (PEPCID) 10 MG tablet, Take 1 tablet by mouth Every Other Day., Disp: 15 tablet, Rfl: 0    fluticasone (FLONASE) 50 MCG/ACT nasal spray, USE 2  SPRAYS IN EACH NOSTRIL ONCE DAILY, Disp: 16 g, Rfl: 12    HYDROcodone-acetaminophen (NORCO) 7.5-325 MG per tablet, Take 0.5 tablets by mouth Every 12 (Twelve) Hours As Needed for Moderate Pain., Disp: , Rfl:     isosorbide mononitrate (IMDUR) 60 MG 24 hr tablet, TAKE ONE TABLET BY MOUTH EVERY DAY, Disp: 90 tablet, Rfl: 3    losartan (COZAAR) 50 MG tablet, TAKE ONE TABLET BY MOUTH EVERY DAY, Disp: 90 tablet, Rfl: 3    multivitamin with minerals tablet tablet, Take 1 tablet by mouth Daily., Disp: , Rfl:     naloxone (NARCAN) 4 MG/0.1ML nasal spray, 1 spray into the nostril(s) as directed by provider As Needed., Disp: , Rfl:     pantoprazole (PROTONIX) 40 MG EC tablet, Take 1 tablet by mouth 2 (Two) Times a Day., Disp: 180 tablet, Rfl: 3    pregabalin (LYRICA) 25 MG capsule, Take 2 capsules by mouth Every Night., Disp: , Rfl:     SITagliptin (Januvia) 50 MG tablet, TAKE 1 TABLET BY MOUTH DAILY, Disp: 90 tablet, Rfl: 3    SSD 1 % cream, APPLY TO AFFECTED AREA AS DIRECTED, Disp: 400 g, Rfl: 11    tolterodine LA (DETROL LA) 2 MG 24 hr capsule, Take 1 capsule by mouth Daily., Disp: 90 capsule, Rfl: 3    True Metrix Blood Glucose Test test strip, Daily. for testing, Disp: , Rfl:     Ventolin  (90 Base) MCG/ACT inhaler, INHALE 2 PUFFS EVERY 6 (SIX) HOURS AS NEEDED FOR WHEEZING., Disp: 18 g, Rfl: 11    Current Facility-Administered Medications:     ipratropium-albuterol (DUO-NEB) nebulizer solution 3 mL, 3 mL, Nebulization, 4x Daily - RT, Aiden Spencer MD, 3 mL at 09/08/22 1328    Allergies:   Allergies   Allergen Reactions    Ceclor [Cefaclor] Rash       Immunizations:   Immunization History   Administered Date(s) Administered    COVID-19 (VINNIE) 03/16/2021    COVID-19 (UNSPECIFIED) 03/01/2021, 04/01/2021    Fluzone High Dose =>65 Years ([facility] ONLY) 10/16/2018    Fluzone High-Dose 65+yrs 10/31/2022, 12/06/2023    Fluzone Quad >6mos (Multi-dose) 11/15/2016, 02/05/2020    Pneumococcal Conjugate  "13-Valent (PCV13) 07/07/2016    Pneumococcal Polysaccharide (PPSV23) 10/31/2022        Objective     Physical Exam: Please see above  Vital Signs:   Vitals:    06/19/24 1648   BP: 140/60   BP Location: Left arm   Patient Position: Sitting   Cuff Size: Adult   Pulse: 73   Resp: 16   Temp: 98 °F (36.7 °C)   TempSrc: Temporal   SpO2: 98%   Weight: 69.4 kg (153 lb)   Height: 152.4 cm (60\")     Body mass index is 29.88 kg/m².          Physical Exam  Vitals and nursing note reviewed.   Constitutional:       Appearance: Normal appearance.   HENT:      Head: Normocephalic and atraumatic.      Nose: Nose normal.      Mouth/Throat:      Pharynx: Oropharynx is clear.   Eyes:      Extraocular Movements: Extraocular movements intact.      Pupils: Pupils are equal, round, and reactive to light.   Neck:      Thyroid: No thyroid mass or thyromegaly.      Trachea: Trachea normal.   Cardiovascular:      Rate and Rhythm: Normal rate and regular rhythm.      Pulses: Normal pulses. No decreased pulses.      Heart sounds: Normal heart sounds.   Pulmonary:      Effort: Pulmonary effort is normal.      Breath sounds: Normal breath sounds.   Abdominal:      General: Abdomen is flat. Bowel sounds are normal.      Palpations: Abdomen is soft.      Tenderness: There is no abdominal tenderness.   Musculoskeletal:      Right shoulder: Tenderness and bony tenderness present. No swelling. Decreased range of motion.      Cervical back: Neck supple.      Right lower leg: No edema.      Left lower leg: No edema.   Lymphadenopathy:      Cervical: No cervical adenopathy.   Skin:     General: Skin is warm and dry.   Neurological:      General: No focal deficit present.      Mental Status: She is alert and oriented to person, place, and time.      Sensory: Sensation is intact.      Motor: Motor function is intact.      Coordination: Coordination is intact.   Psychiatric:         Attention and Perception: Attention normal.         Mood and Affect: Mood " normal.         Speech: Speech normal.         Behavior: Behavior normal.         Procedures    Results:   Labs:   Hemoglobin A1C   Date Value Ref Range Status   05/21/2024 7.10 (H) 4.80 - 5.60 % Final     TSH   Date Value Ref Range Status   02/20/2024 0.452 0.270 - 4.200 uIU/mL Final        POCT Results (if applicable):   Results for orders placed or performed in visit on 06/19/24   Vitamin D,25-Hydroxy    Specimen: Arm, Right; Blood   Result Value Ref Range    25 Hydroxy, Vitamin D 56.8 30.0 - 100.0 ng/ml   Comprehensive metabolic panel    Specimen: Arm, Right; Blood   Result Value Ref Range    Glucose 129 (H) 65 - 99 mg/dL    BUN 29 (H) 8 - 23 mg/dL    Creatinine 2.48 (H) 0.57 - 1.00 mg/dL    Sodium 140 136 - 145 mmol/L    Potassium 4.2 3.5 - 5.2 mmol/L    Chloride 106 98 - 107 mmol/L    CO2 23.0 22.0 - 29.0 mmol/L    Calcium 9.2 8.6 - 10.5 mg/dL    Total Protein 7.3 6.0 - 8.5 g/dL    Albumin 4.1 3.5 - 5.2 g/dL    ALT (SGPT) 8 1 - 33 U/L    AST (SGOT) 17 1 - 32 U/L    Alkaline Phosphatase 77 39 - 117 U/L    Total Bilirubin 0.2 0.0 - 1.2 mg/dL    Globulin 3.2 gm/dL    A/G Ratio 1.3 g/dL    BUN/Creatinine Ratio 11.7 7.0 - 25.0    Anion Gap 11.0 5.0 - 15.0 mmol/L    eGFR 19.1 (L) >60.0 mL/min/1.73   CBC Auto Differential    Specimen: Arm, Right; Blood   Result Value Ref Range    WBC 6.96 3.40 - 10.80 10*3/mm3    RBC 3.00 (L) 3.77 - 5.28 10*6/mm3    Hemoglobin 9.2 (L) 12.0 - 15.9 g/dL    Hematocrit 28.8 (L) 34.0 - 46.6 %    MCV 96.0 79.0 - 97.0 fL    MCH 30.7 26.6 - 33.0 pg    MCHC 31.9 31.5 - 35.7 g/dL    RDW 14.3 12.3 - 15.4 %    RDW-SD 50.3 37.0 - 54.0 fl    MPV 10.9 6.0 - 12.0 fL    Platelets 183 140 - 450 10*3/mm3    Neutrophil % 49.8 42.7 - 76.0 %    Lymphocyte % 33.9 19.6 - 45.3 %    Monocyte % 12.9 (H) 5.0 - 12.0 %    Eosinophil % 1.9 0.3 - 6.2 %    Basophil % 1.1 0.0 - 1.5 %    Immature Grans % 0.4 0.0 - 0.5 %    Neutrophils, Absolute 3.46 1.70 - 7.00 10*3/mm3    Lymphocytes, Absolute 2.36 0.70 - 3.10  10*3/mm3    Monocytes, Absolute 0.90 0.10 - 0.90 10*3/mm3    Eosinophils, Absolute 0.13 0.00 - 0.40 10*3/mm3    Basophils, Absolute 0.08 0.00 - 0.20 10*3/mm3    Immature Grans, Absolute 0.03 0.00 - 0.05 10*3/mm3    nRBC 0.0 0.0 - 0.2 /100 WBC       Imaging:   No valid procedures specified.     Measures:   Advanced Care Planning:   Patient does not have an advance directive, information provided.    Smoking Cessation:   Smoker.    Assessment / Plan      Assessment/Plan:   Diagnoses and all orders for this visit:    1. Injury of right shoulder, initial encounter (Primary)  Patient does have an injury to her right shoulder.  She is tender along her AC joint.  We will obtain x-rays of her shoulder as well as her clavicle to ensure that she has no underlying fracture.  We will continue to monitor and if she has persistent symptoms further evaluation with possible referral to an orthopedist may be necessary.  -     XR Shoulder 2+ View Right; Future  -     XR Clavicle Right; Future    2. Degenerative disc disease, lumbar  Patient does continue to have significant amount of degenerative disc disease.  She has been unable to receive the injections until she is cleared of whether or not she can discontinue her antiplatelet drug.  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  We will provide her with a few hydrocodone.  She will take this only when necessary and half tablet at a time.  Hopefully she will have things resolved where she can get treatment for her degenerative disc disease through Dr. Machado.  -     HYDROcodone-acetaminophen (NORCO) 7.5-325 MG per tablet; Take 0.5 tablets by mouth Every 12 (Twelve) Hours As Needed for Moderate Pain.    3. Neuropathy  Patient is having some problems with her neuropathy.  I do suspect that the Lyrica may be causing some difficulty during the day even though she states that it is not.  We will  switch her Lyrica to only be taken at night with no doses during the day.  Hopefully this will help with her ability to be able to sleep at night and not during the day.  We will continue to encourage sleep hygiene and will pursue and treat accordingly.  -     pregabalin (LYRICA) 25 MG capsule; Take 2 capsules by mouth Every Night.    4. Cigarette nicotine dependence without complication   Patient does continue to smoke.  We have encouraged her to discontinue.  She is using the patches intermittently with some success.  We will continue to provide medical assistance as necessary.      Follow Up:   Return in about 5 weeks (around 7/25/2024).      At [facility], we believe that sharing information builds trust and better relationships. You are receiving this note because you recently visited [facility]. It is possible you will see health information before a provider has talked with you about it. This kind of information can be easy to misunderstand. To help you fully understand what it means for your health, we urge you to discuss this note with your provider.    Aiden Spencer MD  [facility]

## 2024-06-20 LAB — 25(OH)D3 SERPL-MCNC: 56.8 NG/ML (ref 30–100)

## 2024-06-20 NOTE — PROGRESS NOTES
Contacted patient to speak about blood work. Patients sister verbalized good understanding and appreciation. No questions or concerns.

## 2024-06-28 LAB
REF LAB TEST METHOD: NORMAL
TPMT GENE PROD MET ACT IMP BLD/T-IMP: NORMAL
TPMT RBC-CCNC: 19.9 UNITS/ML RBC

## 2024-07-02 DIAGNOSIS — M51.36 DEGENERATIVE DISC DISEASE, LUMBAR: ICD-10-CM

## 2024-07-02 RX ORDER — HYDROCODONE BITARTRATE AND ACETAMINOPHEN 7.5; 325 MG/1; MG/1
1 TABLET ORAL EVERY 12 HOURS PRN
Qty: 20 TABLET | Refills: 0 | Status: SHIPPED | OUTPATIENT
Start: 2024-07-02

## 2024-07-03 ENCOUNTER — TELEPHONE (OUTPATIENT)
Dept: FAMILY MEDICINE CLINIC | Facility: CLINIC | Age: 79
End: 2024-07-03
Payer: MEDICARE

## 2024-07-03 DIAGNOSIS — K21.9 GASTROESOPHAGEAL REFLUX DISEASE WITHOUT ESOPHAGITIS: ICD-10-CM

## 2024-07-03 RX ORDER — PANTOPRAZOLE SODIUM 40 MG/1
40 TABLET, DELAYED RELEASE ORAL 2 TIMES DAILY
Qty: 180 TABLET | Refills: 3 | Status: SHIPPED | OUTPATIENT
Start: 2024-07-03

## 2024-07-03 NOTE — TELEPHONE ENCOUNTER
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 thumb).  She does have narrowing of all her DIP and PIP joints.  There does not appear to be any acute fractures.  He appears mainly to be secondary to arthritic changes.

## 2024-07-12 ENCOUNTER — TELEPHONE (OUTPATIENT)
Dept: FAMILY MEDICINE CLINIC | Facility: CLINIC | Age: 79
End: 2024-07-12
Payer: MEDICARE

## 2024-07-12 NOTE — TELEPHONE ENCOUNTER
Attempted to return patients call. No answer. Left vm for patietn to return call about Dexacom leaking. Does it need replaced/  Relay

## 2024-07-12 NOTE — TELEPHONE ENCOUNTER
Caller: Arminda Krishnan    Relationship: Self    Best call back number:      What is the best time to reach you: ANYTIME    Who are you requesting to speak with (clinical staff, provider,  specific staff member): CLINICAL STAFF    Do you know the name of the person who called: ARMINDA    What was the call regarding: PATIENT ADVISED THAT HER DEXACOM 7 IS LEAKING REALLY BAD AND WAS TOLD TO CONTACT HER PCP WHEN SHE CALLED THE COMPANY THAT MAKES THE DEXACOM    Is it okay if the provider responds through [employer]: PLEASE CALL

## 2024-07-15 ENCOUNTER — LAB (OUTPATIENT)
Dept: FAMILY MEDICINE CLINIC | Facility: CLINIC | Age: 79
End: 2024-07-15
Payer: MEDICARE

## 2024-07-15 ENCOUNTER — HOSPITAL ENCOUNTER (OUTPATIENT)
Dept: CARDIOLOGY | Facility: HOSPITAL | Age: 79
Discharge: HOME | End: 2024-07-15
Admitting: FAMILY MEDICINE
Payer: MEDICARE

## 2024-07-15 VITALS — BODY MASS INDEX: 30.04 KG/M2 | WEIGHT: 153 LBS | HEIGHT: 60 IN

## 2024-07-15 DIAGNOSIS — R55 SYNCOPE, UNSPECIFIED SYNCOPE TYPE: ICD-10-CM

## 2024-07-15 DIAGNOSIS — N18.4 CHRONIC KIDNEY DISEASE, STAGE IV (SEVERE): Primary | ICD-10-CM

## 2024-07-15 LAB
BH CV ECHO MEAS - AI P1/2T: 375.4 MSEC
BH CV ECHO MEAS - AO MAX PG: 9.7 MMHG
BH CV ECHO MEAS - AO MEAN PG: 5 MMHG
BH CV ECHO MEAS - AO ROOT DIAM: 2.3 CM
BH CV ECHO MEAS - AO V2 MAX: 156 CM/SEC
BH CV ECHO MEAS - AO V2 VTI: 34.2 CM
BH CV ECHO MEAS - AVA(I,D): 2.35 CM2
BH CV ECHO MEAS - EDV(CUBED): 79.5 ML
BH CV ECHO MEAS - EDV(MOD-SP2): 30.2 ML
BH CV ECHO MEAS - EDV(MOD-SP4): 49.3 ML
BH CV ECHO MEAS - EF(MOD-BP): 63.2 %
BH CV ECHO MEAS - EF(MOD-SP2): 60.3 %
BH CV ECHO MEAS - EF(MOD-SP4): 69.2 %
BH CV ECHO MEAS - ESV(CUBED): 19.7 ML
BH CV ECHO MEAS - ESV(MOD-SP2): 12 ML
BH CV ECHO MEAS - ESV(MOD-SP4): 15.2 ML
BH CV ECHO MEAS - FS: 37.2 %
BH CV ECHO MEAS - IVS/LVPW: 0.88 CM
BH CV ECHO MEAS - IVSD: 0.7 CM
BH CV ECHO MEAS - LA DIMENSION: 3.5 CM
BH CV ECHO MEAS - LAT PEAK E' VEL: 6.5 CM/SEC
BH CV ECHO MEAS - LV DIASTOLIC VOL/BSA (35-75): 29 CM2
BH CV ECHO MEAS - LV MASS(C)D: 96.8 GRAMS
BH CV ECHO MEAS - LV MAX PG: 7.7 MMHG
BH CV ECHO MEAS - LV MEAN PG: 4 MMHG
BH CV ECHO MEAS - LV SYSTOLIC VOL/BSA (12-30): 9 CM2
BH CV ECHO MEAS - LV V1 MAX: 139 CM/SEC
BH CV ECHO MEAS - LV V1 VTI: 28.4 CM
BH CV ECHO MEAS - LVIDD: 4.3 CM
BH CV ECHO MEAS - LVIDS: 2.7 CM
BH CV ECHO MEAS - LVOT AREA: 2.8 CM2
BH CV ECHO MEAS - LVOT DIAM: 1.9 CM
BH CV ECHO MEAS - LVPWD: 0.8 CM
BH CV ECHO MEAS - MED PEAK E' VEL: 5.4 CM/SEC
BH CV ECHO MEAS - MV A MAX VEL: 104 CM/SEC
BH CV ECHO MEAS - MV DEC SLOPE: 659 CM/SEC2
BH CV ECHO MEAS - MV DEC TIME: 0.2 SEC
BH CV ECHO MEAS - MV E MAX VEL: 79.3 CM/SEC
BH CV ECHO MEAS - MV E/A: 0.76
BH CV ECHO MEAS - MV MAX PG: 4.8 MMHG
BH CV ECHO MEAS - MV MEAN PG: 2 MMHG
BH CV ECHO MEAS - MV P1/2T: 50.7 MSEC
BH CV ECHO MEAS - MV V2 VTI: 22.9 CM
BH CV ECHO MEAS - MVA(P1/2T): 4.3 CM2
BH CV ECHO MEAS - MVA(VTI): 3.5 CM2
BH CV ECHO MEAS - PA ACC TIME: 0.11 SEC
BH CV ECHO MEAS - RAP SYSTOLE: 3 MMHG
BH CV ECHO MEAS - RVSP: 28 MMHG
BH CV ECHO MEAS - SV(LVOT): 80.5 ML
BH CV ECHO MEAS - SV(MOD-SP2): 18.2 ML
BH CV ECHO MEAS - SV(MOD-SP4): 34.1 ML
BH CV ECHO MEAS - SVI(LVOT): 47.4 ML/M2
BH CV ECHO MEAS - SVI(MOD-SP2): 10.7 ML/M2
BH CV ECHO MEAS - SVI(MOD-SP4): 20.1 ML/M2
BH CV ECHO MEAS - TAPSE (>1.6): 2.23 CM
BH CV ECHO MEAS - TR MAX PG: 24.8 MMHG
BH CV ECHO MEAS - TR MAX VEL: 248.8 CM/SEC
BH CV ECHO MEASUREMENTS AVERAGE E/E' RATIO: 13.33
BH CV XLRA - RV BASE: 3.2 CM
BH CV XLRA - RV LENGTH: 5.8 CM
BH CV XLRA - RV MID: 2 CM
BH CV XLRA - TDI S': 11.2 CM/SEC
LEFT ATRIUM VOLUME INDEX: 14.8 ML/M2
MAGNESIUM SERPL-MCNC: 1.6 MG/DL (ref 1.6–2.4)
NT-PROBNP SERPL-MCNC: 454 PG/ML (ref 0–1800)
PTH-INTACT SERPL-MCNC: 94.1 PG/ML (ref 15–65)
URATE SERPL-MCNC: 8.1 MG/DL (ref 2.4–5.7)

## 2024-07-15 PROCEDURE — 84550 ASSAY OF BLOOD/URIC ACID: CPT | Performed by: INTERNAL MEDICINE

## 2024-07-15 PROCEDURE — 82306 VITAMIN D 25 HYDROXY: CPT | Performed by: INTERNAL MEDICINE

## 2024-07-15 PROCEDURE — 83970 ASSAY OF PARATHORMONE: CPT | Performed by: INTERNAL MEDICINE

## 2024-07-15 PROCEDURE — 83735 ASSAY OF MAGNESIUM: CPT | Performed by: INTERNAL MEDICINE

## 2024-07-15 PROCEDURE — 93306 TTE W/DOPPLER COMPLETE: CPT | Performed by: INTERNAL MEDICINE

## 2024-07-15 PROCEDURE — 93306 TTE W/DOPPLER COMPLETE: CPT

## 2024-07-15 PROCEDURE — 83880 ASSAY OF NATRIURETIC PEPTIDE: CPT | Performed by: INTERNAL MEDICINE

## 2024-07-15 PROCEDURE — 36415 COLL VENOUS BLD VENIPUNCTURE: CPT | Performed by: FAMILY MEDICINE

## 2024-07-16 ENCOUNTER — LAB (OUTPATIENT)
Dept: FAMILY MEDICINE CLINIC | Facility: CLINIC | Age: 79
End: 2024-07-16
Payer: MEDICARE

## 2024-07-16 DIAGNOSIS — N18.4 CHRONIC KIDNEY DISEASE, STAGE IV (SEVERE): ICD-10-CM

## 2024-07-16 LAB — 25(OH)D3 SERPL-MCNC: 53.4 NG/ML (ref 30–100)

## 2024-07-16 PROCEDURE — 84156 ASSAY OF PROTEIN URINE: CPT | Performed by: INTERNAL MEDICINE

## 2024-07-16 PROCEDURE — 81003 URINALYSIS AUTO W/O SCOPE: CPT | Performed by: INTERNAL MEDICINE

## 2024-07-16 PROCEDURE — 82570 ASSAY OF URINE CREATININE: CPT | Performed by: INTERNAL MEDICINE

## 2024-07-16 NOTE — PROGRESS NOTES
Contacted patient to speak about imaging. Patient verbalized good understanding and appreciation. No questions or concerns.

## 2024-07-17 LAB
BILIRUB UR QL STRIP: NEGATIVE
CLARITY UR: CLEAR
COLOR UR: YELLOW
CREAT 24H UR-MRATE: ABNORMAL MG/24 HR (ref 800–1800)
CREAT UR-MCNC: 66.5 MG/DL
GLUCOSE UR STRIP-MCNC: NEGATIVE MG/DL
HGB UR QL STRIP.AUTO: NEGATIVE
KETONES UR QL STRIP: NEGATIVE
LEUKOCYTE ESTERASE UR QL STRIP.AUTO: NEGATIVE
NITRITE UR QL STRIP: NEGATIVE
PH UR STRIP.AUTO: 5.5 [PH] (ref 5–8)
PROT 24H UR-MRATE: ABNORMAL MG/24 HR (ref 30–150)
PROT UR QL STRIP: NEGATIVE
PROT UR-MCNC: 22.1 MG/DL
PROT/CREAT UR: 332 MG/G CREAT (ref 0–200)
SP GR UR STRIP: 1.01 (ref 1–1.03)
UROBILINOGEN UR QL STRIP: NORMAL

## 2024-07-18 ENCOUNTER — HOSPITAL ENCOUNTER (OUTPATIENT)
Dept: CARDIOLOGY | Facility: HOSPITAL | Age: 79
Discharge: HOME | End: 2024-07-18
Admitting: FAMILY MEDICINE
Payer: MEDICARE

## 2024-07-18 ENCOUNTER — APPOINTMENT (OUTPATIENT)
Dept: CARDIOLOGY | Facility: HOSPITAL | Age: 79
End: 2024-07-18
Payer: MEDICARE

## 2024-07-18 VITALS — WEIGHT: 153 LBS | HEIGHT: 60 IN | BODY MASS INDEX: 30.04 KG/M2

## 2024-07-18 DIAGNOSIS — R55 SYNCOPE, UNSPECIFIED SYNCOPE TYPE: ICD-10-CM

## 2024-07-18 LAB
BH CV VAS STUDY COMMENTS THYROID NODULE RT 1ST MEASURE: 0.4 CM
BH CV VAS STUDY COMMENTS THYROID NODULE RT 2ND MEASUR: 0.41 CM
BH CV XLRA MEAS LEFT DIST CCA EDV: 14.3 CM/SEC
BH CV XLRA MEAS LEFT DIST CCA PSV: 80.9 CM/SEC
BH CV XLRA MEAS LEFT DIST ICA EDV: 23.6 CM/SEC
BH CV XLRA MEAS LEFT DIST ICA PSV: 109.3 CM/SEC
BH CV XLRA MEAS LEFT ICA/CCA RATIO: 2.3
BH CV XLRA MEAS LEFT MID CCA EDV: 12.2 CM/SEC
BH CV XLRA MEAS LEFT MID CCA PSV: 76.6 CM/SEC
BH CV XLRA MEAS LEFT MID ICA EDV: 40.2 CM/SEC
BH CV XLRA MEAS LEFT MID ICA PSV: 186.5 CM/SEC
BH CV XLRA MEAS LEFT PROX CCA EDV: 17.1 CM/SEC
BH CV XLRA MEAS LEFT PROX CCA PSV: 151.4 CM/SEC
BH CV XLRA MEAS LEFT PROX ECA PSV: 144.8 CM/SEC
BH CV XLRA MEAS LEFT PROX ICA EDV: 20.1 CM/SEC
BH CV XLRA MEAS LEFT PROX ICA PSV: 105.2 CM/SEC
BH CV XLRA MEAS LEFT PROX SCLA PSV: 271 CM/SEC
BH CV XLRA MEAS LEFT VERTEBRAL A EDV: 13.6 CM/SEC
BH CV XLRA MEAS LEFT VERTEBRAL A PSV: 89.8 CM/SEC
BH CV XLRA MEAS RIGHT DIST CCA EDV: 10.9 CM/SEC
BH CV XLRA MEAS RIGHT DIST CCA PSV: 65.7 CM/SEC
BH CV XLRA MEAS RIGHT DIST ICA EDV: 40.2 CM/SEC
BH CV XLRA MEAS RIGHT DIST ICA PSV: 184.4 CM/SEC
BH CV XLRA MEAS RIGHT ICA/CCA RATIO: 2.36
BH CV XLRA MEAS RIGHT MID CCA EDV: 12.4 CM/SEC
BH CV XLRA MEAS RIGHT MID CCA PSV: 84.7 CM/SEC
BH CV XLRA MEAS RIGHT MID ICA EDV: 31.2 CM/SEC
BH CV XLRA MEAS RIGHT MID ICA PSV: 155.8 CM/SEC
BH CV XLRA MEAS RIGHT PROX CCA EDV: 11.4 CM/SEC
BH CV XLRA MEAS RIGHT PROX CCA PSV: 105 CM/SEC
BH CV XLRA MEAS RIGHT PROX ECA PSV: 113.5 CM/SEC
BH CV XLRA MEAS RIGHT PROX ICA EDV: 17.5 CM/SEC
BH CV XLRA MEAS RIGHT PROX ICA PSV: 104.5 CM/SEC
BH CV XLRA MEAS RIGHT PROX SCLA PSV: 299 CM/SEC
BH CV XLRA MEAS RIGHT VERTEBRAL A EDV: 15.1 CM/SEC
BH CV XLRA MEAS RIGHT VERTEBRAL A PSV: 80.9 CM/SEC
LEFT ARM BP: NORMAL MMHG
RIGHT ARM BP: NORMAL MMHG

## 2024-07-18 PROCEDURE — 93882 EXTRACRANIAL UNI/LTD STUDY: CPT

## 2024-07-18 PROCEDURE — 93880 EXTRACRANIAL BILAT STUDY: CPT

## 2024-07-25 ENCOUNTER — OFFICE VISIT (OUTPATIENT)
Dept: FAMILY MEDICINE CLINIC | Facility: CLINIC | Age: 79
End: 2024-07-25
Payer: MEDICARE

## 2024-07-25 ENCOUNTER — LAB (OUTPATIENT)
Dept: FAMILY MEDICINE CLINIC | Facility: CLINIC | Age: 79
End: 2024-07-25
Payer: MEDICARE

## 2024-07-25 VITALS
WEIGHT: 153 LBS | BODY MASS INDEX: 30.04 KG/M2 | OXYGEN SATURATION: 99 % | HEIGHT: 60 IN | DIASTOLIC BLOOD PRESSURE: 52 MMHG | SYSTOLIC BLOOD PRESSURE: 110 MMHG | TEMPERATURE: 97.7 F | HEART RATE: 87 BPM | RESPIRATION RATE: 18 BRPM

## 2024-07-25 DIAGNOSIS — Z79.4 TYPE 2 DIABETES MELLITUS WITH HYPERGLYCEMIA, WITH LONG-TERM CURRENT USE OF INSULIN: Primary | ICD-10-CM

## 2024-07-25 DIAGNOSIS — M51.36 DEGENERATIVE DISC DISEASE, LUMBAR: ICD-10-CM

## 2024-07-25 DIAGNOSIS — N18.4 STAGE 4 CHRONIC KIDNEY DISEASE: ICD-10-CM

## 2024-07-25 DIAGNOSIS — E11.65 TYPE 2 DIABETES MELLITUS WITH HYPERGLYCEMIA, WITH LONG-TERM CURRENT USE OF INSULIN: Primary | ICD-10-CM

## 2024-07-25 DIAGNOSIS — N18.4 CHRONIC KIDNEY DISEASE, STAGE IV (SEVERE): ICD-10-CM

## 2024-07-25 DIAGNOSIS — M10.9 GOUTY ARTHRITIS OF LEFT KNEE: ICD-10-CM

## 2024-07-25 DIAGNOSIS — F17.210 CIGARETTE NICOTINE DEPENDENCE WITHOUT COMPLICATION: ICD-10-CM

## 2024-07-25 DIAGNOSIS — Z91.81 AT HIGH RISK FOR FALLS: ICD-10-CM

## 2024-07-25 PROCEDURE — 1159F MED LIST DOCD IN RCRD: CPT | Performed by: FAMILY MEDICINE

## 2024-07-25 PROCEDURE — 1160F RVW MEDS BY RX/DR IN RCRD: CPT | Performed by: FAMILY MEDICINE

## 2024-07-25 PROCEDURE — G2211 COMPLEX E/M VISIT ADD ON: HCPCS | Performed by: FAMILY MEDICINE

## 2024-07-25 PROCEDURE — 80053 COMPREHEN METABOLIC PANEL: CPT | Performed by: INTERNAL MEDICINE

## 2024-07-25 PROCEDURE — 36415 COLL VENOUS BLD VENIPUNCTURE: CPT | Performed by: FAMILY MEDICINE

## 2024-07-25 PROCEDURE — 83540 ASSAY OF IRON: CPT | Performed by: INTERNAL MEDICINE

## 2024-07-25 PROCEDURE — 1125F AMNT PAIN NOTED PAIN PRSNT: CPT | Performed by: FAMILY MEDICINE

## 2024-07-25 PROCEDURE — 99214 OFFICE O/P EST MOD 30 MIN: CPT | Performed by: FAMILY MEDICINE

## 2024-07-25 PROCEDURE — 84466 ASSAY OF TRANSFERRIN: CPT | Performed by: INTERNAL MEDICINE

## 2024-07-25 RX ORDER — PREDNISONE 10 MG/1
60 TABLET ORAL DAILY
Qty: 18 TABLET | Refills: 0 | Status: SHIPPED | OUTPATIENT
Start: 2024-07-25

## 2024-07-25 RX ORDER — MUPIROCIN CALCIUM 20 MG/G
1 CREAM TOPICAL DAILY
COMMUNITY
End: 2024-07-25

## 2024-07-25 RX ORDER — LOSARTAN POTASSIUM 50 MG/1
TABLET ORAL
Qty: 90 TABLET | Refills: 3 | Status: SHIPPED | OUTPATIENT
Start: 2024-07-25

## 2024-07-25 RX ORDER — HYDROCODONE BITARTRATE AND ACETAMINOPHEN 7.5; 325 MG/1; MG/1
1 TABLET ORAL EVERY 12 HOURS PRN
Qty: 20 TABLET | Refills: 0 | Status: SHIPPED | OUTPATIENT
Start: 2024-07-25

## 2024-07-25 RX ORDER — DUPILUMAB 300 MG/2ML
300 INJECTION, SOLUTION SUBCUTANEOUS AS NEEDED
COMMUNITY
Start: 2024-07-09

## 2024-07-25 NOTE — PROGRESS NOTES
Follow Up Office Visit      Date: 2024   Patient Name: Arminda Krishnan  : 1945   MRN: [id number]     Chief Complaint:    Chief Complaint   Patient presents with    Follow-up     Lab work       History of Present Illness: Arminda Krishnan is a 79 y.o. female who is here today for follow-up.  Patient does continue to have significant amount of pain.  She was evaluated by Dr. Machado as well as the pain specialist and there does not appear to be options currently.  She is taking the pain medicine but does apparently have problems with significant amount of drowsiness.  Patient has not had any falls since last being seen.  She is tolerating her other medications without any cognitive dysfunction or respiratory suppression.  Patient also believes her blood sugars are doing relatively well.  Patient appears to be doing otherwise well.  They have continue with their medications without any side effects.  They have not had any changes in their usual activity, appetite and sleep.  Patient denies any other cardiovascular, respiratory, gastrointestinal, urologic or neurologic complaints.    Subjective      Review of Systems:   Review of Systems   Constitutional:  Negative for activity change, appetite change and fatigue.   Respiratory:  Negative for cough, chest tightness, shortness of breath and wheezing.    Cardiovascular:  Negative for chest pain, palpitations and leg swelling.   Gastrointestinal:  Negative for abdominal distention, abdominal pain, blood in stool, constipation, diarrhea, nausea, vomiting, GERD and indigestion.   Genitourinary:  Negative for difficulty urinating, dysuria, flank pain, frequency, hematuria and urgency.   Musculoskeletal:  Negative for arthralgias, back pain, gait problem, joint swelling and myalgias.   Neurological:  Negative for dizziness, tremors, seizures, syncope, weakness, light-headedness, numbness, headache and memory problem.   Psychiatric/Behavioral:  Negative  for sleep disturbance and depressed mood. The patient is not nervous/anxious.        I have reviewed the patients family history, social history, past medical history, past surgical history and have updated it as appropriate.     Medications:     Current Outpatient Medications:     acetaminophen (TYLENOL) 500 MG tablet, Take 1 tablet by mouth Every 6 (Six) Hours As Needed for Mild Pain., Disp: , Rfl:     albuterol (PROVENTIL) (2.5 MG/3ML) 0.083% nebulizer solution, Take 2.5 mg by nebulization Every 4 (Four) Hours As Needed for Wheezing or Shortness of Air., Disp: , Rfl:     aspirin 81 MG EC tablet, Take 1 tablet by mouth Daily., Disp: , Rfl:     atorvastatin (LIPITOR) 80 MG tablet, Take 1 tablet by mouth Daily., Disp: 90 tablet, Rfl: 3    busPIRone (BUSPAR) 5 MG tablet, Take 1 tablet by mouth 3 (Three) Times a Day., Disp: , Rfl:     carvedilol (COREG) 25 MG tablet, TAKE ONE TABLET BY MOUTH TWO TIMES A DAY, Disp: 180 tablet, Rfl: 3    cetirizine (zyrTEC) 10 MG tablet, Take 0.5 tablets by mouth Daily., Disp: 30 tablet, Rfl: 0    clopidogrel (PLAVIX) 75 MG tablet, TAKE ONE TABLET BY MOUTH EVERY DAY, Disp: 30 tablet, Rfl: 11    Comfort EZ Pen Needles 32G X 4 MM misc, , Disp: , Rfl:     Comfort EZ Pen Needles 32G X 4 MM misc, USE AS DIRECTED DAILY, Disp: , Rfl:     Continuous Blood Gluc  (Dexcom G7 ) device, Use 1 each Daily., Disp: 1 each, Rfl: 0    Continuous Blood Gluc Sensor (Dexcom G7 Sensor) misc, Use 1 each Every 10 (Ten) Days., Disp: 9 each, Rfl: 3    Diclofenac Sodium (VOLTAREN) 1 % gel gel, APPLY TO AFFECTED AREA FOUR TIMES DAILY, Disp: 100 g, Rfl: 12    Dupixent 300 MG/2ML solution pen-injector, Inject 2 mL under the skin into the appropriate area as directed As Needed (Every two weeks)., Disp: , Rfl:     famotidine (PEPCID) 10 MG tablet, Take 1 tablet by mouth Every Other Day., Disp: 15 tablet, Rfl: 0    fluticasone (FLONASE) 50 MCG/ACT nasal spray, USE 2 SPRAYS IN EACH NOSTRIL ONCE DAILY,  Disp: 16 g, Rfl: 12    HYDROcodone-acetaminophen (NORCO) 7.5-325 MG per tablet, TAKE ONE TABLET BY MOUTH EVERY 12 HOURS AS NEEDED FOR MODERATE PAIN, Disp: 20 tablet, Rfl: 0    isosorbide mononitrate (IMDUR) 60 MG 24 hr tablet, TAKE ONE TABLET BY MOUTH EVERY DAY, Disp: 90 tablet, Rfl: 3    losartan (COZAAR) 50 MG tablet, TAKE ONE TABLET BY MOUTH EVERY DAY, Disp: 90 tablet, Rfl: 3    multivitamin with minerals tablet tablet, Take 1 tablet by mouth Daily., Disp: , Rfl:     naloxone (NARCAN) 4 MG/0.1ML nasal spray, 1 spray into the nostril(s) as directed by provider As Needed., Disp: , Rfl:     pantoprazole (PROTONIX) 40 MG EC tablet, TAKE ONE TABLET BY MOUTH TWO TIMES A DAY, Disp: 180 tablet, Rfl: 3    pregabalin (LYRICA) 25 MG capsule, Take 2 capsules by mouth Every Night., Disp: , Rfl:     SITagliptin (Januvia) 50 MG tablet, TAKE 1 TABLET BY MOUTH DAILY, Disp: 90 tablet, Rfl: 3    SSD 1 % cream, APPLY TO AFFECTED AREA AS DIRECTED, Disp: 400 g, Rfl: 11    tolterodine LA (DETROL LA) 2 MG 24 hr capsule, Take 1 capsule by mouth Daily., Disp: 90 capsule, Rfl: 3    True Metrix Blood Glucose Test test strip, Daily. for testing, Disp: , Rfl:     Ventolin  (90 Base) MCG/ACT inhaler, INHALE 2 PUFFS EVERY 6 (SIX) HOURS AS NEEDED FOR WHEEZING., Disp: 18 g, Rfl: 11    nicotine (Nicoderm CQ) 21 MG/24HR patch, Place 1 patch on the skin as directed by provider Daily., Disp: 28 each, Rfl: 0    nicotine polacrilex (Nicorette) 4 MG gum, Chew 1 each As Needed for Smoking Cessation., Disp: 100 each, Rfl: 0    predniSONE (DELTASONE) 10 MG tablet, Take 6 tablets by mouth Daily., Disp: 18 tablet, Rfl: 0    Current Facility-Administered Medications:     ipratropium-albuterol (DUO-NEB) nebulizer solution 3 mL, 3 mL, Nebulization, 4x Daily - RT, Aiden Spencer MD, 3 mL at 09/08/22 1328    Allergies:   Allergies   Allergen Reactions    Ceclor [Cefaclor] Rash       Immunizations:   Immunization History   Administered Date(s)  "Administered    COVID-19 (IVNNIE) 03/16/2021    COVID-19 (UNSPECIFIED) 03/01/2021, 04/01/2021    Fluzone High Dose =>65 Years ([facility] ONLY) 10/16/2018    Fluzone High-Dose 65+yrs 10/31/2022, 12/06/2023    Fluzone Quad >6mos (Multi-dose) 11/15/2016, 02/05/2020    Pneumococcal Conjugate 13-Valent (PCV13) 07/07/2016    Pneumococcal Polysaccharide (PPSV23) 10/31/2022        Objective     Physical Exam: Please see above  Vital Signs:   Vitals:    07/25/24 1334   BP: 110/52   BP Location: Right arm   Patient Position: Sitting   Cuff Size: Adult   Pulse: 87   Resp: 18   Temp: 97.7 °F (36.5 °C)   TempSrc: Temporal   SpO2: 99%   Weight: 69.4 kg (153 lb)   Height: 152.4 cm (60\")   PainSc: 10-Worst pain ever     Body mass index is 29.88 kg/m².          Physical Exam  Vitals and nursing note reviewed.   Constitutional:       Appearance: Normal appearance.   HENT:      Head: Normocephalic and atraumatic.      Nose: Nose normal.      Mouth/Throat:      Pharynx: Oropharynx is clear.   Eyes:      Extraocular Movements: Extraocular movements intact.      Pupils: Pupils are equal, round, and reactive to light.   Neck:      Thyroid: No thyroid mass or thyromegaly.      Trachea: Trachea normal.   Cardiovascular:      Rate and Rhythm: Normal rate and regular rhythm.      Pulses: Normal pulses. No decreased pulses.      Heart sounds: Normal heart sounds.   Pulmonary:      Effort: Pulmonary effort is normal.      Breath sounds: Normal breath sounds.   Abdominal:      General: Abdomen is flat. Bowel sounds are normal.      Palpations: Abdomen is soft.      Tenderness: There is no abdominal tenderness.   Musculoskeletal:      Cervical back: Neck supple.      Right lower leg: No edema.      Left lower leg: No edema.   Lymphadenopathy:      Cervical: No cervical adenopathy.   Skin:     General: Skin is warm and dry.   Neurological:      General: No focal deficit present.      Mental Status: She is alert and oriented to person, place, and " time.      Sensory: Sensation is intact.      Motor: Motor function is intact.      Coordination: Coordination is intact.   Psychiatric:         Attention and Perception: Attention normal.         Mood and Affect: Mood normal.         Speech: Speech normal.         Behavior: Behavior normal.         Procedures    Results:   Labs:   Hemoglobin A1C   Date Value Ref Range Status   05/21/2024 7.10 (H) 4.80 - 5.60 % Final     TSH   Date Value Ref Range Status   02/20/2024 0.452 0.270 - 4.200 uIU/mL Final        POCT Results (if applicable):   Results for orders placed or performed during the hospital encounter of 07/18/24   Duplex Carotid Ultrasound CAR   Result Value Ref Range    Prox CCA .0 cm/sec    Prox CCA EDV 11.4 cm/sec    Right Mid CCA PSV 84.7 cm/sec    right Mid CCA EDV 12.4 cm/sec    Dist CCA PSV 65.7 cm/sec    Dist CCA EDV 10.9 cm/sec    Prox ICA .5 cm/sec    Prox ICA EDV 17.5 cm/sec    Mid ICA .8 cm/sec    Mid ICA EDV 31.2 cm/sec    Dist ICA .4 cm/sec    Dist ICA EDV 40.2 cm/sec    Prox ECA .5 cm/sec    Vertebral A PSV 80.9 cm/sec    Vertebral A EDV 15.1 cm/sec    Prox CCA .4 cm/sec    Prox CCA EDV 17.1 cm/sec    left Mid CCA PSV 76.6 cm/sec    left Mid CCA EDV 12.2 cm/sec    Dist CCA PSV 80.9 cm/sec    Dist CCA EDV 14.3 cm/sec    Prox ICA .2 cm/sec    Prox ICA EDV 20.1 cm/sec    Mid ICA .5 cm/sec    Mid ICA EDV 40.2 cm/sec    Dist ICA .3 cm/sec    Dist ICA EDV 23.6 cm/sec    Prox ECA .8 cm/sec    Vertebral A PSV 89.8 cm/sec    Vertebral A EDV 13.6 cm/sec    Prox SCLA .00 cm/sec    ICA/CCA ratio 2.36     Prox SCLA .00 cm/sec    ICA/CCA ratio 2.30     Right arm /112 mmHg    Left arm /97 mmHg    BH CV VAS STUDY COMMENTS THYROID NODULE RT 1ST MEASURE 0.40 cm    BH CV VAS STUDY COMMENTS THYROID NODULE 2ND TRANS MEASUR 0.41 cm       Imaging:   No valid procedures specified.     Measures:   Advanced Care Planning:    Patient does not have an advance directive, information provided.    Smoking Cessation:   less than 3 minutes spent counseling Will try to cut down    Assessment / Plan      Assessment/Plan:   Diagnoses and all orders for this visit:    1. Type 2 diabetes mellitus with hyperglycemia, with long-term current use of insulin (Primary)   Patient does appear to be doing relatively well with respect to their diabetes.  They are tolerating their medications without complaints.  We will continue to follow their hemoglobin A1c and will make adjustments to keep their level less than 7%.  Her previous lab did reveal a level 7.1%.  We have discussed options and we will give her a little bit more time at her request.  It may be necessary for us to modify medications.    2. Stage 4 chronic kidney disease  Patient is scheduled to follow-up with the nephrologist.  Laboratory data was obtained but unfortunately CMP was not obtained.  We will pursue with obtaining the laboratory data as requested and will make certain that she keeps follow-up with the nephrologist.  She has been encouraged to continue to push fluids and avoid anti-inflammatories.  -     Comprehensive Metabolic Panel; Future    3. At high risk for falls   Patient has not fallen since last being seen.  She does believe the narcotic is helping with her pain without any issues with respect to falls.  She believes that pain control has actually help with reducing her fall.  Recent laboratory data did reveal an increase in her uric acid.  She is having pain with her left knee which could contribute to her fall.  We have encouraged her to use the walker/cane as well as wheelchairs as necessary.    4. Gouty arthritis of left knee  Patient has had problems with respect to arthritis of her knee.  She cannot take anti-inflammatories and we have discussed that prednisone may increase her blood sugar.  Nonetheless we will do a short course of prednisone to see if this helps with  her left knee and we will plan on reassessing in 1 weeks time.  If she does not have improvement we will obtain an x-ray of her knee.  -     predniSONE (DELTASONE) 10 MG tablet; Take 6 tablets by mouth Daily.  Dispense: 18 tablet; Refill: 0    5. Degenerative disc disease, lumbar   Patient does have significant amount of degenerative disc disease but unfortunately he is unable to have any intervention until she can do without Plavix.  Unfortunately with respect to her stent and her current smoking situation I did not believe that she could do without Plavix and would cause compromise with respect to mesenteric ischemia.  We will continue with the hydrocodone and will make adjustments.  We have encouraged her to try taking half a pill at a time to see if this is beneficial.  We will follow-up in 1 week's time.    6. Cigarette nicotine dependence without complication  Patient understands the importance of discontinuing her smoking.  She has had benefit with the patch in the past.  We will pursue with patches as well as NicoDerm gum as necessary and will pursue and treat accordingly.  -     nicotine (Nicoderm CQ) 21 MG/24HR patch; Place 1 patch on the skin as directed by provider Daily.  Dispense: 28 each; Refill: 0  -     nicotine polacrilex (Nicorette) 4 MG gum; Chew 1 each As Needed for Smoking Cessation.  Dispense: 100 each; Refill: 0        Follow Up:   Return in about 1 week (around 8/1/2024).      At [facility], we believe that sharing information builds trust and better relationships. You are receiving this note because you recently visited [facility]. It is possible you will see health information before a provider has talked with you about it. This kind of information can be easy to misunderstand. To help you fully understand what it means for your health, we urge you to discuss this note with your provider.    Aiden Spencer MD  [facility]

## 2024-07-26 LAB
ALBUMIN SERPL-MCNC: 3.8 G/DL (ref 3.5–5.2)
ALBUMIN/GLOB SERPL: 1.1 G/DL
ALP SERPL-CCNC: 92 U/L (ref 39–117)
ALT SERPL W P-5'-P-CCNC: 14 U/L (ref 1–33)
ANION GAP SERPL CALCULATED.3IONS-SCNC: 12.4 MMOL/L (ref 5–15)
AST SERPL-CCNC: 27 U/L (ref 1–32)
BILIRUB SERPL-MCNC: <0.2 MG/DL (ref 0–1.2)
BUN SERPL-MCNC: 35 MG/DL (ref 8–23)
BUN/CREAT SERPL: 13.7 (ref 7–25)
CALCIUM SPEC-SCNC: 9.2 MG/DL (ref 8.6–10.5)
CHLORIDE SERPL-SCNC: 103 MMOL/L (ref 98–107)
CO2 SERPL-SCNC: 21.6 MMOL/L (ref 22–29)
CREAT SERPL-MCNC: 2.55 MG/DL (ref 0.57–1)
EGFRCR SERPLBLD CKD-EPI 2021: 18.4 ML/MIN/1.73
GLOBULIN UR ELPH-MCNC: 3.6 GM/DL
GLUCOSE SERPL-MCNC: 137 MG/DL (ref 65–99)
IRON 24H UR-MRATE: 37 MCG/DL (ref 37–145)
IRON SATN MFR SERPL: 13 % (ref 20–50)
POTASSIUM SERPL-SCNC: 4.1 MMOL/L (ref 3.5–5.2)
PROT SERPL-MCNC: 7.4 G/DL (ref 6–8.5)
SODIUM SERPL-SCNC: 137 MMOL/L (ref 136–145)
TIBC SERPL-MCNC: 279 MCG/DL (ref 298–536)
TRANSFERRIN SERPL-MCNC: 187 MG/DL (ref 200–360)

## 2024-07-27 RX ORDER — NICOTINE 21 MG/24HR
1 PATCH, TRANSDERMAL 24 HOURS TRANSDERMAL EVERY 24 HOURS
Qty: 28 EACH | Refills: 0 | Status: SHIPPED | OUTPATIENT
Start: 2024-07-27

## 2024-07-29 ENCOUNTER — TELEPHONE (OUTPATIENT)
Dept: FAMILY MEDICINE CLINIC | Facility: CLINIC | Age: 79
End: 2024-07-29
Payer: MEDICARE

## 2024-07-29 NOTE — TELEPHONE ENCOUNTER
----- Message from Aiden Spencer sent at 7/26/2024  9:28 PM EDT -----  Patient's kidney test have worsened slightly but basically remained stable.  Her iron levels are diminished.  Is she taking her iron supplementation?

## 2024-07-31 ENCOUNTER — TELEPHONE (OUTPATIENT)
Dept: FAMILY MEDICINE CLINIC | Facility: CLINIC | Age: 79
End: 2024-07-31
Payer: MEDICARE

## 2024-07-31 NOTE — TELEPHONE ENCOUNTER
Caller: Delbert Mcdermott    Relationship: Emergency Contact    Best call back number: [phone]     What is the best time to reach you: ANY    Who are you requesting to speak with (clinical staff, provider,  specific staff member):     Do you know the name of the person who called: SISTER    What was the call regarding: PATIENT HAD 1 WEEK FOLLOW UP SCHEDULED TOMORROW AND WANTED TO RESCHEDULE.  THE ONLY THING I HAD WAS AUG 20TH.  IF THIS IS NOT GOOD, PLEASE CALL BACK.  I COULD NOT REACH THE OFFICE.      Is it okay if the provider responds through MyChart:

## 2024-08-20 ENCOUNTER — LAB (OUTPATIENT)
Dept: FAMILY MEDICINE CLINIC | Facility: CLINIC | Age: 79
End: 2024-08-20
Payer: MEDICARE

## 2024-08-20 DIAGNOSIS — N18.4 CKD (CHRONIC KIDNEY DISEASE) STAGE 4, GFR 15-29 ML/MIN: Primary | ICD-10-CM

## 2024-08-20 PROCEDURE — 36415 COLL VENOUS BLD VENIPUNCTURE: CPT | Performed by: FAMILY MEDICINE

## 2024-08-20 PROCEDURE — 85025 COMPLETE CBC W/AUTO DIFF WBC: CPT | Performed by: INTERNAL MEDICINE

## 2024-08-21 ENCOUNTER — LAB (OUTPATIENT)
Dept: FAMILY MEDICINE CLINIC | Facility: CLINIC | Age: 79
End: 2024-08-21
Payer: MEDICARE

## 2024-08-21 DIAGNOSIS — N18.4 CKD (CHRONIC KIDNEY DISEASE) STAGE 4, GFR 15-29 ML/MIN: ICD-10-CM

## 2024-08-21 LAB
BASOPHILS # BLD AUTO: 0.06 10*3/MM3 (ref 0–0.2)
BASOPHILS NFR BLD AUTO: 0.9 % (ref 0–1.5)
DEPRECATED RDW RBC AUTO: 43.5 FL (ref 37–54)
EOSINOPHIL # BLD AUTO: 0.21 10*3/MM3 (ref 0–0.4)
EOSINOPHIL NFR BLD AUTO: 3 % (ref 0.3–6.2)
ERYTHROCYTE [DISTWIDTH] IN BLOOD BY AUTOMATED COUNT: 13.2 % (ref 12.3–15.4)
HCT VFR BLD AUTO: 27.2 % (ref 34–46.6)
HGB BLD-MCNC: 8.6 G/DL (ref 12–15.9)
IMM GRANULOCYTES # BLD AUTO: 0.07 10*3/MM3 (ref 0–0.05)
IMM GRANULOCYTES NFR BLD AUTO: 1 % (ref 0–0.5)
LYMPHOCYTES # BLD AUTO: 2.23 10*3/MM3 (ref 0.7–3.1)
LYMPHOCYTES NFR BLD AUTO: 32.3 % (ref 19.6–45.3)
MCH RBC QN AUTO: 28.8 PG (ref 26.6–33)
MCHC RBC AUTO-ENTMCNC: 31.6 G/DL (ref 31.5–35.7)
MCV RBC AUTO: 91 FL (ref 79–97)
MONOCYTES # BLD AUTO: 0.87 10*3/MM3 (ref 0.1–0.9)
MONOCYTES NFR BLD AUTO: 12.6 % (ref 5–12)
NEUTROPHILS NFR BLD AUTO: 3.46 10*3/MM3 (ref 1.7–7)
NEUTROPHILS NFR BLD AUTO: 50.2 % (ref 42.7–76)
NRBC BLD AUTO-RTO: 0 /100 WBC (ref 0–0.2)
PLATELET # BLD AUTO: 239 10*3/MM3 (ref 140–450)
PMV BLD AUTO: 10.4 FL (ref 6–12)
RBC # BLD AUTO: 2.99 10*6/MM3 (ref 3.77–5.28)
WBC NRBC COR # BLD AUTO: 6.9 10*3/MM3 (ref 3.4–10.8)

## 2024-08-21 PROCEDURE — 81001 URINALYSIS AUTO W/SCOPE: CPT | Performed by: INTERNAL MEDICINE

## 2024-08-22 LAB
BACTERIA UR QL AUTO: NORMAL /HPF
BILIRUB UR QL STRIP: NEGATIVE
CLARITY UR: CLEAR
COLOR UR: YELLOW
GLUCOSE UR STRIP-MCNC: NEGATIVE MG/DL
HGB UR QL STRIP.AUTO: ABNORMAL
HYALINE CASTS UR QL AUTO: NORMAL /LPF
KETONES UR QL STRIP: NEGATIVE
LEUKOCYTE ESTERASE UR QL STRIP.AUTO: NEGATIVE
NITRITE UR QL STRIP: NEGATIVE
PH UR STRIP.AUTO: 6.5 [PH] (ref 5–8)
PROT UR QL STRIP: NEGATIVE
RBC # UR STRIP: NORMAL /HPF
REF LAB TEST METHOD: NORMAL
SP GR UR STRIP: 1.01 (ref 1–1.03)
SQUAMOUS #/AREA URNS HPF: NORMAL /HPF
UROBILINOGEN UR QL STRIP: ABNORMAL
WBC # UR STRIP: NORMAL /HPF

## 2024-08-24 DIAGNOSIS — G62.9 NEUROPATHY: ICD-10-CM

## 2024-08-26 ENCOUNTER — OFFICE VISIT (OUTPATIENT)
Dept: FAMILY MEDICINE CLINIC | Facility: CLINIC | Age: 79
End: 2024-08-26
Payer: MEDICARE

## 2024-08-26 VITALS
RESPIRATION RATE: 16 BRPM | SYSTOLIC BLOOD PRESSURE: 158 MMHG | HEART RATE: 89 BPM | BODY MASS INDEX: 30.86 KG/M2 | TEMPERATURE: 100 F | WEIGHT: 157.2 LBS | DIASTOLIC BLOOD PRESSURE: 64 MMHG | HEIGHT: 60 IN | OXYGEN SATURATION: 100 %

## 2024-08-26 DIAGNOSIS — M51.36 DEGENERATIVE DISC DISEASE, LUMBAR: Primary | ICD-10-CM

## 2024-08-26 PROCEDURE — G2211 COMPLEX E/M VISIT ADD ON: HCPCS | Performed by: PHYSICIAN ASSISTANT

## 2024-08-26 PROCEDURE — 1125F AMNT PAIN NOTED PAIN PRSNT: CPT | Performed by: PHYSICIAN ASSISTANT

## 2024-08-26 PROCEDURE — 99213 OFFICE O/P EST LOW 20 MIN: CPT | Performed by: PHYSICIAN ASSISTANT

## 2024-08-26 RX ORDER — PREDNISONE 10 MG/1
10 TABLET ORAL DAILY
Qty: 30 TABLET | Refills: 0 | Status: SHIPPED | OUTPATIENT
Start: 2024-08-26 | End: 2024-09-25

## 2024-08-26 RX ORDER — PREGABALIN 25 MG/1
25 CAPSULE ORAL 2 TIMES DAILY
Qty: 60 CAPSULE | Refills: 0 | Status: SHIPPED | OUTPATIENT
Start: 2024-08-26

## 2024-09-06 NOTE — PROGRESS NOTES
Follow Up Office Visit      Date: 2024   Patient Name: Arminda Krishnan  : 1945   MRN: [id number]     Chief Complaint:    Chief Complaint   Patient presents with    Follow-up     1 week    Diabetes       History of Present Illness: Arminda Krishnan is a 79 y.o. female who is here today for follow-up of her chronic complaints.  She has had trouble getting her Dexcom as required but her primary concern today is just lumbar pain.  She reports that she is again having exacerbation of this discomfort.  She has also been having some increased pain in her abdominal region associated with known mesenteric artery stenosis.  She is trying her best to stop smoking is currently using a patch.  She has considerable issues with feeling hopelessness due to her amount of pain and general state of health but of late we have noted that her hemoglobin A1c is 7 and this is considered good control blood sugar control considering her other chronic complaints.  This may give us a little bit of extra lead way on how and maintain her pain control..    Subjective      Review of Systems:   Review of Systems   Constitutional: Negative.    Respiratory: Negative.     Cardiovascular: Negative.    Neurological:  Positive for numbness, headache and memory problem.   Psychiatric/Behavioral:  Positive for behavioral problems (Discouraged due to her pain control issues) and dysphoric mood.      I have reviewed the patients family history, social history, past medical history, past surgical history and have updated it as appropriate.     Medications:     Current Outpatient Medications:     acetaminophen (TYLENOL) 500 MG tablet, Take 1 tablet by mouth Every 6 (Six) Hours As Needed for Mild Pain., Disp: , Rfl:     albuterol (PROVENTIL) (2.5 MG/3ML) 0.083% nebulizer solution, Take 2.5 mg by nebulization Every 4 (Four) Hours As Needed for Wheezing or Shortness of Air., Disp: , Rfl:     aspirin 81 MG EC tablet, Take 1 tablet by mouth  Daily., Disp: , Rfl:     atorvastatin (LIPITOR) 80 MG tablet, Take 1 tablet by mouth Daily., Disp: 90 tablet, Rfl: 3    busPIRone (BUSPAR) 5 MG tablet, Take 1 tablet by mouth 3 (Three) Times a Day., Disp: , Rfl:     carvedilol (COREG) 25 MG tablet, TAKE ONE TABLET BY MOUTH TWO TIMES A DAY, Disp: 180 tablet, Rfl: 3    cetirizine (zyrTEC) 10 MG tablet, Take 0.5 tablets by mouth Daily., Disp: 30 tablet, Rfl: 0    clopidogrel (PLAVIX) 75 MG tablet, TAKE ONE TABLET BY MOUTH EVERY DAY, Disp: 30 tablet, Rfl: 11    Comfort EZ Pen Needles 32G X 4 MM misc, , Disp: , Rfl:     Comfort EZ Pen Needles 32G X 4 MM misc, USE AS DIRECTED DAILY, Disp: , Rfl:     Continuous Blood Gluc  (Dexcom G7 ) device, Use 1 each Daily., Disp: 1 each, Rfl: 0    Continuous Blood Gluc Sensor (Dexcom G7 Sensor) misc, Use 1 each Every 10 (Ten) Days., Disp: 9 each, Rfl: 3    Diclofenac Sodium (VOLTAREN) 1 % gel gel, APPLY TO AFFECTED AREA FOUR TIMES DAILY, Disp: 100 g, Rfl: 12    Dupixent 300 MG/2ML solution pen-injector, Inject 2 mL under the skin into the appropriate area as directed As Needed (Every two weeks)., Disp: , Rfl:     famotidine (PEPCID) 10 MG tablet, Take 1 tablet by mouth Every Other Day., Disp: 15 tablet, Rfl: 0    fluticasone (FLONASE) 50 MCG/ACT nasal spray, USE 2 SPRAYS IN EACH NOSTRIL ONCE DAILY, Disp: 16 g, Rfl: 12    HYDROcodone-acetaminophen (NORCO) 7.5-325 MG per tablet, TAKE ONE TABLET BY MOUTH EVERY 12 HOURS AS NEEDED FOR MODERATE PAIN, Disp: 20 tablet, Rfl: 0    isosorbide mononitrate (IMDUR) 60 MG 24 hr tablet, TAKE ONE TABLET BY MOUTH EVERY DAY, Disp: 90 tablet, Rfl: 3    losartan (COZAAR) 50 MG tablet, TAKE ONE TABLET BY MOUTH EVERY DAY, Disp: 90 tablet, Rfl: 3    multivitamin with minerals tablet tablet, Take 1 tablet by mouth Daily., Disp: , Rfl:     naloxone (NARCAN) 4 MG/0.1ML nasal spray, 1 spray into the nostril(s) as directed by provider As Needed., Disp: , Rfl:     nicotine (Nicoderm CQ) 21 MG/24HR  "patch, Place 1 patch on the skin as directed by provider Daily., Disp: 28 each, Rfl: 0    nicotine polacrilex (Nicorette) 4 MG gum, Chew 1 each As Needed for Smoking Cessation., Disp: 100 each, Rfl: 0    pantoprazole (PROTONIX) 40 MG EC tablet, TAKE ONE TABLET BY MOUTH TWO TIMES A DAY, Disp: 180 tablet, Rfl: 3    pregabalin (LYRICA) 25 MG capsule, TAKE 1 CAPSULE BY MOUTH 2 (TWO) TIMES A DAY., Disp: 60 capsule, Rfl: 0    SITagliptin (Januvia) 50 MG tablet, TAKE 1 TABLET BY MOUTH DAILY, Disp: 90 tablet, Rfl: 3    SSD 1 % cream, APPLY TO AFFECTED AREA AS DIRECTED, Disp: 400 g, Rfl: 11    tacrolimus (PROTOPIC) 0.1 % ointment, APPLY TO AFFECTED AREA TWO TIMES A DAY, Disp: , Rfl:     tolterodine LA (DETROL LA) 2 MG 24 hr capsule, Take 1 capsule by mouth Daily., Disp: 90 capsule, Rfl: 3    True Metrix Blood Glucose Test test strip, Daily. for testing, Disp: , Rfl:     Ventolin  (90 Base) MCG/ACT inhaler, INHALE 2 PUFFS EVERY 6 (SIX) HOURS AS NEEDED FOR WHEEZING., Disp: 18 g, Rfl: 11    predniSONE (DELTASONE) 10 MG tablet, Take 1 tablet by mouth Daily for 30 days., Disp: 30 tablet, Rfl: 0    Current Facility-Administered Medications:     ipratropium-albuterol (DUO-NEB) nebulizer solution 3 mL, 3 mL, Nebulization, 4x Daily - RT, Aiden Spencer MD, 3 mL at 09/08/22 1328    Allergies:   Allergies   Allergen Reactions    Ceclor [Cefaclor] Rash       Objective     Physical Exam: Please see above  Vital Signs:   Vitals:    08/26/24 1516 08/26/24 1519   BP: 166/64 158/64   BP Location: Left arm Left arm   Patient Position: Sitting Sitting   Cuff Size: Adult Adult   Pulse: 89    Resp: 16    Temp: 100 °F (37.8 °C)    TempSrc: Temporal    SpO2: 100%    Weight: 71.3 kg (157 lb 3.2 oz)    Height: 152.4 cm (60\")      Body mass index is 30.7 kg/m².  Facility age limit for growth %ahmet is 20 years.          Physical Exam  Vitals and nursing note reviewed.   Constitutional:       General: She is not in acute distress.    "  Appearance: Normal appearance. She is not ill-appearing or toxic-appearing.   Cardiovascular:      Rate and Rhythm: Normal rate and regular rhythm.      Heart sounds: No murmur heard.     No friction rub. No gallop.   Pulmonary:      Effort: Pulmonary effort is normal.      Breath sounds: Normal breath sounds.   Neurological:      Mental Status: She is alert.     Procedures    Assessment / Plan      Assessment/Plan:   1. Degenerative disc disease, lumbar  20 minutes was spent with Ms. Hilliard discussing her current health concerns.  She voices understanding that stopping her smoking is the most important thing as far as her cardiovascular health.  The stent in her mesenteric artery and maintaining its patency is of utmost importance.  We have also discussed quantity of life versus quality of the life of the associated with the pain that she has.  We know that this is a give-and-take relationship with her blood sugar as well as maintaining her pain control with prednisone but we have elected to begin 10 mg of prednisone a day and attempt to help her with inflammatory changes of her low back and pain control.  She voices understanding that in an attempt to help her pain we are changing her blood sugar control and it is very important for her to abstain from the use of sugary sodas as we are trying this medication to help her with her pain.  - predniSONE (DELTASONE) 10 MG tablet; Take 1 tablet by mouth Daily for 30 days.  Dispense: 30 tablet; Refill: 0      Follow Up:   Return in about 4 weeks (around 9/23/2024) for Follow up with Dr. Spencer.      At [facility], we believe that sharing information builds trust and better relationships. You are receiving this note because you recently visited [facility]. It is possible you will see health information before a provider has talked with you about it. This kind of information can be easy to misunderstand. To help you fully understand what it means for your  health, we urge you to discuss this note with your provider.    Paula Spencer PA-C  [facility]

## 2024-09-18 ENCOUNTER — TELEPHONE (OUTPATIENT)
Dept: FAMILY MEDICINE CLINIC | Facility: CLINIC | Age: 79
End: 2024-09-18

## 2024-09-18 NOTE — TELEPHONE ENCOUNTER
Caller: Delbert Mcdermott    Relationship: Emergency Contact    Best call back number: [phone]    Requested Prescriptions:   Requested Prescriptions      No prescriptions requested or ordered in this encounter      HYDROcodone-acetaminophen (NORCO) 7.5-325 MG per tablet     Pharmacy where request should be sent: [place] [id number] [facility] - [phone]  - [phone] FX     Last office visit with prescribing clinician: 7/25/2024   Last telemedicine visit with prescribing clinician: Visit date not found   Next office visit with prescribing clinician: 9/26/2024     Does the patient have less than a 3 day supply:  [x] Yes  [] No    Would you like a call back once the refill request has been completed: [] Yes [x] No    If the office needs to give you a call back, can they leave a voicemail: [] Yes [x] No    Noemí Aguirre, PCT   09/18/24 08:31 EDT

## 2024-09-19 DIAGNOSIS — M51.36 DEGENERATIVE DISC DISEASE, LUMBAR: ICD-10-CM

## 2024-09-19 RX ORDER — HYDROCODONE BITARTRATE AND ACETAMINOPHEN 7.5; 325 MG/1; MG/1
1 TABLET ORAL EVERY 12 HOURS PRN
Qty: 20 TABLET | Refills: 0 | Status: SHIPPED | OUTPATIENT
Start: 2024-09-19

## 2024-09-27 ENCOUNTER — APPOINTMENT (OUTPATIENT)
Dept: CT IMAGING | Facility: HOSPITAL | Age: 79
End: 2024-09-27
Payer: MEDICARE

## 2024-09-27 ENCOUNTER — HOSPITAL ENCOUNTER (OUTPATIENT)
Facility: HOSPITAL | Age: 79
Setting detail: OBSERVATION
Discharge: HOME | End: 2024-09-30
Attending: EMERGENCY MEDICINE | Admitting: INTERNAL MEDICINE
Payer: MEDICARE

## 2024-09-27 DIAGNOSIS — Z86.79 HISTORY OF CORONARY ARTERY DISEASE: ICD-10-CM

## 2024-09-27 DIAGNOSIS — Z87.448 HISTORY OF CHRONIC KIDNEY DISEASE: ICD-10-CM

## 2024-09-27 DIAGNOSIS — R53.1 GENERALIZED WEAKNESS: ICD-10-CM

## 2024-09-27 DIAGNOSIS — Z87.19 HISTORY OF IBS: ICD-10-CM

## 2024-09-27 DIAGNOSIS — R41.82 ALTERED MENTAL STATUS, UNSPECIFIED ALTERED MENTAL STATUS TYPE: Primary | ICD-10-CM

## 2024-09-27 DIAGNOSIS — E86.0 DEHYDRATION: ICD-10-CM

## 2024-09-27 DIAGNOSIS — Z87.39 HISTORY OF CHRONIC BACK PAIN: ICD-10-CM

## 2024-09-27 DIAGNOSIS — M50.30 DEGENERATIVE DISC DISEASE, CERVICAL: ICD-10-CM

## 2024-09-27 DIAGNOSIS — N18.4 CKD (CHRONIC KIDNEY DISEASE) STAGE 4, GFR 15-29 ML/MIN: ICD-10-CM

## 2024-09-27 DIAGNOSIS — D64.9 CHRONIC ANEMIA: ICD-10-CM

## 2024-09-27 DIAGNOSIS — E11.65 TYPE 2 DIABETES MELLITUS WITH HYPERGLYCEMIA, WITHOUT LONG-TERM CURRENT USE OF INSULIN: ICD-10-CM

## 2024-09-27 DIAGNOSIS — M51.369 DEGENERATIVE DISC DISEASE, LUMBAR: ICD-10-CM

## 2024-09-27 DIAGNOSIS — I67.9 CEREBRAL VASCULAR DISEASE: ICD-10-CM

## 2024-09-27 DIAGNOSIS — R26.2 IMPAIRED AMBULATION: ICD-10-CM

## 2024-09-27 DIAGNOSIS — Z86.79 HISTORY OF HYPERTENSION: ICD-10-CM

## 2024-09-27 DIAGNOSIS — E87.29 INCREASED ANION GAP METABOLIC ACIDOSIS: ICD-10-CM

## 2024-09-27 DIAGNOSIS — R63.0 ANOREXIA: ICD-10-CM

## 2024-09-27 DIAGNOSIS — Z86.73 HISTORY OF CVA (CEREBROVASCULAR ACCIDENT): ICD-10-CM

## 2024-09-27 DIAGNOSIS — Z91.81 AT HIGH RISK FOR INJURY RELATED TO FALL: ICD-10-CM

## 2024-09-27 PROCEDURE — 99285 EMERGENCY DEPT VISIT HI MDM: CPT

## 2024-09-27 PROCEDURE — 70450 CT HEAD/BRAIN W/O DYE: CPT

## 2024-09-27 PROCEDURE — 93005 ELECTROCARDIOGRAM TRACING: CPT | Performed by: PHYSICIAN ASSISTANT

## 2024-09-27 PROCEDURE — 74176 CT ABD & PELVIS W/O CONTRAST: CPT

## 2024-09-27 RX ORDER — SODIUM CHLORIDE 0.9 % (FLUSH) 0.9 %
10 SYRINGE (ML) INJECTION AS NEEDED
Status: DISCONTINUED | OUTPATIENT
Start: 2024-09-27 | End: 2024-09-30 | Stop reason: HOSPADM

## 2024-09-28 ENCOUNTER — APPOINTMENT (OUTPATIENT)
Dept: GENERAL RADIOLOGY | Facility: HOSPITAL | Age: 79
End: 2024-09-28
Payer: MEDICARE

## 2024-09-28 ENCOUNTER — APPOINTMENT (OUTPATIENT)
Dept: CARDIOLOGY | Facility: HOSPITAL | Age: 79
End: 2024-09-28
Payer: MEDICARE

## 2024-09-28 PROBLEM — Z86.73 HISTORY OF CVA (CEREBROVASCULAR ACCIDENT): Status: ACTIVE | Noted: 2024-09-28

## 2024-09-28 PROBLEM — E11.65 TYPE 2 DIABETES MELLITUS WITH HYPERGLYCEMIA, WITHOUT LONG-TERM CURRENT USE OF INSULIN: Status: ACTIVE | Noted: 2024-09-28

## 2024-09-28 PROBLEM — N18.4 CKD (CHRONIC KIDNEY DISEASE) STAGE 4, GFR 15-29 ML/MIN: Status: ACTIVE | Noted: 2024-05-21

## 2024-09-28 PROBLEM — R73.9 HYPERGLYCEMIA: Status: ACTIVE | Noted: 2024-09-28

## 2024-09-28 PROBLEM — R41.0 DISORIENTATION: Status: ACTIVE | Noted: 2024-09-28

## 2024-09-28 LAB
ALBUMIN SERPL-MCNC: 3.9 G/DL (ref 3.5–5.2)
ALBUMIN/GLOB SERPL: 0.9 G/DL
ALP SERPL-CCNC: 102 U/L (ref 39–117)
ALT SERPL W P-5'-P-CCNC: 18 U/L (ref 1–33)
ANION GAP SERPL CALCULATED.3IONS-SCNC: 17 MMOL/L (ref 5–15)
ANION GAP SERPL CALCULATED.3IONS-SCNC: 17 MMOL/L (ref 5–15)
AST SERPL-CCNC: 28 U/L (ref 1–32)
ATMOSPHERIC PRESS: ABNORMAL MM[HG]
B PARAPERT DNA SPEC QL NAA+PROBE: NOT DETECTED
B PERT DNA SPEC QL NAA+PROBE: NOT DETECTED
B-OH-BUTYR SERPL-SCNC: 0.26 MMOL/L (ref 0.02–0.27)
BACTERIA UR QL AUTO: ABNORMAL /HPF
BASE EXCESS BLDV CALC-SCNC: -2.4 MMOL/L (ref -2–2)
BASOPHILS # BLD AUTO: 0.06 10*3/MM3 (ref 0–0.2)
BASOPHILS # BLD AUTO: 0.06 10*3/MM3 (ref 0–0.2)
BASOPHILS NFR BLD AUTO: 0.7 % (ref 0–1.5)
BASOPHILS NFR BLD AUTO: 0.7 % (ref 0–1.5)
BDY SITE: ABNORMAL
BH CV LOWER VASCULAR LEFT COMMON FEMORAL AUGMENT: NORMAL
BH CV LOWER VASCULAR LEFT COMMON FEMORAL COMPETENT: NORMAL
BH CV LOWER VASCULAR LEFT COMMON FEMORAL COMPRESS: NORMAL
BH CV LOWER VASCULAR LEFT COMMON FEMORAL PHASIC: NORMAL
BH CV LOWER VASCULAR LEFT COMMON FEMORAL SPONT: NORMAL
BH CV LOWER VASCULAR LEFT DISTAL FEMORAL COMPRESS: NORMAL
BH CV LOWER VASCULAR LEFT GASTRONEMIUS COMPRESS: NORMAL
BH CV LOWER VASCULAR LEFT GREATER SAPH AK COMPRESS: NORMAL
BH CV LOWER VASCULAR LEFT GREATER SAPH BK COMPRESS: NORMAL
BH CV LOWER VASCULAR LEFT MID FEMORAL AUGMENT: NORMAL
BH CV LOWER VASCULAR LEFT MID FEMORAL COMPETENT: NORMAL
BH CV LOWER VASCULAR LEFT MID FEMORAL COMPRESS: NORMAL
BH CV LOWER VASCULAR LEFT MID FEMORAL PHASIC: NORMAL
BH CV LOWER VASCULAR LEFT MID FEMORAL SPONT: NORMAL
BH CV LOWER VASCULAR LEFT PERONEAL COMPRESS: NORMAL
BH CV LOWER VASCULAR LEFT POPLITEAL AUGMENT: NORMAL
BH CV LOWER VASCULAR LEFT POPLITEAL COMPETENT: NORMAL
BH CV LOWER VASCULAR LEFT POPLITEAL COMPRESS: NORMAL
BH CV LOWER VASCULAR LEFT POPLITEAL PHASIC: NORMAL
BH CV LOWER VASCULAR LEFT POPLITEAL SPONT: NORMAL
BH CV LOWER VASCULAR LEFT POSTERIOR TIBIAL COMPRESS: NORMAL
BH CV LOWER VASCULAR LEFT PROXIMAL FEMORAL COMPRESS: NORMAL
BH CV LOWER VASCULAR LEFT SAPHENOFEMORAL JUNCTION COMPRESS: NORMAL
BH CV LOWER VASCULAR RIGHT COMMON FEMORAL AUGMENT: NORMAL
BH CV LOWER VASCULAR RIGHT COMMON FEMORAL COMPETENT: NORMAL
BH CV LOWER VASCULAR RIGHT COMMON FEMORAL COMPRESS: NORMAL
BH CV LOWER VASCULAR RIGHT COMMON FEMORAL PHASIC: NORMAL
BH CV LOWER VASCULAR RIGHT COMMON FEMORAL SPONT: NORMAL
BH CV LOWER VASCULAR RIGHT DISTAL FEMORAL COMPRESS: NORMAL
BH CV LOWER VASCULAR RIGHT GASTRONEMIUS COMPRESS: NORMAL
BH CV LOWER VASCULAR RIGHT GREATER SAPH AK COMPRESS: NORMAL
BH CV LOWER VASCULAR RIGHT GREATER SAPH BK COMPRESS: NORMAL
BH CV LOWER VASCULAR RIGHT LESSER SAPH COMPRESS: NORMAL
BH CV LOWER VASCULAR RIGHT MID FEMORAL AUGMENT: NORMAL
BH CV LOWER VASCULAR RIGHT MID FEMORAL COMPETENT: NORMAL
BH CV LOWER VASCULAR RIGHT MID FEMORAL COMPRESS: NORMAL
BH CV LOWER VASCULAR RIGHT MID FEMORAL PHASIC: NORMAL
BH CV LOWER VASCULAR RIGHT MID FEMORAL SPONT: NORMAL
BH CV LOWER VASCULAR RIGHT PERONEAL COMPRESS: NORMAL
BH CV LOWER VASCULAR RIGHT POPLITEAL AUGMENT: NORMAL
BH CV LOWER VASCULAR RIGHT POPLITEAL COMPETENT: NORMAL
BH CV LOWER VASCULAR RIGHT POPLITEAL COMPRESS: NORMAL
BH CV LOWER VASCULAR RIGHT POPLITEAL PHASIC: NORMAL
BH CV LOWER VASCULAR RIGHT POPLITEAL SPONT: NORMAL
BH CV LOWER VASCULAR RIGHT POSTERIOR TIBIAL COMPRESS: NORMAL
BH CV LOWER VASCULAR RIGHT PROXIMAL FEMORAL COMPRESS: NORMAL
BH CV LOWER VASCULAR RIGHT SAPHENOFEMORAL JUNCTION COMPRESS: NORMAL
BILIRUB SERPL-MCNC: 0.4 MG/DL (ref 0–1.2)
BILIRUB UR QL STRIP: NEGATIVE
BODY TEMPERATURE: 37
BUN SERPL-MCNC: 35 MG/DL (ref 8–23)
BUN SERPL-MCNC: 38 MG/DL (ref 8–23)
BUN/CREAT SERPL: 14.7 (ref 7–25)
BUN/CREAT SERPL: 15.2 (ref 7–25)
C PNEUM DNA NPH QL NAA+NON-PROBE: NOT DETECTED
CALCIUM SPEC-SCNC: 9.1 MG/DL (ref 8.6–10.5)
CALCIUM SPEC-SCNC: 9.4 MG/DL (ref 8.6–10.5)
CHLORIDE SERPL-SCNC: 92 MMOL/L (ref 98–107)
CHLORIDE SERPL-SCNC: 97 MMOL/L (ref 98–107)
CK SERPL-CCNC: 177 U/L (ref 20–180)
CLARITY UR: CLEAR
CO2 BLDA-SCNC: 24.7 MMOL/L (ref 22–33)
CO2 SERPL-SCNC: 22 MMOL/L (ref 22–29)
CO2 SERPL-SCNC: 23 MMOL/L (ref 22–29)
COHGB MFR BLD: 0.9 %
COLOR UR: YELLOW
CREAT SERPL-MCNC: 2.38 MG/DL (ref 0.57–1)
CREAT SERPL-MCNC: 2.5 MG/DL (ref 0.57–1)
D-LACTATE SERPL-SCNC: 1.5 MMOL/L (ref 0.5–2)
DEPRECATED RDW RBC AUTO: 47.3 FL (ref 37–54)
DEPRECATED RDW RBC AUTO: 47.7 FL (ref 37–54)
EGFRCR SERPLBLD CKD-EPI 2021: 18.9 ML/MIN/1.73
EGFRCR SERPLBLD CKD-EPI 2021: 20 ML/MIN/1.73
EOSINOPHIL # BLD AUTO: 0.13 10*3/MM3 (ref 0–0.4)
EOSINOPHIL # BLD AUTO: 0.16 10*3/MM3 (ref 0–0.4)
EOSINOPHIL NFR BLD AUTO: 1.5 % (ref 0.3–6.2)
EOSINOPHIL NFR BLD AUTO: 1.8 % (ref 0.3–6.2)
EPAP: 0
ERYTHROCYTE [DISTWIDTH] IN BLOOD BY AUTOMATED COUNT: 14.6 % (ref 12.3–15.4)
ERYTHROCYTE [DISTWIDTH] IN BLOOD BY AUTOMATED COUNT: 14.7 % (ref 12.3–15.4)
FLUAV SUBTYP SPEC NAA+PROBE: NOT DETECTED
FLUBV RNA ISLT QL NAA+PROBE: NOT DETECTED
GLOBULIN UR ELPH-MCNC: 4.3 GM/DL
GLUCOSE BLDC GLUCOMTR-MCNC: 130 MG/DL (ref 70–130)
GLUCOSE BLDC GLUCOMTR-MCNC: 230 MG/DL (ref 70–130)
GLUCOSE BLDC GLUCOMTR-MCNC: 246 MG/DL (ref 70–130)
GLUCOSE BLDC GLUCOMTR-MCNC: 252 MG/DL (ref 70–130)
GLUCOSE BLDC GLUCOMTR-MCNC: 269 MG/DL (ref 70–130)
GLUCOSE BLDC GLUCOMTR-MCNC: 276 MG/DL (ref 70–130)
GLUCOSE BLDC GLUCOMTR-MCNC: 483 MG/DL (ref 70–130)
GLUCOSE SERPL-MCNC: 251 MG/DL (ref 65–99)
GLUCOSE SERPL-MCNC: 558 MG/DL (ref 65–99)
GLUCOSE UR STRIP-MCNC: ABNORMAL MG/DL
HADV DNA SPEC NAA+PROBE: NOT DETECTED
HBA1C MFR BLD: 11 % (ref 4.8–5.6)
HCO3 BLDV-SCNC: 23.4 MMOL/L (ref 22–28)
HCOV 229E RNA SPEC QL NAA+PROBE: NOT DETECTED
HCOV HKU1 RNA SPEC QL NAA+PROBE: NOT DETECTED
HCOV NL63 RNA SPEC QL NAA+PROBE: NOT DETECTED
HCOV OC43 RNA SPEC QL NAA+PROBE: NOT DETECTED
HCT VFR BLD AUTO: 30.5 % (ref 34–46.6)
HCT VFR BLD AUTO: 31.4 % (ref 34–46.6)
HGB BLD-MCNC: 10.1 G/DL (ref 12–15.9)
HGB BLD-MCNC: 9.8 G/DL (ref 12–15.9)
HGB BLDA-MCNC: 9.6 G/DL (ref 14–18)
HGB UR QL STRIP.AUTO: ABNORMAL
HMPV RNA NPH QL NAA+NON-PROBE: NOT DETECTED
HPIV1 RNA ISLT QL NAA+PROBE: NOT DETECTED
HPIV2 RNA SPEC QL NAA+PROBE: NOT DETECTED
HPIV3 RNA NPH QL NAA+PROBE: NOT DETECTED
HPIV4 P GENE NPH QL NAA+PROBE: NOT DETECTED
HYALINE CASTS UR QL AUTO: ABNORMAL /LPF
IMM GRANULOCYTES # BLD AUTO: 0.07 10*3/MM3 (ref 0–0.05)
IMM GRANULOCYTES # BLD AUTO: 0.09 10*3/MM3 (ref 0–0.05)
IMM GRANULOCYTES NFR BLD AUTO: 0.8 % (ref 0–0.5)
IMM GRANULOCYTES NFR BLD AUTO: 1 % (ref 0–0.5)
INHALED O2 CONCENTRATION: 21 %
IPAP: 0
KETONES UR QL STRIP: NEGATIVE
LEUKOCYTE ESTERASE UR QL STRIP.AUTO: NEGATIVE
LIPASE SERPL-CCNC: 24 U/L (ref 13–60)
LYMPHOCYTES # BLD AUTO: 1.65 10*3/MM3 (ref 0.7–3.1)
LYMPHOCYTES # BLD AUTO: 1.92 10*3/MM3 (ref 0.7–3.1)
LYMPHOCYTES NFR BLD AUTO: 18.8 % (ref 19.6–45.3)
LYMPHOCYTES NFR BLD AUTO: 21.1 % (ref 19.6–45.3)
M PNEUMO IGG SER IA-ACNC: NOT DETECTED
MAGNESIUM SERPL-MCNC: 1.4 MG/DL (ref 1.6–2.4)
MAGNESIUM SERPL-MCNC: 1.6 MG/DL (ref 1.6–2.4)
MCH RBC QN AUTO: 28.5 PG (ref 26.6–33)
MCH RBC QN AUTO: 28.7 PG (ref 26.6–33)
MCHC RBC AUTO-ENTMCNC: 32.1 G/DL (ref 31.5–35.7)
MCHC RBC AUTO-ENTMCNC: 32.2 G/DL (ref 31.5–35.7)
MCV RBC AUTO: 88.7 FL (ref 79–97)
MCV RBC AUTO: 89.2 FL (ref 79–97)
METHGB BLD QL: 0.8 %
MODALITY: ABNORMAL
MONOCYTES # BLD AUTO: 0.98 10*3/MM3 (ref 0.1–0.9)
MONOCYTES # BLD AUTO: 1.22 10*3/MM3 (ref 0.1–0.9)
MONOCYTES NFR BLD AUTO: 11.1 % (ref 5–12)
MONOCYTES NFR BLD AUTO: 13.4 % (ref 5–12)
NEUTROPHILS NFR BLD AUTO: 5.67 10*3/MM3 (ref 1.7–7)
NEUTROPHILS NFR BLD AUTO: 5.91 10*3/MM3 (ref 1.7–7)
NEUTROPHILS NFR BLD AUTO: 62 % (ref 42.7–76)
NEUTROPHILS NFR BLD AUTO: 67.1 % (ref 42.7–76)
NITRITE UR QL STRIP: NEGATIVE
NRBC BLD AUTO-RTO: 0 /100 WBC (ref 0–0.2)
NRBC BLD AUTO-RTO: 0 /100 WBC (ref 0–0.2)
OXYHGB MFR BLDV: 16.8 %
PAW @ PEAK INSP FLOW SETTING VENT: 0 CMH2O
PCO2 BLDV: 43.6 MM HG (ref 41–51)
PH BLDV: 7.34 PH UNITS (ref 7.31–7.41)
PH UR STRIP.AUTO: 6 [PH] (ref 5–8)
PHOSPHATE SERPL-MCNC: 2.4 MG/DL (ref 2.5–4.5)
PLATELET # BLD AUTO: 213 10*3/MM3 (ref 140–450)
PLATELET # BLD AUTO: 240 10*3/MM3 (ref 140–450)
PMV BLD AUTO: 11.1 FL (ref 6–12)
PMV BLD AUTO: 11.1 FL (ref 6–12)
PO2 BLDV: 16.1 MM HG (ref 27–53)
POTASSIUM SERPL-SCNC: 3.5 MMOL/L (ref 3.5–5.2)
POTASSIUM SERPL-SCNC: 4.5 MMOL/L (ref 3.5–5.2)
PROT SERPL-MCNC: 8.2 G/DL (ref 6–8.5)
PROT UR QL STRIP: ABNORMAL
RBC # BLD AUTO: 3.42 10*6/MM3 (ref 3.77–5.28)
RBC # BLD AUTO: 3.54 10*6/MM3 (ref 3.77–5.28)
RBC # UR STRIP: ABNORMAL /HPF
REF LAB TEST METHOD: ABNORMAL
RHINOVIRUS RNA SPEC NAA+PROBE: NOT DETECTED
RSV RNA NPH QL NAA+NON-PROBE: NOT DETECTED
SARS-COV-2 RNA NPH QL NAA+NON-PROBE: NOT DETECTED
SODIUM SERPL-SCNC: 132 MMOL/L (ref 136–145)
SODIUM SERPL-SCNC: 136 MMOL/L (ref 136–145)
SP GR UR STRIP: 1.02 (ref 1–1.03)
SQUAMOUS #/AREA URNS HPF: ABNORMAL /HPF
TOTAL RATE: 0 BREATHS/MINUTE
TROPONIN T SERPL HS-MCNC: 39 NG/L
UROBILINOGEN UR QL STRIP: ABNORMAL
WBC # UR STRIP: ABNORMAL /HPF
WBC NRBC COR # BLD AUTO: 8.8 10*3/MM3 (ref 3.4–10.8)
WBC NRBC COR # BLD AUTO: 9.12 10*3/MM3 (ref 3.4–10.8)

## 2024-09-28 PROCEDURE — 83735 ASSAY OF MAGNESIUM: CPT | Performed by: PHYSICIAN ASSISTANT

## 2024-09-28 PROCEDURE — 84484 ASSAY OF TROPONIN QUANT: CPT | Performed by: PHYSICIAN ASSISTANT

## 2024-09-28 PROCEDURE — 81001 URINALYSIS AUTO W/SCOPE: CPT | Performed by: PHYSICIAN ASSISTANT

## 2024-09-28 PROCEDURE — 83036 HEMOGLOBIN GLYCOSYLATED A1C: CPT | Performed by: NURSE PRACTITIONER

## 2024-09-28 PROCEDURE — 80053 COMPREHEN METABOLIC PANEL: CPT | Performed by: PHYSICIAN ASSISTANT

## 2024-09-28 PROCEDURE — 83735 ASSAY OF MAGNESIUM: CPT | Performed by: NURSE PRACTITIONER

## 2024-09-28 PROCEDURE — 25810000003 SODIUM CHLORIDE 0.9 % SOLUTION: Performed by: PHYSICIAN ASSISTANT

## 2024-09-28 PROCEDURE — 82948 REAGENT STRIP/BLOOD GLUCOSE: CPT

## 2024-09-28 PROCEDURE — 93970 EXTREMITY STUDY: CPT

## 2024-09-28 PROCEDURE — 36415 COLL VENOUS BLD VENIPUNCTURE: CPT

## 2024-09-28 PROCEDURE — G0378 HOSPITAL OBSERVATION PER HR: HCPCS

## 2024-09-28 PROCEDURE — 63710000001 INSULIN LISPRO (HUMAN) PER 5 UNITS: Performed by: NURSE PRACTITIONER

## 2024-09-28 PROCEDURE — 63710000001 INSULIN REGULAR HUMAN PER 5 UNITS: Performed by: PHYSICIAN ASSISTANT

## 2024-09-28 PROCEDURE — 82010 KETONE BODYS QUAN: CPT | Performed by: PHYSICIAN ASSISTANT

## 2024-09-28 PROCEDURE — 99222 1ST HOSP IP/OBS MODERATE 55: CPT | Performed by: NURSE PRACTITIONER

## 2024-09-28 PROCEDURE — 71045 X-RAY EXAM CHEST 1 VIEW: CPT

## 2024-09-28 PROCEDURE — 84100 ASSAY OF PHOSPHORUS: CPT | Performed by: NURSE PRACTITIONER

## 2024-09-28 PROCEDURE — 85025 COMPLETE CBC W/AUTO DIFF WBC: CPT | Performed by: PHYSICIAN ASSISTANT

## 2024-09-28 PROCEDURE — 85025 COMPLETE CBC W/AUTO DIFF WBC: CPT | Performed by: NURSE PRACTITIONER

## 2024-09-28 PROCEDURE — 0202U NFCT DS 22 TRGT SARS-COV-2: CPT | Performed by: PHYSICIAN ASSISTANT

## 2024-09-28 PROCEDURE — 25810000003 SODIUM CHLORIDE 0.9 % SOLUTION: Performed by: NURSE PRACTITIONER

## 2024-09-28 PROCEDURE — 82805 BLOOD GASES W/O2 SATURATION: CPT

## 2024-09-28 PROCEDURE — 82550 ASSAY OF CK (CPK): CPT | Performed by: PHYSICIAN ASSISTANT

## 2024-09-28 PROCEDURE — 63710000001 INSULIN GLARGINE PER 5 UNITS: Performed by: INTERNAL MEDICINE

## 2024-09-28 PROCEDURE — 83605 ASSAY OF LACTIC ACID: CPT | Performed by: PHYSICIAN ASSISTANT

## 2024-09-28 PROCEDURE — 25010000002 MAGNESIUM SULFATE 4 GM/100ML SOLUTION: Performed by: INTERNAL MEDICINE

## 2024-09-28 PROCEDURE — 83690 ASSAY OF LIPASE: CPT | Performed by: PHYSICIAN ASSISTANT

## 2024-09-28 PROCEDURE — P9612 CATHETERIZE FOR URINE SPEC: HCPCS

## 2024-09-28 PROCEDURE — 87040 BLOOD CULTURE FOR BACTERIA: CPT | Performed by: PHYSICIAN ASSISTANT

## 2024-09-28 PROCEDURE — 93970 EXTREMITY STUDY: CPT | Performed by: INTERNAL MEDICINE

## 2024-09-28 RX ORDER — INSULIN LISPRO 100 [IU]/ML
2-7 INJECTION, SOLUTION INTRAVENOUS; SUBCUTANEOUS
Status: DISCONTINUED | OUTPATIENT
Start: 2024-09-28 | End: 2024-09-30 | Stop reason: HOSPADM

## 2024-09-28 RX ORDER — ATORVASTATIN CALCIUM 40 MG/1
80 TABLET, FILM COATED ORAL NIGHTLY
Status: DISCONTINUED | OUTPATIENT
Start: 2024-09-28 | End: 2024-09-30 | Stop reason: HOSPADM

## 2024-09-28 RX ORDER — BUSPIRONE HYDROCHLORIDE 5 MG/1
5 TABLET ORAL 3 TIMES DAILY
Status: DISCONTINUED | OUTPATIENT
Start: 2024-09-28 | End: 2024-09-30 | Stop reason: HOSPADM

## 2024-09-28 RX ORDER — AMOXICILLIN 250 MG
2 CAPSULE ORAL 2 TIMES DAILY PRN
Status: DISCONTINUED | OUTPATIENT
Start: 2024-09-28 | End: 2024-09-30 | Stop reason: HOSPADM

## 2024-09-28 RX ORDER — ACETAMINOPHEN 500 MG
500 TABLET ORAL EVERY 6 HOURS PRN
Status: DISCONTINUED | OUTPATIENT
Start: 2024-09-28 | End: 2024-09-30 | Stop reason: HOSPADM

## 2024-09-28 RX ORDER — IBUPROFEN 600 MG/1
1 TABLET ORAL
Status: DISCONTINUED | OUTPATIENT
Start: 2024-09-28 | End: 2024-09-30 | Stop reason: HOSPADM

## 2024-09-28 RX ORDER — PANTOPRAZOLE SODIUM 40 MG/1
40 TABLET, DELAYED RELEASE ORAL 2 TIMES DAILY
Status: DISCONTINUED | OUTPATIENT
Start: 2024-09-28 | End: 2024-09-30 | Stop reason: HOSPADM

## 2024-09-28 RX ORDER — ISOSORBIDE MONONITRATE 60 MG/1
60 TABLET, EXTENDED RELEASE ORAL DAILY
Status: DISCONTINUED | OUTPATIENT
Start: 2024-09-28 | End: 2024-09-30 | Stop reason: HOSPADM

## 2024-09-28 RX ORDER — ASPIRIN 81 MG/1
81 TABLET ORAL DAILY
Status: DISCONTINUED | OUTPATIENT
Start: 2024-09-28 | End: 2024-09-30 | Stop reason: HOSPADM

## 2024-09-28 RX ORDER — SODIUM CHLORIDE 9 MG/ML
100 INJECTION, SOLUTION INTRAVENOUS CONTINUOUS
Status: DISCONTINUED | OUTPATIENT
Start: 2024-09-28 | End: 2024-09-29

## 2024-09-28 RX ORDER — PREGABALIN 25 MG/1
25 CAPSULE ORAL 2 TIMES DAILY
Status: DISCONTINUED | OUTPATIENT
Start: 2024-09-28 | End: 2024-09-30 | Stop reason: HOSPADM

## 2024-09-28 RX ORDER — NICOTINE POLACRILEX 4 MG
15 LOZENGE BUCCAL
Status: DISCONTINUED | OUTPATIENT
Start: 2024-09-28 | End: 2024-09-30 | Stop reason: HOSPADM

## 2024-09-28 RX ORDER — CLOPIDOGREL BISULFATE 75 MG/1
75 TABLET ORAL DAILY
Status: DISCONTINUED | OUTPATIENT
Start: 2024-09-28 | End: 2024-09-30 | Stop reason: HOSPADM

## 2024-09-28 RX ORDER — DEXTROSE MONOHYDRATE 25 G/50ML
25 INJECTION, SOLUTION INTRAVENOUS
Status: DISCONTINUED | OUTPATIENT
Start: 2024-09-28 | End: 2024-09-30 | Stop reason: HOSPADM

## 2024-09-28 RX ORDER — SODIUM CHLORIDE 9 MG/ML
40 INJECTION, SOLUTION INTRAVENOUS AS NEEDED
Status: DISCONTINUED | OUTPATIENT
Start: 2024-09-28 | End: 2024-09-30 | Stop reason: HOSPADM

## 2024-09-28 RX ORDER — BISACODYL 10 MG
10 SUPPOSITORY, RECTAL RECTAL DAILY PRN
Status: DISCONTINUED | OUTPATIENT
Start: 2024-09-28 | End: 2024-09-30 | Stop reason: HOSPADM

## 2024-09-28 RX ORDER — NICOTINE 21 MG/24HR
1 PATCH, TRANSDERMAL 24 HOURS TRANSDERMAL EVERY 24 HOURS
Status: DISCONTINUED | OUTPATIENT
Start: 2024-09-28 | End: 2024-09-30 | Stop reason: HOSPADM

## 2024-09-28 RX ORDER — SODIUM CHLORIDE 0.9 % (FLUSH) 0.9 %
10 SYRINGE (ML) INJECTION EVERY 12 HOURS SCHEDULED
Status: DISCONTINUED | OUTPATIENT
Start: 2024-09-28 | End: 2024-09-30 | Stop reason: HOSPADM

## 2024-09-28 RX ORDER — LOSARTAN POTASSIUM 50 MG/1
50 TABLET ORAL ONCE
Status: COMPLETED | OUTPATIENT
Start: 2024-09-28 | End: 2024-09-28

## 2024-09-28 RX ORDER — POLYETHYLENE GLYCOL 3350 17 G/17G
17 POWDER, FOR SOLUTION ORAL DAILY PRN
Status: DISCONTINUED | OUTPATIENT
Start: 2024-09-28 | End: 2024-09-30 | Stop reason: HOSPADM

## 2024-09-28 RX ORDER — BISACODYL 5 MG/1
5 TABLET, DELAYED RELEASE ORAL DAILY PRN
Status: DISCONTINUED | OUTPATIENT
Start: 2024-09-28 | End: 2024-09-30 | Stop reason: HOSPADM

## 2024-09-28 RX ORDER — FLUTICASONE PROPIONATE 50 UG/1
2 SPRAY, METERED NASAL DAILY
Status: DISCONTINUED | OUTPATIENT
Start: 2024-09-28 | End: 2024-09-30 | Stop reason: HOSPADM

## 2024-09-28 RX ORDER — NITROGLYCERIN 0.4 MG/1
0.4 TABLET SUBLINGUAL
Status: DISCONTINUED | OUTPATIENT
Start: 2024-09-28 | End: 2024-09-30 | Stop reason: HOSPADM

## 2024-09-28 RX ORDER — HYDROCODONE BITARTRATE AND ACETAMINOPHEN 7.5; 325 MG/1; MG/1
1 TABLET ORAL EVERY 12 HOURS PRN
Status: DISCONTINUED | OUTPATIENT
Start: 2024-09-28 | End: 2024-09-30 | Stop reason: HOSPADM

## 2024-09-28 RX ORDER — MAGNESIUM SULFATE HEPTAHYDRATE 40 MG/ML
4 INJECTION, SOLUTION INTRAVENOUS ONCE
Status: COMPLETED | OUTPATIENT
Start: 2024-09-28 | End: 2024-09-28

## 2024-09-28 RX ORDER — IPRATROPIUM BROMIDE AND ALBUTEROL SULFATE 2.5; .5 MG/3ML; MG/3ML
3 SOLUTION RESPIRATORY (INHALATION) EVERY 4 HOURS PRN
Status: DISCONTINUED | OUTPATIENT
Start: 2024-09-28 | End: 2024-09-30 | Stop reason: HOSPADM

## 2024-09-28 RX ORDER — CARVEDILOL 12.5 MG/1
25 TABLET ORAL 2 TIMES DAILY
Status: DISCONTINUED | OUTPATIENT
Start: 2024-09-28 | End: 2024-09-30 | Stop reason: HOSPADM

## 2024-09-28 RX ORDER — MULTIPLE VITAMINS W/ MINERALS TAB 9MG-400MCG
1 TAB ORAL DAILY
Status: DISCONTINUED | OUTPATIENT
Start: 2024-09-28 | End: 2024-09-30 | Stop reason: HOSPADM

## 2024-09-28 RX ORDER — SODIUM CHLORIDE 0.9 % (FLUSH) 0.9 %
10 SYRINGE (ML) INJECTION AS NEEDED
Status: DISCONTINUED | OUTPATIENT
Start: 2024-09-28 | End: 2024-09-30 | Stop reason: HOSPADM

## 2024-09-28 RX ORDER — OXYBUTYNIN CHLORIDE 5 MG/1
10 TABLET, EXTENDED RELEASE ORAL DAILY
Status: DISCONTINUED | OUTPATIENT
Start: 2024-09-28 | End: 2024-09-30 | Stop reason: HOSPADM

## 2024-09-28 RX ADMIN — SODIUM CHLORIDE 1000 ML: 9 INJECTION, SOLUTION INTRAVENOUS at 00:51

## 2024-09-28 RX ADMIN — SODIUM CHLORIDE 100 ML/HR: 9 INJECTION, SOLUTION INTRAVENOUS at 16:56

## 2024-09-28 RX ADMIN — INSULIN LISPRO 4 UNITS: 100 INJECTION, SOLUTION INTRAVENOUS; SUBCUTANEOUS at 22:15

## 2024-09-28 RX ADMIN — HYDROCODONE BITARTRATE AND ACETAMINOPHEN 1 TABLET: 7.5; 325 TABLET ORAL at 03:58

## 2024-09-28 RX ADMIN — PREGABALIN 25 MG: 25 CAPSULE ORAL at 08:38

## 2024-09-28 RX ADMIN — ISOSORBIDE MONONITRATE 60 MG: 60 TABLET, EXTENDED RELEASE ORAL at 08:38

## 2024-09-28 RX ADMIN — ATORVASTATIN CALCIUM 80 MG: 40 TABLET, FILM COATED ORAL at 22:14

## 2024-09-28 RX ADMIN — LOSARTAN POTASSIUM 50 MG: 50 TABLET, FILM COATED ORAL at 03:58

## 2024-09-28 RX ADMIN — CARVEDILOL 25 MG: 12.5 TABLET, FILM COATED ORAL at 22:14

## 2024-09-28 RX ADMIN — BUSPIRONE HYDROCHLORIDE 5 MG: 5 TABLET ORAL at 08:39

## 2024-09-28 RX ADMIN — CLOPIDOGREL BISULFATE 75 MG: 75 TABLET ORAL at 08:39

## 2024-09-28 RX ADMIN — MENTHOL 2 G: 10 GEL TOPICAL at 11:31

## 2024-09-28 RX ADMIN — NICOTINE 1 PATCH: 21 PATCH TRANSDERMAL at 05:18

## 2024-09-28 RX ADMIN — INSULIN LISPRO 4 UNITS: 100 INJECTION, SOLUTION INTRAVENOUS; SUBCUTANEOUS at 08:38

## 2024-09-28 RX ADMIN — INSULIN GLARGINE 5 UNITS: 100 INJECTION, SOLUTION SUBCUTANEOUS at 22:14

## 2024-09-28 RX ADMIN — PANTOPRAZOLE SODIUM 40 MG: 40 TABLET, DELAYED RELEASE ORAL at 08:39

## 2024-09-28 RX ADMIN — BUSPIRONE HYDROCHLORIDE 5 MG: 5 TABLET ORAL at 16:59

## 2024-09-28 RX ADMIN — MAGNESIUM SULFATE IN WATER FOR 4 G: 40 INJECTION INTRAVENOUS at 16:59

## 2024-09-28 RX ADMIN — SODIUM CHLORIDE 100 ML/HR: 9 INJECTION, SOLUTION INTRAVENOUS at 06:20

## 2024-09-28 RX ADMIN — Medication 10 ML: at 22:33

## 2024-09-28 RX ADMIN — INSULIN LISPRO 4 UNITS: 100 INJECTION, SOLUTION INTRAVENOUS; SUBCUTANEOUS at 11:31

## 2024-09-28 RX ADMIN — MENTHOL 2 G: 10 GEL TOPICAL at 22:30

## 2024-09-28 RX ADMIN — INSULIN HUMAN 8 UNITS: 100 INJECTION, SOLUTION PARENTERAL at 02:29

## 2024-09-28 RX ADMIN — BUSPIRONE HYDROCHLORIDE 5 MG: 5 TABLET ORAL at 22:14

## 2024-09-28 RX ADMIN — Medication 1 TABLET: at 08:39

## 2024-09-28 RX ADMIN — Medication 10 ML: at 08:42

## 2024-09-28 RX ADMIN — PREGABALIN 25 MG: 25 CAPSULE ORAL at 22:14

## 2024-09-28 RX ADMIN — CARVEDILOL 25 MG: 12.5 TABLET, FILM COATED ORAL at 08:38

## 2024-09-28 RX ADMIN — OXYBUTYNIN CHLORIDE 10 MG: 5 TABLET, EXTENDED RELEASE ORAL at 08:38

## 2024-09-28 RX ADMIN — SODIUM CHLORIDE 500 ML: 9 INJECTION, SOLUTION INTRAVENOUS at 05:19

## 2024-09-28 RX ADMIN — ASPIRIN 81 MG: 81 TABLET, COATED ORAL at 08:38

## 2024-09-28 RX ADMIN — ACETAMINOPHEN 500 MG: 500 TABLET ORAL at 22:30

## 2024-09-28 RX ADMIN — PANTOPRAZOLE SODIUM 40 MG: 40 TABLET, DELAYED RELEASE ORAL at 22:14

## 2024-09-28 NOTE — H&P
Psychiatric Medicine Services  HISTORY AND PHYSICAL    Patient Name: Arminda Krishnan  : 1945  MRN: [id number]  Primary Care Physician: Aiden Spencer MD  Date of admission: 2024    Subjective   Subjective     Chief Complaint:  Confusion, foul smelling urine, frequency     HPI:  Arminda Krishnan is a 79 y.o. female with PMH significant for CAD, chronic back pain, breast cancer, DM2, HTN, HLD, tobacco abuse, who presents to the ED with complaint of confusion, increased incontinence, weakness, and foul smelling urine.  HPI is provided per patient's sister and niece who are at bedside as patient is poor historian.  Her sister reports that she stays with her and that she has been having increased confusion over the last several days.  She also notes that she has had a strong odor to her urine and increased incontinence from her baseline.  She has been complaining of increased lower back pain as well as tenderness in her lower extremities.  The patient denies any known fever.  She does admit to increased weakness.  Used to complain of right flank pain and lower back pain.  Upon arrival to the ED, labs are concerning for significant hyperglycemia.  VBG is stable.  UA notes glucose and proteinuria (on Januvia).  CT abdomen/pelvis is negative for acute findings.  CT head notes chronic appearing infarcts in the posterior high right frontal lobe and left thalamus (new since ).  She will be admitted to hospital medicine for further evaluation.      Review of Systems   Constitutional:  Positive for activity change, appetite change and fatigue. Negative for chills, diaphoresis, fever and unexpected weight change.   HENT: Negative.  Negative for congestion, sinus pressure and trouble swallowing.    Eyes: Negative.  Negative for visual disturbance.   Respiratory:  Positive for cough and wheezing. Negative for chest tightness and shortness of breath.    Cardiovascular:   Negative for chest pain, palpitations and leg swelling.   Gastrointestinal:  Positive for abdominal pain and nausea. Negative for abdominal distention, constipation, diarrhea and vomiting.   Endocrine: Negative for polydipsia and polyphagia.   Genitourinary:  Positive for decreased urine volume, flank pain, frequency and urgency. Negative for difficulty urinating and dysuria.   Musculoskeletal:  Positive for arthralgias, back pain and gait problem. Negative for myalgias and neck pain.   Skin:  Negative for color change, pallor, rash and wound.   Allergic/Immunologic: Negative.  Negative for immunocompromised state.   Neurological:  Positive for weakness. Negative for dizziness, syncope, facial asymmetry, speech difficulty, light-headedness, numbness and headaches.   Hematological: Negative.  Does not bruise/bleed easily.   Psychiatric/Behavioral:  Positive for confusion. The patient is not nervous/anxious.           Personal History     Past Medical History:   Diagnosis Date    Anemia     Arthritis     Back problem     CAD (coronary artery disease)     Cancer     Right breast    Chronic back pain     Chronically on opiate therapy     Depression     Diabetes mellitus     DX 14 years ago- checks fsbs weekly    Fibromyalgia     Gastroparesis     GERD (gastroesophageal reflux disease)     Headache     emotional/tension    History of transfusion     [facility]    HTN (hypertension)     Hypercholesteremia     IBS (irritable bowel syndrome)     Incontinence of urine     urgency    Migraine headache     Myalgia and myositis     Peripheral neuropathy     Sleep apnea     does not wear cpap    UTI (urinary tract infection)        Past Surgical History:   Procedure Laterality Date    APPENDECTOMY      ARTERIOGRAM MESENTERIC N/A 6/16/2022    Procedure: DIAGNOSTIC ARTERIOGRAM WITH CELIAC STENT PLACEMENT;  Surgeon: Neo Neil MD;  Location: [facility];  Service: Vascular;  Laterality: N/A;  FLUORO: 16  MIN  DOSE: 2384 MGY  CONTRAST:  20 ML    BACK SURGERY      5x per patient    BRAIN TUMOR EXCISION  1988    BREAST BIOPSY      CARPAL TUNNEL RELEASE Bilateral     CHOLECYSTECTOMY      COLONOSCOPY      2015    CRANIOTOMY FOR TUMOR      EYE SURGERY      bilateral cataracts removed    HEMORRHOIDECTOMY      LUMBAR FUSION N/A 01/04/2017    Procedure: LUMBAR LAMINECTOMY AND DECOMRESSION  L3 AND L4;  Surgeon: Nishant Bhatti MD;  Location: [facility];  Service:     TOTAL ABDOMINAL HYSTERECTOMY      TRIGGER FINGER RELEASE         Family History:  family history includes Alcohol abuse in her father; Cancer in her brother, brother, father, and another family member; Diabetes in her brother, mother, sister, and another family member; Heart attack in her mother, sister, sister, and another family member; Heart disease in her mother and sister; Hyperlipidemia in an other family member; Hypertension in her brother, mother, sister, and another family member; Stroke in her sister.     Social History:  reports that she has been smoking cigarettes. She started smoking about 65 years ago. She has a 47.4 pack-year smoking history. She has been exposed to tobacco smoke. She has never used smokeless tobacco. She reports that she does not drink alcohol and does not use drugs.  Social History     Social History Narrative    Lives in [place], [place] with sister       Medications:  Dexcom G7 , Dexcom G7 Sensor, Diclofenac Sodium, Dupilumab, HYDROcodone-acetaminophen, Insulin Pen Needle, SITagliptin, acetaminophen, albuterol, albuterol sulfate HFA, aspirin, atorvastatin, busPIRone, carvedilol, cetirizine, clopidogrel, famotidine, fluticasone, glucose blood, isosorbide mononitrate, losartan, multivitamin with minerals, naloxone, nicotine, nicotine polacrilex, pantoprazole, pregabalin, silver sulfadiazine, tacrolimus, and tolterodine LA    Allergies   Allergen Reactions    Ceclor [Cefaclor] Rash       Objective   Objective     Vital  Signs:   Temp:  [98 °F (36.7 °C)] 98 °F (36.7 °C)  Heart Rate:  [85-94] 87  Resp:  [18] 18  BP: (103-202)/() 153/83    Physical Exam   Constitutional: Awake, alert, no acute distress   Eyes: PERRLA, sclerae anicteric, no conjunctival injection  HENT: NCAT, mucous membranes moist  Neck: Supple, no thyromegaly, no lymphadenopathy, trachea midline  Respiratory: coarse to auscultation bilaterally with expiratory wheezing, nonlabored respirations   Cardiovascular: RRR, no murmurs, rubs, or gallops, palpable pedal pulses bilaterally  Gastrointestinal: Positive bowel sounds, soft, nontender, nondistended  Musculoskeletal: No bilateral ankle edema, no clubbing or cyanosis to extremities, BLE tender to light palpation   Psychiatric: Appropriate affect, cooperative  Neurologic: Oriented to self only, strength symmetric in all extremities, Cranial Nerves grossly intact to confrontation, speech clear  Skin: No rashes      Result Review:  I have personally reviewed the results from the time of this admission to 9/28/2024 04:14 EDT and agree with these findings:  [x]  Laboratory list / accordion  [x]  Microbiology  [x]  Radiology  [x]  EKG/Telemetry   []  Cardiology/Vascular   []  Pathology  [x]  Old records  []  Other:  Most notable findings include:     LAB RESULTS:      Lab 09/28/24 0034   WBC 8.80   HEMOGLOBIN 10.1*   HEMATOCRIT 31.4*   PLATELETS 240   NEUTROS ABS 5.91   IMMATURE GRANS (ABS) 0.07*   LYMPHS ABS 1.65   MONOS ABS 0.98*   EOS ABS 0.13   MCV 88.7   LACTATE 1.5   CK TOTAL 177         Lab 09/28/24 0034   SODIUM 132*   POTASSIUM 4.5   CHLORIDE 92*   CO2 23.0   ANION GAP 17.0*   BUN 38*   CREATININE 2.50*   EGFR 18.9*   GLUCOSE 558*   CALCIUM 9.4   MAGNESIUM 1.6         Lab 09/28/24 0034   TOTAL PROTEIN 8.2   ALBUMIN 3.9   GLOBULIN 4.3   ALT (SGPT) 18   AST (SGOT) 28   BILIRUBIN 0.4   ALK PHOS 102   LIPASE 24         Lab 09/28/24 0034   HSTROP T 39*                 Lab 09/28/24 0224   FIO2 21    CARBOXYHEMOGLOBIN (VENOUS) 0.9     Brief Urine Lab Results  (Last result in the past 365 days)        Color   Clarity   Blood   Leuk Est   Nitrite   Protein   CREAT   Urine HCG        09/28/24 0125 Yellow   Clear   Large (3+)   Negative   Negative   100 mg/dL (2+)                 Microbiology Results (last 10 days)       Procedure Component Value - Date/Time    COVID PRE-OP / PRE-PROCEDURE SCREENING ORDER (NO ISOLATION) - Swab, Nasopharynx [[id number]]  (Normal) Collected: 09/28/24 0034    Lab Status: Final result Specimen: Swab from Nasopharynx Updated: 09/28/24 0208    Narrative:      The following orders were created for panel order COVID PRE-OP / PRE-PROCEDURE SCREENING ORDER (NO ISOLATION) - Swab, Nasopharynx.  Procedure                               Abnormality         Status                     ---------                               -----------         ------                     Respiratory Panel PCR w/...[[id number]]  Normal              Final result                 Please view results for these tests on the individual orders.    Respiratory Panel PCR w/COVID-19(SARS-CoV-2) ELSA/BRIEN/MATTHEW/PAD/COR/ASHER In-House, NP Swab in UTM/VTM, 2 HR TAT - Swab, Nasopharynx [[id number]]  (Normal) Collected: 09/28/24 0034    Lab Status: Final result Specimen: Swab from Nasopharynx Updated: 09/28/24 0208     ADENOVIRUS, PCR Not Detected     Coronavirus 229E Not Detected     Coronavirus HKU1 Not Detected     Coronavirus NL63 Not Detected     Coronavirus OC43 Not Detected     COVID19 Not Detected     Human Metapneumovirus Not Detected     Human Rhinovirus/Enterovirus Not Detected     Influenza A PCR Not Detected     Influenza B PCR Not Detected     Parainfluenza Virus 1 Not Detected     Parainfluenza Virus 2 Not Detected     Parainfluenza Virus 3 Not Detected     Parainfluenza Virus 4 Not Detected     RSV, PCR Not Detected     Bordetella pertussis pcr Not Detected     Bordetella parapertussis PCR Not Detected     Chlamydophila  pneumoniae PCR Not Detected     Mycoplasma pneumo by PCR Not Detected    Narrative:      In the setting of a positive respiratory panel with a viral infection PLUS a negative procalcitonin without other underlying concern for bacterial infection, consider observing off antibiotics or discontinuation of antibiotics and continue supportive care. If the respiratory panel is positive for atypical bacterial infection (Bordetella pertussis, Chlamydophila pneumoniae, or Mycoplasma pneumoniae), consider antibiotic de-escalation to target atypical bacterial infection.            CT Abdomen Pelvis Without Contrast    Result Date: 9/28/2024  CT ABDOMEN PELVIS WO CONTRAST Date of Exam: 9/27/2024 11:40 PM EDT Indication: lower abd pain, CVA pain, nausea. Comparison: 6/6/2024. Technique: Axial CT images were obtained of the abdomen and pelvis without the administration of contrast. Reconstructed coronal and sagittal images were also obtained. Automated exposure control and iterative construction methods were used. Findings: Heart size is normal. There is a small fat-containing hiatal hernia. There is a small fat-containing right-sided Bochdalek hernia with adjacent atelectasis. Mild groundglass opacity in the anterior left lung base that could be infectious or inflammatory.  No acute findings in the superficial soft tissues. There is extensive lumbosacral fusion hardware in place. There is a zone of lucency surrounding the L2 and L5 pedicle screws suggesting some loosening of the hardware. There is an interbody fusion device present at the L3-L4 level. Mild osteoarthritis is present at the left hip and moderate osteoarthritis at the right hip. There are calcified granulomas in the liver and spleen. Patient is status post cholecystectomy. The bile ducts, pancreas, stomach, duodenum and adrenal glands appear within normal limits. There is a 3 mm nonobstructing mid right kidney stone. No left-sided kidney stone. No ureteral stone or  hydronephrosis. Urinary bladder is nondistended. Patient is status post hysterectomy. Ovaries are not seen. The appendix is not seen. No small bowel distention. The colon is unremarkable. There is moderate atherosclerotic disease. There is a stent in the celiac artery. Ectasia of the right common iliac artery up to 15 mm. No ascites, pneumoperitoneum or lymphadenopathy. There is small fat-containing periumbilical hernias.     Impression: Impression: 1.No acute abdominal or pelvic abnormality. 2.Extensive lumbosacral fusion hardware with evidence of loosening of the L2 and L5 pedicle screws. 3.3 mm nonobstructing right-sided kidney stone. 4.Atherosclerosis with stent in the celiac artery. 5.Small fat-containing hiatal hernia and small fat-containing periumbilical hernias. 6.Small fat-containing right-sided Bochdalek hernia with adjacent atelectasis. 7.Mild groundglass opacity in the anterior left lung base that could be infectious or inflammatory. Electronically Signed: Alfredo Chávez MD  9/28/2024 12:22 AM EDT  Workstation ID: [id number]    CT Head Without Contrast    Result Date: 9/28/2024  CT HEAD WO CONTRAST Date of Exam: 9/27/2024 11:40 PM EDT Indication: AMS, r/o CVA. Comparison: 7/7/2009 and brain MRI 9/12/2023. Technique: Axial CT images were obtained of the head without contrast administration.  Automated exposure control and iterative construction methods were used. Findings: Superficial soft tissues appear within normal limits. There is evidence of prior right parietal craniotomy with underlying small focus of encephalomalacia. There is a new focus of encephalomalacia likely from chronic infarct in the posterior high right frontal lobe, new from 2023. There is a new chronic appearing lacunar type infarct along the lateral left thalamus. Paranasal sinuses and mastoid air cells appear well aerated. There is thinning of the orbital lenses bilaterally suggesting prior lens replacement. There is no acute  intracranial hemorrhage.  No mass effect or midline shift.  No abnormal extra-axial collections.  There is moderate patchy white matter hypoattenuation. There is mild generalized parenchymal volume loss congruent with age.     Impression: Impression: 1.No acute intracranial abnormality. 2.New (since 2023) chronic appearing infarcts in the posterior high right frontal lobe and left thalamus. 3.Moderate chronic small vessel ischemic change. 4.Prior right parietal craniotomy with underlying encephalomalacia. Electronically Signed: Alfredo Chávez MD  9/28/2024 12:11 AM EDT  Workstation ID: [id number]     Results for orders placed during the hospital encounter of 07/15/24    Adult Transthoracic Echo Complete W/ Cont if Necessary Per Protocol    Interpretation Summary    Left ventricular systolic function is normal. Calculated left ventricular EF = 63.2%    Estimated right ventricular systolic pressure from tricuspid regurgitation is normal (<35 mmHg).    Normal left atrial size and volume noted.    There is calcification of the aortic valve. Mild to moderate aortic valve regurgitation is present.      Assessment & Plan   Assessment & Plan       Type 2 diabetes mellitus with hyperglycemia, without long-term current use of insulin    Spinal stenosis, lumbar region, with neurogenic claudication    Tobacco abuse    Chronic anemia    CKD (chronic kidney disease) stage 4, GFR 15-29 ml/min    Hyperglycemia    History of CVA (cerebrovascular accident)    Disorientation    79 y.o. female with PMH significant for CAD, chronic back pain, breast cancer, DM2, HTN, HLD, tobacco abuse, who presents to the ED with complaint of confusion, increased incontinence, weakness, and foul smelling urine who was found to have concern diabetes mellitus 2 with hyperglycemia.    Diabetes mellitus 2 with hyperglycemia  - Currently on Januvia only  - FSBG ACHS  - SS insulin  - Hemoglobin A1c  - CBC, BMP in the a.m.  - Appears clinically dehydrated  -  VBG stable  - Beta hydroxybutyrate 0.264    Altered mental status  - Likely secondary to uncontrolled blood sugar  - UA negative for infectious process  - CXR pending  -CBC, BMP in the a.m.  - CT head reports chronic appearing infarcts but new since 2023    Weakness  - Likely secondary to above  - Fall precautions  - PT/OT consult in the a.m.  - Case management for discharge planning    Chronic anemia  - Currently improved from baseline  - CBC in the a.m.    CKD 4  - Stable  - BMP in the a.m.    Tobacco abuse  - Nicotine patch    Hypertension  - Continue home medication    DVT prophylaxis: SCDs    CODE STATUS:    Code Status (Patient has no pulse and is not breathing): CPR (Attempt to Resuscitate)  Medical Interventions (Patient has pulse or is breathing): Full Support      Expected Discharge  Expected discharge date/ time has not been documented.    Signature: Electronically signed by BALAJI Martinez, 09/28/24, 4:14 AM EDT.

## 2024-09-28 NOTE — PROGRESS NOTES
[facility] Medicine Services  ADMISSION FOLLOW-UP NOTE          Patient admitted after midnight, H&P by my partner performed earlier on today's date reviewed.  Interim findings, labs, and charting also reviewed.        The [facility] Hospital Problem List has been managed and updated to include any new diagnoses:  Active Hospital Problems    Diagnosis  POA    **Hyperglycemia [R73.9]  Yes    History of CVA (cerebrovascular accident) [Z86.73]  Not Applicable    Type 2 diabetes mellitus with hyperglycemia, without long-term current use of insulin [E11.65]  Yes    Disorientation [R41.0]  Yes    Chronic anemia [D64.9]  Yes    CKD (chronic kidney disease) stage 4, GFR 15-29 ml/min [N18.4]  Yes    Tobacco abuse [Z72.0]  Yes    Spinal stenosis, lumbar region, with neurogenic claudication [M48.062]  Yes      Resolved Hospital Problems   No resolved problems to display.         ADDITIONAL PLAN:  - detailed assessment and plan from admission reviewed  - Patient admitted this AM by BALAJI Agosto, chart reviewed, patient briefly seen at bedside, she does not know why she came to the hospital, she does not recall her medications or reason for recently starting prednisone; there is no family at bedside currently  -Summary: This is an 78 y/o female w/ CAD, HTN, HLD, extensive intraabdominal vascular disease/PAD, CKD4, DM2 w/ gastroparesis, degenerative spine w/ chronic pain, fibromyalgia, depression, GERD, recently started on daily prednisone 10mg for her chronic back pain, who was brought to the ED for weakness, confusion, worsened urinary incontinent, back pain, family was worried about foul smell to her urine prompting eval; on arrival her BG was 558 and she was admitted    Assessment/Plan    DM type 2, A1c 11%, w/o insulin use,w/ hyperglycemia, w/ polyuria  Gastroparesis  -pt recently started on daily prednisone 10mg  -reviewed historic A1c, has been 6.6-7.6% since 2016, now 11%  -consider possible  pancreatic dysfunction, may require insulin at discharge  -cont SSI, start lantus 5U qHS  -cont IVF today for volume depletion  -PT/OT for eval    Acute toxic/metabolic encephalopathy  -likely related to volume depletion and med effect    CKD stage 4 w/ chronic anemia  -baseline Cr 2.2-2.6; eGFR 15-20  -renally dose meds    CAD  HTN  HLD  Extensive vascular disease  Tobacco abuse  -CTA abd/pel 2022 w/ patent celiac art stent, occluded YESICA, stenotic distal abd aorta w/ ulcerated plaque w/ dissection, chronic dissection terminal RT iliac art w/ stenosis, RT renal art stenosis  -asa, statin, coreg, plavix, imdur  -patient continues to smoke    Chronic back pain  Fibromyalgia  -voltaren gel, lyrica, prn norco  -chronic complaints of back and leg pain may be vascular in nature  -recently started on daily prednisone in August for back/joint pain, holding    BLAKE - does not use CPAP  Hx breast Ca  Dupixent use - uncertain indication, Rx'ed by derm and there is some documentation previously of bullous disease of the legs (rpt'd neg pemphigoid w/u)  Pulmonary nodule - seen on CXR, needs opt f/u CT chest or referral to nodule clinic      Alfredo Sousa, DO  09/28/24

## 2024-09-28 NOTE — PLAN OF CARE
Problem: Adult Inpatient Plan of Care  Goal: Absence of Hospital-Acquired Illness or Injury  Intervention: Identify and Manage Fall Risk  Recent Flowsheet Documentation  Taken 9/28/2024 0611 by Mary Jane Fabian RN  Safety Promotion/Fall Prevention:   activity supervised   assistive device/personal items within reach   clutter free environment maintained   fall prevention program maintained   lighting adjusted   nonskid shoes/slippers when out of bed   room organization consistent   safety round/check completed  Taken 9/28/2024 0411 by Mary Jane Fabian RN  Safety Promotion/Fall Prevention:   activity supervised   assistive device/personal items within reach   clutter free environment maintained   fall prevention program maintained   lighting adjusted   nonskid shoes/slippers when out of bed   room organization consistent   safety round/check completed  Intervention: Prevent Skin Injury  Recent Flowsheet Documentation  Taken 9/28/2024 0611 by Mary Jane Fabian RN  Body Position: position changed independently  Taken 9/28/2024 0411 by Mary Jane Fabian RN  Body Position: position changed independently  Intervention: Prevent and Manage VTE (Venous Thromboembolism) Risk  Recent Flowsheet Documentation  Taken 9/28/2024 0411 by Mary Jane Fabian RN  Activity Management: activity minimized  Intervention: Prevent Infection  Recent Flowsheet Documentation  Taken 9/28/2024 0611 by Mary Jane Fabian RN  Infection Prevention:   environmental surveillance performed   hand hygiene promoted   rest/sleep promoted   single patient room provided  Taken 9/28/2024 0411 by Mary Jane Fabian RN  Infection Prevention:   environmental surveillance performed   hand hygiene promoted   rest/sleep promoted   single patient room provided  Goal: Optimal Comfort and Wellbeing  Intervention: Provide Person-Centered Care  Recent Flowsheet Documentation  Taken 9/28/2024 0411 by Mary Jane Fabian RN  Trust Relationship/Rapport:   care explained    choices provided   questions answered   questions encouraged   thoughts/feelings acknowledged  Goal: Readiness for Transition of Care  Intervention: Mutually Develop Transition Plan  Recent Flowsheet Documentation  Taken 9/28/2024 0410 by Mary Jane Fabian, RN  Transportation Anticipated: family or friend will provide  Patient/Family Anticipated Services at Transition: none  Patient/Family Anticipates Transition to: home with family  Taken 9/28/2024 0407 by Mary Jane Fabian, RN  Equipment Currently Used at Home:   cane, straight   nebulizer   walker, standard     Problem: Skin Injury Risk Increased  Goal: Skin Health and Integrity  Intervention: Optimize Skin Protection  Recent Flowsheet Documentation  Taken 9/28/2024 0611 by Mary Jane Fabian, RN  Head of Bed (HOB) Positioning: HOB elevated  Taken 9/28/2024 0411 by Mary Jane Fabian, RN  Head of Bed (HOB) Positioning: HOB elevated   Goal Outcome Evaluation:

## 2024-09-28 NOTE — ED PROVIDER NOTES
Subjective   History of Present Illness  This is a 79-year-old female that presents the ER with altered mental status, strong malodorous urine with some incontinence, generalized weakness, and bilateral CVA pain.  Patient lives alone, and her niece and other family member is present helping with history.  They say that patient has been more confused for the last 2 days.  She has been having episodes of urinary incontinence and urine is strongly malodorous.  Patient also reports chronic low back pain with some radicular symptoms to the right lower extremity, but she has been having bilateral CVA pain.  Appetite has been decreased but patient has not had any vomiting.  Family states that she has had 5 back surgeries.  No recent falls.  Patient has not been started on any new medications or had any medication adjustments.  Past medical history is significant for GERD, chronic low back pain, fibromyalgia, diabetes mellitus, gastroparesis, peripheral neuropathy, coronary artery disease, IBS, chronic narcotic use, obstructive sleep apnea, anemia, and history of UTI.  There are not multiple abnormal urine cultures in epic.  There is only 1 abnormal culture growing out Streptococcus agalactia in 12/2023.  Patient denies any rhinorrhea, nasal congestion, or cough.  She denies any chest pain or shortness of breath.  Patient is only on Januvia for her diabetes mellitus.  She is on Plavix and aspirin for history of CAD.  She does not exhibit any unilateral weakness, numbness, or tingling.  No other concerns at this time.    History provided by:  Patient  History limited by:  Acuity of condition  Illness  Location:  Confusion, strong, malodorous urine with incontinence, weakness, bilateral CVA pain  Duration:  2 days  Timing:  Constant  Progression:  Worsening  Chronicity:  New  Context:  2-day history of increased confusion, strong, malodorous urine with some incontinence, generalized weakness, and bilateral CVA  pain.  Associated symptoms: abdominal pain (Suprapubic abdominal pressure), fatigue and nausea    Associated symptoms: no chest pain, no congestion, no cough, no diarrhea, no ear pain, no fever, no headaches, no myalgias, no rash, no rhinorrhea, no shortness of breath, no sore throat, no vomiting and no wheezing        Review of Systems   Constitutional:  Positive for fatigue. Negative for appetite change, chills, diaphoresis and fever.   HENT: Negative.  Negative for congestion, ear pain, rhinorrhea and sore throat.    Respiratory: Negative.  Negative for cough, shortness of breath and wheezing.    Cardiovascular: Negative.  Negative for chest pain, palpitations and leg swelling.   Gastrointestinal:  Positive for abdominal pain (Suprapubic abdominal pressure) and nausea. Negative for constipation, diarrhea and vomiting.   Endocrine: Positive for polydipsia and polyuria.   Genitourinary:  Negative for dysuria, flank pain, frequency and urgency.        Chronic urinary incontinence, but family reports increased incontinence x 2 days with strong malodorous urine.   Musculoskeletal:  Positive for back pain (Chronic low back pain with previous multiple back surgeries.  Patient reports bilateral CVA pain) and gait problem (Impaired ambulation secondary to chronic low back pain.  No recent falls). Negative for myalgias.   Skin:  Negative for rash.   Neurological:  Positive for weakness. Negative for dizziness, syncope and headaches.   All other systems reviewed and are negative.      Past Medical History:   Diagnosis Date    Anemia     Arthritis     Back problem     CAD (coronary artery disease)     Cancer     Right breast    Chronic back pain     Chronically on opiate therapy     Depression     Diabetes mellitus     DX 14 years ago- checks fsbs weekly    Fibromyalgia     Gastroparesis     GERD (gastroesophageal reflux disease)     Headache     emotional/tension    History of transfusion     [facility]    HTN  (hypertension)     Hypercholesteremia     IBS (irritable bowel syndrome)     Incontinence of urine     urgency    Migraine headache     Myalgia and myositis     Peripheral neuropathy     Sleep apnea     does not wear cpap    UTI (urinary tract infection)        Allergies   Allergen Reactions    Ceclor [Cefaclor] Rash       Past Surgical History:   Procedure Laterality Date    APPENDECTOMY      ARTERIOGRAM MESENTERIC N/A 6/16/2022    Procedure: DIAGNOSTIC ARTERIOGRAM WITH CELIAC STENT PLACEMENT;  Surgeon: Neo Neil MD;  Location: [facility]BR[facility];  Service: Vascular;  Laterality: N/A;  FLUORO: 16 MIN  DOSE: 2384 MGY  CONTRAST:  20 ML    BACK SURGERY      5x per patient    BRAIN TUMOR EXCISION  1988    BREAST BIOPSY      CARPAL TUNNEL RELEASE Bilateral     CHOLECYSTECTOMY      COLONOSCOPY      2015    CRANIOTOMY FOR TUMOR      EYE SURGERY      bilateral cataracts removed    HEMORRHOIDECTOMY      LUMBAR FUSION N/A 01/04/2017    Procedure: LUMBAR LAMINECTOMY AND DECOMRESSION  L3 AND L4;  Surgeon: Nishant Bhatti MD;  Location: [facility] OR;  Service:     TOTAL ABDOMINAL HYSTERECTOMY      TRIGGER FINGER RELEASE         Family History   Problem Relation Age of Onset    Cancer Other     Diabetes Other     Hyperlipidemia Other     Heart attack Other     Hypertension Other     Heart attack Mother     Diabetes Mother     Heart disease Mother     Hypertension Mother     Cancer Father     Alcohol abuse Father     Heart attack Sister     Diabetes Sister     Heart disease Sister     Hypertension Sister     Cancer Brother     Diabetes Brother     Hypertension Brother     Heart attack Sister     Stroke Sister     Cancer Brother        Social History     Socioeconomic History    Marital status:     Number of children: 2   Tobacco Use    Smoking status: Every Day     Current packs/day: 0.25     Average packs/day: 0.4 packs/day for 127.7 years (47.4 ttl pk-yrs)     Types: Cigarettes     Start date: 1959     Passive  exposure: Past    Smokeless tobacco: Never    Tobacco comments:     1/17/2022 quit    Vaping Use    Vaping status: Never Used   Substance and Sexual Activity    Alcohol use: No    Drug use: No    Sexual activity: Not Currently           Objective   Physical Exam  Vitals and nursing note reviewed.   Constitutional:       General: She is not in acute distress.     Appearance: Normal appearance. She is not ill-appearing, toxic-appearing or diaphoretic.      Comments: Elderly, debilitated female.  No acute distress.  Nontoxic.   HENT:      Head: Normocephalic and atraumatic.      Nose: Nose normal. No congestion or rhinorrhea.      Mouth/Throat:      Mouth: Mucous membranes are moist.      Comments: Oral mucous membranes are moist  Eyes:      Extraocular Movements: Extraocular movements intact.      Conjunctiva/sclera: Conjunctivae normal.      Pupils: Pupils are equal, round, and reactive to light.   Cardiovascular:      Rate and Rhythm: Normal rate and regular rhythm. No extrasystoles are present.     Pulses: Normal pulses.           Dorsalis pedis pulses are 2+ on the right side and 2+ on the left side.        Posterior tibial pulses are 2+ on the right side.      Heart sounds: Normal heart sounds.      Comments: Regular rate and rhythm.  No ectopy.  No pedal edema to lower extremities  Pulmonary:      Effort: Pulmonary effort is normal.      Breath sounds: Normal breath sounds.      Comments: Lungs are clear to auscultation bilaterally  Abdominal:      General: Bowel sounds are normal. There is no distension.      Palpations: Abdomen is soft.      Tenderness: There is abdominal tenderness in the suprapubic area. There is right CVA tenderness and left CVA tenderness. There is no guarding or rebound. Negative signs include Flores's sign, Rovsing's sign and McBurney's sign.      Comments: Abdomen soft without distention.  Active bowel sounds all 4 quadrants.  Tenderness to mid suprapubic region and bilateral CVA  tenderness.  Abdominal exam is nonsurgical   Genitourinary:     Rectum: Normal anal tone.      Comments: Strong rectal tone on digital rectal exam.  Musculoskeletal:         General: Normal range of motion.      Cervical back: Normal range of motion and neck supple.      Right lower leg: No edema.      Left lower leg: No edema.      Comments: Impaired ambulation secondary to back pain.  Patient does have a slow gait and was able to get from chair in the lobby into her wheelchair with my assistance.   Skin:     General: Skin is warm and dry.   Neurological:      General: No focal deficit present.      Mental Status: She is alert. She is confused.      Cranial Nerves: Cranial nerves 2-12 are intact.      Sensory: Sensation is intact.      Motor: Motor function is intact. Weakness present.      Coordination: Coordination is intact.      Comments: Confusion with generalized weakness and overall functional decline.  Patient follows commands.  No focal neurologic deficits   Psychiatric:         Mood and Affect: Mood and affect normal.         Speech: Speech normal.         Behavior: Behavior is cooperative.         Thought Content: Thought content normal.         Cognition and Memory: Memory is impaired.         Judgment: Judgment normal.         Procedures           ED Course  ED Course as of 09/28/24 0537   Sat Sep 28, 2024   0227 I personally interpreted EKG which shows sinus rhythm.  No acute ST-T wave changes consistent with ischemia.  CBC shows normal white blood cell count at 8.80 with stable anemia with H&H 10 and 31.  Last H&H 1 month ago in epic was 8.6 and 27.  Chemistries reveal BUN and creatinine 38 and 2.50.  Baseline creatinine is around 2.5.  Serum glucose is 558, which is significantly higher than it has been in the past.  This more than likely accounts for patient's polyuria and polydipsia.  LFTs are normal.  Bicarb is 23.  Anion gap is elevated at 17 and GFR is 18.  Lipase is 24.  Urinalysis reveals  glucosuria and large 3+ blood and proteinuria but negative leukocytes and negative nitrite and 0-2 white blood cells with no bacteria.  This was a cath specimen.  Respiratory PCR panel was completely negative.  High-sensitivity troponin is 39.  CT of the brain without contrast reveals no acute intracranial abnormality but patient has new since 2023 chronic appearing infarcts in the posterior high right frontal lobe and left thalamus.  Moderate chronic small vessel ischemic changes and prior right parietal craniotomy with underlying encephalomalacia.  Patient does have previous brain tumor.  CT of the abdomen/pelvis without contrast reveals no acute abdominal or pelvic abnormality.  Extensive lumbosacral fusion hardware with evidence of loosening of the L2 and L5 pedicle screws.  3 mm nonobstructing right-sided kidney stone and small fat-containing hiatal hernia and small fat-containing periumbilical hernia.  Mild groundglass opacity in the anterior left lung base that could be infectious or inflammatory.  Patient has strong rectal tone on digital rectal exam.  Discussed the case and all diagnostic workup with Dr. Alva, ER attending physician.  VBG showed pH 7.338, pCO2 43, PaO2 was 16, and bicarb was 23.  O2 sat is 100% on room air.  We gave IV fluid bolus and regular insulin 8 units IV.  I ordered serum ketones.  I will page hospitalist to discuss admission.   [FC]   0533 Repeat glucose prior to admission was 483.  Creatinine kinase is 177.  Serum ketones are normal at 0.264.  Discussed admission with hospitalist, Dr. Tanner, and he is agreeable to admission on telemetry.  I updated patient and family at the bedside and they are agreeable with admission and very appreciative. [FC]      ED Course User Index  [FC] Billie Hylton PA-C                                 Recent Results (from the past 24 hour(s))   ECG 12 Lead Altered Mental Status    Collection Time: 09/28/24 12:14 AM   Result Value Ref Range    QT  Interval 336 ms    QTC Interval 408 ms   Comprehensive Metabolic Panel    Collection Time: 09/28/24 12:34 AM    Specimen: Blood   Result Value Ref Range    Glucose 558 (C) 65 - 99 mg/dL    BUN 38 (H) 8 - 23 mg/dL    Creatinine 2.50 (H) 0.57 - 1.00 mg/dL    Sodium 132 (L) 136 - 145 mmol/L    Potassium 4.5 3.5 - 5.2 mmol/L    Chloride 92 (L) 98 - 107 mmol/L    CO2 23.0 22.0 - 29.0 mmol/L    Calcium 9.4 8.6 - 10.5 mg/dL    Total Protein 8.2 6.0 - 8.5 g/dL    Albumin 3.9 3.5 - 5.2 g/dL    ALT (SGPT) 18 1 - 33 U/L    AST (SGOT) 28 1 - 32 U/L    Alkaline Phosphatase 102 39 - 117 U/L    Total Bilirubin 0.4 0.0 - 1.2 mg/dL    Globulin 4.3 gm/dL    A/G Ratio 0.9 g/dL    BUN/Creatinine Ratio 15.2 7.0 - 25.0    Anion Gap 17.0 (H) 5.0 - 15.0 mmol/L    eGFR 18.9 (L) >60.0 mL/min/1.73   Lipase    Collection Time: 09/28/24 12:34 AM    Specimen: Blood   Result Value Ref Range    Lipase 24 13 - 60 U/L   Lactic Acid, Plasma    Collection Time: 09/28/24 12:34 AM    Specimen: Blood   Result Value Ref Range    Lactate 1.5 0.5 - 2.0 mmol/L   Single High Sensitivity Troponin T    Collection Time: 09/28/24 12:34 AM    Specimen: Blood   Result Value Ref Range    HS Troponin T 39 (H) <14 ng/L   Magnesium    Collection Time: 09/28/24 12:34 AM    Specimen: Blood   Result Value Ref Range    Magnesium 1.6 1.6 - 2.4 mg/dL   Respiratory Panel PCR w/COVID-19(SARS-CoV-2) ELSA/BRIEN/MATTHEW/PAD/COR/ASHER In-House, NP Swab in [facility], 2 HR TAT - Swab, Nasopharynx    Collection Time: 09/28/24 12:34 AM    Specimen: Nasopharynx; Swab   Result Value Ref Range    ADENOVIRUS, PCR Not Detected Not Detected    Coronavirus 229E Not Detected Not Detected    Coronavirus HKU1 Not Detected Not Detected    Coronavirus NL63 Not Detected Not Detected    Coronavirus OC43 Not Detected Not Detected    COVID19 Not Detected Not Detected - Ref. Range    Human Metapneumovirus Not Detected Not Detected    Human Rhinovirus/Enterovirus Not Detected Not Detected    Influenza A PCR Not  Detected Not Detected    Influenza B PCR Not Detected Not Detected    Parainfluenza Virus 1 Not Detected Not Detected    Parainfluenza Virus 2 Not Detected Not Detected    Parainfluenza Virus 3 Not Detected Not Detected    Parainfluenza Virus 4 Not Detected Not Detected    RSV, PCR Not Detected Not Detected    Bordetella pertussis pcr Not Detected Not Detected    Bordetella parapertussis PCR Not Detected Not Detected    Chlamydophila pneumoniae PCR Not Detected Not Detected    Mycoplasma pneumo by PCR Not Detected Not Detected   CBC Auto Differential    Collection Time: 09/28/24 12:34 AM    Specimen: Blood   Result Value Ref Range    WBC 8.80 3.40 - 10.80 10*3/mm3    RBC 3.54 (L) 3.77 - 5.28 10*6/mm3    Hemoglobin 10.1 (L) 12.0 - 15.9 g/dL    Hematocrit 31.4 (L) 34.0 - 46.6 %    MCV 88.7 79.0 - 97.0 fL    MCH 28.5 26.6 - 33.0 pg    MCHC 32.2 31.5 - 35.7 g/dL    RDW 14.6 12.3 - 15.4 %    RDW-SD 47.3 37.0 - 54.0 fl    MPV 11.1 6.0 - 12.0 fL    Platelets 240 140 - 450 10*3/mm3    Neutrophil % 67.1 42.7 - 76.0 %    Lymphocyte % 18.8 (L) 19.6 - 45.3 %    Monocyte % 11.1 5.0 - 12.0 %    Eosinophil % 1.5 0.3 - 6.2 %    Basophil % 0.7 0.0 - 1.5 %    Immature Grans % 0.8 (H) 0.0 - 0.5 %    Neutrophils, Absolute 5.91 1.70 - 7.00 10*3/mm3    Lymphocytes, Absolute 1.65 0.70 - 3.10 10*3/mm3    Monocytes, Absolute 0.98 (H) 0.10 - 0.90 10*3/mm3    Eosinophils, Absolute 0.13 0.00 - 0.40 10*3/mm3    Basophils, Absolute 0.06 0.00 - 0.20 10*3/mm3    Immature Grans, Absolute 0.07 (H) 0.00 - 0.05 10*3/mm3    nRBC 0.0 0.0 - 0.2 /100 WBC   CK    Collection Time: 09/28/24 12:34 AM    Specimen: Blood   Result Value Ref Range    Creatine Kinase 177 20 - 180 U/L   Beta Hydroxybutyrate Quantitative    Collection Time: 09/28/24 12:34 AM    Specimen: Blood   Result Value Ref Range    Beta-Hydroxybutyrate Quant 0.264 0.020 - 0.270 mmol/L   Urinalysis With Culture If Indicated - Straight Cath    Collection Time: 09/28/24  1:25 AM    Specimen:  Straight Cath; Urine   Result Value Ref Range    Color, UA Yellow Yellow, Straw    Appearance, UA Clear Clear    pH, UA 6.0 5.0 - 8.0    Specific Gravity, UA 1.020 1.001 - 1.030    Glucose, UA >=1000 mg/dL (3+) (A) Negative    Ketones, UA Negative Negative    Bilirubin, UA Negative Negative    Blood, UA Large (3+) (A) Negative    Protein,  mg/dL (2+) (A) Negative    Leuk Esterase, UA Negative Negative    Nitrite, UA Negative Negative    Urobilinogen, UA 1.0 E.U./dL 0.2 - 1.0 E.U./dL   Urinalysis, Microscopic Only - Straight Cath    Collection Time: 09/28/24  1:25 AM    Specimen: Straight Cath; Urine   Result Value Ref Range    RBC, UA Too Numerous to Count (A) None Seen, 0-2 /HPF    WBC, UA 0-2 None Seen, 0-2 /HPF    Bacteria, UA None Seen None Seen, Trace /HPF    Squamous Epithelial Cells, UA 0-2 None Seen, 0-2 /HPF    Hyaline Casts, UA None Seen 0 - 6 /LPF    Methodology Automated Microscopy    Blood Gas, Venous With Co-Ox    Collection Time: 09/28/24  2:24 AM    Specimen: Venous Blood   Result Value Ref Range    Site Nurse/Dr Draw     pH, Venous 7.338 7.310 - 7.410 pH Units    pCO2, Venous 43.6 41.0 - 51.0 mm Hg    pO2, Venous 16.1 (L) 27.0 - 53.0 mm Hg    HCO3, Venous 23.4 22.0 - 28.0 mmol/L    Base Excess, Venous -2.4 (L) -2.0 - 2.0 mmol/L    Hemoglobin, Blood Gas 9.6 (L) 14 - 18 g/dL    Oxyhemoglobin Venous 16.8 %    Methemoglobin Venous 0.8 %    Carboxyhemoglobin Venous 0.9 %    CO2 Content 24.7 22 - 33 mmol/L    Temperature 37.0     Barometric Pressure for Blood Gas      Modality Room Air     FIO2 21 %    Rate 0 Breaths/minute    PIP 0 cmH2O    IPAP 0     EPAP 0    POC Glucose Once    Collection Time: 09/28/24  2:29 AM    Specimen: Blood   Result Value Ref Range    Glucose 483 (C) 70 - 130 mg/dL   CBC Auto Differential    Collection Time: 09/28/24  4:26 AM    Specimen: Blood   Result Value Ref Range    WBC 9.12 3.40 - 10.80 10*3/mm3    RBC 3.42 (L) 3.77 - 5.28 10*6/mm3    Hemoglobin 9.8 (L) 12.0 - 15.9  g/dL    Hematocrit 30.5 (L) 34.0 - 46.6 %    MCV 89.2 79.0 - 97.0 fL    MCH 28.7 26.6 - 33.0 pg    MCHC 32.1 31.5 - 35.7 g/dL    RDW 14.7 12.3 - 15.4 %    RDW-SD 47.7 37.0 - 54.0 fl    MPV 11.1 6.0 - 12.0 fL    Platelets 213 140 - 450 10*3/mm3    Neutrophil % 62.0 42.7 - 76.0 %    Lymphocyte % 21.1 19.6 - 45.3 %    Monocyte % 13.4 (H) 5.0 - 12.0 %    Eosinophil % 1.8 0.3 - 6.2 %    Basophil % 0.7 0.0 - 1.5 %    Immature Grans % 1.0 (H) 0.0 - 0.5 %    Neutrophils, Absolute 5.67 1.70 - 7.00 10*3/mm3    Lymphocytes, Absolute 1.92 0.70 - 3.10 10*3/mm3    Monocytes, Absolute 1.22 (H) 0.10 - 0.90 10*3/mm3    Eosinophils, Absolute 0.16 0.00 - 0.40 10*3/mm3    Basophils, Absolute 0.06 0.00 - 0.20 10*3/mm3    Immature Grans, Absolute 0.09 (H) 0.00 - 0.05 10*3/mm3    nRBC 0.0 0.0 - 0.2 /100 WBC   POC Glucose Once    Collection Time: 09/28/24  5:05 AM    Specimen: Blood   Result Value Ref Range    Glucose 246 (H) 70 - 130 mg/dL     Note: In addition to lab results from this visit, the labs listed above may include labs taken at another facility or during a different encounter within the last 24 hours. Please correlate lab times with ED admission and discharge times for further clarification of the services performed during this visit.    CT Abdomen Pelvis Without Contrast   Final Result   Impression:   1.No acute abdominal or pelvic abnormality.   2.Extensive lumbosacral fusion hardware with evidence of loosening of the L2 and L5 pedicle screws.   3.3 mm nonobstructing right-sided kidney stone.   4.Atherosclerosis with stent in the celiac artery.   5.Small fat-containing hiatal hernia and small fat-containing periumbilical hernias.   6.Small fat-containing right-sided Bochdalek hernia with adjacent atelectasis.   7.Mild groundglass opacity in the anterior left lung base that could be infectious or inflammatory.            Electronically Signed: Alfredo Chávez MD     9/28/2024 12:22 AM EDT     Workstation ID: [id number]      CT  Head Without Contrast   Final Result   Impression:   1.No acute intracranial abnormality.   2.New (since 2023) chronic appearing infarcts in the posterior high right frontal lobe and left thalamus.   3.Moderate chronic small vessel ischemic change.   4.Prior right parietal craniotomy with underlying encephalomalacia.            Electronically Signed: Alfredo Chávez MD     9/28/2024 12:11 AM EDT     Workstation ID: [id number]      XR Chest 1 View    (Results Pending)     Vitals:    09/28/24 0220 09/28/24 0232 09/28/24 0358 09/28/24 0401   BP: (!) 202/95 (!) 190/126 153/83 153/83   Pulse: 89 87 87 86   Resp:       Temp:       TempSrc:       SpO2: 94% 94%     Weight:       Height:         Medications   sodium chloride 0.9 % flush 10 mL (has no administration in time range)   HYDROcodone-acetaminophen (NORCO) 7.5-325 MG per tablet 1 tablet (1 tablet Oral Given 9/28/24 0358)   acetaminophen (TYLENOL) tablet 500 mg (has no administration in time range)   ipratropium-albuterol (DUO-NEB) nebulizer solution 3 mL (has no administration in time range)   aspirin EC tablet 81 mg (has no administration in time range)   atorvastatin (LIPITOR) tablet 80 mg (has no administration in time range)   busPIRone (BUSPAR) tablet 5 mg (has no administration in time range)   carvedilol (COREG) tablet 25 mg (has no administration in time range)   clopidogrel (PLAVIX) tablet 75 mg (has no administration in time range)   Diclofenac Sodium (VOLTAREN) 1 % gel 2 g (has no administration in time range)   fluticasone (FLONASE) 50 MCG/ACT nasal spray 2 spray (has no administration in time range)   isosorbide mononitrate (IMDUR) 24 hr tablet 60 mg (has no administration in time range)   multivitamin with minerals 1 tablet (has no administration in time range)   nicotine (NICODERM CQ) 21 MG/24HR patch 1 patch (1 patch Transdermal Medication Applied 9/28/24 0518)   pantoprazole (PROTONIX) EC tablet 40 mg (has no administration in time range)   pregabalin  (LYRICA) capsule 25 mg (has no administration in time range)   oxybutynin XL (DITROPAN-XL) 24 hr tablet 10 mg (has no administration in time range)   sodium chloride 0.9 % flush 10 mL (has no administration in time range)   sodium chloride 0.9 % flush 10 mL (has no administration in time range)   sodium chloride 0.9 % infusion 40 mL (has no administration in time range)   dextrose (GLUTOSE) oral gel 15 g (has no administration in time range)   dextrose (D50W) (25 g/50 mL) IV injection 25 g (has no administration in time range)   glucagon (GLUCAGEN) injection 1 mg (has no administration in time range)   Insulin Lispro (humaLOG) injection 2-7 Units (has no administration in time range)   nitroglycerin (NITROSTAT) SL tablet 0.4 mg (has no administration in time range)   sodium chloride 0.9 % infusion (has no administration in time range)   sodium chloride 0.9 % bolus 500 mL (500 mL Intravenous New Bag 9/28/24 1919)   sennosides-docusate (PERICOLACE) 8.6-50 MG per tablet 2 tablet (has no administration in time range)     And   polyethylene glycol (MIRALAX) packet 17 g (has no administration in time range)     And   bisacodyl (DULCOLAX) EC tablet 5 mg (has no administration in time range)     And   bisacodyl (DULCOLAX) suppository 10 mg (has no administration in time range)   sodium chloride 0.9 % bolus 1,000 mL (0 mL Intravenous Stopped 9/28/24 0237)   insulin regular (humuLIN R,novoLIN R) injection 8 Units (8 Units Intravenous Given 9/28/24 0229)   losartan (COZAAR) tablet 50 mg (50 mg Oral Given 9/28/24 7238)     ECG/EMG Results (last 24 hours)       Procedure Component Value Units Date/Time    ECG 12 Lead Altered Mental Status [[id number]] Collected: 09/28/24 0014     Updated: 09/28/24 0015     QT Interval 336 ms      QTC Interval 408 ms     Narrative:      Test Reason : Altered Mental Status  Blood Pressure :   */*   mmHG  Vent. Rate :  89 BPM     Atrial Rate :  89 BPM     P-R Int : 116 ms          QRS Dur :  72 ms       QT Int : 336 ms       P-R-T Axes :  72  61  92 degrees     QTc Int : 408 ms    Normal sinus rhythm  Nonspecific ST and T wave abnormality  Abnormal ECG  When compared with ECG of 04-SEP-2022 13:35,  Nonspecific T wave abnormality now evident in Lateral leads    Referred By: ED MD           Confirmed By:           ECG 12 Lead Altered Mental Status   Preliminary Result   Test Reason : Altered Mental Status   Blood Pressure :   */*   mmHG   Vent. Rate :  89 BPM     Atrial Rate :  89 BPM      P-R Int : 116 ms          QRS Dur :  72 ms       QT Int : 336 ms       P-R-T Axes :  72  61  92 degrees      QTc Int : 408 ms      Normal sinus rhythm   Nonspecific ST and T wave abnormality   Abnormal ECG   When compared with ECG of 04-SEP-2022 13:35,   Nonspecific T wave abnormality now evident in Lateral leads      Referred By: ED MD           Confirmed By:                         Medical Decision Making  Amount and/or Complexity of Data Reviewed  Labs: ordered.  Radiology: ordered.  ECG/medicine tests: ordered.    Risk  OTC drugs.  Prescription drug management.  Decision regarding hospitalization.        Final diagnoses:   Altered mental status, unspecified altered mental status type   Type 2 diabetes mellitus with hyperglycemia, without long-term current use of insulin   Increased anion gap metabolic acidosis   History of chronic kidney disease   Anorexia   Generalized weakness   Impaired ambulation   At high risk for injury related to fall   History of chronic back pain   History of hypertension   History of coronary artery disease   History of IBS       ED Disposition  ED Disposition       ED Disposition   Decision to Admit    Condition   --    Comment   Level of Care: Telemetry [5]   Diagnosis: Hyperglycemia [143964]   Admitting Physician: MAUREEN LINARES [[id number]]   Attending Physician: MAUREEN LINARES [[id number]]                 No follow-up provider specified.       Medication List      No changes were made to your  prescriptions during this visit.            Billie Hylton PA-C  09/28/24 0537

## 2024-09-28 NOTE — ED NOTES
Arminda Krishnan    Nursing Report ED to Floor:  Mental status: alert oriented to self only  Ambulatory status: x2  Oxygen Therapy:  room air  Cardiac Rhythm: normal sinus  Admitted from: ed/home  Safety Concerns:  fall risk  Social Issues: na  ED Room #: 21    ED Nurse Phone Extension - [phone] or may call [phone].      HPI:   Chief Complaint   Patient presents with    Back Pain    INCONTINENCE       Past Medical History:  Past Medical History:   Diagnosis Date    Anemia     Arthritis     Back problem     CAD (coronary artery disease)     Cancer     Right breast    Chronic back pain     Chronically on opiate therapy     Depression     Diabetes mellitus     DX 14 years ago- checks fsbs weekly    Fibromyalgia     Gastroparesis     GERD (gastroesophageal reflux disease)     Headache     emotional/tension    History of transfusion     [facility]    HTN (hypertension)     Hypercholesteremia     IBS (irritable bowel syndrome)     Incontinence of urine     urgency    Migraine headache     Myalgia and myositis     Peripheral neuropathy     Sleep apnea     does not wear cpap    UTI (urinary tract infection)         Past Surgical History:  Past Surgical History:   Procedure Laterality Date    APPENDECTOMY      ARTERIOGRAM MESENTERIC N/A 6/16/2022    Procedure: DIAGNOSTIC ARTERIOGRAM WITH CELIAC STENT PLACEMENT;  Surgeon: Neo Neil MD;  Location: [facility];  Service: Vascular;  Laterality: N/A;  FLUORO: 16 MIN  DOSE: 2384 MGY  CONTRAST:  20 ML    BACK SURGERY      5x per patient    BRAIN TUMOR EXCISION  1988    BREAST BIOPSY      CARPAL TUNNEL RELEASE Bilateral     CHOLECYSTECTOMY      COLONOSCOPY      2015    CRANIOTOMY FOR TUMOR      EYE SURGERY      bilateral cataracts removed    HEMORRHOIDECTOMY      LUMBAR FUSION N/A 01/04/2017    Procedure: LUMBAR LAMINECTOMY AND DECOMRESSION  L3 AND L4;  Surgeon: Nishant Bhatti MD;  [facility];  Service:     TOTAL ABDOMINAL HYSTERECTOMY      TRIGGER  FINGER RELEASE          Admitting Doctor:   Greyson Tanner DO    Consulting Provider(s):  Consults       No orders found for last 30 day(s).             Admitting Diagnosis:   There were no encounter diagnoses.    Most Recent Vitals:   Vitals:    09/28/24 0100 09/28/24 0140 09/28/24 0220 09/28/24 0232   BP: 103/59 178/72 (!) 202/95 (!) 190/126   Pulse: 85 86 89 87   Resp:       Temp:       TempSrc:       SpO2: 93% 93% 94% 94%   Weight:       Height:           Active LDAs/IV Access:   Lines, Drains & Airways       Active LDAs       Name Placement date Placement time Site Days    Peripheral IV 09/28/24 0050 Right Antecubital 09/28/24  0050  Antecubital  less than 1                    Labs (abnormal labs have a star):   Labs Reviewed   COMPREHENSIVE METABOLIC PANEL - Abnormal; Notable for the following components:       Result Value    Glucose 558 (*)     BUN 38 (*)     Creatinine 2.50 (*)     Sodium 132 (*)     Chloride 92 (*)     Anion Gap 17.0 (*)     eGFR 18.9 (*)     All other components within normal limits    Narrative:     GFR Normal >60  Chronic Kidney Disease <60  Kidney Failure <15    The GFR formula is only valid for adults with stable renal function between ages 18 and 70.   URINALYSIS W/ CULTURE IF INDICATED - Abnormal; Notable for the following components:    Glucose, UA >=1000 mg/dL (3+) (*)     Blood, UA Large (3+) (*)     Protein,  mg/dL (2+) (*)     All other components within normal limits    Narrative:     In absence of clinical symptoms, the presence of pyuria, bacteria, and/or nitrites on the urinalysis result does not correlate with infection.   SINGLE HS TROPONIN T - Abnormal; Notable for the following components:    HS Troponin T 39 (*)     All other components within normal limits    Narrative:     High Sensitive Troponin T Reference Range:  <14.0 ng/L- Negative Female for AMI  <22.0 ng/L- Negative Male for AMI  >=14 - Abnormal Female indicating possible myocardial injury.  >=22 -  Abnormal Male indicating possible myocardial injury.   Clinicians would have to utilize clinical acumen, EKG, Troponin, and serial changes to determine if it is an Acute Myocardial Infarction or myocardial injury due to an underlying chronic condition.        CBC WITH AUTO DIFFERENTIAL - Abnormal; Notable for the following components:    RBC 3.54 (*)     Hemoglobin 10.1 (*)     Hematocrit 31.4 (*)     Lymphocyte % 18.8 (*)     Immature Grans % 0.8 (*)     Monocytes, Absolute 0.98 (*)     Immature Grans, Absolute 0.07 (*)     All other components within normal limits   URINALYSIS, MICROSCOPIC ONLY - Abnormal; Notable for the following components:    RBC, UA Too Numerous to Count (*)     All other components within normal limits   BLOOD GAS, VENOUS W/CO-OXIMETRY - Abnormal; Notable for the following components:    pO2, Venous 16.1 (*)     Base Excess, Venous -2.4 (*)     Hemoglobin, Blood Gas 9.6 (*)     All other components within normal limits   POCT GLUCOSE FINGERSTICK - Abnormal; Notable for the following components:    Glucose 483 (*)     All other components within normal limits   RESPIRATORY PANEL PCR W/ COVID-19 (SARS-COV-2), NP SWAB IN UTM/VTP, 2 HR TAT - Normal    Narrative:     In the setting of a positive respiratory panel with a viral infection PLUS a negative procalcitonin without other underlying concern for bacterial infection, consider observing off antibiotics or discontinuation of antibiotics and continue supportive care. If the respiratory panel is positive for atypical bacterial infection (Bordetella pertussis, Chlamydophila pneumoniae, or Mycoplasma pneumoniae), consider antibiotic de-escalation to target atypical bacterial infection.   LIPASE - Normal   LACTIC ACID, PLASMA - Normal   MAGNESIUM - Normal   CK - Normal   BETA HYDROXYBUTYRATE QUANTITATIVE - Normal    Narrative:     In the assessment of possible diabetic ketoacidosis, the test should be interpreted along with other clinical and  laboratory findings.  A level greater than 1 mmol/L should require further evaluation and levels of more than 3 mmol/L require immediate medical review.   COVID PRE-OP / PRE-PROCEDURE SCREENING ORDER (NO ISOLATION)    Narrative:     The following orders were created for panel order COVID PRE-OP / PRE-PROCEDURE SCREENING ORDER (NO ISOLATION) - Swab, Nasopharynx.  Procedure                               Abnormality         Status                     ---------                               -----------         ------                     Respiratory Panel PCR w/...[[id number]]  Normal              Final result                 Please view results for these tests on the individual orders.   BLOOD CULTURE   BLOOD CULTURE   BLOOD GAS, VENOUS   CBC AND DIFFERENTIAL    Narrative:     The following orders were created for panel order CBC & Differential.  Procedure                               Abnormality         Status                     ---------                               -----------         ------                     CBC Auto Differential[[id number]]        Abnormal            Final result                 Please view results for these tests on the individual orders.   KETONE BODIES SERUM    Narrative:     The following orders were created for panel order Ketone Bodies, Serum (Not performed at [facility]).  Procedure                               Abnormality         Status                     ---------                               -----------         ------                     Beta Hydroxybutyrate Maximilian...[[id number]]  Normal              Final result                 Please view results for these tests on the individual orders.       Meds Given in ED:   Medications   sodium chloride 0.9 % flush 10 mL (has no administration in time range)   losartan (COZAAR) tablet 50 mg (has no administration in time range)   sodium chloride 0.9 % bolus 1,000 mL (0 mL Intravenous Stopped 9/28/24 0237)   insulin regular (humuLIN R,novoLIN R)  injection 8 Units (8 Units Intravenous Given 9/28/24 0229)           Last NIH score:                                                          Dysphagia screening results:        [place] Coma Scale:  No data recorded     CIWA:        Restraint Type:            Isolation Status:  No active isolations

## 2024-09-29 LAB
ANION GAP SERPL CALCULATED.3IONS-SCNC: 12 MMOL/L (ref 5–15)
BUN SERPL-MCNC: 34 MG/DL (ref 8–23)
BUN/CREAT SERPL: 13.6 (ref 7–25)
CALCIUM SPEC-SCNC: 8.4 MG/DL (ref 8.6–10.5)
CHLORIDE SERPL-SCNC: 106 MMOL/L (ref 98–107)
CO2 SERPL-SCNC: 18 MMOL/L (ref 22–29)
CREAT SERPL-MCNC: 2.5 MG/DL (ref 0.57–1)
EGFRCR SERPLBLD CKD-EPI 2021: 18.9 ML/MIN/1.73
GLUCOSE BLDC GLUCOMTR-MCNC: 236 MG/DL (ref 70–130)
GLUCOSE BLDC GLUCOMTR-MCNC: 298 MG/DL (ref 70–130)
GLUCOSE BLDC GLUCOMTR-MCNC: 326 MG/DL (ref 70–130)
GLUCOSE BLDC GLUCOMTR-MCNC: 358 MG/DL (ref 70–130)
GLUCOSE SERPL-MCNC: 171 MG/DL (ref 65–99)
MAGNESIUM SERPL-MCNC: 2.5 MG/DL (ref 1.6–2.4)
PHOSPHATE SERPL-MCNC: 3.1 MG/DL (ref 2.5–4.5)
POTASSIUM SERPL-SCNC: 3.9 MMOL/L (ref 3.5–5.2)
QT INTERVAL: 336 MS
QTC INTERVAL: 408 MS
SODIUM SERPL-SCNC: 136 MMOL/L (ref 136–145)

## 2024-09-29 PROCEDURE — 63710000001 PREDNISONE PER 5 MG: Performed by: INTERNAL MEDICINE

## 2024-09-29 PROCEDURE — 83735 ASSAY OF MAGNESIUM: CPT | Performed by: INTERNAL MEDICINE

## 2024-09-29 PROCEDURE — 84100 ASSAY OF PHOSPHORUS: CPT | Performed by: INTERNAL MEDICINE

## 2024-09-29 PROCEDURE — G0378 HOSPITAL OBSERVATION PER HR: HCPCS

## 2024-09-29 PROCEDURE — 25810000003 SODIUM CHLORIDE 0.9 % SOLUTION: Performed by: NURSE PRACTITIONER

## 2024-09-29 PROCEDURE — 63710000001 INSULIN LISPRO (HUMAN) PER 5 UNITS: Performed by: NURSE PRACTITIONER

## 2024-09-29 PROCEDURE — 63710000001 INSULIN GLARGINE PER 5 UNITS: Performed by: INTERNAL MEDICINE

## 2024-09-29 PROCEDURE — 80048 BASIC METABOLIC PNL TOTAL CA: CPT | Performed by: INTERNAL MEDICINE

## 2024-09-29 PROCEDURE — 97166 OT EVAL MOD COMPLEX 45 MIN: CPT

## 2024-09-29 PROCEDURE — 97161 PT EVAL LOW COMPLEX 20 MIN: CPT

## 2024-09-29 PROCEDURE — 82948 REAGENT STRIP/BLOOD GLUCOSE: CPT

## 2024-09-29 PROCEDURE — 99232 SBSQ HOSP IP/OBS MODERATE 35: CPT | Performed by: INTERNAL MEDICINE

## 2024-09-29 PROCEDURE — 97116 GAIT TRAINING THERAPY: CPT

## 2024-09-29 PROCEDURE — 97535 SELF CARE MNGMENT TRAINING: CPT

## 2024-09-29 RX ORDER — PREDNISONE 5 MG/1
5 TABLET ORAL
Status: DISCONTINUED | OUTPATIENT
Start: 2024-09-29 | End: 2024-09-30 | Stop reason: HOSPADM

## 2024-09-29 RX ORDER — GLIPIZIDE 5 MG/1
5 TABLET ORAL
Status: DISCONTINUED | OUTPATIENT
Start: 2024-09-29 | End: 2024-09-30 | Stop reason: HOSPADM

## 2024-09-29 RX ORDER — GUAIFENESIN 200 MG/10ML
200 LIQUID ORAL EVERY 8 HOURS PRN
Status: DISCONTINUED | OUTPATIENT
Start: 2024-09-29 | End: 2024-09-30 | Stop reason: HOSPADM

## 2024-09-29 RX ADMIN — Medication 1 TABLET: at 09:24

## 2024-09-29 RX ADMIN — BUSPIRONE HYDROCHLORIDE 5 MG: 5 TABLET ORAL at 17:25

## 2024-09-29 RX ADMIN — SODIUM CHLORIDE 100 ML/HR: 9 INJECTION, SOLUTION INTRAVENOUS at 09:28

## 2024-09-29 RX ADMIN — PANTOPRAZOLE SODIUM 40 MG: 40 TABLET, DELAYED RELEASE ORAL at 20:59

## 2024-09-29 RX ADMIN — BUSPIRONE HYDROCHLORIDE 5 MG: 5 TABLET ORAL at 20:58

## 2024-09-29 RX ADMIN — ACETAMINOPHEN 500 MG: 500 TABLET ORAL at 11:03

## 2024-09-29 RX ADMIN — ISOSORBIDE MONONITRATE 60 MG: 60 TABLET, EXTENDED RELEASE ORAL at 09:24

## 2024-09-29 RX ADMIN — CARVEDILOL 25 MG: 12.5 TABLET, FILM COATED ORAL at 09:24

## 2024-09-29 RX ADMIN — Medication 10 ML: at 20:59

## 2024-09-29 RX ADMIN — INSULIN GLARGINE 5 UNITS: 100 INJECTION, SOLUTION SUBCUTANEOUS at 20:58

## 2024-09-29 RX ADMIN — PREGABALIN 25 MG: 25 CAPSULE ORAL at 20:59

## 2024-09-29 RX ADMIN — FLUTICASONE PROPIONATE 2 SPRAY: 50 SPRAY, METERED NASAL at 09:25

## 2024-09-29 RX ADMIN — INSULIN LISPRO 4 UNITS: 100 INJECTION, SOLUTION INTRAVENOUS; SUBCUTANEOUS at 17:25

## 2024-09-29 RX ADMIN — CARVEDILOL 25 MG: 12.5 TABLET, FILM COATED ORAL at 20:58

## 2024-09-29 RX ADMIN — INSULIN LISPRO 3 UNITS: 100 INJECTION, SOLUTION INTRAVENOUS; SUBCUTANEOUS at 09:24

## 2024-09-29 RX ADMIN — LINAGLIPTIN 5 MG: 5 TABLET, FILM COATED ORAL at 13:47

## 2024-09-29 RX ADMIN — ATORVASTATIN CALCIUM 80 MG: 40 TABLET, FILM COATED ORAL at 20:59

## 2024-09-29 RX ADMIN — CLOPIDOGREL BISULFATE 75 MG: 75 TABLET ORAL at 09:24

## 2024-09-29 RX ADMIN — PREDNISONE 5 MG: 5 TABLET ORAL at 09:24

## 2024-09-29 RX ADMIN — MENTHOL 2 G: 10 GEL TOPICAL at 20:57

## 2024-09-29 RX ADMIN — PREGABALIN 25 MG: 25 CAPSULE ORAL at 09:24

## 2024-09-29 RX ADMIN — INSULIN LISPRO 5 UNITS: 100 INJECTION, SOLUTION INTRAVENOUS; SUBCUTANEOUS at 11:45

## 2024-09-29 RX ADMIN — MENTHOL 2 G: 10 GEL TOPICAL at 17:26

## 2024-09-29 RX ADMIN — PANTOPRAZOLE SODIUM 40 MG: 40 TABLET, DELAYED RELEASE ORAL at 09:24

## 2024-09-29 RX ADMIN — MENTHOL 2 G: 10 GEL TOPICAL at 09:25

## 2024-09-29 RX ADMIN — OXYBUTYNIN CHLORIDE 10 MG: 5 TABLET, EXTENDED RELEASE ORAL at 09:24

## 2024-09-29 RX ADMIN — NICOTINE 1 PATCH: 21 PATCH TRANSDERMAL at 05:07

## 2024-09-29 RX ADMIN — Medication 10 ML: at 09:24

## 2024-09-29 RX ADMIN — BUSPIRONE HYDROCHLORIDE 5 MG: 5 TABLET ORAL at 09:24

## 2024-09-29 RX ADMIN — ASPIRIN 81 MG: 81 TABLET, COATED ORAL at 09:24

## 2024-09-29 RX ADMIN — INSULIN LISPRO 6 UNITS: 100 INJECTION, SOLUTION INTRAVENOUS; SUBCUTANEOUS at 20:58

## 2024-09-29 RX ADMIN — GLIPIZIDE 5 MG: 5 TABLET ORAL at 13:47

## 2024-09-29 NOTE — PLAN OF CARE
Problem: Adult Inpatient Plan of Care  Goal: Plan of Care Review  Outcome: Progressing  Goal: Patient-Specific Goal (Individualized)  Outcome: Progressing  Goal: Absence of Hospital-Acquired Illness or Injury  Outcome: Progressing  Intervention: Identify and Manage Fall Risk  Recent Flowsheet Documentation  Taken 9/29/2024 1600 by Zuly Neil RN  Safety Promotion/Fall Prevention:   activity supervised   fall prevention program maintained   safety round/check completed   nonskid shoes/slippers when out of bed   lighting adjusted  Taken 9/29/2024 1400 by Zuly Neil RN  Safety Promotion/Fall Prevention:   activity supervised   safety round/check completed   nonskid shoes/slippers when out of bed   mobility aid in reach   lighting adjusted   fall prevention program maintained  Taken 9/29/2024 1200 by Zuly Neil RN  Safety Promotion/Fall Prevention:   nonskid shoes/slippers when out of bed   safety round/check completed   activity supervised   fall prevention program maintained   lighting adjusted  Taken 9/29/2024 1000 by Zuly Neil RN  Safety Promotion/Fall Prevention:   activity supervised   safety round/check completed   nonskid shoes/slippers when out of bed   lighting adjusted   fall prevention program maintained  Taken 9/29/2024 0800 by Zuly Neil RN  Safety Promotion/Fall Prevention:   activity supervised   safety round/check completed   nonskid shoes/slippers when out of bed   lighting adjusted   fall prevention program maintained  Intervention: Prevent Skin Injury  Recent Flowsheet Documentation  Taken 9/29/2024 1600 by Zuly Neil RN  Body Position: position changed independently  Skin Protection:   adhesive use limited   skin-to-device areas padded   skin-to-skin areas padded   transparent dressing maintained   tubing/devices free from skin contact  Taken 9/29/2024 1400 by Zuly Neil RN  Body Position: position changed independently  Skin Protection:   adhesive use  limited   skin-to-device areas padded   skin-to-skin areas padded   transparent dressing maintained   tubing/devices free from skin contact  Taken 9/29/2024 1200 by Zuly Neil RN  Body Position: position changed independently  Skin Protection:   adhesive use limited   skin-to-device areas padded   skin-to-skin areas padded   transparent dressing maintained   tubing/devices free from skin contact  Taken 9/29/2024 1000 by Zuly Neil RN  Body Position: legs elevated  Skin Protection:   adhesive use limited   skin-to-device areas padded   skin-to-skin areas padded   transparent dressing maintained   tubing/devices free from skin contact  Taken 9/29/2024 0800 by Zuly Neil RN  Body Position:   supine   supine, legs elevated  Skin Protection:   adhesive use limited   skin-to-device areas padded   skin-to-skin areas padded   transparent dressing maintained   tubing/devices free from skin contact  Intervention: Prevent and Manage VTE (Venous Thromboembolism) Risk  Recent Flowsheet Documentation  Taken 9/29/2024 1600 by Zuly Neil RN  Activity Management: activity encouraged  Taken 9/29/2024 1400 by Zuly Neil RN  Activity Management: activity encouraged  Taken 9/29/2024 1200 by Zuly Neil RN  Activity Management:   activity encouraged   up in chair  Taken 9/29/2024 1000 by Zuly Neil RN  Activity Management: up in chair  Taken 9/29/2024 0800 by Zuly Neil RN  Activity Management: activity encouraged  VTE Prevention/Management: sequential compression devices off  Range of Motion: active ROM (range of motion) encouraged  Intervention: Prevent Infection  Recent Flowsheet Documentation  Taken 9/29/2024 1600 by Zuly Neil RN  Infection Prevention:   hand hygiene promoted   single patient room provided  Taken 9/29/2024 1400 by Zuly Neil RN  Infection Prevention:   hand hygiene promoted   single patient room provided  Taken 9/29/2024 1200 by Zuly Neil  RN  Infection Prevention:   hand hygiene promoted   single patient room provided  Taken 9/29/2024 1000 by Zuly Neil RN  Infection Prevention:   hand hygiene promoted   single patient room provided  Taken 9/29/2024 0800 by Zuly Neil RN  Infection Prevention:   hand hygiene promoted   single patient room provided  Goal: Optimal Comfort and Wellbeing  Outcome: Progressing  Intervention: Monitor Pain and Promote Comfort  Recent Flowsheet Documentation  Taken 9/29/2024 1103 by Zuly Neil RN  Pain Management Interventions: see MAR  Intervention: Provide Person-Centered Care  Recent Flowsheet Documentation  Taken 9/29/2024 1600 by Zuly Neil RN  Trust Relationship/Rapport:   care explained   questions encouraged   questions answered  Taken 9/29/2024 1400 by Zuly Neil RN  Trust Relationship/Rapport:   care explained   questions answered   questions encouraged  Taken 9/29/2024 1200 by Zuly Neil RN  Trust Relationship/Rapport:   care explained   questions answered   questions encouraged  Taken 9/29/2024 1000 by Zuly Neil RN  Trust Relationship/Rapport:   care explained   questions answered   questions encouraged  Taken 9/29/2024 0800 by Zuly Neil RN  Trust Relationship/Rapport:   care explained   questions encouraged   questions answered  Goal: Readiness for Transition of Care  Outcome: Progressing   Goal Outcome Evaluation:               Pt up in chair today and ambulated in hallway with PT. Pt eating well. VSS. No complaints at this time. Plan of care to continue,

## 2024-09-29 NOTE — PLAN OF CARE
Goal Outcome Evaluation:  Plan of Care Reviewed With: patient        Progress: no change  Outcome Evaluation: Patient present with impaired strength, balance, safety, cognition and endurance impacting PLOF ADLs.  Pt. is appropriate for skilled OT services to address deficit areas and promote return to PLOF.  Due to pt. with worsened cognition and independence along with pt being alone for periods during the day SNF is recommended prior to ho      Anticipated Discharge Disposition (OT): skilled nursing facility

## 2024-09-29 NOTE — PROGRESS NOTES
[facility]  PROGRESS NOTE    Patient Name: Arminda Krishnan  : 1945  MRN: [id number]    Date of Admission: 2024  Primary Care Physician: Aiden Spencer MD    Subjective   Subjective     CC: weak and confused    HPI:  Sister says she is much better today after fluids.  Pt says she is interested in lunch.  No new complaints.        Objective   Objective     Vital Signs:   Temp:  [97.6 °F (36.4 °C)-99.3 °F (37.4 °C)] 97.6 °F (36.4 °C)  Heart Rate:  [81-94] 85  Resp:  [16-18] 18  BP: ()/(46-73) 137/52     Physical Exam:  Gen:  WD/WN woman up in chair, sister doing her hair   Neuro: alert and oconfused, clear speech   HEENT:  NC/AT   Neck:  Supple, no LAD  Heart RRR   Lungs clear   Abd:  Soft, nontender, no rebound or guarding, pos BS  Extrem:  No c/c/e      Results Reviewed:  LAB RESULTS:      Lab 24  0426 24  0034   WBC 9.12 8.80   HEMOGLOBIN 9.8* 10.1*   HEMATOCRIT 30.5* 31.4*   PLATELETS 213 240   NEUTROS ABS 5.67 5.91   IMMATURE GRANS (ABS) 0.09* 0.07*   LYMPHS ABS 1.92 1.65   MONOS ABS 1.22* 0.98*   EOS ABS 0.16 0.13   MCV 89.2 88.7   LACTATE  --  1.5   HSTROP T  --  39*         Lab 24  0438 24  0426 24  0034   SODIUM 136 136 132*   POTASSIUM 3.9 3.5 4.5   CHLORIDE 106 97* 92*   CO2 18.0* 22.0 23.0   ANION GAP 12.0 17.0* 17.0*   BUN 34* 35* 38*   CREATININE 2.50* 2.38* 2.50*   EGFR 18.9* 20.0* 18.9*   GLUCOSE 171* 251* 558*   CALCIUM 8.4* 9.1 9.4   MAGNESIUM 2.5* 1.4* 1.6   PHOSPHORUS 3.1 2.4*  --    HEMOGLOBIN A1C  --  11.00*  --          Lab 24  0034   TOTAL PROTEIN 8.2   ALBUMIN 3.9   GLOBULIN 4.3   ALT (SGPT) 18   AST (SGOT) 28   BILIRUBIN 0.4   ALK PHOS 102   LIPASE 24         Lab 24  0034   HSTROP T 39*                 Lab 24  0224   FIO2 21   CARBOXYHEMOGLOBIN (VENOUS) 0.9     Brief Urine Lab Results  (Last result in the past 365 days)        Color   Clarity   Blood   Leuk Est    Nitrite   Protein   CREAT   Urine HCG        09/28/24 0125 Yellow   Clear   Large (3+)   Negative   Negative   100 mg/dL (2+)                   Microbiology Results Abnormal       Procedure Component Value - Date/Time    Blood Culture - Blood, Hand, Right [[id number]]  (Normal) Collected: 09/28/24 0034    Lab Status: Preliminary result Specimen: Blood from Hand, Right Updated: 09/29/24 0800     Blood Culture No growth at 24 hours    Narrative:      Less than seven (7) mL's of blood was collected.  Insufficient quantity may yield false negative results.    Blood Culture - Blood, Arm, Right [[id number]]  (Normal) Collected: 09/28/24 0034    Lab Status: Preliminary result Specimen: Blood from Arm, Right Updated: 09/29/24 0800     Blood Culture No growth at 24 hours    Narrative:      Less than seven (7) mL's of blood was collected.  Insufficient quantity may yield false negative results.    COVID PRE-OP / PRE-PROCEDURE SCREENING ORDER (NO ISOLATION) - Swab, Nasopharynx [[id number]]  (Normal) Collected: 09/28/24 0034    Lab Status: Final result Specimen: Swab from Nasopharynx Updated: 09/28/24 0208    Narrative:      The following orders were created for panel order COVID PRE-OP / PRE-PROCEDURE SCREENING ORDER (NO ISOLATION) - Swab, Nasopharynx.  Procedure                               Abnormality         Status                     ---------                               -----------         ------                     Respiratory Panel PCR w/...[[id number]]  Normal              Final result                 Please view results for these tests on the individual orders.    Respiratory Panel PCR w/COVID-19(SARS-CoV-2) ELSA/BRIEN/MATTHEW/PAD/COR/ASHER In-House, NP Swab in [facility], 2 HR TAT - Swab, Nasopharynx [[id number]]  (Normal) Collected: 09/28/24 0034    Lab Status: Final result Specimen: Swab from Nasopharynx Updated: 09/28/24 0208     ADENOVIRUS, PCR Not Detected     Coronavirus 229E Not Detected     Coronavirus HKU1 Not Detected      Coronavirus NL63 Not Detected     Coronavirus OC43 Not Detected     COVID19 Not Detected     Human Metapneumovirus Not Detected     Human Rhinovirus/Enterovirus Not Detected     Influenza A PCR Not Detected     Influenza B PCR Not Detected     Parainfluenza Virus 1 Not Detected     Parainfluenza Virus 2 Not Detected     Parainfluenza Virus 3 Not Detected     Parainfluenza Virus 4 Not Detected     RSV, PCR Not Detected     Bordetella pertussis pcr Not Detected     Bordetella parapertussis PCR Not Detected     Chlamydophila pneumoniae PCR Not Detected     Mycoplasma pneumo by PCR Not Detected    Narrative:      In the setting of a positive respiratory panel with a viral infection PLUS a negative procalcitonin without other underlying concern for bacterial infection, consider observing off antibiotics or discontinuation of antibiotics and continue supportive care. If the respiratory panel is positive for atypical bacterial infection (Bordetella pertussis, Chlamydophila pneumoniae, or Mycoplasma pneumoniae), consider antibiotic de-escalation to target atypical bacterial infection.            Duplex Venous Lower Extremity - Bilateral CAR    Result Date: 9/28/2024    The left small saphenous vein was not visualized.   All other veins are compressible without evidence of DVT or thrombophlebitis.     XR Chest 1 View    Result Date: 9/28/2024  XR CHEST 1 VW Date of Exam: 9/28/2024 5:31 AM EDT Indication: cough, dyspnea, confusion Comparison: September 4, 2022 Findings: There is suspicion for a peripheral nodule at the right upper lobe measuring approximately 10 mm between the lateral second and third ribs. This appears to be an interval change. There is emphysema, as before, with suspected scarring in the right lower lung. No significant focal infiltrate. No pleural effusion or pneumothorax. Heart size appears within normal limits. Calcific atherosclerosis of the aorta.     Impression: Impression: 1.Suspected 10 mm nodule  in the peripheral right upper lobe, which appears to be an interval change. Recommend CT chest for further evaluation. 2.Emphysema and suspected scarring in the right lower lung, as before. Results were called to patient's nurse, Mary Jane Fabian  by Dr. Palencia at 9/28/2024 7:17 AM EDT. Electronically Signed: Reinaldo Palencia  9/28/2024 7:17 AM EDT  Workstation ID: [id number]    CT Abdomen Pelvis Without Contrast    Result Date: 9/28/2024  CT ABDOMEN PELVIS WO CONTRAST Date of Exam: 9/27/2024 11:40 PM EDT Indication: lower abd pain, CVA pain, nausea. Comparison: 6/6/2024. Technique: Axial CT images were obtained of the abdomen and pelvis without the administration of contrast. Reconstructed coronal and sagittal images were also obtained. Automated exposure control and iterative construction methods were used. Findings: Heart size is normal. There is a small fat-containing hiatal hernia. There is a small fat-containing right-sided Bochdalek hernia with adjacent atelectasis. Mild groundglass opacity in the anterior left lung base that could be infectious or inflammatory.  No acute findings in the superficial soft tissues. There is extensive lumbosacral fusion hardware in place. There is a zone of lucency surrounding the L2 and L5 pedicle screws suggesting some loosening of the hardware. There is an interbody fusion device present at the L3-L4 level. Mild osteoarthritis is present at the left hip and moderate osteoarthritis at the right hip. There are calcified granulomas in the liver and spleen. Patient is status post cholecystectomy. The bile ducts, pancreas, stomach, duodenum and adrenal glands appear within normal limits. There is a 3 mm nonobstructing mid right kidney stone. No left-sided kidney stone. No ureteral stone or hydronephrosis. Urinary bladder is nondistended. Patient is status post hysterectomy. Ovaries are not seen. The appendix is not seen. No small bowel distention. The colon is unremarkable. There is  moderate atherosclerotic disease. There is a stent in the celiac artery. Ectasia of the right common iliac artery up to 15 mm. No ascites, pneumoperitoneum or lymphadenopathy. There is small fat-containing periumbilical hernias.     Impression: Impression: 1.No acute abdominal or pelvic abnormality. 2.Extensive lumbosacral fusion hardware with evidence of loosening of the L2 and L5 pedicle screws. 3.3 mm nonobstructing right-sided kidney stone. 4.Atherosclerosis with stent in the celiac artery. 5.Small fat-containing hiatal hernia and small fat-containing periumbilical hernias. 6.Small fat-containing right-sided Bochdalek hernia with adjacent atelectasis. 7.Mild groundglass opacity in the anterior left lung base that could be infectious or inflammatory. Electronically Signed: Alfredo Chávez MD  9/28/2024 12:22 AM EDT  Workstation ID: [id number]    CT Head Without Contrast    Result Date: 9/28/2024  CT HEAD WO CONTRAST Date of Exam: 9/27/2024 11:40 PM EDT Indication: AMS, r/o CVA. Comparison: 7/7/2009 and brain MRI 9/12/2023. Technique: Axial CT images were obtained of the head without contrast administration.  Automated exposure control and iterative construction methods were used. Findings: Superficial soft tissues appear within normal limits. There is evidence of prior right parietal craniotomy with underlying small focus of encephalomalacia. There is a new focus of encephalomalacia likely from chronic infarct in the posterior high right frontal lobe, new from 2023. There is a new chronic appearing lacunar type infarct along the lateral left thalamus. Paranasal sinuses and mastoid air cells appear well aerated. There is thinning of the orbital lenses bilaterally suggesting prior lens replacement. There is no acute intracranial hemorrhage.  No mass effect or midline shift.  No abnormal extra-axial collections.  There is moderate patchy white matter hypoattenuation. There is mild generalized parenchymal volume loss  congruent with age.     Impression: Impression: 1.No acute intracranial abnormality. 2.New (since 2023) chronic appearing infarcts in the posterior high right frontal lobe and left thalamus. 3.Moderate chronic small vessel ischemic change. 4.Prior right parietal craniotomy with underlying encephalomalacia. Electronically Signed: Alfredo Chávez MD  9/28/2024 12:11 AM EDT  Workstation ID: [id number]     Results for orders placed during the hospital encounter of 07/15/24    Adult Transthoracic Echo Complete W/ Cont if Necessary Per Protocol    Interpretation Summary    Left ventricular systolic function is normal. Calculated left ventricular EF = 63.2%    Estimated right ventricular systolic pressure from tricuspid regurgitation is normal (<35 mmHg).    Normal left atrial size and volume noted.    There is calcification of the aortic valve. Mild to moderate aortic valve regurgitation is present.      Current medications:  Scheduled Meds:aspirin, 81 mg, Oral, Daily  atorvastatin, 80 mg, Oral, Nightly  busPIRone, 5 mg, Oral, TID  carvedilol, 25 mg, Oral, BID  clopidogrel, 75 mg, Oral, Daily  Diclofenac Sodium, 2 g, Topical, TID  fluticasone, 2 spray, Each Nare, Daily  insulin glargine, 5 Units, Subcutaneous, Nightly  insulin lispro, 2-7 Units, Subcutaneous, 4x Daily AC & at Bedtime  isosorbide mononitrate, 60 mg, Oral, Daily  multivitamin with minerals, 1 tablet, Oral, Daily  nicotine, 1 patch, Transdermal, Q24H  oxybutynin XL, 10 mg, Oral, Daily  pantoprazole, 40 mg, Oral, BID  pregabalin, 25 mg, Oral, BID  sodium chloride, 10 mL, Intravenous, Q12H      Continuous Infusions:sodium chloride, 100 mL/hr, Last Rate: 100 mL/hr (09/28/24 4506)      PRN Meds:.  acetaminophen    senna-docusate sodium **AND** polyethylene glycol **AND** bisacodyl **AND** bisacodyl    Calcium Replacement - Follow Nurse / BPA Driven Protocol    dextrose    dextrose    glucagon (human recombinant)    HYDROcodone-acetaminophen     ipratropium-albuterol    Magnesium Low Dose Replacement - Follow Nurse / BPA Driven Protocol    nitroglycerin    Phosphorus Replacement - Follow Nurse / BPA Driven Protocol    [COMPLETED] Insert Peripheral IV **AND** sodium chloride    sodium chloride    sodium chloride    Assessment & Plan   Assessment & Plan     Active Hospital Problems    Diagnosis  POA    **Hyperglycemia [R73.9]  Yes    History of CVA (cerebrovascular accident) [Z86.73]  Not Applicable    Type 2 diabetes mellitus with hyperglycemia, without long-term current use of insulin [E11.65]  Yes    Disorientation [R41.0]  Yes    Chronic anemia [D64.9]  Yes    CKD (chronic kidney disease) stage 4, GFR 15-29 ml/min [N18.4]  Yes    Tobacco abuse [Z72.0]  Yes    Spinal stenosis, lumbar region, with neurogenic claudication [M48.062]  Yes      Resolved Hospital Problems   No resolved problems to display.        Brief Hospital Course to date:  Arminda Krishnan is a 79 y.o. female with PMH significant for CAD, chronic back pain, breast cancer, DM2, HTN, HLD, tobacco abuse. She was admitted for several days of confusion and foul smelling urine.      DM type 2, A1c 11%, w/o insulin use,w/ hyperglycemia, w/ polyuria  Gastroparesis  -pt recently started on daily prednisone 10mg  -reviewed historic A1c, has been 6.6-7.6% since 2016, now 11%  -cont SSI, add glipizide and add back a gliptin.  Trying to avoid starting insulin as pt dislikes needles   -PT/OT for eval  -fluids - will dc them and see how she eats       Acute toxic/metabolic encephalopathy  -likely related to volume depletion and med effect - improved      CKD stage 4 w/ chronic anemia  -baseline Cr 2.2-2.6; stable     CAD  HTN  HLD  Extensive vascular disease  Tobacco abuse  -CTA abd/pel 2022 w/ patent celiac art stent, occluded YESICA, stenotic distal abd aorta w/ ulcerated plaque w/ dissection, chronic dissection terminal RT iliac art w/ stenosis, RT renal art stenosis  -asa, statin, coreg, plavix,  imdur  -patient continues to smoke     Chronic back pain  Fibromyalgia  Chronic steroids x 1 month  -voltaren gel, lyrica, prn norco  -chronic complaints of back and leg pain may be vascular in nature  -recently started on daily prednisone in August for back/joint pain, will taper to 5mg  - interested in SNF      BLAKE - does not use CPAP  Hx breast Ca  Dupixent use - uncertain indication, Rx'ed by derm and there is some documentation previously of bullous disease of the legs (rpt'd neg pemphigoid w/u)  Pulmonary nodule - seen on CXR, needs opt f/u CT chest or referral to nodule clinic          Expected Discharge Location and Transportation: SNF  Expected Discharge when bed availabe   Expected discharge date/ time has not been documented.     VTE Prophylaxis:  Mechanical VTE prophylaxis orders are present.         AM-PAC 6 Clicks Score (PT): 14 (09/28/24 4281)    CODE STATUS:   Code Status and Medical Interventions: CPR (Attempt to Resuscitate); Full Support   Ordered at: 09/28/24 9972     Code Status (Patient has no pulse and is not breathing):    CPR (Attempt to Resuscitate)     Medical Interventions (Patient has pulse or is breathing):    Full Support       Dolores Albarado MD  09/29/24

## 2024-09-29 NOTE — THERAPY EVALUATION
Patient Name: Arminda Krishnan  : 1945    MRN: [id number]                              Today's Date: 2024       Admit Date: 2024    Visit Dx:     ICD-10-CM ICD-9-CM   1. Altered mental status, unspecified altered mental status type  R41.82 780.97   2. Type 2 diabetes mellitus with hyperglycemia, without long-term current use of insulin  E11.65 250.00     790.29   3. Increased anion gap metabolic acidosis  E87.29 276.2   4. History of chronic kidney disease  Z87.448 V13.09   5. Anorexia  R63.0 783.0   6. Generalized weakness  R53.1 780.79   7. Impaired ambulation  R26.2 719.7   8. At high risk for injury related to fall  Z91.81 V49.89   9. History of chronic back pain  Z87.39 V13.59   10. History of hypertension  Z86.79 V12.59   11. History of coronary artery disease  Z86.79 V12.59   12. History of IBS  Z87.19 V12.79     Patient Active Problem List   Diagnosis    Cerebral vascular disease    Degenerative disc disease, lumbar    Degenerative disc disease, cervical    Spinal stenosis, lumbar region, with neurogenic claudication    Tobacco abuse    Chronic anemia    CKD (chronic kidney disease) stage 4, GFR 15-29 ml/min    Hyperglycemia    History of CVA (cerebrovascular accident)    Type 2 diabetes mellitus with hyperglycemia, without long-term current use of insulin    Disorientation     Past Medical History:   Diagnosis Date    Anemia     Arthritis     Back problem     CAD (coronary artery disease)     Cancer     Right breast    Chronic back pain     Chronically on opiate therapy     Depression     Diabetes mellitus     DX 14 years ago- checks fsbs weekly    Fibromyalgia     Gastroparesis     GERD (gastroesophageal reflux disease)     Headache     emotional/tension    History of transfusion     [facility]    HTN (hypertension)     Hypercholesteremia     IBS (irritable bowel syndrome)     Incontinence of urine     urgency    Migraine headache     Myalgia and myositis     Peripheral  neuropathy     Sleep apnea     does not wear cpap    UTI (urinary tract infection)      Past Surgical History:   Procedure Laterality Date    APPENDECTOMY      ARTERIOGRAM MESENTERIC N/A 6/16/2022    Procedure: DIAGNOSTIC ARTERIOGRAM WITH CELIAC STENT PLACEMENT;  Surgeon: Neo Neil MD;  Location: [facility];  Service: Vascular;  Laterality: N/A;  FLUORO: 16 MIN  DOSE: 2384 MGY  CONTRAST:  20 ML    BACK SURGERY      5x per patient    BRAIN TUMOR EXCISION  1988    BREAST BIOPSY      CARPAL TUNNEL RELEASE Bilateral     CHOLECYSTECTOMY      COLONOSCOPY      2015    CRANIOTOMY FOR TUMOR      EYE SURGERY      bilateral cataracts removed    HEMORRHOIDECTOMY      LUMBAR FUSION N/A 01/04/2017    Procedure: LUMBAR LAMINECTOMY AND DECOMRESSION  L3 AND L4;  Surgeon: Nishant Bhatti MD;  Location: [facility] OR;  Service:     TOTAL ABDOMINAL HYSTERECTOMY      TRIGGER FINGER RELEASE        General Information       Row Name 09/29/24 1003          OT Time and Intention    Document Type evaluation  -BILLY     Mode of Treatment occupational therapy  -       Row Name 09/29/24 1003          General Information    Patient Profile Reviewed yes  -BILLY     Prior Level of Function all household mobility;bed mobility;feeding;grooming;dressing;bathing;independent:;min assist:;gait;transfer;dependent:;w/c or scooter;cleaning;driving;shopping  -BILLY     Existing Precautions/Restrictions fall;other (see comments)  limited safety  -BILLY     Barriers to Rehab previous functional deficit;cognitive status;physical barrier  -BILLY       Row Name 09/29/24 1003          Occupational Profile    Environmental Supports and Barriers (Occupational Profile) ramp into home, rollator, rw wx, hurry cane, tub shower with one grab bar and a shower chair, high toilet with tub bar and sink close by  -BILLY       Row Name 09/29/24 1003          Living Environment    People in Home sibling(s)  pt. lives with her sister,  but her sister works, per sister pt. has a   that sits with her Monday to Thursday 9-3, but can home heat meals up for pt., cleaning lady  -       Row Name 09/29/24 1003          Home Main Entrance    Number of Stairs, Main Entrance none  ramp in, but there is one step from parking area onto sidewalk  -       Row Name 09/29/24 1003          Stairs Within Home, Primary    Number of Stairs, Within Home, Primary none  -       Row Name 09/29/24 1003          Cognition    Orientation Status (Cognition) oriented to;person;disoriented to;place;situation;time  pt. knew the year, but  not the month, pt. re-oriented, pt. with limited attention span and task initiation at times, safety cues and redirection needed throughout session  -       Row Name 09/29/24 1003          Safety Issues, Functional Mobility    Safety Issues Affecting Function (Mobility) ability to follow commands;awareness of need for assistance;insight into deficits/self-awareness;positioning of assistive device;judgment;problem-solving;safety precaution awareness;safety precautions follow-through/compliance;sequencing abilities  -     Impairments Affecting Function (Mobility) balance;cognition;endurance/activity tolerance;strength;pain  -     Cognitive Impairments, Mobility Safety/Performance attention;awareness, need for assistance;insight into deficits/self-awareness;safety precaution awareness;problem-solving/reasoning;judgment;safety precaution follow-through;sequencing abilities  -               User Key  (r) = Recorded By, (t) = Taken By, (c) = Cosigned By      Initials Name Provider Type    Mar Rowe OT Occupational Therapist                     Mobility/ADL's       Row Name 09/29/24 1011          Bed Mobility    Bed Mobility supine-sit;scooting/bridging  -     Scooting/Bridging [place] (Bed Mobility) minimum assist (75% patient effort);verbal cues;nonverbal cues (demo/gesture)  -     Supine-Sit [place] (Bed Mobility) verbal cues;nonverbal cues  (demo/gesture);standby assist  -     Bed Mobility, Safety Issues cognitive deficits limit understanding  -     Assistive Device (Bed Mobility) bed rails;head of bed elevated  -BILLY     Comment, (Bed Mobility) extra time needed, pt. getting distracted throughout finally needing Physical and verbal cues to scoot to EOB  -       Row Name 09/29/24 1011          Transfers    Transfers sit-stand transfer;stand-sit transfer;toilet transfer  -       Row Name 09/29/24 1011          Sit-Stand Transfer    Sit-Stand [place] (Transfers) verbal cues;minimum assist (75% patient effort)  -     Assistive Device (Sit-Stand Transfers) walker, front-wheeled  -     Comment, (Sit-Stand Transfer) cues for hand placement  -       Row Name 09/29/24 1011          Stand-Sit Transfer    Stand-Sit [place] (Transfers) minimum assist (75% patient effort);verbal cues  -     Assistive Device (Stand-Sit Transfers) walker, front-wheeled  -       Row Name 09/29/24 1011          Toilet Transfer    Type (Toilet Transfer) sit-stand;stand-sit  -     [place] Level (Toilet Transfer) contact guard;verbal cues;nonverbal cues (demo/gesture)  -     Assistive Device (Toilet Transfer) commode;grab bars/safety frame  tactile cues and verbal to get pt. to move hand to railing  -       Row Name 09/29/24 1011          Functional Mobility    Functional Mobility- Ind. Level minimum assist (75% patient effort);2 person assist required  -     Functional Mobility- Device walker, front-wheeled  -     Functional Mobility-Distance (Feet) 65  + 65  -     Functional Mobility- Safety Issues step length decreased  -     Functional Mobility- Comment pt. easily distracted and getting walker to far away, much cueing to keep walker close and to stand upright and not rest forearms on walker, some assist at time to turn or back walker  -       Row Name 09/29/24 1011          Activities of Daily Living    BADL Assessment/Intervention lower  body dressing;grooming;toileting  -[facility] Name 09/29/24 1011          Lower Body Dressing Assessment/Training    [place] Level (Lower Body Dressing) don;socks;standby assist;verbal cues  -     Position (Lower Body Dressing) edge of bed sitting  -     Comment, (Lower Body Dressing) cues to attend to task and initiate  -[facility] Name 09/29/24 1011          Grooming Assessment/Training    [place] Level (Grooming) wash face, hands;set up;contact guard assist  -     Position (Grooming) supported standing;supine  -     Comment, (Grooming) pt. washed face and hands in bed and once toileted washed hands again CGA  -[facility] Name 09/29/24 1011          Toileting Assessment/Training    [place] Level (Toileting) perform perineal hygiene;standby assist;adjust/manage clothing;minimum assist (75% patient effort)  -     Assistive Devices (Toileting) commode;grab bar/safety frame  -     Position (Toileting) supported standing;supported sitting  -               User Key  (r) = Recorded By, (t) = Taken By, (c) = Cosigned By      Initials Name Provider Type    Mar Rowe OT Occupational Therapist                   Obj/Interventions       [facility] Name 09/29/24 1016          Sensory Assessment (Somatosensory)    Sensory Assessment (Somatosensory) UE sensation intact  -     Sensory Assessment pt. reports pain with touch in LE's and with neuropathy with buringing and tingling typically per sister  -[facility] Name 09/29/24 1016          Vision Assessment/Intervention    Vision Assessment Comment per sister pt. has retinopathy  -[facility] Name 09/29/24 1016          Range of Motion Comprehensive    General Range of Motion bilateral upper extremity ROM WFL  -[facility] Name 09/29/24 1016          Strength Comprehensive (MMT)    General Manual Muscle Testing (MMT) Assessment upper extremity strength deficits identified  -     Comment, General Manual Muscle Testing (MMT) Assessment  PADMINI grossly 4 to 4+/5  -BILLY       Row Name 09/29/24 1016          Balance    Balance Assessment sitting static balance;sitting dynamic balance;standing static balance;standing dynamic balance  -BILLY     Static Sitting Balance standby assist  -BILLY     Dynamic Sitting Balance standby assist  -BILLY     Position, Sitting Balance unsupported;sitting edge of bed  -BILLY     Static Standing Balance contact guard  -BILLY     Dynamic Standing Balance minimal assist  -BILLY     Position/Device Used, Standing Balance supported;walker, front-wheeled  -BILLY     Balance Interventions sit to stand;occupation based/functional task  -BILLY     Comment, Balance LBD, grooming and toileting  -BILLY               User Key  (r) = Recorded By, (t) = Taken By, (c) = Cosigned By      Initials Name Provider Type    BILLY Mar Almaraz, OT Occupational Therapist                   Goals/Plan       Row Name 09/29/24 1023          Bed Mobility Goal 1 (OT)    Activity/Assistive Device (Bed Mobility Goal 1, OT) bed mobility activities, all  -BILLY     [place] Level/Cues Needed (Bed Mobility Goal 1, OT) modified independence  -BILLY     Time Frame (Bed Mobility Goal 1, OT) short term goal (STG);5 days  -BILLY     Progress/Outcomes (Bed Mobility Goal 1, OT) new goal  -BILLY       Row Name 09/29/24 1023          Transfer Goal 1 (OT)    Activity/Assistive Device (Transfer Goal 1, OT) sit-to-stand/stand-to-sit;toilet;commode;walker, rolling  grab bar  -BILLY     [place] Level/Cues Needed (Transfer Goal 1, OT) standby assist  -BILLY     Time Frame (Transfer Goal 1, OT) long term goal (LTG);10 days  -BILLY     Strategies/Barriers (Transfers Goal 1, OT) good safety demonstrated  -BILLY     Progress/Outcome (Transfer Goal 1, OT) new goal  -BILLY       Row Name 09/29/24 1023          Dressing Goal 1 (OT)    Activity/Device (Dressing Goal 1, OT) upper body dressing;lower body dressing  -BILLY     [place]/Cues Needed (Dressing Goal 1, OT) standby assist;set-up required  -BILLY     Time Frame  (Dressing Goal 1, OT) long term goal (LTG);10 days  -BILLY     Strategies/Barriers (Dressing Goal 1, OT) yves love  -BILLY     Progress/Outcome (Dressing Goal 1, OT) new goal  -BILLY       Row Name 09/29/24 1023          Therapy Assessment/Plan (OT)    Planned Therapy Interventions (OT) activity tolerance training;BADL retraining;cognitive/visual perception retraining;functional balance retraining;occupation/activity based interventions;patient/caregiver education/training;strengthening exercise;transfer/mobility retraining;ROM/therapeutic exercise  -BILLY               User Key  (r) = Recorded By, (t) = Taken By, (c) = Cosigned By      Initials Name Provider Type    BILLY Mar Almaraz, OT Occupational Therapist                   Clinical Impression       Row Name 09/29/24 1018          Pain Assessment    Pretreatment Pain Rating 2/10  -BILLY     Posttreatment Pain Rating 2/10  -BILLY     Pain Location - Side/Orientation Bilateral  -BILLY     Pain Location lower  -BILLY     Pain Location - extremity  -BILLY     Pre/Posttreatment Pain Comment increased pain when touching her LEs  -BILLY       Row Name 09/29/24 1018          Plan of Care Review    Plan of Care Reviewed With patient  -BILLY     Progress no change  -BILLY     Outcome Evaluation Patient present with impaired strength, balance, safety, cognition and endurance impacting PLOF ADLs.  Pt. is appropriate for skilled OT services to address deficit areas and promote return to PLOF.  Due to pt. with worsened cognition and independence along with pt being alone for periods during the day SNF is recommended prior to ho  -BILLY       Row Name 09/29/24 1018          Therapy Assessment/Plan (OT)    Patient/Family Therapy Goal Statement (OT) return to PLOF and return home  -BILLY     Rehab Potential (OT) good, to achieve stated therapy goals  -BILLY     Criteria for Skilled Therapeutic Interventions Met (OT) yes;meets criteria;skilled treatment is necessary  -BILLY     Therapy Frequency (OT) daily  -BILLY      Predicted Duration of Therapy Intervention (OT) 10 days  -BILLY       Row Name 09/29/24 1018          Therapy Plan Review/Discharge Plan (OT)    Anticipated Discharge Disposition (OT) skilled nursing facility  -BILLY       Row Name 09/29/24 1018          Vital Signs    Pre Systolic BP Rehab 137  -BILLY     Pre Treatment Diastolic BP 52  -BILLY     Pretreatment Heart Rate (beats/min) 84  -BILLY     Posttreatment Heart Rate (beats/min) 86  -BILLY     O2 Delivery Pre Treatment room air  -BILLY     O2 Delivery Intra Treatment room air  -BILLY     Post SpO2 (%) 94  -BILLY     O2 Delivery Post Treatment room air  -BILLY     Pre Patient Position Supine  -BILLY     Intra Patient Position Standing  -BILLY     Post Patient Position Sitting  -BILLY       Row Name 09/29/24 1018          Positioning and Restraints    Pre-Treatment Position in bed  -BILLY     Post Treatment Position chair  -BILLY     In Chair notified nsg;reclined;call light within reach;encouraged to call for assist;exit alarm on;waffle cushion;legs elevated;heels elevated;with family/caregiver  BF heated for patient and fresh water and coffee gotten  -BILLY               User Key  (r) = Recorded By, (t) = Taken By, (c) = Cosigned By      Initials Name Provider Type    Mar Rowe, OT Occupational Therapist                   Outcome Measures       Row Name 09/29/24 1024          How much help from another is currently needed...    Putting on and taking off regular lower body clothing? 3  -BILLY     Bathing (including washing, rinsing, and drying) 3  -BILLY     Toileting (which includes using toilet bed pan or urinal) 3  -BILLY     Putting on and taking off regular upper body clothing 3  -BILLY     Taking care of personal grooming (such as brushing teeth) 3  -BILLY     Eating meals 3  -BILLY     AM-PAC 6 Clicks Score (OT) 18  -BILLY       Row Name 09/29/24 0832          How much help from another person do you currently need...    Turning from your back to your side while in flat bed without using bedrails? 4  -LS      Moving from lying on back to sitting on the side of a flat bed without bedrails? 3  -LS     Moving to and from a bed to a chair (including a wheelchair)? 3  -LS     Standing up from a chair using your arms (e.g., wheelchair, bedside chair)? 3  -LS     Climbing 3-5 steps with a railing? 3  -LS     To walk in hospital room? 3  -LS     AM-PAC 6 Clicks Score (PT) 19  -LS     Highest Level of Mobility Goal 6 --> Walk 10 steps or more  -       Row Name 09/29/24 1024 09/29/24 0832       Functional Assessment    Outcome Measure Options AM-PAC 6 Clicks Daily Activity (OT)  -BILLY AM-PAC 6 Clicks Basic Mobility (PT)  -LS              User Key  (r) = Recorded By, (t) = Taken By, (c) = Cosigned By      Initials Name Provider Type    Mar Rowe, OT Occupational Therapist    Prema Layton, PT Physical Therapist                    Occupational Therapy Education       Title: PT OT SLP Therapies (In Progress)       Topic: Occupational Therapy (In Progress)       Point: ADL training (In Progress)       Description:   Instruct learner(s) on proper safety adaptation and remediation techniques during self care or transfers.   Instruct in proper use of assistive devices.                  Learning Progress Summary             Patient Acceptance, E,D, NR by BILLY at 9/29/2024 1025    Comment: reason for consult, noted deficits, ADL and transfer safety, posture   Family Acceptance, E,D, NR by BILLY at 9/29/2024 1025    Comment: reason for consult, noted deficits, ADL and transfer safety, posture                         Point: Precautions (In Progress)       Description:   Instruct learner(s) on prescribed precautions during self-care and functional transfers.                  Learning Progress Summary             Patient Acceptance, E,D, NR by BILLY at 9/29/2024 1025    Comment: reason for consult, noted deficits, ADL and transfer safety, posture   Family Acceptance, E,D, NR by BILLY at 9/29/2024 1025    Comment: reason for consult,  noted deficits, ADL and transfer safety, posture                         Point: Body mechanics (In Progress)       Description:   Instruct learner(s) on proper positioning and spine alignment during self-care, functional mobility activities and/or exercises.                  Learning Progress Summary             Patient Acceptance, E,D, NR by BILLY at 9/29/2024 1025    Comment: reason for consult, noted deficits, ADL and transfer safety, posture   Family Acceptance, E,D, NR by BILLY at 9/29/2024 1025    Comment: reason for consult, noted deficits, ADL and transfer safety, posture                                         User Key       Initials Effective Dates Name Provider Type Discipline    BILLY 07/11/23 -  Mar Almaraz, OT Occupational Therapist OT                  OT Recommendation and Plan  Planned Therapy Interventions (OT): activity tolerance training, BADL retraining, cognitive/visual perception retraining, functional balance retraining, occupation/activity based interventions, patient/caregiver education/training, strengthening exercise, transfer/mobility retraining, ROM/therapeutic exercise  Therapy Frequency (OT): daily  Plan of Care Review  Plan of Care Reviewed With: patient  Progress: no change  Outcome Evaluation: Patient present with impaired strength, balance, safety, cognition and endurance impacting PLOF ADLs.  Pt. is appropriate for skilled OT services to address deficit areas and promote return to PLOF.  Due to pt. with worsened cognition and independence along with pt being alone for periods during the day SNF is recommended prior to ho     Time Calculation:   Evaluation Complexity (OT)  Review Occupational Profile/Medical/Therapy History Complexity: expanded/moderate complexity  Assessment, Occupational Performance/Identification of Deficit Complexity: 3-5 performance deficits  Clinical Decision Making Complexity (OT): detailed assessment/moderate complexity  Overall Complexity of Evaluation (OT):  moderate complexity     Time Calculation- OT       Row Name 09/29/24 1026 09/29/24 0832          Time Calculation- OT    OT Start Time 0831  -BILLY --     OT Received On 09/29/24  -BILLY --     OT Goal Re-Cert Due Date 10/09/24  -BILLY --        Timed Charges    65598 - Gait Training Minutes  -- 10  -LS     67667 - OT Self Care/Mgmt Minutes 10  -BILLY --        Untimed Charges    OT Eval/Re-eval Minutes 58  -BILLY --        Total Minutes    Timed Charges Total Minutes 10  -BILLY 10  -LS     Untimed Charges Total Minutes 58  -BILLY --      Total Minutes 68  -BILLY 10  -LS               User Key  (r) = Recorded By, (t) = Taken By, (c) = Cosigned By      Initials Name Provider Type    Mar Rowe, OT Occupational Therapist    Prema Layton, PT Physical Therapist                  Therapy Charges for Today       Code Description Service Date Service Provider Modifiers Qty    42117484181 HC OT SELF CARE/MGMT/TRAIN EA 15 MIN 9/29/2024 Mar Almaraz OT GO 1    89326650199 HC OT EVAL MOD COMPLEXITY 4 9/29/2024 Mar Almaraz OT GO 1                 Mar Almaraz OT  9/29/2024

## 2024-09-29 NOTE — PLAN OF CARE
Problem: Adult Inpatient Plan of Care  Goal: Absence of Hospital-Acquired Illness or Injury  Intervention: Identify and Manage Fall Risk  Recent Flowsheet Documentation  Taken 9/29/2024 0214 by Mary Jane Fabian RN  Safety Promotion/Fall Prevention:   activity supervised   assistive device/personal items within reach   clutter free environment maintained   fall prevention program maintained   lighting adjusted   nonskid shoes/slippers when out of bed   room organization consistent   safety round/check completed  Taken 9/29/2024 0014 by Mary Jane Fabian RN  Safety Promotion/Fall Prevention:   activity supervised   assistive device/personal items within reach   clutter free environment maintained   fall prevention program maintained   lighting adjusted   nonskid shoes/slippers when out of bed   room organization consistent   safety round/check completed  Taken 9/28/2024 2214 by Mary Jane Fabian RN  Safety Promotion/Fall Prevention:   activity supervised   assistive device/personal items within reach   clutter free environment maintained   fall prevention program maintained   lighting adjusted   nonskid shoes/slippers when out of bed   safety round/check completed   room organization consistent  Taken 9/28/2024 2045 by Mary Jane Fabian RN  Safety Promotion/Fall Prevention:   activity supervised   assistive device/personal items within reach   clutter free environment maintained   fall prevention program maintained   lighting adjusted   nonskid shoes/slippers when out of bed   room organization consistent   safety round/check completed  Intervention: Prevent Skin Injury  Recent Flowsheet Documentation  Taken 9/29/2024 0214 by Mary Jane Fabian, RN  Body Position: position changed independently  Taken 9/29/2024 0014 by Mary Jane Fabian RN  Body Position: position changed independently  Taken 9/28/2024 2214 by Mary Jane Fabian RN  Body Position: position changed independently  Taken 9/28/2024 2045 by Rui  ANGELITO Hinton  Body Position: position changed independently  Intervention: Prevent and Manage VTE (Venous Thromboembolism) Risk  Recent Flowsheet Documentation  Taken 9/28/2024 2214 by Mary Jane Fabian RN  Activity Management: activity minimized  Intervention: Prevent Infection  Recent Flowsheet Documentation  Taken 9/29/2024 0214 by Mary Jane Fabian RN  Infection Prevention:   environmental surveillance performed   hand hygiene promoted   rest/sleep promoted   single patient room provided  Taken 9/29/2024 0014 by Mary Jane Fabian RN  Infection Prevention:   environmental surveillance performed   hand hygiene promoted   rest/sleep promoted   single patient room provided  Taken 9/28/2024 2214 by Mary Jane Fabian RN  Infection Prevention:   environmental surveillance performed   hand hygiene promoted   single patient room provided   rest/sleep promoted  Taken 9/28/2024 2045 by Mary Jane Fabian RN  Infection Prevention:   environmental surveillance performed   hand hygiene promoted   rest/sleep promoted   single patient room provided  Goal: Optimal Comfort and Wellbeing  Intervention: Provide Person-Centered Care  Recent Flowsheet Documentation  Taken 9/28/2024 2214 by Mary Jane Fabian RN  Trust Relationship/Rapport:   care explained   choices provided   questions encouraged   questions answered   thoughts/feelings acknowledged     Problem: Skin Injury Risk Increased  Goal: Skin Health and Integrity  Intervention: Optimize Skin Protection  Recent Flowsheet Documentation  Taken 9/29/2024 0214 by Mary Jane Fabian RN  Head of Bed (HOB) Positioning: HOB elevated  Taken 9/29/2024 0014 by Mary Jane Fabian RN  Head of Bed (HOB) Positioning: HOB elevated  Taken 9/28/2024 2214 by Mary Jane Fabian RN  Head of Bed (HOB) Positioning: HOB elevated  Taken 9/28/2024 2045 by Mary Jane Fabian RN  Head of Bed (HOB) Positioning: HOB elevated     Problem: Fall Injury Risk  Goal: Absence of Fall and Fall-Related  Injury  Intervention: Promote Injury-Free Environment  Recent Flowsheet Documentation  Taken 9/29/2024 0214 by Mary Jane Fabian, RN  Safety Promotion/Fall Prevention:   activity supervised   assistive device/personal items within reach   clutter free environment maintained   fall prevention program maintained   lighting adjusted   nonskid shoes/slippers when out of bed   room organization consistent   safety round/check completed  Taken 9/29/2024 0014 by Mary Jane Fabian, RN  Safety Promotion/Fall Prevention:   activity supervised   assistive device/personal items within reach   clutter free environment maintained   fall prevention program maintained   lighting adjusted   nonskid shoes/slippers when out of bed   room organization consistent   safety round/check completed  Taken 9/28/2024 2214 by Mary Jane Fabian, RN  Safety Promotion/Fall Prevention:   activity supervised   assistive device/personal items within reach   clutter free environment maintained   fall prevention program maintained   lighting adjusted   nonskid shoes/slippers when out of bed   safety round/check completed   room organization consistent  Taken 9/28/2024 2045 by Mary Jane Fabian, RN  Safety Promotion/Fall Prevention:   activity supervised   assistive device/personal items within reach   clutter free environment maintained   fall prevention program maintained   lighting adjusted   nonskid shoes/slippers when out of bed   room organization consistent   safety round/check completed   Goal Outcome Evaluation:

## 2024-09-29 NOTE — THERAPY EVALUATION
Patient Name: Arminda Krishnan  : 1945    MRN: [id number]                              Today's Date: 2024       Admit Date: 2024    Visit Dx:     ICD-10-CM ICD-9-CM   1. Altered mental status, unspecified altered mental status type  R41.82 780.97   2. Type 2 diabetes mellitus with hyperglycemia, without long-term current use of insulin  E11.65 250.00     790.29   3. Increased anion gap metabolic acidosis  E87.29 276.2   4. History of chronic kidney disease  Z87.448 V13.09   5. Anorexia  R63.0 783.0   6. Generalized weakness  R53.1 780.79   7. Impaired ambulation  R26.2 719.7   8. At high risk for injury related to fall  Z91.81 V49.89   9. History of chronic back pain  Z87.39 V13.59   10. History of hypertension  Z86.79 V12.59   11. History of coronary artery disease  Z86.79 V12.59   12. History of IBS  Z87.19 V12.79     Patient Active Problem List   Diagnosis    Cerebral vascular disease    Degenerative disc disease, lumbar    Degenerative disc disease, cervical    Spinal stenosis, lumbar region, with neurogenic claudication    Tobacco abuse    Chronic anemia    CKD (chronic kidney disease) stage 4, GFR 15-29 ml/min    Hyperglycemia    History of CVA (cerebrovascular accident)    Type 2 diabetes mellitus with hyperglycemia, without long-term current use of insulin    Disorientation     Past Medical History:   Diagnosis Date    Anemia     Arthritis     Back problem     CAD (coronary artery disease)     Cancer     Right breast    Chronic back pain     Chronically on opiate therapy     Depression     Diabetes mellitus     DX 14 years ago- checks fsbs weekly    Fibromyalgia     Gastroparesis     GERD (gastroesophageal reflux disease)     Headache     emotional/tension    History of transfusion     [facility]    HTN (hypertension)     Hypercholesteremia     IBS (irritable bowel syndrome)     Incontinence of urine     urgency    Migraine headache     Myalgia and myositis     Peripheral  neuropathy     Sleep apnea     does not wear cpap    UTI (urinary tract infection)      Past Surgical History:   Procedure Laterality Date    APPENDECTOMY      ARTERIOGRAM MESENTERIC N/A 6/16/2022    Procedure: DIAGNOSTIC ARTERIOGRAM WITH CELIAC STENT PLACEMENT;  Surgeon: Neo Neil MD;  Location: [facility];  Service: Vascular;  Laterality: N/A;  FLUORO: 16 MIN  DOSE: 2384 MGY  CONTRAST:  20 ML    BACK SURGERY      5x per patient    BRAIN TUMOR EXCISION  1988    BREAST BIOPSY      CARPAL TUNNEL RELEASE Bilateral     CHOLECYSTECTOMY      COLONOSCOPY      2015    CRANIOTOMY FOR TUMOR      EYE SURGERY      bilateral cataracts removed    HEMORRHOIDECTOMY      LUMBAR FUSION N/A 01/04/2017    Procedure: LUMBAR LAMINECTOMY AND DECOMRESSION  L3 AND L4;  Surgeon: Nishant Bhatti MD;  Location: [facility] OR;  Service:     TOTAL ABDOMINAL HYSTERECTOMY      TRIGGER FINGER RELEASE        General Information       Row Name 09/29/24 0832          Physical Therapy Time and Intention    Document Type evaluation  -LS     Mode of Treatment physical therapy  -LS       Row Name 09/29/24 0832          General Information    Patient Profile Reviewed yes  -LS     Prior Level of Function independent:;gait;transfer;bed mobility  Pt's sister said pt uses a SC sometimes but often leaves it behind. She has a RW but refuses to use it. Indep dressing and bathing. Dependent driving. Has a  who visits with her Mon-Thurs 9-3. Lunches are brought in Mon, Wed, Fri.  -LS     Existing Precautions/Restrictions fall  -LS     Barriers to Rehab medically complex  -       Row Name 09/29/24 0832          Living Environment    People in Home sibling(s)  lives with sister who is gone sometimes during the day. pt has a  from the [facility] Mon-Thurs 9-3; however, that person is not to help physically  -       Row Name 09/29/24 0832          Home Main Entrance    Number of Stairs, Main Entrance none  one step at  sidewalk, but pt's sister said she can pull the car close enough that pt does not have to step up the step.  -       Row Name 09/29/24 0832          Stairs Within Home, Primary    Number of Stairs, Within Home, Primary none  -       Row Name 09/29/24 0832          Cognition    Orientation Status (Cognition) oriented to;person;time;disoriented to;place;situation  -       Row Name 09/29/24 0832          Safety Issues, Functional Mobility    Safety Issues Affecting Function (Mobility) awareness of need for assistance;judgment;insight into deficits/self-awareness;problem-solving;safety precaution awareness;safety precautions follow-through/compliance;sequencing abilities  -LS     Impairments Affecting Function (Mobility) balance;cognition;endurance/activity tolerance;strength  -LS     Cognitive Impairments, Mobility Safety/Performance attention;awareness, need for assistance;insight into deficits/self-awareness;judgment;safety precaution follow-through;safety precaution awareness;problem-solving/reasoning;sequencing abilities  -LS               User Key  (r) = Recorded By, (t) = Taken By, (c) = Cosigned By      Initials Name Provider Type    LS Prema Patricio, PT Physical Therapist                   Mobility       Row Name 09/29/24 0832          Bed Mobility    Bed Mobility supine-sit  -LS     Supine-Sit [place] (Bed Mobility) modified independence  -     Assistive Device (Bed Mobility) head of bed elevated   pt has an adjustable bed at home.  -       Row Name 09/29/24 0832          Sit-Stand Transfer    Sit-Stand [place] (Transfers) verbal cues;minimum assist (75% patient effort)  -     Assistive Device (Sit-Stand Transfers) walker, front-wheeled  -     Comment, (Sit-Stand Transfer) vcs for hand placement  -       Row Name 09/29/24 0832          Gait/Stairs (Locomotion)    [place] Level (Gait) verbal cues;minimum assist (75% patient effort)  -     Assistive Device (Gait) walker,  front-wheeled  -LS     Patient was able to Ambulate yes  -LS     Distance in Feet (Gait) 65   65' X 2 with one standing rest break  -LS     Deviations/Abnormal Patterns (Gait) gait speed decreased;bilateral deviations;stride length decreased;base of support, narrow  -LS     Bilateral Gait Deviations forward flexed posture  -LS     Comment, (Gait/Stairs) repeated vcs to stay close to walker and avoid pushing it too far ahead. attempted amb without RW with assist of 2, but pt was very unstable.  -LS               User Key  (r) = Recorded By, (t) = Taken By, (c) = Cosigned By      Initials Name Provider Type    Prema Layton, PT Physical Therapist                   Obj/Interventions       Row Name 09/29/24 0832          Range of Motion Comprehensive    General Range of Motion bilateral lower extremity ROM WFL  -LS       Row Name 09/29/24 0832          Strength Comprehensive (MMT)    Comment, General Manual Muscle Testing (MMT) Assessment B hip flexors and B ankle dorsiflexors 4-/5. B knee extensors 4/5.  -LS       Row Name 09/29/24 0832          Balance    Balance Assessment standing dynamic balance;standing static balance  -LS     Static Standing Balance contact guard  -LS     Dynamic Standing Balance minimal assist  -LS     Position/Device Used, Standing Balance supported;walker, front-wheeled  -LS     Balance Interventions standing;sit to stand;supported;weight shifting activity  -LS               User Key  (r) = Recorded By, (t) = Taken By, (c) = Cosigned By      Initials Name Provider Type    Prema Layton, PT Physical Therapist                   Goals/Plan       Row Name 09/29/24 0832          Transfer Goal 1 (PT)    Activity/Assistive Device (Transfer Goal 1, PT) sit-to-stand/stand-to-sit;walker, rolling  -LS     [place] Level/Cues Needed (Transfer Goal 1, PT) modified independence  -LS     Time Frame (Transfer Goal 1, PT) short term goal (STG);5 days  -LS     Progress/Outcome (Transfer  Goal 1, PT) goal ongoing  -LS       Row Name 09/29/24 0832          Gait Training Goal 1 (PT)    Activity/Assistive Device (Gait Training Goal 1, PT) gait (walking locomotion);assistive device use;walker, rolling  -LS     [place] Level (Gait Training Goal 1, PT) standby assist  -LS     Distance (Gait Training Goal 1, PT) 200  -LS     Time Frame (Gait Training Goal 1, PT) long term goal (LTG);10 days  -LS     Progress/Outcome (Gait Training Goal 1, PT) goal ongoing  -LS       Row Name 09/29/24 0832          Therapy Assessment/Plan (PT)    Planned Therapy Interventions (PT) balance training;gait training;home exercise program;patient/family education;postural re-education;strengthening;transfer training  -LS               User Key  (r) = Recorded By, (t) = Taken By, (c) = Cosigned By      Initials Name Provider Type    LS Prema Patricio, PT Physical Therapist                   Clinical Impression       Row Name 09/29/24 0832          Pain    Pain Intervention(s) Repositioned  -LS     Additional Documentation Pain Scale: FACES Pre/Post-Treatment (Group)  -LS       Row Name 09/29/24 0832          Pain Scale: FACES Pre/Post-Treatment    Pain: FACES Scale, Pretreatment 2-->hurts little bit  -LS     Posttreatment Pain Rating 2-->hurts little bit  -LS     Pain Location - Side/Orientation Bilateral  -LS     Pain Location lower  -LS     Pain Location - extremity  -LS       Row Name 09/29/24 0832          Plan of Care Review    Plan of Care Reviewed With patient;family  sister  -LS     Outcome Evaluation Pt presents with below baseline level of function. She is at risk for falls and currently needs a RW and assist to walk. Very unstable with attempt to walk without walker. Pt does not use a walker at home and said she does not have time for that. She lives with her sister but is alone sometimes during the day. SNF is recommended prior to going home.  -LS       Row Name 09/29/24 0832          Therapy Assessment/Plan  (PT)    Patient/Family Therapy Goals Statement (PT) Return to PLOF.  -LS     Rehab Potential (PT) good, to achieve stated therapy goals  -LS     Criteria for Skilled Interventions Met (PT) yes;meets criteria  -LS     Therapy Frequency (PT) daily  -LS     Predicted Duration of Therapy Intervention (PT) 10 days  -       Row Name 09/29/24 0832          Positioning and Restraints    Pre-Treatment Position in bed  -LS     Post Treatment Position chair  -LS     In Chair notified nsg;sitting;call light within reach;encouraged to call for assist;exit alarm on;with family/caregiver;waffle cushion;legs elevated;heels elevated  -               User Key  (r) = Recorded By, (t) = Taken By, (c) = Cosigned By      Initials Name Provider Type    Prema Layton, STANLEY Physical Therapist                   Outcome Measures       Row Name 09/29/24 0832          How much help from another person do you currently need...    Turning from your back to your side while in flat bed without using bedrails? 4  -LS     Moving from lying on back to sitting on the side of a flat bed without bedrails? 3  -LS     Moving to and from a bed to a chair (including a wheelchair)? 3  -LS     Standing up from a chair using your arms (e.g., wheelchair, bedside chair)? 3  -LS     Climbing 3-5 steps with a railing? 3  -LS     To walk in hospital room? 3  -LS     AM-PAC 6 Clicks Score (PT) 19  -LS     Highest Level of Mobility Goal 6 --> Walk 10 steps or more  -       Row Name 09/29/24 0832          Functional Assessment    Outcome Measure Options AM-PAC 6 Clicks Basic Mobility (PT)  -               User Key  (r) = Recorded By, (t) = Taken By, (c) = Cosigned By      Initials Name Provider Type    Prema Layton, STANLEY Physical Therapist                                 Physical Therapy Education       Title: PT OT SLP Therapies (In Progress)       Topic: Physical Therapy (In Progress)       Point: Mobility training (Done)       Learning  Progress Summary             Patient Acceptance, E, VU,NR by  at 9/29/2024 0832    Acceptance, E,TB, VU by TC at 9/28/2024 1854                         Point: Home exercise program (Done)       Learning Progress Summary             Patient Acceptance, E,TB, VU by TC at 9/28/2024 1854                         Point: Body mechanics (Done)       Learning Progress Summary             Patient Acceptance, E,TB, VU by TC at 9/28/2024 1854                         Point: Precautions (Not Started)       Learner Progress:  Not documented in this visit.                              User Key       Initials Effective Dates Name Provider Type Discipline     07/11/23 -  Prema Patricio, PT Physical Therapist PT    TC 03/08/24 -  Zuly Neil, RN Registered Nurse Nurse                  PT Recommendation and Plan  Planned Therapy Interventions (PT): balance training, gait training, home exercise program, patient/family education, postural re-education, strengthening, transfer training  Plan of Care Reviewed With: patient, family (sister)  Outcome Evaluation: Pt presents with below baseline level of function. She is at risk for falls and currently needs a RW and assist to walk. Very unstable with attempt to walk without walker. Pt does not use a walker at home and said she does not have time for that. She lives with her sister but is alone sometimes during the day. SNF is recommended prior to going home.     Time Calculation:   PT Evaluation Complexity  History, PT Evaluation Complexity: 3 or more personal factors and/or comorbidities  Examination of Body Systems (PT Eval Complexity): total of 4 or more elements  Clinical Presentation (PT Evaluation Complexity): stable  Clinical Decision Making (PT Evaluation Complexity): low complexity  Overall Complexity (PT Evaluation Complexity): low complexity     PT Charges       Row Name 09/29/24 0832             Time Calculation    Start Time 0832  -      PT Received On 09/29/24   -LS      PT Goal Re-Cert Due Date 10/09/24  -LS         Timed Charges    94449 - Gait Training Minutes  10  -LS      53308 - PT Therapeutic Activity Minutes 5  -LS         Untimed Charges    PT Eval/Re-eval Minutes 60  -LS         Total Minutes    Timed Charges Total Minutes 15  -LS      Untimed Charges Total Minutes 60  -LS       Total Minutes 75  -LS                User Key  (r) = Recorded By, (t) = Taken By, (c) = Cosigned By      Initials Name Provider Type    Prema Layton, PT Physical Therapist                  Therapy Charges for Today       Code Description Service Date Service Provider Modifiers Qty    12375038677 HC PT EVAL LOW COMPLEXITY 4 9/29/2024 Prema Patricio, PT GP 1    17533874619 HC GAIT TRAINING EA 15 MIN 9/29/2024 Prema Patricio, PT GP 1            PT G-Codes  Outcome Measure Options: AM-PAC 6 Clicks Basic Mobility (PT)  AM-PAC 6 Clicks Score (PT): 19  PT Discharge Summary  Anticipated Discharge Disposition (PT): skilled nursing facility    Prema Patricio, PT  9/29/2024

## 2024-09-29 NOTE — PLAN OF CARE
Goal Outcome Evaluation:  Plan of Care Reviewed With: patient, family (sister)           Outcome Evaluation: Pt presents with below baseline level of function. She is at risk for falls and currently needs a RW and assist to walk. Very unstable with attempt to walk without walker. Pt does not use a walker at home and said she does not have time for that. She lives with her sister but is alone sometimes during the day. SNF is recommended prior to going home.      Anticipated Discharge Disposition (PT): skilled nursing facility

## 2024-09-30 ENCOUNTER — READMISSION MANAGEMENT (OUTPATIENT)
Dept: CALL CENTER | Facility: HOSPITAL | Age: 79
End: 2024-09-30
Payer: MEDICARE

## 2024-09-30 ENCOUNTER — TELEPHONE (OUTPATIENT)
Dept: FAMILY MEDICINE CLINIC | Facility: CLINIC | Age: 79
End: 2024-09-30
Payer: MEDICARE

## 2024-09-30 VITALS
WEIGHT: 166.8 LBS | DIASTOLIC BLOOD PRESSURE: 76 MMHG | HEART RATE: 78 BPM | SYSTOLIC BLOOD PRESSURE: 104 MMHG | HEIGHT: 60 IN | TEMPERATURE: 97.9 F | BODY MASS INDEX: 32.75 KG/M2 | RESPIRATION RATE: 18 BRPM | OXYGEN SATURATION: 96 %

## 2024-09-30 PROBLEM — E86.0 DEHYDRATION: Status: ACTIVE | Noted: 2024-09-30

## 2024-09-30 LAB
ANION GAP SERPL CALCULATED.3IONS-SCNC: 11 MMOL/L (ref 5–15)
BUN SERPL-MCNC: 34 MG/DL (ref 8–23)
BUN/CREAT SERPL: 12.9 (ref 7–25)
CALCIUM SPEC-SCNC: 8.3 MG/DL (ref 8.6–10.5)
CHLORIDE SERPL-SCNC: 108 MMOL/L (ref 98–107)
CO2 SERPL-SCNC: 18 MMOL/L (ref 22–29)
CREAT SERPL-MCNC: 2.63 MG/DL (ref 0.57–1)
EGFRCR SERPLBLD CKD-EPI 2021: 17.8 ML/MIN/1.73
GLUCOSE BLDC GLUCOMTR-MCNC: 286 MG/DL (ref 70–130)
GLUCOSE SERPL-MCNC: 239 MG/DL (ref 65–99)
POTASSIUM SERPL-SCNC: 4.2 MMOL/L (ref 3.5–5.2)
SODIUM SERPL-SCNC: 137 MMOL/L (ref 136–145)

## 2024-09-30 PROCEDURE — 80048 BASIC METABOLIC PNL TOTAL CA: CPT | Performed by: INTERNAL MEDICINE

## 2024-09-30 PROCEDURE — G0378 HOSPITAL OBSERVATION PER HR: HCPCS

## 2024-09-30 PROCEDURE — 63710000001 INSULIN LISPRO (HUMAN) PER 5 UNITS: Performed by: NURSE PRACTITIONER

## 2024-09-30 PROCEDURE — 82948 REAGENT STRIP/BLOOD GLUCOSE: CPT

## 2024-09-30 PROCEDURE — 99239 HOSP IP/OBS DSCHRG MGMT >30: CPT | Performed by: INTERNAL MEDICINE

## 2024-09-30 PROCEDURE — 63710000001 PREDNISONE PER 5 MG: Performed by: INTERNAL MEDICINE

## 2024-09-30 RX ORDER — GLIPIZIDE 5 MG/1
5 TABLET ORAL
Qty: 30 TABLET | Refills: 5 | Status: SHIPPED | OUTPATIENT
Start: 2024-10-01

## 2024-09-30 RX ORDER — PREDNISONE 5 MG/1
5 TABLET ORAL
Qty: 21 TABLET | Refills: 0 | Status: SHIPPED | OUTPATIENT
Start: 2024-10-01 | End: 2024-10-15

## 2024-09-30 RX ADMIN — EMPAGLIFLOZIN 10 MG: 10 TABLET, FILM COATED ORAL at 08:37

## 2024-09-30 RX ADMIN — MENTHOL 2 G: 10 GEL TOPICAL at 08:33

## 2024-09-30 RX ADMIN — Medication 10 ML: at 08:34

## 2024-09-30 RX ADMIN — Medication 1 TABLET: at 08:30

## 2024-09-30 RX ADMIN — GLIPIZIDE 5 MG: 5 TABLET ORAL at 08:32

## 2024-09-30 RX ADMIN — BUSPIRONE HYDROCHLORIDE 5 MG: 5 TABLET ORAL at 08:31

## 2024-09-30 RX ADMIN — PREDNISONE 5 MG: 5 TABLET ORAL at 08:31

## 2024-09-30 RX ADMIN — INSULIN LISPRO 4 UNITS: 100 INJECTION, SOLUTION INTRAVENOUS; SUBCUTANEOUS at 08:32

## 2024-09-30 RX ADMIN — OXYBUTYNIN CHLORIDE 10 MG: 5 TABLET, EXTENDED RELEASE ORAL at 08:31

## 2024-09-30 RX ADMIN — PANTOPRAZOLE SODIUM 40 MG: 40 TABLET, DELAYED RELEASE ORAL at 08:30

## 2024-09-30 RX ADMIN — ASPIRIN 81 MG: 81 TABLET, COATED ORAL at 08:30

## 2024-09-30 RX ADMIN — PREGABALIN 25 MG: 25 CAPSULE ORAL at 08:31

## 2024-09-30 RX ADMIN — ACETAMINOPHEN 500 MG: 500 TABLET ORAL at 01:25

## 2024-09-30 RX ADMIN — CLOPIDOGREL BISULFATE 75 MG: 75 TABLET ORAL at 08:30

## 2024-09-30 RX ADMIN — LINAGLIPTIN 5 MG: 5 TABLET, FILM COATED ORAL at 08:32

## 2024-09-30 RX ADMIN — NICOTINE 1 PATCH: 21 PATCH TRANSDERMAL at 06:00

## 2024-09-30 RX ADMIN — FLUTICASONE PROPIONATE 2 SPRAY: 50 SPRAY, METERED NASAL at 08:33

## 2024-09-30 NOTE — PLAN OF CARE
Problem: Adult Inpatient Plan of Care  Goal: Plan of Care Review  Outcome: Progressing  Goal: Patient-Specific Goal (Individualized)  Outcome: Progressing  Goal: Absence of Hospital-Acquired Illness or Injury  Outcome: Progressing  Intervention: Identify and Manage Fall Risk  Recent Flowsheet Documentation  Taken 9/30/2024 0200 by Linnea Bolton RN  Safety Promotion/Fall Prevention:   activity supervised   assistive device/personal items within reach  Taken 9/30/2024 0000 by Linnea Bolton RN  Safety Promotion/Fall Prevention: nonskid shoes/slippers when out of bed  Taken 9/29/2024 2200 by Linnea Bolton RN  Safety Promotion/Fall Prevention:   activity supervised   assistive device/personal items within reach  Taken 9/29/2024 2000 by Linnea Bolton RN  Safety Promotion/Fall Prevention: nonskid shoes/slippers when out of bed  Intervention: Prevent Skin Injury  Recent Flowsheet Documentation  Taken 9/30/2024 0200 by Linnea Bolton RN  Body Position: position changed independently  Skin Protection: adhesive use limited  Taken 9/30/2024 0000 by Linnea Bolton RN  Body Position: position changed independently  Skin Protection: adhesive use limited  Taken 9/29/2024 2200 by Linnea Bolton RN  Body Position: position changed independently  Skin Protection: adhesive use limited  Taken 9/29/2024 2000 by Linnea Bolton RN  Body Position: position changed independently  Skin Protection: adhesive use limited  Intervention: Prevent and Manage VTE (Venous Thromboembolism) Risk  Recent Flowsheet Documentation  Taken 9/30/2024 0200 by Linnea Bolton RN  Activity Management: activity encouraged  Taken 9/30/2024 0000 by Linnea Bolton RN  Activity Management: activity encouraged  Taken 9/29/2024 2200 by Linnea Bolton RN  Activity Management: activity encouraged  Taken 9/29/2024 2000 by Linnea Bolton RN  Activity Management:  activity encouraged  VTE Prevention/Management: sequential compression devices on  Range of Motion: active ROM (range of motion) encouraged  Intervention: Prevent Infection  Recent Flowsheet Documentation  Taken 9/30/2024 0200 by Linnea Bolton RN  Infection Prevention: environmental surveillance performed  Taken 9/30/2024 0000 by Linnea Bolton RN  Infection Prevention: cohorting utilized  Taken 9/29/2024 2200 by Linnea Bolton RN  Infection Prevention: environmental surveillance performed  Taken 9/29/2024 2000 by Linnea Bolton RN  Infection Prevention: environmental surveillance performed  Goal: Optimal Comfort and Wellbeing  Outcome: Progressing  Intervention: Provide Person-Centered Care  Recent Flowsheet Documentation  Taken 9/29/2024 2000 by Linnea Bolton RN  Trust Relationship/Rapport:   care explained   choices provided  Goal: Readiness for Transition of Care  Outcome: Progressing     Problem: Skin Injury Risk Increased  Goal: Skin Health and Integrity  Outcome: Progressing  Intervention: Optimize Skin Protection  Recent Flowsheet Documentation  Taken 9/30/2024 0200 by Linnea Bolton RN  Pressure Reduction Techniques: frequent weight shift encouraged  Head of Bed (HOB) Positioning: [facility] elevated  Pressure Reduction Devices: specialty bed utilized  Skin Protection: adhesive use limited  Taken 9/30/2024 0000 by Linnea Bolton RN  Pressure Reduction Techniques: frequent weight shift encouraged  Head of Bed (HOB) Positioning: [facility] elevated  Pressure Reduction Devices: specialty bed utilized  Skin Protection: adhesive use limited  Taken 9/29/2024 2200 by Linnea Bolton RN  Pressure Reduction Techniques: frequent weight shift encouraged  Head of Bed (HOB) Positioning: [facility] elevated  Pressure Reduction Devices: specialty bed utilized  Skin Protection: adhesive use limited  Taken 9/29/2024 2000 by Linnea Bolton RN  Pressure Reduction  Techniques: frequent weight shift encouraged  Head of Bed (HOB) Positioning: HOB elevated  Pressure Reduction Devices: specialty bed utilized  Skin Protection: adhesive use limited     Problem: Fall Injury Risk  Goal: Absence of Fall and Fall-Related Injury  Outcome: Progressing  Intervention: Identify and Manage Contributors  Recent Flowsheet Documentation  Taken 9/30/2024 0200 by Linnea Bolton RN  Self-Care Promotion: independence encouraged  Taken 9/30/2024 0000 by Linnea Bolton RN  Medication Review/Management: medications reviewed  Taken 9/29/2024 2200 by Linnea Bolton RN  Self-Care Promotion: independence encouraged  Taken 9/29/2024 2000 by Linnea Bolton RN  Medication Review/Management: medications reviewed  Intervention: Promote Injury-Free Environment  Recent Flowsheet Documentation  Taken 9/30/2024 0200 by Linnea Bolton RN  Safety Promotion/Fall Prevention:   activity supervised   assistive device/personal items within reach  Taken 9/30/2024 0000 by Linnea Bolton RN  Safety Promotion/Fall Prevention: nonskid shoes/slippers when out of bed  Taken 9/29/2024 2200 by Linnea Bolton RN  Safety Promotion/Fall Prevention:   activity supervised   assistive device/personal items within reach  Taken 9/29/2024 2000 by Linnea Bolton RN  Safety Promotion/Fall Prevention: nonskid shoes/slippers when out of bed   Goal Outcome Evaluation:

## 2024-09-30 NOTE — PLAN OF CARE
Problem: Adult Inpatient Plan of Care  Goal: Plan of Care Review  Outcome: Met  Goal: Patient-Specific Goal (Individualized)  Outcome: Met  Goal: Absence of Hospital-Acquired Illness or Injury  Outcome: Met  Goal: Optimal Comfort and Wellbeing  Outcome: Met  Goal: Readiness for Transition of Care  Outcome: Met     Problem: Skin Injury Risk Increased  Goal: Skin Health and Integrity  Outcome: Met     Problem: Fall Injury Risk  Goal: Absence of Fall and Fall-Related Injury  Outcome: Met   Goal Outcome Evaluation:

## 2024-09-30 NOTE — CASE MANAGEMENT/SOCIAL WORK
Discharge Planning Assessment  [facility]     Patient Name: Arminda Krishnan  MRN: [id number]  Today's Date: 9/30/2024    Admit Date: 9/27/2024    Plan: Home with [facility]    Discharge Needs Assessment       Row Name 09/30/24 0900       Living Environment    People in Home sibling(s)    Name(s) of People in Home Delbert Mcdermott (sister) [phone]    Current Living Arrangements apartment    Potentially Unsafe Housing Conditions none    Primary Care Provided by self    Provides Primary Care For no one, unable/limited ability to care for self    Family Caregiver if Needed sibling(s)    Family Caregiver Names Delbert Mcdermott (sister) [phone]    Quality of Family Relationships helpful;involved;supportive    Able to Return to Prior Arrangements yes       Resource/Environmental Concerns    Resource/Environmental Concerns none    Transportation Concerns none       Transition Planning    Patient/Family Anticipates Transition to home with help/services    Patient/Family Anticipated Services at Transition none    Transportation Anticipated family or friend will provide       Discharge Needs Assessment    Readmission Within the Last 30 Days no previous admission in last 30 days    Equipment Currently Used at Home cane, straight;nebulizer;walker, rolling    Concerns to be Addressed denies needs/concerns at this time    Anticipated Changes Related to Illness none    Equipment Needed After Discharge none    Discharge Facility/Level of Care Needs home with home health    Provided Post Acute Provider List? Yes    Post Acute Provider List Home Health    Provided Post Acute Provider Quality & Resource List? Yes    Post Acute Provider Quality and Resource List Home Health    Delivered To Patient    Method of Delivery In person    Patient's Choice of Community Agency(s) [facility]                   Discharge Plan       Row Name 09/30/24 0902       Plan    Plan Home with [facility]    Plan Comments Spoke to patient and  sister at bedside. Lives with Delbert Mcdermott () [phone] in [place] Everywhere. Is somewaht independent with ADL's. No problems with [place] Medicare/[place] Medicaid. Uses [facility]. Uses a straight cane, nebulizer and rolling walker. PCP is Aiden Spencer MD. Plan is home with [facility] .  will transport. CM will continue to follow.    Final Discharge Disposition Code 06 - home with home health care                  Continued Care and Services - Admitted Since 9/27/2024    No active coordination exists for this encounter.       Expected Discharge Date and Time       Expected Discharge Date Expected Discharge Time    Sep 30, 2024            Demographic Summary       Row Name 09/30/24 0900       General Information    Admission Type observation    Arrived From emergency department    Referral Source admission list    Reason for Consult discharge planning    Preferred Language English       Contact Information    Permission Granted to Share Info With     Contact Information Obtained for                    Functional Status       Row Name 09/30/24 0900       Functional Status    Usual Activity Tolerance moderate    Current Activity Tolerance moderate       Functional Status, IADL    Medications independent    Meal Preparation independent    Housekeeping assistive person    Laundry assistive person    Shopping assistive person       Mental Status    General Appearance WDL WDL       Mental Status Summary    Recent Changes in Mental Status/Cognitive Functioning no changes       Employment/    Employment Status retired                   Psychosocial    No documentation.                  Abuse/Neglect    No documentation.                  Legal    No documentation.                  Substance Abuse    No documentation.                  Patient Forms    No documentation.                     Jordan Carlos RN

## 2024-09-30 NOTE — CASE MANAGEMENT/SOCIAL WORK
Case Management Discharge Note      Final Note: Plan is home with [facility] SN/PT/OT. Has caregivers Monday -Thursday 9am -3 pm. Sister will transport. Nicci at [facility] has the referral.    Provided Post Acute Provider List?: Yes  Post Acute Provider List: Home Health  Provided Post Acute Provider Quality & Resource List?: Yes  Post Acute Provider Quality and Resource List: Home Health  Delivered To: Patient  Method of Delivery: In person    Selected Continued Care - Admitted Since 9/27/2024       Destination    No services have been selected for the patient.                Durable Medical Equipment    No services have been selected for the patient.                Dialysis/Infusion    No services have been selected for the patient.                Home Medical Care       Service Provider Selected Services Address Phone Fax Patient Preferred    [facility] ,  Home Rehabilitation [address] [place], [place] [address] [facility], [address] [phone] --              Therapy    No services have been selected for the patient.                Community Resources    No services have been selected for the patient.                Community & DME    No services have been selected for the patient.                         Final Discharge Disposition Code: 06 - home with home health care

## 2024-09-30 NOTE — TELEPHONE ENCOUNTER
Piedad from [facility] called requesting your signed orders for patient that is being discharged from  referral. Per voice order Piedad was notified that provider agrees [facility].

## 2024-09-30 NOTE — DISCHARGE SUMMARY
[facility] Medicine Services  DISCHARGE SUMMARY    Patient Name: Arminda Krishnna  : 1945  MRN: [id number]    Date of Admission: 2024 11:38 PM  Date of Discharge:  2024  Primary Care Physician: Aiden Spencer MD    Consults       No orders found from 2024 to 2024.            Hospital Course     Presenting Problem: lethargy and weakness     Active Hospital Problems    Diagnosis  POA    **Dehydration [E86.0]  Yes    Type 2 diabetes mellitus with hyperglycemia, without long-term current use of insulin [E11.65]  Yes     Priority: High    Hyperglycemia [R73.9]  Yes    History of CVA (cerebrovascular accident) [Z86.73]  Not Applicable    Disorientation [R41.0]  Yes    Chronic anemia [D64.9]  Yes      Resolved Hospital Problems   No resolved problems to display.          Hospital Course:  Arminda Krishnan is a 79 y.o. female with mild cognitive impairment, DM2, anemia, history of CVA.  Her sister stays with her.  Her sister brought her to ED due to several days of progressive lethargy and weakness.  Glucose in ED was over 300.     Dehydration causing lethargy/weakness   Uncontrolled DM2 with Hyperglycemia  Steroid- induced hyperglycemia  - prior hgb A1c had been 6.5 on uvia.  On admission, it was 11.5  - She had been started on prednisone 10mg daily one month prior, apparently for fibromyalgia pain.    - She improved with fluids and returned to baseline mentation and walked without difficulty   - She dislikes needles.  Will attempt to manage hyperglycemia without home insulin.  Glipizide is added and prednisone is being weaned off   - encouraged pt and sister to push PO fluids.     Chronic prednisone use  - this was started a month ago in outpt setting, 10mg daily  - due to hyperglycemia, plan to wean off  - 5mg daily x 2 weeks then 5mg QODay x 2 weeks then stop       Discharge Follow Up Recommendations for outpatient labs/diagnostics:   - FU with  PCP in 2 weeks to discuss DM control and steroid wean     Day of Discharge     HPI:  Feels good, has walked to [place], is back to baseline according to her sister.  Has decided to go home, not to rehab.      Review of Systems  Good appetite, ate most of breakfast     Vital Signs:   Temp:  [97.9 °F (36.6 °C)-98.4 °F (36.9 °C)] 97.9 °F (36.6 °C)  Heart Rate:  [76-81] 78  Resp:  [18] 18  BP: (104-143)/(48-76) 104/76      Physical Exam:  Gen:  WD/WN woman in bed, NAD, sister present.  Slow response, but appropriate   Neuro: alert and oriented, clear speech, follows commands, grossly nonfocal, some cognitive delay   HEENT:  NC/AT  Neck:  Supple, no LAD  Heart RRR   Lungs clear not labored   Abd:  Soft, nontender, no rebound or guarding, pos BS  Extrem:  No c/c/e      Pertinent  and/or Most Recent Results     LAB RESULTS:      Lab 09/28/24  0426 09/28/24  0034   WBC 9.12 8.80   HEMOGLOBIN 9.8* 10.1*   HEMATOCRIT 30.5* 31.4*   PLATELETS 213 240   NEUTROS ABS 5.67 5.91   IMMATURE GRANS (ABS) 0.09* 0.07*   LYMPHS ABS 1.92 1.65   MONOS ABS 1.22* 0.98*   EOS ABS 0.16 0.13   MCV 89.2 88.7   LACTATE  --  1.5         Lab 09/30/24  0453 09/29/24  0438 09/28/24  0426 09/28/24  0034   SODIUM 137 136 136 132*   POTASSIUM 4.2 3.9 3.5 4.5   CHLORIDE 108* 106 97* 92*   CO2 18.0* 18.0* 22.0 23.0   ANION GAP 11.0 12.0 17.0* 17.0*   BUN 34* 34* 35* 38*   CREATININE 2.63* 2.50* 2.38* 2.50*   EGFR 17.8* 18.9* 20.0* 18.9*   GLUCOSE 239* 171* 251* 558*   CALCIUM 8.3* 8.4* 9.1 9.4   MAGNESIUM  --  2.5* 1.4* 1.6   PHOSPHORUS  --  3.1 2.4*  --    HEMOGLOBIN A1C  --   --  11.00*  --          Lab 09/28/24  0034   TOTAL PROTEIN 8.2   ALBUMIN 3.9   GLOBULIN 4.3   ALT (SGPT) 18   AST (SGOT) 28   BILIRUBIN 0.4   ALK PHOS 102   LIPASE 24         Lab 09/28/24  0034   HSTROP T 39*                 Lab 09/28/24  0224   FIO2 21   CARBOXYHEMOGLOBIN (VENOUS) 0.9     Brief Urine Lab Results  (Last result in the past 365 days)        Color   Clarity   Blood   Leuk  Est   Nitrite   Protein   CREAT   Urine HCG        09/28/24 0125 Yellow   Clear   Large (3+)   Negative   Negative   100 mg/dL (2+)                 Microbiology Results (last 10 days)       Procedure Component Value - Date/Time    COVID PRE-OP / PRE-PROCEDURE SCREENING ORDER (NO ISOLATION) - Swab, Nasopharynx [[id number]]  (Normal) Collected: 09/28/24 0034    Lab Status: Final result Specimen: Swab from Nasopharynx Updated: 09/28/24 0208    Narrative:      The following orders were created for panel order COVID PRE-OP / PRE-PROCEDURE SCREENING ORDER (NO ISOLATION) - Swab, Nasopharynx.  Procedure                               Abnormality         Status                     ---------                               -----------         ------                     Respiratory Panel PCR w/...[[id number]]  Normal              Final result                 Please view results for these tests on the individual orders.    Blood Culture - Blood, Hand, Right [[id number]]  (Normal) Collected: 09/28/24 0034    Lab Status: Preliminary result Specimen: Blood from Hand, Right Updated: 09/30/24 0801     Blood Culture No growth at 2 days    Narrative:      Less than seven (7) mL's of blood was collected.  Insufficient quantity may yield false negative results.    Blood Culture - Blood, Arm, Right [[id number]]  (Normal) Collected: 09/28/24 0034    Lab Status: Preliminary result Specimen: Blood from Arm, Right Updated: 09/30/24 0801     Blood Culture No growth at 2 days    Narrative:      Less than seven (7) mL's of blood was collected.  Insufficient quantity may yield false negative results.    Respiratory Panel PCR w/COVID-19(SARS-CoV-2) ELSA/BRIEN/MATTHEW/PAD/COR/ASHER In-House, NP Swab in [place], 2 HR TAT - Swab, Nasopharynx [[id number]]  (Normal) Collected: 09/28/24 0034    Lab Status: Final result Specimen: Swab from Nasopharynx Updated: 09/28/24 0208     ADENOVIRUS, PCR Not Detected     Coronavirus 229E Not Detected     Coronavirus HKU1 Not  Detected     Coronavirus NL63 Not Detected     Coronavirus OC43 Not Detected     COVID19 Not Detected     Human Metapneumovirus Not Detected     Human Rhinovirus/Enterovirus Not Detected     Influenza A PCR Not Detected     Influenza B PCR Not Detected     Parainfluenza Virus 1 Not Detected     Parainfluenza Virus 2 Not Detected     Parainfluenza Virus 3 Not Detected     Parainfluenza Virus 4 Not Detected     RSV, PCR Not Detected     Bordetella pertussis pcr Not Detected     Bordetella parapertussis PCR Not Detected     Chlamydophila pneumoniae PCR Not Detected     Mycoplasma pneumo by PCR Not Detected    Narrative:      In the setting of a positive respiratory panel with a viral infection PLUS a negative procalcitonin without other underlying concern for bacterial infection, consider observing off antibiotics or discontinuation of antibiotics and continue supportive care. If the respiratory panel is positive for atypical bacterial infection (Bordetella pertussis, Chlamydophila pneumoniae, or Mycoplasma pneumoniae), consider antibiotic de-escalation to target atypical bacterial infection.            Duplex Venous Lower Extremity - Bilateral CAR    Result Date: 9/28/2024    The left small saphenous vein was not visualized.   All other veins are compressible without evidence of DVT or thrombophlebitis.     XR Chest 1 View    Result Date: 9/28/2024  XR CHEST 1 VW Date of Exam: 9/28/2024 5:31 AM EDT Indication: cough, dyspnea, confusion Comparison: September 4, 2022 Findings: There is suspicion for a peripheral nodule at the right upper lobe measuring approximately 10 mm between the lateral second and third ribs. This appears to be an interval change. There is emphysema, as before, with suspected scarring in the right lower lung. No significant focal infiltrate. No pleural effusion or pneumothorax. Heart size appears within normal limits. Calcific atherosclerosis of the aorta.     Impression: 1.Suspected 10 mm nodule  in the peripheral right upper lobe, which appears to be an interval change. Recommend CT chest for further evaluation. 2.Emphysema and suspected scarring in the right lower lung, as before. Results were called to patient's nurse, Mary Jane Fabian  by Dr. Palencia at 9/28/2024 7:17 AM EDT. Electronically Signed: Reinaldo Palencia  9/28/2024 7:17 AM EDT  Workstation ID: [id number]    CT Abdomen Pelvis Without Contrast    Result Date: 9/28/2024  CT ABDOMEN PELVIS WO CONTRAST Date of Exam: 9/27/2024 11:40 PM EDT Indication: lower abd pain, CVA pain, nausea. Comparison: 6/6/2024. Technique: Axial CT images were obtained of the abdomen and pelvis without the administration of contrast. Reconstructed coronal and sagittal images were also obtained. Automated exposure control and iterative construction methods were used. Findings: Heart size is normal. There is a small fat-containing hiatal hernia. There is a small fat-containing right-sided Bochdalek hernia with adjacent atelectasis. Mild groundglass opacity in the anterior left lung base that could be infectious or inflammatory.  No acute findings in the superficial soft tissues. There is extensive lumbosacral fusion hardware in place. There is a zone of lucency surrounding the L2 and L5 pedicle screws suggesting some loosening of the hardware. There is an interbody fusion device present at the L3-L4 level. Mild osteoarthritis is present at the left hip and moderate osteoarthritis at the right hip. There are calcified granulomas in the liver and spleen. Patient is status post cholecystectomy. The bile ducts, pancreas, stomach, duodenum and adrenal glands appear within normal limits. There is a 3 mm nonobstructing mid right kidney stone. No left-sided kidney stone. No ureteral stone or hydronephrosis. Urinary bladder is nondistended. Patient is status post hysterectomy. Ovaries are not seen. The appendix is not seen. No small bowel distention. The colon is unremarkable. There is  moderate atherosclerotic disease. There is a stent in the celiac artery. Ectasia of the right common iliac artery up to 15 mm. No ascites, pneumoperitoneum or lymphadenopathy. There is small fat-containing periumbilical hernias.     Impression: 1.No acute abdominal or pelvic abnormality. 2.Extensive lumbosacral fusion hardware with evidence of loosening of the L2 and L5 pedicle screws. 3.3 mm nonobstructing right-sided kidney stone. 4.Atherosclerosis with stent in the celiac artery. 5.Small fat-containing hiatal hernia and small fat-containing periumbilical hernias. 6.Small fat-containing right-sided Bochdalek hernia with adjacent atelectasis. 7.Mild groundglass opacity in the anterior left lung base that could be infectious or inflammatory. Electronically Signed: Alfredo Chávez MD  9/28/2024 12:22 AM EDT  Workstation ID: [id number]    CT Head Without Contrast    Result Date: 9/28/2024  CT HEAD WO CONTRAST Date of Exam: 9/27/2024 11:40 PM EDT Indication: AMS, r/o CVA. Comparison: 7/7/2009 and brain MRI 9/12/2023. Technique: Axial CT images were obtained of the head without contrast administration.  Automated exposure control and iterative construction methods were used. Findings: Superficial soft tissues appear within normal limits. There is evidence of prior right parietal craniotomy with underlying small focus of encephalomalacia. There is a new focus of encephalomalacia likely from chronic infarct in the posterior high right frontal lobe, new from 2023. There is a new chronic appearing lacunar type infarct along the lateral left thalamus. Paranasal sinuses and mastoid air cells appear well aerated. There is thinning of the orbital lenses bilaterally suggesting prior lens replacement. There is no acute intracranial hemorrhage.  No mass effect or midline shift.  No abnormal extra-axial collections.  There is moderate patchy white matter hypoattenuation. There is mild generalized parenchymal volume loss congruent with  age.     Impression: 1.No acute intracranial abnormality. 2.New (since 2023) chronic appearing infarcts in the posterior high right frontal lobe and left thalamus. 3.Moderate chronic small vessel ischemic change. 4.Prior right parietal craniotomy with underlying encephalomalacia. Electronically Signed: Alfredo Chávez MD  9/28/2024 12:11 AM EDT  Workstation ID: [id number]     Results for orders placed during the hospital encounter of 09/27/24    Duplex Venous Lower Extremity - Bilateral CAR    Interpretation Summary    The left small saphenous vein was not visualized.    All other veins are compressible without evidence of DVT or thrombophlebitis.      Results for orders placed during the hospital encounter of 09/27/24    Duplex Venous Lower Extremity - Bilateral CAR    Interpretation Summary    The left small saphenous vein was not visualized.    All other veins are compressible without evidence of DVT or thrombophlebitis.      Results for orders placed during the hospital encounter of 07/15/24    Adult Transthoracic Echo Complete W/ Cont if Necessary Per Protocol    Interpretation Summary    Left ventricular systolic function is normal. Calculated left ventricular EF = 63.2%    Estimated right ventricular systolic pressure from tricuspid regurgitation is normal (<35 mmHg).    Normal left atrial size and volume noted.    There is calcification of the aortic valve. Mild to moderate aortic valve regurgitation is present.      Plan for Follow-up of Pending Labs/Results: I will FU   Pending Labs       Order Current Status    Blood Culture - Blood, Arm, Right Preliminary result    Blood Culture - Blood, Hand, Right Preliminary result          Discharge Details        Discharge Medications        New Medications        Instructions Start Date   glipizide 5 MG tablet  Commonly known as: GLUCOTROL   5 mg, Oral, Every Morning Before Breakfast   Start Date: October 1, 2024     predniSONE 5 MG tablet  Commonly known as:  DELTASONE   5 mg, Oral, Daily With Breakfast, Then change to 5mg every other day for 14 days, then stop.   Start Date: October 1, 2024            Continue These Medications        Instructions Start Date   acetaminophen 500 MG tablet  Commonly known as: TYLENOL   500 mg, Oral, Every 6 Hours PRN      albuterol (2.5 MG/3ML) 0.083% nebulizer solution  Commonly known as: PROVENTIL   2.5 mg, Nebulization, Every 4 Hours PRN      Ventolin  (90 Base) MCG/ACT inhaler  Generic drug: albuterol sulfate HFA   2 puffs, Inhalation, Every 6 Hours PRN, for wheezing      aspirin 81 MG EC tablet   81 mg, Oral, Daily      atorvastatin 80 MG tablet  Commonly known as: LIPITOR   80 mg, Oral, Daily      busPIRone 5 MG tablet  Commonly known as: BUSPAR   5 mg, Oral, 3 Times Daily      carvedilol 25 MG tablet  Commonly known as: COREG   TAKE ONE TABLET BY MOUTH TWO TIMES A DAY      cetirizine 10 MG tablet  Commonly known as: zyrTEC   5 mg, Oral, Daily      clopidogrel 75 MG tablet  Commonly known as: PLAVIX   75 mg, Oral, Daily      Comfort EZ Pen Needles 32G X 4 MM misc  Generic drug: Insulin Pen Needle   No dose, route, or frequency recorded.      Comfort EZ Pen Needles 32G X 4 MM misc  Generic drug: Insulin Pen Needle   USE AS DIRECTED DAILY      Dexcom G7  device   1 each, Does not apply, Daily      Dexcom G7 Sensor misc   1 each, Does not apply, Every 10 Days      Diclofenac Sodium 1 % gel gel  Commonly known as: VOLTAREN   APPLY TO AFFECTED AREA FOUR TIMES DAILY      Dupixent 300 MG/2ML solution pen-injector  Generic drug: Dupilumab   300 mg, Subcutaneous, As Needed      famotidine 10 MG tablet  Commonly known as: PEPCID   10 mg, Oral, Every Other Day      fluticasone 50 MCG/ACT nasal spray  Commonly known as: FLONASE   2 sprays, Each Nare, Daily      HYDROcodone-acetaminophen 7.5-325 MG per tablet  Commonly known as: NORCO   1 tablet, Oral, Every 12 Hours PRN      isosorbide mononitrate 60 MG 24 hr tablet  Commonly  known as: IMDUR   TAKE ONE TABLET BY MOUTH EVERY DAY      Januvia 50 MG tablet  Generic drug: SITagliptin   TAKE 1 TABLET BY MOUTH DAILY      multivitamin with minerals tablet tablet   1 tablet, Oral, Daily      naloxone 4 MG/0.1ML nasal spray  Commonly known as: NARCAN   1 spray, Nasal, As Needed      nicotine 21 MG/24HR patch  Commonly known as: Nicoderm CQ   1 patch, Transdermal, Every 24 Hours      pantoprazole 40 MG EC tablet  Commonly known as: PROTONIX   40 mg, Oral, 2 Times Daily      pregabalin 25 MG capsule  Commonly known as: LYRICA   25 mg, Oral, 2 Times Daily      SSD 1 % cream  Generic drug: silver sulfadiazine   APPLY TO AFFECTED AREA AS DIRECTED      tacrolimus 0.1 % ointment  Commonly known as: PROTOPIC   APPLY TO AFFECTED AREA TWO TIMES A DAY      tolterodine LA 2 MG 24 hr capsule  Commonly known as: DETROL LA   2 mg, Oral, Daily      True Metrix Blood Glucose Test test strip  Generic drug: glucose blood   Daily, for testing             Stop These Medications      losartan 50 MG tablet  Commonly known as: COZAAR     nicotine polacrilex 4 MG gum  Commonly known as: Nicorette              Allergies   Allergen Reactions    Ceclor [Cefaclor] Rash         Discharge Disposition:  Home or Self Care    Diet:  Hospital:  Diet Order   Procedures    Diet: Cardiac, Diabetic; Healthy Heart (2-3 Na+); Consistent Carbohydrate; Fluid Consistency: Thin (IDDSI 0)            Activity:         CODE STATUS:    Code Status and Medical Interventions: CPR (Attempt to Resuscitate); Full Support   Ordered at: 09/28/24 0414     Code Status (Patient has no pulse and is not breathing):    CPR (Attempt to Resuscitate)     Medical Interventions (Patient has pulse or is breathing):    Full Support       Future Appointments   Date Time Provider Department Center   1/16/2025  1:00 PM Yulia Hurt APRN [facility]       Additional Instructions for the Follow-ups that You Need to Schedule       Discharge Follow-up with  PCP   As directed       Currently Documented PCP:    Aiden Spencer MD    PCP Phone Number:    [phone]     Follow Up Details: 2 weeks                      Dolores Albarado MD  09/30/24      Time Spent on Discharge:  I spent  45  minutes on this discharge activity which included: face-to-face encounter with the patient, reviewing the data in the system, coordination of the care with the nursing staff as well as consultants, documentation, and entering orders.

## 2024-10-01 ENCOUNTER — TELEPHONE (OUTPATIENT)
Dept: FAMILY MEDICINE CLINIC | Facility: CLINIC | Age: 79
End: 2024-10-01

## 2024-10-01 ENCOUNTER — TRANSITIONAL CARE MANAGEMENT TELEPHONE ENCOUNTER (OUTPATIENT)
Dept: CALL CENTER | Facility: HOSPITAL | Age: 79
End: 2024-10-01
Payer: MEDICARE

## 2024-10-01 DIAGNOSIS — E11.65 TYPE 2 DIABETES MELLITUS WITH HYPERGLYCEMIA, WITH LONG-TERM CURRENT USE OF INSULIN: Primary | ICD-10-CM

## 2024-10-01 DIAGNOSIS — Z79.4 TYPE 2 DIABETES MELLITUS WITH HYPERGLYCEMIA, WITH LONG-TERM CURRENT USE OF INSULIN: Primary | ICD-10-CM

## 2024-10-01 RX ORDER — GLUCOSAMINE HCL/CHONDROITIN SU 500-400 MG
1 CAPSULE ORAL 3 TIMES DAILY
Qty: 100 EACH | Refills: 11 | Status: SHIPPED | OUTPATIENT
Start: 2024-10-01

## 2024-10-01 NOTE — OUTREACH NOTE
Call Center TCM Note      Flowsheet Row Responses   [facility] patient discharged from? [place]   Does the patient have one of the following disease processes/diagnoses(primary or secondary)? Other   TCM attempt successful? No   Unsuccessful attempts Attempt 1  [Attempted to reach patient and sister, Delbert Mcdermott, listed on PCP verbal release. No answer.]            Lien Devine RN    10/1/2024, 13:44 EDT

## 2024-10-01 NOTE — TELEPHONE ENCOUNTER
Caller: Delbert Mcdermott    Relationship: Emergency Contact    Best call back number: [phone]     What medication are you requesting: ONE TOUCH ULTRA GLUCOMETER AND TESTING KIT      Have you had these symptoms before:    [x] Yes  [] No    Have you been treated for these symptoms before:   [x] Yes  [] No    If a prescription is needed, what is your preferred pharmacy and phone number: [place], [place] - [facility] [address] - [phone] [facility] [phone] FX     Additional notes:

## 2024-10-01 NOTE — OUTREACH NOTE
Call Center TCM Note      Flowsheet Row Responses   [facility] patient discharged from? [place]   Does the patient have one of the following disease processes/diagnoses(primary or secondary)? Other   TCM attempt successful? No   Unsuccessful attempts Attempt 2  [Attempted to reach patient and sister, Delbert Mcdermott, listed on PCP verbal release and number available. . No answer.]            Lien Devine RN    10/1/2024, 15:33 EDT

## 2024-10-01 NOTE — OUTREACH NOTE
Prep Survey      Flowsheet Row Responses   [facility] patient discharged from? [place]   Is LACE score < 7 ? No   Eligibility [facility]   Date of Admission 09/27/24   Date of Discharge 09/30/24   Discharge Disposition Home-Health Care Sv   Discharge diagnosis Dehydration   Does the patient have one of the following disease processes/diagnoses(primary or secondary)? Other   Does the patient have Home health ordered? Yes   What is the [facility] health agency?  [facility]   Is there a DME ordered? No   Prep survey completed? Yes            Mulu PORTILLO - Registered Nurse

## 2024-10-02 ENCOUNTER — TRANSITIONAL CARE MANAGEMENT TELEPHONE ENCOUNTER (OUTPATIENT)
Dept: CALL CENTER | Facility: HOSPITAL | Age: 79
End: 2024-10-02
Payer: MEDICARE

## 2024-10-02 ENCOUNTER — TELEPHONE (OUTPATIENT)
Dept: FAMILY MEDICINE CLINIC | Facility: CLINIC | Age: 79
End: 2024-10-02
Payer: MEDICARE

## 2024-10-02 NOTE — OUTREACH NOTE
Call Center TCM Note      Flowsheet Row Responses   [facility] patient discharged from? [place]   Does the patient have one of the following disease processes/diagnoses(primary or secondary)? Other   TCM attempt successful? No   Unsuccessful attempts Attempt 3            Leana Calzada RN    10/2/2024, 09:37 EDT

## 2024-10-02 NOTE — TELEPHONE ENCOUNTER
Reyna - Physical therapist from [facility] called requesting PT 1x/week x 5 weeks. Verbal approval given per Dr. Spencer voice order.

## 2024-10-03 ENCOUNTER — APPOINTMENT (OUTPATIENT)
Dept: CT IMAGING | Facility: HOSPITAL | Age: 79
End: 2024-10-03
Payer: MEDICARE

## 2024-10-03 ENCOUNTER — HOSPITAL ENCOUNTER (EMERGENCY)
Facility: HOSPITAL | Age: 79
Discharge: HOME | End: 2024-10-03
Attending: EMERGENCY MEDICINE
Payer: MEDICARE

## 2024-10-03 ENCOUNTER — APPOINTMENT (OUTPATIENT)
Dept: GENERAL RADIOLOGY | Facility: HOSPITAL | Age: 79
End: 2024-10-03
Payer: MEDICARE

## 2024-10-03 ENCOUNTER — TELEPHONE (OUTPATIENT)
Dept: FAMILY MEDICINE CLINIC | Facility: CLINIC | Age: 79
End: 2024-10-03

## 2024-10-03 VITALS
HEIGHT: 60 IN | DIASTOLIC BLOOD PRESSURE: 43 MMHG | TEMPERATURE: 98.4 F | SYSTOLIC BLOOD PRESSURE: 155 MMHG | RESPIRATION RATE: 16 BRPM | OXYGEN SATURATION: 99 % | BODY MASS INDEX: 31.41 KG/M2 | WEIGHT: 160 LBS | HEART RATE: 73 BPM

## 2024-10-03 DIAGNOSIS — R73.9 HYPERGLYCEMIA: Primary | ICD-10-CM

## 2024-10-03 DIAGNOSIS — N18.9 CHRONIC KIDNEY DISEASE, UNSPECIFIED CKD STAGE: ICD-10-CM

## 2024-10-03 DIAGNOSIS — D64.9 ANEMIA, UNSPECIFIED TYPE: ICD-10-CM

## 2024-10-03 LAB
ALBUMIN SERPL-MCNC: 3.4 G/DL (ref 3.5–5.2)
ALBUMIN/GLOB SERPL: 0.9 G/DL
ALP SERPL-CCNC: 77 U/L (ref 39–117)
ALT SERPL W P-5'-P-CCNC: 13 U/L (ref 1–33)
AMMONIA BLD-SCNC: <10 UMOL/L (ref 11–51)
AMPHET+METHAMPHET UR QL: NEGATIVE
AMPHETAMINES UR QL: NEGATIVE
ANION GAP SERPL CALCULATED.3IONS-SCNC: 14 MMOL/L (ref 5–15)
APAP SERPL-MCNC: <5 MCG/ML (ref 0–30)
AST SERPL-CCNC: 17 U/L (ref 1–32)
BACTERIA SPEC AEROBE CULT: NORMAL
BACTERIA SPEC AEROBE CULT: NORMAL
BACTERIA UR QL AUTO: ABNORMAL /HPF
BARBITURATES UR QL SCN: NEGATIVE
BASOPHILS # BLD AUTO: 0.06 10*3/MM3 (ref 0–0.2)
BASOPHILS NFR BLD AUTO: 0.8 % (ref 0–1.5)
BENZODIAZ UR QL SCN: NEGATIVE
BILIRUB SERPL-MCNC: 0.4 MG/DL (ref 0–1.2)
BILIRUB UR QL STRIP: NEGATIVE
BUN SERPL-MCNC: 38 MG/DL (ref 8–23)
BUN/CREAT SERPL: 13.2 (ref 7–25)
BUPRENORPHINE SERPL-MCNC: NEGATIVE NG/ML
CALCIUM SPEC-SCNC: 9.5 MG/DL (ref 8.6–10.5)
CANNABINOIDS SERPL QL: NEGATIVE
CHLORIDE SERPL-SCNC: 101 MMOL/L (ref 98–107)
CLARITY UR: ABNORMAL
CO2 SERPL-SCNC: 20 MMOL/L (ref 22–29)
COCAINE UR QL: NEGATIVE
COLOR UR: YELLOW
CREAT SERPL-MCNC: 2.88 MG/DL (ref 0.57–1)
DEPRECATED RDW RBC AUTO: 48.2 FL (ref 37–54)
EGFRCR SERPLBLD CKD-EPI 2021: 15.9 ML/MIN/1.73
EOSINOPHIL # BLD AUTO: 0.16 10*3/MM3 (ref 0–0.4)
EOSINOPHIL NFR BLD AUTO: 2.1 % (ref 0.3–6.2)
ERYTHROCYTE [DISTWIDTH] IN BLOOD BY AUTOMATED COUNT: 14.9 % (ref 12.3–15.4)
ETHANOL BLD-MCNC: <10 MG/DL (ref 0–10)
FENTANYL UR-MCNC: NEGATIVE NG/ML
GLOBULIN UR ELPH-MCNC: 3.7 GM/DL
GLUCOSE BLDC GLUCOMTR-MCNC: 300 MG/DL (ref 70–130)
GLUCOSE BLDC GLUCOMTR-MCNC: 344 MG/DL (ref 70–130)
GLUCOSE SERPL-MCNC: 303 MG/DL (ref 65–99)
GLUCOSE UR STRIP-MCNC: ABNORMAL MG/DL
HCT VFR BLD AUTO: 24.2 % (ref 34–46.6)
HGB BLD-MCNC: 7.6 G/DL (ref 12–15.9)
HGB UR QL STRIP.AUTO: ABNORMAL
HYALINE CASTS UR QL AUTO: ABNORMAL /LPF
IMM GRANULOCYTES # BLD AUTO: 0.11 10*3/MM3 (ref 0–0.05)
IMM GRANULOCYTES NFR BLD AUTO: 1.5 % (ref 0–0.5)
KETONES UR QL STRIP: NEGATIVE
LEUKOCYTE ESTERASE UR QL STRIP.AUTO: NEGATIVE
LYMPHOCYTES # BLD AUTO: 1.43 10*3/MM3 (ref 0.7–3.1)
LYMPHOCYTES NFR BLD AUTO: 18.9 % (ref 19.6–45.3)
MAGNESIUM SERPL-MCNC: 1.7 MG/DL (ref 1.6–2.4)
MCH RBC QN AUTO: 27.9 PG (ref 26.6–33)
MCHC RBC AUTO-ENTMCNC: 31.4 G/DL (ref 31.5–35.7)
MCV RBC AUTO: 89 FL (ref 79–97)
METHADONE UR QL SCN: NEGATIVE
MONOCYTES # BLD AUTO: 0.84 10*3/MM3 (ref 0.1–0.9)
MONOCYTES NFR BLD AUTO: 11.1 % (ref 5–12)
NEUTROPHILS NFR BLD AUTO: 4.96 10*3/MM3 (ref 1.7–7)
NEUTROPHILS NFR BLD AUTO: 65.6 % (ref 42.7–76)
NITRITE UR QL STRIP: NEGATIVE
NRBC BLD AUTO-RTO: 0 /100 WBC (ref 0–0.2)
OPIATES UR QL: NEGATIVE
OXYCODONE UR QL SCN: NEGATIVE
PCP UR QL SCN: NEGATIVE
PH UR STRIP.AUTO: 6 [PH] (ref 5–8)
PLATELET # BLD AUTO: 213 10*3/MM3 (ref 140–450)
PMV BLD AUTO: 11.2 FL (ref 6–12)
POTASSIUM SERPL-SCNC: 4.3 MMOL/L (ref 3.5–5.2)
PROT SERPL-MCNC: 7.1 G/DL (ref 6–8.5)
PROT UR QL STRIP: ABNORMAL
QT INTERVAL: 366 MS
QTC INTERVAL: 411 MS
RBC # BLD AUTO: 2.72 10*6/MM3 (ref 3.77–5.28)
RBC # UR STRIP: ABNORMAL /HPF
REF LAB TEST METHOD: ABNORMAL
SALICYLATES SERPL-MCNC: <0.3 MG/DL
SODIUM SERPL-SCNC: 135 MMOL/L (ref 136–145)
SP GR UR STRIP: 1.01 (ref 1–1.03)
SQUAMOUS #/AREA URNS HPF: ABNORMAL /HPF
T4 FREE SERPL-MCNC: 0.98 NG/DL (ref 0.92–1.68)
TRICYCLICS UR QL SCN: NEGATIVE
TSH SERPL DL<=0.05 MIU/L-ACNC: 0.58 UIU/ML (ref 0.27–4.2)
UROBILINOGEN UR QL STRIP: ABNORMAL
WBC # UR STRIP: ABNORMAL /HPF
WBC NRBC COR # BLD AUTO: 7.56 10*3/MM3 (ref 3.4–10.8)

## 2024-10-03 PROCEDURE — 80143 DRUG ASSAY ACETAMINOPHEN: CPT | Performed by: EMERGENCY MEDICINE

## 2024-10-03 PROCEDURE — 80179 DRUG ASSAY SALICYLATE: CPT | Performed by: EMERGENCY MEDICINE

## 2024-10-03 PROCEDURE — 36415 COLL VENOUS BLD VENIPUNCTURE: CPT

## 2024-10-03 PROCEDURE — 70450 CT HEAD/BRAIN W/O DYE: CPT

## 2024-10-03 PROCEDURE — 82077 ASSAY SPEC XCP UR&BREATH IA: CPT | Performed by: EMERGENCY MEDICINE

## 2024-10-03 PROCEDURE — 82948 REAGENT STRIP/BLOOD GLUCOSE: CPT

## 2024-10-03 PROCEDURE — 93005 ELECTROCARDIOGRAM TRACING: CPT | Performed by: EMERGENCY MEDICINE

## 2024-10-03 PROCEDURE — 84439 ASSAY OF FREE THYROXINE: CPT | Performed by: EMERGENCY MEDICINE

## 2024-10-03 PROCEDURE — 85025 COMPLETE CBC W/AUTO DIFF WBC: CPT | Performed by: EMERGENCY MEDICINE

## 2024-10-03 PROCEDURE — 82140 ASSAY OF AMMONIA: CPT | Performed by: EMERGENCY MEDICINE

## 2024-10-03 PROCEDURE — 81001 URINALYSIS AUTO W/SCOPE: CPT | Performed by: EMERGENCY MEDICINE

## 2024-10-03 PROCEDURE — 84443 ASSAY THYROID STIM HORMONE: CPT | Performed by: EMERGENCY MEDICINE

## 2024-10-03 PROCEDURE — 80053 COMPREHEN METABOLIC PANEL: CPT | Performed by: EMERGENCY MEDICINE

## 2024-10-03 PROCEDURE — 25810000003 SODIUM CHLORIDE 0.9 % SOLUTION: Performed by: EMERGENCY MEDICINE

## 2024-10-03 PROCEDURE — 80307 DRUG TEST PRSMV CHEM ANLYZR: CPT | Performed by: EMERGENCY MEDICINE

## 2024-10-03 PROCEDURE — 99284 EMERGENCY DEPT VISIT MOD MDM: CPT

## 2024-10-03 PROCEDURE — 71045 X-RAY EXAM CHEST 1 VIEW: CPT

## 2024-10-03 PROCEDURE — 83735 ASSAY OF MAGNESIUM: CPT | Performed by: EMERGENCY MEDICINE

## 2024-10-03 RX ADMIN — SODIUM CHLORIDE 1000 ML: 9 INJECTION, SOLUTION INTRAVENOUS at 15:23

## 2024-10-03 NOTE — TELEPHONE ENCOUNTER
Caller: Delbert Mcdermott    Relationship to patient: Emergency Contact    Best call back number: [phone]     Chief complaint: CONFUSION    Patient directed to call 911 or go to their nearest emergency room.     Patient verbalized understanding: [x] Yes  [] No  If no, why?    Additional notes:

## 2024-10-03 NOTE — TELEPHONE ENCOUNTER
I CALLED [facility]ICA TO  CHECK ON PATIENT, SHE STATES THAT A FAMILY MEMBER, GUNNAR, CAME TO HELP HER GET PATIENT READY TO BE TAKEN TO THE ER. SHE STATES THAT THE PATIENT WAS NOT RESPONDING TO THEM WHEN THEY TALK TO HER. I ASKED HER TO REACH OUT TO US IF THEY NEED ANYTHING AT ALL.

## 2024-10-03 NOTE — ED PROVIDER NOTES
Subjective   History of Present Illness  79-year-old female presents for evaluation of altered mental status.  EMS was called by the patient's home health nurse this afternoon when the patient was noted to have decreased LOC when compared to baseline.  On EMS arrival, the patient's mental status was back to baseline.  She was noted to be quite hyperglycemic with a glucose of greater than 500 and as a result was brought to our facility to be evaluated.  Currently, the patient is without complaint.  She tells me that she feels at baseline.  She notes compliance with her medications.  She is unsure as to what may have caused her blood sugar to spike.  She denies any headache.  No fever.  No known sick contacts.  No vomiting.      Review of Systems   Psychiatric/Behavioral:  Positive for confusion.    All other systems reviewed and are negative.      Past Medical History:   Diagnosis Date    Anemia     Arthritis     Back problem     CAD (coronary artery disease)     Cancer     Right breast    Chronic back pain     Chronically on opiate therapy     Depression     Diabetes mellitus     DX 14 years ago- checks fsbs weekly    Fibromyalgia     Gastroparesis     GERD (gastroesophageal reflux disease)     Headache     emotional/tension    History of transfusion     [facility]    HTN (hypertension)     Hypercholesteremia     IBS (irritable bowel syndrome)     Incontinence of urine     urgency    Migraine headache     Myalgia and myositis     Peripheral neuropathy     Sleep apnea     does not wear cpap    UTI (urinary tract infection)        Allergies   Allergen Reactions    Ceclor [Cefaclor] Rash       Past Surgical History:   Procedure Laterality Date    APPENDECTOMY      ARTERIOGRAM MESENTERIC N/A 6/16/2022    Procedure: DIAGNOSTIC ARTERIOGRAM WITH CELIAC STENT PLACEMENT;  Surgeon: Neo Neil MD;  Location: [facility];  Service: Vascular;  Laterality: N/A;  FLUORO: 16 MIN  DOSE: 2384 MGY  CONTRAST:  20  ML    BACK SURGERY      5x per patient    BRAIN TUMOR EXCISION  1988    BREAST BIOPSY      CARPAL TUNNEL RELEASE Bilateral     CHOLECYSTECTOMY      COLONOSCOPY      2015    CRANIOTOMY FOR TUMOR      EYE SURGERY      bilateral cataracts removed    HEMORRHOIDECTOMY      LUMBAR FUSION N/A 01/04/2017    Procedure: LUMBAR LAMINECTOMY AND DECOMRESSION  L3 AND L4;  Surgeon: Nishant Bhatti MD;  Location: [facility];  Service:     TOTAL ABDOMINAL HYSTERECTOMY      TRIGGER FINGER RELEASE         Family History   Problem Relation Age of Onset    Cancer Other     Diabetes Other     Hyperlipidemia Other     Heart attack Other     Hypertension Other     Heart attack Mother     Diabetes Mother     Heart disease Mother     Hypertension Mother     Cancer Father     Alcohol abuse Father     Heart attack Sister     Diabetes Sister     Heart disease Sister     Hypertension Sister     Cancer Brother     Diabetes Brother     Hypertension Brother     Heart attack Sister     Stroke Sister     Cancer Brother        Social History     Socioeconomic History    Marital status:     Number of children: 2   Tobacco Use    Smoking status: Every Day     Current packs/day: 0.25     Average packs/day: 0.4 packs/day for 127.8 years (47.4 ttl pk-yrs)     Types: Cigarettes     Start date: 1959     Passive exposure: Past    Smokeless tobacco: Never    Tobacco comments:     1/17/2022 quit    Vaping Use    Vaping status: Never Used   Substance and Sexual Activity    Alcohol use: No    Drug use: No    Sexual activity: Not Currently           Objective   Physical Exam  Vitals and nursing note reviewed.   Constitutional:       General: She is not in acute distress.     Appearance: She is well-developed. She is not diaphoretic.      Comments: Nontoxic-appearing elderly female   HENT:      Head: Normocephalic and atraumatic.      Comments: Moist tongue and mucous membranes, no tongue laceration present  Eyes:      Pupils: Pupils are equal, round, and  reactive to light.   Neck:      Comments: No meningeal signs or nuchal rigidity  Cardiovascular:      Rate and Rhythm: Normal rate and regular rhythm.      Heart sounds: Normal heart sounds. No murmur heard.     No friction rub. No gallop.   Pulmonary:      Effort: Pulmonary effort is normal. No respiratory distress.      Breath sounds: Normal breath sounds. No wheezing or rales.   Abdominal:      General: Bowel sounds are normal. There is no distension.      Palpations: Abdomen is soft. There is no mass.      Tenderness: There is no abdominal tenderness. There is no guarding or rebound.      Comments: No focal abdominal tenderness, no peritoneal signs, no pain out of proportion to exam   Musculoskeletal:         General: Normal range of motion.      Cervical back: Neck supple.   Skin:     General: Skin is warm and dry.      Findings: No erythema or rash.   Neurological:      Mental Status: She is alert and oriented to person, place, and time.      Comments: Awake, alert, and oriented x3, clear and fluent speech, neurovascularly intact distally in all fours with bounding distal pulses and normal sensation, 5 out of 5 strength in all fours, no cranial nerve deficits noted with cranial nerves II through XII grossly intact   Psychiatric:         Mood and Affect: Mood normal.         Thought Content: Thought content normal.         Judgment: Judgment normal.         Procedures           ED Course  ED Course as of 10/03/24 1726   Thu Oct 03, 2024   1510 79-year-old female presents for evaluation of altered mental status.  EMS was called by the patient's home health nurse this afternoon when the patient was noted to have decreased LOC when compared to baseline.  On EMS arrival, the patient's mental status was at baseline.  She was noted to be hyperglycemic and was brought to our facility to be evaluated.  On arrival, the patient is nontoxic-appearing.  She is answering questions appropriately.  Mental status is at  baseline.  Glucose is 344.  Chronic kidney disease is at baseline. [DD]   1511 I personally and independently viewed the patient's x-ray images myself, and I am in agreement with the radiologist's reading for final interpretation, particularly there is no pneumonia noted. [DD]   1511 I personally and independently reviewed the patient's CT images and findings, and I am in agreement with the radiologist regarding CT interpretation--particularly there is no emergent central process noted. [DD]   1512 IV crystalloid given for hyperglycemia.  No evidence of DKA. [DD]   1543 Upon reevaluation, the patient continues to look and feel well.  Mental status is at baseline per family at bedside.  She is tolerating p.o.  I feel that she can be safely discharged and managed on an outpatient basis at this point.  She will follow-up with her primary care physician within the next week.  She will continue to closely monitor her blood sugars at home and will follow-up with her primary care physician to discuss potential tweaks to her diabetes medication regimen.  Agreeable with plan and given appropriate strict return precautions. [DD]      ED Course User Index  [DD] Arun Miller MD                                   Recent Results (from the past 24 hour(s))   POC Glucose Once    Collection Time: 10/03/24 12:58 PM    Specimen: Blood   Result Value Ref Range    Glucose 300 (H) 70 - 130 mg/dL   ECG 12 Lead Altered Mental Status    Collection Time: 10/03/24  1:25 PM   Result Value Ref Range    QT Interval 366 ms    QTC Interval 411 ms   POC Glucose Once    Collection Time: 10/03/24  1:33 PM    Specimen: Blood   Result Value Ref Range    Glucose 344 (H) 70 - 130 mg/dL   Comprehensive Metabolic Panel    Collection Time: 10/03/24  1:56 PM    Specimen: Blood   Result Value Ref Range    Glucose 303 (H) 65 - 99 mg/dL    BUN 38 (H) 8 - 23 mg/dL    Creatinine 2.88 (H) 0.57 - 1.00 mg/dL    Sodium 135 (L) 136 - 145 mmol/L    Potassium 4.3  3.5 - 5.2 mmol/L    Chloride 101 98 - 107 mmol/L    CO2 20.0 (L) 22.0 - 29.0 mmol/L    Calcium 9.5 8.6 - 10.5 mg/dL    Total Protein 7.1 6.0 - 8.5 g/dL    Albumin 3.4 (L) 3.5 - 5.2 g/dL    ALT (SGPT) 13 1 - 33 U/L    AST (SGOT) 17 1 - 32 U/L    Alkaline Phosphatase 77 39 - 117 U/L    Total Bilirubin 0.4 0.0 - 1.2 mg/dL    Globulin 3.7 gm/dL    A/G Ratio 0.9 g/dL    BUN/Creatinine Ratio 13.2 7.0 - 25.0    Anion Gap 14.0 5.0 - 15.0 mmol/L    eGFR 15.9 (L) >60.0 mL/min/1.73   T4, Free    Collection Time: 10/03/24  1:56 PM    Specimen: Blood   Result Value Ref Range    Free T4 0.98 0.92 - 1.68 ng/dL   TSH    Collection Time: 10/03/24  1:56 PM    Specimen: Blood   Result Value Ref Range    TSH 0.580 0.270 - 4.200 uIU/mL   Magnesium    Collection Time: 10/03/24  1:56 PM    Specimen: Blood   Result Value Ref Range    Magnesium 1.7 1.6 - 2.4 mg/dL   Ammonia    Collection Time: 10/03/24  1:56 PM    Specimen: Blood   Result Value Ref Range    Ammonia <10 (L) 11 - 51 umol/L   Salicylate Level    Collection Time: 10/03/24  1:56 PM    Specimen: Blood   Result Value Ref Range    Salicylate <0.3 <=30.0 mg/dL   Ethanol    Collection Time: 10/03/24  1:56 PM    Specimen: Blood   Result Value Ref Range    Ethanol <10 0 - 10 mg/dL   Acetaminophen Level    Collection Time: 10/03/24  1:56 PM    Specimen: Blood   Result Value Ref Range    Acetaminophen <5.0 0.0 - 30.0 mcg/mL   CBC Auto Differential    Collection Time: 10/03/24  1:56 PM    Specimen: Blood   Result Value Ref Range    WBC 7.56 3.40 - 10.80 10*3/mm3    RBC 2.72 (L) 3.77 - 5.28 10*6/mm3    Hemoglobin 7.6 (L) 12.0 - 15.9 g/dL    Hematocrit 24.2 (L) 34.0 - 46.6 %    MCV 89.0 79.0 - 97.0 fL    MCH 27.9 26.6 - 33.0 pg    MCHC 31.4 (L) 31.5 - 35.7 g/dL    RDW 14.9 12.3 - 15.4 %    RDW-SD 48.2 37.0 - 54.0 fl    MPV 11.2 6.0 - 12.0 fL    Platelets 213 140 - 450 10*3/mm3    Neutrophil % 65.6 42.7 - 76.0 %    Lymphocyte % 18.9 (L) 19.6 - 45.3 %    Monocyte % 11.1 5.0 - 12.0 %     Eosinophil % 2.1 0.3 - 6.2 %    Basophil % 0.8 0.0 - 1.5 %    Immature Grans % 1.5 (H) 0.0 - 0.5 %    Neutrophils, Absolute 4.96 1.70 - 7.00 10*3/mm3    Lymphocytes, Absolute 1.43 0.70 - 3.10 10*3/mm3    Monocytes, Absolute 0.84 0.10 - 0.90 10*3/mm3    Eosinophils, Absolute 0.16 0.00 - 0.40 10*3/mm3    Basophils, Absolute 0.06 0.00 - 0.20 10*3/mm3    Immature Grans, Absolute 0.11 (H) 0.00 - 0.05 10*3/mm3    nRBC 0.0 0.0 - 0.2 /100 WBC   Urinalysis With Culture If Indicated - Urine, Clean Catch    Collection Time: 10/03/24  3:25 PM    Specimen: Urine, Clean Catch   Result Value Ref Range    Color, UA Yellow Yellow, Straw    Appearance, UA Cloudy (A) Clear    pH, UA 6.0 5.0 - 8.0    Specific Gravity, UA 1.009 1.001 - 1.030    Glucose,  mg/dL (2+) (A) Negative    Ketones, UA Negative Negative    Bilirubin, UA Negative Negative    Blood, UA Large (3+) (A) Negative    Protein, UA 30 mg/dL (1+) (A) Negative    Leuk Esterase, UA Negative Negative    Nitrite, UA Negative Negative    Urobilinogen, UA 0.2 E.U./dL 0.2 - 1.0 E.U./dL   Urine Drug Screen - Urine, Clean Catch    Collection Time: 10/03/24  3:25 PM    Specimen: Urine, Clean Catch   Result Value Ref Range    THC, Screen, Urine Negative Negative    Phencyclidine (PCP), Urine Negative Negative    Cocaine Screen, Urine Negative Negative    Methamphetamine, Ur Negative Negative    Opiate Screen Negative Negative    Amphetamine Screen, Urine Negative Negative    Benzodiazepine Screen, Urine Negative Negative    Tricyclic Antidepressants Screen Negative Negative    Methadone Screen, Urine Negative Negative    Barbiturates Screen, Urine Negative Negative    Oxycodone Screen, Urine Negative Negative    Buprenorphine, Screen, Urine Negative Negative   Urinalysis, Microscopic Only - Urine, Clean Catch    Collection Time: 10/03/24  3:25 PM    Specimen: Urine, Clean Catch   Result Value Ref Range    RBC, UA Too Numerous to Count (A) None Seen, 0-2 /HPF    WBC, UA 0-2 None  "Seen, 0-2 /HPF    Bacteria, UA None Seen None Seen, Trace /HPF    Squamous Epithelial Cells, UA None Seen None Seen, 0-2 /HPF    Hyaline Casts, UA None Seen 0 - 6 /LPF    Methodology Automated Microscopy    Fentanyl, Urine - Urine, Clean Catch    Collection Time: 10/03/24  3:25 PM    Specimen: Urine, Clean Catch   Result Value Ref Range    Fentanyl, Urine Negative Negative     Note: In addition to lab results from this visit, the labs listed above may include labs taken at another facility or during a different encounter within the last 24 hours. Please correlate lab times with ED admission and discharge times for further clarification of the services performed during this visit.    CT Head Without Contrast   Final Result   Impression:   Brain atrophy with chronic microvascular ischemic changes. Stable chronic infarcts and surgical changes      No evidence of acute intracranial abnormality            Electronically Signed: Al Gorman MD     10/3/2024 2:20 PM EDT     Workstation ID: [id number]      XR Chest 1 View   Final Result   Impression:   No acute process.         Electronically Signed: Briana Arceo MD     10/3/2024 1:48 PM EDT     Workstation ID: [id number]        Vitals:    10/03/24 1252 10/03/24 1330 10/03/24 1500 10/03/24 1600   BP: 164/67 160/65 148/66 155/43   BP Location: Right arm      Patient Position: Lying      Pulse: 77 76 73 73   Resp: 16      Temp: 98.4 °F (36.9 °C)      TempSrc: Oral      SpO2: 100% 100% 100% 99%   Weight:  72.6 kg (160 lb)     Height:  152.4 cm (60\")       Medications   sodium chloride 0.9 % bolus 1,000 mL (0 mL Intravenous Stopped 10/3/24 1612)     ECG/EMG Results (last 24 hours)       Procedure Component Value Units Date/Time    ECG 12 Lead Altered Mental Status [[id number]] Collected: 10/03/24 1325     Updated: 10/03/24 1518     QT Interval 366 ms      QTC Interval 411 ms     Narrative:      Test Reason : Altered Mental Status  Blood Pressure :   */*   mmHG  Vent. Rate :  " 76 BPM     Atrial Rate :  76 BPM     P-R Int : 128 ms          QRS Dur :  82 ms      QT Int : 366 ms       P-R-T Axes :  73  66  69 degrees     QTc Int : 411 ms    Normal sinus rhythm  Normal ECG  When compared with ECG of 28-SEP-2024 00:14,  No significant change was found  Confirmed by MD Miller Michael ([id number]) on 10/3/2024 3:17:20 PM    Referred By: EDMD           Confirmed By: Nic Miller MD          ECG 12 Lead Altered Mental Status   Final Result   Test Reason : Altered Mental Status   Blood Pressure :   */*   mmHG   Vent. Rate :  76 BPM     Atrial Rate :  76 BPM      P-R Int : 128 ms          QRS Dur :  82 ms       QT Int : 366 ms       P-R-T Axes :  73  66  69 degrees      QTc Int : 411 ms      Normal sinus rhythm   Normal ECG   When compared with ECG of 28-SEP-2024 00:14,   No significant change was found   Confirmed by MD Miller Michael ([id number]) on 10/3/2024 3:17:20 PM      Referred By: EDMD           Confirmed By: Nic Miller MD                    Medical Decision Making  Problems Addressed:  Anemia, unspecified type: complicated acute illness or injury  Chronic kidney disease, unspecified CKD stage: complicated acute illness or injury  Hyperglycemia: complicated acute illness or injury    Amount and/or Complexity of Data Reviewed  Labs: ordered.  Radiology: ordered.  ECG/medicine tests: ordered.        Final diagnoses:   Hyperglycemia   Chronic kidney disease, unspecified CKD stage   Anemia, unspecified type       ED Disposition  ED Disposition       ED Disposition   Discharge    Condition   Stable    Comment   --               Aiden Spencer MD  [address] [place] [address] [address] [place] [id number] [phone]    In 1 week           Medication List      No changes were made to your prescriptions during this visit.            Arun Miller MD  10/03/24 0171

## 2024-10-08 ENCOUNTER — TELEPHONE (OUTPATIENT)
Dept: FAMILY MEDICINE CLINIC | Facility: CLINIC | Age: 79
End: 2024-10-08
Payer: MEDICARE

## 2024-10-08 NOTE — TELEPHONE ENCOUNTER
SPOKE WITH [facility]    OT TO BEGIN TREATMENT PER EVAL, Plan of care to be sent, for signature

## 2024-10-08 NOTE — TELEPHONE ENCOUNTER
Caller: KITTY AGARWAL    Relationship: Home Health    Best call back number: [phone]    What orders are you requesting (i.e. lab or imaging): VERBAL ORDERS FOR 1 X WEEK FOR 4 WEEKS FOR URINARY INCONTINENCE AND STRENGTHEN  AND ACTIVITY TOLERANCE    In what timeframe would the patient need to come in: ASAP    Where will you receive your lab/imaging services: HOME    Additional notes: TODAY WAS DAY 1     PLEASE ADVISE

## 2024-10-09 ENCOUNTER — HOSPITAL ENCOUNTER (INPATIENT)
Facility: HOSPITAL | Age: 79
LOS: 12 days | Discharge: NURSING, REHAB OR LONG-TERM CARE | End: 2024-10-22
Attending: STUDENT IN AN ORGANIZED HEALTH CARE EDUCATION/TRAINING PROGRAM | Admitting: HOSPITALIST
Payer: MEDICARE

## 2024-10-09 ENCOUNTER — APPOINTMENT (OUTPATIENT)
Dept: CT IMAGING | Facility: HOSPITAL | Age: 79
End: 2024-10-09
Payer: MEDICARE

## 2024-10-09 ENCOUNTER — APPOINTMENT (OUTPATIENT)
Dept: GENERAL RADIOLOGY | Facility: HOSPITAL | Age: 79
End: 2024-10-09
Payer: MEDICARE

## 2024-10-09 DIAGNOSIS — E11.65 TYPE 2 DIABETES MELLITUS WITH HYPERGLYCEMIA, WITHOUT LONG-TERM CURRENT USE OF INSULIN: ICD-10-CM

## 2024-10-09 DIAGNOSIS — M48.062 SPINAL STENOSIS, LUMBAR REGION, WITH NEUROGENIC CLAUDICATION: ICD-10-CM

## 2024-10-09 DIAGNOSIS — I67.9 CEREBRAL VASCULAR DISEASE: ICD-10-CM

## 2024-10-09 DIAGNOSIS — Z72.820 SLEEP DEPRIVATION: ICD-10-CM

## 2024-10-09 DIAGNOSIS — M51.369 DEGENERATION OF INTERVERTEBRAL DISC OF LUMBAR REGION, UNSPECIFIED WHETHER PAIN PRESENT: ICD-10-CM

## 2024-10-09 DIAGNOSIS — E11.65 UNCONTROLLED TYPE 2 DIABETES MELLITUS WITH HYPERGLYCEMIA: Primary | ICD-10-CM

## 2024-10-09 DIAGNOSIS — N39.0 ACUTE UTI (URINARY TRACT INFECTION): ICD-10-CM

## 2024-10-09 DIAGNOSIS — R13.12 OROPHARYNGEAL DYSPHAGIA: ICD-10-CM

## 2024-10-09 DIAGNOSIS — E86.0 DEHYDRATION: ICD-10-CM

## 2024-10-09 DIAGNOSIS — J18.9 PNEUMONIA OF LEFT LOWER LOBE DUE TO INFECTIOUS ORGANISM: ICD-10-CM

## 2024-10-09 DIAGNOSIS — R41.0 DISORIENTATION: ICD-10-CM

## 2024-10-09 DIAGNOSIS — J69.0 ASPIRATION PNEUMONIA OF LEFT LOWER LOBE, UNSPECIFIED ASPIRATION PNEUMONIA TYPE: ICD-10-CM

## 2024-10-09 PROBLEM — I10 HTN (HYPERTENSION): Status: ACTIVE | Noted: 2024-10-09

## 2024-10-09 LAB
ALBUMIN SERPL-MCNC: 3.3 G/DL (ref 3.5–5.2)
ALBUMIN/GLOB SERPL: 0.8 G/DL
ALP SERPL-CCNC: 78 U/L (ref 39–117)
ALT SERPL W P-5'-P-CCNC: 18 U/L (ref 1–33)
ANION GAP SERPL CALCULATED.3IONS-SCNC: 14 MMOL/L (ref 5–15)
AST SERPL-CCNC: 21 U/L (ref 1–32)
ATMOSPHERIC PRESS: ABNORMAL MM[HG]
B-OH-BUTYR SERPL-SCNC: 0.14 MMOL/L (ref 0.02–0.27)
BACTERIA UR QL AUTO: ABNORMAL /HPF
BASE EXCESS BLDV CALC-SCNC: -4 MMOL/L (ref -2–2)
BASOPHILS # BLD AUTO: 0.08 10*3/MM3 (ref 0–0.2)
BASOPHILS NFR BLD AUTO: 0.9 % (ref 0–1.5)
BDY SITE: ABNORMAL
BILIRUB SERPL-MCNC: 0.3 MG/DL (ref 0–1.2)
BILIRUB UR QL STRIP: NEGATIVE
BODY TEMPERATURE: 37
BUN SERPL-MCNC: 56 MG/DL (ref 8–23)
BUN/CREAT SERPL: 17 (ref 7–25)
CALCIUM SPEC-SCNC: 9.5 MG/DL (ref 8.6–10.5)
CHLORIDE SERPL-SCNC: 103 MMOL/L (ref 98–107)
CLARITY UR: ABNORMAL
CO2 BLDA-SCNC: 23.1 MMOL/L (ref 22–33)
CO2 SERPL-SCNC: 19 MMOL/L (ref 22–29)
COHGB MFR BLD: 1.6 %
COLOR UR: YELLOW
CREAT SERPL-MCNC: 3.3 MG/DL (ref 0.57–1)
CRP SERPL-MCNC: 13.05 MG/DL (ref 0–0.5)
D-LACTATE SERPL-SCNC: 0.9 MMOL/L (ref 0.5–2)
DEPRECATED RDW RBC AUTO: 47.8 FL (ref 37–54)
EGFRCR SERPLBLD CKD-EPI 2021: 13.5 ML/MIN/1.73
EOSINOPHIL # BLD AUTO: 0.13 10*3/MM3 (ref 0–0.4)
EOSINOPHIL NFR BLD AUTO: 1.5 % (ref 0.3–6.2)
EPAP: 0
ERYTHROCYTE [DISTWIDTH] IN BLOOD BY AUTOMATED COUNT: 14.8 % (ref 12.3–15.4)
FLUAV RNA RESP QL NAA+PROBE: NOT DETECTED
FLUBV RNA RESP QL NAA+PROBE: NOT DETECTED
GLOBULIN UR ELPH-MCNC: 4.1 GM/DL
GLUCOSE BLDC GLUCOMTR-MCNC: 239 MG/DL (ref 70–130)
GLUCOSE BLDC GLUCOMTR-MCNC: 393 MG/DL (ref 70–130)
GLUCOSE BLDC GLUCOMTR-MCNC: 404 MG/DL (ref 70–130)
GLUCOSE SERPL-MCNC: 359 MG/DL (ref 65–99)
GLUCOSE UR STRIP-MCNC: ABNORMAL MG/DL
HCO3 BLDV-SCNC: 21.8 MMOL/L (ref 22–28)
HCT VFR BLD AUTO: 25 % (ref 34–46.6)
HGB BLD-MCNC: 7.9 G/DL (ref 12–15.9)
HGB BLDA-MCNC: 9.8 G/DL (ref 14–18)
HGB UR QL STRIP.AUTO: ABNORMAL
HOLD SPECIMEN: NORMAL
HYALINE CASTS UR QL AUTO: ABNORMAL /LPF
IMM GRANULOCYTES # BLD AUTO: 0.2 10*3/MM3 (ref 0–0.05)
IMM GRANULOCYTES NFR BLD AUTO: 2.3 % (ref 0–0.5)
INHALED O2 CONCENTRATION: 21 %
IPAP: 0
KETONES UR QL STRIP: NEGATIVE
LEUKOCYTE ESTERASE UR QL STRIP.AUTO: ABNORMAL
LIPASE SERPL-CCNC: 23 U/L (ref 13–60)
LYMPHOCYTES # BLD AUTO: 1.13 10*3/MM3 (ref 0.7–3.1)
LYMPHOCYTES NFR BLD AUTO: 13 % (ref 19.6–45.3)
MAGNESIUM SERPL-MCNC: 1.8 MG/DL (ref 1.6–2.4)
MCH RBC QN AUTO: 28 PG (ref 26.6–33)
MCHC RBC AUTO-ENTMCNC: 31.6 G/DL (ref 31.5–35.7)
MCV RBC AUTO: 88.7 FL (ref 79–97)
METHGB BLD QL: 0.3 %
MODALITY: ABNORMAL
MONOCYTES # BLD AUTO: 0.83 10*3/MM3 (ref 0.1–0.9)
MONOCYTES NFR BLD AUTO: 9.6 % (ref 5–12)
NEUTROPHILS NFR BLD AUTO: 6.31 10*3/MM3 (ref 1.7–7)
NEUTROPHILS NFR BLD AUTO: 72.7 % (ref 42.7–76)
NITRITE UR QL STRIP: NEGATIVE
NRBC BLD AUTO-RTO: 0 /100 WBC (ref 0–0.2)
NT-PROBNP SERPL-MCNC: 719.5 PG/ML (ref 0–1800)
OXYHGB MFR BLDV: 65.8 %
PAW @ PEAK INSP FLOW SETTING VENT: 0 CMH2O
PCO2 BLDV: 42 MM HG (ref 41–51)
PH BLDV: 7.32 PH UNITS (ref 7.31–7.41)
PH UR STRIP.AUTO: <=5 [PH] (ref 5–8)
PHOSPHATE SERPL-MCNC: 3.5 MG/DL (ref 2.5–4.5)
PLATELET # BLD AUTO: 301 10*3/MM3 (ref 140–450)
PMV BLD AUTO: 10.8 FL (ref 6–12)
PO2 BLDV: 37.3 MM HG (ref 27–53)
POTASSIUM SERPL-SCNC: 4.4 MMOL/L (ref 3.5–5.2)
PROCALCITONIN SERPL-MCNC: 0.12 NG/ML (ref 0–0.25)
PROT SERPL-MCNC: 7.4 G/DL (ref 6–8.5)
PROT UR QL STRIP: ABNORMAL
RBC # BLD AUTO: 2.82 10*6/MM3 (ref 3.77–5.28)
RBC # UR STRIP: ABNORMAL /HPF
REF LAB TEST METHOD: ABNORMAL
RSV RNA RESP QL NAA+PROBE: NOT DETECTED
SARS-COV-2 RNA RESP QL NAA+PROBE: NOT DETECTED
SODIUM SERPL-SCNC: 136 MMOL/L (ref 136–145)
SP GR UR STRIP: 1.01 (ref 1–1.03)
SQUAMOUS #/AREA URNS HPF: ABNORMAL /HPF
T4 FREE SERPL-MCNC: 1.08 NG/DL (ref 0.92–1.68)
TOTAL RATE: 0 BREATHS/MINUTE
TROPONIN T SERPL HS-MCNC: 39 NG/L
TSH SERPL DL<=0.05 MIU/L-ACNC: 0.47 UIU/ML (ref 0.27–4.2)
UROBILINOGEN UR QL STRIP: ABNORMAL
WBC # UR STRIP: ABNORMAL /HPF
WBC NRBC COR # BLD AUTO: 8.68 10*3/MM3 (ref 3.4–10.8)
WHOLE BLOOD HOLD COAG: NORMAL
WHOLE BLOOD HOLD SPECIMEN: NORMAL

## 2024-10-09 PROCEDURE — 87449 NOS EACH ORGANISM AG IA: CPT

## 2024-10-09 PROCEDURE — G0378 HOSPITAL OBSERVATION PER HR: HCPCS

## 2024-10-09 PROCEDURE — 82948 REAGENT STRIP/BLOOD GLUCOSE: CPT

## 2024-10-09 PROCEDURE — 25010000002 ONDANSETRON PER 1 MG: Performed by: STUDENT IN AN ORGANIZED HEALTH CARE EDUCATION/TRAINING PROGRAM

## 2024-10-09 PROCEDURE — 83735 ASSAY OF MAGNESIUM: CPT | Performed by: STUDENT IN AN ORGANIZED HEALTH CARE EDUCATION/TRAINING PROGRAM

## 2024-10-09 PROCEDURE — 85025 COMPLETE CBC W/AUTO DIFF WBC: CPT | Performed by: STUDENT IN AN ORGANIZED HEALTH CARE EDUCATION/TRAINING PROGRAM

## 2024-10-09 PROCEDURE — 83605 ASSAY OF LACTIC ACID: CPT | Performed by: STUDENT IN AN ORGANIZED HEALTH CARE EDUCATION/TRAINING PROGRAM

## 2024-10-09 PROCEDURE — 87040 BLOOD CULTURE FOR BACTERIA: CPT | Performed by: STUDENT IN AN ORGANIZED HEALTH CARE EDUCATION/TRAINING PROGRAM

## 2024-10-09 PROCEDURE — 82746 ASSAY OF FOLIC ACID SERUM: CPT

## 2024-10-09 PROCEDURE — 84443 ASSAY THYROID STIM HORMONE: CPT | Performed by: STUDENT IN AN ORGANIZED HEALTH CARE EDUCATION/TRAINING PROGRAM

## 2024-10-09 PROCEDURE — 74176 CT ABD & PELVIS W/O CONTRAST: CPT

## 2024-10-09 PROCEDURE — 63710000001 INSULIN REGULAR HUMAN PER 5 UNITS: Performed by: STUDENT IN AN ORGANIZED HEALTH CARE EDUCATION/TRAINING PROGRAM

## 2024-10-09 PROCEDURE — 93005 ELECTROCARDIOGRAM TRACING: CPT | Performed by: STUDENT IN AN ORGANIZED HEALTH CARE EDUCATION/TRAINING PROGRAM

## 2024-10-09 PROCEDURE — 84145 PROCALCITONIN (PCT): CPT | Performed by: STUDENT IN AN ORGANIZED HEALTH CARE EDUCATION/TRAINING PROGRAM

## 2024-10-09 PROCEDURE — 80053 COMPREHEN METABOLIC PANEL: CPT | Performed by: STUDENT IN AN ORGANIZED HEALTH CARE EDUCATION/TRAINING PROGRAM

## 2024-10-09 PROCEDURE — 94640 AIRWAY INHALATION TREATMENT: CPT

## 2024-10-09 PROCEDURE — 83880 ASSAY OF NATRIURETIC PEPTIDE: CPT | Performed by: STUDENT IN AN ORGANIZED HEALTH CARE EDUCATION/TRAINING PROGRAM

## 2024-10-09 PROCEDURE — 81001 URINALYSIS AUTO W/SCOPE: CPT | Performed by: STUDENT IN AN ORGANIZED HEALTH CARE EDUCATION/TRAINING PROGRAM

## 2024-10-09 PROCEDURE — 99213 OFFICE O/P EST LOW 20 MIN: CPT

## 2024-10-09 PROCEDURE — 87086 URINE CULTURE/COLONY COUNT: CPT | Performed by: STUDENT IN AN ORGANIZED HEALTH CARE EDUCATION/TRAINING PROGRAM

## 2024-10-09 PROCEDURE — 87637 SARSCOV2&INF A&B&RSV AMP PRB: CPT | Performed by: STUDENT IN AN ORGANIZED HEALTH CARE EDUCATION/TRAINING PROGRAM

## 2024-10-09 PROCEDURE — 84466 ASSAY OF TRANSFERRIN: CPT

## 2024-10-09 PROCEDURE — 84439 ASSAY OF FREE THYROXINE: CPT | Performed by: STUDENT IN AN ORGANIZED HEALTH CARE EDUCATION/TRAINING PROGRAM

## 2024-10-09 PROCEDURE — 87899 AGENT NOS ASSAY W/OPTIC: CPT

## 2024-10-09 PROCEDURE — 83540 ASSAY OF IRON: CPT

## 2024-10-09 PROCEDURE — 86140 C-REACTIVE PROTEIN: CPT | Performed by: STUDENT IN AN ORGANIZED HEALTH CARE EDUCATION/TRAINING PROGRAM

## 2024-10-09 PROCEDURE — 84484 ASSAY OF TROPONIN QUANT: CPT | Performed by: STUDENT IN AN ORGANIZED HEALTH CARE EDUCATION/TRAINING PROGRAM

## 2024-10-09 PROCEDURE — 82805 BLOOD GASES W/O2 SATURATION: CPT

## 2024-10-09 PROCEDURE — 83690 ASSAY OF LIPASE: CPT | Performed by: STUDENT IN AN ORGANIZED HEALTH CARE EDUCATION/TRAINING PROGRAM

## 2024-10-09 PROCEDURE — 82010 KETONE BODYS QUAN: CPT | Performed by: STUDENT IN AN ORGANIZED HEALTH CARE EDUCATION/TRAINING PROGRAM

## 2024-10-09 PROCEDURE — 25810000003 SODIUM CHLORIDE 0.9 % SOLUTION: Performed by: STUDENT IN AN ORGANIZED HEALTH CARE EDUCATION/TRAINING PROGRAM

## 2024-10-09 PROCEDURE — 25010000002 PIPERACILLIN SOD-TAZOBACTAM PER 1 G: Performed by: STUDENT IN AN ORGANIZED HEALTH CARE EDUCATION/TRAINING PROGRAM

## 2024-10-09 PROCEDURE — 82728 ASSAY OF FERRITIN: CPT

## 2024-10-09 PROCEDURE — 71045 X-RAY EXAM CHEST 1 VIEW: CPT

## 2024-10-09 PROCEDURE — 36415 COLL VENOUS BLD VENIPUNCTURE: CPT

## 2024-10-09 PROCEDURE — 99285 EMERGENCY DEPT VISIT HI MDM: CPT

## 2024-10-09 PROCEDURE — 84100 ASSAY OF PHOSPHORUS: CPT | Performed by: STUDENT IN AN ORGANIZED HEALTH CARE EDUCATION/TRAINING PROGRAM

## 2024-10-09 PROCEDURE — 82607 VITAMIN B-12: CPT

## 2024-10-09 RX ORDER — VANCOMYCIN/0.9 % SOD CHLORIDE 1.5G/250ML
20 PLASTIC BAG, INJECTION (ML) INTRAVENOUS ONCE
Status: COMPLETED | OUTPATIENT
Start: 2024-10-09 | End: 2024-10-10

## 2024-10-09 RX ORDER — SODIUM CHLORIDE 0.9 % (FLUSH) 0.9 %
10 SYRINGE (ML) INJECTION AS NEEDED
Status: DISCONTINUED | OUTPATIENT
Start: 2024-10-09 | End: 2024-10-22 | Stop reason: HOSPADM

## 2024-10-09 RX ORDER — ONDANSETRON 2 MG/ML
4 INJECTION INTRAMUSCULAR; INTRAVENOUS ONCE
Status: COMPLETED | OUTPATIENT
Start: 2024-10-09 | End: 2024-10-09

## 2024-10-09 RX ORDER — IPRATROPIUM BROMIDE AND ALBUTEROL SULFATE 2.5; .5 MG/3ML; MG/3ML
3 SOLUTION RESPIRATORY (INHALATION) ONCE
Status: COMPLETED | OUTPATIENT
Start: 2024-10-09 | End: 2024-10-09

## 2024-10-09 RX ADMIN — PIPERACILLIN AND TAZOBACTAM 3.38 G: 3; .375 INJECTION, POWDER, LYOPHILIZED, FOR SOLUTION INTRAVENOUS at 23:05

## 2024-10-09 RX ADMIN — SODIUM CHLORIDE 1000 ML: 9 INJECTION, SOLUTION INTRAVENOUS at 21:51

## 2024-10-09 RX ADMIN — ONDANSETRON 4 MG: 2 INJECTION INTRAMUSCULAR; INTRAVENOUS at 21:52

## 2024-10-09 RX ADMIN — IPRATROPIUM BROMIDE AND ALBUTEROL SULFATE 3 ML: 2.5; .5 SOLUTION RESPIRATORY (INHALATION) at 23:22

## 2024-10-09 RX ADMIN — INSULIN HUMAN 4 UNITS: 100 INJECTION, SOLUTION PARENTERAL at 21:51

## 2024-10-09 NOTE — TELEPHONE ENCOUNTER
BLOOD SUGAR . HOMEHEALTH ON THE LINE STATING SHE ISNT DOING ANY BETTER. VERY LETHARGIC. WANTED TO GET DOCTORS OPINION BEFORE MOVING FORWARD.     HOME HEALTH  SHI ROMERO  [phone]

## 2024-10-09 NOTE — TELEPHONE ENCOUNTER
Spoke with home health and Delbert rdzs sister and let them both know that Per PCP verbal patient needs to go to the ER. Called home health's office and requested an updated medication list for patient as we have not seen her since the last time she was discharged.

## 2024-10-09 NOTE — Clinical Note
Level of Care: Telemetry [5]   Diagnosis: LLL pneumonia [858099]   Admitting Physician: LISANDRO DANGELO III [[id number]]   Attending Physician: LISANDRO DANGELO III [[id number]]   Bed Request Comments: tele obs cdu

## 2024-10-10 ENCOUNTER — APPOINTMENT (OUTPATIENT)
Dept: GENERAL RADIOLOGY | Facility: HOSPITAL | Age: 79
End: 2024-10-10
Payer: MEDICARE

## 2024-10-10 ENCOUNTER — TELEPHONE (OUTPATIENT)
Dept: FAMILY MEDICINE CLINIC | Facility: CLINIC | Age: 79
End: 2024-10-10

## 2024-10-10 PROBLEM — J18.9 PNEUMONIA: Status: ACTIVE | Noted: 2024-10-10

## 2024-10-10 LAB
ALBUMIN SERPL-MCNC: 3.1 G/DL (ref 3.5–5.2)
ALBUMIN/GLOB SERPL: 0.8 G/DL
ALP SERPL-CCNC: 70 U/L (ref 39–117)
ALT SERPL W P-5'-P-CCNC: 15 U/L (ref 1–33)
AMMONIA BLD-SCNC: 16 UMOL/L (ref 11–51)
ANION GAP SERPL CALCULATED.3IONS-SCNC: 13 MMOL/L (ref 5–15)
AST SERPL-CCNC: 21 U/L (ref 1–32)
BASOPHILS # BLD AUTO: 0.1 10*3/MM3 (ref 0–0.2)
BASOPHILS NFR BLD AUTO: 1 % (ref 0–1.5)
BILIRUB SERPL-MCNC: 0.2 MG/DL (ref 0–1.2)
BUN SERPL-MCNC: 45 MG/DL (ref 8–23)
BUN/CREAT SERPL: 15.6 (ref 7–25)
CALCIUM SPEC-SCNC: 9.1 MG/DL (ref 8.6–10.5)
CHLORIDE SERPL-SCNC: 110 MMOL/L (ref 98–107)
CO2 SERPL-SCNC: 18 MMOL/L (ref 22–29)
CREAT SERPL-MCNC: 2.88 MG/DL (ref 0.57–1)
DEPRECATED RDW RBC AUTO: 49.3 FL (ref 37–54)
EGFRCR SERPLBLD CKD-EPI 2021: 15.9 ML/MIN/1.73
EOSINOPHIL # BLD AUTO: 0.14 10*3/MM3 (ref 0–0.4)
EOSINOPHIL NFR BLD AUTO: 1.4 % (ref 0.3–6.2)
ERYTHROCYTE [DISTWIDTH] IN BLOOD BY AUTOMATED COUNT: 15.2 % (ref 12.3–15.4)
FERRITIN SERPL-MCNC: 766.8 NG/ML (ref 13–150)
FOLATE SERPL-MCNC: 19 NG/ML (ref 4.78–24.2)
GLOBULIN UR ELPH-MCNC: 3.9 GM/DL
GLUCOSE BLDC GLUCOMTR-MCNC: 156 MG/DL (ref 70–130)
GLUCOSE BLDC GLUCOMTR-MCNC: 217 MG/DL (ref 70–130)
GLUCOSE BLDC GLUCOMTR-MCNC: 241 MG/DL (ref 70–130)
GLUCOSE BLDC GLUCOMTR-MCNC: 252 MG/DL (ref 70–130)
GLUCOSE BLDC GLUCOMTR-MCNC: 260 MG/DL (ref 70–130)
GLUCOSE SERPL-MCNC: 156 MG/DL (ref 65–99)
HCT VFR BLD AUTO: 23.7 % (ref 34–46.6)
HCT VFR BLD AUTO: 25.8 % (ref 34–46.6)
HCT VFR BLD AUTO: 27.6 % (ref 34–46.6)
HGB BLD-MCNC: 7.5 G/DL (ref 12–15.9)
HGB BLD-MCNC: 8.2 G/DL (ref 12–15.9)
HGB BLD-MCNC: 8.3 G/DL (ref 12–15.9)
IMM GRANULOCYTES # BLD AUTO: 0.16 10*3/MM3 (ref 0–0.05)
IMM GRANULOCYTES NFR BLD AUTO: 1.6 % (ref 0–0.5)
IRON 24H UR-MRATE: 14 MCG/DL (ref 37–145)
IRON 24H UR-MRATE: 14 MCG/DL (ref 37–145)
IRON SATN MFR SERPL: 7 % (ref 20–50)
L PNEUMO1 AG UR QL IA: NEGATIVE
LYMPHOCYTES # BLD AUTO: 1.36 10*3/MM3 (ref 0.7–3.1)
LYMPHOCYTES NFR BLD AUTO: 13.4 % (ref 19.6–45.3)
MCH RBC QN AUTO: 28.1 PG (ref 26.6–33)
MCHC RBC AUTO-ENTMCNC: 31.6 G/DL (ref 31.5–35.7)
MCV RBC AUTO: 88.8 FL (ref 79–97)
MONOCYTES # BLD AUTO: 1.08 10*3/MM3 (ref 0.1–0.9)
MONOCYTES NFR BLD AUTO: 10.6 % (ref 5–12)
NEUTROPHILS NFR BLD AUTO: 7.33 10*3/MM3 (ref 1.7–7)
NEUTROPHILS NFR BLD AUTO: 72 % (ref 42.7–76)
NRBC BLD AUTO-RTO: 0 /100 WBC (ref 0–0.2)
PLATELET # BLD AUTO: 296 10*3/MM3 (ref 140–450)
PMV BLD AUTO: 10.6 FL (ref 6–12)
POTASSIUM SERPL-SCNC: 4.1 MMOL/L (ref 3.5–5.2)
PROT SERPL-MCNC: 7 G/DL (ref 6–8.5)
RBC # BLD AUTO: 2.67 10*6/MM3 (ref 3.77–5.28)
RETICS # AUTO: 0.04 10*6/MM3 (ref 0.02–0.13)
RETICS/RBC NFR AUTO: 1.68 % (ref 0.7–1.9)
S PNEUM AG SPEC QL LA: NEGATIVE
SODIUM SERPL-SCNC: 141 MMOL/L (ref 136–145)
TIBC SERPL-MCNC: 195 MCG/DL (ref 298–536)
TRANSFERRIN SERPL-MCNC: 131 MG/DL (ref 200–360)
VIT B12 BLD-MCNC: 1262 PG/ML (ref 211–946)
WBC NRBC COR # BLD AUTO: 10.17 10*3/MM3 (ref 3.4–10.8)

## 2024-10-10 PROCEDURE — 63710000001 INSULIN REGULAR HUMAN PER 5 UNITS

## 2024-10-10 PROCEDURE — 94761 N-INVAS EAR/PLS OXIMETRY MLT: CPT

## 2024-10-10 PROCEDURE — 94799 UNLISTED PULMONARY SVC/PX: CPT

## 2024-10-10 PROCEDURE — 99232 SBSQ HOSP IP/OBS MODERATE 35: CPT | Performed by: INTERNAL MEDICINE

## 2024-10-10 PROCEDURE — 74230 X-RAY XM SWLNG FUNCJ C+: CPT

## 2024-10-10 PROCEDURE — 97162 PT EVAL MOD COMPLEX 30 MIN: CPT

## 2024-10-10 PROCEDURE — 25810000003 SODIUM CHLORIDE 0.9 % SOLUTION

## 2024-10-10 PROCEDURE — 92611 MOTION FLUOROSCOPY/SWALLOW: CPT

## 2024-10-10 PROCEDURE — 25010000002 CEFTRIAXONE PER 250 MG: Performed by: INTERNAL MEDICINE

## 2024-10-10 PROCEDURE — 25010000002 VANCOMYCIN 1.5-0.9 GM/500ML-% SOLUTION: Performed by: STUDENT IN AN ORGANIZED HEALTH CARE EDUCATION/TRAINING PROGRAM

## 2024-10-10 PROCEDURE — 85025 COMPLETE CBC W/AUTO DIFF WBC: CPT

## 2024-10-10 PROCEDURE — 82948 REAGENT STRIP/BLOOD GLUCOSE: CPT

## 2024-10-10 PROCEDURE — 82140 ASSAY OF AMMONIA: CPT | Performed by: NURSE PRACTITIONER

## 2024-10-10 PROCEDURE — 85014 HEMATOCRIT: CPT

## 2024-10-10 PROCEDURE — 85018 HEMOGLOBIN: CPT

## 2024-10-10 PROCEDURE — 80053 COMPREHEN METABOLIC PANEL: CPT

## 2024-10-10 PROCEDURE — 92610 EVALUATE SWALLOWING FUNCTION: CPT

## 2024-10-10 PROCEDURE — 97535 SELF CARE MNGMENT TRAINING: CPT

## 2024-10-10 PROCEDURE — 97166 OT EVAL MOD COMPLEX 45 MIN: CPT

## 2024-10-10 PROCEDURE — 85045 AUTOMATED RETICULOCYTE COUNT: CPT | Performed by: PHYSICIAN ASSISTANT

## 2024-10-10 RX ORDER — DIPHENHYDRAMINE HYDROCHLORIDE 50 MG/ML
25 INJECTION INTRAMUSCULAR; INTRAVENOUS ONCE AS NEEDED
Status: COMPLETED | OUTPATIENT
Start: 2024-10-10 | End: 2024-10-12

## 2024-10-10 RX ORDER — FAMOTIDINE 20 MG/1
10 TABLET, FILM COATED ORAL EVERY OTHER DAY
Status: DISCONTINUED | OUTPATIENT
Start: 2024-10-10 | End: 2024-10-19

## 2024-10-10 RX ORDER — AMOXICILLIN 250 MG
2 CAPSULE ORAL 2 TIMES DAILY PRN
Status: DISCONTINUED | OUTPATIENT
Start: 2024-10-10 | End: 2024-10-13

## 2024-10-10 RX ORDER — DEXTROSE MONOHYDRATE 25 G/50ML
25 INJECTION, SOLUTION INTRAVENOUS
Status: DISCONTINUED | OUTPATIENT
Start: 2024-10-10 | End: 2024-10-22 | Stop reason: HOSPADM

## 2024-10-10 RX ORDER — POLYETHYLENE GLYCOL 3350 17 G/17G
17 POWDER, FOR SOLUTION ORAL DAILY PRN
Status: DISCONTINUED | OUTPATIENT
Start: 2024-10-10 | End: 2024-10-13

## 2024-10-10 RX ORDER — ATORVASTATIN CALCIUM 40 MG/1
80 TABLET, FILM COATED ORAL DAILY
Status: DISCONTINUED | OUTPATIENT
Start: 2024-10-10 | End: 2024-10-22 | Stop reason: HOSPADM

## 2024-10-10 RX ORDER — SODIUM CHLORIDE 0.9 % (FLUSH) 0.9 %
10 SYRINGE (ML) INJECTION EVERY 12 HOURS SCHEDULED
Status: DISCONTINUED | OUTPATIENT
Start: 2024-10-10 | End: 2024-10-22 | Stop reason: HOSPADM

## 2024-10-10 RX ORDER — PREDNISONE 5 MG/1
5 TABLET ORAL
Status: DISCONTINUED | OUTPATIENT
Start: 2024-10-10 | End: 2024-10-13

## 2024-10-10 RX ORDER — CLOPIDOGREL BISULFATE 75 MG/1
75 TABLET ORAL DAILY
Status: DISCONTINUED | OUTPATIENT
Start: 2024-10-10 | End: 2024-10-22 | Stop reason: HOSPADM

## 2024-10-10 RX ORDER — BISACODYL 5 MG/1
5 TABLET, DELAYED RELEASE ORAL DAILY PRN
Status: DISCONTINUED | OUTPATIENT
Start: 2024-10-10 | End: 2024-10-13

## 2024-10-10 RX ORDER — SODIUM CHLORIDE 0.9 % (FLUSH) 0.9 %
10 SYRINGE (ML) INJECTION AS NEEDED
Status: DISCONTINUED | OUTPATIENT
Start: 2024-10-10 | End: 2024-10-22 | Stop reason: HOSPADM

## 2024-10-10 RX ORDER — BISACODYL 10 MG
10 SUPPOSITORY, RECTAL RECTAL DAILY PRN
Status: DISCONTINUED | OUTPATIENT
Start: 2024-10-10 | End: 2024-10-13

## 2024-10-10 RX ORDER — ACETAMINOPHEN 160 MG/5ML
650 SOLUTION ORAL EVERY 4 HOURS PRN
Status: DISCONTINUED | OUTPATIENT
Start: 2024-10-10 | End: 2024-10-22 | Stop reason: HOSPADM

## 2024-10-10 RX ORDER — ISOSORBIDE MONONITRATE 60 MG/1
60 TABLET, EXTENDED RELEASE ORAL DAILY
Status: DISCONTINUED | OUTPATIENT
Start: 2024-10-10 | End: 2024-10-22 | Stop reason: HOSPADM

## 2024-10-10 RX ORDER — IPRATROPIUM BROMIDE AND ALBUTEROL SULFATE 2.5; .5 MG/3ML; MG/3ML
3 SOLUTION RESPIRATORY (INHALATION)
Status: DISCONTINUED | OUTPATIENT
Start: 2024-10-10 | End: 2024-10-22 | Stop reason: HOSPADM

## 2024-10-10 RX ORDER — ASPIRIN 81 MG/1
81 TABLET ORAL DAILY
Status: DISCONTINUED | OUTPATIENT
Start: 2024-10-10 | End: 2024-10-11

## 2024-10-10 RX ORDER — SODIUM CHLORIDE 9 MG/ML
75 INJECTION, SOLUTION INTRAVENOUS CONTINUOUS
Status: ACTIVE | OUTPATIENT
Start: 2024-10-10 | End: 2024-10-10

## 2024-10-10 RX ORDER — ACETAMINOPHEN 650 MG/1
650 SUPPOSITORY RECTAL EVERY 4 HOURS PRN
Status: DISCONTINUED | OUTPATIENT
Start: 2024-10-10 | End: 2024-10-22 | Stop reason: HOSPADM

## 2024-10-10 RX ORDER — ACETAMINOPHEN 325 MG/1
650 TABLET ORAL EVERY 4 HOURS PRN
Status: DISCONTINUED | OUTPATIENT
Start: 2024-10-10 | End: 2024-10-22 | Stop reason: HOSPADM

## 2024-10-10 RX ORDER — IBUPROFEN 600 MG/1
1 TABLET ORAL
Status: DISCONTINUED | OUTPATIENT
Start: 2024-10-10 | End: 2024-10-22 | Stop reason: HOSPADM

## 2024-10-10 RX ORDER — NICOTINE POLACRILEX 4 MG
15 LOZENGE BUCCAL
Status: DISCONTINUED | OUTPATIENT
Start: 2024-10-10 | End: 2024-10-22 | Stop reason: HOSPADM

## 2024-10-10 RX ADMIN — ACETAMINOPHEN 650 MG: 325 TABLET, FILM COATED ORAL at 22:51

## 2024-10-10 RX ADMIN — HUMAN INSULIN 4 UNITS: 100 INJECTION, SOLUTION SUBCUTANEOUS at 06:32

## 2024-10-10 RX ADMIN — IPRATROPIUM BROMIDE AND ALBUTEROL SULFATE 3 ML: 2.5; .5 SOLUTION RESPIRATORY (INHALATION) at 16:00

## 2024-10-10 RX ADMIN — SODIUM CHLORIDE 1000 MG: 900 INJECTION INTRAVENOUS at 12:38

## 2024-10-10 RX ADMIN — BARIUM SULFATE 100 ML: 0.81 POWDER, FOR SUSPENSION ORAL at 14:07

## 2024-10-10 RX ADMIN — Medication 1500 MG: at 00:21

## 2024-10-10 RX ADMIN — SODIUM CHLORIDE 75 ML/HR: 9 INJECTION, SOLUTION INTRAVENOUS at 03:08

## 2024-10-10 RX ADMIN — Medication 10 ML: at 22:34

## 2024-10-10 RX ADMIN — IPRATROPIUM BROMIDE AND ALBUTEROL SULFATE 3 ML: 2.5; .5 SOLUTION RESPIRATORY (INHALATION) at 19:50

## 2024-10-10 RX ADMIN — BARIUM SULFATE 20 ML: 400 PASTE ORAL at 14:07

## 2024-10-10 RX ADMIN — Medication 10 ML: at 12:39

## 2024-10-10 NOTE — ED PROVIDER NOTES
EMERGENCY DEPARTMENT ENCOUNTER    Pt Name: Arminda Krishnan  MRN: [id number]  Pt :   1945  Room Number:  S524/1  Date of encounter:  10/9/2024  PCP: Aiden Spencer MD  ED Provider: Ricco Lamar MD    Historian: Patient, sister      HPI:  Chief Complaint: Epigastric pain, hyperglycemia        Context: Arminda Krishnan is a 79-year-old woman with diabetes and history of gastroparesis and bowel obstruction who presents for evaluation of abdominal pain and hyperglycemia.  She has not taken any of her diabetic medicines today she is accompanied by her sister who helps with the history.  She says she is having severe upper abdominal pain as well as nausea and has not been able to tolerate anything by mouth they think her last bowel movement may have been as long as 2 weeks ago.  No other complaints at this time.       PAST MEDICAL HISTORY  Past Medical History:   Diagnosis Date    Anemia     Arthritis     Back problem     CAD (coronary artery disease)     Cancer     Right breast    Chronic back pain     Chronically on opiate therapy     Depression     Diabetes mellitus     DX 14 years ago- checks fsbs weekly    Fibromyalgia     Gastroparesis     GERD (gastroesophageal reflux disease)     Headache     emotional/tension    History of transfusion     [facility]    HTN (hypertension)     Hypercholesteremia     IBS (irritable bowel syndrome)     Incontinence of urine     urgency    Migraine headache     Myalgia and myositis     Peripheral neuropathy     Sleep apnea     does not wear cpap    UTI (urinary tract infection)          PAST SURGICAL HISTORY  Past Surgical History:   Procedure Laterality Date    APPENDECTOMY      ARTERIOGRAM MESENTERIC N/A 2022    Procedure: DIAGNOSTIC ARTERIOGRAM WITH CELIAC STENT PLACEMENT;  Surgeon: Neo Neil MD;  Location: [facility];  Service: Vascular;  Laterality: N/A;  FLUORO: 16 MIN  DOSE: 2384 MGY  CONTRAST:  20 ML    BACK  SURGERY      5x per patient    BRAIN TUMOR EXCISION  1988    BREAST BIOPSY      CARPAL TUNNEL RELEASE Bilateral     CHOLECYSTECTOMY      COLONOSCOPY      2015    CRANIOTOMY FOR TUMOR      EYE SURGERY      bilateral cataracts removed    HEMORRHOIDECTOMY      LUMBAR FUSION N/A 01/04/2017    Procedure: LUMBAR LAMINECTOMY AND DECOMRESSION  L3 AND L4;  Surgeon: Nishant Bhatti MD;  [facility];  Service:     TOTAL ABDOMINAL HYSTERECTOMY      TRIGGER FINGER RELEASE           FAMILY HISTORY  Family History   Problem Relation Age of Onset    Cancer Other     Diabetes Other     Hyperlipidemia Other     Heart attack Other     Hypertension Other     Heart attack Mother     Diabetes Mother     Heart disease Mother     Hypertension Mother     Cancer Father     Alcohol abuse Father     Heart attack Sister     Diabetes Sister     Heart disease Sister     Hypertension Sister     Cancer Brother     Diabetes Brother     Hypertension Brother     Heart attack Sister     Stroke Sister     Cancer Brother          SOCIAL HISTORY  Social History     Socioeconomic History    Marital status:     Number of children: 2   Tobacco Use    Smoking status: Every Day     Current packs/day: 0.25     Average packs/day: 0.4 packs/day for 127.8 years (47.4 ttl pk-yrs)     Types: Cigarettes     Start date: 1959     Passive exposure: Past    Smokeless tobacco: Never    Tobacco comments:     1/17/2022 quit    Vaping Use    Vaping status: Never Used   Substance and Sexual Activity    Alcohol use: No    Drug use: No    Sexual activity: Not Currently         ALLERGIES  Ceclor [cefaclor]        REVIEW OF SYSTEMS  Review of Systems       All systems reviewed and negative except for those discussed in HPI.       PHYSICAL EXAM    I have reviewed the triage vital signs and nursing notes.    ED Triage Vitals [10/09/24 1748]   Temp Heart Rate Resp BP SpO2   98.3 °F (36.8 °C) 83 18 134/72 99 %      Temp src Heart Rate Source Patient Position BP  Location FiO2 (%)   Oral Monitor Sitting Right arm --       Physical Exam  GENERAL:   Appears i chronically ill  HENT: Nares patent.  EYES: No scleral icterus.  CV: Regular rhythm, regular rate.  RESPIRATORY: Normal effort.  No audible wheezes, rales or rhonchi.  ABDOMEN: Mildly distended, tender in the epigastrium without rigidity or guarding  MUSCULOSKELETAL: No deformities.   NEURO: Alert, moves all extremities, follows commands.  SKIN: Warm, dry, no rash visualized.      LAB RESULTS  Recent Results (from the past 24 hour(s))   POC Glucose Once    Collection Time: 10/09/24  5:53 PM    Specimen: Blood   Result Value Ref Range    Glucose 404 (C) 70 - 130 mg/dL   POC Glucose Once    Collection Time: 10/09/24  5:55 PM    Specimen: Blood   Result Value Ref Range    Glucose 393 (H) 70 - 130 mg/dL   Comprehensive Metabolic Panel    Collection Time: 10/09/24  6:42 PM    Specimen: Blood   Result Value Ref Range    Glucose 359 (H) 65 - 99 mg/dL    BUN 56 (H) 8 - 23 mg/dL    Creatinine 3.30 (H) 0.57 - 1.00 mg/dL    Sodium 136 136 - 145 mmol/L    Potassium 4.4 3.5 - 5.2 mmol/L    Chloride 103 98 - 107 mmol/L    CO2 19.0 (L) 22.0 - 29.0 mmol/L    Calcium 9.5 8.6 - 10.5 mg/dL    Total Protein 7.4 6.0 - 8.5 g/dL    Albumin 3.3 (L) 3.5 - 5.2 g/dL    ALT (SGPT) 18 1 - 33 U/L    AST (SGOT) 21 1 - 32 U/L    Alkaline Phosphatase 78 39 - 117 U/L    Total Bilirubin 0.3 0.0 - 1.2 mg/dL    Globulin 4.1 gm/dL    A/G Ratio 0.8 g/dL    BUN/Creatinine Ratio 17.0 7.0 - 25.0    Anion Gap 14.0 5.0 - 15.0 mmol/L    eGFR 13.5 (L) >60.0 mL/min/1.73   Single High Sensitivity Troponin T    Collection Time: 10/09/24  6:42 PM    Specimen: Blood   Result Value Ref Range    HS Troponin T 39 (H) <14 ng/L   Magnesium    Collection Time: 10/09/24  6:42 PM    Specimen: Blood   Result Value Ref Range    Magnesium 1.8 1.6 - 2.4 mg/dL   Green Top (Gel)    Collection Time: 10/09/24  6:42 PM   Result Value Ref Range    Extra Tube Hold for add-ons.    Lavender  Top    Collection Time: 10/09/24  6:42 PM   Result Value Ref Range    Extra Tube hold for add-on    Gold Top - SST    Collection Time: 10/09/24  6:42 PM   Result Value Ref Range    Extra Tube Hold for add-ons.    Gray Top    Collection Time: 10/09/24  6:42 PM   Result Value Ref Range    Extra Tube Hold for add-ons.    Light Blue Top    Collection Time: 10/09/24  6:42 PM   Result Value Ref Range    Extra Tube Hold for add-ons.    CBC Auto Differential    Collection Time: 10/09/24  6:42 PM    Specimen: Blood   Result Value Ref Range    WBC 8.68 3.40 - 10.80 10*3/mm3    RBC 2.82 (L) 3.77 - 5.28 10*6/mm3    Hemoglobin 7.9 (L) 12.0 - 15.9 g/dL    Hematocrit 25.0 (L) 34.0 - 46.6 %    MCV 88.7 79.0 - 97.0 fL    MCH 28.0 26.6 - 33.0 pg    MCHC 31.6 31.5 - 35.7 g/dL    RDW 14.8 12.3 - 15.4 %    RDW-SD 47.8 37.0 - 54.0 fl    MPV 10.8 6.0 - 12.0 fL    Platelets 301 140 - 450 10*3/mm3    Neutrophil % 72.7 42.7 - 76.0 %    Lymphocyte % 13.0 (L) 19.6 - 45.3 %    Monocyte % 9.6 5.0 - 12.0 %    Eosinophil % 1.5 0.3 - 6.2 %    Basophil % 0.9 0.0 - 1.5 %    Immature Grans % 2.3 (H) 0.0 - 0.5 %    Neutrophils, Absolute 6.31 1.70 - 7.00 10*3/mm3    Lymphocytes, Absolute 1.13 0.70 - 3.10 10*3/mm3    Monocytes, Absolute 0.83 0.10 - 0.90 10*3/mm3    Eosinophils, Absolute 0.13 0.00 - 0.40 10*3/mm3    Basophils, Absolute 0.08 0.00 - 0.20 10*3/mm3    Immature Grans, Absolute 0.20 (H) 0.00 - 0.05 10*3/mm3    nRBC 0.0 0.0 - 0.2 /100 WBC   Lipase    Collection Time: 10/09/24  6:42 PM    Specimen: Blood   Result Value Ref Range    Lipase 23 13 - 60 U/L   BNP    Collection Time: 10/09/24  6:42 PM    Specimen: Blood   Result Value Ref Range    proBNP 719.5 0.0 - 1,800.0 pg/mL   Lactic Acid, Plasma    Collection Time: 10/09/24  6:42 PM    Specimen: Blood   Result Value Ref Range    Lactate 0.9 0.5 - 2.0 mmol/L   Procalcitonin    Collection Time: 10/09/24  6:42 PM    Specimen: Blood   Result Value Ref Range    Procalcitonin 0.12 0.00 - 0.25 ng/mL    C-reactive Protein    Collection Time: 10/09/24  6:42 PM    Specimen: Blood   Result Value Ref Range    C-Reactive Protein 13.05 (H) 0.00 - 0.50 mg/dL   TSH    Collection Time: 10/09/24  6:42 PM    Specimen: Blood   Result Value Ref Range    TSH 0.465 0.270 - 4.200 uIU/mL   T4, Free    Collection Time: 10/09/24  6:42 PM    Specimen: Blood   Result Value Ref Range    Free T4 1.08 0.92 - 1.68 ng/dL   Phosphorus    Collection Time: 10/09/24  6:42 PM    Specimen: Blood   Result Value Ref Range    Phosphorus 3.5 2.5 - 4.5 mg/dL   Beta Hydroxybutyrate Quantitative    Collection Time: 10/09/24  6:42 PM    Specimen: Blood   Result Value Ref Range    Beta-Hydroxybutyrate Quant 0.143 0.020 - 0.270 mmol/L   Ferritin    Collection Time: 10/09/24  6:42 PM    Specimen: Blood   Result Value Ref Range    Ferritin 766.80 (H) 13.00 - 150.00 ng/mL   Iron    Collection Time: 10/09/24  6:42 PM    Specimen: Blood   Result Value Ref Range    Iron 14 (L) 37 - 145 mcg/dL   Iron Profile    Collection Time: 10/09/24  6:42 PM    Specimen: Blood   Result Value Ref Range    Iron 14 (L) 37 - 145 mcg/dL    Iron Saturation (TSAT) 7 (L) 20 - 50 %    Transferrin 131 (L) 200 - 360 mg/dL    TIBC 195 (L) 298 - 536 mcg/dL   COVID-19, FLU A/B, RSV PCR 1 HR TAT - Swab, Nasopharynx    Collection Time: 10/09/24  6:43 PM    Specimen: Nasopharynx; Swab   Result Value Ref Range    COVID19 Not Detected Not Detected - Ref. Range    Influenza A PCR Not Detected Not Detected    Influenza B PCR Not Detected Not Detected    RSV, PCR Not Detected Not Detected   ECG 12 Lead ED Triage Standing Order; Weak / Dizzy / AMS    Collection Time: 10/09/24  6:46 PM   Result Value Ref Range    QT Interval 348 ms    QTC Interval 406 ms   Blood Gas, Venous With Co-Ox    Collection Time: 10/09/24  6:46 PM    Specimen: Venous Blood   Result Value Ref Range    Site Nurse/Dr Draw     pH, Venous 7.324 7.310 - 7.410 pH Units    pCO2, Venous 42.0 41.0 - 51.0 mm Hg    pO2, Venous 37.3  27.0 - 53.0 mm Hg    HCO3, Venous 21.8 (L) 22.0 - 28.0 mmol/L    Base Excess, Venous -4.0 (L) -2.0 - 2.0 mmol/L    Hemoglobin, Blood Gas 9.8 (L) 14 - 18 g/dL    Oxyhemoglobin Venous 65.8 %    Methemoglobin Venous 0.3 %    Carboxyhemoglobin Venous 1.6 %    CO2 Content 23.1 22 - 33 mmol/L    Temperature 37.0     Barometric Pressure for Blood Gas      Modality Room Air     FIO2 21 %    Rate 0 Breaths/minute    PIP 0 cmH2O    IPAP 0     EPAP 0    Urinalysis With Microscopic If Indicated (No Culture) - Urine, Clean Catch    Collection Time: 10/09/24  8:58 PM    Specimen: Urine, Clean Catch   Result Value Ref Range    Color, UA Yellow Yellow, Straw    Appearance, UA Cloudy (A) Clear    pH, UA <=5.0 5.0 - 8.0    Specific Gravity, UA 1.015 1.001 - 1.030    Glucose,  mg/dL (1+) (A) Negative    Ketones, UA Negative Negative    Bilirubin, UA Negative Negative    Blood, UA Large (3+) (A) Negative    Protein,  mg/dL (2+) (A) Negative    Leuk Esterase, UA Moderate (2+) (A) Negative    Nitrite, UA Negative Negative    Urobilinogen, UA 0.2 E.U./dL 0.2 - 1.0 E.U./dL   Urinalysis, Microscopic Only - Urine, Clean Catch    Collection Time: 10/09/24  8:58 PM    Specimen: Urine, Clean Catch   Result Value Ref Range    RBC, UA 21-50 (A) None Seen, 0-2 /HPF    WBC, UA Too Numerous to Count (A) None Seen, 0-2 /HPF    Bacteria, UA 4+ (A) None Seen, Trace /HPF    Squamous Epithelial Cells, UA 0-2 None Seen, 0-2 /HPF    Hyaline Casts, UA None Seen 0 - 6 /LPF    Methodology Automated Microscopy    POC Glucose Once    Collection Time: 10/09/24 11:07 PM    Specimen: Blood   Result Value Ref Range    Glucose 239 (H) 70 - 130 mg/dL       If labs were ordered, I independently reviewed the results and considered them in treating the patient.        RADIOLOGY  XR Chest 1 View    Result Date: 10/9/2024  XR CHEST 1 VW Date of Exam: 10/9/2024 7:38 PM EDT Indication: Weak/Dizzy/AMS triage protocol Comparison: Chest radiograph 10/3/2024  Findings: Mediastinum: Cardiomediastinal silhouette appears unchanged and normal in size Lungs: Bronchial wall thickening and bilateral mild interstitial opacities. There is no appreciable prominence of the central pulmonary vasculature or convincing septal thickening to strongly suggest pulmonary edema. There is a consolidative opacity in the left lower lobe. Pleura: No pleural effusion or pneumothorax. Bones and soft tissues: No acute osseous abnormality.     Impression: Consolidative opacity in the left lower lobe suspicious for aspiration or pneumonia. Bronchial wall thickening which can be seen in bronchitis and/or large airways disease. Electronically Signed: Francisco Javier Fuller  10/9/2024 8:09 PM EDT  Workstation ID: [id number]    CT Abdomen Pelvis Without Contrast    Result Date: 10/9/2024  CT ABDOMEN PELVIS WO CONTRAST Date of Exam: 10/9/2024 7:26 PM EDT Indication: nausea and severe epigastric pain, no BM 2 weeks. Comparison: CT abdomen pelvis 9/27/2024 Technique: Axial CT images were obtained of the abdomen and pelvis without the administration of contrast. Reconstructed coronal and sagittal images were also obtained. Automated exposure control and iterative construction methods were used. Findings: Heart size within normal limits. Trace pericardial fluid stable from prior. Small hiatal hernia. Lower lungs without acute abnormality. Small fat-containing right Bochdalek hernia. Noncontrast appearance of liver, spleen and biliary tree unremarkable. Cholecystectomy. Atrophic pancreas stable from prior without active inflammation. No suspicious adrenal nodule. Symmetric renal size and contour. Stable punctate right midpole calculus. No hydronephrosis. Urinary bladder unremarkable. Hysterectomy. No suspicious adnexal mass. Expected configuration stomach and duodenum. Mild to moderate formed colonic stool. No evidence of bowel obstruction or active inflammation. No CT evidence of acute appendicitis. Extensive  abdominal atherosclerotic disease. Celiac artery stent. Negative for abdominal aortic aneurysm. No suspicious adenopathy. No ascites, free air or drainable collection. Fat-containing umbilical and periumbilical hernias stable from prior without edema or contained bowel. No acute soft tissue finding. Degenerative osteoarthritis of the hip joints, right greater than left. Discectomy and fusion changes of the lumbar spine unchanged from prior. 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 Anterolisthesis L4 and L5 stable from prior. Periprosthetic lucency at  L2 and L5 screws stable from prior suggesting loosening.     Impression: No acute CT findings. Multiple chronic/ancillary findings overall without significant change from recent comparison. Electronically Signed: Deon Gimenez MD  10/9/2024 7:53 PM EDT  Workstation ID: [id number]     I ordered and independently reviewed the above noted radiographic studies.      I viewed images of CT scan of the abdomen pelvis which showed celiac stents and umbilical hernia but no acute pathology that I can appreciate per my independent interpretation.  Chest x-ray showing distinct left lower lobe opacity concerning for pneumonia.    See radiologist's dictation for official interpretation.        PROCEDURES    Procedures    ECG 12 Lead ED Triage Standing Order; Weak / Dizzy / AMS   Preliminary Result   Test Reason : ED Triage Standing Order~   Blood Pressure :   */*   mmHG   Vent. Rate :  82 BPM     Atrial Rate :  82 BPM      P-R Int : 134 ms          QRS Dur :  70 ms       QT Int : 348 ms       P-R-T Axes :  62  50  62 degrees      QTc Int : 406 ms      Normal sinus rhythm   Septal infarct , age undetermined   Abnormal ECG   When compared with ECG of 03-OCT-2024 13:25,   No significant change was found      Referred By:            Confirmed By:           MEDICATIONS GIVEN IN ER    Medications   sodium chloride 0.9 % flush 10 mL (has no administration in time range)   aspirin EC tablet  81 mg (has no administration in time range)   atorvastatin (LIPITOR) tablet 80 mg (has no administration in time range)   clopidogrel (PLAVIX) tablet 75 mg (has no administration in time range)   famotidine (PEPCID) tablet 10 mg (has no administration in time range)   isosorbide mononitrate (IMDUR) 24 hr tablet 60 mg (has no administration in time range)   linagliptin (TRADJENTA) tablet 5 mg (has no administration in time range)   predniSONE (DELTASONE) tablet 5 mg (has no administration in time range)   sodium chloride 0.9 % flush 10 mL (has no administration in time range)   sodium chloride 0.9 % flush 10 mL (has no administration in time range)   Potassium Replacement - Follow Nurse / BPA Driven Protocol (has no administration in time range)   Magnesium Low Dose Replacement - Follow Nurse / BPA Driven Protocol (has no administration in time range)   Phosphorus Replacement - Follow Nurse / BPA Driven Protocol (has no administration in time range)   Calcium Replacement - Follow Nurse / BPA Driven Protocol (has no administration in time range)   acetaminophen (TYLENOL) tablet 650 mg (has no administration in time range)     Or   acetaminophen (TYLENOL) 160 MG/5ML oral solution 650 mg (has no administration in time range)     Or   acetaminophen (TYLENOL) suppository 650 mg (has no administration in time range)   sennosides-docusate (PERICOLACE) 8.6-50 MG per tablet 2 tablet (has no administration in time range)     And   polyethylene glycol (MIRALAX) packet 17 g (has no administration in time range)     And   bisacodyl (DULCOLAX) EC tablet 5 mg (has no administration in time range)     And   bisacodyl (DULCOLAX) suppository 10 mg (has no administration in time range)   melatonin tablet 5 mg (has no administration in time range)   Pharmacy to dose vancomycin (has no administration in time range)   ipratropium-albuterol (DUO-NEB) nebulizer solution 3 mL (has no administration in time range)   dextrose (GLUTOSE) oral  gel 15 g (has no administration in time range)   dextrose (D50W) (25 g/50 mL) IV injection 25 g (has no administration in time range)   glucagon (GLUCAGEN) injection 1 mg (has no administration in time range)   insulin regular (humuLIN R,novoLIN R) injection 2-7 Units (has no administration in time range)   sodium chloride 0.9 % infusion (has no administration in time range)   piperacillin-tazobactam (ZOSYN) 3.375 g IVPB in 100 mL NS MBP (CD) (has no administration in time range)   vancomycin (dosing per levels) (has no administration in time range)   sodium chloride 0.9 % bolus 1,000 mL (0 mL Intravenous Stopped 10/9/24 2332)   insulin regular (humuLIN R,novoLIN R) injection 4 Units (4 Units Intravenous Given 10/9/24 2151)   ondansetron (ZOFRAN) injection 4 mg (4 mg Intravenous Given 10/9/24 2152)   vancomycin IVPB 1500 mg in 0.9% NaCl (Premix) 500 mL (1,500 mg Intravenous New Bag 10/10/24 0021)   piperacillin-tazobactam (ZOSYN) 3.375 g IVPB in 100 mL NS MBP (CD) (0 g Intravenous Stopped 10/9/24 2332)   ipratropium-albuterol (DUO-NEB) nebulizer solution 3 mL (3 mL Nebulization Given 10/9/24 2322)         MEDICAL DECISION MAKING, PROGRESS, and CONSULTS    All labs, if obtained, have been independently reviewed by me.  All radiology studies, if obtained, have been reviewed by me and the radiologist dictating the report.  All EKG's, if obtained, have been independently viewed and interpreted by me/my attending physician.      Discussion below represents my analysis of pertinent findings related to patient's condition, differential diagnosis, treatment plan and final disposition.                         Differential diagnosis:    Uncontrolled diabetes, hyperglycemia, HHS, DKA, volvulus, ileus, bowel obstruction, sepsis, anemia, electrolyte abnormality      Additional sources:    - Discussed/ obtained information from independent historians: Sister    - External (non-ED) record review: Previous hospitalization for  altered mental status shows history of:  mild cognitive impairment, DM2, anemia, history of CVA.     - Chronic or social conditions impacting care: Diabetes with missed medications, anemia, history of stroke with mild cognitive impairment    - Shared decision making: Patient/patient representative in complete agreement with current plans for evaluation and management.      Orders placed during this visit:  Orders Placed This Encounter   Procedures    COVID PRE-OP / PRE-PROCEDURE SCREENING ORDER (NO ISOLATION) - Swab, Nasopharynx    Blood Culture - Blood,    Blood Culture - Blood,    COVID-19, FLU A/B, RSV PCR 1 HR TAT - Swab, Nasopharynx    Urine Culture - Urine, Urine, Clean Catch    MRSA Screen, PCR (Inpatient) - Swab, Nares    Legionella Antigen, Urine - Urine, Urine, Clean Catch    S. Pneumo Ag Urine or CSF - Urine, Urine, Clean Catch    Respiratory Culture - Sputum, Cough    XR Chest 1 View    CT Abdomen Pelvis Without Contrast    [place] Draw    Comprehensive Metabolic Panel    Single High Sensitivity Troponin T    Magnesium    Urinalysis With Microscopic If Indicated (No Culture) - Urine, Clean Catch    CBC Auto Differential    Lipase    BNP    Blood Gas, Arterial -With Co-Ox Panel: Yes    Lactic Acid, Plasma    Procalcitonin    C-reactive Protein    TSH    T4, Free    Phosphorus    Beta Hydroxybutyrate Quantitative    Blood Gas, Venous With Co-Ox    Urinalysis, Microscopic Only - Urine, Clean Catch    Comprehensive Metabolic Panel    CBC Auto Differential    Hemoglobin & Hematocrit, Blood    Ferritin    Iron    Iron Profile    Vitamin B12    Folate    Occult Blood X 1, Stool - Stool, Per Rectum    Vancomycin, Random    NPO Diet NPO Type: Strict NPO    Undress & Gown    Vital Signs    Orthostatic Blood Pressure    Vital Signs    Notify Provider (Specify Parameters)    Intake & Output    Weigh Patient    Oral Care    Place Venous Foot Pump    Maintain Venous Foot Pump    Maintain IV Access    Telemetry - Place  Orders & Notify Provider of Results When Patient Experiences Acute Chest Pain, Dysrhythmia or Respiratory Distress    May Be Off Telemetry for Tests    Continuous Pulse Oximetry    Turn Patient    Code Status and Medical Interventions: No CPR (Do Not Attempt to Resuscitate); Limited Support; No intubation (DNI)    Inpatient Gastroenterology Consult    Inpatient Case Management  Consult    Oxygen Therapy- Nasal Cannula; Titrate 1-6 LPM Per SpO2; 90 - 95%    Incentive Spirometry    SLP Consult: Eval & Treat Other; Aspirational pneumonia    POC Glucose Once    POC Glucose Once    POC Glucose Once    POC Glucose STAT    POC Glucose Once    POC Glucose 4x Daily Before Meals & at Bedtime    ECG 12 Lead ED Triage Standing Order; Weak / Dizzy / AMS    Insert Peripheral IV    Insert Peripheral IV    Initiate Observation Status    ED Bed Request    Fall Precautions    Fall Precautions    CBC & Differential    Green Top (Gel)    Lavender Top    Gold Top - SST    Gray Top    Light Blue Top    Ketone Bodies, Serum (Not performed at [place])         Additional orders considered but not ordered:      ED Course:    Consultants:      ED Course as of 10/10/24 0307   Wed Oct 09, 2024   1802 This an 79-year-old woman with diabetes and history of gastroparesis and bowel obstruction who presents for evaluation of abdominal pain and hyperglycemia.  She has not taken any of her diabetic medicines today she is accompanied by her sister who helps with the history.  She says she is having severe upper abdominal pain as well as nausea and has not been able to tolerate anything by mouth they think her last bowel movement may have been as long as 2 weeks ago.  No other complaints at this time. [CC]   Thu Oct 10, 2024   0304 She arrived awake and alert she is chronically ill-appearing and endorsing tenderness in the epigastrium as well as no bowel movements concern for bowel obstruction sepsis etc. [CC]   0304 CBC showing  significant but stable anemia hemoglobin 7.9 slightly improved from prior values. [CC]   0304 Extremely high serum creatinine 3.3 is slightly elevated from prior values did not meet criteria for acute kidney injury glucose of 359 but no anion gap no elevation in lactic acid troponin of 39 likely due to poor renal clearance but will need monitored.  Urinalysis showing urinary tract infection with gross pyuria bacteria and hematuria.  CT scan of the abdomen pelvis not showing any acute pathology but chest x-ray does show pneumonia.  I do not have a cause for her abdominal pain but with history of gastroparesis with possible the significant UTI and pneumonia have triggered an exacerbation of that started on broad-spectrum antibiotics blood sugar has come down to 239 after fluids and insulin.  She remains ill-appearing on reevaluation she is not technically hypoxic at this time but based on age and comorbidities I think would benefit from hospitalization for at least initiation of treatment of her UTI and pneumonia with IV antibiotics family is agreeable this plan medicine consulted for admission. [CC]      ED Course User Index  [CC] Ricco Lamar MD              Shared Decision Making:  After my consideration of clinical presentation and any laboratory/radiology studies obtained, I discussed the findings with the patient/patient representative who is in agreement with the treatment plan and the final disposition.   Risks and benefits of discharge and/or observation/admission were discussed.       AS OF 03:07 EDT VITALS:    BP - 140/73  HR - 80  TEMP - 98.3 °F (36.8 °C) (Oral)  O2 SATS - 100%                  DIAGNOSIS  Final diagnoses:   Uncontrolled type 2 diabetes mellitus with hyperglycemia   Acute UTI (urinary tract infection)   Pneumonia of left lower lobe due to infectious organism         DISPOSITION  Admit      Please note that portions of this document were completed with voice recognition software.         Ricco Lamar MD  10/10/24 3539

## 2024-10-10 NOTE — MBS/VFSS/FEES
Acute Care - Speech Language Pathology   Swallow Initial Evaluation [facility]  Modified Barium Swallow Study (MBS)       Patient Name: Arminda Krishnan  : 1945  MRN: [id number]  Today's Date: 10/10/2024               Admit Date: 10/9/2024    Visit Dx:     ICD-10-CM ICD-9-CM   1. Uncontrolled type 2 diabetes mellitus with hyperglycemia  E11.65 250.02   2. Acute UTI (urinary tract infection)  N39.0 599.0   3. Pneumonia of left lower lobe due to infectious organism  J18.9 486   4. Dysphagia, unspecified type  R13.10 787.20     Patient Active Problem List   Diagnosis    Cerebral vascular disease    Degenerative disc disease, lumbar    Degenerative disc disease, cervical    Spinal stenosis, lumbar region, with neurogenic claudication    Tobacco abuse    Chronic anemia    CKD (chronic kidney disease) stage 4, GFR 15-29 ml/min    Hyperglycemia    History of CVA (cerebrovascular accident)    Type 2 diabetes mellitus with hyperglycemia, without long-term current use of insulin    Disorientation    Dehydration    LLL pneumonia    HTN (hypertension)    Pneumonia     Past Medical History:   Diagnosis Date    Anemia     Arthritis     Back problem     CAD (coronary artery disease)     Cancer     Right breast    Chronic back pain     Chronically on opiate therapy     Depression     Diabetes mellitus     DX 14 years ago- checks fsbs weekly    Fibromyalgia     Gastroparesis     GERD (gastroesophageal reflux disease)     Headache     emotional/tension    History of transfusion     [facility]    HTN (hypertension)     Hypercholesteremia     IBS (irritable bowel syndrome)     Incontinence of urine     urgency    Migraine headache     Myalgia and myositis     Peripheral neuropathy     Sleep apnea     does not wear cpap    UTI (urinary tract infection)      Past Surgical History:   Procedure Laterality Date    APPENDECTOMY      ARTERIOGRAM MESENTERIC N/A 2022    Procedure: DIAGNOSTIC ARTERIOGRAM WITH CELIAC  STENT PLACEMENT;  Surgeon: Neo Neil MD;  Location:  [facility];  Service: Vascular;  Laterality: N/A;  FLUORO: 16 MIN  DOSE: 2384 MGY  CONTRAST:  20 ML    BACK SURGERY      5x per patient    BRAIN TUMOR EXCISION  1988    BREAST BIOPSY      CARPAL TUNNEL RELEASE Bilateral     CHOLECYSTECTOMY      COLONOSCOPY      2015    CRANIOTOMY FOR TUMOR      EYE SURGERY      bilateral cataracts removed    HEMORRHOIDECTOMY      LUMBAR FUSION N/A 01/04/2017    Procedure: LUMBAR LAMINECTOMY AND DECOMRESSION  L3 AND L4;  Surgeon: Nishant Bhatti MD;  Location: [facility] OR;  Service:     TOTAL ABDOMINAL HYSTERECTOMY      TRIGGER FINGER RELEASE         SLP Recommendation and Plan  SLP Swallowing Diagnosis: mild-moderate, oral dysphagia, functional pharyngeal phase (10/10/24 1400)  SLP Diet Recommendation: puree, thin liquids (10/10/24 1400)  Recommended Precautions and Strategies: upright posture during/after eating, general aspiration precautions (10/10/24 1400)  SLP Rec. for Method of Medication Administration: meds crushed, with puree (10/10/24 1400)     Monitor for Signs of Aspiration: notify SLP if any concerns (10/10/24 1400)    Swallow Criteria for Skilled Therapeutic Interventions Met: demonstrates skilled criteria (10/10/24 1400)  Anticipated Discharge Disposition (SLP): skilled nursing facility (10/10/24 1400)  Rehab Potential/Prognosis, Swallowing: re-evaluate goals as necessary (10/10/24 1400)  Therapy Frequency (Swallow): PRN, 5 days per week (10/10/24 1400)  Predicted Duration Therapy Intervention (Days): 1 week (10/10/24 1400)  Oral Care Recommendations: Oral Care BID/PRN, Suction toothbrush (10/10/24 1400)                                        Plan of Care Reviewed With: patient  Progress: improving      SWALLOW EVALUATION (Last 72 Hours)       SLP Adult Swallow Evaluation       Row Name 10/10/24 1400 10/10/24 7687                Rehab Evaluation    Document Type evaluation  -RS evaluation  -RS        Subjective Information no complaints  -RS no complaints  -RS       Patient Observations alert;cooperative  -RS lethargic  -RS       Patient/Family/Caregiver Comments/Observations none present in radiology  -RS Niece present  -RS       Patient Effort good  -RS adequate  -RS       Symptoms Noted During/After Treatment none  -RS fatigue  -RS       Oral Care -- patient refused intervention  -RS          General Information    Patient Profile Reviewed -- yes  -RS       Pertinent History Of Current Problem -- Pt is an 80 yo F w PMHx mild coronary impairment, prior CVA, DDD on steroids, and DM2 who presented to [facility] ED with hyperglycemia, UTI, and LLL pna. MDs ? aspiration pna  -RS       Current Method of Nutrition -- NPO  -RS       Precautions/Limitations, Vision -- difficult to assess  -RS       Precautions/Limitations, Hearing -- difficult to assess  -RS       Prior Level of Function-Communication -- cognitive-linguistic impairment  -RS       Prior Level of Function-Swallowing -- no diet consistency restrictions;other (see comments)  per family member present, but also endorses increased coughing during mealtimes  -RS       Plans/Goals Discussed with -- patient and family;agreed upon  -RS          Pain    Additional Documentation Pain Scale: FACES Pre/Post-Treatment (Group)  -RS Pain Scale: FACES Pre/Post-Treatment (Group)  -RS          Pain Scale: FACES Pre/Post-Treatment    Pain: FACES Scale, Pretreatment 0-->no hurt  -RS 0-->no hurt  -RS       Posttreatment Pain Rating 0-->no hurt  -RS 0-->no hurt  -RS          Oral Motor Structure and Function    Dentition Assessment -- edentulous  -RS       Secretion Management -- WNL/WFL  -RS       Mucosal Quality -- moist, healthy  -RS       Volitional Swallow -- unable to elicit  -RS       Volitional Cough -- unable to elicit  -RS          Oral Musculature and Cranial Nerve Assessment    Oral Motor, Comment -- Pt w what appears to be a tongue thrust w PO trials. U/a to follow  directions to assess oral mechanism in isolation  -RS          General Eating/Swallowing Observations    Respiratory Support Currently in Use -- room air  -RS       Eating/Swallowing Skills -- fed by SLP  -RS       Positioning During Eating -- upright in bed  -RS       Utensils Used -- spoon;straw  -RS       Consistencies Trialed -- pureed;ice chips;thin liquids  -RS          Clinical Swallow Eval    Oral Prep Phase -- WFL  -RS       Oral Transit -- impaired  -RS       Oral Residue -- WFL  -RS       Pharyngeal Phase -- suspected pharyngeal impairment  -RS       Esophageal Phase -- unremarkable  -RS          Oral Transit Concerns    Oral Transit Concerns -- increased oral transit time  -RS       Increased Oral Transit Time -- pudding  -RS          Pharyngeal Phase Concerns    Pharyngeal Phase Concerns -- other (see comments)  -RS       Pharyngeal Phase Concerns, Comment -- Concern for silent aspiration though no overt pharyngeal patterns at bedside  -RS          MBS/VFSS    Utensils Used spoon;straw  -RS --       Consistencies Trialed mixed consistency;pureed;thin liquids  -RS --          MBS/VFSS Interpretation    Oral Prep Phase impaired oral phase of swallowing  -RS --       Oral Transit Phase impaired  -RS --       Oral Residue WFL  -RS --          Oral Preparatory Phase    Oral Preparatory Phase prolonged manipulation  -RS --       Prolonged Manipulation pudding/puree  -RS --          Oral Transit Phase    Impaired Oral Transit Phase delayed initiation of bolus transit;tongue pumping;premature spillage of liquids into pharynx  -RS --       Delayed Initiation of bolus transit pudding/puree;secondary to reduced lingual control;discoordination of lingual movement;secondary to impaired cognitive status  -RS --       Tongue Pumping pudding/puree;secondary to reduced lingual control;discoordination of lingual movement;secondary to impaired cognitive status  -RS --       Premature Spillage of Liquids into Pharynx thin  liquids;secondary to reduced lingual control  -RS --       Oral Transit Phase, Comment Puree solids may be pt's new baseline given cognitive status  -RS --          Initiation of Pharyngeal Swallow    Initiation of Pharyngeal Swallow bolus in valleculae  -RS --       Pharyngeal Phase functional pharyngeal phase of swallowing  -RS --       Anatomical abnormalities noted prominent cricopharyngeous/ cricopharyngeal bar  -RS --       Anatomical abnormalities functional impact no functional impact on swallowing  -RS --       Pharyngeal Phase, Comment No asp/pen was observed during today's study; however, given fluctuating alertness, an isolated aspiration event may have occurred  -RS --          SLP Evaluation Clinical Impression    SLP Swallowing Diagnosis mild-moderate;oral dysphagia;functional pharyngeal phase  -RS R/O pharyngeal dysphagia  -RS       Functional Impact risk of aspiration/pneumonia  -RS risk of aspiration/pneumonia  -RS       Rehab Potential/Prognosis, Swallowing re-evaluate goals as necessary  -RS --       Swallow Criteria for Skilled Therapeutic Interventions Met demonstrates skilled criteria  -RS --          Recommendations    Therapy Frequency (Swallow) PRN;5 days per week  -RS --       Predicted Duration Therapy Intervention (Days) 1 week  -RS --       SLP Diet Recommendation puree;thin liquids  -RS NPO  -RS       Recommended Diagnostics -- VFSS (MBS)  -RS       Recommended Precautions and Strategies upright posture during/after eating;general aspiration precautions  -RS --       Oral Care Recommendations Oral Care BID/PRN;Suction toothbrush  -RS Oral Care BID/PRN;Suction toothbrush  -RS       SLP Rec. for Method of Medication Administration meds crushed;with puree  -RS meds whole;with puree  -RS       Monitor for Signs of Aspiration notify SLP if any concerns  -RS notify SLP if any concerns  -RS       Anticipated Discharge Disposition (SLP) skilled nursing facility  -RS skilled nursing facility   -RS                 User Key  (r) = Recorded By, (t) = Taken By, (c) = Cosigned By      Initials Name Effective Dates    RS Everette Rodriguez, MS CCC-SLP 09/14/23 -                     EDUCATION  The patient has been educated in the following areas:   Modified Diet Instruction.        SLP GOALS       Row Name 10/10/24 1400             (LTG) Patient will demonstrate functional swallow for    Diet Texture (Demonstrate functional swallow) soft to chew (ground) textures  -RS      Liquid viscosity (Demonstrate functional swallow) thin liquids  -RS      Evangeline (Demonstrate functional swallow) with moderate cues (50-74% accuracy)  -RS      Time Frame (Demonstrate functional swallow) 1 week  -RS      Progress/Outcomes (Demonstrate functional swallow) new goal  -RS         (STG) Patient will tolerate trials of    Consistencies Trialed (Tolerate trials) soft to chew (ground) textures;thin liquids  -RS      Desired Outcome (Tolerate trials) without signs/symptoms of aspiration;without signs of distress;with adequate oral prep/transit/clearance  -RS      Evangeline (Tolerate trials) with moderate cues (50-74% accuracy)  -RS      Time Frame (Tolerate trials) 1 week  -RS      Progress/Outcomes (Tolerate trials) new goal  -RS         (STG) Labial Strengthening Goal 1 (SLP)    Activity (Labial Strengthening Goal 1, SLP) increase labial tone  -RS      Increase Labial Tone labial resistance exercises;swallow trials  -RS      Evangeline/Accuracy (Labial Strengthening Goal 1, SLP) with maximum cues (25-49% accuracy)  -RS      Time Frame (Labial Strengthening Goal 1, SLP) 1 week  -RS      Progress/Outcomes (Labial Strengthening Goal 1, SLP) new goal  -RS         (STG) Lingual Strengthening Goal 1 (SLP)    Activity (Lingual Strengthening Goal 1, SLP) increase tongue back strength  -RS      Increase Tongue Back Strength lingual resistance exercises;swallow trials  -RS      Evangeline/Accuracy (Lingual Strengthening Goal 1, SLP) with  maximum cues (25-49% accuracy)  -RS      Time Frame (Lingual Strengthening Goal 1, SLP) 1 week  -RS      Progress/Outcomes (Lingual Strengthening Goal 1, SLP) new goal  -RS                User Key  (r) = Recorded By, (t) = Taken By, (c) = Cosigned By      Initials Name Provider Type    Everette Elliott MS CCC-SLP Speech and Language Pathologist                         Time Calculation:    Time Calculation- SLP       Row Name 10/10/24 1413 10/10/24 0944          Time Calculation- SLP    SLP Start Time 1400  -RS 0830  -RS     SLP Received On 10/10/24  -RS 10/10/24  -RS        Untimed Charges    59543-ZQ Eval Oral Pharyng Swallow Minutes -- 41  -RS     96609-YK Motion Fluoro Eval Swallow Minutes 60  -RS --        Total Minutes    Untimed Charges Total Minutes 60  -RS 41  -RS      Total Minutes 60  -RS 41  -RS               User Key  (r) = Recorded By, (t) = Taken By, (c) = Cosigned By      Initials Name Provider Type    Everette Elliott MS CCC-SLP Speech and Language Pathologist                    Therapy Charges for Today       Code Description Service Date Service Provider Modifiers Qty    34399437278 HC ST EVAL ORAL PHARYNG SWALLOW 3 10/10/2024 Everette Rodriguez MS CCC-SLP GN 1    52729363421 HC ST MOTION FLUORO EVAL SWALLOW 4 10/10/2024 Everette Rodriguez MS CCC-SLP GN 1                 MS NICOLAS Schilling  10/10/2024

## 2024-10-10 NOTE — PLAN OF CARE
Problem: Adult Inpatient Plan of Care  Goal: Plan of Care Review  Outcome: Progressing  Goal: Patient-Specific Goal (Individualized)  Outcome: Progressing  Goal: Absence of Hospital-Acquired Illness or Injury  Outcome: Progressing  Goal: Optimal Comfort and Wellbeing  Outcome: Progressing  Goal: Readiness for Transition of Care  Outcome: Progressing     Problem: Skin Injury Risk Increased  Goal: Skin Health and Integrity  Outcome: Progressing     Problem: Fall Injury Risk  Goal: Absence of Fall and Fall-Related Injury  Outcome: Progressing     Problem: Diabetes Comorbidity  Goal: Blood Glucose Level Within Targeted Range  Outcome: Progressing     Problem: Hypertension Comorbidity  Goal: Blood Pressure in Desired Range  Outcome: Progressing   Goal Outcome Evaluation:

## 2024-10-10 NOTE — TELEPHONE ENCOUNTER
Caller: Rica Mcdermott    Relationship: Emergency Contact    Best call back number: [phone]     What is the best time to reach you: FYI NO CALL BACK REQUIRED    Who are you requesting to speak with (clinical staff, provider,  specific staff member): DR RANGEL     Do you know the name of the person who called: THE PATIENT'S SISTER RICA     What was the call regarding: THE PATIENT'S SISTER RICA ADVISED THAT SHE TOOK THE PATIENT TO THE ER YESTERDAY AS INSTRUCTED BY THE PRACTICE DUE TO HER SUGAR BEING HIGH, AND WANTED ADVISED THAT THE PATIENT HAS BEEN ADMITTED TO [facility] FOR PNEUMONIA IN LOWER LEFT LUNG AND A UTI AND THAT HER SUGAR WAS BETTER THIS MORNING (10/10/2024).     Is it okay if the provider responds through MyChart: NO, JUST AND FYI NO CALL BACK REQUIRED.

## 2024-10-10 NOTE — PROGRESS NOTES
"Pharmacy Consult - Vancomycin Dosing and Monitoring (Renal Dysfunction / Dialysis)    Arminda Krishnan is a 79 y.o. female receiving vancomycin therapy.     Indication: PNA/UTI  Consulting Provider: Hospitalist  Goal Trough: 15 - 20    Current Antimicrobial Therapy  Vancomycin  Zosyn    Allergies  Allergies as of 10/09/2024 - Reviewed 10/09/2024   Allergen Reaction Noted    Ceclor [cefaclor] Rash 08/12/2016     Labs  Results from last 7 days   Lab Units 10/09/24  1842 10/03/24  1356   BUN mg/dL 56* 38*   CREATININE mg/dL 3.30* 2.88*     Results from last 7 days   Lab Units 10/09/24  1842 10/03/24  1356   WBC 10*3/mm3 8.68 7.56     Evaluation of Dosing   Height - 152.4 cm (60\")  Weight - 75.3 kg (166 lb)    Estimated Creatinine Clearance: 12.1 mL/min (A) (by C-G formula based on SCr of 3.3 mg/dL (H)).    Intake & Output (last 3 days)       None          Microbiology  Microbiology Results (last 10 days)       Procedure Component Value - Date/Time    COVID PRE-OP / PRE-PROCEDURE SCREENING ORDER (NO ISOLATION) - Swab, Nasopharynx [[id number]]  (Normal) Collected: 10/09/24 1843    Lab Status: Final result Specimen: Swab from Nasopharynx Updated: 10/09/24 1958    Narrative:      The following orders were created for panel order COVID PRE-OP / PRE-PROCEDURE SCREENING ORDER (NO ISOLATION) - Swab, Nasopharynx.  Procedure                               Abnormality         Status                     ---------                               -----------         ------                     COVID-19, FLU A/B, RSV P...[[id number]]  Normal              Final result                 Please view results for these tests on the individual orders.    COVID-19, FLU A/B, RSV PCR 1 HR TAT - Swab, Nasopharynx [[id number]]  (Normal) Collected: 10/09/24 1843    Lab Status: Final result Specimen: Swab from Nasopharynx Updated: 10/09/24 1958     COVID19 Not Detected     Influenza A PCR Not Detected     Influenza B PCR Not Detected     RSV, PCR Not " Detected    Narrative:      Fact sheet for providers: https://www.fda.gov/media/356300/download    Fact sheet for patients: https://www.fda.gov/media/756003/download    Test performed by PCR.          Vancomycin Levels              Assessment/Plan:  Patient received Vancomycin loading dose in the ER  MRSA PCR pending and random Vancomycin level ordered for the AM  Pharmacy will following and redose as needed.      Graciela Rosario, PharmD  10/10/2024  00:51 EDT

## 2024-10-10 NOTE — PLAN OF CARE
Goal Outcome Evaluation:  Plan of Care Reviewed With: patient, family           Outcome Evaluation: OT initial eval and expanded chart review completed. Pt presents with multiple comorbidities and decreased strength, balance, activity tolerance and alert level limiting independence with ADL's and mobility from baseline status. Recommend continued skilled OT services and transfer to IRF at d/c.      Anticipated Discharge Disposition (OT): inpatient rehabilitation facility

## 2024-10-10 NOTE — H&P
[facility] Medicine Services  HISTORY AND PHYSICAL    Patient Name: Arminda Krishnan  : 1945  MRN: [id number]  Primary Care Physician: Aiden Spencer MD  Date of admission: 10/9/2024    Subjective   Subjective     Chief Complaint:  Hyperglycemia    HPI:  Arminda Krishnan is a 79 y.o. female with significant medical history of mild cognitive impairment, type 2 diabetes, history of CVA, anemia, degenerative disc disease on steroids who presents to [facility] ED with hyperglycemia.     Due to decreased LOC HPI was obtained via medical record and next of kin at bedside.  Patient's sister reports that home health arrived today and checked patient's glucose was 495.  It remained in the high 300s low 400s, and the patient appeared more lethargic than normal so they brought her to the ED. they also report that her chronic cough has gotten progressively worse over the last 2 days, cough described as productive with yellow sputum.  Denies fevers body aches or chills.  No complaints of chest pain, occasional shortness of breath, SpO2 stable on room air.      Chest x-ray shows lower lobe pneumonia, concerning for aspirational pneumonia  UA in ED positive for leukocytes and bacteria +4  Uncontrolled type 2 diabetes on chronic steroids FSBG 393 on arrival    Received IVF, insulin, Zosyn and Vanc       Review of Systems   Constitutional:  Negative for chills and fever.   HENT:  Negative for congestion.    Eyes: Negative.    Respiratory:  Positive for cough, shortness of breath and wheezing. Negative for chest tightness.    Cardiovascular:  Negative for chest pain and leg swelling.   Gastrointestinal:  Negative for constipation, nausea and vomiting.   Endocrine: Negative.    Genitourinary: Negative.    Musculoskeletal:  Positive for myalgias (Baseline bilateral leg pain).   Skin: Negative.    Allergic/Immunologic: Positive for immunocompromised state.   Neurological:   Positive for weakness. Negative for syncope, light-headedness and headaches.   Hematological: Negative.    Psychiatric/Behavioral:  Positive for confusion (Baseline confusion, worsening).               Personal History     Past Medical History:   Diagnosis Date   • Anemia    • Arthritis    • Back problem    • CAD (coronary artery disease)    • Cancer     Right breast   • Chronic back pain    • Chronically on opiate therapy    • Depression    • Diabetes mellitus     DX 14 years ago- checks fsbs weekly   • Fibromyalgia    • Gastroparesis    • GERD (gastroesophageal reflux disease)    • Headache     emotional/tension   • History of transfusion     [facility]   • HTN (hypertension)    • Hypercholesteremia    • IBS (irritable bowel syndrome)    • Incontinence of urine     urgency   • Migraine headache    • Myalgia and myositis    • Peripheral neuropathy    • Sleep apnea     does not wear cpap   • UTI (urinary tract infection)              Past Surgical History:   Procedure Laterality Date   • APPENDECTOMY     • ARTERIOGRAM MESENTERIC N/A 6/16/2022    Procedure: DIAGNOSTIC ARTERIOGRAM WITH CELIAC STENT PLACEMENT;  Surgeon: Neo Neil MD;  Location: [facility];  Service: Vascular;  Laterality: N/A;  FLUORO: 16 MIN  DOSE: 2384 MGY  CONTRAST:  20 ML   • BACK SURGERY      5x per patient   • BRAIN TUMOR EXCISION  1988   • BREAST BIOPSY     • CARPAL TUNNEL RELEASE Bilateral    • CHOLECYSTECTOMY     • COLONOSCOPY      2015   • CRANIOTOMY FOR TUMOR     • EYE SURGERY      bilateral cataracts removed   • HEMORRHOIDECTOMY     • LUMBAR FUSION N/A 01/04/2017    Procedure: LUMBAR LAMINECTOMY AND DECOMRESSION  L3 AND L4;  Surgeon: Nishant Bhatti MD;  [facility]  Service:    • TOTAL ABDOMINAL HYSTERECTOMY     • TRIGGER FINGER RELEASE         Family History: family history includes Alcohol abuse in her father; Cancer in her brother, brother, father, and another family member; Diabetes in her brother,  mother, sister, and another family member; Heart attack in her mother, sister, sister, and another family member; Heart disease in her mother and sister; Hyperlipidemia in an other family member; Hypertension in her brother, mother, sister, and another family member; Stroke in her sister.     Social History:  reports that she has been smoking cigarettes. She started smoking about 65 years ago. She has a 47.4 pack-year smoking history. She has been exposed to tobacco smoke. She has never used smokeless tobacco. She reports that she does not drink alcohol and does not use drugs.  Social History     Social History Narrative    Lives in [place], [place] with sister       Medications:  Blood Glucose Monitoring Suppl, Blood Glucose Test, Dexcom G7 , Dexcom G7 Sensor, Diclofenac Sodium, Dupilumab, HYDROcodone-acetaminophen, Insulin Pen Needle, ONE TOUCH ULTRA 2, OneTouch Delica Plus Tuljih21E, SITagliptin, acetaminophen, albuterol, albuterol sulfate HFA, aspirin, atorvastatin, busPIRone, carvedilol, cetirizine, clopidogrel, famotidine, fluticasone, glipizide, glucose blood, isosorbide mononitrate, multivitamin with minerals, naloxone, nicotine, pantoprazole, predniSONE, pregabalin, silver sulfadiazine, tacrolimus, and tolterodine LA    Allergies   Allergen Reactions   • Ceclor [Cefaclor] Rash       Objective   Objective     Vital Signs:   Temp:  [98.3 °F (36.8 °C)] 98.3 °F (36.8 °C)  Heart Rate:  [80-83] 80  Resp:  [18] 18  BP: (134-158)/(69-73) 140/73    Physical Exam  Constitutional:       Appearance: She is well-groomed. She is ill-appearing.   HENT:      Head: Normocephalic.   Cardiovascular:      Rate and Rhythm: Normal rate and regular rhythm.   Pulmonary:      Effort: Pulmonary effort is normal.      Breath sounds: Wheezing and rhonchi present.   Abdominal:      General: Abdomen is flat. Bowel sounds are normal.      Palpations: Abdomen is soft.   Musculoskeletal:      Right lower leg: No edema.      Left lower  leg: No edema.   Skin:     General: Skin is warm and dry.   Neurological:      Mental Status: She is lethargic and disoriented.   Psychiatric:         Behavior: Behavior is cooperative.          Result Review:  I have personally reviewed the results from the time of this admission to 10/10/2024 00:02 EDT and agree with these findings:  [x]  Laboratory list / accordion  [x]  Microbiology  []  Radiology  [x]  EKG/Telemetry   []  Cardiology/Vascular   []  Pathology  [x]  Old records  []  Other:      LAB RESULTS:      Lab 10/09/24  1842 10/03/24  1356   WBC 8.68 7.56   HEMOGLOBIN 7.9* 7.6*   HEMATOCRIT 25.0* 24.2*   PLATELETS 301 213   NEUTROS ABS 6.31 4.96   IMMATURE GRANS (ABS) 0.20* 0.11*   LYMPHS ABS 1.13 1.43   MONOS ABS 0.83 0.84   EOS ABS 0.13 0.16   MCV 88.7 89.0   CRP 13.05*  --    PROCALCITONIN 0.12  --    LACTATE 0.9  --          Lab 10/09/24  1842 10/03/24  1356   SODIUM 136 135*   POTASSIUM 4.4 4.3   CHLORIDE 103 101   CO2 19.0* 20.0*   ANION GAP 14.0 14.0   BUN 56* 38*   CREATININE 3.30* 2.88*   EGFR 13.5* 15.9*   GLUCOSE 359* 303*   CALCIUM 9.5 9.5   MAGNESIUM 1.8 1.7   PHOSPHORUS 3.5  --    TSH 0.465 0.580         Lab 10/09/24  1842 10/03/24  1356   TOTAL PROTEIN 7.4 7.1   ALBUMIN 3.3* 3.4*   GLOBULIN 4.1 3.7   ALT (SGPT) 18 13   AST (SGOT) 21 17   BILIRUBIN 0.3 0.4   ALK PHOS 78 77   LIPASE 23  --          Lab 10/09/24  1842   PROBNP 719.5   HSTROP T 39*                 Lab 10/09/24  1846   FIO2 21   CARBOXYHEMOGLOBIN (VENOUS) 1.6     Brief Urine Lab Results  (Last result in the past 365 days)        Color   Clarity   Blood   Leuk Est   Nitrite   Protein   CREAT   Urine HCG        10/09/24 2058 Yellow   Cloudy   Large (3+)   Moderate (2+)   Negative   100 mg/dL (2+)                 Microbiology Results (last 10 days)       Procedure Component Value - Date/Time    COVID PRE-OP / PRE-PROCEDURE SCREENING ORDER (NO ISOLATION) - Swab, Nasopharynx [[id number]]  (Normal) Collected: 10/09/24 3763    Lab Status:  Final result Specimen: Swab from Nasopharynx Updated: 10/09/24 1958    Narrative:      The following orders were created for panel order COVID PRE-OP / PRE-PROCEDURE SCREENING ORDER (NO ISOLATION) - Swab, Nasopharynx.  Procedure                               Abnormality         Status                     ---------                               -----------         ------                     COVID-19, FLU A/B, RSV P...[[id number]]  Normal              Final result                 Please view results for these tests on the individual orders.    COVID-19, FLU A/B, RSV PCR 1 HR TAT - Swab, Nasopharynx [[id number]]  (Normal) Collected: 10/09/24 1843    Lab Status: Final result Specimen: Swab from Nasopharynx Updated: 10/09/24 1958     COVID19 Not Detected     Influenza A PCR Not Detected     Influenza B PCR Not Detected     RSV, PCR Not Detected    Narrative:      Fact sheet for providers: https://www.fda.gov/media/778812/download    Fact sheet for patients: https://www.fda.gov/media/803633/download    Test performed by PCR.            XR Chest 1 View    Result Date: 10/9/2024  XR CHEST 1 VW Date of Exam: 10/9/2024 7:38 PM EDT Indication: Weak/Dizzy/AMS triage protocol Comparison: Chest radiograph 10/3/2024 Findings: Mediastinum: Cardiomediastinal silhouette appears unchanged and normal in size Lungs: Bronchial wall thickening and bilateral mild interstitial opacities. There is no appreciable prominence of the central pulmonary vasculature or convincing septal thickening to strongly suggest pulmonary edema. There is a consolidative opacity in the left lower lobe. Pleura: No pleural effusion or pneumothorax. Bones and soft tissues: No acute osseous abnormality.     Impression: Impression: Consolidative opacity in the left lower lobe suspicious for aspiration or pneumonia. Bronchial wall thickening which can be seen in bronchitis and/or large airways disease. Electronically Signed: Francisco Javier Fuller  10/9/2024 8:09 PM EDT   Workstation ID: [id number]    CT Abdomen Pelvis Without Contrast    Result Date: 10/9/2024  CT ABDOMEN PELVIS WO CONTRAST Date of Exam: 10/9/2024 7:26 PM EDT Indication: nausea and severe epigastric pain, no BM 2 weeks. Comparison: CT abdomen pelvis 9/27/2024 Technique: Axial CT images were obtained of the abdomen and pelvis without the administration of contrast. Reconstructed coronal and sagittal images were also obtained. Automated exposure control and iterative construction methods were used. Findings: Heart size within normal limits. Trace pericardial fluid stable from prior. Small hiatal hernia. Lower lungs without acute abnormality. Small fat-containing right Bochdalek hernia. Noncontrast appearance of liver, spleen and biliary tree unremarkable. Cholecystectomy. Atrophic pancreas stable from prior without active inflammation. No suspicious adrenal nodule. Symmetric renal size and contour. Stable punctate right midpole calculus. No hydronephrosis. Urinary bladder unremarkable. Hysterectomy. No suspicious adnexal mass. Expected configuration stomach and duodenum. Mild to moderate formed colonic stool. No evidence of bowel obstruction or active inflammation. No CT evidence of acute appendicitis. Extensive abdominal atherosclerotic disease. Celiac artery stent. Negative for abdominal aortic aneurysm. No suspicious adenopathy. No ascites, free air or drainable collection. Fat-containing umbilical and periumbilical hernias stable from prior without edema or contained bowel. No acute soft tissue finding. Degenerative osteoarthritis of the hip joints, right greater than left. Discectomy and fusion changes of the lumbar spine unchanged from prior. 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 Anterolisthesis L4 and L5 stable from prior. Periprosthetic lucency at  L2 and L5 screws stable from prior suggesting loosening.     Impression: Impression: No acute CT findings. Multiple chronic/ancillary findings overall without  significant change from recent comparison. Electronically Signed: Deon Gimenez MD  10/9/2024 7:53 PM EDT  Workstation ID: [id number]     Results for orders placed during the hospital encounter of 07/15/24    Adult Transthoracic Echo Complete W/ Cont if Necessary Per Protocol    Interpretation Summary  •  Left ventricular systolic function is normal. Calculated left ventricular EF = 63.2%  •  Estimated right ventricular systolic pressure from tricuspid regurgitation is normal (<35 mmHg).  •  Normal left atrial size and volume noted.  •  There is calcification of the aortic valve. Mild to moderate aortic valve regurgitation is present.      Assessment & Plan   Assessment & Plan       LLL pneumonia    Degenerative disc disease, lumbar    CKD (chronic kidney disease) stage 4, GFR 15-29 ml/min    Hyperglycemia    History of CVA (cerebrovascular accident)    HTN (hypertension)      Left lower lung pneumonia  -CXR LLL pneumonia  -Respiratory panel negative  -Received Zosyn and Vanc in ED  -Sputum culture  -DuoNebs  -Urine antigens  -Continue Zosyn and vancomycin  -Speech consult  -N.p.o. until speech eval  CBC and CMP in a.m.      UTI  CKD   -UA positive for leukocytes, +4 bacteria  -Cultures pending  -Creatinine 3.30  -IV fluid  -ABX as above    Type 2 diabetes, uncontrolled  -Chronic prednisone use  -Received insulin and fluids in ED  -A1c 11 (September 2024)  -FSBG, SSI    HTN/HLD  History of CVA  -Continue ASA  -Continue atorvastatin  -Continue carvedilol  -Continue isosorbide  -Continue clopidogrel    Anemia  -Serial H&H's  -Anemia studies  -Occult stool  -Consider GI consult      Degenerative disc disease  -Chronic prednisone 5 mg every other day      DVT prophylaxis:      CODE STATUS: DNR/DNI  Medical Intervention Limits: No intubation (DNI)  Level Of Support Discussed With: Next of Kin (If No Surrogate)  Code Status (Patient has no pulse and is not breathing): No CPR (Do Not Attempt to Resuscitate)  Medical  Interventions (Patient has pulse or is breathing): Limited Support      Expected Discharge  Expected discharge date/ time has not been documented.  TBD      This note has been completed as part of a split-shared workflow.     Signature: Electronically signed by BALAJI Ge, 10/10/24, 12:12 AM EDT    -----------------------    The patient was seen independently by the APC.  I was available for any questions or concerns.     Electronically signed by Jin Manriquez III, DO, 10/10/24, 12:53 AM EDT.

## 2024-10-10 NOTE — PLAN OF CARE
Goal Outcome Evaluation:  Plan of Care Reviewed With: patient        Progress: improving         Anticipated Discharge Disposition (SLP): skilled nursing facility          SLP Swallowing Diagnosis: mild-moderate, oral dysphagia, functional pharyngeal phase (10/10/24 1400)

## 2024-10-10 NOTE — THERAPY EVALUATION
Patient Name: Arminda Krishnan  : 1945    MRN: [id number]                              Today's Date: 10/10/2024       Admit Date: 10/9/2024    Visit Dx:     ICD-10-CM ICD-9-CM   1. Uncontrolled type 2 diabetes mellitus with hyperglycemia  E11.65 250.02   2. Acute UTI (urinary tract infection)  N39.0 599.0   3. Pneumonia of left lower lobe due to infectious organism  J18.9 486   4. Dysphagia, unspecified type  R13.10 787.20     Patient Active Problem List   Diagnosis    Cerebral vascular disease    Degenerative disc disease, lumbar    Degenerative disc disease, cervical    Spinal stenosis, lumbar region, with neurogenic claudication    Tobacco abuse    Chronic anemia    CKD (chronic kidney disease) stage 4, GFR 15-29 ml/min    Hyperglycemia    History of CVA (cerebrovascular accident)    Type 2 diabetes mellitus with hyperglycemia, without long-term current use of insulin    Disorientation    Dehydration    LLL pneumonia    HTN (hypertension)    Pneumonia     Past Medical History:   Diagnosis Date    Anemia     Arthritis     Back problem     CAD (coronary artery disease)     Cancer     Right breast    Chronic back pain     Chronically on opiate therapy     Depression     Diabetes mellitus     DX 14 years ago- checks fsbs weekly    Fibromyalgia     Gastroparesis     GERD (gastroesophageal reflux disease)     Headache     emotional/tension    History of transfusion     [facility]    HTN (hypertension)     Hypercholesteremia     IBS (irritable bowel syndrome)     Incontinence of urine     urgency    Migraine headache     Myalgia and myositis     Peripheral neuropathy     Sleep apnea     does not wear cpap    UTI (urinary tract infection)      Past Surgical History:   Procedure Laterality Date    APPENDECTOMY      ARTERIOGRAM MESENTERIC N/A 2022    Procedure: DIAGNOSTIC ARTERIOGRAM WITH CELIAC STENT PLACEMENT;  Surgeon: Neo Neil MD;  Location: [facility];  Service: Vascular;   Laterality: N/A;  FLUORO: 16 MIN  DOSE: 2384 MGY  CONTRAST:  20 ML    BACK SURGERY      5x per patient    BRAIN TUMOR EXCISION  1988    BREAST BIOPSY      CARPAL TUNNEL RELEASE Bilateral     CHOLECYSTECTOMY      COLONOSCOPY      2015    CRANIOTOMY FOR TUMOR      EYE SURGERY      bilateral cataracts removed    HEMORRHOIDECTOMY      LUMBAR FUSION N/A 01/04/2017    Procedure: LUMBAR LAMINECTOMY AND DECOMRESSION  L3 AND L4;  Surgeon: Nishant Bhatti MD;  Location: [facility];  Service:     TOTAL ABDOMINAL HYSTERECTOMY      TRIGGER FINGER RELEASE        General Information       Row Name 10/10/24 1213          OT Time and Intention    Document Type evaluation  -JR     Mode of Treatment occupational therapy  -       Row Name 10/10/24 1213          General Information    Patient Profile Reviewed yes  -JR     Prior Level of Function independent:;gait;transfer;bed mobility;ADL's  Family reports pt independent with ADL's and gait with cane prior to admit. Pt lives with dtr, has sitter while dtr works.  -JR     Existing Precautions/Restrictions fall  -     Barriers to Rehab medically complex;cognitive status  -       Row Name 10/10/24 1213          Occupational Profile    Occupational History/Life Experiences (Occupational Profile) Family reports pt lives with daughter, has been independent with ADL's, uses a cane for mobility. Was previously receiving HHPT. Has caregiver while daughter is at work. Typically sleeps in her bed, but has been sleeping in a recliner since she has been ill.  -       Row Name 10/10/24 1213          Living Environment    People in Home child(verna), adult  has sitter while dtr at work.  -       Row Name 10/10/24 1213          Home Main Entrance    Number of Stairs, Main Entrance one  1 step then ramp  -       Row Name 10/10/24 1213          Stairs Within Home, Primary    Number of Stairs, Within Home, Primary none  -       Row Name 10/10/24 1213          Cognition    Orientation Status  (Cognition) disoriented to;person;place;situation;time;unable/difficult to assess  Possibly oriented to name only this date. Pt with eyes closed, limited opening despite increased encouragement this date.  -       Row Name 10/10/24 1213          Safety Issues, Functional Mobility    Safety Issues Affecting Function (Mobility) ability to follow commands;awareness of need for assistance;insight into deficits/self-awareness;judgment;problem-solving;safety precaution awareness;safety precautions follow-through/compliance;sequencing abilities  -     Impairments Affecting Function (Mobility) balance;cognition;coordination;endurance/activity tolerance;strength;pain;postural/trunk control  -     Cognitive Impairments, Mobility Safety/Performance attention;awareness, need for assistance;insight into deficits/self-awareness;judgment;problem-solving/reasoning;safety precaution awareness;safety precaution follow-through;sequencing abilities  -               User Key  (r) = Recorded By, (t) = Taken By, (c) = Cosigned By      Initials Name Provider Type     Jenny Hirsch OT Occupational Therapist                     Mobility/ADL's       Row Name 10/10/24 1217          Bed Mobility    Bed Mobility supine-sit;sit-supine  -     Supine-Sit [place] (Bed Mobility) dependent (less than 25% patient effort);2 person assist;verbal cues  -     Sit-Supine [place] (Bed Mobility) dependent (less than 25% patient effort);2 person assist;verbal cues  -     Bed Mobility, Safety Issues cognitive deficits limit understanding;decreased use of arms for pushing/pulling;decreased use of legs for bridging/pushing;impaired trunk control for bed mobility  -     Assistive Device (Bed Mobility) draw sheet;head of bed elevated  -     Comment, (Bed Mobility) Pt with decreased alert leve this date. Pt eyes opened several times, but unable to maintain alert level this date. Pt initially required max A for static sitting  balance, progressed to CGA for short period. Once returned to supine, pt able to demo lifting UB up in bed.  -       Row Name 10/10/24 1217          Transfers    Comment, (Transfers) Deferred due to decreased alert level this date.  -       Row Name 10/10/24 1217          Activities of Daily Living    BADL Assessment/Intervention lower body dressing  -       Row Name 10/10/24 1217          Lower Body Dressing Assessment/Training    [place] Level (Lower Body Dressing) don;socks;dependent (less than 25% patient effort)  -     Position (Lower Body Dressing) supine  -               User Key  (r) = Recorded By, (t) = Taken By, (c) = Cosigned By      Initials Name Provider Type     Jenny Hirsch, OT Occupational Therapist                   Obj/Interventions       Row Name 10/10/24 1219          Sensory Assessment (Somatosensory)    Sensory Assessment Unable to assess due to alert level  -       Row Name 10/10/24 1219          Vision Assessment/Intervention    Vision Assessment Comment eyes closed throughout  -       Row Name 10/10/24 1219          Range of Motion Comprehensive    General Range of Motion bilateral upper extremity ROM WFL  -       Row Name 10/10/24 1219          Strength Comprehensive (MMT)    Comment, General Manual Muscle Testing (MMT) Assessment Unable to assess due to alert level  -       Row Name 10/10/24 1219          Balance    Balance Assessment sitting static balance  -     Static Sitting Balance maximum assist  brief period of CGA  -JR     Position, Sitting Balance unsupported  -               User Key  (r) = Recorded By, (t) = Taken By, (c) = Cosigned By      Initials Name Provider Type     Jenny Hirsch, OT Occupational Therapist                   Goals/Plan       Row Name 10/10/24 1223          Bed Mobility Goal 1 (OT)    Activity/Assistive Device (Bed Mobility Goal 1, OT) bed mobility activities, all  -     [place] Level/Cues Needed (Bed Mobility  Goal 1, OT) moderate assist (50-74% patient effort);verbal cues required  -JR     Time Frame (Bed Mobility Goal 1, OT) short term goal (STG);5 days  -JR     Progress/Outcomes (Bed Mobility Goal 1, OT) new goal  -       Row Name 10/10/24 1223          Transfer Goal 1 (OT)    Activity/Assistive Device (Transfer Goal 1, OT) bed-to-chair/chair-to-bed  -JR     [place] Level/Cues Needed (Transfer Goal 1, OT) moderate assist (50-74% patient effort);verbal cues required  -JR     Time Frame (Transfer Goal 1, OT) long term goal (LTG);by discharge  -JR     Progress/Outcome (Transfer Goal 1, OT) new goal  -       Row Name 10/10/24 1223          Grooming Goal 1 (OT)    Activity/Device (Grooming Goal 1, OT) grooming skills, all  -JR     [place] (Grooming Goal 1, OT) minimum assist (75% or more patient effort);verbal cues required  -JR     Time Frame (Grooming Goal 1, OT) long term goal (LTG);by discharge  -JR     Progress/Outcome (Grooming Goal 1, OT) new goal  -       Row Name 10/10/24 1223          Problem Specific Goal 1 (OT)    Problem Specific Goal 1 (OT) Pt to sit EOB x 5 minutes with CGA to support ADL independence.  -JR     Time Frame (Problem Specific Goal 1, OT) long term goal (LTG);by discharge  -JR     Progress/Outcome (Problem Specific Goal 1, OT) new goal  -       Row Name 10/10/24 1223          Therapy Assessment/Plan (OT)    Planned Therapy Interventions (OT) activity tolerance training;adaptive equipment training;BADL retraining;functional balance retraining;occupation/activity based interventions;ROM/therapeutic exercise;strengthening exercise;transfer/mobility retraining;patient/caregiver education/training;cognitive/visual perception retraining  -JR               User Key  (r) = Recorded By, (t) = Taken By, (c) = Cosigned By      Initials Name Provider Type    Jenny Bonilla, OT Occupational Therapist                   Clinical Impression       Row Name 10/10/24 1220          Pain  Assessment    Additional Documentation Pain Scale: FACES Pre/Post-Treatment (Group)  -       Row Name 10/10/24 1220          Pain Scale: FACES Pre/Post-Treatment    Pain: FACES Scale, Pretreatment 0-->no hurt  -JR     Posttreatment Pain Rating 0-->no hurt  -JR       Row Name 10/10/24 1220          Plan of Care Review    Plan of Care Reviewed With patient;family  -     Outcome Evaluation OT initial eval and expanded chart review completed. Pt presents with multiple comorbidities and decreased strength, balance, activity tolerance and alert level limiting independence with ADL's and mobility from baseline status. Recommend continued skilled OT services and transfer to IRF at d/c.  -       Row Name 10/10/24 1220          Therapy Assessment/Plan (OT)    Patient/Family Therapy Goal Statement (OT) family would like pt to be able to return to [facility].  -     Rehab Potential (OT) good, to achieve stated therapy goals  -     Criteria for Skilled Therapeutic Interventions Met (OT) yes;meets criteria;skilled treatment is necessary  -     Therapy Frequency (OT) daily  -     Predicted Duration of Therapy Intervention (OT) 10 days  -       Row Name 10/10/24 1220          Therapy Plan Review/Discharge Plan (OT)    Anticipated Discharge Disposition (OT) inpatient rehabilitation facility  -       Row Name 10/10/24 1220          Vital Signs    Pre Systolic BP Rehab 168  -JR     Pre Treatment Diastolic BP 72  -JR     Post Systolic BP Rehab 146  -JR     Post Treatment Diastolic BP 62  -JR     Pretreatment Heart Rate (beats/min) 88  -JR     Posttreatment Heart Rate (beats/min) 94  -JR     Pre SpO2 (%) 93  -JR     O2 Delivery Pre Treatment room air  -JR     Post SpO2 (%) 94  -JR     O2 Delivery Post Treatment room air  -JR     Pre Patient Position Supine  -JR     Intra Patient Position Sitting  -JR     Post Patient Position Supine  -JR       Row Name 10/10/24 1220          Positioning and Restraints    Pre-Treatment  Position in bed  -JR     Post Treatment Position bed  -JR     In Bed notified nsg;supine;call light within reach;encouraged to call for assist;exit alarm on;with family/caregiver  -JR               User Key  (r) = Recorded By, (t) = Taken By, (c) = Cosigned By      Initials Name Provider Type    Jenny Bonilla OT Occupational Therapist                   Outcome Measures       Row Name 10/10/24 1224          How much help from another is currently needed...    Putting on and taking off regular lower body clothing? 1  -JR     Bathing (including washing, rinsing, and drying) 1  -JR     Toileting (which includes using toilet bed pan or urinal) 1  -JR     Putting on and taking off regular upper body clothing 1  -JR     Taking care of personal grooming (such as brushing teeth) 1  -JR     Eating meals 1  -JR     AM-PAC 6 Clicks Score (OT) 6  -JR       Row Name 10/10/24 0101          How much help from another person do you currently need...    Turning from your back to your side while in flat bed without using bedrails? 3  -NG     Moving from lying on back to sitting on the side of a flat bed without bedrails? 2  -NG     Moving to and from a bed to a chair (including a wheelchair)? 2  -NG     Standing up from a chair using your arms (e.g., wheelchair, bedside chair)? 2  -NG     Climbing 3-5 steps with a railing? 1  -NG     To walk in hospital room? 2  -NG     AM-PAC 6 Clicks Score (PT) 12  -NG     Highest Level of Mobility Goal 4 --> Transfer to chair/commode  -NG       Row Name 10/10/24 1224          Functional Assessment    Outcome Measure Options AM-PAC 6 Clicks Daily Activity (OT)  -               User Key  (r) = Recorded By, (t) = Taken By, (c) = Cosigned By      Initials Name Provider Type    Jenny Bonilla OT Occupational Therapist    Loly Rangel RN Registered Nurse                    Occupational Therapy Education       Title: PT OT SLP Therapies (In Progress)       Topic: Occupational  Therapy (In Progress)       Point: ADL training (Done)       Description:   Instruct learner(s) on proper safety adaptation and remediation techniques during self care or transfers.   Instruct in proper use of assistive devices.                  Learning Progress Summary             Patient Acceptance, E, VU,NR by  at 10/10/2024 0945    Comment: role of therapy and ongoing treatment plan   Family Acceptance, E, VU,NR by  at 10/10/2024 0945    Comment: role of therapy and ongoing treatment plan                         Point: Home exercise program (Not Started)       Description:   Instruct learner(s) on appropriate technique for monitoring, assisting and/or progressing therapeutic exercises/activities.                  Learner Progress:  Not documented in this visit.              Point: Precautions (Not Started)       Description:   Instruct learner(s) on prescribed precautions during self-care and functional transfers.                  Learner Progress:  Not documented in this visit.              Point: Body mechanics (Not Started)       Description:   Instruct learner(s) on proper positioning and spine alignment during self-care, functional mobility activities and/or exercises.                  Learner Progress:  Not documented in this visit.                              User Key       Initials Effective Dates Name Provider Type Discipline     02/03/23 -  Jenny Hirsch, OT Occupational Therapist OT                  OT Recommendation and Plan  Planned Therapy Interventions (OT): activity tolerance training, adaptive equipment training, BADL retraining, functional balance retraining, occupation/activity based interventions, ROM/therapeutic exercise, strengthening exercise, transfer/mobility retraining, patient/caregiver education/training, cognitive/visual perception retraining  Therapy Frequency (OT): daily  Plan of Care Review  Plan of Care Reviewed With: patient, family  Outcome Evaluation: OT initial  eval and expanded chart review completed. Pt presents with multiple comorbidities and decreased strength, balance, activity tolerance and alert level limiting independence with ADL's and mobility from baseline status. Recommend continued skilled OT services and transfer to IRF at d/c.     Time Calculation:   Evaluation Complexity (OT)  Review Occupational Profile/Medical/Therapy History Complexity: expanded/moderate complexity  Assessment, Occupational Performance/Identification of Deficit Complexity: 3-5 performance deficits  Clinical Decision Making Complexity (OT): detailed assessment/moderate complexity  Overall Complexity of Evaluation (OT): moderate complexity     Time Calculation- OT       Row Name 10/10/24 1226             Time Calculation- OT    OT Start Time 0945  -JR      OT Received On 10/10/24  -JR      OT Goal Re-Cert Due Date 10/20/24  -JR         Timed Charges    26592 - OT Self Care/Mgmt Minutes 8  -JR         Untimed Charges    OT Eval/Re-eval Minutes 31  -JR         Total Minutes    Timed Charges Total Minutes 8  -JR      Untimed Charges Total Minutes 31  -JR       Total Minutes 39  -JR                User Key  (r) = Recorded By, (t) = Taken By, (c) = Cosigned By      Initials Name Provider Type    JR Jenny Hirsch, OT Occupational Therapist                  Therapy Charges for Today       Code Description Service Date Service Provider Modifiers Qty    12429479401 HC OT SELF CARE/MGMT/TRAIN EA 15 MIN 10/10/2024 Jenny Hirsch OT GO 1    20938868113 HC OT EVAL MOD COMPLEXITY 3 10/10/2024 Jenny Hirsch OT GO 1                 Jenny Hirsch, OT  10/10/2024

## 2024-10-10 NOTE — PROGRESS NOTES
Psychiatric Medicine Services  PROGRESS NOTE    Patient Name: Arminda Krishnan  : 1945  MRN: [id number]    Date of Admission: 10/9/2024  Primary Care Physician: Aiden Spencer MD    Subjective   Subjective     CC: Follow-up hyperglycemia    HPI: No acute events overnight, niece is at bedside, patient is fast asleep, barely arouses      Objective   Objective     Vital Signs:   Temp:  [98.3 °F (36.8 °C)-98.5 °F (36.9 °C)] 98.5 °F (36.9 °C)  Heart Rate:  [80-99] 99  Resp:  [16-18] 16  BP: (134-166)/(69-74) 166/74     Physical Exam:  Constitutional: Chronically ill-appearing elderly female, sleepy  HENT: NCAT, mucous membranes dry  Respiratory: Nonlabored respirations  Cardiovascular: RRR, no murmurs, rubs, or gallops  Gastrointestinal: Positive bowel sounds, soft, nontender, nondistended  Musculoskeletal: No bilateral ankle edema  Psychiatric: KAILA  Neurologic: KAILA    Results Reviewed:  LAB RESULTS:      Lab 10/09/24  1842 10/03/24  1356   WBC 8.68 7.56   HEMOGLOBIN 7.9* 7.6*   HEMATOCRIT 25.0* 24.2*   PLATELETS 301 213   NEUTROS ABS 6.31 4.96   IMMATURE GRANS (ABS) 0.20* 0.11*   LYMPHS ABS 1.13 1.43   MONOS ABS 0.83 0.84   EOS ABS 0.13 0.16   MCV 88.7 89.0   CRP 13.05*  --    PROCALCITONIN 0.12  --    LACTATE 0.9  --    HSTROP T 39*  --          Lab 10/09/24  1842 10/03/24  1356   SODIUM 136 135*   POTASSIUM 4.4 4.3   CHLORIDE 103 101   CO2 19.0* 20.0*   ANION GAP 14.0 14.0   BUN 56* 38*   CREATININE 3.30* 2.88*   EGFR 13.5* 15.9*   GLUCOSE 359* 303*   CALCIUM 9.5 9.5   MAGNESIUM 1.8 1.7   PHOSPHORUS 3.5  --    TSH 0.465 0.580         Lab 10/09/24  1842 10/03/24  1356   TOTAL PROTEIN 7.4 7.1   ALBUMIN 3.3* 3.4*   GLOBULIN 4.1 3.7   ALT (SGPT) 18 13   AST (SGOT) 21 17   BILIRUBIN 0.3 0.4   ALK PHOS 78 77   LIPASE 23  --          Lab 10/09/24  1842   PROBNP 719.5   HSTROP T 39*             Lab 10/09/24  1842   IRON 14*  14*   IRON SATURATION (TSAT) 7*   TIBC 195*    TRANSFERRIN 131*   FERRITIN 766.80*         Lab 10/09/24  1846   FIO2 21   CARBOXYHEMOGLOBIN (VENOUS) 1.6     Brief Urine Lab Results  (Last result in the past 365 days)        Color   Clarity   Blood   Leuk Est   Nitrite   Protein   CREAT   Urine HCG        10/09/24 2058 Yellow   Cloudy   Large (3+)   Moderate (2+)   Negative   100 mg/dL (2+)                   Microbiology Results Abnormal       Procedure Component Value - Date/Time    COVID PRE-OP / PRE-PROCEDURE SCREENING ORDER (NO ISOLATION) - Swab, Nasopharynx [[id number]]  (Normal) Collected: 10/09/24 1843    Lab Status: Final result Specimen: Swab from Nasopharynx Updated: 10/09/24 1958    Narrative:      The following orders were created for panel order COVID PRE-OP / PRE-PROCEDURE SCREENING ORDER (NO ISOLATION) - Swab, Nasopharynx.  Procedure                               Abnormality         Status                     ---------                               -----------         ------                     COVID-19, FLU A/B, RSV P...[[id number]]  Normal              Final result                 Please view results for these tests on the individual orders.    COVID-19, FLU A/B, RSV PCR 1 HR TAT - Swab, Nasopharynx [[id number]]  (Normal) Collected: 10/09/24 1843    Lab Status: Final result Specimen: Swab from Nasopharynx Updated: 10/09/24 1958     COVID19 Not Detected     Influenza A PCR Not Detected     Influenza B PCR Not Detected     RSV, PCR Not Detected    Narrative:      Fact sheet for providers: https://www.fda.gov/media/777656/download    Fact sheet for patients: https://www.fda.gov/media/778568/download    Test performed by PCR.            XR Chest 1 View    Result Date: 10/9/2024  XR CHEST 1 VW Date of Exam: 10/9/2024 7:38 PM EDT Indication: Weak/Dizzy/AMS triage protocol Comparison: Chest radiograph 10/3/2024 Findings: Mediastinum: Cardiomediastinal silhouette appears unchanged and normal in size Lungs: Bronchial wall thickening and bilateral mild  interstitial opacities. There is no appreciable prominence of the central pulmonary vasculature or convincing septal thickening to strongly suggest pulmonary edema. There is a consolidative opacity in the left lower lobe. Pleura: No pleural effusion or pneumothorax. Bones and soft tissues: No acute osseous abnormality.     Impression: Impression: Consolidative opacity in the left lower lobe suspicious for aspiration or pneumonia. Bronchial wall thickening which can be seen in bronchitis and/or large airways disease. Electronically Signed: Francisco Javier Fuller  10/9/2024 8:09 PM EDT  Workstation ID: [id number]    CT Abdomen Pelvis Without Contrast    Result Date: 10/9/2024  CT ABDOMEN PELVIS WO CONTRAST Date of Exam: 10/9/2024 7:26 PM EDT Indication: nausea and severe epigastric pain, no BM 2 weeks. Comparison: CT abdomen pelvis 9/27/2024 Technique: Axial CT images were obtained of the abdomen and pelvis without the administration of contrast. Reconstructed coronal and sagittal images were also obtained. Automated exposure control and iterative construction methods were used. Findings: Heart size within normal limits. Trace pericardial fluid stable from prior. Small hiatal hernia. Lower lungs without acute abnormality. Small fat-containing right Bochdalek hernia. Noncontrast appearance of liver, spleen and biliary tree unremarkable. Cholecystectomy. Atrophic pancreas stable from prior without active inflammation. No suspicious adrenal nodule. Symmetric renal size and contour. Stable punctate right midpole calculus. No hydronephrosis. Urinary bladder unremarkable. Hysterectomy. No suspicious adnexal mass. Expected configuration stomach and duodenum. Mild to moderate formed colonic stool. No evidence of bowel obstruction or active inflammation. No CT evidence of acute appendicitis. Extensive abdominal atherosclerotic disease. Celiac artery stent. Negative for abdominal aortic aneurysm. No suspicious adenopathy. No ascites,  free air or drainable collection. Fat-containing umbilical and periumbilical hernias stable from prior without edema or contained bowel. No acute soft tissue finding. Degenerative osteoarthritis of the hip joints, right greater than left. Discectomy and fusion changes of the lumbar spine unchanged from prior. 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 Anterolisthesis L4 and L5 stable from prior. Periprosthetic lucency at  L2 and L5 screws stable from prior suggesting loosening.     Impression: Impression: No acute CT findings. Multiple chronic/ancillary findings overall without significant change from recent comparison. Electronically Signed: Deon Gimenez MD  10/9/2024 7:53 PM EDT  Workstation ID: [id number]     Results for orders placed during the hospital encounter of 07/15/24    Adult Transthoracic Echo Complete W/ Cont if Necessary Per Protocol    Interpretation Summary    Left ventricular systolic function is normal. Calculated left ventricular EF = 63.2%    Estimated right ventricular systolic pressure from tricuspid regurgitation is normal (<35 mmHg).    Normal left atrial size and volume noted.    There is calcification of the aortic valve. Mild to moderate aortic valve regurgitation is present.      Current medications:  Scheduled Meds:aspirin, 81 mg, Oral, Daily  atorvastatin, 80 mg, Oral, Daily  clopidogrel, 75 mg, Oral, Daily  famotidine, 10 mg, Oral, Every Other Day  insulin regular, 2-7 Units, Subcutaneous, Q6H  ipratropium-albuterol, 3 mL, Nebulization, 4x Daily - RT  isosorbide mononitrate, 60 mg, Oral, Daily  linagliptin, 5 mg, Oral, Daily  piperacillin-tazobactam, 3.375 g, Intravenous, Q12H  predniSONE, 5 mg, Oral, Daily With Breakfast  sodium chloride, 10 mL, Intravenous, Q12H  vancomycin (dosing per levels), , Does not apply, Daily      Continuous Infusions:Pharmacy to dose vancomycin,   sodium chloride, 75 mL/hr, Last Rate: 75 mL/hr (10/10/24 0305)      PRN Meds:.  acetaminophen **OR**  acetaminophen **OR** acetaminophen    senna-docusate sodium **AND** polyethylene glycol **AND** bisacodyl **AND** bisacodyl    Calcium Replacement - Follow Nurse / BPA Driven Protocol    dextrose    dextrose    glucagon (human recombinant)    Magnesium Low Dose Replacement - Follow Nurse / BPA Driven Protocol    melatonin    Pharmacy to dose vancomycin    Phosphorus Replacement - Follow Nurse / BPA Driven Protocol    Potassium Replacement - Follow Nurse / BPA Driven Protocol    sodium chloride    sodium chloride    Assessment & Plan   Assessment & Plan     Active Hospital Problems    Diagnosis  POA    **LLL pneumonia [J18.9]  Yes    HTN (hypertension) [I10]  Unknown    Hyperglycemia [R73.9]  Yes    History of CVA (cerebrovascular accident) [Z86.73]  Not Applicable    CKD (chronic kidney disease) stage 4, GFR 15-29 ml/min [N18.4]  Yes    Degenerative disc disease, lumbar [M51.369]  Yes      Resolved Hospital Problems   No resolved problems to display.      Brief Hospital Course to date:  Arminda Krishnan is a 79 y.o. female with history of mild coronary impairment, prior CVA, DDD on steroids, type 2 diabetes who presented to [facility] ED with hyperglycemia, workup concerning for possible pneumonia and UTI    Suspected pneumonia  -Chest x-ray shows l left ower lobe pneumonia, likely aspirational  -Follow-up blood and sputum cultures, currently NGTD, follow-up Legionella and strep pneumo urinary antigens  -Continue antibiotics patient started on Zosyn and vancomycin, we will de-escalate to IV rocephin, pharmacy following  -SLP to evaluate  -She remains on room air, continue DuoNebs    Suspected UTI  -UA with 4+ bacteria, leukocytosis  -Follow-up urine cultures, continue antibiotics as above    MICHAEL on CKD stage III  -Baseline creatinine~2.2-2.6, was 3.30 on presentation  -Likely prerenal, will gently hydrate and closely monitor    Poorly controlled type 2 diabetes with A1c 11% and hyperglycemia in the setting of chronic  steroids  -FSBG's reviewed and are much improved  -Continue SSI, will add basal Lantus 5 units twice daily when she is no longer n.p.o. and adjust as warranted.    Anemia  -Hg 7.9 on presentation, no signs of blood loss  -Follow-up iron profile and closely monitor.    History of CVA  Hyperlipidemia  -Continue aspirin, statin, Plavix    MCI    DDD  -Continue chronic steroids    Expected Discharge Location and Transportation: TBD  Expected Discharge   Expected discharge date/ time has not been documented.     VTE Prophylaxis:  Mechanical VTE prophylaxis orders are present.       CODE STATUS:   Code Status and Medical Interventions: No CPR (Do Not Attempt to Resuscitate); Limited Support; No intubation (DNI)   Ordered at: 10/10/24 0002     Medical Intervention Limits:    No intubation (DNI)     Level Of Support Discussed With:    Next of Kin (If No Surrogate)     Code Status (Patient has no pulse and is not breathing):    No CPR (Do Not Attempt to Resuscitate)     Medical Interventions (Patient has pulse or is breathing):    Limited Support       Tab Samuel MD  10/10/24

## 2024-10-10 NOTE — PAYOR COMM NOTE
"Arminda Noel (79 y.o. Female)       Date of Birth   1945    Social Security Number       Address   [address] [place] [address]    Home Phone   [phone]    MRN   [id number]       Confucianism   [facility]    Marital Status                               Admission Date   10/9/24    Admission Type   Emergency    Admitting Provider   Tab Samuel MD    Attending Provider   Tab Samuel MD    Department, Room/Bed   [address], S524/1       Discharge Date       Discharge Disposition       Discharge Destination                                 Attending Provider: Tab Samuel MD    Allergies: Ceclor [Cefaclor]    Isolation: None   Infection: None   Code Status: No CPR    Ht: 152.4 cm (60\")   Wt: 81.6 kg (180 lb)    Admission Cmt: None   Principal Problem: LLL pneumonia [J18.9]                   Active Insurance as of 10/9/2024       Primary Coverage       Payor Plan Insurance Group Employer/Plan Group    ANTHEM MEDICARE REPLACEMENT ANTHEM MEDICARE ADVANTAGE [id number]       Payor Plan Address Payor Plan Phone Number Payor Plan Fax Number Effective Dates    [address] [phone]  7/1/2024 - None Entered    [facility] [id number]         Subscriber Name Subscriber Birth Date Member ID       ARMINDA NOEL 1945 [id number]               Secondary Coverage       Payor Plan Insurance Group Employer/Plan Group    [place]        Payor Plan Address Payor Plan Phone Number Payor Plan Fax Number Effective Dates    [address] [phone]  2/20/2024 - None Entered    [facility] [id number]         Subscriber Name Subscriber Birth Date Member ID       ARMINDA NOEL 1945 [id number]                     Emergency Contacts        (Rel.) Home Phone Work Phone Mobile Phone    Delbert Mcdermott (Sister) [phone] -- [phone]                 History & Physical        Jin Manriquez III, DO at 10/09/24 " 2304              [facility] Medicine Services  HISTORY AND PHYSICAL    Patient Name: Arminda Krishnan  : 1945  MRN: [id number]  Primary Care Physician: Aiden Spencer MD  Date of admission: 10/9/2024    Subjective  Subjective     Chief Complaint:  Hyperglycemia    HPI:  Arminda Krishnan is a 79 y.o. female with significant medical history of mild cognitive impairment, type 2 diabetes, history of CVA, anemia, degenerative disc disease on steroids who presents to [facility] ED with hyperglycemia.     Due to decreased LOC HPI was obtained via medical record and next of kin at bedside.  Patient's sister reports that home health arrived today and checked patient's glucose was 495.  It remained in the high 300s low 400s, and the patient appeared more lethargic than normal so they brought her to the ED. they also report that her chronic cough has gotten progressively worse over the last 2 days, cough described as productive with yellow sputum.  Denies fevers body aches or chills.  No complaints of chest pain, occasional shortness of breath, SpO2 stable on room air.      Chest x-ray shows lower lobe pneumonia, concerning for aspirational pneumonia  UA in ED positive for leukocytes and bacteria +4  Uncontrolled type 2 diabetes on chronic steroids FSBG 393 on arrival    Received IVF, insulin, Zosyn and Vanc       Review of Systems   Constitutional:  Negative for chills and fever.   HENT:  Negative for congestion.    Eyes: Negative.    Respiratory:  Positive for cough, shortness of breath and wheezing. Negative for chest tightness.    Cardiovascular:  Negative for chest pain and leg swelling.   Gastrointestinal:  Negative for constipation, nausea and vomiting.   Endocrine: Negative.    Genitourinary: Negative.    Musculoskeletal:  Positive for myalgias (Baseline bilateral leg pain).   Skin: Negative.    Allergic/Immunologic: Positive for immunocompromised state.    Neurological:  Positive for weakness. Negative for syncope, light-headedness and headaches.   Hematological: Negative.    Psychiatric/Behavioral:  Positive for confusion (Baseline confusion, worsening).               Personal History     Past Medical History:   Diagnosis Date    Anemia     Arthritis     Back problem     CAD (coronary artery disease)     Cancer     Right breast    Chronic back pain     Chronically on opiate therapy     Depression     Diabetes mellitus     DX 14 years ago- checks fsbs weekly    Fibromyalgia     Gastroparesis     GERD (gastroesophageal reflux disease)     Headache     emotional/tension    History of transfusion     [facility]    HTN (hypertension)     Hypercholesteremia     IBS (irritable bowel syndrome)     Incontinence of urine     urgency    Migraine headache     Myalgia and myositis     Peripheral neuropathy     Sleep apnea     does not wear cpap    UTI (urinary tract infection)              Past Surgical History:   Procedure Laterality Date    APPENDECTOMY      ARTERIOGRAM MESENTERIC N/A 6/16/2022    Procedure: DIAGNOSTIC ARTERIOGRAM WITH CELIAC STENT PLACEMENT;  Surgeon: Neo Neil MD;  Location: [facility];  Service: Vascular;  Laterality: N/A;  FLUORO: 16 MIN  DOSE: 2384 MGY  CONTRAST:  20 ML    BACK SURGERY      5x per patient    BRAIN TUMOR EXCISION  1988    BREAST BIOPSY      CARPAL TUNNEL RELEASE Bilateral     CHOLECYSTECTOMY      COLONOSCOPY      2015    CRANIOTOMY FOR TUMOR      EYE SURGERY      bilateral cataracts removed    HEMORRHOIDECTOMY      LUMBAR FUSION N/A 01/04/2017    Procedure: LUMBAR LAMINECTOMY AND DECOMRESSION  L3 AND L4;  Surgeon: Nishant Bhatti MD;  Location: [facility] OR;  Service:     TOTAL ABDOMINAL HYSTERECTOMY      TRIGGER FINGER RELEASE         Family History: family history includes Alcohol abuse in her father; Cancer in her brother, brother, father, and another family member; Diabetes in her brother, mother, sister, and  another family member; Heart attack in her mother, sister, sister, and another family member; Heart disease in her mother and sister; Hyperlipidemia in an other family member; Hypertension in her brother, mother, sister, and another family member; Stroke in her sister.     Social History:  reports that she has been smoking cigarettes. She started smoking about 65 years ago. She has a 47.4 pack-year smoking history. She has been exposed to tobacco smoke. She has never used smokeless tobacco. She reports that she does not drink alcohol and does not use drugs.  Social History     Social History Narrative    Lives in [place], [place] with sister       Medications:  Blood Glucose Monitoring Suppl, Blood Glucose Test, Dexcom G7 , Dexcom G7 Sensor, Diclofenac Sodium, Dupilumab, HYDROcodone-acetaminophen, Insulin Pen Needle, ONE TOUCH ULTRA 2, OneTouch Delica Plus Ksjakw12Z, SITagliptin, acetaminophen, albuterol, albuterol sulfate HFA, aspirin, atorvastatin, busPIRone, carvedilol, cetirizine, clopidogrel, famotidine, fluticasone, glipizide, glucose blood, isosorbide mononitrate, multivitamin with minerals, naloxone, nicotine, pantoprazole, predniSONE, pregabalin, silver sulfadiazine, tacrolimus, and tolterodine LA    Allergies   Allergen Reactions    Ceclor [Cefaclor] Rash       Objective  Objective     Vital Signs:   Temp:  [98.3 °F (36.8 °C)] 98.3 °F (36.8 °C)  Heart Rate:  [80-83] 80  Resp:  [18] 18  BP: (134-158)/(69-73) 140/73    Physical Exam  Constitutional:       Appearance: She is well-groomed. She is ill-appearing.   HENT:      Head: Normocephalic.   Cardiovascular:      Rate and Rhythm: Normal rate and regular rhythm.   Pulmonary:      Effort: Pulmonary effort is normal.      Breath sounds: Wheezing and rhonchi present.   Abdominal:      General: Abdomen is flat. Bowel sounds are normal.      Palpations: Abdomen is soft.   Musculoskeletal:      Right lower leg: No edema.      Left lower leg: No edema.    Skin:     General: Skin is warm and dry.   Neurological:      Mental Status: She is lethargic and disoriented.   Psychiatric:         Behavior: Behavior is cooperative.          Result Review:  I have personally reviewed the results from the time of this admission to 10/10/2024 00:02 EDT and agree with these findings:  [x]  Laboratory list / accordion  [x]  Microbiology  []  Radiology  [x]  EKG/Telemetry   []  Cardiology/Vascular   []  Pathology  [x]  Old records  []  Other:      LAB RESULTS:      Lab 10/09/24  1842 10/03/24  1356   WBC 8.68 7.56   HEMOGLOBIN 7.9* 7.6*   HEMATOCRIT 25.0* 24.2*   PLATELETS 301 213   NEUTROS ABS 6.31 4.96   IMMATURE GRANS (ABS) 0.20* 0.11*   LYMPHS ABS 1.13 1.43   MONOS ABS 0.83 0.84   EOS ABS 0.13 0.16   MCV 88.7 89.0   CRP 13.05*  --    PROCALCITONIN 0.12  --    LACTATE 0.9  --          Lab 10/09/24  1842 10/03/24  1356   SODIUM 136 135*   POTASSIUM 4.4 4.3   CHLORIDE 103 101   CO2 19.0* 20.0*   ANION GAP 14.0 14.0   BUN 56* 38*   CREATININE 3.30* 2.88*   EGFR 13.5* 15.9*   GLUCOSE 359* 303*   CALCIUM 9.5 9.5   MAGNESIUM 1.8 1.7   PHOSPHORUS 3.5  --    TSH 0.465 0.580         Lab 10/09/24  1842 10/03/24  1356   TOTAL PROTEIN 7.4 7.1   ALBUMIN 3.3* 3.4*   GLOBULIN 4.1 3.7   ALT (SGPT) 18 13   AST (SGOT) 21 17   BILIRUBIN 0.3 0.4   ALK PHOS 78 77   LIPASE 23  --          Lab 10/09/24  1842   PROBNP 719.5   HSTROP T 39*                 Lab 10/09/24  1846   FIO2 21   CARBOXYHEMOGLOBIN (VENOUS) 1.6     Brief Urine Lab Results  (Last result in the past 365 days)        Color   Clarity   Blood   Leuk Est   Nitrite   Protein   CREAT   Urine HCG        10/09/24 2058 Yellow   Cloudy   Large (3+)   Moderate (2+)   Negative   100 mg/dL (2+)                 Microbiology Results (last 10 days)       Procedure Component Value - Date/Time    COVID PRE-OP / PRE-PROCEDURE SCREENING ORDER (NO ISOLATION) - Swab, Nasopharynx [[id number]]  (Normal) Collected: 10/09/24 9665    Lab Status: Final result  Specimen: Swab from Nasopharynx Updated: 10/09/24 1958    Narrative:      The following orders were created for panel order COVID PRE-OP / PRE-PROCEDURE SCREENING ORDER (NO ISOLATION) - Swab, Nasopharynx.  Procedure                               Abnormality         Status                     ---------                               -----------         ------                     COVID-19, FLU A/B, RSV P...[[id number]]  Normal              Final result                 Please view results for these tests on the individual orders.    COVID-19, FLU A/B, RSV PCR 1 HR TAT - Swab, Nasopharynx [[id number]]  (Normal) Collected: 10/09/24 1843    Lab Status: Final result Specimen: Swab from Nasopharynx Updated: 10/09/24 1958     COVID19 Not Detected     Influenza A PCR Not Detected     Influenza B PCR Not Detected     RSV, PCR Not Detected    Narrative:      Fact sheet for providers: https://www.fda.gov/media/473171/download    Fact sheet for patients: https://www.fda.gov/media/949057/download    Test performed by PCR.            XR Chest 1 View    Result Date: 10/9/2024  XR CHEST 1 VW Date of Exam: 10/9/2024 7:38 PM EDT Indication: Weak/Dizzy/AMS triage protocol Comparison: Chest radiograph 10/3/2024 Findings: Mediastinum: Cardiomediastinal silhouette appears unchanged and normal in size Lungs: Bronchial wall thickening and bilateral mild interstitial opacities. There is no appreciable prominence of the central pulmonary vasculature or convincing septal thickening to strongly suggest pulmonary edema. There is a consolidative opacity in the left lower lobe. Pleura: No pleural effusion or pneumothorax. Bones and soft tissues: No acute osseous abnormality.     Impression: Impression: Consolidative opacity in the left lower lobe suspicious for aspiration or pneumonia. Bronchial wall thickening which can be seen in bronchitis and/or large airways disease. Electronically Signed: Francisco Javier Fuller  10/9/2024 8:09 PM EDT  Workstation ID:  [id number]    CT Abdomen Pelvis Without Contrast    Result Date: 10/9/2024  CT ABDOMEN PELVIS WO CONTRAST Date of Exam: 10/9/2024 7:26 PM EDT Indication: nausea and severe epigastric pain, no BM 2 weeks. Comparison: CT abdomen pelvis 9/27/2024 Technique: Axial CT images were obtained of the abdomen and pelvis without the administration of contrast. Reconstructed coronal and sagittal images were also obtained. Automated exposure control and iterative construction methods were used. Findings: Heart size within normal limits. Trace pericardial fluid stable from prior. Small hiatal hernia. Lower lungs without acute abnormality. Small fat-containing right Bochdalek hernia. Noncontrast appearance of liver, spleen and biliary tree unremarkable. Cholecystectomy. Atrophic pancreas stable from prior without active inflammation. No suspicious adrenal nodule. Symmetric renal size and contour. Stable punctate right midpole calculus. No hydronephrosis. Urinary bladder unremarkable. Hysterectomy. No suspicious adnexal mass. Expected configuration stomach and duodenum. Mild to moderate formed colonic stool. No evidence of bowel obstruction or active inflammation. No CT evidence of acute appendicitis. Extensive abdominal atherosclerotic disease. Celiac artery stent. Negative for abdominal aortic aneurysm. No suspicious adenopathy. No ascites, free air or drainable collection. Fat-containing umbilical and periumbilical hernias stable from prior without edema or contained bowel. No acute soft tissue finding. Degenerative osteoarthritis of the hip joints, right greater than left. Discectomy and fusion changes of the lumbar spine unchanged from prior. 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 Anterolisthesis L4 and L5 stable from prior. Periprosthetic lucency at  L2 and L5 screws stable from prior suggesting loosening.     Impression: Impression: No acute CT findings. Multiple chronic/ancillary findings overall without significant change  from recent comparison. Electronically Signed: Deon Gimenez MD  10/9/2024 7:53 PM EDT  Workstation ID: [id number]     Results for orders placed during the hospital encounter of 07/15/24    Adult Transthoracic Echo Complete W/ Cont if Necessary Per Protocol    Interpretation Summary    Left ventricular systolic function is normal. Calculated left ventricular EF = 63.2%    Estimated right ventricular systolic pressure from tricuspid regurgitation is normal (<35 mmHg).    Normal left atrial size and volume noted.    There is calcification of the aortic valve. Mild to moderate aortic valve regurgitation is present.      Assessment & Plan  Assessment & Plan       LLL pneumonia    Degenerative disc disease, lumbar    CKD (chronic kidney disease) stage 4, GFR 15-29 ml/min    Hyperglycemia    History of CVA (cerebrovascular accident)    HTN (hypertension)      Left lower lung pneumonia  -CXR LLL pneumonia  -Respiratory panel negative  -Received Zosyn and Vanc in ED  -Sputum culture  -DuoNebs  -Urine antigens  -Continue Zosyn and vancomycin  -Speech consult  -N.p.o. until speech eval  CBC and CMP in a.m.      UTI  CKD   -UA positive for leukocytes, +4 bacteria  -Cultures pending  -Creatinine 3.30  -IV fluid  -ABX as above    Type 2 diabetes, uncontrolled  -Chronic prednisone use  -Received insulin and fluids in ED  -A1c 11 (September 2024)  -FSBG, SSI    HTN/HLD  History of CVA  -Continue ASA  -Continue atorvastatin  -Continue carvedilol  -Continue isosorbide  -Continue clopidogrel    Anemia  -Serial H&H's  -Anemia studies  -Occult stool  -Consider GI consult      Degenerative disc disease  -Chronic prednisone 5 mg every other day      DVT prophylaxis:      CODE STATUS: DNR/DNI  Medical Intervention Limits: No intubation (DNI)  Level Of Support Discussed With: Next of Kin (If No Surrogate)  Code Status (Patient has no pulse and is not breathing): No CPR (Do Not Attempt to Resuscitate)  Medical Interventions (Patient has  pulse or is breathing): Limited Support      Expected Discharge  Expected discharge date/ time has not been documented.  TBD      This note has been completed as part of a split-shared workflow.     Signature: Electronically signed by BALAJI Ge, 10/10/24, 12:12 AM EDT    -----------------------    The patient was seen independently by the APC.  I was available for any questions or concerns.     Electronically signed by Jin Manriquez III, DO, 10/10/24, 12:53 AM EDT.             Electronically signed by Jin Manriquez III, DO at 10/10/24 0053          Physician Progress Notes (all)        Tab Samuel MD at 10/10/24 0616              [facility] Medicine Services  PROGRESS NOTE    Patient Name: Arminda Krishnan  : 1945  MRN: [id number]    Date of Admission: 10/9/2024  Primary Care Physician: Aiden Spencer MD    Subjective   Subjective     CC: Follow-up hyperglycemia    HPI: No acute events overnight, niece is at bedside, patient is fast asleep, barely arouses      Objective   Objective     Vital Signs:   Temp:  [98.3 °F (36.8 °C)-98.5 °F (36.9 °C)] 98.5 °F (36.9 °C)  Heart Rate:  [80-99] 99  Resp:  [16-18] 16  BP: (134-166)/(69-74) 166/74     Physical Exam:  Constitutional: Chronically ill-appearing elderly female, sleepy  HENT: NCAT, mucous membranes dry  Respiratory: Nonlabored respirations  Cardiovascular: RRR, no murmurs, rubs, or gallops  Gastrointestinal: Positive bowel sounds, soft, nontender, nondistended  Musculoskeletal: No bilateral ankle edema  Psychiatric: KAILA  Neurologic: KAILA    Results Reviewed:  LAB RESULTS:      Lab 10/09/24  1842 10/03/24  1356   WBC 8.68 7.56   HEMOGLOBIN 7.9* 7.6*   HEMATOCRIT 25.0* 24.2*   PLATELETS 301 213   NEUTROS ABS 6.31 4.96   IMMATURE GRANS (ABS) 0.20* 0.11*   LYMPHS ABS 1.13 1.43   MONOS ABS 0.83 0.84   EOS ABS 0.13 0.16   MCV 88.7 89.0   CRP 13.05*  --    PROCALCITONIN 0.12  --    LACTATE 0.9  --     HSTROP T 39*  --          Lab 10/09/24  1842 10/03/24  1356   SODIUM 136 135*   POTASSIUM 4.4 4.3   CHLORIDE 103 101   CO2 19.0* 20.0*   ANION GAP 14.0 14.0   BUN 56* 38*   CREATININE 3.30* 2.88*   EGFR 13.5* 15.9*   GLUCOSE 359* 303*   CALCIUM 9.5 9.5   MAGNESIUM 1.8 1.7   PHOSPHORUS 3.5  --    TSH 0.465 0.580         Lab 10/09/24  1842 10/03/24  1356   TOTAL PROTEIN 7.4 7.1   ALBUMIN 3.3* 3.4*   GLOBULIN 4.1 3.7   ALT (SGPT) 18 13   AST (SGOT) 21 17   BILIRUBIN 0.3 0.4   ALK PHOS 78 77   LIPASE 23  --          Lab 10/09/24  1842   PROBNP 719.5   HSTROP T 39*             Lab 10/09/24  1842   IRON 14*  14*   IRON SATURATION (TSAT) 7*   TIBC 195*   TRANSFERRIN 131*   FERRITIN 766.80*         Lab 10/09/24  1846   FIO2 21   CARBOXYHEMOGLOBIN (VENOUS) 1.6     Brief Urine Lab Results  (Last result in the past 365 days)        Color   Clarity   Blood   Leuk Est   Nitrite   Protein   CREAT   Urine HCG        10/09/24 2058 Yellow   Cloudy   Large (3+)   Moderate (2+)   Negative   100 mg/dL (2+)                   Microbiology Results Abnormal       Procedure Component Value - Date/Time    COVID PRE-OP / PRE-PROCEDURE SCREENING ORDER (NO ISOLATION) - Swab, Nasopharynx [[id number]]  (Normal) Collected: 10/09/24 1843    Lab Status: Final result Specimen: Swab from Nasopharynx Updated: 10/09/24 1958    Narrative:      The following orders were created for panel order COVID PRE-OP / PRE-PROCEDURE SCREENING ORDER (NO ISOLATION) - Swab, Nasopharynx.  Procedure                               Abnormality         Status                     ---------                               -----------         ------                     COVID-19, FLU A/B, RSV P...[[id number]]  Normal              Final result                 Please view results for these tests on the individual orders.    COVID-19, FLU A/B, RSV PCR 1 HR TAT - Swab, Nasopharynx [[id number]]  (Normal) Collected: 10/09/24 1843    Lab Status: Final result Specimen: Swab from  Nasopharynx Updated: 10/09/24 1958     COVID19 Not Detected     Influenza A PCR Not Detected     Influenza B PCR Not Detected     RSV, PCR Not Detected    Narrative:      Fact sheet for providers: https://www.fda.gov/media/053084/download    Fact sheet for patients: https://www.fda.gov/media/271544/download    Test performed by PCR.            XR Chest 1 View    Result Date: 10/9/2024  XR CHEST 1 VW Date of Exam: 10/9/2024 7:38 PM EDT Indication: Weak/Dizzy/AMS triage protocol Comparison: Chest radiograph 10/3/2024 Findings: Mediastinum: Cardiomediastinal silhouette appears unchanged and normal in size Lungs: Bronchial wall thickening and bilateral mild interstitial opacities. There is no appreciable prominence of the central pulmonary vasculature or convincing septal thickening to strongly suggest pulmonary edema. There is a consolidative opacity in the left lower lobe. Pleura: No pleural effusion or pneumothorax. Bones and soft tissues: No acute osseous abnormality.     Impression: Impression: Consolidative opacity in the left lower lobe suspicious for aspiration or pneumonia. Bronchial wall thickening which can be seen in bronchitis and/or large airways disease. Electronically Signed: Francisco Javier Fuller  10/9/2024 8:09 PM EDT  Workstation ID: [id number]    CT Abdomen Pelvis Without Contrast    Result Date: 10/9/2024  CT ABDOMEN PELVIS WO CONTRAST Date of Exam: 10/9/2024 7:26 PM EDT Indication: nausea and severe epigastric pain, no BM 2 weeks. Comparison: CT abdomen pelvis 9/27/2024 Technique: Axial CT images were obtained of the abdomen and pelvis without the administration of contrast. Reconstructed coronal and sagittal images were also obtained. Automated exposure control and iterative construction methods were used. Findings: Heart size within normal limits. Trace pericardial fluid stable from prior. Small hiatal hernia. Lower lungs without acute abnormality. Small fat-containing right Bochdalek hernia. Noncontrast  appearance of liver, spleen and biliary tree unremarkable. Cholecystectomy. Atrophic pancreas stable from prior without active inflammation. No suspicious adrenal nodule. Symmetric renal size and contour. Stable punctate right midpole calculus. No hydronephrosis. Urinary bladder unremarkable. Hysterectomy. No suspicious adnexal mass. Expected configuration stomach and duodenum. Mild to moderate formed colonic stool. No evidence of bowel obstruction or active inflammation. No CT evidence of acute appendicitis. Extensive abdominal atherosclerotic disease. Celiac artery stent. Negative for abdominal aortic aneurysm. No suspicious adenopathy. No ascites, free air or drainable collection. Fat-containing umbilical and periumbilical hernias stable from prior without edema or contained bowel. No acute soft tissue finding. Degenerative osteoarthritis of the hip joints, right greater than left. Discectomy and fusion changes of the lumbar spine unchanged from prior. 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 Anterolisthesis L4 and L5 stable from prior. Periprosthetic lucency at  L2 and L5 screws stable from prior suggesting loosening.     Impression: Impression: No acute CT findings. Multiple chronic/ancillary findings overall without significant change from recent comparison. Electronically Signed: Deon Gimenez MD  10/9/2024 7:53 PM EDT  Workstation ID: [id number]     Results for orders placed during the hospital encounter of 07/15/24    Adult Transthoracic Echo Complete W/ Cont if Necessary Per Protocol    Interpretation Summary    Left ventricular systolic function is normal. Calculated left ventricular EF = 63.2%    Estimated right ventricular systolic pressure from tricuspid regurgitation is normal (<35 mmHg).    Normal left atrial size and volume noted.    There is calcification of the aortic valve. Mild to moderate aortic valve regurgitation is present.      Current medications:  Scheduled Meds:aspirin, 81 mg, Oral,  Daily  atorvastatin, 80 mg, Oral, Daily  clopidogrel, 75 mg, Oral, Daily  famotidine, 10 mg, Oral, Every Other Day  insulin regular, 2-7 Units, Subcutaneous, Q6H  ipratropium-albuterol, 3 mL, Nebulization, 4x Daily - RT  isosorbide mononitrate, 60 mg, Oral, Daily  linagliptin, 5 mg, Oral, Daily  piperacillin-tazobactam, 3.375 g, Intravenous, Q12H  predniSONE, 5 mg, Oral, Daily With Breakfast  sodium chloride, 10 mL, Intravenous, Q12H  vancomycin (dosing per levels), , Does not apply, Daily      Continuous Infusions:Pharmacy to dose vancomycin,   sodium chloride, 75 mL/hr, Last Rate: 75 mL/hr (10/10/24 0308)      PRN Meds:.  acetaminophen **OR** acetaminophen **OR** acetaminophen    senna-docusate sodium **AND** polyethylene glycol **AND** bisacodyl **AND** bisacodyl    Calcium Replacement - Follow Nurse / BPA Driven Protocol    dextrose    dextrose    glucagon (human recombinant)    Magnesium Low Dose Replacement - Follow Nurse / BPA Driven Protocol    melatonin    Pharmacy to dose vancomycin    Phosphorus Replacement - Follow Nurse / BPA Driven Protocol    Potassium Replacement - Follow Nurse / BPA Driven Protocol    sodium chloride    sodium chloride    Assessment & Plan   Assessment & Plan     Active Hospital Problems    Diagnosis  POA    **LLL pneumonia [J18.9]  Yes    HTN (hypertension) [I10]  Unknown    Hyperglycemia [R73.9]  Yes    History of CVA (cerebrovascular accident) [Z86.73]  Not Applicable    CKD (chronic kidney disease) stage 4, GFR 15-29 ml/min [N18.4]  Yes    Degenerative disc disease, lumbar [M51.369]  Yes      Resolved Hospital Problems   No resolved problems to display.      Brief Hospital Course to date:  Arminda Krishnan is a 79 y.o. female with history of mild coronary impairment, prior CVA, DDD on steroids, type 2 diabetes who presented to [facility] ED with hyperglycemia, workup concerning for possible pneumonia and UTI    Suspected pneumonia  -Chest x-ray shows l left ower lobe pneumonia,  likely aspirational  -Follow-up blood and sputum cultures, currently NGTD, follow-up Legionella and strep pneumo urinary antigens  -Continue antibiotics patient started on Zosyn and vancomycin, we will de-escalate to IV rocephin, pharmacy following  -SLP to evaluate  -She remains on room air, continue DuoNebs    Suspected UTI  -UA with 4+ bacteria, leukocytosis  -Follow-up urine cultures, continue antibiotics as above    MICHAEL on CKD stage III  -Baseline creatinine~2.2-2.6, was 3.30 on presentation  -Likely prerenal, will gently hydrate and closely monitor    Poorly controlled type 2 diabetes with A1c 11% and hyperglycemia in the setting of chronic steroids  -FSBG's reviewed and are much improved  -Continue SSI, will add basal Lantus 5 units twice daily when she is no longer n.p.o. and adjust as warranted.    Anemia  -Hg 7.9 on presentation, no signs of blood loss  -Follow-up iron profile and closely monitor.    History of CVA  Hyperlipidemia  -Continue aspirin, statin, Plavix    MCI    DDD  -Continue chronic steroids    Expected Discharge Location and Transportation: TBD  Expected Discharge   Expected discharge date/ time has not been documented.     VTE Prophylaxis:  Mechanical VTE prophylaxis orders are present.       CODE STATUS:   Code Status and Medical Interventions: No CPR (Do Not Attempt to Resuscitate); Limited Support; No intubation (DNI)   Ordered at: 10/10/24 0002     Medical Intervention Limits:    No intubation (DNI)     Level Of Support Discussed With:    Next of Kin (If No Surrogate)     Code Status (Patient has no pulse and is not breathing):    No CPR (Do Not Attempt to Resuscitate)     Medical Interventions (Patient has pulse or is breathing):    Limited Support       Tab Samuel MD  10/10/24        Electronically signed by Tab Samuel MD at 10/10/24 4236       Consult Notes (all)    No notes of this type exist for this encounter.

## 2024-10-10 NOTE — PLAN OF CARE
Goal Outcome Evaluation:  Plan of Care Reviewed With: patient        Progress: no change  Outcome Evaluation: PT eval completed. Pt with decreased alert leve this date despite max encouragement and upright sitting. Pt eyes opened several times throughout PTx, but unable to maintain alert level this date. Pt initially required max A for static sitting balance, however progressed to CGA for short spurts. Once returned to supine, pt able to demonstrate supine to sit in bed.Patient presents below baseline for functional mobility. Patient demonstrates decreased activity tolerance, deconditioning and reduced dynamic balance. Patient would continue to benefit from skilled PT services to improve dynamic balance with gait, transfers strength, and activity tolerance training in order to build endurance and reduce risk of falls.      Anticipated Discharge Disposition (PT): inpatient rehabilitation facility

## 2024-10-10 NOTE — ED NOTES
Arminda Krishnan    Nursing Report ED to Floor:  Mental status: a&ox4 *[facility]  Ambulatory status: up x2 uses wheelchair at home  Oxygen Therapy:  ra  Cardiac Rhythm: nsr  Admitted from: home  Safety Concerns:  fall risk  Social Issues: none  ED Room #:  21    ED Nurse Phone Extension - [phone] or may call [phone].      HPI:   Chief Complaint   Patient presents with    Hyperglycemia       Past Medical History:  Past Medical History:   Diagnosis Date    Anemia     Arthritis     Back problem     CAD (coronary artery disease)     Cancer     Right breast    Chronic back pain     Chronically on opiate therapy     Depression     Diabetes mellitus     DX 14 years ago- checks fsbs weekly    Fibromyalgia     Gastroparesis     GERD (gastroesophageal reflux disease)     Headache     emotional/tension    History of transfusion     [facility]    HTN (hypertension)     Hypercholesteremia     IBS (irritable bowel syndrome)     Incontinence of urine     urgency    Migraine headache     Myalgia and myositis     Peripheral neuropathy     Sleep apnea     does not wear cpap    UTI (urinary tract infection)         Past Surgical History:  Past Surgical History:   Procedure Laterality Date    APPENDECTOMY      ARTERIOGRAM MESENTERIC N/A 6/16/2022    Procedure: DIAGNOSTIC ARTERIOGRAM WITH CELIAC STENT PLACEMENT;  Surgeon: Neo Neil MD;  Location: [facility];  Service: Vascular;  Laterality: N/A;  FLUORO: 16 MIN  DOSE: 2384 MGY  CONTRAST:  20 ML    BACK SURGERY      5x per patient    BRAIN TUMOR EXCISION  1988    BREAST BIOPSY      CARPAL TUNNEL RELEASE Bilateral     CHOLECYSTECTOMY      COLONOSCOPY      2015    CRANIOTOMY FOR TUMOR      EYE SURGERY      bilateral cataracts removed    HEMORRHOIDECTOMY      LUMBAR FUSION N/A 01/04/2017    Procedure: LUMBAR LAMINECTOMY AND DECOMRESSION  L3 AND L4;  Surgeon: Nishant Bhatti MD;  Location: [facility];  Service:     TOTAL ABDOMINAL HYSTERECTOMY      TRIGGER FINGER RELEASE    "       Admitting Doctor:   Jin Manriquez III, DO    Consulting Provider(s):  Consults       No orders found from 9/10/2024 to 10/10/2024.             Admitting Diagnosis:   The primary encounter diagnosis was Uncontrolled type 2 diabetes mellitus with hyperglycemia. Diagnoses of Acute UTI (urinary tract infection) and Pneumonia of left lower lobe due to infectious organism were also pertinent to this visit.    Most Recent Vitals:   Vitals:    10/09/24 1748 10/09/24 2145 10/09/24 2230 10/09/24 2300   BP: 134/72 141/69 136/73 158/73   BP Location: Right arm      Patient Position: Sitting      Pulse: 83 81 83 82   Resp: 18      Temp: 98.3 °F (36.8 °C)      TempSrc: Oral      SpO2: 99% 96% 92% 95%   Weight: 75.3 kg (166 lb)      Height: 152.4 cm (60\")          Active LDAs/IV Access:   Lines, Drains & Airways       Active LDAs       Name Placement date Placement time Site Days    Peripheral IV 10/09/24 2150 Right Antecubital 10/09/24  2150  Antecubital  less than 1                    Labs (abnormal labs have a star):   Labs Reviewed   COMPREHENSIVE METABOLIC PANEL - Abnormal; Notable for the following components:       Result Value    Glucose 359 (*)     BUN 56 (*)     Creatinine 3.30 (*)     CO2 19.0 (*)     Albumin 3.3 (*)     eGFR 13.5 (*)     All other components within normal limits    Narrative:     GFR Normal >60  Chronic Kidney Disease <60  Kidney Failure <15    The GFR formula is only valid for adults with stable renal function between ages 18 and 70.   SINGLE HS TROPONIN T - Abnormal; Notable for the following components:    HS Troponin T 39 (*)     All other components within normal limits    Narrative:     High Sensitive Troponin T Reference Range:  <14.0 ng/L- Negative Female for AMI  <22.0 ng/L- Negative Male for AMI  >=14 - Abnormal Female indicating possible myocardial injury.  >=22 - Abnormal Male indicating possible myocardial injury.   Clinicians would have to utilize clinical acumen, EKG, " Troponin, and serial changes to determine if it is an Acute Myocardial Infarction or myocardial injury due to an underlying chronic condition.        URINALYSIS W/ MICROSCOPIC IF INDICATED (NO CULTURE) - Abnormal; Notable for the following components:    Appearance, UA Cloudy (*)     Glucose,  mg/dL (1+) (*)     Blood, UA Large (3+) (*)     Protein,  mg/dL (2+) (*)     Leuk Esterase, UA Moderate (2+) (*)     All other components within normal limits   CBC WITH AUTO DIFFERENTIAL - Abnormal; Notable for the following components:    RBC 2.82 (*)     Hemoglobin 7.9 (*)     Hematocrit 25.0 (*)     Lymphocyte % 13.0 (*)     Immature Grans % 2.3 (*)     Immature Grans, Absolute 0.20 (*)     All other components within normal limits   C-REACTIVE PROTEIN - Abnormal; Notable for the following components:    C-Reactive Protein 13.05 (*)     All other components within normal limits   BLOOD GAS, VENOUS W/CO-OXIMETRY - Abnormal; Notable for the following components:    HCO3, Venous 21.8 (*)     Base Excess, Venous -4.0 (*)     Hemoglobin, Blood Gas 9.8 (*)     All other components within normal limits   URINALYSIS, MICROSCOPIC ONLY - Abnormal; Notable for the following components:    RBC, UA 21-50 (*)     WBC, UA Too Numerous to Count (*)     Bacteria, UA 4+ (*)     All other components within normal limits   POCT GLUCOSE FINGERSTICK - Abnormal; Notable for the following components:    Glucose 404 (*)     All other components within normal limits   POCT GLUCOSE FINGERSTICK - Abnormal; Notable for the following components:    Glucose 393 (*)     All other components within normal limits   POCT GLUCOSE FINGERSTICK - Abnormal; Notable for the following components:    Glucose 239 (*)     All other components within normal limits   COVID-19/FLUA&B/RSV, NP SWAB IN TRANSPORT MEDIA 1 HR TAT - Normal    Narrative:     Fact sheet for providers: https://www.fda.gov/media/257121/download    Fact sheet for patients:  "https://www.fda.gov/media/880799/download    Test performed by PCR.   MAGNESIUM - Normal   LIPASE - Normal   BNP (IN-HOUSE) - Normal    Narrative:     This assay is used as an aid in the diagnosis of individuals suspected of having heart failure. It can be used as an aid in the diagnosis of acute decompensated heart failure (ADHF) in patients presenting with signs and symptoms of ADHF to the emergency department (ED). In addition, NT-proBNP of <300 pg/mL indicates ADHF is not likely.    Age Range Result Interpretation  NT-proBNP Concentration (pg/mL:      <50             Positive            >450                   Gray                 300-450                    Negative             <300    50-75           Positive            >900                  Gray                300-900                  Negative            <300      >75             Positive            >1800                  Gray                300-1800                  Negative            <300   LACTIC ACID, PLASMA - Normal   PROCALCITONIN - Normal    Narrative:     As a Marker for Sepsis (Non-Neonates):    1. <0.5 ng/mL represents a low risk of severe sepsis and/or septic shock.  2. >2 ng/mL represents a high risk of severe sepsis and/or septic shock.    As a Marker for Lower Respiratory Tract Infections that require antibiotic therapy:    PCT on Admission    Antibiotic Therapy       6-12 Hrs later    >0.5                Strongly Recommended  >0.25 - <0.5        Recommended   0.1 - 0.25          Discouraged              Remeasure/reassess PCT  <0.1                Strongly Discouraged     Remeasure/reassess PCT    As 28 day mortality risk marker: \"Change in Procalcitonin Result\" (>80% or <=80%) if Day 0 (or Day 1) and Day 4 values are available. Refer to http://www.[employer]-pct-calculator.com    Change in PCT <=80%  A decrease of PCT levels below or equal to 80% defines a positive change in PCT test result representing a higher risk for 28-day all-cause mortality " of patients diagnosed with severe sepsis for septic shock.    Change in PCT >80%  A decrease of PCT levels of more than 80% defines a negative change in PCT result representing a lower risk for 28-day all-cause mortality of patients diagnosed with severe sepsis or septic shock.      TSH - Normal   T4, FREE - Normal   PHOSPHORUS - Normal   BETA HYDROXYBUTYRATE QUANTITATIVE - Normal    Narrative:     In the assessment of possible diabetic ketoacidosis, the test should be interpreted along with other clinical and laboratory findings.  A level greater than 1 mmol/L should require further evaluation and levels of more than 3 mmol/L require immediate medical review.   COVID PRE-OP / PRE-PROCEDURE SCREENING ORDER (NO ISOLATION)    Narrative:     The following orders were created for panel order COVID PRE-OP / PRE-PROCEDURE SCREENING ORDER (NO ISOLATION) - Swab, Nasopharynx.  Procedure                               Abnormality         Status                     ---------                               -----------         ------                     COVID-19, FLU A/B, RSV P...[[id number]]  Normal              Final result                 Please view results for these tests on the individual orders.   BLOOD CULTURE   BLOOD CULTURE   URINE CULTURE   RAINBOW DRAW    Narrative:     The following orders were created for panel order [place] Draw.  Procedure                               Abnormality         Status                     ---------                               -----------         ------                     Green Top (Gel)[[id number]]                                  Final result               Lavender Top[[id number]]                                     Final result               Gold Top - SST[[id number]]                                   Final result               Sullivan Top[[id number]]                                         Final result               Light Blue Top[[id number]]                                   Final result                  Please view results for these tests on the individual orders.   BLOOD GAS, ARTERIAL   POCT GLUCOSE FINGERSTICK   POCT GLUCOSE FINGERSTICK   CBC AND DIFFERENTIAL    Narrative:     The following orders were created for panel order CBC & Differential.  Procedure                               Abnormality         Status                     ---------                               -----------         ------                     CBC Auto Differential[[id number]]        Abnormal            Final result                 Please view results for these tests on the individual orders.   GREEN TOP   LAVENDER TOP   GOLD TOP - SST   GRAY TOP   LIGHT BLUE TOP   KETONE BODIES SERUM    Narrative:     The following orders were created for panel order Ketone Bodies, Serum (Not performed at [place]).  Procedure                               Abnormality         Status                     ---------                               -----------         ------                     Beta Hydroxybutyrate Maximilian...[[id number]]  Normal              Final result                 Please view results for these tests on the individual orders.       Meds Given in ED:   Medications   sodium chloride 0.9 % flush 10 mL (has no administration in time range)   vancomycin IVPB 1500 mg in 0.9% NaCl (Premix) 500 mL (has no administration in time range)   sodium chloride 0.9 % bolus 1,000 mL (0 mL Intravenous Stopped 10/9/24 2332)   insulin regular (humuLIN R,novoLIN R) injection 4 Units (4 Units Intravenous Given 10/9/24 2151)   ondansetron (ZOFRAN) injection 4 mg (4 mg Intravenous Given 10/9/24 2152)   piperacillin-tazobactam (ZOSYN) 3.375 g IVPB in 100 mL NS MBP (CD) (0 g Intravenous Stopped 10/9/24 2332)   ipratropium-albuterol (DUO-NEB) nebulizer solution 3 mL (3 mL Nebulization Given 10/9/24 2322)           Last NIH score:                                                          Dysphagia screening results:  Patient Factors Component (Dysphagia:Stroke or  Rule-out)  Best Eye Response: 4-->(E4) spontaneous (10/09/24 2230)  Best Motor Response: 6-->(M6) obeys commands (10/09/24 2230)  Best Verbal Response: 5-->(V5) oriented (10/09/24 2230)  Roy Coma Scale Score: 15 (10/09/24 2230)     Roy Coma Scale:  No data recorded     CIWA:        Restraint Type:            Isolation Status:  No active isolations

## 2024-10-10 NOTE — THERAPY EVALUATION
Patient Name: Arminda Krishnan  : 1945    MRN: [id number]                              Today's Date: 10/10/2024       Admit Date: 10/9/2024    Visit Dx:     ICD-10-CM ICD-9-CM   1. Uncontrolled type 2 diabetes mellitus with hyperglycemia  E11.65 250.02   2. Acute UTI (urinary tract infection)  N39.0 599.0   3. Pneumonia of left lower lobe due to infectious organism  J18.9 486   4. Dysphagia, unspecified type  R13.10 787.20   5. Cerebral vascular disease  I67.9 437.9   6. Degeneration of intervertebral disc of lumbar region, unspecified whether pain present  M51.369 722.52   7. Spinal stenosis, lumbar region, with neurogenic claudication  M48.062 724.03   8. Aspiration pneumonia of left lower lobe, unspecified aspiration pneumonia type  J69.0 507.0   9. Dehydration  E86.0 276.51     Patient Active Problem List   Diagnosis    Cerebral vascular disease    Degenerative disc disease, lumbar    Degenerative disc disease, cervical    Spinal stenosis, lumbar region, with neurogenic claudication    Tobacco abuse    Chronic anemia    CKD (chronic kidney disease) stage 4, GFR 15-29 ml/min    Hyperglycemia    History of CVA (cerebrovascular accident)    Type 2 diabetes mellitus with hyperglycemia, without long-term current use of insulin    Disorientation    Dehydration    LLL pneumonia    HTN (hypertension)    Pneumonia     Past Medical History:   Diagnosis Date    Anemia     Arthritis     Back problem     CAD (coronary artery disease)     Cancer     Right breast    Chronic back pain     Chronically on opiate therapy     Depression     Diabetes mellitus     DX 14 years ago- checks fsbs weekly    Fibromyalgia     Gastroparesis     GERD (gastroesophageal reflux disease)     Headache     emotional/tension    History of transfusion     [facility]    HTN (hypertension)     Hypercholesteremia     IBS (irritable bowel syndrome)     Incontinence of urine     urgency    Migraine headache     Myalgia and myositis      Peripheral neuropathy     Sleep apnea     does not wear cpap    UTI (urinary tract infection)      Past Surgical History:   Procedure Laterality Date    APPENDECTOMY      ARTERIOGRAM MESENTERIC N/A 6/16/2022    Procedure: DIAGNOSTIC ARTERIOGRAM WITH CELIAC STENT PLACEMENT;  Surgeon: Neo Neil MD;  Location: [facility];  Service: Vascular;  Laterality: N/A;  FLUORO: 16 MIN  DOSE: 2384 MGY  CONTRAST:  20 ML    BACK SURGERY      5x per patient    BRAIN TUMOR EXCISION  1988    BREAST BIOPSY      CARPAL TUNNEL RELEASE Bilateral     CHOLECYSTECTOMY      COLONOSCOPY      2015    CRANIOTOMY FOR TUMOR      EYE SURGERY      bilateral cataracts removed    HEMORRHOIDECTOMY      LUMBAR FUSION N/A 01/04/2017    Procedure: LUMBAR LAMINECTOMY AND DECOMRESSION  L3 AND L4;  Surgeon: Nishant Bhatti MD;  Location: [facility] OR;  Service:     TOTAL ABDOMINAL HYSTERECTOMY      TRIGGER FINGER RELEASE        General Information       Row Name 10/10/24 0930          Physical Therapy Time and Intention    Document Type evaluation  -KW     Mode of Treatment physical therapy  -KW       Row Name 10/10/24 0930          General Information    Patient Profile Reviewed yes  -KW     Prior Level of Function independent:;all household mobility;community mobility;gait;transfer;bed mobility  independent with cane typically, has been using rwx the past few weeks  -KW     Existing Precautions/Restrictions fall  -KW     Barriers to Rehab medically complex  -KW       Row Name 10/10/24 0930          Living Environment    People in Home child(verna), adult  -KW       Row Name 10/10/24 0930          Home Main Entrance    Number of Stairs, Main Entrance one  one steps then ramp  -KW       Row Name 10/10/24 0930          Stairs Within Home, Primary    Number of Stairs, Within Home, Primary none  -KW       Row Name 10/10/24 0930          Cognition    Orientation Status (Cognition) disoriented to;person;place;situation;time;unable/difficult to assess   Possibly oriented to name only this date. Pt with eyes closed, limited opening despite max encouragement this date.  -KW       Row Name 10/10/24 0930          Safety Issues, Functional Mobility    Impairments Affecting Function (Mobility) balance;endurance/activity tolerance;pain;shortness of breath;strength  -KW               User Key  (r) = Recorded By, (t) = Taken By, (c) = Cosigned By      Initials Name Provider Type    Loly Vallejo PT Physical Therapist                   Mobility       Row Name 10/10/24 0930          Bed Mobility    Bed Mobility supine-sit;sit-supine  -KW     Supine-Sit [place] (Bed Mobility) dependent (less than 25% patient effort);2 person assist;verbal cues  -KW     Sit-Supine [place] (Bed Mobility) dependent (less than 25% patient effort);2 person assist;verbal cues  -KW     Assistive Device (Bed Mobility) draw sheet;head of bed elevated  -KW               User Key  (r) = Recorded By, (t) = Taken By, (c) = Cosigned By      Initials Name Provider Type    Loly Vallejo PT Physical Therapist                   Obj/Interventions       Row Name 10/10/24 0930          Strength Comprehensive (MMT)    General Manual Muscle Testing (MMT) Assessment lower extremity strength deficits identified  -KW               User Key  (r) = Recorded By, (t) = Taken By, (c) = Cosigned By      Initials Name Provider Type    Loly Vallejo PT Physical Therapist                   Goals/Plan       Row Name 10/10/24 0930          Bed Mobility Goal 1 (PT)    Activity/Assistive Device (Bed Mobility Goal 1, PT) sit to supine;supine to sit  -KW     [place] Level/Cues Needed (Bed Mobility Goal 1, PT) independent  -KW     Time Frame (Bed Mobility Goal 1, PT) 5 days;short term goal (STG)  -KW       Row Name 10/10/24 0930          Transfer Goal 1 (PT)    Activity/Assistive Device (Transfer Goal 1, PT) sit-to-stand/stand-to-sit;bed-to-chair/chair-to-bed  -KW     [place]  Level/Cues Needed (Transfer Goal 1, PT) modified independence  -KW     Time Frame (Transfer Goal 1, PT) 10 days  -KW       Row Name 10/10/24 0930          Gait Training Goal 1 (PT)    Activity/Assistive Device (Gait Training Goal 1, PT) assistive device use;decrease fall risk;gait (walking locomotion);diminish gait deviation;improve balance and speed;increase endurance/gait distance;walker, rolling  -KW     [place] Level (Gait Training Goal 1, PT) modified independence  -KW     Distance (Gait Training Goal 1, PT) 150  -KW     Time Frame (Gait Training Goal 1, PT) 10 days  -KW               User Key  (r) = Recorded By, (t) = Taken By, (c) = Cosigned By      Initials Name Provider Type    Loly Vallejo, PT Physical Therapist                   Clinical Impression       Row Name 10/10/24 0930          Pain    Pretreatment Pain Rating 0/10 - no pain  -KW       Row Name 10/10/24 0930          Plan of Care Review    Plan of Care Reviewed With patient  -KW     Progress no change  -KW     Outcome Evaluation PT eval completed. Pt with decreased alert leve this date despite max encouragement and upright sitting. Pt eyes opened several times throughout PTx, but unable to maintain alert level this date. Pt initially required max A for static sitting balance, however progressed to CGA for short spurts. Once returned to supine, pt able to demonstrate supine to sit in bed.Patient presents below baseline for functional mobility. Patient demonstrates decreased activity tolerance, deconditioning and reduced dynamic balance. Patient would continue to benefit from skilled PT services to improve dynamic balance with gait, transfers strength, and activity tolerance training in order to build endurance and reduce risk of falls.  -KW       Row Name 10/10/24 0930          Therapy Assessment/Plan (PT)    Patient/Family Therapy Goals Statement (PT) to return to baseline  -KW     Rehab Potential (PT) good, to achieve stated  therapy goals  -KW     Criteria for Skilled Interventions Met (PT) yes;skilled treatment is necessary  -KW     Therapy Frequency (PT) daily  -KW     Predicted Duration of Therapy Intervention (PT) 10 days  -KW       Row Name 10/10/24 0930          Vital Signs    Pretreatment Heart Rate (beats/min) 89  -KW     Pre SpO2 (%) 93  -KW       Row Name 10/10/24 0930          Positioning and Restraints    Pre-Treatment Position in bed  -KW     Post Treatment Position bed  -KW     In Bed supine;call light within reach;encouraged to call for assist;exit alarm on  -KW               User Key  (r) = Recorded By, (t) = Taken By, (c) = Cosigned By      Initials Name Provider Type    Loly Vallejo, STANLEY Physical Therapist                   Outcome Measures       Row Name 10/10/24 0930          How much help from another person do you currently need...    Turning from your back to your side while in flat bed without using bedrails? 3  -KW     Moving from lying on back to sitting on the side of a flat bed without bedrails? 2  -KW     Moving to and from a bed to a chair (including a wheelchair)? 2  -KW     Standing up from a chair using your arms (e.g., wheelchair, bedside chair)? 2  -KW     Climbing 3-5 steps with a railing? 2  -KW     To walk in hospital room? 2  -KW     AM-PAC 6 Clicks Score (PT) 13  -KW     Highest Level of Mobility Goal 4 --> Transfer to chair/commode  -KW       Row Name 10/10/24 1224 10/10/24 0930       Functional Assessment    Outcome Measure Options AM-PAC 6 Clicks Daily Activity (OT)  - AM-PAC 6 Clicks Basic Mobility (PT)  -KW              User Key  (r) = Recorded By, (t) = Taken By, (c) = Cosigned By      Initials Name Provider Type    Jenny Bonilla, OT Occupational Therapist    KW Loly Davidson PT Physical Therapist                                   PT Recommendation and Plan     Plan of Care Reviewed With: patient  Progress: no change  Outcome Evaluation: PT eval completed. Pt with  decreased alert leve this date despite max encouragement and upright sitting. Pt eyes opened several times throughout PTx, but unable to maintain alert level this date. Pt initially required max A for static sitting balance, however progressed to CGA for short spurts. Once returned to supine, pt able to demonstrate supine to sit in bed.Patient presents below baseline for functional mobility. Patient demonstrates decreased activity tolerance, deconditioning and reduced dynamic balance. Patient would continue to benefit from skilled PT services to improve dynamic balance with gait, transfers strength, and activity tolerance training in order to build endurance and reduce risk of falls.     Time Calculation:   PT Evaluation Complexity  History, PT Evaluation Complexity: 3 or more personal factors and/or comorbidities  Examination of Body Systems (PT Eval Complexity): total of 3 or more elements  Clinical Presentation (PT Evaluation Complexity): evolving  Clinical Decision Making (PT Evaluation Complexity): moderate complexity  Overall Complexity (PT Evaluation Complexity): moderate complexity     PT Charges       Row Name 10/10/24 0930             Time Calculation    Start Time 0930  -KW      PT Received On 10/10/24  -KW      PT Goal Re-Cert Due Date 10/20/24  -KW         Untimed Charges    PT Eval/Re-eval Minutes 59  -KW         Total Minutes    Untimed Charges Total Minutes 59  -KW       Total Minutes 59  -KW                User Key  (r) = Recorded By, (t) = Taken By, (c) = Cosigned By      Initials Name Provider Type    Loly Vallejo PT Physical Therapist                  Therapy Charges for Today       Code Description Service Date Service Provider Modifiers Qty    [id number] HC PT EVAL MOD COMPLEXITY 4 10/10/2024 Loly Davidson PT GP 1            PT G-Codes  Outcome Measure Options: AM-PAC 6 Clicks Daily Activity (OT)  AM-PAC 6 Clicks Score (PT): 13  AM-PAC 6 Clicks Score (OT): 6  PT Discharge  Summary  Anticipated Discharge Disposition (PT): inpatient rehabilitation facility    Loly Davidson, PT  10/10/2024

## 2024-10-11 LAB
BACTERIA SPEC AEROBE CULT: NO GROWTH
GLUCOSE BLDC GLUCOMTR-MCNC: 178 MG/DL (ref 70–130)
GLUCOSE BLDC GLUCOMTR-MCNC: 198 MG/DL (ref 70–130)
GLUCOSE BLDC GLUCOMTR-MCNC: 203 MG/DL (ref 70–130)
GLUCOSE BLDC GLUCOMTR-MCNC: 215 MG/DL (ref 70–130)

## 2024-10-11 PROCEDURE — 94799 UNLISTED PULMONARY SVC/PX: CPT

## 2024-10-11 PROCEDURE — 82948 REAGENT STRIP/BLOOD GLUCOSE: CPT

## 2024-10-11 PROCEDURE — 94664 DEMO&/EVAL PT USE INHALER: CPT

## 2024-10-11 PROCEDURE — 92526 ORAL FUNCTION THERAPY: CPT

## 2024-10-11 PROCEDURE — 25010000002 CEFTRIAXONE PER 250 MG: Performed by: INTERNAL MEDICINE

## 2024-10-11 PROCEDURE — 94761 N-INVAS EAR/PLS OXIMETRY MLT: CPT

## 2024-10-11 PROCEDURE — 99232 SBSQ HOSP IP/OBS MODERATE 35: CPT | Performed by: INTERNAL MEDICINE

## 2024-10-11 PROCEDURE — 63710000001 INSULIN REGULAR HUMAN PER 5 UNITS: Performed by: INTERNAL MEDICINE

## 2024-10-11 PROCEDURE — 63710000001 INSULIN GLARGINE PER 5 UNITS: Performed by: INTERNAL MEDICINE

## 2024-10-11 RX ORDER — QUETIAPINE FUMARATE 25 MG/1
12.5 TABLET, FILM COATED ORAL NIGHTLY
Status: DISCONTINUED | OUTPATIENT
Start: 2024-10-11 | End: 2024-10-14

## 2024-10-11 RX ORDER — ASPIRIN 81 MG/1
81 TABLET, CHEWABLE ORAL DAILY
Status: DISCONTINUED | OUTPATIENT
Start: 2024-10-11 | End: 2024-10-22 | Stop reason: HOSPADM

## 2024-10-11 RX ADMIN — Medication 10 ML: at 20:47

## 2024-10-11 RX ADMIN — QUETIAPINE FUMARATE 12.5 MG: 25 TABLET ORAL at 18:09

## 2024-10-11 RX ADMIN — Medication 10 ML: at 08:59

## 2024-10-11 RX ADMIN — HUMAN INSULIN 3 UNITS: 100 INJECTION, SOLUTION SUBCUTANEOUS at 17:07

## 2024-10-11 RX ADMIN — ISOSORBIDE MONONITRATE 60 MG: 60 TABLET, EXTENDED RELEASE ORAL at 09:25

## 2024-10-11 RX ADMIN — HUMAN INSULIN 3 UNITS: 100 INJECTION, SOLUTION SUBCUTANEOUS at 08:46

## 2024-10-11 RX ADMIN — Medication 10 ML: at 12:21

## 2024-10-11 RX ADMIN — HUMAN INSULIN 2 UNITS: 100 INJECTION, SOLUTION SUBCUTANEOUS at 12:20

## 2024-10-11 RX ADMIN — IPRATROPIUM BROMIDE AND ALBUTEROL SULFATE 3 ML: 2.5; .5 SOLUTION RESPIRATORY (INHALATION) at 22:20

## 2024-10-11 RX ADMIN — INSULIN GLARGINE 5 UNITS: 100 INJECTION, SOLUTION SUBCUTANEOUS at 08:47

## 2024-10-11 RX ADMIN — SODIUM CHLORIDE 1000 MG: 900 INJECTION INTRAVENOUS at 14:26

## 2024-10-11 RX ADMIN — ACETAMINOPHEN 650 MG: 650 SOLUTION ORAL at 17:16

## 2024-10-11 RX ADMIN — IPRATROPIUM BROMIDE AND ALBUTEROL SULFATE 3 ML: 2.5; .5 SOLUTION RESPIRATORY (INHALATION) at 12:44

## 2024-10-11 RX ADMIN — IPRATROPIUM BROMIDE AND ALBUTEROL SULFATE 3 ML: 2.5; .5 SOLUTION RESPIRATORY (INHALATION) at 07:37

## 2024-10-11 RX ADMIN — ACETAMINOPHEN 650 MG: 325 TABLET, FILM COATED ORAL at 21:47

## 2024-10-11 NOTE — DISCHARGE PLACEMENT REQUEST
"    [employer] [facility] [phone]    From [place] [phone]      Arminda Noel (79 y.o. Female)                                 Date of Birth   1945    Social Security Number       Address   [address] [place] [address]    Home Phone   [phone]    MRN   [id number]       Mormonism   Shinto    Marital Status                               Admission Date   10/9/24    Admission Type   Emergency    Admitting Provider   Tab Samuel MD    Attending Provider   Tab Samuel MD    Department, Room/Bed   [address], S524/1       Discharge Date       Discharge Disposition       Discharge Destination                                 Attending Provider: Tab Samuel MD    Allergies: Ceclor [Cefaclor]    Isolation: None   Infection: None   Code Status: No CPR    Ht: 152.4 cm (60\")   Wt: 81.6 kg (180 lb)    Admission Cmt: None   Principal Problem: LLL pneumonia [J18.9]                   Active Insurance as of 10/9/2024       Primary Coverage       Payor Plan Insurance Group Employer/Plan Group    ANTHEM MEDICARE REPLACEMENT ANTHEM MEDICARE ADVANTAGE [id number]       Payor Plan Address Payor Plan Phone Number Payor Plan Fax Number Effective Dates    [address] [phone]  7/1/2024 - None Entered    [facility] [id number]         Subscriber Name Subscriber Birth Date Member ID       ARMINDA NOEL 1945 [id number]               Secondary Coverage       Payor Plan Insurance Group Employer/Plan Group    [place]        Payor Plan Address Payor Plan Phone Number Payor Plan Fax Number Effective Dates    [address] [phone]  2/20/2024 - None Entered    [facility] [id number]         Subscriber Name Subscriber Birth Date Member ID       ARMINDA NOEL 1945 [id number]                     Emergency Contacts        (Rel.) Home Phone Work Phone Mobile Phone    Delbert Mcdermott (Sister) [phone] -- " [phone]              Insurance Information                  ANTHEM MEDICARE REPLACEMENT/ANTHEM MEDICARE ADVANTAGE Phone: [phone]    Subscriber: Arminda Krishnan Subscriber#: [id number]    Group#: [id number] Precert#: --        [place] Phone: [phone]    Subscriber: Arminda Krishnan Subscriber#: [id number]    Group#: -- Precert#: --        Jin Manriquez III, DO   Physician  Hospitalist     H&P     Signed     Date of Service: 10/09/24 2304  Creation Time: 10/09/24 2304     Signed       Expand All Collapse All[]Expand All by Default         [facility] Medicine Services  HISTORY AND PHYSICAL     Patient Name: Arminda Krishnan  : 1945  MRN: [id number]  Primary Care Physician: Aiden Spencer MD  Date of admission: 10/9/2024        Subjective  Subjective      Chief Complaint:  Hyperglycemia     HPI:  Arminda Krishnan is a 79 y.o. female with significant medical history of mild cognitive impairment, type 2 diabetes, history of CVA, anemia, degenerative disc disease on steroids who presents to [facility] ED with hyperglycemia.      Due to decreased LOC HPI was obtained via medical record and next of kin at bedside.  Patient's sister reports that home health arrived today and checked patient's glucose was 495.  It remained in the high 300s low 400s, and the patient appeared more lethargic than normal so they brought her to the ED. they also report that her chronic cough has gotten progressively worse over the last 2 days, cough described as productive with yellow sputum.  Denies fevers body aches or chills.  No complaints of chest pain, occasional shortness of breath, SpO2 stable on room air.        Chest x-ray shows lower lobe pneumonia, concerning for aspirational pneumonia  UA in ED positive for leukocytes and bacteria +4  Uncontrolled type 2 diabetes on chronic steroids FSBG 393 on arrival     Received  IVF, insulin, Zosyn and Vanc         Review of Systems   Constitutional:  Negative for chills and fever.   HENT:  Negative for congestion.    Eyes: Negative.    Respiratory:  Positive for cough, shortness of breath and wheezing. Negative for chest tightness.    Cardiovascular:  Negative for chest pain and leg swelling.   Gastrointestinal:  Negative for constipation, nausea and vomiting.   Endocrine: Negative.    Genitourinary: Negative.    Musculoskeletal:  Positive for myalgias (Baseline bilateral leg pain).   Skin: Negative.    Allergic/Immunologic: Positive for immunocompromised state.   Neurological:  Positive for weakness. Negative for syncope, light-headedness and headaches.   Hematological: Negative.    Psychiatric/Behavioral:  Positive for confusion (Baseline confusion, worsening).                   Personal History      Medical History        Past Medical History:   Diagnosis Date    Anemia      Arthritis      Back problem      CAD (coronary artery disease)      Cancer       Right breast    Chronic back pain      Chronically on opiate therapy      Depression      Diabetes mellitus       DX 14 years ago- checks fsbs weekly    Fibromyalgia      Gastroparesis      GERD (gastroesophageal reflux disease)      Headache       emotional/tension    History of transfusion       [facility]    HTN (hypertension)      Hypercholesteremia      IBS (irritable bowel syndrome)      Incontinence of urine       urgency    Migraine headache      Myalgia and myositis      Peripheral neuropathy      Sleep apnea       does not wear cpap    UTI (urinary tract infection)                       Surgical History         Past Surgical History:   Procedure Laterality Date    APPENDECTOMY        ARTERIOGRAM MESENTERIC N/A 6/16/2022     Procedure: DIAGNOSTIC ARTERIOGRAM WITH CELIAC STENT PLACEMENT;  Surgeon: Neo Neil MD;  Location: [facility];  Service: Vascular;  Laterality: N/A;  FLUORO: 16 MIN  DOSE: 2384  MGY  CONTRAST:  20 ML    BACK SURGERY         5x per patient    BRAIN TUMOR EXCISION   1988    BREAST BIOPSY        CARPAL TUNNEL RELEASE Bilateral      CHOLECYSTECTOMY        COLONOSCOPY         2015    CRANIOTOMY FOR TUMOR        EYE SURGERY         bilateral cataracts removed    HEMORRHOIDECTOMY        LUMBAR FUSION N/A 01/04/2017     Procedure: LUMBAR LAMINECTOMY AND DECOMRESSION  L3 AND L4;  Surgeon: Nishant Bhatti MD;  Location: [facility];  Service:     TOTAL ABDOMINAL HYSTERECTOMY        TRIGGER FINGER RELEASE                Family History: family history includes Alcohol abuse in her father; Cancer in her brother, brother, father, and another family member; Diabetes in her brother, mother, sister, and another family member; Heart attack in her mother, sister, sister, and another family member; Heart disease in her mother and sister; Hyperlipidemia in an other family member; Hypertension in her brother, mother, sister, and another family member; Stroke in her sister.      Social History:  reports that she has been smoking cigarettes. She started smoking about 65 years ago. She has a 47.4 pack-year smoking history. She has been exposed to tobacco smoke. She has never used smokeless tobacco. She reports that she does not drink alcohol and does not use drugs.  Social History          Social History Narrative     Lives in [place], [place] with sister         Medications:  Blood Glucose Monitoring Suppl, Blood Glucose Test, Dexcom G7 , Dexcom G7 Sensor, Diclofenac Sodium, Dupilumab, HYDROcodone-acetaminophen, Insulin Pen Needle, ONE TOUCH ULTRA 2, OneTouch Delica Plus Spdoiy37M, SITagliptin, acetaminophen, albuterol, albuterol sulfate HFA, aspirin, atorvastatin, busPIRone, carvedilol, cetirizine, clopidogrel, famotidine, fluticasone, glipizide, glucose blood, isosorbide mononitrate, multivitamin with minerals, naloxone, nicotine, pantoprazole, predniSONE, pregabalin, silver sulfadiazine, tacrolimus, and  tolterodine LA     Allergies        Allergies   Allergen Reactions    Ceclor [Cefaclor] Rash                  Objective  Objective      Vital Signs:   Temp:  [98.3 °F (36.8 °C)] 98.3 °F (36.8 °C)  Heart Rate:  [80-83] 80  Resp:  [18] 18  BP: (134-158)/(69-73) 140/73     Physical Exam  Constitutional:       Appearance: She is well-groomed. She is ill-appearing.   HENT:      Head: Normocephalic.   Cardiovascular:      Rate and Rhythm: Normal rate and regular rhythm.   Pulmonary:      Effort: Pulmonary effort is normal.      Breath sounds: Wheezing and rhonchi present.   Abdominal:      General: Abdomen is flat. Bowel sounds are normal.      Palpations: Abdomen is soft.   Musculoskeletal:      Right lower leg: No edema.      Left lower leg: No edema.   Skin:     General: Skin is warm and dry.   Neurological:      Mental Status: She is lethargic and disoriented.   Psychiatric:         Behavior: Behavior is cooperative.            Result Review:  I have personally reviewed the results from the time of this admission to 10/10/2024 00:02 EDT and agree with these findings:  [x]  Laboratory list / accordion  [x]  Microbiology  []  Radiology  [x]  EKG/Telemetry   []  Cardiology/Vascular   []  Pathology  [x]  Old records  []  Other:        LAB RESULTS:           Lab 10/09/24  1842 10/03/24  1356   WBC 8.68 7.56   HEMOGLOBIN 7.9* 7.6*   HEMATOCRIT 25.0* 24.2*   PLATELETS 301 213   NEUTROS ABS 6.31 4.96   IMMATURE GRANS (ABS) 0.20* 0.11*   LYMPHS ABS 1.13 1.43   MONOS ABS 0.83 0.84   EOS ABS 0.13 0.16   MCV 88.7 89.0   CRP 13.05*  --    PROCALCITONIN 0.12  --    LACTATE 0.9  --                Lab 10/09/24  1842 10/03/24  1356   SODIUM 136 135*   POTASSIUM 4.4 4.3   CHLORIDE 103 101   CO2 19.0* 20.0*   ANION GAP 14.0 14.0   BUN 56* 38*   CREATININE 3.30* 2.88*   EGFR 13.5* 15.9*   GLUCOSE 359* 303*   CALCIUM 9.5 9.5   MAGNESIUM 1.8 1.7   PHOSPHORUS 3.5  --    TSH 0.465 0.580               Lab 10/09/24  1842 10/03/24  1356   TOTAL  PROTEIN 7.4 7.1   ALBUMIN 3.3* 3.4*   GLOBULIN 4.1 3.7   ALT (SGPT) 18 13   AST (SGOT) 21 17   BILIRUBIN 0.3 0.4   ALK PHOS 78 77   LIPASE 23  --               Lab 10/09/24  1842   PROBNP 719.5   HSTROP T 39*                      Lab 10/09/24  1846   FIO2 21   CARBOXYHEMOGLOBIN (VENOUS) 1.6      Brief Urine Lab Results  (Last result in the past 365 days)          Color   Clarity   Blood   Leuk Est   Nitrite   Protein   CREAT   Urine HCG         10/09/24 2058 Yellow    Cloudy    Large (3+)    Moderate (2+)    Negative    100 mg/dL (2+)                          Microbiology Results (last 10 days)         Procedure Component Value - Date/Time     COVID PRE-OP / PRE-PROCEDURE SCREENING ORDER (NO ISOLATION) - Swab, Nasopharynx [[id number]]  (Normal) Collected: 10/09/24 1843     Lab Status: Final result Specimen: Swab from Nasopharynx Updated: 10/09/24 1958     Narrative:       The following orders were created for panel order COVID PRE-OP / PRE-PROCEDURE SCREENING ORDER (NO ISOLATION) - Swab, Nasopharynx.  Procedure                               Abnormality         Status                     ---------                               -----------         ------                     COVID-19, FLU A/B, RSV P...[[id number]]  Normal              Final result                  Please view results for these tests on the individual orders.     COVID-19, FLU A/B, RSV PCR 1 HR TAT - Swab, Nasopharynx [[id number]]  (Normal) Collected: 10/09/24 1843     Lab Status: Final result Specimen: Swab from Nasopharynx Updated: 10/09/24 1958       COVID19 Not Detected       Influenza A PCR Not Detected       Influenza B PCR Not Detected       RSV, PCR Not Detected     Narrative:       Fact sheet for providers: https://www.fda.gov/media/164561/download    Fact sheet for patients: https://www.fda.gov/media/906070/download     Test performed by PCR.                  Radiology results from the last 24 hours:   XR Chest 1 View     Result Date:  10/9/2024  XR CHEST 1 VW Date of Exam: 10/9/2024 7:38 PM EDT Indication: Weak/Dizzy/AMS triage protocol Comparison: Chest radiograph 10/3/2024 Findings: Mediastinum: Cardiomediastinal silhouette appears unchanged and normal in size Lungs: Bronchial wall thickening and bilateral mild interstitial opacities. There is no appreciable prominence of the central pulmonary vasculature or convincing septal thickening to strongly suggest pulmonary edema. There is a consolidative opacity in the left lower lobe. Pleura: No pleural effusion or pneumothorax. Bones and soft tissues: No acute osseous abnormality.      Impression: Impression: Consolidative opacity in the left lower lobe suspicious for aspiration or pneumonia. Bronchial wall thickening which can be seen in bronchitis and/or large airways disease. Electronically Signed: Francisco Javier Fuller  10/9/2024 8:09 PM EDT  Workstation ID: [id number]     CT Abdomen Pelvis Without Contrast     Result Date: 10/9/2024  CT ABDOMEN PELVIS WO CONTRAST Date of Exam: 10/9/2024 7:26 PM EDT Indication: nausea and severe epigastric pain, no BM 2 weeks. Comparison: CT abdomen pelvis 9/27/2024 Technique: Axial CT images were obtained of the abdomen and pelvis without the administration of contrast. Reconstructed coronal and sagittal images were also obtained. Automated exposure control and iterative construction methods were used. Findings: Heart size within normal limits. Trace pericardial fluid stable from prior. Small hiatal hernia. Lower lungs without acute abnormality. Small fat-containing right Bochdalek hernia. Noncontrast appearance of liver, spleen and biliary tree unremarkable. Cholecystectomy. Atrophic pancreas stable from prior without active inflammation. No suspicious adrenal nodule. Symmetric renal size and contour. Stable punctate right midpole calculus. No hydronephrosis. Urinary bladder unremarkable. Hysterectomy. No suspicious adnexal mass. Expected configuration stomach and  duodenum. Mild to moderate formed colonic stool. No evidence of bowel obstruction or active inflammation. No CT evidence of acute appendicitis. Extensive abdominal atherosclerotic disease. Celiac artery stent. Negative for abdominal aortic aneurysm. No suspicious adenopathy. No ascites, free air or drainable collection. Fat-containing umbilical and periumbilical hernias stable from prior without edema or contained bowel. No acute soft tissue finding. Degenerative osteoarthritis of the hip joints, right greater than left. Discectomy and fusion changes of the lumbar spine unchanged from prior. 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 Anterolisthesis L4 and L5 stable from prior. Periprosthetic lucency at  L2 and L5 screws stable from prior suggesting loosening.      Impression: Impression: No acute CT findings. Multiple chronic/ancillary findings overall without significant change from recent comparison. Electronically Signed: Deon Gimenez MD  10/9/2024 7:53 PM EDT  Workstation ID: [id number]         Results for orders placed during the hospital encounter of 07/15/24     Adult Transthoracic Echo Complete W/ Cont if Necessary Per Protocol     Interpretation Summary    Left ventricular systolic function is normal. Calculated left ventricular EF = 63.2%    Estimated right ventricular systolic pressure from tricuspid regurgitation is normal (<35 mmHg).    Normal left atrial size and volume noted.    There is calcification of the aortic valve. Mild to moderate aortic valve regurgitation is present.              Assessment & Plan  Assessment & Plan        LLL pneumonia    Degenerative disc disease, lumbar    CKD (chronic kidney disease) stage 4, GFR 15-29 ml/min    Hyperglycemia    History of CVA (cerebrovascular accident)    HTN (hypertension)        Left lower lung pneumonia  -CXR LLL pneumonia  -Respiratory panel negative  -Received Zosyn and Vanc in ED  -Sputum culture  -DuoNebs  -Urine antigens  -Continue Zosyn and  vancomycin  -Speech consult  -N.p.o. until speech eval  CBC and CMP in a.m.        UTI  CKD   -UA positive for leukocytes, +4 bacteria  -Cultures pending  -Creatinine 3.30  -IV fluid  -ABX as above     Type 2 diabetes, uncontrolled  -Chronic prednisone use  -Received insulin and fluids in ED  -A1c 11 (2024)  -FSBG, SSI     HTN/HLD  History of CVA  -Continue ASA  -Continue atorvastatin  -Continue carvedilol  -Continue isosorbide  -Continue clopidogrel     Anemia  -Serial H&H's  -Anemia studies  -Occult stool  -Consider GI consult        Degenerative disc disease  -Chronic prednisone 5 mg every other day        DVT prophylaxis:       CODE STATUS: DNR/DNI  Medical Intervention Limits: No intubation (DNI)  Level Of Support Discussed With: Next of Kin (If No Surrogate)  Code Status (Patient has no pulse and is not breathing): No CPR (Do Not Attempt to Resuscitate)  Medical Interventions (Patient has pulse or is breathing): Limited Support        Expected Discharge  Expected discharge date/ time has not been documented.  TBD        This note has been completed as part of a split-shared workflow.      Signature: Electronically signed by BALJAI Ge, 10/10/24, 12:12 AM EDT     -----------------------     The patient was seen independently by the APC.  I was available for any questions or concerns.      Electronically signed by Jin Manriquez III, DO, 10/10/24, 12:53 AM EDT.      Loly Davidson PT   Physical Therapist  Physical Therapy     Therapy Evaluation     Signed     Date of Service: 10/10/24 0930  Creation Time: 10/10/24 1700     Signed       Expand All Collapse All[]Expand All by Default    Patient Name: Arminda Krishnan                        : 1945                          MRN: [id number]                              Today's Date: 10/10/2024                                 Admit Date: 10/9/2024                        Visit Dx:   Visit Diagnosis       ICD-10-CM ICD-9-CM   1.  Uncontrolled type 2 diabetes mellitus with hyperglycemia  E11.65 250.02   2. Acute UTI (urinary tract infection)  N39.0 599.0   3. Pneumonia of left lower lobe due to infectious organism  J18.9 486   4. Dysphagia, unspecified type  R13.10 787.20   5. Cerebral vascular disease  I67.9 437.9   6. Degeneration of intervertebral disc of lumbar region, unspecified whether pain present  M51.369 722.52   7. Spinal stenosis, lumbar region, with neurogenic claudication  M48.062 724.03   8. Aspiration pneumonia of left lower lobe, unspecified aspiration pneumonia type  J69.0 507.0   9. Dehydration  E86.0 276.51         Problem List       Patient Active Problem List   Diagnosis    Cerebral vascular disease    Degenerative disc disease, lumbar    Degenerative disc disease, cervical    Spinal stenosis, lumbar region, with neurogenic claudication    Tobacco abuse    Chronic anemia    CKD (chronic kidney disease) stage 4, GFR 15-29 ml/min    Hyperglycemia    History of CVA (cerebrovascular accident)    Type 2 diabetes mellitus with hyperglycemia, without long-term current use of insulin    Disorientation    Dehydration    LLL pneumonia    HTN (hypertension)    Pneumonia         Medical History        Past Medical History:   Diagnosis Date    Anemia      Arthritis      Back problem      CAD (coronary artery disease)      Cancer       Right breast    Chronic back pain      Chronically on opiate therapy      Depression      Diabetes mellitus       DX 14 years ago- checks fsbs weekly    Fibromyalgia      Gastroparesis      GERD (gastroesophageal reflux disease)      Headache       emotional/tension    History of transfusion       [facility]    HTN (hypertension)      Hypercholesteremia      IBS (irritable bowel syndrome)      Incontinence of urine       urgency    Migraine headache      Myalgia and myositis      Peripheral neuropathy      Sleep apnea       does not wear cpap    UTI (urinary tract infection)            Surgical History         Past Surgical History:   Procedure Laterality Date    APPENDECTOMY        ARTERIOGRAM MESENTERIC N/A 6/16/2022     Procedure: DIAGNOSTIC ARTERIOGRAM WITH CELIAC STENT PLACEMENT;  Surgeon: Neo Neil MD;  Location: [facility];  Service: Vascular;  Laterality: N/A;  FLUORO: 16 MIN  DOSE: 2384 MGY  CONTRAST:  20 ML    BACK SURGERY         5x per patient    BRAIN TUMOR EXCISION   1988    BREAST BIOPSY        CARPAL TUNNEL RELEASE Bilateral      CHOLECYSTECTOMY        COLONOSCOPY         2015    CRANIOTOMY FOR TUMOR        EYE SURGERY         bilateral cataracts removed    HEMORRHOIDECTOMY        LUMBAR FUSION N/A 01/04/2017     Procedure: LUMBAR LAMINECTOMY AND DECOMRESSION  L3 AND L4;  Surgeon: Nishant Bhatti MD;  [facility];  Service:     TOTAL ABDOMINAL HYSTERECTOMY        TRIGGER FINGER RELEASE               General Information         Row Name 10/10/24 0930                 Physical Therapy Time and Intention     Document Type evaluation  -KW       Mode of Treatment physical therapy  -KW          Row Name 10/10/24 0930                 General Information     Patient Profile Reviewed yes  -KW       Prior Level of Function independent:;all household mobility;community mobility;gait;transfer;bed mobility  independent with cane typically, has been using rwx the past few weeks  -KW       Existing Precautions/Restrictions fall  -KW       Barriers to Rehab medically complex  -KW          Row Name 10/10/24 0930                 Living Environment     People in Home child(verna), adult  -KW          Row Name 10/10/24 0930                 Home Main Entrance     Number of Stairs, Main Entrance one  one steps then ramp  -KW          Row Name 10/10/24 0930                 Stairs Within Home, Primary     Number of Stairs, Within Home, Primary none  -KW          Row Name 10/10/24 0930                 Cognition     Orientation Status (Cognition) disoriented  to;person;place;situation;time;unable/difficult to assess  Possibly oriented to name only this date. Pt with eyes closed, limited opening despite max encouragement this date.  -KW          Row Name 10/10/24 0930                 Safety Issues, Functional Mobility     Impairments Affecting Function (Mobility) balance;endurance/activity tolerance;pain;shortness of breath;strength  -KW                       User Key  (r) = Recorded By, (t) = Taken By, (c) = Cosigned By        Initials Name Provider Type     Loly Vallejo PT Physical Therapist                            Mobility         Row Name 10/10/24 0930                 Bed Mobility     Bed Mobility supine-sit;sit-supine  -KW       Supine-Sit [place] (Bed Mobility) dependent (less than 25% patient effort);2 person assist;verbal cues  -KW       Sit-Supine [place] (Bed Mobility) dependent (less than 25% patient effort);2 person assist;verbal cues  -KW       Assistive Device (Bed Mobility) draw sheet;head of bed elevated  -KW                       User Key  (r) = Recorded By, (t) = Taken By, (c) = Cosigned By        Initials Name Provider Type     Loly Vallejo PT Physical Therapist                            Obj/Interventions         Row Name 10/10/24 0930                 Strength Comprehensive (MMT)     General Manual Muscle Testing (MMT) Assessment lower extremity strength deficits identified  -KW                       User Key  (r) = Recorded By, (t) = Taken By, (c) = Cosigned By        Initials Name Provider Type     Loly Vallejo PT Physical Therapist                            Goals/Plan         Row Name 10/10/24 0930                 Bed Mobility Goal 1 (PT)     Activity/Assistive Device (Bed Mobility Goal 1, PT) sit to supine;supine to sit  -KW       [place] Level/Cues Needed (Bed Mobility Goal 1, PT) independent  -KW       Time Frame (Bed Mobility Goal 1, PT) 5 days;short term goal (STG)  -KW          Row Name  10/10/24 0930                 Transfer Goal 1 (PT)     Activity/Assistive Device (Transfer Goal 1, PT) sit-to-stand/stand-to-sit;bed-to-chair/chair-to-bed  -KW       [place] Level/Cues Needed (Transfer Goal 1, PT) modified independence  -KW       Time Frame (Transfer Goal 1, PT) 10 days  -KW          Row Name 10/10/24 0930                 Gait Training Goal 1 (PT)     Activity/Assistive Device (Gait Training Goal 1, PT) assistive device use;decrease fall risk;gait (walking locomotion);diminish gait deviation;improve balance and speed;increase endurance/gait distance;walker, rolling  -KW       [place] Level (Gait Training Goal 1, PT) modified independence  -KW       Distance (Gait Training Goal 1, PT) 150  -KW       Time Frame (Gait Training Goal 1, PT) 10 days  -KW                    User Key  (r) = Recorded By, (t) = Taken By, (c) = Cosigned By        Initials Name Provider Type     KW Loly Davidson, PT Physical Therapist                         Clinical Impression         Row Name 10/10/24 0930                 Pain     Pretreatment Pain Rating 0/10 - no pain  -KW          Row Name 10/10/24 0930                 Plan of Care Review     Plan of Care Reviewed With patient  -KW       Progress no change  -KW       Outcome Evaluation PT eval completed. Pt with decreased alert leve this date despite max encouragement and upright sitting. Pt eyes opened several times throughout PTx, but unable to maintain alert level this date. Pt initially required max A for static sitting balance, however progressed to CGA for short spurts. Once returned to supine, pt able to demonstrate supine to sit in bed.Patient presents below baseline for functional mobility. Patient demonstrates decreased activity tolerance, deconditioning and reduced dynamic balance. Patient would continue to benefit from skilled PT services to improve dynamic balance with gait, transfers strength, and activity tolerance training in order to build  endurance and reduce risk of falls.  -KW          Row Name 10/10/24 0930                 Therapy Assessment/Plan (PT)     Patient/Family Therapy Goals Statement (PT) to return to baseline  -KW       Rehab Potential (PT) good, to achieve stated therapy goals  -KW       Criteria for Skilled Interventions Met (PT) yes;skilled treatment is necessary  -KW       Therapy Frequency (PT) daily  -KW       Predicted Duration of Therapy Intervention (PT) 10 days  -KW          Row Name 10/10/24 0930                 Vital Signs     Pretreatment Heart Rate (beats/min) 89  -KW       Pre SpO2 (%) 93  -KW          Row Name 10/10/24 0930                 Positioning and Restraints     Pre-Treatment Position in bed  -KW       Post Treatment Position bed  -KW       In Bed supine;call light within reach;encouraged to call for assist;exit alarm on  -KW                       User Key  (r) = Recorded By, (t) = Taken By, (c) = Cosigned By        Initials Name Provider Type     KW Loly Davidson, PT Physical Therapist                            Outcome Measures         Row Name 10/10/24 0930                 How much help from another person do you currently need...     Turning from your back to your side while in flat bed without using bedrails? 3  -KW       Moving from lying on back to sitting on the side of a flat bed without bedrails? 2  -KW       Moving to and from a bed to a chair (including a wheelchair)? 2  -KW       Standing up from a chair using your arms (e.g., wheelchair, bedside chair)? 2  -KW       Climbing 3-5 steps with a railing? 2  -KW       To walk in hospital room? 2  -KW       AM-PAC 6 Clicks Score (PT) 13  -KW       Highest Level of Mobility Goal 4 --> Transfer to chair/commode  -KW          Row Name 10/10/24 1224 10/10/24 0930            Functional Assessment     Outcome Measure Options AM-PAC 6 Clicks Daily Activity (OT)  -JR AM-PAC 6 Clicks Basic Mobility (PT)  -KW                     User Key  (r) = Recorded By,  (t) = Taken By, (c) = Cosigned By        Initials Name Provider Type     Jenny Bonilla, OT Occupational Therapist     KW Loly Davidson, PT Physical Therapist                                   PT Recommendation and Plan  Plan of Care Reviewed With: patient  Progress: no change  Outcome Evaluation: PT eval completed. Pt with decreased alert leve this date despite max encouragement and upright sitting. Pt eyes opened several times throughout PTx, but unable to maintain alert level this date. Pt initially required max A for static sitting balance, however progressed to CGA for short spurts. Once returned to supine, pt able to demonstrate supine to sit in bed.Patient presents below baseline for functional mobility. Patient demonstrates decreased activity tolerance, deconditioning and reduced dynamic balance. Patient would continue to benefit from skilled PT services to improve dynamic balance with gait, transfers strength, and activity tolerance training in order to build endurance and reduce risk of falls.      Time Calculation:   PT Evaluation Complexity  History, PT Evaluation Complexity: 3 or more personal factors and/or comorbidities  Examination of Body Systems (PT Eval Complexity): total of 3 or more elements  Clinical Presentation (PT Evaluation Complexity): evolving  Clinical Decision Making (PT Evaluation Complexity): moderate complexity  Overall Complexity (PT Evaluation Complexity): moderate complexity       PT Charges         Row Name 10/10/24 0930                       Time Calculation     Start Time 0930  -KW         PT Received On 10/10/24  -KW         PT Goal Re-Cert Due Date 10/20/24  -KW                 Untimed Charges     PT Eval/Re-eval Minutes 59  -KW                 Total Minutes     Untimed Charges Total Minutes 59  -KW          Total Minutes 59  -KW                      User Key  (r) = Recorded By, (t) = Taken By, (c) = Cosigned By        Initials Name Provider Type     SHERRIE  Loly Davidson PT Physical Therapist                       Therapy Charges for Today         Code Description Service Date Service Provider Modifiers Qty     92297597810 HC PT EVAL MOD COMPLEXITY 4 10/10/2024 Loly Davidson PT GP 1                PT G-Codes  Outcome Measure Options: AM-PAC 6 Clicks Daily Activity (OT)  AM-PAC 6 Clicks Score (PT): 13  AM-PAC 6 Clicks Score (OT): 6  PT Discharge Summary  Anticipated Discharge Disposition (PT): inpatient rehabilitation facility     Loly Davidson PT                     10/10/2024                                       Loly Davidson PT   Physical Therapist  Physical Therapy     Plan of Care     Signed     Date of Service: 10/10/24 0930  Creation Time: 10/10/24 1700     Signed         Goal Outcome Evaluation:  Plan of Care Reviewed With: patient  Progress: no change  Outcome Evaluation: PT eval completed. Pt with decreased alert leve this date despite max encouragement and upright sitting. Pt eyes opened several times throughout PTx, but unable to maintain alert level this date. Pt initially required max A for static sitting balance, however progressed to CGA for short spurts. Once returned to supine, pt able to demonstrate supine to sit in bed.Patient presents below baseline for functional mobility. Patient demonstrates decreased activity tolerance, deconditioning and reduced dynamic balance. Patient would continue to benefit from skilled PT services to improve dynamic balance with gait, transfers strength, and activity tolerance training in order to build endurance and reduce risk of falls.        Anticipated Discharge Disposition (PT): inpatient rehabilitation facility                     Jenny Hirsch OT   Occupational Therapist  Specialty: Occupational Therapy     Therapy Evaluation     Signed     Date of Service: 10/10/24 0945  Creation Time: 10/10/24 1227     Signed       Expand All Collapse All[]Expand All by Default    Patient  Name: Arminda Krishnan                        : 1945                          MRN: [id number]                              Today's Date: 10/10/2024                                 Admit Date: 10/9/2024                        Visit Dx:   Visit Diagnosis       ICD-10-CM ICD-9-CM   1. Uncontrolled type 2 diabetes mellitus with hyperglycemia  E11.65 250.02   2. Acute UTI (urinary tract infection)  N39.0 599.0   3. Pneumonia of left lower lobe due to infectious organism  J18.9 486   4. Dysphagia, unspecified type  R13.10 787.20         Problem List       Patient Active Problem List   Diagnosis    Cerebral vascular disease    Degenerative disc disease, lumbar    Degenerative disc disease, cervical    Spinal stenosis, lumbar region, with neurogenic claudication    Tobacco abuse    Chronic anemia    CKD (chronic kidney disease) stage 4, GFR 15-29 ml/min    Hyperglycemia    History of CVA (cerebrovascular accident)    Type 2 diabetes mellitus with hyperglycemia, without long-term current use of insulin    Disorientation    Dehydration    LLL pneumonia    HTN (hypertension)    Pneumonia         Medical History        Past Medical History:   Diagnosis Date    Anemia      Arthritis      Back problem      CAD (coronary artery disease)      Cancer       Right breast    Chronic back pain      Chronically on opiate therapy      Depression      Diabetes mellitus       DX 14 years ago- checks fsbs weekly    Fibromyalgia      Gastroparesis      GERD (gastroesophageal reflux disease)      Headache       emotional/tension    History of transfusion       [facility]    HTN (hypertension)      Hypercholesteremia      IBS (irritable bowel syndrome)      Incontinence of urine       urgency    Migraine headache      Myalgia and myositis      Peripheral neuropathy      Sleep apnea       does not wear cpap    UTI (urinary tract infection)           Surgical History         Past Surgical History:   Procedure Laterality Date     APPENDECTOMY        ARTERIOGRAM MESENTERIC N/A 6/16/2022     Procedure: DIAGNOSTIC ARTERIOGRAM WITH CELIAC STENT PLACEMENT;  Surgeon: Neo Neil MD;  Location: [facility];  Service: Vascular;  Laterality: N/A;  FLUORO: 16 MIN  DOSE: 2384 MGY  CONTRAST:  20 ML    BACK SURGERY         5x per patient    BRAIN TUMOR EXCISION   1988    BREAST BIOPSY        CARPAL TUNNEL RELEASE Bilateral      CHOLECYSTECTOMY        COLONOSCOPY         2015    CRANIOTOMY FOR TUMOR        EYE SURGERY         bilateral cataracts removed    HEMORRHOIDECTOMY        LUMBAR FUSION N/A 01/04/2017     Procedure: LUMBAR LAMINECTOMY AND DECOMRESSION  L3 AND L4;  Surgeon: Nishant Bhatti MD;  Location: [facility] OR;  Service:     TOTAL ABDOMINAL HYSTERECTOMY        TRIGGER FINGER RELEASE               General Information         Row Name 10/10/24 1213                 OT Time and Intention     Document Type evaluation  -       Mode of Treatment occupational therapy  -          Row Name 10/10/24 1213                 General Information     Patient Profile Reviewed yes  -JR       Prior Level of Function independent:;gait;transfer;bed mobility;ADL's  Family reports pt independent with ADL's and gait with cane prior to admit. Pt lives with dtr, has sitter while dtr works.  -       Existing Precautions/Restrictions fall  -       Barriers to Rehab medically complex;cognitive status  -          Row Name 10/10/24 1213                 Occupational Profile     Occupational History/Life Experiences (Occupational Profile) Family reports pt lives with daughter, has been independent with ADL's, uses a cane for mobility. Was previously receiving HHPT. Has caregiver while daughter is at work. Typically sleeps in her bed, but has been sleeping in a recliner since she has been ill.  -          Row Name 10/10/24 1213                 Living Environment     People in Home child(verna), adult  has sitter while dtr at work.  -          Row Name  10/10/24 1213                 Home Main Entrance     Number of Stairs, Main Entrance one  1 step then ramp  -          Row Name 10/10/24 1213                 Stairs Within Home, Primary     Number of Stairs, Within Home, Primary none  -          Row Name 10/10/24 1213                 Cognition     Orientation Status (Cognition) disoriented to;person;place;situation;time;unable/difficult to assess  Possibly oriented to name only this date. Pt with eyes closed, limited opening despite increased encouragement this date.  -          Row Name 10/10/24 1213                 Safety Issues, Functional Mobility     Safety Issues Affecting Function (Mobility) ability to follow commands;awareness of need for assistance;insight into deficits/self-awareness;judgment;problem-solving;safety precaution awareness;safety precautions follow-through/compliance;sequencing abilities  -       Impairments Affecting Function (Mobility) balance;cognition;coordination;endurance/activity tolerance;strength;pain;postural/trunk control  -       Cognitive Impairments, Mobility Safety/Performance attention;awareness, need for assistance;insight into deficits/self-awareness;judgment;problem-solving/reasoning;safety precaution awareness;safety precaution follow-through;sequencing abilities  -                       User Key  (r) = Recorded By, (t) = Taken By, (c) = Cosigned By        Initials Name Provider Type      Jenny Hirsch OT Occupational Therapist                                     Mobility/ADL's         Row Name 10/10/24 1217                 Bed Mobility     Bed Mobility supine-sit;sit-supine  -       Supine-Sit [place] (Bed Mobility) dependent (less than 25% patient effort);2 person assist;verbal cues  -       Sit-Supine [place] (Bed Mobility) dependent (less than 25% patient effort);2 person assist;verbal cues  -       Bed Mobility, Safety Issues cognitive deficits limit understanding;decreased use of  arms for pushing/pulling;decreased use of legs for bridging/pushing;impaired trunk control for bed mobility  -       Assistive Device (Bed Mobility) draw sheet;head of bed elevated  -       Comment, (Bed Mobility) Pt with decreased alert leve this date. Pt eyes opened several times, but unable to maintain alert level this date. Pt initially required max A for static sitting balance, progressed to CGA for short period. Once returned to supine, pt able to demo lifting UB up in bed.  -          Row Name 10/10/24 1217                 Transfers     Comment, (Transfers) Deferred due to decreased alert level this date.  -          Row Name 10/10/24 1217                 Activities of Daily Living     BADL Assessment/Intervention lower body dressing  -          Row Name 10/10/24 1217                 Lower Body Dressing Assessment/Training     [place] Level (Lower Body Dressing) don;socks;dependent (less than 25% patient effort)  -       Position (Lower Body Dressing) supine  -                       User Key  (r) = Recorded By, (t) = Taken By, (c) = Cosigned By        Initials Name Provider Type      Jenny Hirsch, OT Occupational Therapist                            Obj/Interventions         Row Name 10/10/24 1219                 Sensory Assessment (Somatosensory)     Sensory Assessment Unable to assess due to alert level  -          Row Name 10/10/24 1219                 Vision Assessment/Intervention     Vision Assessment Comment eyes closed throughout  -[facility] Name 10/10/24 1219                 Range of Motion Comprehensive     General Range of Motion bilateral upper extremity ROM WFL  -          Row Name 10/10/24 1219                 Strength Comprehensive (MMT)     Comment, General Manual Muscle Testing (MMT) Assessment Unable to assess due to alert level  -          Row Name 10/10/24 1219                 Balance     Balance Assessment sitting static balance  -       Static  Sitting Balance maximum assist  brief period of CGA  -JR       Position, Sitting Balance unsupported  -JR                       User Key  (r) = Recorded By, (t) = Taken By, (c) = Cosigned By        Initials Name Provider Type     Jenny Bonilla OT Occupational Therapist                            Goals/Plan         Row Name 10/10/24 1223                 Bed Mobility Goal 1 (OT)     Activity/Assistive Device (Bed Mobility Goal 1, OT) bed mobility activities, all  -JR       [place] Level/Cues Needed (Bed Mobility Goal 1, OT) moderate assist (50-74% patient effort);verbal cues required  -JR       Time Frame (Bed Mobility Goal 1, OT) short term goal (STG);5 days  -JR       Progress/Outcomes (Bed Mobility Goal 1, OT) new goal  -          Row Name 10/10/24 1223                 Transfer Goal 1 (OT)     Activity/Assistive Device (Transfer Goal 1, OT) bed-to-chair/chair-to-bed  -JR       [place] Level/Cues Needed (Transfer Goal 1, OT) moderate assist (50-74% patient effort);verbal cues required  -JR       Time Frame (Transfer Goal 1, OT) long term goal (LTG);by discharge  -JR       Progress/Outcome (Transfer Goal 1, OT) new goal  -          Row Name 10/10/24 1223                 Grooming Goal 1 (OT)     Activity/Device (Grooming Goal 1, OT) grooming skills, all  -JR       [place] (Grooming Goal 1, OT) minimum assist (75% or more patient effort);verbal cues required  -JR       Time Frame (Grooming Goal 1, OT) long term goal (LTG);by discharge  -JR       Progress/Outcome (Grooming Goal 1, OT) new goal  -          Row Name 10/10/24 1223                 Problem Specific Goal 1 (OT)     Problem Specific Goal 1 (OT) Pt to sit EOB x 5 minutes with CGA to support ADL independence.  -JR       Time Frame (Problem Specific Goal 1, OT) long term goal (LTG);by discharge  -JR       Progress/Outcome (Problem Specific Goal 1, OT) new goal  -          Row Name 10/10/24 1223                 Therapy  Assessment/Plan (OT)     Planned Therapy Interventions (OT) activity tolerance training;adaptive equipment training;BADL retraining;functional balance retraining;occupation/activity based interventions;ROM/therapeutic exercise;strengthening exercise;transfer/mobility retraining;patient/caregiver education/training;cognitive/visual perception retraining  -                    User Key  (r) = Recorded By, (t) = Taken By, (c) = Cosigned By        Initials Name Provider Type     JR Jenny Hirsch, OT Occupational Therapist                         Clinical Impression         Row Name 10/10/24 1220                 Pain Assessment     Additional Documentation Pain Scale: FACES Pre/Post-Treatment (Group)  -          Row Name 10/10/24 1220                 Pain Scale: FACES Pre/Post-Treatment     Pain: FACES Scale, Pretreatment 0-->no hurt  -       Posttreatment Pain Rating 0-->no hurt  -[facility] Name 10/10/24 1220                 Plan of Care Review     Plan of Care Reviewed With patient;family  -       Outcome Evaluation OT initial eval and expanded chart review completed. Pt presents with multiple comorbidities and decreased strength, balance, activity tolerance and alert level limiting independence with ADL's and mobility from baseline status. Recommend continued skilled OT services and transfer to IRF at d/c.  -          Row Name 10/10/24 1220                 Therapy Assessment/Plan (OT)     Patient/Family Therapy Goal Statement (OT) family would like pt to be able to return to [facility].  -       Rehab Potential (OT) good, to achieve stated therapy goals  -       Criteria for Skilled Therapeutic Interventions Met (OT) yes;meets criteria;skilled treatment is necessary  -       Therapy Frequency (OT) daily  -JR       Predicted Duration of Therapy Intervention (OT) 10 days  -          Row Name 10/10/24 1220                 Therapy Plan Review/Discharge Plan (OT)     Anticipated Discharge Disposition  (OT) inpatient rehabilitation facility  -          Row Name 10/10/24 1220                 Vital Signs     Pre Systolic BP Rehab 168  -JR       Pre Treatment Diastolic BP 72  -JR       Post Systolic BP Rehab 146  -JR       Post Treatment Diastolic BP 62  -JR       Pretreatment Heart Rate (beats/min) 88  -JR       Posttreatment Heart Rate (beats/min) 94  -JR       Pre SpO2 (%) 93  -JR       O2 Delivery Pre Treatment room air  -JR       Post SpO2 (%) 94  -JR       O2 Delivery Post Treatment room air  -JR       Pre Patient Position Supine  -JR       Intra Patient Position Sitting  -JR       Post Patient Position Supine  -JR          Row Name 10/10/24 1220                 Positioning and Restraints     Pre-Treatment Position in bed  -JR       Post Treatment Position bed  -JR       In Bed notified nsg;supine;call light within reach;encouraged to call for assist;exit alarm on;with family/caregiver  -JR                       User Key  (r) = Recorded By, (t) = Taken By, (c) = Cosigned By        Initials Name Provider Type     JR Jenny Hrisch, OT Occupational Therapist                            Outcome Measures         Row Name 10/10/24 1224                 How much help from another is currently needed...     Putting on and taking off regular lower body clothing? 1  -JR       Bathing (including washing, rinsing, and drying) 1  -JR       Toileting (which includes using toilet bed pan or urinal) 1  -JR       Putting on and taking off regular upper body clothing 1  -JR       Taking care of personal grooming (such as brushing teeth) 1  -JR       Eating meals 1  -JR       AM-PAC 6 Clicks Score (OT) 6  -JR          Row Name 10/10/24 0101                 How much help from another person do you currently need...     Turning from your back to your side while in flat bed without using bedrails? 3  -NG       Moving from lying on back to sitting on the side of a flat bed without bedrails? 2  -NG       Moving to and from a bed  to a chair (including a wheelchair)? 2  -NG       Standing up from a chair using your arms (e.g., wheelchair, bedside chair)? 2  -NG       Climbing 3-5 steps with a railing? 1  -NG       To walk in hospital room? 2  -NG       AM-PAC 6 Clicks Score (PT) 12  -NG       Highest Level of Mobility Goal 4 --> Transfer to chair/commode  -NG          Row Name 10/10/24 1224                 Functional Assessment     Outcome Measure Options AM-PAC 6 Clicks Daily Activity (OT)  -                       User Key  (r) = Recorded By, (t) = Taken By, (c) = Cosigned By        Initials Name Provider Type     Jenny Bonilal OT Occupational Therapist     Loly Rangel RN Registered Nurse                             Occupational Therapy Education            Title: PT OT SLP Therapies (In Progress)         Topic: Occupational Therapy (In Progress)         Point: ADL training (Done)         Description:   Instruct learner(s) on proper safety adaptation and remediation techniques during self care or transfers.   Instruct in proper use of assistive devices.                       Learning Progress Summary               Patient Acceptance, E, VU,NR by  at 10/10/2024 0945     Comment: role of therapy and ongoing treatment plan   Family Acceptance, E, VU,NR by  at 10/10/2024 0945     Comment: role of therapy and ongoing treatment plan                               Point: Home exercise program (Not Started)         Description:   Instruct learner(s) on appropriate technique for monitoring, assisting and/or progressing therapeutic exercises/activities.                       Learner Progress:  Not documented in this visit.                  Point: Precautions (Not Started)         Description:   Instruct learner(s) on prescribed precautions during self-care and functional transfers.                       Learner Progress:  Not documented in this visit.                  Point: Body mechanics (Not Started)         Description:    Instruct learner(s) on proper positioning and spine alignment during self-care, functional mobility activities and/or exercises.                       Learner Progress:  Not documented in this visit.                                      User Key         Initials Effective Dates Name Provider Type Discipline      02/03/23 -  Jenny Hirsch, OT Occupational Therapist OT                          OT Recommendation and Plan  Planned Therapy Interventions (OT): activity tolerance training, adaptive equipment training, BADL retraining, functional balance retraining, occupation/activity based interventions, ROM/therapeutic exercise, strengthening exercise, transfer/mobility retraining, patient/caregiver education/training, cognitive/visual perception retraining  Therapy Frequency (OT): daily  Plan of Care Review  Plan of Care Reviewed With: patient, family  Outcome Evaluation: OT initial eval and expanded chart review completed. Pt presents with multiple comorbidities and decreased strength, balance, activity tolerance and alert level limiting independence with ADL's and mobility from baseline status. Recommend continued skilled OT services and transfer to IRF at d/c.      Time Calculation:   Evaluation Complexity (OT)  Review Occupational Profile/Medical/Therapy History Complexity: expanded/moderate complexity  Assessment, Occupational Performance/Identification of Deficit Complexity: 3-5 performance deficits  Clinical Decision Making Complexity (OT): detailed assessment/moderate complexity  Overall Complexity of Evaluation (OT): moderate complexity       Time Calculation- OT         Row Name 10/10/24 1226                       Time Calculation- OT     OT Start Time 0945  -JR         OT Received On 10/10/24  -         OT Goal Re-Cert Due Date 10/20/24  -                 Timed Charges     37572 - OT Self Care/Mgmt Minutes 8  -JR                 Untimed Charges     OT Eval/Re-eval Minutes 31  -JR                  Total Minutes     Timed Charges Total Minutes 8  -JR         Untimed Charges Total Minutes 31  -JR          Total Minutes 39  -JR                      User Key  (r) = Recorded By, (t) = Taken By, (c) = Cosigned By        Initials Name Provider Type     Jenny Bonilla OT Occupational Therapist                       Therapy Charges for Today         Code Description Service Date Service Provider Modifiers Qty     66170495123 HC OT SELF CARE/MGMT/TRAIN EA 15 MIN 10/10/2024 Jenny Hirsch OT GO 1     20936534058 HC OT EVAL MOD COMPLEXITY 3 10/10/2024 Jenny Hirsch OT GO 1                   Jenny Hirsch OT             10/10/2024                  Jenny Hirsch OT   Occupational Therapist  Specialty: Occupational Therapy     Plan of Care     Signed     Date of Service: 10/10/24 0945  Creation Time: 10/10/24 1225     Signed         Goal Outcome Evaluation:  Plan of Care Reviewed With: patient, family  Outcome Evaluation: OT initial eval and expanded chart review completed. Pt presents with multiple comorbidities and decreased strength, balance, activity tolerance and alert level limiting independence with ADL's and mobility from baseline status. Recommend continued skilled OT services and transfer to IRF at d/c.        Anticipated Discharge Disposition (OT): inpatient rehabilitation                          Revision History

## 2024-10-11 NOTE — THERAPY TREATMENT NOTE
Acute Care - Speech Language Pathology   Swallow Treatment Note [facility]     Patient Name: Arminda Krishnan  : 1945  MRN: [id number]  Today's Date: 10/11/2024               Admit Date: 10/9/2024    Visit Dx:     ICD-10-CM ICD-9-CM   1. Uncontrolled type 2 diabetes mellitus with hyperglycemia  E11.65 250.02   2. Acute UTI (urinary tract infection)  N39.0 599.0   3. Pneumonia of left lower lobe due to infectious organism  J18.9 486   4. Dysphagia, unspecified type  R13.10 787.20   5. Cerebral vascular disease  I67.9 437.9   6. Degeneration of intervertebral disc of lumbar region, unspecified whether pain present  M51.369 722.52   7. Spinal stenosis, lumbar region, with neurogenic claudication  M48.062 724.03   8. Aspiration pneumonia of left lower lobe, unspecified aspiration pneumonia type  J69.0 507.0   9. Dehydration  E86.0 276.51     Patient Active Problem List   Diagnosis    Cerebral vascular disease    Degenerative disc disease, lumbar    Degenerative disc disease, cervical    Spinal stenosis, lumbar region, with neurogenic claudication    Tobacco abuse    Chronic anemia    CKD (chronic kidney disease) stage 4, GFR 15-29 ml/min    Hyperglycemia    History of CVA (cerebrovascular accident)    Type 2 diabetes mellitus with hyperglycemia, without long-term current use of insulin    Disorientation    Dehydration    LLL pneumonia    HTN (hypertension)    Pneumonia     Past Medical History:   Diagnosis Date    Anemia     Arthritis     Back problem     CAD (coronary artery disease)     Cancer     Right breast    Chronic back pain     Chronically on opiate therapy     Depression     Diabetes mellitus     DX 14 years ago- checks fsbs weekly    Fibromyalgia     Gastroparesis     GERD (gastroesophageal reflux disease)     Headache     emotional/tension    History of transfusion     [facility]    HTN (hypertension)     Hypercholesteremia     IBS (irritable bowel syndrome)     Incontinence of urine      urgency    Migraine headache     Myalgia and myositis     Peripheral neuropathy     Sleep apnea     does not wear cpap    UTI (urinary tract infection)      Past Surgical History:   Procedure Laterality Date    APPENDECTOMY      ARTERIOGRAM MESENTERIC N/A 6/16/2022    Procedure: DIAGNOSTIC ARTERIOGRAM WITH CELIAC STENT PLACEMENT;  Surgeon: Neo Neil MD;  Location: [facility];  Service: Vascular;  Laterality: N/A;  FLUORO: 16 MIN  DOSE: 2384 MGY  CONTRAST:  20 ML    BACK SURGERY      5x per patient    BRAIN TUMOR EXCISION  1988    BREAST BIOPSY      CARPAL TUNNEL RELEASE Bilateral     CHOLECYSTECTOMY      COLONOSCOPY      2015    CRANIOTOMY FOR TUMOR      EYE SURGERY      bilateral cataracts removed    HEMORRHOIDECTOMY      LUMBAR FUSION N/A 01/04/2017    Procedure: LUMBAR LAMINECTOMY AND DECOMRESSION  L3 AND L4;  Surgeon: Nishant Bhatti MD;  Location: On license of [facility] OR;  Service:     TOTAL ABDOMINAL HYSTERECTOMY      TRIGGER FINGER RELEASE         SLP Recommendation and Plan  SLP Swallowing Diagnosis: mild-moderate, oral dysphagia, functional pharyngeal phase (10/11/24 1140)  SLP Diet Recommendation: soft to chew textures, chopped, thin liquids (10/11/24 1140)  Recommended Precautions and Strategies: upright posture during/after eating, general aspiration precautions (10/11/24 1140)  SLP Rec. for Method of Medication Administration: meds crushed, with puree (10/11/24 1140)     Monitor for Signs of Aspiration: notify SLP if any concerns (10/11/24 1140)  Recommended Diagnostics: other (see comments) (diet tolerance) (10/11/24 1140)  Swallow Criteria for Skilled Therapeutic Interventions Met: demonstrates skilled criteria (10/11/24 1140)  Anticipated Discharge Disposition (SLP): skilled nursing facility (10/11/24 1140)  Rehab Potential/Prognosis, Swallowing: re-evaluate goals as necessary (10/11/24 1140)  Therapy Frequency (Swallow): PRN, 5 days per week (10/11/24 1140)  Predicted Duration Therapy  Intervention (Days): 1 week (10/11/24 1140)  Oral Care Recommendations: Oral Care BID/PRN, Suction toothbrush (10/11/24 1140)        Daily Summary of Progress (SLP): progress toward functional goals is good (10/11/24 1140)               Treatment Assessment (SLP): continued, toleration of diet, no clinical signs of, aspiration (10/11/24 1140)  Treatment Assessment Comments (SLP): Trialed soft chewables and patient tolerated without s/s of aspiration.DIet consistency changed to soft, chopped and thin liquids (10/11/24 1140)  Plan for Continued Treatment (SLP): continue treatment per plan of care (10/11/24 1140)                SWALLOW EVALUATION (Last 72 Hours)       SLP Adult Swallow Evaluation       Row Name 10/11/24 1140 10/10/24 1400 10/10/24 0830             Rehab Evaluation    Document Type therapy note (daily note)  -CH evaluation  -RS evaluation  -RS      Subjective Information no complaints  -CH no complaints  -RS no complaints  -RS      Patient Observations alert;cooperative;agree to therapy  -CH alert;cooperative  -RS lethargic  -RS      Patient/Family/Caregiver Comments/Observations -- none present in radiology  -RS Niece present  -RS      Patient Effort good  -CH good  -RS adequate  -RS      Symptoms Noted During/After Treatment none  -CH none  -RS fatigue  -RS      Oral Care other (see comments)  deferred oral care as patient was eating lunch  -CH -- patient refused intervention  -RS         General Information    Patient Profile Reviewed yes  -CH -- yes  -RS      Pertinent History Of Current Problem -- -- Pt is an 80 yo F w PMHx mild coronary impairment, prior CVA, DDD on steroids, and DM2 who presented to [facility] ED with hyperglycemia, UTI, and LLL pna. MDs ? aspiration pna  -RS      Current Method of Nutrition -- -- NPO  -RS      Precautions/Limitations, Vision -- -- difficult to assess  -RS      Precautions/Limitations, Hearing -- -- difficult to assess  -RS      Prior Level of Function-Communication --  -- cognitive-linguistic impairment  -RS      Prior Level of Function-Swallowing -- -- no diet consistency restrictions;other (see comments)  per family member present, but also endorses increased coughing during mealtimes  -RS      Plans/Goals Discussed with -- -- patient and family;agreed upon  -RS         Pain    Additional Documentation Pain Scale: FACES Pre/Post-Treatment (Group)  -CH Pain Scale: FACES Pre/Post-Treatment (Group)  -RS Pain Scale: FACES Pre/Post-Treatment (Group)  -RS         Pain Scale: FACES Pre/Post-Treatment    Pain: FACES Scale, Pretreatment 0-->no hurt  -CH 0-->no hurt  -RS 0-->no hurt  -RS      Posttreatment Pain Rating 0-->no hurt  -CH 0-->no hurt  -RS 0-->no hurt  -RS         Oral Motor Structure and Function    Dentition Assessment -- -- edentulous  -RS      Secretion Management -- -- WNL/WFL  -RS      Mucosal Quality -- -- moist, healthy  -RS      Volitional Swallow -- -- unable to elicit  -RS      Volitional Cough -- -- unable to elicit  -RS         Oral Musculature and Cranial Nerve Assessment    Oral Motor, Comment -- -- Pt w what appears to be a tongue thrust w PO trials. U/a to follow directions to assess oral mechanism in isolation  -RS         General Eating/Swallowing Observations    Respiratory Support Currently in Use -- -- room air  -RS      Eating/Swallowing Skills -- -- fed by SLP  -RS      Positioning During Eating -- -- upright in bed  -RS      Utensils Used -- -- spoon;straw  -RS      Consistencies Trialed -- -- pureed;ice chips;thin liquids  -RS         Clinical Swallow Eval    Oral Prep Phase -- -- WFL  -RS      Oral Transit -- -- impaired  -RS      Oral Residue -- -- WFL  -RS      Pharyngeal Phase -- -- suspected pharyngeal impairment  -RS      Esophageal Phase -- -- unremarkable  -RS         Oral Transit Concerns    Oral Transit Concerns -- -- increased oral transit time  -RS      Increased Oral Transit Time -- -- pudding  -RS         Pharyngeal Phase Concerns     Pharyngeal Phase Concerns -- -- other (see comments)  -RS      Pharyngeal Phase Concerns, Comment -- -- Concern for silent aspiration though no overt pharyngeal patterns at bedside  -RS         MBS/VFSS    Utensils Used -- spoon;straw  -RS --      Consistencies Trialed -- mixed consistency;pureed;thin liquids  -RS --         MBS/VFSS Interpretation    Oral Prep Phase -- impaired oral phase of swallowing  -RS --      Oral Transit Phase -- impaired  -RS --      Oral Residue -- WFL  -RS --         Oral Preparatory Phase    Oral Preparatory Phase -- prolonged manipulation  -RS --      Prolonged Manipulation -- pudding/puree  -RS --         Oral Transit Phase    Impaired Oral Transit Phase -- delayed initiation of bolus transit;tongue pumping;premature spillage of liquids into pharynx  -RS --      Delayed Initiation of bolus transit -- pudding/puree;secondary to reduced lingual control;discoordination of lingual movement;secondary to impaired cognitive status  -RS --      Tongue Pumping -- pudding/puree;secondary to reduced lingual control;discoordination of lingual movement;secondary to impaired cognitive status  -RS --      Premature Spillage of Liquids into Pharynx -- thin liquids;secondary to reduced lingual control  -RS --      Oral Transit Phase, Comment -- Puree solids may be pt's new baseline given cognitive status  -RS --         Initiation of Pharyngeal Swallow    Initiation of Pharyngeal Swallow -- bolus in valleculae  -RS --      Pharyngeal Phase -- functional pharyngeal phase of swallowing  -RS --      Anatomical abnormalities noted -- prominent cricopharyngeous/ cricopharyngeal bar  -RS --      Anatomical abnormalities functional impact -- no functional impact on swallowing  -RS --      Pharyngeal Phase, Comment -- No asp/pen was observed during today's study; however, given fluctuating alertness, an isolated aspiration event may have occurred  -RS --         SLP Evaluation Clinical Impression    SLP  Swallowing Diagnosis mild-moderate;oral dysphagia;functional pharyngeal phase  -CH mild-moderate;oral dysphagia;functional pharyngeal phase  -RS R/O pharyngeal dysphagia  -RS      Functional Impact risk of aspiration/pneumonia  -CH risk of aspiration/pneumonia  -RS risk of aspiration/pneumonia  -RS      Rehab Potential/Prognosis, Swallowing re-evaluate goals as necessary  -CH re-evaluate goals as necessary  -RS --      Swallow Criteria for Skilled Therapeutic Interventions Met demonstrates skilled criteria  -CH demonstrates skilled criteria  -RS --         SLP Treatment Clinical Impressions    Treatment Assessment (SLP) continued;toleration of diet;no clinical signs of;aspiration  - -- --      Treatment Assessment Comments (SLP) Trialed soft chewables and patient tolerated without s/s of aspiration.DIet consistency changed to soft, chopped and thin liquids  - -- --      Daily Summary of Progress (SLP) progress toward functional goals is good  - -- --      Plan for Continued Treatment (SLP) continue treatment per plan of care  -CH -- --      Care Plan Review evaluation/treatment results reviewed;care plan/treatment goals reviewed  - -- --         Recommendations    Therapy Frequency (Swallow) PRN;5 days per week  - PRN;5 days per week  -RS --      Predicted Duration Therapy Intervention (Days) 1 week  -CH 1 week  -RS --      SLP Diet Recommendation soft to chew textures;chopped;thin liquids  - puree;thin liquids  -RS NPO  -RS      Recommended Diagnostics other (see comments)  diet tolerance  - -- [facility]SS ([facility])  -RS      Recommended Precautions and Strategies upright posture during/after eating;general aspiration precautions  -CH upright posture during/after eating;general aspiration precautions  -RS --      Oral Care Recommendations Oral Care BID/PRN;Suction toothbrush  -CH Oral Care BID/PRN;Suction toothbrush  -RS Oral Care BID/PRN;Suction toothbrush  -RS      SLP Rec. for Method of Medication  Administration meds crushed;with puree  -CH meds crushed;with puree  -RS meds whole;with puree  -RS      Monitor for Signs of Aspiration notify SLP if any concerns  -CH notify SLP if any concerns  -RS notify SLP if any concerns  -RS      Anticipated Discharge Disposition (SLP) skilled nursing facility  -CH skilled nursing facility  -RS skilled nursing facility  -RS                User Key  (r) = Recorded By, (t) = Taken By, (c) = Cosigned By      Initials Name Effective Dates    CH Stacey Solano, MS CCC-SLP 06/16/21 -     RS Everette Rodriguez, MS CCC-SLP 09/14/23 -                     EDUCATION  The patient has been educated in the following areas:   Home Exercise Program (HEP) Dysphagia (Swallowing Impairment) Oral Care/Hydration Modified Diet Instruction.        SLP GOALS       Row Name 10/11/24 1140 10/10/24 1400          (LTG) Patient will demonstrate functional swallow for    Diet Texture (Demonstrate functional swallow) soft to chew (chopped) textures  -CH soft to chew (ground) textures  -RS     Liquid viscosity (Demonstrate functional swallow) thin liquids  -CH thin liquids  -RS     [place] (Demonstrate functional swallow) with moderate cues (50-74% accuracy)  -CH with moderate cues (50-74% accuracy)  -RS     Time Frame (Demonstrate functional swallow) 1 week  -CH 1 week  -RS     Progress/Outcomes (Demonstrate functional swallow) continuing progress toward goal  -CH new goal  -RS     Comment (Demonstrate functional swallow) Patient tolerated trials. Diet changed to soft chopped and thin liquids  -CH --        (STG) Patient will tolerate trials of    Consistencies Trialed (Tolerate trials) soft to chew (chopped) textures;thin liquids  -CH soft to chew (ground) textures;thin liquids  -RS     Desired Outcome (Tolerate trials) without signs/symptoms of aspiration;without signs of distress;with adequate oral prep/transit/clearance  -CH without signs/symptoms of aspiration;without signs of distress;with adequate  oral prep/transit/clearance  -RS     [place] (Tolerate trials) with moderate cues (50-74% accuracy)  -CH with moderate cues (50-74% accuracy)  -RS     Time Frame (Tolerate trials) 1 week  -CH 1 week  -RS     Progress/Outcomes (Tolerate trials) continuing progress toward goal  -CH new goal  -RS     Comment (Tolerate trials) Patient tolerated trials. Diet changed to soft chopped and thin liquids  -CH --        (STG) Labial Strengthening Goal 1 (SLP)    Activity (Labial Strengthening Goal 1, SLP) increase labial tone  -CH increase labial tone  -RS     Increase Labial Tone labial resistance exercises;swallow trials  -CH labial resistance exercises;swallow trials  -RS     [place]/Accuracy (Labial Strengthening Goal 1, SLP) with maximum cues (25-49% accuracy)  -CH with maximum cues (25-49% accuracy)  -RS     Time Frame (Labial Strengthening Goal 1, SLP) 1 week  -CH 1 week  -RS     Progress/Outcomes (Labial Strengthening Goal 1, SLP) goal ongoing  -CH new goal  -RS     Comment (Labial Strengthening Goal 1, SLP) Deferred as pt eating lunch  -CH --        (STG) Lingual Strengthening Goal 1 (SLP)    Activity (Lingual Strengthening Goal 1, SLP) increase tongue back strength  -CH increase tongue back strength  -RS     Increase Tongue Back Strength lingual resistance exercises;swallow trials  -CH lingual resistance exercises;swallow trials  -RS     [place]/Accuracy (Lingual Strengthening Goal 1, SLP) with maximum cues (25-49% accuracy)  -CH with maximum cues (25-49% accuracy)  -RS     Time Frame (Lingual Strengthening Goal 1, SLP) 1 week  -CH 1 week  -RS     Progress/Outcomes (Lingual Strengthening Goal 1, SLP) goal ongoing  - new goal  -RS     Comment (Lingual Strengthening Goal 1, SLP) Deferred as pt eating lunch  -CH --               User Key  (r) = Recorded By, (t) = Taken By, (c) = Cosigned By      Initials Name Provider Type    Stacey Mittal MS CCC-SLP Speech and Language Pathologist    ERIC Rodriguez  MS Everette CCC-SLP Speech and Language Pathologist                         Time Calculation:    Time Calculation- SLP       Row Name 10/11/24 1432             Time Calculation- SLP    SLP Start Time 1140  -CH      SLP Received On 10/11/24  -CH         Untimed Charges    63153-RX Treatment Swallow Minutes 30  -CH         Total Minutes    Untimed Charges Total Minutes 30  -CH       Total Minutes 30  -CH                User Key  (r) = Recorded By, (t) = Taken By, (c) = Cosigned By      Initials Name Provider Type     Stacey Solano MS CCC-SLP Speech and Language Pathologist                    Therapy Charges for Today       Code Description Service Date Service Provider Modifiers Qty    24243855121  ST TREATMENT SWALLOW 2 10/11/2024 Stacey Solano MS CCC-SLP GN 1                 MS NICOLAS Joyce  10/11/2024

## 2024-10-11 NOTE — PROGRESS NOTES
Discharge Planning Assessment  [facility]     Patient Name: Arminda Krishnan  MRN: [id number]  Today's Date: 10/11/2024    Admit Date: 10/9/2024        Discharge Needs Assessment    No documentation.                  Discharge Plan    Met with the patients family and the patient is current with home health for PT, OT, skilled nursing. She and her daughter request a referral to [facility] for short term rehab and a referral was placed on Friday. Case mangement will follow up with [facility] on Monday 10/14/2024.               Continued Care and Services - Admitted Since 10/9/2024       Destination       Service Provider Request Status Selected Services Address Phone Fax Patient Preferred    [facility] Pending - No Request Sent N/A [address] [place] [address] [phone] --                  Selected Continued Care - Prior Encounters Includes continued care and service providers with selected services from prior encounters from 7/11/2024 to 10/11/2024      Discharged on 9/30/2024 Admission date: 9/27/2024 - Discharge disposition: Home or Self Care      Home Medical Care       Service Provider Selected Services Address Phone Fax Patient Preferred    [facility] ,  [facility] [address], [address], [place] [address] [phone] --                          Expected Discharge Date and Time       Expected Discharge Date Expected Discharge Time    Oct 14, 2024            Demographic Summary    No documentation.                  Functional Status    No documentation.                  Psychosocial    No documentation.                  Abuse/Neglect    No documentation.                  Legal    No documentation.                  Substance Abuse    No documentation.                  Patient Forms    No documentation.                     BALAJI Fernández

## 2024-10-11 NOTE — PROGRESS NOTES
[facility] Medicine Services  PROGRESS NOTE    Patient Name: Arminda Krishnan  : 1945  MRN: [id number]    Date of Admission: 10/9/2024  Primary Care Physician: Aiden Spencer MD    Subjective   Subjective     CC: Follow-up hyperglycemia    HPI: Patient had a rough night, nursing reports some confusion and hallucination    Objective   Objective     Vital Signs:   Temp:  [98.6 °F (37 °C)-99.5 °F (37.5 °C)] 99.5 °F (37.5 °C)  Heart Rate:  [] 92  Resp:  [14-20] 14  BP: (134-168)/(63-87) 134/82     Physical Exam:  Constitutional: Elderly female, lethargic  HENT: NCAT, mucous membranes moist  Respiratory: Clear to auscultation bilaterally, respiratory effort normal   Cardiovascular: RRR, no murmurs, rubs, or gallops  Gastrointestinal: Positive bowel sounds, soft, nontender, nondistended  Musculoskeletal: No bilateral ankle edema  Psychiatric: Appropriate affect, cooperative  Neurologic: Confusion, nonfocal     Results Reviewed:  LAB RESULTS:      Lab 10/10/24  2223 10/10/24  1745 10/10/24  1236 10/09/24  1842   WBC  --   --  10.17 8.68   HEMOGLOBIN 8.2* 8.3* 7.5* 7.9*   HEMATOCRIT 25.8* 27.6* 23.7* 25.0*   PLATELETS  --   --  296 301   NEUTROS ABS  --   --  7.33* 6.31   IMMATURE GRANS (ABS)  --   --  0.16* 0.20*   LYMPHS ABS  --   --  1.36 1.13   MONOS ABS  --   --  1.08* 0.83   EOS ABS  --   --  0.14 0.13   MCV  --   --  88.8 88.7   CRP  --   --   --  13.05*   PROCALCITONIN  --   --   --  0.12   LACTATE  --   --   --  0.9   HSTROP T  --   --   --  39*         Lab 10/10/24  1236 10/09/24  1842   SODIUM 141 136   POTASSIUM 4.1 4.4   CHLORIDE 110* 103   CO2 18.0* 19.0*   ANION GAP 13.0 14.0   BUN 45* 56*   CREATININE 2.88* 3.30*   EGFR 15.9* 13.5*   GLUCOSE 156* 359*   CALCIUM 9.1 9.5   MAGNESIUM  --  1.8   PHOSPHORUS  --  3.5   TSH  --  0.465         Lab 10/10/24  1236 10/09/24  1842   TOTAL PROTEIN 7.0 7.4   ALBUMIN 3.1* 3.3*   GLOBULIN 3.9 4.1   ALT (SGPT) 15 18    AST (SGOT) 21 21   BILIRUBIN 0.2 0.3   ALK PHOS 70 78   LIPASE  --  23         Lab 10/09/24  1842   PROBNP 719.5   HSTROP T 39*             Lab 10/09/24  1842   IRON 14*  14*   IRON SATURATION (TSAT) 7*   TIBC 195*   TRANSFERRIN 131*   FERRITIN 766.80*   FOLATE 19.00   VITAMIN B 12 1,262*         Lab 10/09/24  1846   FIO2 21   CARBOXYHEMOGLOBIN (VENOUS) 1.6     Brief Urine Lab Results  (Last result in the past 365 days)        Color   Clarity   Blood   Leuk Est   Nitrite   Protein   CREAT   Urine HCG        10/09/24 2058 Yellow   Cloudy   Large (3+)   Moderate (2+)   Negative   100 mg/dL (2+)                   Microbiology Results Abnormal       Procedure Component Value - Date/Time    Blood Culture - Blood, Arm, Right [[id number]]  (Normal) Collected: 10/09/24 2159    Lab Status: Preliminary result Specimen: Blood from Arm, Right Updated: 10/10/24 2232     Blood Culture No growth at 24 hours    Narrative:      Less than seven (7) mL's of blood was collected.  Insufficient quantity may yield false negative results.    Blood Culture - Blood, Hand, Left [[id number]]  (Normal) Collected: 10/09/24 1842    Lab Status: Preliminary result Specimen: Blood from Hand, Left Updated: 10/10/24 1945     Blood Culture No growth at 24 hours    Narrative:      Less than seven (7) mL's of blood was collected.  Insufficient quantity may yield false negative results.    Urine Culture - Urine, Urine, Clean Catch [[id number]]  (Normal) Collected: 10/09/24 2058    Lab Status: Preliminary result Specimen: Urine, Clean Catch Updated: 10/10/24 1048     Urine Culture No growth    Legionella Antigen, Urine - Urine, Urine, Clean Catch [[id number]]  (Normal) Collected: 10/09/24 2058    Lab Status: Final result Specimen: Urine, Clean Catch Updated: 10/10/24 0922     LEGIONELLA ANTIGEN, URINE Negative    S. Pneumo Ag Urine or CSF - Urine, Urine, Clean Catch [[id number]]  (Normal) Collected: 10/09/24 2058    Lab Status: Final result Specimen: Urine,  Clean Catch Updated: 10/10/24 0922     Strep Pneumo Ag Negative    COVID PRE-OP / PRE-PROCEDURE SCREENING ORDER (NO ISOLATION) - Swab, Nasopharynx [[id number]]  (Normal) Collected: 10/09/24 1843    Lab Status: Final result Specimen: Swab from Nasopharynx Updated: 10/09/24 1958    Narrative:      The following orders were created for panel order COVID PRE-OP / PRE-PROCEDURE SCREENING ORDER (NO ISOLATION) - Swab, Nasopharynx.  Procedure                               Abnormality         Status                     ---------                               -----------         ------                     COVID-19, FLU A/B, RSV P...[[id number]]  Normal              Final result                 Please view results for these tests on the individual orders.    COVID-19, FLU A/B, RSV PCR 1 HR TAT - Swab, Nasopharynx [[id number]]  (Normal) Collected: 10/09/24 1843    Lab Status: Final result Specimen: Swab from Nasopharynx Updated: 10/09/24 1958     COVID19 Not Detected     Influenza A PCR Not Detected     Influenza B PCR Not Detected     RSV, PCR Not Detected    Narrative:      Fact sheet for providers: https://www.fda.gov/media/196369/download    Fact sheet for patients: https://www.fda.gov/media/673496/download    Test performed by PCR.            FL Video Swallow With Speech Single Contrast    Result Date: 10/10/2024  FL VIDEO SWALLOW W SPEECH SINGLE-CONTRAST Date of Exam: 10/10/2024 1:51 PM EDT Indication: aspiration.   Comparison: None available. Technique:   The speech pathologist administered food and/or liquid mixed with barium to the patient with cine/video imaging.  Imaging assistance was provided to the speech pathologist and an image was saved. Fluoroscopic Time: 48-second Number of Images: 5 fluoroscopic series Findings: Fluoroscopy provided for speech therapy evaluation of swallowing. No aspiration or penetration. Prominent cricopharyngeus.     Impression: Impression: Fluoroscopy provided for speech therapy evaluation  of swallowing. No aspiration or penetration. Prominent cricopharyngeus. Please see speech therapy report for full findings and recommendations. Electronically Signed: Antoni Faith MD  10/10/2024 2:34 PM EDT  Workstation ID: [id number]    XR Chest 1 View    Result Date: 10/9/2024  XR CHEST 1 VW Date of Exam: 10/9/2024 7:38 PM EDT Indication: Weak/Dizzy/AMS triage protocol Comparison: Chest radiograph 10/3/2024 Findings: Mediastinum: Cardiomediastinal silhouette appears unchanged and normal in size Lungs: Bronchial wall thickening and bilateral mild interstitial opacities. There is no appreciable prominence of the central pulmonary vasculature or convincing septal thickening to strongly suggest pulmonary edema. There is a consolidative opacity in the left lower lobe. Pleura: No pleural effusion or pneumothorax. Bones and soft tissues: No acute osseous abnormality.     Impression: Impression: Consolidative opacity in the left lower lobe suspicious for aspiration or pneumonia. Bronchial wall thickening which can be seen in bronchitis and/or large airways disease. Electronically Signed: Francisco Javier Fuller  10/9/2024 8:09 PM EDT  Workstation ID: [id number]    CT Abdomen Pelvis Without Contrast    Result Date: 10/9/2024  CT ABDOMEN PELVIS WO CONTRAST Date of Exam: 10/9/2024 7:26 PM EDT Indication: nausea and severe epigastric pain, no BM 2 weeks. Comparison: CT abdomen pelvis 9/27/2024 Technique: Axial CT images were obtained of the abdomen and pelvis without the administration of contrast. Reconstructed coronal and sagittal images were also obtained. Automated exposure control and iterative construction methods were used. Findings: Heart size within normal limits. Trace pericardial fluid stable from prior. Small hiatal hernia. Lower lungs without acute abnormality. Small fat-containing right Bochdalek hernia. Noncontrast appearance of liver, spleen and biliary tree unremarkable. Cholecystectomy. Atrophic pancreas stable  from prior without active inflammation. No suspicious adrenal nodule. Symmetric renal size and contour. Stable punctate right midpole calculus. No hydronephrosis. Urinary bladder unremarkable. Hysterectomy. No suspicious adnexal mass. Expected configuration stomach and duodenum. Mild to moderate formed colonic stool. No evidence of bowel obstruction or active inflammation. No CT evidence of acute appendicitis. Extensive abdominal atherosclerotic disease. Celiac artery stent. Negative for abdominal aortic aneurysm. No suspicious adenopathy. No ascites, free air or drainable collection. Fat-containing umbilical and periumbilical hernias stable from prior without edema or contained bowel. No acute soft tissue finding. Degenerative osteoarthritis of the hip joints, right greater than left. Discectomy and fusion changes of the lumbar spine unchanged from prior. 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 Anterolisthesis L4 and L5 stable from prior. Periprosthetic lucency at  L2 and L5 screws stable from prior suggesting loosening.     Impression: Impression: No acute CT findings. Multiple chronic/ancillary findings overall without significant change from recent comparison. Electronically Signed: Deon Gimenez MD  10/9/2024 7:53 PM EDT  Workstation ID: [id number]     Results for orders placed during the hospital encounter of 07/15/24    Adult Transthoracic Echo Complete W/ Cont if Necessary Per Protocol    Interpretation Summary    Left ventricular systolic function is normal. Calculated left ventricular EF = 63.2%    Estimated right ventricular systolic pressure from tricuspid regurgitation is normal (<35 mmHg).    Normal left atrial size and volume noted.    There is calcification of the aortic valve. Mild to moderate aortic valve regurgitation is present.      Current medications:  Scheduled Meds:aspirin, 81 mg, Oral, Daily  atorvastatin, 80 mg, Oral, Daily  cefTRIAXone, 1,000 mg, Intravenous, Q24H  clopidogrel, 75 mg,  Oral, Daily  famotidine, 10 mg, Oral, Every Other Day  insulin regular, 2-7 Units, Subcutaneous, TID With Meals  ipratropium-albuterol, 3 mL, Nebulization, 4x Daily - RT  isosorbide mononitrate, 60 mg, Oral, Daily  linagliptin, 5 mg, Oral, Daily  predniSONE, 5 mg, Oral, Daily With Breakfast  sodium chloride, 10 mL, Intravenous, Q12H      Continuous Infusions:Pharmacy to dose vancomycin,       PRN Meds:.  acetaminophen **OR** acetaminophen **OR** acetaminophen    senna-docusate sodium **AND** polyethylene glycol **AND** bisacodyl **AND** bisacodyl    Calcium Replacement - Follow Nurse / BPA Driven Protocol    dextrose    dextrose    diphenhydrAMINE    glucagon (human recombinant)    influenza vaccine    Magnesium Low Dose Replacement - Follow Nurse / BPA Driven Protocol    melatonin    Pharmacy to dose vancomycin    Phosphorus Replacement - Follow Nurse / BPA Driven Protocol    Potassium Replacement - Follow Nurse / BPA Driven Protocol    sodium chloride    sodium chloride    Assessment & Plan   Assessment & Plan     Active Hospital Problems    Diagnosis  POA    **LLL pneumonia [J18.9]  Yes    Pneumonia [J18.9]  Yes    HTN (hypertension) [I10]  Unknown    Hyperglycemia [R73.9]  Yes    History of CVA (cerebrovascular accident) [Z86.73]  Not Applicable    CKD (chronic kidney disease) stage 4, GFR 15-29 ml/min [N18.4]  Yes    Degenerative disc disease, lumbar [M51.369]  Yes      Resolved Hospital Problems   No resolved problems to display.        Brief Hospital Course to date:  Arminda Krishnan is a 79 y.o. female with history of mild coronary impairment, prior CVA, DDD on steroids, type 2 diabetes who presented to [facility] ED with hyperglycemia, workup concerning for possible pneumonia and UTI     Suspected pneumonia  -Chest x-ray shows  left ower lobe pneumonia, likely secondary to aspiration  -blood and sputum cultures are currently NGTD, Legionella and strep pneumo urinary antigens are both negative  -Continue  antibiotics currently on IV Rocephin  -SLP has evaluated, okay for diet  -She remains on room air, continue DuoNebs     Suspected UTI  -UA with 4+ bacteria, leukocytosis  -Urine cultures NGTD, continue to back as above.    MICHAEL on CKD stage III  -Baseline creatinine~2.2-2.6, was 3.30 on presentation, improving  -Likely prerenal, will gently hydrate and closely monitor     Poorly controlled type 2 diabetes with A1c 11% and hyperglycemia in the setting of chronic steroids  -FSBG's reviewed and are much improved  -Continue SSI, will add basal Lantus 5 units daily and adjust as warranted.     Anemia  -Hg 7.9 on presentation, currently improved no signs of blood loss  -Follow-up iron profile and closely monitor.     History of CVA  Hyperlipidemia  -Continue aspirin, statin, Plavix     Acute encephalopathy superimposed on MCI  -Continue antibiotics as above  -Start low-dose Seroquel tonight 12.5 mg nightly     DDD  -Continue chronic steroids    Expected Discharge Location and Transportation: [facility]  Expected Discharge   Expected Discharge Date: 10/11/2024; Expected Discharge Time:      VTE Prophylaxis:  Mechanical VTE prophylaxis orders are present.         AM-PAC 6 Clicks Score (PT): 13 (10/10/24 2200)    CODE STATUS:   Code Status and Medical Interventions: No CPR (Do Not Attempt to Resuscitate); Limited Support; No intubation (DNI)   Ordered at: 10/10/24 0002     Medical Intervention Limits:    No intubation (DNI)     Level Of Support Discussed With:    Next of Kin (If No Surrogate)     Code Status (Patient has no pulse and is not breathing):    No CPR (Do Not Attempt to Resuscitate)     Medical Interventions (Patient has pulse or is breathing):    Limited Support       Tab Samuel MD  10/11/24

## 2024-10-11 NOTE — PLAN OF CARE
Problem: Adult Inpatient Plan of Care  Goal: Plan of Care Review  Outcome: Progressing  Goal: Patient-Specific Goal (Individualized)  Outcome: Progressing  Goal: Absence of Hospital-Acquired Illness or Injury  Outcome: Progressing  Intervention: Identify and Manage Fall Risk  Recent Flowsheet Documentation  Taken 10/11/2024 0600 by Loly Morales RN  Safety Promotion/Fall Prevention:   activity supervised   assistive device/personal items within reach   clutter free environment maintained   nonskid shoes/slippers when out of bed   room organization consistent   safety round/check completed  Taken 10/11/2024 0400 by Loly Morales RN  Safety Promotion/Fall Prevention:   activity supervised   assistive device/personal items within reach   clutter free environment maintained   nonskid shoes/slippers when out of bed   room organization consistent   safety round/check completed  Taken 10/11/2024 0200 by Loly Morales RN  Safety Promotion/Fall Prevention:   activity supervised   assistive device/personal items within reach   clutter free environment maintained   nonskid shoes/slippers when out of bed   room organization consistent   safety round/check completed  Taken 10/11/2024 0000 by Loly Morales RN  Safety Promotion/Fall Prevention:   activity supervised   assistive device/personal items within reach   clutter free environment maintained   nonskid shoes/slippers when out of bed   room organization consistent   safety round/check completed  Taken 10/10/2024 2200 by Loly Morales RN  Safety Promotion/Fall Prevention:   activity supervised   assistive device/personal items within reach   clutter free environment maintained   nonskid shoes/slippers when out of bed   room organization consistent   safety round/check completed  Taken 10/10/2024 2000 by Loly Morales RN  Safety Promotion/Fall Prevention:   activity supervised   assistive device/personal items within reach   clutter free environment maintained    nonskid shoes/slippers when out of bed   room organization consistent   safety round/check completed  Intervention: Prevent Skin Injury  Recent Flowsheet Documentation  Taken 10/11/2024 0600 by Loly Morales RN  Body Position: position changed independently  Skin Protection:   adhesive use limited   incontinence pads utilized   tubing/devices free from skin contact   transparent dressing maintained  Taken 10/11/2024 0400 by Loly Morales RN  Body Position: position changed independently  Skin Protection:   adhesive use limited   incontinence pads utilized   tubing/devices free from skin contact   transparent dressing maintained  Taken 10/11/2024 0200 by Loly Morales RN  Body Position: position changed independently  Skin Protection:   adhesive use limited   incontinence pads utilized   tubing/devices free from skin contact   transparent dressing maintained  Taken 10/11/2024 0000 by Loly Morales RN  Body Position: position changed independently  Skin Protection:   adhesive use limited   incontinence pads utilized   tubing/devices free from skin contact   transparent dressing maintained  Taken 10/10/2024 2200 by Loly Morales RN  Body Position: position changed independently  Skin Protection:   adhesive use limited   incontinence pads utilized   tubing/devices free from skin contact   transparent dressing maintained  Taken 10/10/2024 2000 by Loly Morales RN  Body Position: position changed independently  Skin Protection:   adhesive use limited   incontinence pads utilized   tubing/devices free from skin contact   transparent dressing maintained  Intervention: Prevent and Manage VTE (Venous Thromboembolism) Risk  Recent Flowsheet Documentation  Taken 10/11/2024 0600 by Loly Morales RN  Activity Management: activity minimized  Taken 10/11/2024 0400 by Loly Morales RN  Activity Management: activity minimized  Taken 10/11/2024 0200 by Loly Morales RN  Activity Management: activity minimized  Taken  10/11/2024 0000 by Loly Morales RN  Activity Management: activity minimized  Taken 10/10/2024 2200 by Loly Morales RN  Activity Management: activity minimized  Range of Motion: active ROM (range of motion) encouraged  Taken 10/10/2024 2000 by Loly Morales RN  Activity Management: activity minimized  Goal: Optimal Comfort and Wellbeing  Outcome: Progressing  Intervention: Provide Person-Centered Care  Recent Flowsheet Documentation  Taken 10/10/2024 2200 by Loly Morales RN  Trust Relationship/Rapport:   care explained   questions answered   questions encouraged   reassurance provided  Goal: Readiness for Transition of Care  Outcome: Progressing     Problem: Skin Injury Risk Increased  Goal: Skin Health and Integrity  Outcome: Progressing  Intervention: Optimize Skin Protection  Recent Flowsheet Documentation  Taken 10/11/2024 0600 by Loly Morales RN  Pressure Reduction Techniques: frequent weight shift encouraged  Head of Bed (HOB) Positioning: \[facility]\"" elevated  Pressure Reduction Devices: pressure-redistributing mattress utilized  Skin Protection:   adhesive use limited   incontinence pads utilized   tubing/devices free from skin contact   transparent dressing maintained  Taken 10/11/2024 0400 by Loly Morales RN  Pressure Reduction Techniques: frequent weight shift encouraged  Head of Bed (HOB) Positioning: HOB elevated  Pressure Reduction Devices: pressure-redistributing mattress utilized  Skin Protection:   adhesive use limited   incontinence pads utilized   tubing/devices free from skin contact   transparent dressing maintained  Taken 10/11/2024 0200 by Loly Morales RN  Pressure Reduction Techniques: frequent weight shift encouraged  Head of Bed (HOB) Positioning: HOB elevated  Pressure Reduction Devices: pressure-redistributing mattress utilized  Skin Protection:   adhesive use limited   incontinence pads utilized   tubing/devices free from skin contact   transparent dressing maintained  Taken  10/11/2024 0000 by Loly Morales RN  Pressure Reduction Techniques: frequent weight shift encouraged  Head of Bed (HOB) Positioning: [facility] elevated  Pressure Reduction Devices: pressure-redistributing mattress utilized  Skin Protection:   adhesive use limited   incontinence pads utilized   tubing/devices free from skin contact   transparent dressing maintained  Taken 10/10/2024 2200 by Loly Morales RN  Pressure Reduction Techniques: frequent weight shift encouraged  Head of Bed (HOB) Positioning: [facility] elevated  Pressure Reduction Devices: pressure-redistributing mattress utilized  Skin Protection:   adhesive use limited   incontinence pads utilized   tubing/devices free from skin contact   transparent dressing maintained  Taken 10/10/2024 2000 by Loly Morales RN  Pressure Reduction Techniques: frequent weight shift encouraged  Head of Bed (HOB) Positioning: [facility] elevated  Pressure Reduction Devices: pressure-redistributing mattress utilized  Skin Protection:   adhesive use limited   incontinence pads utilized   tubing/devices free from skin contact   transparent dressing maintained     Problem: Fall Injury Risk  Goal: Absence of Fall and Fall-Related Injury  Outcome: Progressing  Intervention: Identify and Manage Contributors  Recent Flowsheet Documentation  Taken 10/11/2024 0600 by Loly Morales RN  Medication Review/Management: medications reviewed  Taken 10/11/2024 0400 by Loly Morales RN  Medication Review/Management: medications reviewed  Taken 10/11/2024 0200 by Loly Morales RN  Medication Review/Management: medications reviewed  Taken 10/11/2024 0000 by Loly Morales RN  Medication Review/Management: medications reviewed  Taken 10/10/2024 2200 by Loly Morales RN  Medication Review/Management: medications reviewed  Taken 10/10/2024 2000 by Loly Morales RN  Medication Review/Management: medications reviewed  Intervention: Promote Injury-Free Environment  Recent Flowsheet Documentation  Taken  10/11/2024 0600 by Loly Morales RN  Safety Promotion/Fall Prevention:   activity supervised   assistive device/personal items within reach   clutter free environment maintained   nonskid shoes/slippers when out of bed   room organization consistent   safety round/check completed  Taken 10/11/2024 0400 by Loly Morales RN  Safety Promotion/Fall Prevention:   activity supervised   assistive device/personal items within reach   clutter free environment maintained   nonskid shoes/slippers when out of bed   room organization consistent   safety round/check completed  Taken 10/11/2024 0200 by Loly Morales RN  Safety Promotion/Fall Prevention:   activity supervised   assistive device/personal items within reach   clutter free environment maintained   nonskid shoes/slippers when out of bed   room organization consistent   safety round/check completed  Taken 10/11/2024 0000 by Loly Morales RN  Safety Promotion/Fall Prevention:   activity supervised   assistive device/personal items within reach   clutter free environment maintained   nonskid shoes/slippers when out of bed   room organization consistent   safety round/check completed  Taken 10/10/2024 2200 by Loly Morales RN  Safety Promotion/Fall Prevention:   activity supervised   assistive device/personal items within reach   clutter free environment maintained   nonskid shoes/slippers when out of bed   room organization consistent   safety round/check completed  Taken 10/10/2024 2000 by Loly Morales RN  Safety Promotion/Fall Prevention:   activity supervised   assistive device/personal items within reach   clutter free environment maintained   nonskid shoes/slippers when out of bed   room organization consistent   safety round/check completed     Problem: Diabetes Comorbidity  Goal: Blood Glucose Level Within Targeted Range  Outcome: Progressing     Problem: Hypertension Comorbidity  Goal: Blood Pressure in Desired Range  Outcome:  Progressing  Intervention: Maintain Blood Pressure Management  Recent Flowsheet Documentation  Taken 10/11/2024 0600 by Loly Morales, RN  Medication Review/Management: medications reviewed  Taken 10/11/2024 0400 by Loly Morales RN  Medication Review/Management: medications reviewed  Taken 10/11/2024 0200 by Loly Morales RN  Medication Review/Management: medications reviewed  Taken 10/11/2024 0000 by Loly Morales RN  Medication Review/Management: medications reviewed  Taken 10/10/2024 2200 by Loly Morales, RN  Medication Review/Management: medications reviewed  Taken 10/10/2024 2000 by Loly Morales, RN  Medication Review/Management: medications reviewed   Goal Outcome Evaluation:

## 2024-10-11 NOTE — CONSULTS
"Diabetes Education    Patient Name:  Arminda Krishnan  YOB: 1945  MRN: [id number]  Admit Date:  10/9/2024      Total time spent reviewing chart, preparing education/materials, providing education at bedside, and coordinating care approx 20 minutes.    Consult for diabetes education received for A1c >7. Chart reviewed. Pt was seen at bedside today. Permission given for visit.     Reviewed with patient current A1c 11 and discussed its significance. Pt appears to be somewhat confused during our visit today. She gave me permission to call her daughter, Delbert, who helps her manage her diabetes at home.    Pts daughter Delbert telephoned and states she takes \"two diabetes pills at home, one was just started last week\". Chart review reveals januvia and glipizide at home. She states her mother has a Dexcom G7 sensor but she stopped wearing it due to it causing pain in her arm. She has started using a blood glucose meter at home instead. Reports readings the last week or so have been over 200, but prior to that her readings were in the 100s. She typically eats 2 meals plus snacks, drinks water at home. Discussed how infection can impact blood glucose levels and need for close ongoing monitoring. Reviewed ADA target range for A1c.    With Delbert's permission, mailed to pts home address a copy of [employer] \"What is Diabetes\" handout, \"Blood Glucose Goals\" handout, and \"What is A1c\" handout as well as pamphlet of outpatient education services. Encouraged her to call as needed.     Should diabetes mgmt medication regimen be adjusted at discharge, please re-consult as needed.     Electronically signed by:  Yulia Dior RN  10/11/24 14:44 EDT  "

## 2024-10-11 NOTE — PLAN OF CARE
Goal Outcome Evaluation:                     Anticipated Discharge Disposition (SLP): skilled nursing facility          SLP Swallowing Diagnosis: mild-moderate, oral dysphagia, functional pharyngeal phase (10/11/24 1140)  Treatment Assessment (SLP): continued, toleration of diet, no clinical signs of, aspiration (10/11/24 1140)  Treatment Assessment Comments (SLP): Trialed soft chewables and patient tolerated without s/s of aspiration.DIet consistency changed to soft, chopped and thin liquids (10/11/24 1140)  Plan for Continued Treatment (SLP): continue treatment per plan of care (10/11/24 1140)

## 2024-10-12 LAB
GLUCOSE BLDC GLUCOMTR-MCNC: 161 MG/DL (ref 70–130)
GLUCOSE BLDC GLUCOMTR-MCNC: 186 MG/DL (ref 70–130)
GLUCOSE BLDC GLUCOMTR-MCNC: 204 MG/DL (ref 70–130)
GLUCOSE BLDC GLUCOMTR-MCNC: 226 MG/DL (ref 70–130)

## 2024-10-12 PROCEDURE — 94664 DEMO&/EVAL PT USE INHALER: CPT

## 2024-10-12 PROCEDURE — 94799 UNLISTED PULMONARY SVC/PX: CPT

## 2024-10-12 PROCEDURE — 97110 THERAPEUTIC EXERCISES: CPT

## 2024-10-12 PROCEDURE — 63710000001 INSULIN REGULAR HUMAN PER 5 UNITS: Performed by: INTERNAL MEDICINE

## 2024-10-12 PROCEDURE — 82948 REAGENT STRIP/BLOOD GLUCOSE: CPT

## 2024-10-12 PROCEDURE — 99232 SBSQ HOSP IP/OBS MODERATE 35: CPT | Performed by: STUDENT IN AN ORGANIZED HEALTH CARE EDUCATION/TRAINING PROGRAM

## 2024-10-12 PROCEDURE — 25010000002 DIPHENHYDRAMINE PER 50 MG: Performed by: INTERNAL MEDICINE

## 2024-10-12 PROCEDURE — 63710000001 INSULIN LISPRO (HUMAN) PER 5 UNITS: Performed by: STUDENT IN AN ORGANIZED HEALTH CARE EDUCATION/TRAINING PROGRAM

## 2024-10-12 PROCEDURE — 63710000001 INSULIN GLARGINE PER 5 UNITS: Performed by: INTERNAL MEDICINE

## 2024-10-12 PROCEDURE — 63710000001 PREDNISONE PER 5 MG

## 2024-10-12 PROCEDURE — 25010000002 CEFTRIAXONE PER 250 MG: Performed by: INTERNAL MEDICINE

## 2024-10-12 RX ORDER — HYDROCODONE BITARTRATE AND ACETAMINOPHEN 7.5; 325 MG/1; MG/1
1 TABLET ORAL EVERY 12 HOURS PRN
Status: DISCONTINUED | OUTPATIENT
Start: 2024-10-12 | End: 2024-10-15

## 2024-10-12 RX ORDER — INSULIN LISPRO 100 [IU]/ML
2 INJECTION, SOLUTION INTRAVENOUS; SUBCUTANEOUS
Status: DISCONTINUED | OUTPATIENT
Start: 2024-10-12 | End: 2024-10-15

## 2024-10-12 RX ORDER — FERROUS SULFATE 325(65) MG
325 TABLET ORAL
Status: DISCONTINUED | OUTPATIENT
Start: 2024-10-13 | End: 2024-10-22 | Stop reason: HOSPADM

## 2024-10-12 RX ADMIN — INSULIN LISPRO 2 UNITS: 100 INJECTION, SOLUTION INTRAVENOUS; SUBCUTANEOUS at 17:56

## 2024-10-12 RX ADMIN — FAMOTIDINE 10 MG: 20 TABLET, FILM COATED ORAL at 08:21

## 2024-10-12 RX ADMIN — CLOPIDOGREL BISULFATE 75 MG: 75 TABLET ORAL at 08:21

## 2024-10-12 RX ADMIN — IPRATROPIUM BROMIDE AND ALBUTEROL SULFATE 3 ML: 2.5; .5 SOLUTION RESPIRATORY (INHALATION) at 15:50

## 2024-10-12 RX ADMIN — Medication 10 ML: at 20:00

## 2024-10-12 RX ADMIN — HUMAN INSULIN 2 UNITS: 100 INJECTION, SOLUTION SUBCUTANEOUS at 08:23

## 2024-10-12 RX ADMIN — PREDNISONE 5 MG: 5 TABLET ORAL at 08:21

## 2024-10-12 RX ADMIN — SODIUM CHLORIDE 1000 MG: 900 INJECTION INTRAVENOUS at 12:28

## 2024-10-12 RX ADMIN — HUMAN INSULIN 2 UNITS: 100 INJECTION, SOLUTION SUBCUTANEOUS at 17:56

## 2024-10-12 RX ADMIN — HYDROCODONE BITARTRATE AND ACETAMINOPHEN 1 TABLET: 7.5; 325 TABLET ORAL at 14:48

## 2024-10-12 RX ADMIN — INSULIN LISPRO 2 UNITS: 100 INJECTION, SOLUTION INTRAVENOUS; SUBCUTANEOUS at 12:28

## 2024-10-12 RX ADMIN — QUETIAPINE FUMARATE 12.5 MG: 25 TABLET ORAL at 19:50

## 2024-10-12 RX ADMIN — ATORVASTATIN CALCIUM 80 MG: 40 TABLET, FILM COATED ORAL at 08:20

## 2024-10-12 RX ADMIN — IPRATROPIUM BROMIDE AND ALBUTEROL SULFATE 3 ML: 2.5; .5 SOLUTION RESPIRATORY (INHALATION) at 18:29

## 2024-10-12 RX ADMIN — HUMAN INSULIN 3 UNITS: 100 INJECTION, SOLUTION SUBCUTANEOUS at 11:41

## 2024-10-12 RX ADMIN — IPRATROPIUM BROMIDE AND ALBUTEROL SULFATE 3 ML: 2.5; .5 SOLUTION RESPIRATORY (INHALATION) at 12:57

## 2024-10-12 RX ADMIN — ACETAMINOPHEN 650 MG: 325 TABLET, FILM COATED ORAL at 19:50

## 2024-10-12 RX ADMIN — ISOSORBIDE MONONITRATE 60 MG: 60 TABLET, EXTENDED RELEASE ORAL at 08:21

## 2024-10-12 RX ADMIN — ASPIRIN 81 MG 81 MG: 81 TABLET ORAL at 08:21

## 2024-10-12 RX ADMIN — DIPHENHYDRAMINE HYDROCHLORIDE 25 MG: 50 INJECTION INTRAMUSCULAR; INTRAVENOUS at 23:56

## 2024-10-12 RX ADMIN — LINAGLIPTIN 5 MG: 5 TABLET, FILM COATED ORAL at 08:20

## 2024-10-12 RX ADMIN — INSULIN GLARGINE 5 UNITS: 100 INJECTION, SOLUTION SUBCUTANEOUS at 08:23

## 2024-10-12 NOTE — PLAN OF CARE
Goal Outcome Evaluation:  Plan of Care Reviewed With: patient        Progress: declining  Outcome Evaluation: patient completed AAROM B UE/LE exercises with max cues for attention to task and technique with all exercises. increased time and effort to complete HEP. Deferred EOB/OOB activity due to safety concerns and poor command following. Recommend IRF at D/C forb best functional outcome.

## 2024-10-12 NOTE — PLAN OF CARE
Problem: Adult Inpatient Plan of Care  Goal: Plan of Care Review  Outcome: Progressing  Goal: Patient-Specific Goal (Individualized)  Outcome: Progressing  Goal: Absence of Hospital-Acquired Illness or Injury  Outcome: Progressing  Intervention: Identify and Manage Fall Risk  Recent Flowsheet Documentation  Taken 10/12/2024 0200 by Luz Marina Meraz RN  Safety Promotion/Fall Prevention:   nonskid shoes/slippers when out of bed   safety round/check completed  Taken 10/12/2024 0000 by Luz Marina Meraz RN  Safety Promotion/Fall Prevention:   nonskid shoes/slippers when out of bed   safety round/check completed  Taken 10/11/2024 2130 by Luz Marina Meraz RN  Safety Promotion/Fall Prevention:   nonskid shoes/slippers when out of bed   safety round/check completed   assistive device/personal items within reach   clutter free environment maintained   fall prevention program maintained   lighting adjusted   room organization consistent  Taken 10/11/2024 1920 by Luz Marina Meraz RN  Safety Promotion/Fall Prevention:   nonskid shoes/slippers when out of bed   safety round/check completed  Intervention: Prevent Skin Injury  Recent Flowsheet Documentation  Taken 10/12/2024 0200 by Luz Marina Meraz RN  Body Position: weight shifting  Skin Protection:   drying agents applied   pectin skin barriers applied  Taken 10/12/2024 0000 by Luz Marina Meraz RN  Body Position: weight shifting  Skin Protection:   adhesive use limited   incontinence pads utilized  Taken 10/11/2024 2130 by Luz Marina Meraz RN  Body Position: weight shifting  Skin Protection:   adhesive use limited   incontinence pads utilized  Taken 10/11/2024 1920 by Luz Marina Meraz RN  Body Position: weight shifting  Skin Protection:   adhesive use limited   incontinence pads utilized  Goal: Optimal Comfort and Wellbeing  Outcome: Progressing  Intervention: Provide Person-Centered Care  Recent Flowsheet Documentation  Taken 10/11/2024 2130 by Luz Marina Meraz RN  Trust Relationship/Rapport:   choices  provided   care explained   questions encouraged  Goal: Readiness for Transition of Care  Outcome: Progressing     Problem: Pneumonia  Goal: Fluid Balance  Outcome: Progressing  Goal: Absence of Infection Signs and Symptoms  Outcome: Progressing  Goal: Effective Oxygenation and Ventilation  Outcome: Progressing  Intervention: Promote Airway Secretion Clearance  Recent Flowsheet Documentation  Taken 10/12/2024 0200 by Luz Marina Meraz RN  Activity Management: activity minimized  Cough And Deep Breathing: done independently per patient  Taken 10/12/2024 0000 by Luz Marina Meraz RN  Activity Management: activity minimized  Cough And Deep Breathing: done independently per patient  Taken 10/11/2024 2130 by Lu zMarina Meraz RN  Activity Management: activity minimized  Cough And Deep Breathing: done independently per patient  Taken 10/11/2024 1920 by Luz Marina Meraz RN  Activity Management: activity minimized  Cough And Deep Breathing: done independently per patient  Intervention: Optimize Oxygenation and Ventilation  Recent Flowsheet Documentation  Taken 10/12/2024 0200 by Luz Marina Meraz RN  Head of Bed (HOB) Positioning: HOB elevated  Taken 10/12/2024 0000 by Luz Marina Meraz RN  Head of Bed (HOB) Positioning: HOB elevated  Taken 10/11/2024 2130 by Luz Marina Meraz RN  Head of Bed (HOB) Positioning: HOB elevated  Taken 10/11/2024 1920 by Luz Marina Meraz RN  Head of Bed (HOB) Positioning: HOB elevated     Problem: Skin Injury Risk Increased  Goal: Skin Health and Integrity  Outcome: Progressing  Intervention: Optimize Skin Protection  Recent Flowsheet Documentation  Taken 10/12/2024 0200 by Luz Marina Meraz RN  Activity Management: activity minimized  Pressure Reduction Techniques: frequent weight shift encouraged  Head of Bed (HOB) Positioning: HOB elevated  Pressure Reduction Devices:   pressure-redistributing mattress utilized   positioning supports utilized  Skin Protection:   drying agents applied   pectin skin barriers applied  Taken  10/12/2024 0000 by Luz Marina Meraz RN  Activity Management: activity minimized  Pressure Reduction Techniques: frequent weight shift encouraged  Head of Bed (HOB) Positioning: HOB elevated  Pressure Reduction Devices:   pressure-redistributing mattress utilized   positioning supports utilized  Skin Protection:   adhesive use limited   incontinence pads utilized  Taken 10/11/2024 2130 by Luz Marina Meraz RN  Activity Management: activity minimized  Pressure Reduction Techniques:   frequent weight shift encouraged   weight shift assistance provided  Head of Bed (HOB) Positioning: HOB elevated  Pressure Reduction Devices:   pressure-redistributing mattress utilized   positioning supports utilized  Skin Protection:   adhesive use limited   incontinence pads utilized  Taken 10/11/2024 1920 by Luz Marina Meraz RN  Activity Management: activity minimized  Pressure Reduction Techniques: frequent weight shift encouraged  Head of Bed (HOB) Positioning: HOB elevated  Pressure Reduction Devices:   pressure-redistributing mattress utilized   positioning supports utilized  Skin Protection:   adhesive use limited   incontinence pads utilized     Problem: Fall Injury Risk  Goal: Absence of Fall and Fall-Related Injury  Outcome: Progressing  Intervention: Identify and Manage Contributors  Recent Flowsheet Documentation  Taken 10/11/2024 2130 by Luz Marina Meraz RN  Medication Review/Management: medications reviewed  Intervention: Promote Injury-Free Environment  Recent Flowsheet Documentation  Taken 10/12/2024 0200 by Luz Marina Meraz RN  Safety Promotion/Fall Prevention:   nonskid shoes/slippers when out of bed   safety round/check completed  Taken 10/12/2024 0000 by Luz Marina Meraz RN  Safety Promotion/Fall Prevention:   nonskid shoes/slippers when out of bed   safety round/check completed  Taken 10/11/2024 2130 by Luz Marina Meraz RN  Safety Promotion/Fall Prevention:   nonskid shoes/slippers when out of bed   safety round/check completed    assistive device/personal items within reach   clutter free environment maintained   fall prevention program maintained   lighting adjusted   room organization consistent  Taken 10/11/2024 1920 by Luz Marina Meraz, RN  Safety Promotion/Fall Prevention:   nonskid shoes/slippers when out of bed   safety round/check completed   Goal Outcome Evaluation:

## 2024-10-12 NOTE — PROGRESS NOTES
[facility] Medicine Services  PROGRESS NOTE    Patient Name: Arminda Krishnan  : 1945  MRN: [id number]    Date of Admission: 10/9/2024  Primary Care Physician: Aiden Spencer MD    Subjective   Subjective     CC: Follow-up hyperglycemia    HPI: Patient seen and examined this morning.  Her only complaint is pain in her left jaw.  Family at bedside reports this is a chronic issue they think from her dentures.    Objective   Objective     Vital Signs:   Temp:  [98.2 °F (36.8 °C)-99.9 °F (37.7 °C)] 98.4 °F (36.9 °C)  Heart Rate:  [] 88  Resp:  [14-20] 18  BP: (128-154)/(52-80) 139/69     Physical Exam:  Physical Exam  Constitutional:       General: She is not in acute distress.  HENT:      Mouth/Throat:      Mouth: Mucous membranes are moist.      Pharynx: Oropharynx is clear. No posterior oropharyngeal erythema.      Comments: No tenderness to palpation along the jaw or throat, no swelling  Cardiovascular:      Rate and Rhythm: Normal rate and regular rhythm.      Heart sounds: Normal heart sounds.   Pulmonary:      Effort: Pulmonary effort is normal. No respiratory distress.      Breath sounds: Normal breath sounds.   Abdominal:      Palpations: Abdomen is soft.      Tenderness: There is no abdominal tenderness.   Musculoskeletal:      Right lower leg: No edema.      Left lower leg: No edema.   Neurological:      General: No focal deficit present.      Mental Status: She is alert. Mental status is at baseline.   Psychiatric:         Mood and Affect: Mood normal.         Thought Content: Thought content normal.        Results Reviewed:  LAB RESULTS:      Lab 10/10/24  2223 10/10/24  1745 10/10/24  1236 10/09/24  1842   WBC  --   --  10.17 8.68   HEMOGLOBIN 8.2* 8.3* 7.5* 7.9*   HEMATOCRIT 25.8* 27.6* 23.7* 25.0*   PLATELETS  --   --  296 301   NEUTROS ABS  --   --  7.33* 6.31   IMMATURE GRANS (ABS)  --   --  0.16* 0.20*   LYMPHS ABS  --   --  1.36 1.13   MONOS ABS   --   --  1.08* 0.83   EOS ABS  --   --  0.14 0.13   MCV  --   --  88.8 88.7   CRP  --   --   --  13.05*   PROCALCITONIN  --   --   --  0.12   LACTATE  --   --   --  0.9   HSTROP T  --   --   --  39*         Lab 10/10/24  1236 10/09/24  1842   SODIUM 141 136   POTASSIUM 4.1 4.4   CHLORIDE 110* 103   CO2 18.0* 19.0*   ANION GAP 13.0 14.0   BUN 45* 56*   CREATININE 2.88* 3.30*   EGFR 15.9* 13.5*   GLUCOSE 156* 359*   CALCIUM 9.1 9.5   MAGNESIUM  --  1.8   PHOSPHORUS  --  3.5   TSH  --  0.465         Lab 10/10/24  1236 10/09/24  1842   TOTAL PROTEIN 7.0 7.4   ALBUMIN 3.1* 3.3*   GLOBULIN 3.9 4.1   ALT (SGPT) 15 18   AST (SGOT) 21 21   BILIRUBIN 0.2 0.3   ALK PHOS 70 78   LIPASE  --  23         Lab 10/09/24  1842   PROBNP 719.5   HSTROP T 39*             Lab 10/09/24  1842   IRON 14*  14*   IRON SATURATION (TSAT) 7*   TIBC 195*   TRANSFERRIN 131*   FERRITIN 766.80*   FOLATE 19.00   VITAMIN B 12 1,262*         Lab 10/09/24  1846   FIO2 21   CARBOXYHEMOGLOBIN (VENOUS) 1.6     Brief Urine Lab Results  (Last result in the past 365 days)        Color   Clarity   Blood   Leuk Est   Nitrite   Protein   CREAT   Urine HCG        10/09/24 2058 Yellow   Cloudy   Large (3+)   Moderate (2+)   Negative   100 mg/dL (2+)                   Microbiology Results Abnormal       Procedure Component Value - Date/Time    Blood Culture - Blood, Arm, Right [[id number]]  (Normal) Collected: 10/09/24 2159    Lab Status: Preliminary result Specimen: Blood from Arm, Right Updated: 10/11/24 2232     Blood Culture No growth at 2 days    Narrative:      Less than seven (7) mL's of blood was collected.  Insufficient quantity may yield false negative results.    Blood Culture - Blood, Hand, Left [[id number]]  (Normal) Collected: 10/09/24 1842    Lab Status: Preliminary result Specimen: Blood from Hand, Left Updated: 10/11/24 1945     Blood Culture No growth at 2 days    Narrative:      Less than seven (7) mL's of blood was collected.  Insufficient  quantity may yield false negative results.    Urine Culture - Urine, Urine, Clean Catch [[id number]]  (Normal) Collected: 10/09/24 2058    Lab Status: Final result Specimen: Urine, Clean Catch Updated: 10/11/24 1000     Urine Culture No growth    Legionella Antigen, Urine - Urine, Urine, Clean Catch [[id number]]  (Normal) Collected: 10/09/24 2058    Lab Status: Final result Specimen: Urine, Clean Catch Updated: 10/10/24 0922     LEGIONELLA ANTIGEN, URINE Negative    S. Pneumo Ag Urine or CSF - Urine, Urine, Clean Catch [[id number]]  (Normal) Collected: 10/09/24 2058    Lab Status: Final result Specimen: Urine, Clean Catch Updated: 10/10/24 0922     Strep Pneumo Ag Negative    COVID PRE-OP / PRE-PROCEDURE SCREENING ORDER (NO ISOLATION) - Swab, Nasopharynx [[id number]]  (Normal) Collected: 10/09/24 1843    Lab Status: Final result Specimen: Swab from Nasopharynx Updated: 10/09/24 1958    Narrative:      The following orders were created for panel order COVID PRE-OP / PRE-PROCEDURE SCREENING ORDER (NO ISOLATION) - Swab, Nasopharynx.  Procedure                               Abnormality         Status                     ---------                               -----------         ------                     COVID-19, FLU A/B, RSV P...[[id number]]  Normal              Final result                 Please view results for these tests on the individual orders.    COVID-19, FLU A/B, RSV PCR 1 HR TAT - Swab, Nasopharynx [[id number]]  (Normal) Collected: 10/09/24 1843    Lab Status: Final result Specimen: Swab from Nasopharynx Updated: 10/09/24 1958     COVID19 Not Detected     Influenza A PCR Not Detected     Influenza B PCR Not Detected     RSV, PCR Not Detected    Narrative:      Fact sheet for providers: https://www.fda.gov/media/270567/download    Fact sheet for patients: https://www.fda.gov/media/778188/download    Test performed by PCR.            FL Video Swallow With Speech Single Contrast    Result Date: 10/10/2024  FL VIDEO  SWALLOW W SPEECH SINGLE-CONTRAST Date of Exam: 10/10/2024 1:51 PM EDT Indication: aspiration.   Comparison: None available. Technique:   The speech pathologist administered food and/or liquid mixed with barium to the patient with cine/video imaging.  Imaging assistance was provided to the speech pathologist and an image was saved. Fluoroscopic Time: 48-second Number of Images: 5 fluoroscopic series Findings: Fluoroscopy provided for speech therapy evaluation of swallowing. No aspiration or penetration. Prominent cricopharyngeus.     Impression: Impression: Fluoroscopy provided for speech therapy evaluation of swallowing. No aspiration or penetration. Prominent cricopharyngeus. Please see speech therapy report for full findings and recommendations. Electronically Signed: Antoni Faith MD  10/10/2024 2:34 PM EDT  Workstation ID: [id number]     Results for orders placed during the hospital encounter of 07/15/24    Adult Transthoracic Echo Complete W/ Cont if Necessary Per Protocol    Interpretation Summary    Left ventricular systolic function is normal. Calculated left ventricular EF = 63.2%    Estimated right ventricular systolic pressure from tricuspid regurgitation is normal (<35 mmHg).    Normal left atrial size and volume noted.    There is calcification of the aortic valve. Mild to moderate aortic valve regurgitation is present.      Current medications:  Scheduled Meds:aspirin, 81 mg, Oral, Daily  atorvastatin, 80 mg, Oral, Daily  cefTRIAXone, 1,000 mg, Intravenous, Q24H  clopidogrel, 75 mg, Oral, Daily  famotidine, 10 mg, Oral, Every Other Day  insulin glargine, 5 Units, Subcutaneous, Daily  insulin regular, 2-7 Units, Subcutaneous, TID With Meals  ipratropium-albuterol, 3 mL, Nebulization, 4x Daily - RT  isosorbide mononitrate, 60 mg, Oral, Daily  linagliptin, 5 mg, Oral, Daily  predniSONE, 5 mg, Oral, Daily With Breakfast  QUEtiapine, 12.5 mg, Oral, Nightly  sodium chloride, 10 mL, Intravenous,  Q12H      Continuous Infusions:     PRN Meds:.  acetaminophen **OR** acetaminophen **OR** acetaminophen    senna-docusate sodium **AND** polyethylene glycol **AND** bisacodyl **AND** bisacodyl    Calcium Replacement - Follow Nurse / BPA Driven Protocol    dextrose    dextrose    diphenhydrAMINE    glucagon (human recombinant)    HYDROcodone-acetaminophen    influenza vaccine    Magnesium Low Dose Replacement - Follow Nurse / BPA Driven Protocol    melatonin    Phosphorus Replacement - Follow Nurse / BPA Driven Protocol    Potassium Replacement - Follow Nurse / BPA Driven Protocol    sodium chloride    sodium chloride    Assessment & Plan   Assessment & Plan     Active Hospital Problems    Diagnosis  POA    **LLL pneumonia [J18.9]  Yes    Pneumonia [J18.9]  Yes    HTN (hypertension) [I10]  Unknown    Hyperglycemia [R73.9]  Yes    History of CVA (cerebrovascular accident) [Z86.73]  Not Applicable    CKD (chronic kidney disease) stage 4, GFR 15-29 ml/min [N18.4]  Yes    Degenerative disc disease, lumbar [M51.369]  Yes      Resolved Hospital Problems   No resolved problems to display.        Brief Hospital Course to date:  Arminda Krishnan is a 79 y.o. female with history of mild coronary impairment, prior CVA, DDD on steroids, type 2 diabetes who presented to [facility] ED with hyperglycemia, workup concerning for possible pneumonia and UTI     Suspected pneumonia  -Chest x-ray shows  left ower lobe pneumonia, likely secondary to aspiration  -blood and sputum cultures are currently NGTD, Legionella and strep pneumo urinary antigens are both negative  -Continue antibiotics currently on IV Rocephin, plan for 5 days of antibiotics end date 10/14  -SLP has evaluated, okay for diet  -She remains on room air, continue DuoNebs     Suspected UTI  -UA with 4+ bacteria, leukocytosis  -Urine cultures NGTD, continue antibiotics as above.    MICHAEL on CKD stage III  -Baseline creatinine~2.2-2.6, was 3.30 on presentation, improving on last  check  -Likely prerenal, status post gentle hydration, patient p.o. intake improving  - Will recheck tomorrow a.m.     Poorly controlled type 2 diabetes with A1c 11% and hyperglycemia in the setting of chronic steroids  -FSBG's reviewed and are much improved  -Continue SSI, will add basal Lantus 5 units daily and adjust as warranted.  - Will start mealtime insulin 2 units, adjust as needed     Anemia  -no signs of blood loss  -Iron studies most consistent with anemia of chronic disease  - Iron 14, will start iron supplementation  - Recheck CBC in a.m.     History of CVA  Hyperlipidemia  -Continue aspirin, statin, Plavix     Acute encephalopathy superimposed on MCI  -Continue antibiotics as above  -Start low-dose Seroquel tonight 12.5 mg nightly     DDD  -Continue chronic steroids    Expected Discharge Location and Transportation: [facility]  Expected Discharge   Expected Discharge Date: 10/14/2024; Expected Discharge Time:      VTE Prophylaxis:  Mechanical VTE prophylaxis orders are present.         AM-PAC 6 Clicks Score (PT): 12 (10/12/24 0934)    CODE STATUS:   Code Status and Medical Interventions: No CPR (Do Not Attempt to Resuscitate); Limited Support; No intubation (DNI)   Ordered at: 10/10/24 0002     Medical Intervention Limits:    No intubation (DNI)     Level Of Support Discussed With:    Next of Kin (If No Surrogate)     Code Status (Patient has no pulse and is not breathing):    No CPR (Do Not Attempt to Resuscitate)     Medical Interventions (Patient has pulse or is breathing):    Limited Support       Fide Zhou MD  10/12/24

## 2024-10-12 NOTE — PLAN OF CARE
Problem: Adult Inpatient Plan of Care  Goal: Plan of Care Review  Outcome: Progressing  Goal: Patient-Specific Goal (Individualized)  Outcome: Progressing  Goal: Absence of Hospital-Acquired Illness or Injury  Outcome: Progressing  Intervention: Identify and Manage Fall Risk  Recent Flowsheet Documentation  Taken 10/12/2024 1227 by Isabela Benjamin RN  Safety Promotion/Fall Prevention: activity supervised  Taken 10/12/2024 1000 by Isabela Benjamin RN  Safety Promotion/Fall Prevention: activity supervised  Taken 10/12/2024 0800 by Isabela Benjamin RN  Safety Promotion/Fall Prevention:   activity supervised   safety round/check completed   nonskid shoes/slippers when out of bed  Intervention: Prevent Skin Injury  Recent Flowsheet Documentation  Taken 10/12/2024 1227 by Isabela Benjamin RN  Body Position: position changed independently  Skin Protection:   skin sealant/moisture barrier applied   transparent dressing maintained  Taken 10/12/2024 1000 by Isabela Benjamin RN  Body Position: position changed independently  Skin Protection:   drying agents applied   skin sealant/moisture barrier applied   transparent dressing maintained  Taken 10/12/2024 0800 by Isabela Benjamin RN  Body Position: position changed independently  Skin Protection:   drying agents applied   incontinence pads utilized  Goal: Optimal Comfort and Wellbeing  Outcome: Progressing  Goal: Readiness for Transition of Care  Outcome: Progressing     Problem: Pneumonia  Goal: Fluid Balance  Outcome: Progressing  Goal: Absence of Infection Signs and Symptoms  Outcome: Progressing  Goal: Effective Oxygenation and Ventilation  Outcome: Progressing  Intervention: Promote Airway Secretion Clearance  Recent Flowsheet Documentation  Taken 10/12/2024 1227 by Isabela Benjamin RN  Activity Management: activity minimized  Taken 10/12/2024 1000 by Isabela Benjamin RN  Activity Management: activity minimized  Taken 10/12/2024 0800 by Isabela Benjamin RN  Activity Management: activity  minimized     Problem: Skin Injury Risk Increased  Goal: Skin Health and Integrity  Outcome: Progressing  Intervention: Optimize Skin Protection  Recent Flowsheet Documentation  Taken 10/12/2024 1227 by Isabela Benjamin RN  Activity Management: activity minimized  Pressure Reduction Techniques: frequent weight shift encouraged  Pressure Reduction Devices: pressure-redistributing mattress utilized  Skin Protection:   skin sealant/moisture barrier applied   transparent dressing maintained  Taken 10/12/2024 1000 by Isabela Benjamin RN  Activity Management: activity minimized  Pressure Reduction Techniques: frequent weight shift encouraged  Pressure Reduction Devices: pressure-redistributing mattress utilized  Skin Protection:   drying agents applied   skin sealant/moisture barrier applied   transparent dressing maintained  Taken 10/12/2024 0800 by Isabela Benjamin RN  Activity Management: activity minimized  Pressure Reduction Techniques: frequent weight shift encouraged  Pressure Reduction Devices: pressure-redistributing mattress utilized  Skin Protection:   drying agents applied   incontinence pads utilized     Problem: Fall Injury Risk  Goal: Absence of Fall and Fall-Related Injury  Outcome: Progressing  Intervention: Promote Injury-Free Environment  Recent Flowsheet Documentation  Taken 10/12/2024 1227 by Isabela Benjamin RN  Safety Promotion/Fall Prevention: activity supervised  Taken 10/12/2024 1000 by Isabela Benjamin RN  Safety Promotion/Fall Prevention: activity supervised  Taken 10/12/2024 0800 by Isabela Benjamin RN  Safety Promotion/Fall Prevention:   activity supervised   safety round/check completed   nonskid shoes/slippers when out of bed   Goal Outcome Evaluation:

## 2024-10-12 NOTE — THERAPY TREATMENT NOTE
Patient Name: Arminda Krishnan  : 1945    MRN: [id number]                              Today's Date: 10/12/2024       Admit Date: 10/9/2024    Visit Dx:     ICD-10-CM ICD-9-CM   1. Uncontrolled type 2 diabetes mellitus with hyperglycemia  E11.65 250.02   2. Acute UTI (urinary tract infection)  N39.0 599.0   3. Pneumonia of left lower lobe due to infectious organism  J18.9 486   4. Dysphagia, unspecified type  R13.10 787.20   5. Cerebral vascular disease  I67.9 437.9   6. Degeneration of intervertebral disc of lumbar region, unspecified whether pain present  M51.369 722.52   7. Spinal stenosis, lumbar region, with neurogenic claudication  M48.062 724.03   8. Aspiration pneumonia of left lower lobe, unspecified aspiration pneumonia type  J69.0 507.0   9. Dehydration  E86.0 276.51     Patient Active Problem List   Diagnosis    Cerebral vascular disease    Degenerative disc disease, lumbar    Degenerative disc disease, cervical    Spinal stenosis, lumbar region, with neurogenic claudication    Tobacco abuse    Chronic anemia    CKD (chronic kidney disease) stage 4, GFR 15-29 ml/min    Hyperglycemia    History of CVA (cerebrovascular accident)    Type 2 diabetes mellitus with hyperglycemia, without long-term current use of insulin    Disorientation    Dehydration    LLL pneumonia    HTN (hypertension)    Pneumonia     Past Medical History:   Diagnosis Date    Anemia     Arthritis     Back problem     CAD (coronary artery disease)     Cancer     Right breast    Chronic back pain     Chronically on opiate therapy     Depression     Diabetes mellitus     DX 14 years ago- checks fsbs weekly    Fibromyalgia     Gastroparesis     GERD (gastroesophageal reflux disease)     Headache     emotional/tension    History of transfusion     [facility]    HTN (hypertension)     Hypercholesteremia     IBS (irritable bowel syndrome)     Incontinence of urine     urgency    Migraine headache     Myalgia and myositis      Peripheral neuropathy     Sleep apnea     does not wear cpap    UTI (urinary tract infection)      Past Surgical History:   Procedure Laterality Date    APPENDECTOMY      ARTERIOGRAM MESENTERIC N/A 6/16/2022    Procedure: DIAGNOSTIC ARTERIOGRAM WITH CELIAC STENT PLACEMENT;  Surgeon: Neo Neil MD;  Location: [facility];  Service: Vascular;  Laterality: N/A;  FLUORO: 16 MIN  DOSE: 2384 MGY  CONTRAST:  20 ML    BACK SURGERY      5x per patient    BRAIN TUMOR EXCISION  1988    BREAST BIOPSY      CARPAL TUNNEL RELEASE Bilateral     CHOLECYSTECTOMY      COLONOSCOPY      2015    CRANIOTOMY FOR TUMOR      EYE SURGERY      bilateral cataracts removed    HEMORRHOIDECTOMY      LUMBAR FUSION N/A 01/04/2017    Procedure: LUMBAR LAMINECTOMY AND DECOMRESSION  L3 AND L4;  Surgeon: Nishant Bhatti MD;  Location: [facility];  Service:     TOTAL ABDOMINAL HYSTERECTOMY      TRIGGER FINGER RELEASE        General Information       Row Name 10/12/24 0927          Physical Therapy Time and Intention    Document Type therapy note (daily note)  -AS     Mode of Treatment physical therapy  -AS       Row Name 10/12/24 0927          General Information    Patient Profile Reviewed yes  -AS     Existing Precautions/Restrictions fall  confusion, decreased safety awareness  -AS     Barriers to Rehab medically complex;cognitive status  -AS       Row Name 10/12/24 0927          Cognition    Orientation Status (Cognition) unable/difficult to assess  responded to name but did not state  -AS       Row Name 10/12/24 0927          Safety Issues/Impairments Affecting Functional Mobility    Safety Issues Affecting Function (Mobility) ability to follow commands;awareness of need for assistance;insight into deficits/self-awareness;judgment;safety precaution awareness;safety precautions follow-through/compliance  -AS     Impairments Affecting Function (Mobility) balance;cognition;coordination;endurance/activity  tolerance;strength;pain;postural/trunk control  -AS     Cognitive Impairments, Mobility Safety/Performance attention;awareness, need for assistance;insight into deficits/self-awareness;judgment;problem-solving/reasoning;safety precaution awareness;safety precaution follow-through;sequencing abilities  -AS     Comment, Safety Issues/Impairments (Mobility) poor command following, max verbal cues for technique with all exercises  -AS               User Key  (r) = Recorded By, (t) = Taken By, (c) = Cosigned By      Initials Name Provider Type    AS Nicci Wyatt PTA Physical Therapist Assistant                   Mobility       Row Name 10/12/24 0928          Bed Mobility    Comment, (Bed Mobility) patient refused EOB/OOB activity  -AS       Row Name 10/12/24 0928          Transfers    Comment, (Transfers) deferred due to poor command following and safety concerns  -AS       Row Name 10/12/24 0928          Bed-Chair Transfer    Bed-Chair [place] (Transfers) unable to assess  -AS       Row Name 10/12/24 0928          Sit-Stand Transfer    Sit-Stand [place] (Transfers) unable to assess  -AS       Row Name 10/12/24 0928          Gait/Stairs (Locomotion)    [place] Level (Gait) unable to assess  -AS               User Key  (r) = Recorded By, (t) = Taken By, (c) = Cosigned By      Initials Name Provider Type    AS Nicci Wyatt PTA Physical Therapist Assistant                   Obj/Interventions       Row Name 10/12/24 0929          Motor Skills    Therapeutic Exercise shoulder;knee;ankle;hip  -AS       Row Name 10/12/24 0929          Shoulder (Therapeutic Exercise)    Shoulder AAROM (Therapeutic Exercise) bilateral;flexion;extension;aBduction;aDduction;supine;10 repetitions  bicep curls, max cues for attention to task and technique  -AS       Row Name 10/12/24 0929          Hip (Therapeutic Exercise)    Hip (Therapeutic Exercise) AAROM (active assistive range of motion)  -AS     Hip AAROM  (Therapeutic Exercise) bilateral;aBduction;aDduction;external rotation;internal rotation;supine;10 repetitions  -AS       Row Name 10/12/24 0929          Knee (Therapeutic Exercise)    Knee (Therapeutic Exercise) AAROM (active assistive range of motion)  -AS     Knee AAROM (Therapeutic Exercise) bilateral;flexion;extension;supine;10 repetitions  -AS       Row Name 10/12/24 0929          Ankle (Therapeutic Exercise)    Ankle (Therapeutic Exercise) AAROM (active assistive range of motion)  -AS     Ankle AAROM (Therapeutic Exercise) bilateral;dorsiflexion;plantarflexion;supine;10 repetitions  -AS               User Key  (r) = Recorded By, (t) = Taken By, (c) = Cosigned By      Initials Name Provider Type    AS Nicci Wyatt PTA Physical Therapist Assistant                   Goals/Plan    No documentation.                  Clinical Impression       Row Name 10/12/24 0930          Pain Scale: FACES Pre/Post-Treatment    Pain: FACES Scale, Pretreatment 0-->no hurt  -AS     Posttreatment Pain Rating 0-->no hurt  -AS       Row Name 10/12/24 0930          Plan of Care Review    Plan of Care Reviewed With patient  -AS     Progress declining  -AS     Outcome Evaluation patient completed AAROM B UE/LE exercises with max cues for attention to task and technique with all exercises. increased time and effort to complete HEP. Deferred EOB/OOB activity due to safety concerns and poor command following. Recommend IRF at D/C forb best functional outcome.  -AS       Row Name 10/12/24 0930          Positioning and Restraints    Pre-Treatment Position in bed  -AS     Post Treatment Position bed  -AS     In Bed notified nsg;supine;call light within reach;encouraged to call for assist;exit alarm on;side rails up x3  -AS               User Key  (r) = Recorded By, (t) = Taken By, (c) = Cosigned By      Initials Name Provider Type    AS Nicci Wyatt PTA Physical Therapist Assistant                   Outcome Measures       [facility]  Name 10/12/24 0934          How much help from another person do you currently need...    Turning from your back to your side while in flat bed without using bedrails? 2  -AS     Moving from lying on back to sitting on the side of a flat bed without bedrails? 2  -AS     Moving to and from a bed to a chair (including a wheelchair)? 2  -AS     Standing up from a chair using your arms (e.g., wheelchair, bedside chair)? 2  -AS     Climbing 3-5 steps with a railing? 2  -AS     To walk in hospital room? 2  -AS     AM-PAC 6 Clicks Score (PT) 12  -AS     Highest Level of Mobility Goal 4 --> Transfer to chair/commode  -AS       Row Name 10/12/24 0934          Functional Assessment    Outcome Measure Options AM-PAC 6 Clicks Basic Mobility (PT)  -AS               User Key  (r) = Recorded By, (t) = Taken By, (c) = Cosigned By      Initials Name Provider Type    AS Nicci Wyatt PTA Physical Therapist Assistant                                 Physical Therapy Education        No education to display                  PT Recommendation and Plan     Progress: declining  Outcome Evaluation: patient completed AAROM B UE/LE exercises with max cues for attention to task and technique with all exercises. increased time and effort to complete HEP. Deferred EOB/OOB activity due to safety concerns and poor command following. Recommend IRF at D/C forb best functional outcome.     Time Calculation:         PT Charges       Row Name 10/12/24 0934             Time Calculation    Start Time 0903  -AS      PT Received On 10/12/24  -AS      PT Goal Re-Cert Due Date 10/20/24  -AS         Timed Charges    04009 - PT Therapeutic Exercise Minutes 23  -AS         Total Minutes    Timed Charges Total Minutes 23  -AS       Total Minutes 23  -AS                User Key  (r) = Recorded By, (t) = Taken By, (c) = Cosigned By      Initials Name Provider Type    AS Nicci Wyatt PTA Physical Therapist Assistant                  Therapy Charges  for Today       Code Description Service Date Service Provider Modifiers Qty    25786448942 HC PT THER PROC EA 15 MIN 10/12/2024 Nicci Wyatt, PTA GP 2            PT G-Codes  Outcome Measure Options: AM-PAC 6 Clicks Basic Mobility (PT)  AM-PAC 6 Clicks Score (PT): 12  AM-PAC 6 Clicks Score (OT): 6       Nicci Wyatt PTA  10/12/2024

## 2024-10-13 LAB
ABO GROUP BLD: NORMAL
ANION GAP SERPL CALCULATED.3IONS-SCNC: 13 MMOL/L (ref 5–15)
BLD GP AB SCN SERPL QL: NEGATIVE
BUN SERPL-MCNC: 28 MG/DL (ref 8–23)
BUN/CREAT SERPL: 11.4 (ref 7–25)
CALCIUM SPEC-SCNC: 9.5 MG/DL (ref 8.6–10.5)
CHLORIDE SERPL-SCNC: 106 MMOL/L (ref 98–107)
CO2 SERPL-SCNC: 20 MMOL/L (ref 22–29)
CREAT SERPL-MCNC: 2.45 MG/DL (ref 0.57–1)
DEPRECATED RDW RBC AUTO: 49.9 FL (ref 37–54)
EGFRCR SERPLBLD CKD-EPI 2021: 19.4 ML/MIN/1.73
ERYTHROCYTE [DISTWIDTH] IN BLOOD BY AUTOMATED COUNT: 15.5 % (ref 12.3–15.4)
GLUCOSE BLDC GLUCOMTR-MCNC: 166 MG/DL (ref 70–130)
GLUCOSE BLDC GLUCOMTR-MCNC: 181 MG/DL (ref 70–130)
GLUCOSE BLDC GLUCOMTR-MCNC: 189 MG/DL (ref 70–130)
GLUCOSE BLDC GLUCOMTR-MCNC: 194 MG/DL (ref 70–130)
GLUCOSE SERPL-MCNC: 181 MG/DL (ref 65–99)
HCT VFR BLD AUTO: 21.8 % (ref 34–46.6)
HCT VFR BLD AUTO: 22.6 % (ref 34–46.6)
HGB BLD-MCNC: 7 G/DL (ref 12–15.9)
HGB BLD-MCNC: 7.1 G/DL (ref 12–15.9)
MAGNESIUM SERPL-MCNC: 1.5 MG/DL (ref 1.6–2.4)
MCH RBC QN AUTO: 27.8 PG (ref 26.6–33)
MCHC RBC AUTO-ENTMCNC: 31.4 G/DL (ref 31.5–35.7)
MCV RBC AUTO: 88.6 FL (ref 79–97)
PLATELET # BLD AUTO: 282 10*3/MM3 (ref 140–450)
PMV BLD AUTO: 11.5 FL (ref 6–12)
POTASSIUM SERPL-SCNC: 3.8 MMOL/L (ref 3.5–5.2)
RBC # BLD AUTO: 2.55 10*6/MM3 (ref 3.77–5.28)
RH BLD: POSITIVE
SODIUM SERPL-SCNC: 139 MMOL/L (ref 136–145)
T&S EXPIRATION DATE: NORMAL
WBC NRBC COR # BLD AUTO: 9.23 10*3/MM3 (ref 3.4–10.8)

## 2024-10-13 PROCEDURE — 85018 HEMOGLOBIN: CPT | Performed by: FAMILY MEDICINE

## 2024-10-13 PROCEDURE — 25010000002 MAGNESIUM SULFATE IN D5W 1G/100ML (PREMIX) 1-5 GM/100ML-% SOLUTION: Performed by: FAMILY MEDICINE

## 2024-10-13 PROCEDURE — 94799 UNLISTED PULMONARY SVC/PX: CPT

## 2024-10-13 PROCEDURE — 80048 BASIC METABOLIC PNL TOTAL CA: CPT | Performed by: STUDENT IN AN ORGANIZED HEALTH CARE EDUCATION/TRAINING PROGRAM

## 2024-10-13 PROCEDURE — 94664 DEMO&/EVAL PT USE INHALER: CPT

## 2024-10-13 PROCEDURE — 63710000001 INSULIN GLARGINE PER 5 UNITS: Performed by: INTERNAL MEDICINE

## 2024-10-13 PROCEDURE — 99232 SBSQ HOSP IP/OBS MODERATE 35: CPT | Performed by: FAMILY MEDICINE

## 2024-10-13 PROCEDURE — 63710000001 INSULIN LISPRO (HUMAN) PER 5 UNITS: Performed by: STUDENT IN AN ORGANIZED HEALTH CARE EDUCATION/TRAINING PROGRAM

## 2024-10-13 PROCEDURE — 86923 COMPATIBILITY TEST ELECTRIC: CPT

## 2024-10-13 PROCEDURE — 82948 REAGENT STRIP/BLOOD GLUCOSE: CPT

## 2024-10-13 PROCEDURE — 86850 RBC ANTIBODY SCREEN: CPT | Performed by: FAMILY MEDICINE

## 2024-10-13 PROCEDURE — 86901 BLOOD TYPING SEROLOGIC RH(D): CPT | Performed by: FAMILY MEDICINE

## 2024-10-13 PROCEDURE — 94761 N-INVAS EAR/PLS OXIMETRY MLT: CPT

## 2024-10-13 PROCEDURE — P9016 RBC LEUKOCYTES REDUCED: HCPCS

## 2024-10-13 PROCEDURE — 85027 COMPLETE CBC AUTOMATED: CPT | Performed by: STUDENT IN AN ORGANIZED HEALTH CARE EDUCATION/TRAINING PROGRAM

## 2024-10-13 PROCEDURE — 36430 TRANSFUSION BLD/BLD COMPNT: CPT

## 2024-10-13 PROCEDURE — 83735 ASSAY OF MAGNESIUM: CPT | Performed by: STUDENT IN AN ORGANIZED HEALTH CARE EDUCATION/TRAINING PROGRAM

## 2024-10-13 PROCEDURE — 25010000002 CEFTRIAXONE PER 250 MG: Performed by: INTERNAL MEDICINE

## 2024-10-13 PROCEDURE — 63710000001 INSULIN REGULAR HUMAN PER 5 UNITS: Performed by: INTERNAL MEDICINE

## 2024-10-13 PROCEDURE — 86900 BLOOD TYPING SEROLOGIC ABO: CPT | Performed by: FAMILY MEDICINE

## 2024-10-13 PROCEDURE — 86900 BLOOD TYPING SEROLOGIC ABO: CPT

## 2024-10-13 PROCEDURE — 85014 HEMATOCRIT: CPT | Performed by: FAMILY MEDICINE

## 2024-10-13 RX ORDER — POLYETHYLENE GLYCOL 3350 17 G/17G
17 POWDER, FOR SOLUTION ORAL DAILY
Status: DISCONTINUED | OUTPATIENT
Start: 2024-10-13 | End: 2024-10-22 | Stop reason: HOSPADM

## 2024-10-13 RX ORDER — BISACODYL 10 MG
10 SUPPOSITORY, RECTAL RECTAL DAILY PRN
Status: DISCONTINUED | OUTPATIENT
Start: 2024-10-13 | End: 2024-10-22 | Stop reason: HOSPADM

## 2024-10-13 RX ORDER — MAGNESIUM SULFATE 1 G/100ML
1 INJECTION INTRAVENOUS
Status: COMPLETED | OUTPATIENT
Start: 2024-10-13 | End: 2024-10-13

## 2024-10-13 RX ORDER — AMOXICILLIN 250 MG
2 CAPSULE ORAL 2 TIMES DAILY
Status: DISCONTINUED | OUTPATIENT
Start: 2024-10-13 | End: 2024-10-22 | Stop reason: HOSPADM

## 2024-10-13 RX ORDER — BISACODYL 5 MG/1
5 TABLET, DELAYED RELEASE ORAL DAILY PRN
Status: DISCONTINUED | OUTPATIENT
Start: 2024-10-13 | End: 2024-10-22 | Stop reason: HOSPADM

## 2024-10-13 RX ORDER — HYDROXYZINE HYDROCHLORIDE 10 MG/1
10 TABLET, FILM COATED ORAL ONCE
Status: DISCONTINUED | OUTPATIENT
Start: 2024-10-13 | End: 2024-10-14

## 2024-10-13 RX ADMIN — LINAGLIPTIN 5 MG: 5 TABLET, FILM COATED ORAL at 09:38

## 2024-10-13 RX ADMIN — HUMAN INSULIN 2 UNITS: 100 INJECTION, SOLUTION SUBCUTANEOUS at 09:42

## 2024-10-13 RX ADMIN — MAGNESIUM SULFATE HEPTAHYDRATE 1 G: 10 INJECTION, SOLUTION INTRAVENOUS at 11:31

## 2024-10-13 RX ADMIN — MAGNESIUM SULFATE HEPTAHYDRATE 1 G: 10 INJECTION, SOLUTION INTRAVENOUS at 12:36

## 2024-10-13 RX ADMIN — HYDROCODONE BITARTRATE AND ACETAMINOPHEN 1 TABLET: 7.5; 325 TABLET ORAL at 20:41

## 2024-10-13 RX ADMIN — IPRATROPIUM BROMIDE AND ALBUTEROL SULFATE 3 ML: 2.5; .5 SOLUTION RESPIRATORY (INHALATION) at 07:40

## 2024-10-13 RX ADMIN — INSULIN LISPRO 2 UNITS: 100 INJECTION, SOLUTION INTRAVENOUS; SUBCUTANEOUS at 09:42

## 2024-10-13 RX ADMIN — ISOSORBIDE MONONITRATE 60 MG: 60 TABLET, EXTENDED RELEASE ORAL at 09:38

## 2024-10-13 RX ADMIN — Medication 10 ML: at 09:43

## 2024-10-13 RX ADMIN — INSULIN GLARGINE 5 UNITS: 100 INJECTION, SOLUTION SUBCUTANEOUS at 09:42

## 2024-10-13 RX ADMIN — HUMAN INSULIN 2 UNITS: 100 INJECTION, SOLUTION SUBCUTANEOUS at 12:37

## 2024-10-13 RX ADMIN — CLOPIDOGREL BISULFATE 75 MG: 75 TABLET ORAL at 09:38

## 2024-10-13 RX ADMIN — SENNOSIDES AND DOCUSATE SODIUM 2 TABLET: 50; 8.6 TABLET ORAL at 20:41

## 2024-10-13 RX ADMIN — SENNOSIDES AND DOCUSATE SODIUM 2 TABLET: 50; 8.6 TABLET ORAL at 11:31

## 2024-10-13 RX ADMIN — POLYETHYLENE GLYCOL 3350 17 G: 17 POWDER, FOR SOLUTION ORAL at 11:31

## 2024-10-13 RX ADMIN — HUMAN INSULIN 2 UNITS: 100 INJECTION, SOLUTION SUBCUTANEOUS at 18:32

## 2024-10-13 RX ADMIN — SODIUM CHLORIDE 1000 MG: 900 INJECTION INTRAVENOUS at 14:07

## 2024-10-13 RX ADMIN — FERROUS SULFATE TAB 325 MG (65 MG ELEMENTAL FE) 325 MG: 325 (65 FE) TAB at 09:38

## 2024-10-13 RX ADMIN — QUETIAPINE FUMARATE 12.5 MG: 25 TABLET ORAL at 20:53

## 2024-10-13 RX ADMIN — MAGNESIUM SULFATE HEPTAHYDRATE 1 G: 10 INJECTION, SOLUTION INTRAVENOUS at 09:59

## 2024-10-13 RX ADMIN — INSULIN LISPRO 2 UNITS: 100 INJECTION, SOLUTION INTRAVENOUS; SUBCUTANEOUS at 12:36

## 2024-10-13 RX ADMIN — ACETAMINOPHEN 650 MG: 325 TABLET, FILM COATED ORAL at 15:52

## 2024-10-13 RX ADMIN — INSULIN LISPRO 2 UNITS: 100 INJECTION, SOLUTION INTRAVENOUS; SUBCUTANEOUS at 18:32

## 2024-10-13 RX ADMIN — ASPIRIN 81 MG 81 MG: 81 TABLET ORAL at 09:37

## 2024-10-13 RX ADMIN — IPRATROPIUM BROMIDE AND ALBUTEROL SULFATE 3 ML: 2.5; .5 SOLUTION RESPIRATORY (INHALATION) at 12:59

## 2024-10-13 RX ADMIN — ATORVASTATIN CALCIUM 80 MG: 40 TABLET, FILM COATED ORAL at 09:37

## 2024-10-13 RX ADMIN — IPRATROPIUM BROMIDE AND ALBUTEROL SULFATE 3 ML: 2.5; .5 SOLUTION RESPIRATORY (INHALATION) at 15:37

## 2024-10-13 RX ADMIN — IPRATROPIUM BROMIDE AND ALBUTEROL SULFATE 3 ML: 2.5; .5 SOLUTION RESPIRATORY (INHALATION) at 20:58

## 2024-10-13 NOTE — PLAN OF CARE
Goal Outcome Evaluation:      Pt resting in bed, pt receiving one unit of blood. RA-2l. NSR on tele. Bed alarm set. Safety precautions utilized and maintained.

## 2024-10-13 NOTE — PROGRESS NOTES
[facility] Medicine Services  PROGRESS NOTE    Patient Name: Arminda Krishnan  : 1945  MRN: [id number]    Date of Admission: 10/9/2024  Primary Care Physician: Aiden Spencer MD    Subjective   Subjective     CC: Follow-up hyperglycemia    HPI: Patient is a 79-year-old female seen and examined by me this a.m., niece at bedside, she is resting comfortably at this time. Patient being treated for recent pneumonia, she does report she has not had a bowel movement in a few days, ordered scheduled bowel regimen and will follow     Objective   Objective     Vital Signs:   Temp:  [98.2 °F (36.8 °C)-99.8 °F (37.7 °C)] 98.9 °F (37.2 °C)  Heart Rate:  [82-99] 82  Resp:  [16-18] 18  BP: (146-177)/(55-87) 146/66  Flow (L/min) (Oxygen Therapy):  [2] 2     Physical Exam:  Constitutional: No acute distress, awake, alert, frail and elderly appearing, currently on 2L NC and resting in bed   HENT: NCAT, mucous membranes moist  Respiratory: Decreased BS bilaterally, no rhonchi or wheezing, respiratory effort normal   Cardiovascular: RRR, no murmurs, rubs, or gallops  Gastrointestinal: Positive bowel sounds, soft, mild tenderness to palpation, nondistended  Musculoskeletal: No bilateral ankle edema  Psychiatric: Appropriate affect, cooperative  Neurologic: Oriented x 3, strength symmetric in all extremities, Cranial Nerves grossly intact to confrontation, speech clear  Skin: No rashes       Results Reviewed:  LAB RESULTS:      Lab 10/13/24  1219 10/13/24  0519 10/10/24  2223 10/10/24  1745 10/10/24  1236 10/09/24  1842   WBC  --  9.23  --   --  10.17 8.68   HEMOGLOBIN 7.0* 7.1* 8.2* 8.3* 7.5* 7.9*   HEMATOCRIT 21.8* 22.6* 25.8* 27.6* 23.7* 25.0*   PLATELETS  --  282  --   --  296 301   NEUTROS ABS  --   --   --   --  7.33* 6.31   IMMATURE GRANS (ABS)  --   --   --   --  0.16* 0.20*   LYMPHS ABS  --   --   --   --  1.36 1.13   MONOS ABS  --   --   --   --  1.08* 0.83   EOS ABS  --   --    --   --  0.14 0.13   MCV  --  88.6  --   --  88.8 88.7   CRP  --   --   --   --   --  13.05*   PROCALCITONIN  --   --   --   --   --  0.12   LACTATE  --   --   --   --   --  0.9   HSTROP T  --   --   --   --   --  39*         Lab 10/13/24  0813 10/10/24  1236 10/09/24  1842   SODIUM 139 141 136   POTASSIUM 3.8 4.1 4.4   CHLORIDE 106 110* 103   CO2 20.0* 18.0* 19.0*   ANION GAP 13.0 13.0 14.0   BUN 28* 45* 56*   CREATININE 2.45* 2.88* 3.30*   EGFR 19.4* 15.9* 13.5*   GLUCOSE 181* 156* 359*   CALCIUM 9.5 9.1 9.5   MAGNESIUM 1.5*  --  1.8   PHOSPHORUS  --   --  3.5   TSH  --   --  0.465         Lab 10/10/24  1236 10/09/24  1842   TOTAL PROTEIN 7.0 7.4   ALBUMIN 3.1* 3.3*   GLOBULIN 3.9 4.1   ALT (SGPT) 15 18   AST (SGOT) 21 21   BILIRUBIN 0.2 0.3   ALK PHOS 70 78   LIPASE  --  23         Lab 10/09/24  1842   PROBNP 719.5   HSTROP T 39*             Lab 10/09/24  1842   IRON 14*  14*   IRON SATURATION (TSAT) 7*   TIBC 195*   TRANSFERRIN 131*   FERRITIN 766.80*   FOLATE 19.00   VITAMIN B 12 1,262*         Lab 10/09/24  1846   FIO2 21   CARBOXYHEMOGLOBIN (VENOUS) 1.6     Brief Urine Lab Results  (Last result in the past 365 days)        Color   Clarity   Blood   Leuk Est   Nitrite   Protein   CREAT   Urine HCG        10/09/24 2058 Yellow   Cloudy   Large (3+)   Moderate (2+)   Negative   100 mg/dL (2+)                   Microbiology Results Abnormal       Procedure Component Value - Date/Time    Blood Culture - Blood, Arm, Right [[id number]]  (Normal) Collected: 10/09/24 2159    Lab Status: Preliminary result Specimen: Blood from Arm, Right Updated: 10/12/24 2231     Blood Culture No growth at 3 days    Narrative:      Less than seven (7) mL's of blood was collected.  Insufficient quantity may yield false negative results.    Blood Culture - Blood, Hand, Left [[id number]]  (Normal) Collected: 10/09/24 1842    Lab Status: Preliminary result Specimen: Blood from Hand, Left Updated: 10/12/24 1946     Blood Culture No growth at  3 days    Narrative:      Less than seven (7) mL's of blood was collected.  Insufficient quantity may yield false negative results.    Urine Culture - Urine, Urine, Clean Catch [[id number]]  (Normal) Collected: 10/09/24 2058    Lab Status: Final result Specimen: Urine, Clean Catch Updated: 10/11/24 1000     Urine Culture No growth    Legionella Antigen, Urine - Urine, Urine, Clean Catch [[id number]]  (Normal) Collected: 10/09/24 2058    Lab Status: Final result Specimen: Urine, Clean Catch Updated: 10/10/24 0922     LEGIONELLA ANTIGEN, URINE Negative    S. Pneumo Ag Urine or CSF - Urine, Urine, Clean Catch [[id number]]  (Normal) Collected: 10/09/24 2058    Lab Status: Final result Specimen: Urine, Clean Catch Updated: 10/10/24 0922     Strep Pneumo Ag Negative    COVID PRE-OP / PRE-PROCEDURE SCREENING ORDER (NO ISOLATION) - Swab, Nasopharynx [[id number]]  (Normal) Collected: 10/09/24 1843    Lab Status: Final result Specimen: Swab from Nasopharynx Updated: 10/09/24 1958    Narrative:      The following orders were created for panel order COVID PRE-OP / PRE-PROCEDURE SCREENING ORDER (NO ISOLATION) - Swab, Nasopharynx.  Procedure                               Abnormality         Status                     ---------                               -----------         ------                     COVID-19, FLU A/B, RSV P...[[id number]]  Normal              Final result                 Please view results for these tests on the individual orders.    COVID-19, FLU A/B, RSV PCR 1 HR TAT - Swab, Nasopharynx [[id number]]  (Normal) Collected: 10/09/24 1843    Lab Status: Final result Specimen: Swab from Nasopharynx Updated: 10/09/24 1958     COVID19 Not Detected     Influenza A PCR Not Detected     Influenza B PCR Not Detected     RSV, PCR Not Detected    Narrative:      Fact sheet for providers: https://www.fda.gov/media/612239/download    Fact sheet for patients: https://www.fda.gov/media/520078/download    Test performed by PCR.             No radiology results from the last 24 hrs    Results for orders placed during the hospital encounter of 07/15/24    Adult Transthoracic Echo Complete W/ Cont if Necessary Per Protocol    Interpretation Summary    Left ventricular systolic function is normal. Calculated left ventricular EF = 63.2%    Estimated right ventricular systolic pressure from tricuspid regurgitation is normal (<35 mmHg).    Normal left atrial size and volume noted.    There is calcification of the aortic valve. Mild to moderate aortic valve regurgitation is present.      Current medications:  Scheduled Meds:aspirin, 81 mg, Oral, Daily  atorvastatin, 80 mg, Oral, Daily  cefTRIAXone, 1,000 mg, Intravenous, Q24H  clopidogrel, 75 mg, Oral, Daily  famotidine, 10 mg, Oral, Every Other Day  ferrous sulfate, 325 mg, Oral, Daily With Breakfast  insulin glargine, 5 Units, Subcutaneous, Daily  Insulin Lispro, 2 Units, Subcutaneous, TID With Meals  insulin regular, 2-7 Units, Subcutaneous, TID With Meals  ipratropium-albuterol, 3 mL, Nebulization, 4x Daily - RT  isosorbide mononitrate, 60 mg, Oral, Daily  linagliptin, 5 mg, Oral, Daily  senna-docusate sodium, 2 tablet, Oral, BID   And  polyethylene glycol, 17 g, Oral, Daily  QUEtiapine, 12.5 mg, Oral, Nightly  sodium chloride, 10 mL, Intravenous, Q12H      Continuous Infusions:     PRN Meds:.  acetaminophen **OR** acetaminophen **OR** acetaminophen    senna-docusate sodium **AND** polyethylene glycol **AND** bisacodyl **AND** bisacodyl    Calcium Replacement - Follow Nurse / BPA Driven Protocol    dextrose    dextrose    glucagon (human recombinant)    HYDROcodone-acetaminophen    influenza vaccine    Magnesium Low Dose Replacement - Follow Nurse / BPA Driven Protocol    melatonin    Phosphorus Replacement - Follow Nurse / BPA Driven Protocol    Potassium Replacement - Follow Nurse / BPA Driven Protocol    sodium chloride    sodium chloride    Assessment & Plan   Assessment & Plan     Active  Hospital Problems    Diagnosis  POA    **LLL pneumonia [J18.9]  Yes    Pneumonia [J18.9]  Yes    HTN (hypertension) [I10]  Unknown    Hyperglycemia [R73.9]  Yes    History of CVA (cerebrovascular accident) [Z86.73]  Not Applicable    CKD (chronic kidney disease) stage 4, GFR 15-29 ml/min [N18.4]  Yes    Degenerative disc disease, lumbar [M51.369]  Yes      Resolved Hospital Problems   No resolved problems to display.        Brief Hospital Course to date:  Arminda Krishnan is a 79 y.o. female with history of mild coronary impairment, prior CVA, DDD on steroids, type 2 diabetes who presented to [facility] ED with hyperglycemia, workup concerning for possible pneumonia and UTI.  Patient transferred to my services on the a.m. of 10/13, she is continuing treatment for pneumonia at this time with IV Rocephin.  Patient's niece at bedside and updated as well, will follow-up with case management in the a.m. for possible rehab placement. No other acute changes at this time.      Suspected pneumonia  -Chest x-ray shows  left ower lobe pneumonia, likely secondary to aspiration  -blood and sputum cultures are currently NGTD, Legionella and strep pneumo urinary antigens are both negative  -Continue antibiotics currently on IV Rocephin, plan for 5 days of antibiotics end date 10/14  -SLP has evaluated, okay for diet and adjusted per their recommendations on 10/13   - currently on 2L NC on 10/13      Suspected UTI  -UA with 4+ bacteria, leukocytosis  -Urine cultures NGTD, continue antibiotics as above.    MICHAEL on CKD stage III  -Baseline creatinine~2.2-2.6, was 3.30 on presentation, improved back to her baseline   -Likely prerenal, status post gentle hydration, patient p.o. intake improving  - MICHAEL appears resolved      Poorly controlled type 2 diabetes with A1c 11% and hyperglycemia in the setting of chronic steroids  -FSBG's reviewed and are much improved  -Continue SSI, will add basal Lantus 5 units daily and adjust as warranted.  -  Will start mealtime insulin 2 units, adjust as needed     Anemia  -no signs of blood loss  -Iron studies most consistent with anemia of chronic disease  - Iron 14, will start iron supplementation  - Continue to follow H&H, Hgb 7.1 on the AM of 10/13, repeat H&H shows again decreased, will plan for transfusion 1U PRBCs today.  -- Family updated at bedside on the afternoon of 10/13 and suspect this is likely related to anemia of chronic disease and kidney function but will continue to monitor      History of CVA  Hyperlipidemia  -Continue aspirin, statin, Plavix     Acute encephalopathy superimposed on MCI  -Continue antibiotics as above  -Start low-dose Seroquel tonight 12.5 mg nightly     DDD  -Continue chronic steroids    Expected Discharge Location and Transportation: [facility]  Expected Discharge   Expected Discharge Date: 10/14/2024; Expected Discharge Time:      VTE Prophylaxis:  Mechanical VTE prophylaxis orders are present.         AM-PAC 6 Clicks Score (PT): 12 (10/12/24 2000)    CODE STATUS:   Code Status and Medical Interventions: No CPR (Do Not Attempt to Resuscitate); Limited Support; No intubation (DNI)   Ordered at: 10/10/24 0002     Medical Intervention Limits:    No intubation (DNI)     Level Of Support Discussed With:    Next of Kin (If No Surrogate)     Code Status (Patient has no pulse and is not breathing):    No CPR (Do Not Attempt to Resuscitate)     Medical Interventions (Patient has pulse or is breathing):    Limited Support       BEKA Tam, DO  10/13/24

## 2024-10-14 ENCOUNTER — APPOINTMENT (OUTPATIENT)
Dept: CT IMAGING | Facility: HOSPITAL | Age: 79
End: 2024-10-14
Payer: MEDICARE

## 2024-10-14 LAB
ALBUMIN SERPL-MCNC: 3 G/DL (ref 3.5–5.2)
ALBUMIN/GLOB SERPL: 0.8 G/DL
ALP SERPL-CCNC: 70 U/L (ref 39–117)
ALT SERPL W P-5'-P-CCNC: 24 U/L (ref 1–33)
ANION GAP SERPL CALCULATED.3IONS-SCNC: 13 MMOL/L (ref 5–15)
AST SERPL-CCNC: 36 U/L (ref 1–32)
BACTERIA SPEC AEROBE CULT: NORMAL
BACTERIA SPEC AEROBE CULT: NORMAL
BASOPHILS # BLD AUTO: 0.08 10*3/MM3 (ref 0–0.2)
BASOPHILS NFR BLD AUTO: 0.8 % (ref 0–1.5)
BH BB BLOOD EXPIRATION DATE: NORMAL
BH BB BLOOD TYPE BARCODE: NORMAL
BH BB DISPENSE STATUS: NORMAL
BH BB PRODUCT CODE: NORMAL
BH BB UNIT NUMBER: NORMAL
BILIRUB SERPL-MCNC: 0.2 MG/DL (ref 0–1.2)
BUN SERPL-MCNC: 26 MG/DL (ref 8–23)
BUN/CREAT SERPL: 11.2 (ref 7–25)
CALCIUM SPEC-SCNC: 9.6 MG/DL (ref 8.6–10.5)
CHLORIDE SERPL-SCNC: 106 MMOL/L (ref 98–107)
CO2 SERPL-SCNC: 20 MMOL/L (ref 22–29)
CREAT SERPL-MCNC: 2.33 MG/DL (ref 0.57–1)
CROSSMATCH INTERPRETATION: NORMAL
DEPRECATED RDW RBC AUTO: 47 FL (ref 37–54)
EGFRCR SERPLBLD CKD-EPI 2021: 20.6 ML/MIN/1.73
EOSINOPHIL # BLD AUTO: 0.2 10*3/MM3 (ref 0–0.4)
EOSINOPHIL NFR BLD AUTO: 2.1 % (ref 0.3–6.2)
ERYTHROCYTE [DISTWIDTH] IN BLOOD BY AUTOMATED COUNT: 14.7 % (ref 12.3–15.4)
GLOBULIN UR ELPH-MCNC: 3.9 GM/DL
GLUCOSE BLDC GLUCOMTR-MCNC: 175 MG/DL (ref 70–130)
GLUCOSE BLDC GLUCOMTR-MCNC: 189 MG/DL (ref 70–130)
GLUCOSE BLDC GLUCOMTR-MCNC: 201 MG/DL (ref 70–130)
GLUCOSE BLDC GLUCOMTR-MCNC: 201 MG/DL (ref 70–130)
GLUCOSE SERPL-MCNC: 155 MG/DL (ref 65–99)
HCT VFR BLD AUTO: 26.9 % (ref 34–46.6)
HGB BLD-MCNC: 8.7 G/DL (ref 12–15.9)
IMM GRANULOCYTES # BLD AUTO: 0.15 10*3/MM3 (ref 0–0.05)
IMM GRANULOCYTES NFR BLD AUTO: 1.5 % (ref 0–0.5)
LYMPHOCYTES # BLD AUTO: 1.4 10*3/MM3 (ref 0.7–3.1)
LYMPHOCYTES NFR BLD AUTO: 14.4 % (ref 19.6–45.3)
MAGNESIUM SERPL-MCNC: 2.2 MG/DL (ref 1.6–2.4)
MCH RBC QN AUTO: 28.2 PG (ref 26.6–33)
MCHC RBC AUTO-ENTMCNC: 32.3 G/DL (ref 31.5–35.7)
MCV RBC AUTO: 87.3 FL (ref 79–97)
MONOCYTES # BLD AUTO: 1.19 10*3/MM3 (ref 0.1–0.9)
MONOCYTES NFR BLD AUTO: 12.2 % (ref 5–12)
NEUTROPHILS NFR BLD AUTO: 6.72 10*3/MM3 (ref 1.7–7)
NEUTROPHILS NFR BLD AUTO: 69 % (ref 42.7–76)
NRBC BLD AUTO-RTO: 0 /100 WBC (ref 0–0.2)
PLATELET # BLD AUTO: 271 10*3/MM3 (ref 140–450)
PMV BLD AUTO: 10.5 FL (ref 6–12)
POTASSIUM SERPL-SCNC: 3.9 MMOL/L (ref 3.5–5.2)
PROT SERPL-MCNC: 6.9 G/DL (ref 6–8.5)
QT INTERVAL: 348 MS
QTC INTERVAL: 406 MS
RBC # BLD AUTO: 3.08 10*6/MM3 (ref 3.77–5.28)
SODIUM SERPL-SCNC: 139 MMOL/L (ref 136–145)
UNIT  ABO: NORMAL
UNIT  RH: NORMAL
WBC NRBC COR # BLD AUTO: 9.74 10*3/MM3 (ref 3.4–10.8)

## 2024-10-14 PROCEDURE — 63710000001 INSULIN REGULAR HUMAN PER 5 UNITS: Performed by: INTERNAL MEDICINE

## 2024-10-14 PROCEDURE — 63710000001 INSULIN LISPRO (HUMAN) PER 5 UNITS: Performed by: STUDENT IN AN ORGANIZED HEALTH CARE EDUCATION/TRAINING PROGRAM

## 2024-10-14 PROCEDURE — 82948 REAGENT STRIP/BLOOD GLUCOSE: CPT

## 2024-10-14 PROCEDURE — 85025 COMPLETE CBC W/AUTO DIFF WBC: CPT | Performed by: FAMILY MEDICINE

## 2024-10-14 PROCEDURE — 63710000001 INSULIN GLARGINE PER 5 UNITS: Performed by: INTERNAL MEDICINE

## 2024-10-14 PROCEDURE — 94761 N-INVAS EAR/PLS OXIMETRY MLT: CPT

## 2024-10-14 PROCEDURE — 92526 ORAL FUNCTION THERAPY: CPT

## 2024-10-14 PROCEDURE — 70450 CT HEAD/BRAIN W/O DYE: CPT

## 2024-10-14 PROCEDURE — 99223 1ST HOSP IP/OBS HIGH 75: CPT | Performed by: PSYCHIATRY & NEUROLOGY

## 2024-10-14 PROCEDURE — 94799 UNLISTED PULMONARY SVC/PX: CPT

## 2024-10-14 PROCEDURE — 80053 COMPREHEN METABOLIC PANEL: CPT | Performed by: FAMILY MEDICINE

## 2024-10-14 PROCEDURE — 94664 DEMO&/EVAL PT USE INHALER: CPT

## 2024-10-14 PROCEDURE — 99232 SBSQ HOSP IP/OBS MODERATE 35: CPT | Performed by: FAMILY MEDICINE

## 2024-10-14 PROCEDURE — 83735 ASSAY OF MAGNESIUM: CPT | Performed by: FAMILY MEDICINE

## 2024-10-14 PROCEDURE — 25010000002 CEFTRIAXONE PER 250 MG

## 2024-10-14 RX ORDER — TEMAZEPAM 7.5 MG/1
7.5 CAPSULE ORAL NIGHTLY
Status: COMPLETED | OUTPATIENT
Start: 2024-10-14 | End: 2024-10-18

## 2024-10-14 RX ORDER — DONEPEZIL HYDROCHLORIDE 10 MG/1
5 TABLET, FILM COATED ORAL NIGHTLY
Status: DISCONTINUED | OUTPATIENT
Start: 2024-10-14 | End: 2024-10-22 | Stop reason: HOSPADM

## 2024-10-14 RX ORDER — HYDROCODONE BITARTRATE AND ACETAMINOPHEN 5; 325 MG/1; MG/1
1 TABLET ORAL EVERY 6 HOURS PRN
Status: DISCONTINUED | OUTPATIENT
Start: 2024-10-14 | End: 2024-10-18

## 2024-10-14 RX ADMIN — ASPIRIN 81 MG 81 MG: 81 TABLET ORAL at 11:29

## 2024-10-14 RX ADMIN — ACETAMINOPHEN 650 MG: 650 SUPPOSITORY RECTAL at 05:14

## 2024-10-14 RX ADMIN — TEMAZEPAM 7.5 MG: 7.5 CAPSULE ORAL at 20:21

## 2024-10-14 RX ADMIN — IPRATROPIUM BROMIDE AND ALBUTEROL SULFATE 3 ML: 2.5; .5 SOLUTION RESPIRATORY (INHALATION) at 08:00

## 2024-10-14 RX ADMIN — INSULIN LISPRO 2 UNITS: 100 INJECTION, SOLUTION INTRAVENOUS; SUBCUTANEOUS at 11:37

## 2024-10-14 RX ADMIN — LINAGLIPTIN 5 MG: 5 TABLET, FILM COATED ORAL at 11:27

## 2024-10-14 RX ADMIN — CLOPIDOGREL BISULFATE 75 MG: 75 TABLET ORAL at 11:29

## 2024-10-14 RX ADMIN — DONEPEZIL HYDROCHLORIDE 5 MG: 10 TABLET, FILM COATED ORAL at 20:21

## 2024-10-14 RX ADMIN — Medication 10 ML: at 09:16

## 2024-10-14 RX ADMIN — IPRATROPIUM BROMIDE AND ALBUTEROL SULFATE 3 ML: 2.5; .5 SOLUTION RESPIRATORY (INHALATION) at 12:32

## 2024-10-14 RX ADMIN — ISOSORBIDE MONONITRATE 60 MG: 60 TABLET, EXTENDED RELEASE ORAL at 11:29

## 2024-10-14 RX ADMIN — SENNOSIDES AND DOCUSATE SODIUM 2 TABLET: 50; 8.6 TABLET ORAL at 20:21

## 2024-10-14 RX ADMIN — Medication 5 MG: at 20:21

## 2024-10-14 RX ADMIN — SENNOSIDES AND DOCUSATE SODIUM 2 TABLET: 50; 8.6 TABLET ORAL at 11:27

## 2024-10-14 RX ADMIN — FAMOTIDINE 10 MG: 20 TABLET, FILM COATED ORAL at 11:28

## 2024-10-14 RX ADMIN — ATORVASTATIN CALCIUM 80 MG: 40 TABLET, FILM COATED ORAL at 11:28

## 2024-10-14 RX ADMIN — POLYETHYLENE GLYCOL 3350 17 G: 17 POWDER, FOR SOLUTION ORAL at 11:27

## 2024-10-14 RX ADMIN — HYDROCODONE BITARTRATE AND ACETAMINOPHEN 1 TABLET: 5; 325 TABLET ORAL at 20:21

## 2024-10-14 RX ADMIN — INSULIN GLARGINE 5 UNITS: 100 INJECTION, SOLUTION SUBCUTANEOUS at 11:30

## 2024-10-14 RX ADMIN — INSULIN LISPRO 2 UNITS: 100 INJECTION, SOLUTION INTRAVENOUS; SUBCUTANEOUS at 17:42

## 2024-10-14 RX ADMIN — FERROUS SULFATE TAB 325 MG (65 MG ELEMENTAL FE) 325 MG: 325 (65 FE) TAB at 11:28

## 2024-10-14 RX ADMIN — HUMAN INSULIN 2 UNITS: 100 INJECTION, SOLUTION SUBCUTANEOUS at 17:42

## 2024-10-14 RX ADMIN — SODIUM CHLORIDE 2000 MG: 900 INJECTION INTRAVENOUS at 13:36

## 2024-10-14 RX ADMIN — HUMAN INSULIN 3 UNITS: 100 INJECTION, SOLUTION SUBCUTANEOUS at 11:37

## 2024-10-14 NOTE — PROGRESS NOTES
[facility] Medicine Services  PROGRESS NOTE    Patient Name: Arminda Krishnan  : 1945  MRN: [id number]    Date of Admission: 10/9/2024  Primary Care Physician: Aiden Spencer MD    Subjective   Subjective     CC: Follow-up hyperglycemia    HPI: Patient is a 79-year-old female seen and examined by me this a.m., patient more lethargic again this AM, later more awake but confused, discussed with niece at bedside and seen again this afternoon. Consult to neurology for evaluation, CT head negative.     Objective   Objective     Vital Signs:   Temp:  [97.6 °F (36.4 °C)-99 °F (37.2 °C)] 98.5 °F (36.9 °C)  Heart Rate:  [81-95] 81  Resp:  [14-18] 18  BP: (129-173)/(48-85) 145/71     Physical Exam:  Constitutional:  Initially sleepy but now awake and confused, disoriented, currently on 2L   HENT: NCAT, mucous membranes moist  Respiratory: Decreased BS bilaterally, no rhonchi or wheezing, respiratory effort normal   Cardiovascular: RRR, no murmurs, rubs, or gallops  Gastrointestinal: Positive bowel sounds, soft, mild tenderness to palpation, nondistended  Musculoskeletal: No bilateral ankle edema  Psychiatric: Appropriate affect, cooperative  Neurologic: Disoriented, awake, will not currently follow directions, BOND   Skin: No rashes       Results Reviewed:  LAB RESULTS:      Lab 10/14/24  0052 10/13/24  1219 10/13/24  0519 10/10/24  2223 10/10/24  1745 10/10/24  1236 10/09/24  1842   WBC 9.74  --  9.23  --   --  10.17 8.68   HEMOGLOBIN 8.7* 7.0* 7.1* 8.2* 8.3* 7.5* 7.9*   HEMATOCRIT 26.9* 21.8* 22.6* 25.8* 27.6* 23.7* 25.0*   PLATELETS 271  --  282  --   --  296 301   NEUTROS ABS 6.72  --   --   --   --  7.33* 6.31   IMMATURE GRANS (ABS) 0.15*  --   --   --   --  0.16* 0.20*   LYMPHS ABS 1.40  --   --   --   --  1.36 1.13   MONOS ABS 1.19*  --   --   --   --  1.08* 0.83   EOS ABS 0.20  --   --   --   --  0.14 0.13   MCV 87.3  --  88.6  --   --  88.8 88.7   CRP  --   --   --   --    --   --  13.05*   PROCALCITONIN  --   --   --   --   --   --  0.12   LACTATE  --   --   --   --   --   --  0.9   HSTROP T  --   --   --   --   --   --  39*         Lab 10/14/24  0052 10/13/24  0813 10/10/24  1236 10/09/24  1842   SODIUM 139 139 141 136   POTASSIUM 3.9 3.8 4.1 4.4   CHLORIDE 106 106 110* 103   CO2 20.0* 20.0* 18.0* 19.0*   ANION GAP 13.0 13.0 13.0 14.0   BUN 26* 28* 45* 56*   CREATININE 2.33* 2.45* 2.88* 3.30*   EGFR 20.6* 19.4* 15.9* 13.5*   GLUCOSE 155* 181* 156* 359*   CALCIUM 9.6 9.5 9.1 9.5   MAGNESIUM 2.2 1.5*  --  1.8   PHOSPHORUS  --   --   --  3.5   TSH  --   --   --  0.465         Lab 10/14/24  0052 10/10/24  1236 10/09/24  1842   TOTAL PROTEIN 6.9 7.0 7.4   ALBUMIN 3.0* 3.1* 3.3*   GLOBULIN 3.9 3.9 4.1   ALT (SGPT) 24 15 18   AST (SGOT) 36* 21 21   BILIRUBIN 0.2 0.2 0.3   ALK PHOS 70 70 78   LIPASE  --   --  23         Lab 10/09/24  1842   PROBNP 719.5   HSTROP T 39*             Lab 10/13/24  1418 10/09/24  1842   IRON  --  14*  14*   IRON SATURATION (TSAT)  --  7*   TIBC  --  195*   TRANSFERRIN  --  131*   FERRITIN  --  766.80*   FOLATE  --  19.00   VITAMIN B 12  --  1,262*   ABO TYPING O  --    RH TYPING Positive  --    ANTIBODY SCREEN Negative  --          Lab 10/09/24  1846   FIO2 21   CARBOXYHEMOGLOBIN (VENOUS) 1.6     Brief Urine Lab Results  (Last result in the past 365 days)        Color   Clarity   Blood   Leuk Est   Nitrite   Protein   CREAT   Urine HCG        10/09/24 2058 Yellow   Cloudy   Large (3+)   Moderate (2+)   Negative   100 mg/dL (2+)                   Microbiology Results Abnormal       Procedure Component Value - Date/Time    Blood Culture - Blood, Arm, Right [[id number]]  (Normal) Collected: 10/09/24 2159    Lab Status: Preliminary result Specimen: Blood from Arm, Right Updated: 10/13/24 2231     Blood Culture No growth at 4 days    Narrative:      Less than seven (7) mL's of blood was collected.  Insufficient quantity may yield false negative results.    Blood  Culture - Blood, Hand, Left [[id number]]  (Normal) Collected: 10/09/24 1842    Lab Status: Preliminary result Specimen: Blood from Hand, Left Updated: 10/13/24 1946     Blood Culture No growth at 4 days    Narrative:      Less than seven (7) mL's of blood was collected.  Insufficient quantity may yield false negative results.    Urine Culture - Urine, Urine, Clean Catch [[id number]]  (Normal) Collected: 10/09/24 2058    Lab Status: Final result Specimen: Urine, Clean Catch Updated: 10/11/24 1000     Urine Culture No growth    Legionella Antigen, Urine - Urine, Urine, Clean Catch [[id number]]  (Normal) Collected: 10/09/24 2058    Lab Status: Final result Specimen: Urine, Clean Catch Updated: 10/10/24 0922     LEGIONELLA ANTIGEN, URINE Negative    S. Pneumo Ag Urine or CSF - Urine, Urine, Clean Catch [[id number]]  (Normal) Collected: 10/09/24 2058    Lab Status: Final result Specimen: Urine, Clean Catch Updated: 10/10/24 0922     Strep Pneumo Ag Negative    COVID PRE-OP / PRE-PROCEDURE SCREENING ORDER (NO ISOLATION) - Swab, Nasopharynx [[id number]]  (Normal) Collected: 10/09/24 1843    Lab Status: Final result Specimen: Swab from Nasopharynx Updated: 10/09/24 1958    Narrative:      The following orders were created for panel order COVID PRE-OP / PRE-PROCEDURE SCREENING ORDER (NO ISOLATION) - Swab, Nasopharynx.  Procedure                               Abnormality         Status                     ---------                               -----------         ------                     COVID-19, FLU A/B, RSV P...[[id number]]  Normal              Final result                 Please view results for these tests on the individual orders.    COVID-19, FLU A/B, RSV PCR 1 HR TAT - Swab, Nasopharynx [[id number]]  (Normal) Collected: 10/09/24 1843    Lab Status: Final result Specimen: Swab from Nasopharynx Updated: 10/09/24 1958     COVID19 Not Detected     Influenza A PCR Not Detected     Influenza B PCR Not Detected     RSV, PCR Not  Detected    Narrative:      Fact sheet for providers: https://www.fda.gov/media/575549/download    Fact sheet for patients: https://www.fda.gov/media/240058/download    Test performed by PCR.            CT Head Without Contrast    Result Date: 10/14/2024  CT HEAD WO CONTRAST Date of Exam: 10/14/2024 11:57 AM EDT Indication: AMS, increased lethargy. Comparison: CT head without contrast 10/3/2024. Technique: Axial CT images were obtained of the head without contrast administration.  Automated exposure control and iterative construction methods were used. Findings: Encephalomalacia consistent with chronic infarct within the right frontal lobe. Chronic left subinsular lacunar-type infarct. Small chronic lacunar infarct within the left basal ganglia. Chronic focal infarct in the parafalcine high right frontal lobe. No evidence of acute or evolving infarct, mass lesion, mass effect, midline shift or intracranial hemorrhage. Right posterior parietal craniotomy changes are redemonstrated, and no acute or suspicious calvarial abnormality is identified. Paranasal sinuses and mastoid air cells are clear. Presumed bilateral cataract surgery.     Impression: 1. No acute intracranial finding. No significant change compared to 10/3/2024. 2. Chronic findings as described above. Electronically Signed: Jennifer Mon MD  10/14/2024 12:12 PM EDT  Workstation ID: [id number]     Results for orders placed during the hospital encounter of 07/15/24    Adult Transthoracic Echo Complete W/ Cont if Necessary Per Protocol    Interpretation Summary    Left ventricular systolic function is normal. Calculated left ventricular EF = 63.2%    Estimated right ventricular systolic pressure from tricuspid regurgitation is normal (<35 mmHg).    Normal left atrial size and volume noted.    There is calcification of the aortic valve. Mild to moderate aortic valve regurgitation is present.      Current medications:  Scheduled Meds:aspirin, 81 mg, Oral,  Daily  atorvastatin, 80 mg, Oral, Daily  clopidogrel, 75 mg, Oral, Daily  donepezil, 5 mg, Oral, Nightly  famotidine, 10 mg, Oral, Every Other Day  ferrous sulfate, 325 mg, Oral, Daily With Breakfast  insulin glargine, 5 Units, Subcutaneous, Daily  Insulin Lispro, 2 Units, Subcutaneous, TID With Meals  insulin regular, 2-7 Units, Subcutaneous, TID With Meals  ipratropium-albuterol, 3 mL, Nebulization, 4x Daily - RT  isosorbide mononitrate, 60 mg, Oral, Daily  linagliptin, 5 mg, Oral, Daily  melatonin, 5 mg, Oral, Nightly  senna-docusate sodium, 2 tablet, Oral, BID   And  polyethylene glycol, 17 g, Oral, Daily  sodium chloride, 10 mL, Intravenous, Q12H  temazepam, 7.5 mg, Oral, Nightly      Continuous Infusions:     PRN Meds:.  acetaminophen **OR** acetaminophen **OR** acetaminophen    senna-docusate sodium **AND** polyethylene glycol **AND** bisacodyl **AND** bisacodyl    Calcium Replacement - Follow Nurse / BPA Driven Protocol    dextrose    dextrose    glucagon (human recombinant)    HYDROcodone-acetaminophen    [Held by provider] HYDROcodone-acetaminophen    influenza vaccine    Magnesium Low Dose Replacement - Follow Nurse / BPA Driven Protocol    Phosphorus Replacement - Follow Nurse / BPA Driven Protocol    Potassium Replacement - Follow Nurse / BPA Driven Protocol    sodium chloride    sodium chloride    Assessment & Plan   Assessment & Plan     Active Hospital Problems    Diagnosis  POA    **LLL pneumonia [J18.9]  Yes    Pneumonia [J18.9]  Yes    HTN (hypertension) [I10]  Unknown    Hyperglycemia [R73.9]  Yes    History of CVA (cerebrovascular accident) [Z86.73]  Not Applicable    CKD (chronic kidney disease) stage 4, GFR 15-29 ml/min [N18.4]  Yes    Degenerative disc disease, lumbar [M51.369]  Yes      Resolved Hospital Problems   No resolved problems to display.        Brief Hospital Course to date:  Arminda Krishnan is a 79 y.o. female with history of mild coronary impairment, prior CVA, DDD on  steroids, type 2 diabetes who presented to [facility] ED with hyperglycemia, workup concerning for possible pneumonia and UTI.  Patient transferred to my services on the a.m. of 10/13, she is continuing treatment for pneumonia at this time with IV Rocephin.  Patient's niece at bedside and updated as well, will follow-up with case management in the a.m. for possible rehab placement. No other acute changes at this time. Patient seen on the AM of 10/14, more lethargic but later improved, concerned again for AMS likely from delirium and also concerns of underlying dementia. Neurology consulted and following, attempting to reestablish sleep cycle, cancel labs and will try to create better resting environment for patient. Continue to follow with Dr. Herrera      Suspected pneumonia  -Chest x-ray shows  left ower lobe pneumonia, likely secondary to aspiration  -blood and sputum cultures are currently NGTD, Legionella and strep pneumo urinary antigens are both negative  -Continue antibiotics currently on IV Rocephin, plan for 5 days of antibiotics end date 10/14  -SLP has evaluated, okay for diet and adjusted per their recommendations on 10/13   - currently on 2L NC on 10/13      Suspected UTI  -UA with 4+ bacteria, leukocytosis  -Urine cultures NGTD, continue antibiotics as above.    MICHAEL on CKD stage III  -Baseline creatinine~2.2-2.6, was 3.30 on presentation, improved back to her baseline   -Likely prerenal, status post gentle hydration, patient p.o. intake improving  - MICHAEL appears resolved and back to her baseline      Poorly controlled type 2 diabetes with A1c 11% and hyperglycemia in the setting of chronic steroids  -FSBG's reviewed and are much improved  -Continue SSI, will add basal Lantus 5 units daily and adjust as warranted.     Anemia  -no signs of blood loss  -Iron studies most consistent with anemia of chronic disease  - Iron 14, will start iron supplementation  - Continue to follow H&H, Hgb 7.1 on the AM of 10/13,  repeat H&H shows again decreased, will plan for transfusion 1U PRBCs   - Hgb improved to 8.7 following transfusion   -- Family updated at bedside on the afternoon of 10/13 and suspect this is likely related to anemia of chronic disease and kidney function but will continue to monitor      History of CVA  Hyperlipidemia  -Continue aspirin, statin, Plavix     Acute encephalopathy superimposed on MCI  Likely underlying vascular dementia   -Again worsened on 10/14, consult to neurology   - Follow up CT head without new acute findings but old CVA   - Plans to start Aricept, melatonin and restoril on 10/14       DDD  -Continue chronic steroids    Expected Discharge Location and Transportation: [facility]  Expected Discharge   Expected Discharge Date: 10/14/2024; Expected Discharge Time:      VTE Prophylaxis:  Mechanical VTE prophylaxis orders are present.         AM-PAC 6 Clicks Score (PT): 13 (10/14/24 0800)    CODE STATUS:   Code Status and Medical Interventions: No CPR (Do Not Attempt to Resuscitate); Limited Support; No intubation (DNI)   Ordered at: 10/10/24 0002     Medical Intervention Limits:    No intubation (DNI)     Level Of Support Discussed With:    Next of Kin (If No Surrogate)     Code Status (Patient has no pulse and is not breathing):    No CPR (Do Not Attempt to Resuscitate)     Medical Interventions (Patient has pulse or is breathing):    Limited Support       BEKA Tam, DO  10/14/24

## 2024-10-14 NOTE — TELEPHONE ENCOUNTER
CALLED RICA TO CHECK ON PT, SHE STATED THAT THE PT HAD A BAD UTI, SHE IS GETTING A CT COMPLETED TODAY, AND HER SUGARS HAVE GONE DOWN .

## 2024-10-14 NOTE — THERAPY TREATMENT NOTE
Acute Care - Speech Language Pathology   Swallow Treatment Note [facility]     Patient Name: Arminda Krishnan  : 1945  MRN: [id number]  Today's Date: 10/14/2024               Admit Date: 10/9/2024    Visit Dx:     ICD-10-CM ICD-9-CM   1. Uncontrolled type 2 diabetes mellitus with hyperglycemia  E11.65 250.02   2. Acute UTI (urinary tract infection)  N39.0 599.0   3. Pneumonia of left lower lobe due to infectious organism  J18.9 486   4. Dysphagia, unspecified type  R13.10 787.20   5. Cerebral vascular disease  I67.9 437.9   6. Degeneration of intervertebral disc of lumbar region, unspecified whether pain present  M51.369 722.52   7. Spinal stenosis, lumbar region, with neurogenic claudication  M48.062 724.03   8. Aspiration pneumonia of left lower lobe, unspecified aspiration pneumonia type  J69.0 507.0   9. Dehydration  E86.0 276.51     Patient Active Problem List   Diagnosis    Cerebral vascular disease    Degenerative disc disease, lumbar    Degenerative disc disease, cervical    Spinal stenosis, lumbar region, with neurogenic claudication    Tobacco abuse    Chronic anemia    CKD (chronic kidney disease) stage 4, GFR 15-29 ml/min    Hyperglycemia    History of CVA (cerebrovascular accident)    Type 2 diabetes mellitus with hyperglycemia, without long-term current use of insulin    Disorientation    Dehydration    LLL pneumonia    HTN (hypertension)    Pneumonia     Past Medical History:   Diagnosis Date    Anemia     Arthritis     Back problem     CAD (coronary artery disease)     Cancer     Right breast    Chronic back pain     Chronically on opiate therapy     Depression     Diabetes mellitus     DX 14 years ago- checks fsbs weekly    Fibromyalgia     Gastroparesis     GERD (gastroesophageal reflux disease)     Headache     emotional/tension    History of transfusion     [facility]    HTN (hypertension)     Hypercholesteremia     IBS (irritable bowel syndrome)     Incontinence of urine      urgency    Migraine headache     Myalgia and myositis     Peripheral neuropathy     Sleep apnea     does not wear cpap    UTI (urinary tract infection)      Past Surgical History:   Procedure Laterality Date    APPENDECTOMY      ARTERIOGRAM MESENTERIC N/A 6/16/2022    Procedure: DIAGNOSTIC ARTERIOGRAM WITH CELIAC STENT PLACEMENT;  Surgeon: Neo Neil MD;  Location: [facility] HYBRID [facility];  Service: Vascular;  Laterality: N/A;  FLUORO: 16 MIN  DOSE: 2384 MGY  CONTRAST:  20 ML    BACK SURGERY      5x per patient    BRAIN TUMOR EXCISION  1988    BREAST BIOPSY      CARPAL TUNNEL RELEASE Bilateral     CHOLECYSTECTOMY      COLONOSCOPY      2015    CRANIOTOMY FOR TUMOR      EYE SURGERY      bilateral cataracts removed    HEMORRHOIDECTOMY      LUMBAR FUSION N/A 01/04/2017    Procedure: LUMBAR LAMINECTOMY AND DECOMRESSION  L3 AND L4;  Surgeon: Nishant Bhatti MD;  Location: [facility] OR;  Service:     TOTAL ABDOMINAL HYSTERECTOMY      TRIGGER FINGER RELEASE         SLP Recommendation and Plan     SLP Diet Recommendation: soft to chew textures, chopped, thin liquids (10/14/24 1540)  Recommended Precautions and Strategies: upright posture during/after eating, general aspiration precautions, assist with feeding (10/14/24 1540)  SLP Rec. for Method of Medication Administration: meds whole, meds crushed, with puree, as tolerated (10/14/24 1540)     Monitor for Signs of Aspiration: yes, notify SLP if any concerns (10/14/24 1540)  Recommended Diagnostics: other (see comments) (diet tolerance/adjustment) (10/14/24 1540)     Anticipated Discharge Disposition (SLP): skilled nursing facility (10/14/24 1540)     Therapy Frequency (Swallow): PRN (10/14/24 1540)  Predicted Duration Therapy Intervention (Days): 1 week (10/14/24 1540)  Oral Care Recommendations: Oral Care BID/PRN, Toothbrush (10/14/24 1540)        Daily Summary of Progress (SLP): progress toward functional goals as expected (10/14/24 1540)               Treatment  Assessment (SLP): toleration of diet, no clinical signs of, pharyngeal dysphagia (10/14/24 1540)  Treatment Assessment Comments (SLP): No overt s/s of aspiration w/ thin liquid or pureed trials. Pt declined solid trials this date. Ok to continue soft/chopped solid diet w/ thin liquids. If any concerns, ok to downgrade to pureed. SLP will f/u for diet tolerance/adjustment (10/14/24 1540)  Plan for Continued Treatment (SLP): continue treatment per plan of care (10/14/24 1540)                SWALLOW EVALUATION (Last 72 Hours)       SLP Adult Swallow Evaluation       Row Name 10/14/24 1540                   Rehab Evaluation    Document Type therapy note (daily note)  -        Subjective Information no complaints  -        Patient Observations alert;cooperative  -        Patient/Family/Caregiver Comments/Observations No family present  -        Patient Effort good  -        Symptoms Noted During/After Treatment none  -           Pain    Additional Documentation Pain Scale: FACES Pre/Post-Treatment (Group)  -           Pain Scale: FACES Pre/Post-Treatment    Pain: FACES Scale, Pretreatment 0-->no hurt  -        Posttreatment Pain Rating 0-->no hurt  -           SLP Treatment Clinical Impressions    Treatment Assessment (SLP) toleration of diet;no clinical signs of;pharyngeal dysphagia  -        Treatment Assessment Comments (SLP) No overt s/s of aspiration w/ thin liquid or pureed trials. Pt declined solid trials this date. Ok to continue soft/chopped solid diet w/ thin liquids. If any concerns, ok to downgrade to pureed. SLP will f/u for diet tolerance/adjustment  -        Daily Summary of Progress (SLP) progress toward functional goals as expected  -        Plan for Continued Treatment (SLP) continue treatment per plan of care  -        Care Plan Review care plan/treatment goals reviewed  -           Recommendations    Therapy Frequency (Swallow) PRN  -        Predicted Duration Therapy  Intervention (Days) 1 week  -        SLP Diet Recommendation soft to chew textures;chopped;thin liquids  -        Recommended Diagnostics other (see comments)  diet tolerance/adjustment  -        Recommended Precautions and Strategies upright posture during/after eating;general aspiration precautions;assist with feeding  -        Oral Care Recommendations Oral Care BID/PRN;Toothbrush  -        SLP Rec. for Method of Medication Administration meds whole;meds crushed;with puree;as tolerated  -        Monitor for Signs of Aspiration yes;notify SLP if any concerns  -        Anticipated Discharge Disposition (SLP) skilled nursing [facility]  -                  User Key  (r) = Recorded By, (t) = Taken By, (c) = Cosigned By      Initials Name Effective Dates     Yenifer Herring, MS [facility]-SLP 05/12/23 -                     EDUCATION  The patient has been educated in the following areas:   Dysphagia (Swallowing Impairment) Modified Diet Instruction.        SLP GOALS       Row Name 10/14/24 4411             (LTG) Patient will demonstrate functional swallow for    Diet Texture (Demonstrate functional swallow) soft to chew (chopped) textures  -      Liquid viscosity (Demonstrate functional swallow) thin liquids  -      Lynn (Demonstrate functional swallow) with moderate cues (50-74% accuracy)  -      Time Frame (Demonstrate functional swallow) 1 week  -      Progress/Outcomes (Demonstrate functional swallow) continuing progress toward goal  -      Comment (Demonstrate functional swallow) No overt s/s of aspiration w/ thin liquid trials. Pt declined solid trials this date  -         (STG) Patient will tolerate trials of    Consistencies Trialed (Tolerate trials) soft to chew (chopped) textures;thin liquids  -      Desired Outcome (Tolerate trials) without signs/symptoms of aspiration;without signs of distress;with adequate oral prep/transit/clearance  -      Lynn (Tolerate trials)  with moderate cues (50-74% accuracy)  -MH      Time Frame (Tolerate trials) 1 week  -MH      Progress/Outcomes (Tolerate trials) continuing progress toward goal  -MH         (STG) Labial Strengthening Goal 1 (SLP)    Activity (Labial Strengthening Goal 1, SLP) increase labial tone  -MH      Increase Labial Tone labial resistance exercises;swallow trials  -MH      Izard/Accuracy (Labial Strengthening Goal 1, SLP) with maximum cues (25-49% accuracy)  -MH      Time Frame (Labial Strengthening Goal 1, SLP) 1 week  -MH      Progress/Outcomes (Labial Strengthening Goal 1, SLP) progress slower than expected  -MH         (STG) Lingual Strengthening Goal 1 (SLP)    Activity (Lingual Strengthening Goal 1, SLP) increase tongue back strength  -MH      Increase Tongue Back Strength lingual resistance exercises;swallow trials  -MH      Izard/Accuracy (Lingual Strengthening Goal 1, SLP) with maximum cues (25-49% accuracy)  -MH      Time Frame (Lingual Strengthening Goal 1, SLP) 1 week  -MH      Progress/Outcomes (Lingual Strengthening Goal 1, SLP) progress slower than expected  -MH      Comment (Lingual Strengthening Goal 1, SLP) Cog status impacting ability to complete  -MH                User Key  (r) = Recorded By, (t) = Taken By, (c) = Cosigned By      Initials Name Provider Type    Yenifer Moore MS CCC-SLP Speech and Language Pathologist                         Time Calculation:    Time Calculation- SLP       Row Name 10/14/24 1644             Time Calculation- SLP    SLP Start Time 1540  -MH      SLP Received On 10/14/24  -MH         Untimed Charges    [id number] Treatment Swallow Minutes 42  -MH         Total Minutes    Untimed Charges Total Minutes 42  -MH       Total Minutes 42  -MH                User Key  (r) = Recorded By, (t) = Taken By, (c) = Cosigned By      Initials Name Provider Type    Yenifer Moore MS CCC-SLP Speech and Language Pathologist                    Therapy Charges for Today        Code Description Service Date Service Provider Modifiers Qty    [id number] HC ST TREATMENT SWALLOW 3 10/14/2024 Yenifer Herring, MS CCC-SLP GN 1                 Yenifer Herring MS CCC-SLP  10/14/2024

## 2024-10-14 NOTE — CONSULTS
Neurology       Patient Care Team:  Aiden Spencer MD as PCP - General (Family Medicine)  Sujatha Carmona MD as Consulting Physician (Pain Medicine)  Cr De Jesus MD as Consulting Physician (Gastroenterology)  Noemí Machuca APRN as Nurse Practitioner (Nurse Practitioner)    Chief complaint: Confusion    History: 79-year-old woman seen for Dr. Espinoza for evaluation of confusion.    She was admitted with pneumonia on October 9 and is gotten worse since being in the hospital.    She sleeps poorly in general typically goes to bed at 8:00.    She sleeps some during the day.    Currently she is confused and a bit delusional.    She has been found to have had 3 previous strokes including 2 leucoariosis and 1 sizable infarct in right parietal lobe.  The family is unaware of any focal deficits or history suggesting a stroke.    She has had some falls.    She apparently does not get syncope or get dizzy.    She has diabetes and had an hemoglobin A1c of 11 on admission.    She has mild chronic memory problems, type 2 diabetes, history of previous stroke which the family is not aware of.  Also degenerative disc disease and is on prednisone 5 mg daily for that  Past Medical History:   Diagnosis Date    Anemia     Arthritis     Back problem     CAD (coronary artery disease)     Cancer     Right breast    Chronic back pain     Chronically on opiate therapy     Depression     Diabetes mellitus     DX 14 years ago- checks fsbs weekly    Fibromyalgia     Gastroparesis     GERD (gastroesophageal reflux disease)     Headache     emotional/tension    History of transfusion     [facility]    HTN (hypertension)     Hypercholesteremia     IBS (irritable bowel syndrome)     Incontinence of urine     urgency    Migraine headache     Myalgia and myositis     Peripheral neuropathy     Sleep apnea     does not wear cpap    UTI (urinary tract infection)        Vital Signs   Vitals:    10/14/24 0700  10/14/24 0800 10/14/24 1000 10/14/24 1232   BP: 173/70  160/78    BP Location: Left arm  Left arm    Patient Position: Lying  Lying    Pulse:  90  81   Resp: 16 16 16 16   Temp: 97.6 °F (36.4 °C)  98.5 °F (36.9 °C)    TempSrc: Oral  Oral    SpO2:  97%  100%   Weight:       Height:           Physical Exam:   General: Pleasant and alert              Neuro: Oriented only to self.    She cannot identify her knees and cannot tell me how old she has.    Speech is articulate.    Coordination is normal with finger-nose testing.    Cranial nerves show conjugate eye movements and equal pupils.  Facial movement sensation are normal bilaterally.    Palate elevates normally tongue protrudes normally.    Reflexes are 1+ and equal in the upper extremities.  Plus minus in the lower extremities.    Motor testing shows normal power and tone with no pronator drift.  She is antigravity with both legs.        Results Review:  CT head was personally reviewed and shows a 5 cm left frontal stroke as well as lacunar strokes in the right frontal and in the left hemisphere.      Results from last 7 days   Lab Units 10/14/24  0052   WBC 10*3/mm3 9.74   HEMOGLOBIN g/dL 8.7*   HEMATOCRIT % 26.9*   PLATELETS 10*3/mm3 271     Results from last 7 days   Lab Units 10/14/24  0052 10/13/24  0813 10/10/24  1236 10/09/24  1842   SODIUM mmol/L 139 139 141 136   POTASSIUM mmol/L 3.9 3.8 4.1 4.4   CHLORIDE mmol/L 106 106 110* 103   CO2 mmol/L 20.0* 20.0* 18.0* 19.0*   BUN mg/dL 26* 28* 45* 56*   CREATININE mg/dL 2.33* 2.45* 2.88* 3.30*   CALCIUM mg/dL 9.6 9.5 9.1 9.5   BILIRUBIN mg/dL 0.2  --  0.2 0.3   ALK PHOS U/L 70  --  70 78   ALT (SGPT) U/L 24  --  15 18   AST (SGOT) U/L 36*  --  21 21   GLUCOSE mg/dL 155* 181* 156* 359*       Imaging Results (Last 24 Hours)       Procedure Component Value Units Date/Time    CT Head Without Contrast [[id number]] Collected: 10/14/24 1209     Updated: 10/14/24 1215    Narrative:      CT HEAD WO CONTRAST    Date of Exam:  10/14/2024 11:57 AM EDT    Indication: AMS, increased lethargy.    Comparison: CT head without contrast 10/3/2024.    Technique: Axial CT images were obtained of the head without contrast administration.  Automated exposure control and iterative construction methods were used.      Findings:  Encephalomalacia consistent with chronic infarct within the right frontal lobe. Chronic left subinsular lacunar-type infarct. Small chronic lacunar infarct within the left basal ganglia. Chronic focal infarct in the parafalcine high right frontal lobe.    No evidence of acute or evolving infarct, mass lesion, mass effect, midline shift or intracranial hemorrhage. Right posterior parietal craniotomy changes are redemonstrated, and no acute or suspicious calvarial abnormality is identified. Paranasal   sinuses and mastoid air cells are clear. Presumed bilateral cataract surgery.      Impression:      1. No acute intracranial finding. No significant change compared to 10/3/2024.  2. Chronic findings as described above.      Electronically Signed: Jennifer Mon MD    10/14/2024 12:12 PM EDT    Workstation ID: [id number]            Assessment:  Pre-existing vascular dementia.    Sleep deprivation psychosis.    Covering pneumonia    Plan:  Add melatonin 5 mg nightly.    Donepezil 5 mg nightly and increase to 10 mg as tolerated.    Add temazepam 7.5 mg nightly    Comment:  Will titrate the medications up as needed.    The patient does not seem to have behavioral issues and is easily redirected so far.  I spoke to the patient's niece and her sister about the diagnosis         I discussed the patients findings and my recommendations with patient and family    Collin Herrera MD  10/14/24  13:32 EDT

## 2024-10-14 NOTE — PROGRESS NOTES
Continued Stay Note  [facility]     Patient Name: Arminda Krishnan  MRN: [id number]  Today's Date: 10/14/2024    Admit Date: 10/9/2024        Discharge Plan       Row Name 10/14/24 4814       Plan    Plan Comments Case management set a message to the [facility] managers to have PT and OT see the patient Tuesday is making a referral to [facility] for short term rehab placement on 10/14/2024.                   Discharge Codes    No documentation.                 Expected Discharge Date and Time       Expected Discharge Date Expected Discharge Time    Oct 14, 2024               BALAJI Fernández

## 2024-10-14 NOTE — PLAN OF CARE
Goal Outcome Evaluation:                   Anticipated Discharge Disposition (SLP): skilled nursing facility

## 2024-10-14 NOTE — TELEPHONE ENCOUNTER
RICA CALLED BACK WITH ANOTHER UPDATE. STATED THE NEUROLOGIST SAID JEANNE HAD 3 STROKES. TWO OF WHICH WERE PRETTY SIGNIFICANT BUT THE OTHER WAS SMALLER. SHE HAD HER CT DONE AT . YOU SHOULD BE ABLE TO SEE THOSE RESULTS AS WELL. SHE WAS PUT ON SOME MEDICATION THAT SHOULD HELP HER BUT IT WILL TAKE A FEW DAYS TO START WORKING.

## 2024-10-14 NOTE — PROGRESS NOTES
Continued Stay Note  [facility]     Patient Name: Arminda Krishnan  MRN: [id number]  Today's Date: 10/14/2024    Admit Date: 10/9/2024        Discharge Plan       Row Name 10/14/24 1046       Plan    Plan Comments [facility] is not in network with [employer] and they are sending the referral to there administration to see if there is any way to have Mrs. Krishnan be able to go there for rehab Medicaid pending and not use her [employer] Medicare.                   Discharge Codes    No documentation.                 Expected Discharge Date and Time       Expected Discharge Date Expected Discharge Time    Oct 14, 2024               BALAJI Fernández

## 2024-10-15 LAB
ALBUMIN SERPL-MCNC: 3.1 G/DL (ref 3.5–5.2)
ALBUMIN/GLOB SERPL: 0.9 G/DL
ALP SERPL-CCNC: 76 U/L (ref 39–117)
ALT SERPL W P-5'-P-CCNC: 21 U/L (ref 1–33)
ANION GAP SERPL CALCULATED.3IONS-SCNC: 14 MMOL/L (ref 5–15)
AST SERPL-CCNC: 26 U/L (ref 1–32)
BILIRUB SERPL-MCNC: 0.3 MG/DL (ref 0–1.2)
BUN SERPL-MCNC: 24 MG/DL (ref 8–23)
BUN/CREAT SERPL: 10.2 (ref 7–25)
CALCIUM SPEC-SCNC: 9.2 MG/DL (ref 8.6–10.5)
CHLORIDE SERPL-SCNC: 103 MMOL/L (ref 98–107)
CO2 SERPL-SCNC: 21 MMOL/L (ref 22–29)
CREAT SERPL-MCNC: 2.35 MG/DL (ref 0.57–1)
DEPRECATED RDW RBC AUTO: 48.9 FL (ref 37–54)
EGFRCR SERPLBLD CKD-EPI 2021: 20.3 ML/MIN/1.73
ERYTHROCYTE [DISTWIDTH] IN BLOOD BY AUTOMATED COUNT: 15.2 % (ref 12.3–15.4)
GLOBULIN UR ELPH-MCNC: 3.6 GM/DL
GLUCOSE BLDC GLUCOMTR-MCNC: 176 MG/DL (ref 70–130)
GLUCOSE BLDC GLUCOMTR-MCNC: 183 MG/DL (ref 70–130)
GLUCOSE BLDC GLUCOMTR-MCNC: 240 MG/DL (ref 70–130)
GLUCOSE BLDC GLUCOMTR-MCNC: 245 MG/DL (ref 70–130)
GLUCOSE SERPL-MCNC: 254 MG/DL (ref 65–99)
HCT VFR BLD AUTO: 29.5 % (ref 34–46.6)
HGB BLD-MCNC: 9.4 G/DL (ref 12–15.9)
MCH RBC QN AUTO: 28 PG (ref 26.6–33)
MCHC RBC AUTO-ENTMCNC: 31.9 G/DL (ref 31.5–35.7)
MCV RBC AUTO: 87.8 FL (ref 79–97)
PLATELET # BLD AUTO: 291 10*3/MM3 (ref 140–450)
PMV BLD AUTO: 10.5 FL (ref 6–12)
POTASSIUM SERPL-SCNC: 4.2 MMOL/L (ref 3.5–5.2)
PROT SERPL-MCNC: 6.7 G/DL (ref 6–8.5)
RBC # BLD AUTO: 3.36 10*6/MM3 (ref 3.77–5.28)
SODIUM SERPL-SCNC: 138 MMOL/L (ref 136–145)
WBC NRBC COR # BLD AUTO: 9.36 10*3/MM3 (ref 3.4–10.8)

## 2024-10-15 PROCEDURE — 63710000001 INSULIN REGULAR HUMAN PER 5 UNITS: Performed by: INTERNAL MEDICINE

## 2024-10-15 PROCEDURE — 85027 COMPLETE CBC AUTOMATED: CPT | Performed by: FAMILY MEDICINE

## 2024-10-15 PROCEDURE — 97530 THERAPEUTIC ACTIVITIES: CPT

## 2024-10-15 PROCEDURE — 63710000001 INSULIN GLARGINE PER 5 UNITS: Performed by: INTERNAL MEDICINE

## 2024-10-15 PROCEDURE — 99232 SBSQ HOSP IP/OBS MODERATE 35: CPT | Performed by: FAMILY MEDICINE

## 2024-10-15 PROCEDURE — 80053 COMPREHEN METABOLIC PANEL: CPT | Performed by: FAMILY MEDICINE

## 2024-10-15 PROCEDURE — 63710000001 INSULIN LISPRO (HUMAN) PER 5 UNITS: Performed by: STUDENT IN AN ORGANIZED HEALTH CARE EDUCATION/TRAINING PROGRAM

## 2024-10-15 PROCEDURE — 99232 SBSQ HOSP IP/OBS MODERATE 35: CPT | Performed by: PSYCHIATRY & NEUROLOGY

## 2024-10-15 PROCEDURE — 82948 REAGENT STRIP/BLOOD GLUCOSE: CPT

## 2024-10-15 PROCEDURE — 97535 SELF CARE MNGMENT TRAINING: CPT

## 2024-10-15 PROCEDURE — 94799 UNLISTED PULMONARY SVC/PX: CPT

## 2024-10-15 RX ORDER — SIMETHICONE 80 MG
80 TABLET,CHEWABLE ORAL 4 TIMES DAILY PRN
Status: DISCONTINUED | OUTPATIENT
Start: 2024-10-15 | End: 2024-10-22 | Stop reason: HOSPADM

## 2024-10-15 RX ADMIN — HUMAN INSULIN 3 UNITS: 100 INJECTION, SOLUTION SUBCUTANEOUS at 10:52

## 2024-10-15 RX ADMIN — SENNOSIDES AND DOCUSATE SODIUM 2 TABLET: 50; 8.6 TABLET ORAL at 20:37

## 2024-10-15 RX ADMIN — INSULIN GLARGINE 5 UNITS: 100 INJECTION, SOLUTION SUBCUTANEOUS at 09:42

## 2024-10-15 RX ADMIN — HYDROCODONE BITARTRATE AND ACETAMINOPHEN 1 TABLET: 5; 325 TABLET ORAL at 20:36

## 2024-10-15 RX ADMIN — LINAGLIPTIN 5 MG: 5 TABLET, FILM COATED ORAL at 09:42

## 2024-10-15 RX ADMIN — IPRATROPIUM BROMIDE AND ALBUTEROL SULFATE 3 ML: 2.5; .5 SOLUTION RESPIRATORY (INHALATION) at 19:03

## 2024-10-15 RX ADMIN — DONEPEZIL HYDROCHLORIDE 5 MG: 10 TABLET, FILM COATED ORAL at 20:37

## 2024-10-15 RX ADMIN — HUMAN INSULIN 2 UNITS: 100 INJECTION, SOLUTION SUBCUTANEOUS at 18:39

## 2024-10-15 RX ADMIN — ASPIRIN 81 MG 81 MG: 81 TABLET ORAL at 09:42

## 2024-10-15 RX ADMIN — INSULIN LISPRO 2 UNITS: 100 INJECTION, SOLUTION INTRAVENOUS; SUBCUTANEOUS at 09:42

## 2024-10-15 RX ADMIN — IPRATROPIUM BROMIDE AND ALBUTEROL SULFATE 3 ML: 2.5; .5 SOLUTION RESPIRATORY (INHALATION) at 12:31

## 2024-10-15 RX ADMIN — ISOSORBIDE MONONITRATE 60 MG: 60 TABLET, EXTENDED RELEASE ORAL at 09:42

## 2024-10-15 RX ADMIN — SIMETHICONE 80 MG: 80 TABLET, CHEWABLE ORAL at 12:05

## 2024-10-15 RX ADMIN — HUMAN INSULIN 3 UNITS: 100 INJECTION, SOLUTION SUBCUTANEOUS at 12:06

## 2024-10-15 RX ADMIN — Medication 5 MG: at 20:37

## 2024-10-15 RX ADMIN — Medication 10 ML: at 09:43

## 2024-10-15 RX ADMIN — ATORVASTATIN CALCIUM 80 MG: 40 TABLET, FILM COATED ORAL at 09:42

## 2024-10-15 RX ADMIN — Medication 10 ML: at 20:37

## 2024-10-15 RX ADMIN — CLOPIDOGREL BISULFATE 75 MG: 75 TABLET ORAL at 09:42

## 2024-10-15 RX ADMIN — SIMETHICONE 80 MG: 80 TABLET, CHEWABLE ORAL at 20:37

## 2024-10-15 RX ADMIN — SENNOSIDES AND DOCUSATE SODIUM 2 TABLET: 50; 8.6 TABLET ORAL at 09:42

## 2024-10-15 RX ADMIN — TEMAZEPAM 7.5 MG: 7.5 CAPSULE ORAL at 20:37

## 2024-10-15 RX ADMIN — FERROUS SULFATE TAB 325 MG (65 MG ELEMENTAL FE) 325 MG: 325 (65 FE) TAB at 09:42

## 2024-10-15 RX ADMIN — POLYETHYLENE GLYCOL 3350 17 G: 17 POWDER, FOR SOLUTION ORAL at 09:42

## 2024-10-15 NOTE — PROGRESS NOTES
Continued Stay Note  [facility]     Patient Name: Arminda Krishnan  MRN: [id number]  Today's Date: 10/15/2024    Admit Date: 10/9/2024        Discharge Plan       Row Name 10/15/24 1500       Plan    Plan Comments A referral has been made to [facility] for rehab and case management called [facility] and left a message for the admissions office.                   Discharge Codes    No documentation.                 Expected Discharge Date and Time       Expected Discharge Date Expected Discharge Time    Oct 14, 2024               BALAJI Fernández

## 2024-10-15 NOTE — PROGRESS NOTES
Neurology       Patient Care Team:  Aiden Spencer MD as PCP - General (Family Medicine)  Sujatha Carmona MD as Consulting Physician (Pain Medicine)  Cr De Jesus MD as Consulting Physician (Gastroenterology)  Noemí Machuca APRN as Nurse Practitioner (Nurse Practitioner)    Chief complaint: Sleep deprivation    History: Patient has improved overnight and slept well.    She is awake and talking to his sister.      Past Medical History:   Diagnosis Date    Anemia     Arthritis     Back problem     CAD (coronary artery disease)     Cancer     Right breast    Chronic back pain     Chronically on opiate therapy     Depression     Diabetes mellitus     DX 14 years ago- checks fsbs weekly    Fibromyalgia     Gastroparesis     GERD (gastroesophageal reflux disease)     Headache     emotional/tension    History of transfusion     [facility]    HTN (hypertension)     Hypercholesteremia     IBS (irritable bowel syndrome)     Incontinence of urine     urgency    Migraine headache     Myalgia and myositis     Peripheral neuropathy     Sleep apnea     does not wear cpap    UTI (urinary tract infection)        Vital Signs   Vitals:    10/14/24 2330 10/15/24 0700 10/15/24 1100 10/15/24 1231   BP: 146/62 162/61 129/77    BP Location: Right arm Left arm Left arm    Patient Position: Lying Lying Sitting    Pulse: 100   91   Resp: 18 18 18 18   Temp: 99.9 °F (37.7 °C) 100.2 °F (37.9 °C) 97.4 °F (36.3 °C)    TempSrc: Oral Oral Oral    SpO2: 93%      Weight:       Height:           Physical Exam:   General: Oriented to person place and situation.                  Neuro: Cheerful calm and pleasant.    Feeding herself lunch.        Results Review:  Reviewed  Results from last 7 days   Lab Units 10/15/24  0939   WBC 10*3/mm3 9.36   HEMOGLOBIN g/dL 9.4*   HEMATOCRIT % 29.5*   PLATELETS 10*3/mm3 291     Results from last 7 days   Lab Units 10/15/24  0939 10/14/24  0052 10/13/24  0813 10/10/24  1236    SODIUM mmol/L 138 139 139 141   POTASSIUM mmol/L 4.2 3.9 3.8 4.1   CHLORIDE mmol/L 103 106 106 110*   CO2 mmol/L 21.0* 20.0* 20.0* 18.0*   BUN mg/dL 24* 26* 28* 45*   CREATININE mg/dL 2.35* 2.33* 2.45* 2.88*   CALCIUM mg/dL 9.2 9.6 9.5 9.1   BILIRUBIN mg/dL 0.3 0.2  --  0.2   ALK PHOS U/L 76 70  --  70   ALT (SGPT) U/L 21 24  --  15   AST (SGOT) U/L 26 36*  --  21   GLUCOSE mg/dL 254* 155* 181* 156*       Imaging Results (Last 24 Hours)       ** No results found for the last 24 hours. **            Assessment:  Sleep deprivation improved  Plan:  Continue current regimen    Likely inpatient rehab.    Comment:  Approaching baseline         I discussed the patients findings and my recommendations with patient and family    Collin Herrera MD  10/15/24  13:11 EDT

## 2024-10-15 NOTE — THERAPY TREATMENT NOTE
Patient Name: Arminda Krishnan  : 1945    MRN: [id number]                              Today's Date: 10/15/2024       Admit Date: 10/9/2024    Visit Dx:     ICD-10-CM ICD-9-CM   1. Uncontrolled type 2 diabetes mellitus with hyperglycemia  E11.65 250.02   2. Acute UTI (urinary tract infection)  N39.0 599.0   3. Pneumonia of left lower lobe due to infectious organism  J18.9 486   4. Dysphagia, unspecified type  R13.10 787.20   5. Cerebral vascular disease  I67.9 437.9   6. Degeneration of intervertebral disc of lumbar region, unspecified whether pain present  M51.369 722.52   7. Spinal stenosis, lumbar region, with neurogenic claudication  M48.062 724.03   8. Aspiration pneumonia of left lower lobe, unspecified aspiration pneumonia type  J69.0 507.0   9. Dehydration  E86.0 276.51     Patient Active Problem List   Diagnosis    Cerebral vascular disease    Degenerative disc disease, lumbar    Degenerative disc disease, cervical    Spinal stenosis, lumbar region, with neurogenic claudication    Tobacco abuse    Chronic anemia    CKD (chronic kidney disease) stage 4, GFR 15-29 ml/min    Hyperglycemia    History of CVA (cerebrovascular accident)    Type 2 diabetes mellitus with hyperglycemia, without long-term current use of insulin    Disorientation    Dehydration    LLL pneumonia    HTN (hypertension)    Pneumonia     Past Medical History:   Diagnosis Date    Anemia     Arthritis     Back problem     CAD (coronary artery disease)     Cancer     Right breast    Chronic back pain     Chronically on opiate therapy     Depression     Diabetes mellitus     DX 14 years ago- checks fsbs weekly    Fibromyalgia     Gastroparesis     GERD (gastroesophageal reflux disease)     Headache     emotional/tension    History of transfusion     [facility]    HTN (hypertension)     Hypercholesteremia     IBS (irritable bowel syndrome)     Incontinence of urine     urgency    Migraine headache     Myalgia and myositis      Peripheral neuropathy     Sleep apnea     does not wear cpap    UTI (urinary tract infection)      Past Surgical History:   Procedure Laterality Date    APPENDECTOMY      ARTERIOGRAM MESENTERIC N/A 6/16/2022    Procedure: DIAGNOSTIC ARTERIOGRAM WITH CELIAC STENT PLACEMENT;  Surgeon: Neo Neil MD;  Location: [facility];  Service: Vascular;  Laterality: N/A;  FLUORO: 16 MIN  DOSE: 2384 MGY  CONTRAST:  20 ML    BACK SURGERY      5x per patient    BRAIN TUMOR EXCISION  1988    BREAST BIOPSY      CARPAL TUNNEL RELEASE Bilateral     CHOLECYSTECTOMY      COLONOSCOPY      2015    CRANIOTOMY FOR TUMOR      EYE SURGERY      bilateral cataracts removed    HEMORRHOIDECTOMY      LUMBAR FUSION N/A 01/04/2017    Procedure: LUMBAR LAMINECTOMY AND DECOMRESSION  L3 AND L4;  Surgeon: Nishant Bhatti MD;  Location: [facility] OR;  Service:     TOTAL ABDOMINAL HYSTERECTOMY      TRIGGER FINGER RELEASE        General Information       Row Name 10/15/24 0938          OT Time and Intention    Subjective Information --  -     Document Type therapy note (daily note)  -     Mode of Treatment occupational therapy;individual therapy  -     Patient Effort --  -     Symptoms Noted During/After Treatment --  -       Row Name 10/15/24 0938          General Information    Patient Profile Reviewed yes  -     Existing Precautions/Restrictions fall;other (see comments)  confusion, decreased safety awareness, impulsivity  -     Barriers to Rehab medically complex;previous functional deficit;cognitive status  -       Row Name 10/15/24 0938          Cognition    Orientation Status (Cognition) oriented to;person;unable/difficult to assess  -       Row Name 10/15/24 0938          Safety Issues/Impairments Affecting Functional Mobility    Safety Issues Affecting Function (Mobility) awareness of need for assistance;insight into deficits/self-awareness;safety precautions follow-through/compliance;safety precaution  awareness;sequencing abilities;impulsivity  -     Impairments Affecting Function (Mobility) balance;cognition;coordination;endurance/activity tolerance;strength;pain;postural/trunk control  -     Cognitive Impairments, Mobility Safety/Performance attention;awareness, need for assistance;impulsivity;safety precaution awareness;safety precaution follow-through;sequencing abilities  -               User Key  (r) = Recorded By, (t) = Taken By, (c) = Cosigned By      Initials Name Provider Type     Kat Kat OT Occupational Therapist                     Mobility/ADL's       Row Name 10/15/24 0944          Bed Mobility    Bed Mobility supine-sit  -     Supine-Sit [place] (Bed Mobility) moderate assist (50% patient effort);verbal cues  -     Bed Mobility, Safety Issues cognitive deficits limit understanding;decreased use of arms for pushing/pulling;decreased use of legs for bridging/pushing;impaired trunk control for bed mobility  -     Assistive Device (Bed Mobility) bed rails;head of bed elevated;repositioning sheet  -       Row Name 10/15/24 0944          Transfers    Transfers sit-stand transfer;stand-sit transfer;bed-chair transfer  -       Row Name 10/15/24 0944          Bed-Chair Transfer    Bed-Chair [place] (Transfers) maximum assist (25% patient effort);2 person assist;verbal cues  -     Assistive Device (Bed-Chair Transfers) other (see comments)  BUE support  -       Row Name 10/15/24 0944          Sit-Stand Transfer    Sit-Stand [place] (Transfers) maximum assist (25% patient effort);2 person assist;verbal cues  -     Assistive Device (Sit-Stand Transfers) other (see comments)  -       Row Name 10/15/24 0944          Stand-Sit Transfer    Stand-Sit [place] (Transfers) maximum assist (25% patient effort);2 person assist;verbal cues  -     Assistive Device (Stand-Sit Transfers) other (see comments)  -       Row Name 10/15/24 0944          Activities of  Daily Living    BADL Assessment/Intervention lower body dressing;upper body dressing;toileting;feeding  -       Row Name 10/15/24 0944          Lower Body Dressing Assessment/Training    [place] Level (Lower Body Dressing) don;socks;dependent (less than 25% patient effort)  -     Position (Lower Body Dressing) supine  -       Row Name 10/15/24 0944          Upper Body Dressing Assessment/Training    [place] Level (Upper Body Dressing) don;doff;other (see comments);maximum assist (25% patient effort);verbal cues  -     Position (Upper Body Dressing) supine  -     Comment, (Upper Body Dressing) hosp gown  -       Row Name 10/15/24 0944          Toileting Assessment/Training    [place] Level (Toileting) adjust/manage clothing;change pad/brief;perform perineal hygiene;dependent (less than 25% patient effort);verbal cues  -     Position (Toileting) supine;supported standing  -       Row Name 10/15/24 0944          Self-Feeding Assessment/Training    [place] Level (Feeding) maximum assist (25% patient effort);verbal cues;liquids to mouth  -     Position (Feeding) supported sitting  -               User Key  (r) = Recorded By, (t) = Taken By, (c) = Cosigned By      Initials Name Provider Type     Kat Kat OT Occupational Therapist                   Obj/Interventions       [facility] Name 10/15/24 0958          Balance    Balance Assessment sitting static balance;sitting dynamic balance;sit to stand dynamic balance;standing static balance;standing dynamic balance  -     Static Sitting Balance contact guard  -     Dynamic Sitting Balance moderate assist  -     Position, Sitting Balance unsupported;sitting edge of bed;sitting in chair  -     Sit to Stand Dynamic Balance maximum assist;2-person assist;verbal cues  -     Static Standing Balance maximum assist;2-person assist;verbal cues  -     Dynamic Standing Balance maximum assist;2-person assist;verbal cues  -      Position/Device Used, Standing Balance supported  -     Balance Interventions sitting;sit to stand;occupation based/functional task  -               User Key  (r) = Recorded By, (t) = Taken By, (c) = Cosigned By      Initials Name Provider Type     Kat Kat, ANUJ Occupational Therapist                   Goals/Plan    No documentation.                  Clinical Impression       Row Name 10/15/24 0946          Pain Assessment    Pretreatment Pain Rating 0/10 - no pain  -     Posttreatment Pain Rating 0/10 - no pain  -[facility] Name 10/15/24 0946          Plan of Care Review    Plan of Care Reviewed With patient  -     Progress improving  -     Outcome Evaluation Pt presents w/ decreased safety awareness, impulsivity, confusion, generalized weakness, and balance deficits limiting her ADL independence. Will continue to progress pt as tolerated per OT POC. Rec SNF at d/c.  -       Row Name 10/15/24 0946          Therapy Assessment/Plan (OT)    Rehab Potential (OT) good  -     Criteria for Skilled Therapeutic Interventions Met (OT) yes;meets criteria;skilled treatment is necessary  [facility]     Therapy Frequency (OT) daily  -       Row Name 10/15/24 0946          Therapy Plan Review/Discharge Plan (OT)    Anticipated Discharge Disposition (OT) skilled nursing facility  -       Row Name 10/15/24 0946          Vital Signs    O2 Delivery Pre Treatment room air  -     O2 Delivery Intra Treatment room air  -     O2 Delivery Post Treatment room air  -     Pre Patient Position Supine  -     Intra Patient Position Standing  -     Post Patient Position Sitting  -       Row Name 10/15/24 0946          Positioning and Restraints    Pre-Treatment Position in bed  -     Post Treatment Position chair  -     In Chair notified nsg;reclined;call light within reach;encouraged to call for assist;exit alarm on;with family/caregiver;waffle cushion;legs elevated  -               User Key  (r) =  Recorded By, (t) = Taken By, (c) = Cosigned By      Initials Name Provider Type    Kat Golden OT Occupational Therapist                   Outcome Measures       Row Name 10/15/24 0947          How much help from another is currently needed...    Putting on and taking off regular lower body clothing? 1  -MC     Bathing (including washing, rinsing, and drying) 2  -MC     Toileting (which includes using toilet bed pan or urinal) 1  -MC     Putting on and taking off regular upper body clothing 2  -MC     Taking care of personal grooming (such as brushing teeth) 2  -MC     Eating meals 2  -MC     AM-PAC 6 Clicks Score (OT) 10  -       Row Name 10/15/24 0947          Functional Assessment    Outcome Measure Options AM-PAC 6 Clicks Daily Activity (OT)  -               User Key  (r) = Recorded By, (t) = Taken By, (c) = Cosigned By      Initials Name Provider Type    Kat Golden OT Occupational Therapist                    Occupational Therapy Education       Title: PT OT SLP Therapies (In Progress)       Topic: Occupational Therapy (In Progress)       Point: ADL training (In Progress)       Description:   Instruct learner(s) on proper safety adaptation and remediation techniques during self care or transfers.   Instruct in proper use of assistive devices.                  Learning Progress Summary            Patient Acceptance, E, NR by CHEMO at 10/15/2024 0947    Acceptance, E, VU,NR by  at 10/10/2024 0945    Comment: role of therapy and ongoing treatment plan   Family Acceptance, E, VU,NR by  at 10/10/2024 0945    Comment: role of therapy and ongoing treatment plan                      Point: Home exercise program (Not Started)       Description:   Instruct learner(s) on appropriate technique for monitoring, assisting and/or progressing therapeutic exercises/activities.                  Learner Progress:  Not documented in this visit.              Point: Precautions (In Progress)        Description:   Instruct learner(s) on prescribed precautions during self-care and functional transfers.                  Learning Progress Summary            Patient Acceptance, E, NR by  at 10/15/2024 0947                      Point: Body mechanics (In Progress)       Description:   Instruct learner(s) on proper positioning and spine alignment during self-care, functional mobility activities and/or exercises.                  Learning Progress Summary            Patient Acceptance, E, NR by  at 10/15/2024 0947                                      User Key       Initials Effective Dates Name Provider Type [facility] 02/03/23 -  Jenny Hirsch OT Occupational Therapist OT     10/14/22 -  Kat Kat OT Occupational Therapist OT                  OT Recommendation and Plan  Therapy Frequency (OT): daily  Plan of Care Review  Plan of Care Reviewed With: patient  Progress: improving  Outcome Evaluation: Pt presents w/ decreased safety awareness, impulsivity, confusion, generalized weakness, and balance deficits limiting her ADL independence. Will continue to progress pt as tolerated per OT POC. Rec SNF at d/c.     Time Calculation:         Time Calculation- OT       Row Name 10/15/24 0947             Time Calculation- OT    OT Start Time 0905  -      OT Received On 10/15/24  -      OT Goal Re-Cert Due Date 10/20/24  -         Timed Charges    59231 - OT Therapeutic Activity Minutes 12  -      63924 - OT Self Care/Mgmt Minutes 11  -         Total Minutes    Timed Charges Total Minutes 23  -       Total Minutes 23  -                User Key  (r) = Recorded By, (t) = Taken By, (c) = Cosigned By      Initials Name Provider Type     Kat Kat OT Occupational Therapist                  Therapy Charges for Today       Code Description Service Date Service Provider Modifiers Qty    60792947938  OT THERAPEUTIC ACT EA 15 MIN 10/15/2024 Kat Kat OT GO 1    38814762671  HC OT SELF CARE/MGMT/TRAIN EA 15 MIN 10/15/2024 Kat Kat OT GO 1    42617333047 HC OT THER SUPP EA 15 MIN 10/15/2024 Kat Kat OT GO 2                 Kat Kat OT  10/15/2024

## 2024-10-15 NOTE — THERAPY TREATMENT NOTE
Patient Name: Arminda Krishnan  : 1945    MRN: [id number]                              Today's Date: 10/15/2024       Admit Date: 10/9/2024    Visit Dx:     ICD-10-CM ICD-9-CM   1. Uncontrolled type 2 diabetes mellitus with hyperglycemia  E11.65 250.02   2. Acute UTI (urinary tract infection)  N39.0 599.0   3. Pneumonia of left lower lobe due to infectious organism  J18.9 486   4. Dysphagia, unspecified type  R13.10 787.20   5. Cerebral vascular disease  I67.9 437.9   6. Degeneration of intervertebral disc of lumbar region, unspecified whether pain present  M51.369 722.52   7. Spinal stenosis, lumbar region, with neurogenic claudication  M48.062 724.03   8. Aspiration pneumonia of left lower lobe, unspecified aspiration pneumonia type  J69.0 507.0   9. Dehydration  E86.0 276.51     Patient Active Problem List   Diagnosis    Cerebral vascular disease    Degenerative disc disease, lumbar    Degenerative disc disease, cervical    Spinal stenosis, lumbar region, with neurogenic claudication    Tobacco abuse    Chronic anemia    CKD (chronic kidney disease) stage 4, GFR 15-29 ml/min    Hyperglycemia    History of CVA (cerebrovascular accident)    Type 2 diabetes mellitus with hyperglycemia, without long-term current use of insulin    Disorientation    Dehydration    LLL pneumonia    HTN (hypertension)    Pneumonia     Past Medical History:   Diagnosis Date    Anemia     Arthritis     Back problem     CAD (coronary artery disease)     Cancer     Right breast    Chronic back pain     Chronically on opiate therapy     Depression     Diabetes mellitus     DX 14 years ago- checks fsbs weekly    Fibromyalgia     Gastroparesis     GERD (gastroesophageal reflux disease)     Headache     emotional/tension    History of transfusion     [facility]    HTN (hypertension)     Hypercholesteremia     IBS (irritable bowel syndrome)     Incontinence of urine     urgency    Migraine headache     Myalgia and myositis      Peripheral neuropathy     Sleep apnea     does not wear cpap    UTI (urinary tract infection)      Past Surgical History:   Procedure Laterality Date    APPENDECTOMY      ARTERIOGRAM MESENTERIC N/A 6/16/2022    Procedure: DIAGNOSTIC ARTERIOGRAM WITH CELIAC STENT PLACEMENT;  Surgeon: Neo Neil MD;  Location: [facility];  Service: Vascular;  Laterality: N/A;  FLUORO: 16 MIN  DOSE: 2384 MGY  CONTRAST:  20 ML    BACK SURGERY      5x per patient    BRAIN TUMOR EXCISION  1988    BREAST BIOPSY      CARPAL TUNNEL RELEASE Bilateral     CHOLECYSTECTOMY      COLONOSCOPY      2015    CRANIOTOMY FOR TUMOR      EYE SURGERY      bilateral cataracts removed    HEMORRHOIDECTOMY      LUMBAR FUSION N/A 01/04/2017    Procedure: LUMBAR LAMINECTOMY AND DECOMRESSION  L3 AND L4;  Surgeon: Nishant Bhatti MD;  [facility]  Service:     TOTAL ABDOMINAL HYSTERECTOMY      TRIGGER FINGER RELEASE        General Information       Row Name 10/15/24 1134          Physical Therapy Time and Intention    Document Type therapy note (daily note)  -KW     Mode of Treatment physical therapy  -       Row Name 10/15/24 1134          General Information    Patient Profile Reviewed yes  -KW     Existing Precautions/Restrictions fall;other (see comments)  confusion, decreased safety awareness, impulsivity  -KW               User Key  (r) = Recorded By, (t) = Taken By, (c) = Cosigned By      Initials Name Provider Type    KW Loly Davidson PT Physical Therapist                   Mobility       Row Name 10/15/24 1310          Sit-Stand Transfer    Sit-Stand [place] (Transfers) minimum assist (75% patient effort);verbal cues  -KW     Assistive Device (Sit-Stand Transfers) walker, front-wheeled  -KW       Row Name 10/15/24 1310          Gait/Stairs (Locomotion)    [place] Level (Gait) unable to assess  -KW               User Key  (r) = Recorded By, (t) = Taken By, (c) = Cosigned By      Initials Name Provider Type     Loly Vallejo, STANLEY Physical Therapist                   Obj/Interventions    No documentation.                  Goals/Plan    No documentation.                  Clinical Impression       Row Name 10/15/24 1310          Pain    Pretreatment Pain Rating 0/10 - no pain  -KW       Row Name 10/15/24 1310          Plan of Care Review    Progress improving  -     Outcome Evaluation Physical therapy treatment complete. Patient more alert today however remains confused. She has difficulty with staying on task and following commands. She was able to stand twice but unable to take steps due to poor command following and decreased safety awareness. Patient completed reaching activities with about 50% command following. The patient continues to present below baseline for functional mobility. The patient would continue to benefit from skilled PT to address strength, balance and activity tolerance deficits. Continue to current PT POC  -[facility] Name 10/15/24 1310          Therapy Assessment/Plan (PT)    Patient/Family Therapy Goals Statement (PT) to return to baseline  -     Predicted Duration of Therapy Intervention (PT) 10 days  -[facility] Name 10/15/24 1310          Vital Signs    Pre Systolic BP Rehab 129  -     Pre Treatment Diastolic BP 77  -KW       Row Name 10/15/24 1310          Positioning and Restraints    Pre-Treatment Position sitting in chair/recliner  -     Post Treatment Position chair  -KW     In Chair reclined;call light within reach;encouraged to call for assist;legs elevated;exit alarm on  -[facility] Name 10/15/24 1310          Plan of Care Review    Plan of Care Reviewed With patient  -KW               User Key  (r) = Recorded By, (t) = Taken By, (c) = Cosigned By      Initials Name Provider Type    Loly Vallejo, STANLEY Physical Therapist                   Outcome Measures       Row Name 10/15/24 1310 10/15/24 0800       How much help from another person do you currently  need...    Turning from your back to your side while in flat bed without using bedrails? 3  -KW 3  -TK    Moving from lying on back to sitting on the side of a flat bed without bedrails? 3  -KW 2  -TK    Moving to and from a bed to a chair (including a wheelchair)? 3  -KW 2  -TK    Standing up from a chair using your arms (e.g., wheelchair, bedside chair)? 3  -KW 2  -TK    Climbing 3-5 steps with a railing? 2  -KW 2  -TK    To walk in hospital room? 2  -KW 2  -TK    AM-PAC 6 Clicks Score (PT) 16  -KW 13  -TK    Highest Level of Mobility Goal 5 --> Static standing  -KW 4 --> Transfer to chair/commode  -TK      Row Name 10/15/24 1310 10/15/24 0947       Functional Assessment    Outcome Measure Options AM-PAC 6 Clicks Basic Mobility (PT)  - AM-PAC 6 Clicks Daily Activity (OT)  -              User Key  (r) = Recorded By, (t) = Taken By, (c) = Cosigned By      Initials Name Provider Type    Kat Golden, OT Occupational Therapist    KW Loly Davidson, PT Physical Therapist    TK Niurka Arambula, RN Registered Nurse                                 Physical Therapy Education        No education to display                  PT Recommendation and Plan     Plan of Care Reviewed With: patient  Progress: improving  Outcome Evaluation: Physical therapy treatment complete. Patient more alert today however remains confused. She has difficulty with staying on task and following commands. She was able to stand twice but unable to take steps due to poor command following and decreased safety awareness. Patient completed reaching activities with about 50% command following. The patient continues to present below baseline for functional mobility. The patient would continue to benefit from skilled PT to address strength, balance and activity tolerance deficits. Continue to current PT POC     Time Calculation:   PT Evaluation Complexity  History, PT Evaluation Complexity: 3 or more personal factors and/or  comorbidities  Examination of Body Systems (PT Eval Complexity): total of 3 or more elements  Clinical Presentation (PT Evaluation Complexity): evolving  Clinical Decision Making (PT Evaluation Complexity): moderate complexity  Overall Complexity (PT Evaluation Complexity): moderate complexity     PT Charges       Row Name 10/15/24 1310             Time Calculation    Start Time 1310  -KW      PT Received On 10/15/24  -KW         Timed Charges    38165 - PT Therapeutic Activity Minutes 28  -KW         Total Minutes    Timed Charges Total Minutes 28  -KW       Total Minutes 28  -KW                User Key  (r) = Recorded By, (t) = Taken By, (c) = Cosigned By      Initials Name Provider Type    Loly Vallejo, STANLEY Physical Therapist                  Therapy Charges for Today       Code Description Service Date Service Provider Modifiers Qty    15877408631 HC PT THERAPEUTIC ACT EA 15 MIN 10/15/2024 Loly Davidson PT GP 2            PT G-Codes  Outcome Measure Options: AM-PAC 6 Clicks Basic Mobility (PT)  AM-PAC 6 Clicks Score (PT): 16  AM-PAC 6 Clicks Score (OT): 10  PT Discharge Summary  Anticipated Discharge Disposition (PT): inpatient rehabilitation facility    Loly Davidson PT  10/15/2024

## 2024-10-15 NOTE — PROGRESS NOTES
[facility] Medicine Services  PROGRESS NOTE    Patient Name: Arminda Krishnan  : 1945  MRN: [id number]    Date of Admission: 10/9/2024  Primary Care Physician: Aiden Spencer MD    Subjective   Subjective     CC: Follow-up hyperglycemia    HPI:   Patient is an 80 yo F seen and examined by me this AM, sister at bedside, patient more awake this AM and able to answer simple questions but disoriented. Was able to rest better overnight, continue to follow.     Objective   Objective     Vital Signs:   Temp:  [97.4 °F (36.3 °C)-100.2 °F (37.9 °C)] 97.4 °F (36.3 °C)  Heart Rate:  [] 91  Resp:  [18] 18  BP: (129-162)/(61-77) 129/77  Flow (L/min) (Oxygen Therapy):  [2] 2     Physical Exam:  Constitutional: Awake, remains disoriented but able to answer simple questions, frail and elderly appearing    HENT: NCAT, mucous membranes moist  Respiratory: Decreased BS bilaterally, no rhonchi or wheezing, respiratory effort normal   Cardiovascular: RRR, no murmurs, rubs, or gallops  Gastrointestinal: Positive bowel sounds, soft, mild tenderness to palpation, nondistended  Musculoskeletal: No bilateral ankle edema  Psychiatric: Appropriate affect, cooperative  Neurologic: Disoriented, awake, follows commands, BOND   Skin: No rashes       Results Reviewed:  LAB RESULTS:      Lab 10/15/24  0939 10/14/24  0052 10/13/24  1219 10/13/24  0519 10/10/24  2223 10/10/24  1745 10/10/24  1236 10/09/24  1842   WBC 9.36 9.74  --  9.23  --   --  10.17 8.68   HEMOGLOBIN 9.4* 8.7* 7.0* 7.1* 8.2*   < > 7.5* 7.9*   HEMATOCRIT 29.5* 26.9* 21.8* 22.6* 25.8*   < > 23.7* 25.0*   PLATELETS 291 271  --  282  --   --  296 301   NEUTROS ABS  --  6.72  --   --   --   --  7.33* 6.31   IMMATURE GRANS (ABS)  --  0.15*  --   --   --   --  0.16* 0.20*   LYMPHS ABS  --  1.40  --   --   --   --  1.36 1.13   MONOS ABS  --  1.19*  --   --   --   --  1.08* 0.83   EOS ABS  --  0.20  --   --   --   --  0.14 0.13   MCV  87.8 87.3  --  88.6  --   --  88.8 88.7   CRP  --   --   --   --   --   --   --  13.05*   PROCALCITONIN  --   --   --   --   --   --   --  0.12   LACTATE  --   --   --   --   --   --   --  0.9   HSTROP T  --   --   --   --   --   --   --  39*    < > = values in this interval not displayed.         Lab 10/15/24  0939 10/14/24  0052 10/13/24  0813 10/10/24  1236 10/09/24  1842   SODIUM 138 139 139 141 136   POTASSIUM 4.2 3.9 3.8 4.1 4.4   CHLORIDE 103 106 106 110* 103   CO2 21.0* 20.0* 20.0* 18.0* 19.0*   ANION GAP 14.0 13.0 13.0 13.0 14.0   BUN 24* 26* 28* 45* 56*   CREATININE 2.35* 2.33* 2.45* 2.88* 3.30*   EGFR 20.3* 20.6* 19.4* 15.9* 13.5*   GLUCOSE 254* 155* 181* 156* 359*   CALCIUM 9.2 9.6 9.5 9.1 9.5   MAGNESIUM  --  2.2 1.5*  --  1.8   PHOSPHORUS  --   --   --   --  3.5   TSH  --   --   --   --  0.465         Lab 10/15/24  0939 10/14/24  0052 10/10/24  1236 10/09/24  1842   TOTAL PROTEIN 6.7 6.9 7.0 7.4   ALBUMIN 3.1* 3.0* 3.1* 3.3*   GLOBULIN 3.6 3.9 3.9 4.1   ALT (SGPT) 21 24 15 18   AST (SGOT) 26 36* 21 21   BILIRUBIN 0.3 0.2 0.2 0.3   ALK PHOS 76 70 70 78   LIPASE  --   --   --  23         Lab 10/09/24  1842   PROBNP 719.5   HSTROP T 39*             Lab 10/13/24  1418 10/09/24  1842   IRON  --  14*  14*   IRON SATURATION (TSAT)  --  7*   TIBC  --  195*   TRANSFERRIN  --  131*   FERRITIN  --  766.80*   FOLATE  --  19.00   VITAMIN B 12  --  1,262*   ABO TYPING O  --    RH TYPING Positive  --    ANTIBODY SCREEN Negative  --          Lab 10/09/24  1846   FIO2 21   CARBOXYHEMOGLOBIN (VENOUS) 1.6     Brief Urine Lab Results  (Last result in the past 365 days)        Color   Clarity   Blood   Leuk Est   Nitrite   Protein   CREAT   Urine HCG        10/09/24 2058 Yellow   Cloudy   Large (3+)   Moderate (2+)   Negative   100 mg/dL (2+)                   Microbiology Results Abnormal       Procedure Component Value - Date/Time    Blood Culture - Blood, Arm, Right [[id number]]  (Normal) Collected: 10/09/24 2159    Lab  Status: Final result Specimen: Blood from Arm, Right Updated: 10/14/24 2231     Blood Culture No growth at 5 days    Narrative:      Less than seven (7) mL's of blood was collected.  Insufficient quantity may yield false negative results.    Blood Culture - Blood, Hand, Left [[id number]]  (Normal) Collected: 10/09/24 1842    Lab Status: Final result Specimen: Blood from Hand, Left Updated: 10/14/24 1946     Blood Culture No growth at 5 days    Narrative:      Less than seven (7) mL's of blood was collected.  Insufficient quantity may yield false negative results.    Urine Culture - Urine, Urine, Clean Catch [[id number]]  (Normal) Collected: 10/09/24 2058    Lab Status: Final result Specimen: Urine, Clean Catch Updated: 10/11/24 1000     Urine Culture No growth    Legionella Antigen, Urine - Urine, Urine, Clean Catch [[id number]]  (Normal) Collected: 10/09/24 2058    Lab Status: Final result Specimen: Urine, Clean Catch Updated: 10/10/24 0922     LEGIONELLA ANTIGEN, URINE Negative    S. Pneumo Ag Urine or CSF - Urine, Urine, Clean Catch [[id number]]  (Normal) Collected: 10/09/24 2058    Lab Status: Final result Specimen: Urine, Clean Catch Updated: 10/10/24 0922     Strep Pneumo Ag Negative    COVID PRE-OP / PRE-PROCEDURE SCREENING ORDER (NO ISOLATION) - Swab, Nasopharynx [[id number]]  (Normal) Collected: 10/09/24 1843    Lab Status: Final result Specimen: Swab from Nasopharynx Updated: 10/09/24 1958    Narrative:      The following orders were created for panel order COVID PRE-OP / PRE-PROCEDURE SCREENING ORDER (NO ISOLATION) - Swab, Nasopharynx.  Procedure                               Abnormality         Status                     ---------                               -----------         ------                     COVID-19, FLU A/B, RSV P...[[id number]]  Normal              Final result                 Please view results for these tests on the individual orders.    COVID-19, FLU A/B, RSV PCR 1 HR TAT - Swab,  Nasopharynx [[id number]]  (Normal) Collected: 10/09/24 1843    Lab Status: Final result Specimen: Swab from Nasopharynx Updated: 10/09/24 1958     COVID19 Not Detected     Influenza A PCR Not Detected     Influenza B PCR Not Detected     RSV, PCR Not Detected    Narrative:      Fact sheet for providers: https://www.fda.gov/media/888472/download    Fact sheet for patients: https://www.fda.gov/media/114179/download    Test performed by PCR.            CT Head Without Contrast    Result Date: 10/14/2024  CT HEAD WO CONTRAST Date of Exam: 10/14/2024 11:57 AM EDT Indication: AMS, increased lethargy. Comparison: CT head without contrast 10/3/2024. Technique: Axial CT images were obtained of the head without contrast administration.  Automated exposure control and iterative construction methods were used. Findings: Encephalomalacia consistent with chronic infarct within the right frontal lobe. Chronic left subinsular lacunar-type infarct. Small chronic lacunar infarct within the left basal ganglia. Chronic focal infarct in the parafalcine high right frontal lobe. No evidence of acute or evolving infarct, mass lesion, mass effect, midline shift or intracranial hemorrhage. Right posterior parietal craniotomy changes are redemonstrated, and no acute or suspicious calvarial abnormality is identified. Paranasal sinuses and mastoid air cells are clear. Presumed bilateral cataract surgery.     Impression: 1. No acute intracranial finding. No significant change compared to 10/3/2024. 2. Chronic findings as described above. Electronically Signed: Jennifer Mon MD  10/14/2024 12:12 PM EDT  Workstation ID: [id number]     Results for orders placed during the hospital encounter of 07/15/24    Adult Transthoracic Echo Complete W/ Cont if Necessary Per Protocol    Interpretation Summary    Left ventricular systolic function is normal. Calculated left ventricular EF = 63.2%    Estimated right ventricular systolic pressure from tricuspid  regurgitation is normal (<35 mmHg).    Normal left atrial size and volume noted.    There is calcification of the aortic valve. Mild to moderate aortic valve regurgitation is present.      Current medications:  Scheduled Meds:aspirin, 81 mg, Oral, Daily  atorvastatin, 80 mg, Oral, Daily  clopidogrel, 75 mg, Oral, Daily  donepezil, 5 mg, Oral, Nightly  famotidine, 10 mg, Oral, Every Other Day  ferrous sulfate, 325 mg, Oral, Daily With Breakfast  [START ON 10/16/2024] insulin glargine, 10 Units, Subcutaneous, Daily  insulin regular, 2-7 Units, Subcutaneous, TID With Meals  ipratropium-albuterol, 3 mL, Nebulization, 4x Daily - RT  isosorbide mononitrate, 60 mg, Oral, Daily  linagliptin, 5 mg, Oral, Daily  melatonin, 5 mg, Oral, Nightly  senna-docusate sodium, 2 tablet, Oral, BID   And  polyethylene glycol, 17 g, Oral, Daily  sodium chloride, 10 mL, Intravenous, Q12H  temazepam, 7.5 mg, Oral, Nightly      Continuous Infusions:     PRN Meds:.  acetaminophen **OR** acetaminophen **OR** acetaminophen    senna-docusate sodium **AND** polyethylene glycol **AND** bisacodyl **AND** bisacodyl    Calcium Replacement - Follow Nurse / BPA Driven Protocol    dextrose    dextrose    glucagon (human recombinant)    HYDROcodone-acetaminophen    influenza vaccine    Magnesium Low Dose Replacement - Follow Nurse / BPA Driven Protocol    Phosphorus Replacement - Follow Nurse / BPA Driven Protocol    Potassium Replacement - Follow Nurse / BPA Driven Protocol    simethicone    sodium chloride    sodium chloride    Assessment & Plan   Assessment & Plan     Active Hospital Problems    Diagnosis  POA    **LLL pneumonia [J18.9]  Yes    Pneumonia [J18.9]  Yes    HTN (hypertension) [I10]  Unknown    Hyperglycemia [R73.9]  Yes    History of CVA (cerebrovascular accident) [Z86.73]  Not Applicable    CKD (chronic kidney disease) stage 4, GFR 15-29 ml/min [N18.4]  Yes    Degenerative disc disease, lumbar [M51.369]  Yes      Resolved Hospital  Problems   No resolved problems to display.        Brief Hospital Course to date:  Arminda Krishnan is a 79 y.o. female with history of mild coronary impairment, prior CVA, DDD on steroids, type 2 diabetes who presented to [facility] ED with hyperglycemia, workup concerning for possible pneumonia and UTI.  Patient transferred to my services on the a.m. of 10/13, she is continuing treatment for pneumonia at this time with IV Rocephin.  Patient's niece at bedside and updated as well, will follow-up with case management in the a.m. for possible rehab placement. No other acute changes at this time. Patient seen on the AM of 10/14, more lethargic but later improved, concerned again for AMS likely from delirium and also concerns of underlying dementia. Neurology consulted and following, attempting to reestablish sleep cycle, cancel labs and will try to create better resting environment for patient. Continue to follow with Dr. Herrera. Seen again on 10/15, overall patient improved today but still disoriented, continue to work on sleep hygiene, CM looking into hopeful placement options still at this time.      Suspected pneumonia  -Chest x-ray shows  left ower lobe pneumonia, likely secondary to aspiration  -blood and sputum cultures are currently NGTD, Legionella and strep pneumo urinary antigens are both negative  -Continue antibiotics currently on IV Rocephin, plan for 5 days of antibiotics end date 10/14, now previously completed   -SLP has evaluated, okay for diet and adjusted per their recommendations on 10/13   - currently on 2L NC on 10/13      Suspected UTI  -UA with 4+ bacteria, leukocytosis  -Urine cultures NGTD, continue antibiotics as above.    MICHAEL on CKD stage III, resolved   -Baseline creatinine~2.2-2.6, was 3.30 on presentation, improved back to her baseline   -Likely prerenal, status post gentle hydration, patient p.o. intake improving  - MICHAEL appears resolved and back to her baseline      Poorly controlled type  2 diabetes with A1c 11% and hyperglycemia in the setting of chronic steroids  -FSBG's reviewed and are much improved  -Continue SSI, will add basal Lantus 5 units daily and adjust as warranted.     Anemia  -no signs of blood loss  -Iron studies most consistent with anemia of chronic disease  - Iron 14, will start iron supplementation  - Continue to follow H&H, Hgb 7.1 on the AM of 10/13, repeat H&H shows again decreased, will plan for transfusion 1U PRBCs   - Hgb improved to 8.7 following transfusion   -- Family updated at bedside on the afternoon of 10/13 and suspect this is likely related to anemia of chronic disease and kidney function but will continue to monitor      History of CVA  Hyperlipidemia  -Continue aspirin, statin, Plavix     Acute encephalopathy superimposed on MCI  Likely underlying vascular dementia   -Again worsened on 10/14, consult to neurology   - Follow up CT head without new acute findings but old CVA   - Plans to start Aricept, melatonin and restoril on 10/14       DDD  -Continue chronic steroids    Expected Discharge Location and Transportation: [facility]  Expected Discharge   Expected Discharge Date: 10/14/2024; Expected Discharge Time:      VTE Prophylaxis:  Mechanical VTE prophylaxis orders are present.         AM-PAC 6 Clicks Score (PT): 13 (10/15/24 0800)    CODE STATUS:   Code Status and Medical Interventions: No CPR (Do Not Attempt to Resuscitate); Limited Support; No intubation (DNI)   Ordered at: 10/10/24 0002     Medical Intervention Limits:    No intubation (DNI)     Level Of Support Discussed With:    Next of Kin (If No Surrogate)     Code Status (Patient has no pulse and is not breathing):    No CPR (Do Not Attempt to Resuscitate)     Medical Interventions (Patient has pulse or is breathing):    Limited Support       BEKA Tam, DO  10/15/24

## 2024-10-15 NOTE — PLAN OF CARE
Goal Outcome Evaluation:  Plan of Care Reviewed With: patient        Progress: improving  Outcome Evaluation: Physical therapy treatment complete. Patient more alert today however remains confused. She has difficulty with staying on task and following commands. She was able to stand twice but unable to take steps due to poor command following and decreased safety awareness. Patient completed reaching activities with about 50% command following. The patient continues to present below baseline for functional mobility. The patient would continue to benefit from skilled PT to address strength, balance and activity tolerance deficits. Continue to current PT POC    Anticipated Discharge Disposition (PT): inpatient rehabilitation facility

## 2024-10-15 NOTE — PLAN OF CARE
Goal Outcome Evaluation:  Plan of Care Reviewed With: patient        Progress: improving  Outcome Evaluation: Pt presents w/ decreased safety awareness, impulsivity, confusion, generalized weakness, and balance deficits limiting her ADL independence. Will continue to progress pt as tolerated per OT POC. Rec SNF at d/c.    Anticipated Discharge Disposition (OT): skilled nursing facility

## 2024-10-16 LAB
ANION GAP SERPL CALCULATED.3IONS-SCNC: 16 MMOL/L (ref 5–15)
BUN SERPL-MCNC: 27 MG/DL (ref 8–23)
BUN/CREAT SERPL: 11.6 (ref 7–25)
CALCIUM SPEC-SCNC: 9.7 MG/DL (ref 8.6–10.5)
CHLORIDE SERPL-SCNC: 105 MMOL/L (ref 98–107)
CO2 SERPL-SCNC: 21 MMOL/L (ref 22–29)
CREAT SERPL-MCNC: 2.33 MG/DL (ref 0.57–1)
DEPRECATED RDW RBC AUTO: 48.6 FL (ref 37–54)
EGFRCR SERPLBLD CKD-EPI 2021: 20.6 ML/MIN/1.73
ERYTHROCYTE [DISTWIDTH] IN BLOOD BY AUTOMATED COUNT: 15 % (ref 12.3–15.4)
GLUCOSE BLDC GLUCOMTR-MCNC: 151 MG/DL (ref 70–130)
GLUCOSE BLDC GLUCOMTR-MCNC: 215 MG/DL (ref 70–130)
GLUCOSE BLDC GLUCOMTR-MCNC: 230 MG/DL (ref 70–130)
GLUCOSE SERPL-MCNC: 217 MG/DL (ref 65–99)
HCT VFR BLD AUTO: 30.4 % (ref 34–46.6)
HGB BLD-MCNC: 9.6 G/DL (ref 12–15.9)
MCH RBC QN AUTO: 28 PG (ref 26.6–33)
MCHC RBC AUTO-ENTMCNC: 31.6 G/DL (ref 31.5–35.7)
MCV RBC AUTO: 88.6 FL (ref 79–97)
PLATELET # BLD AUTO: 287 10*3/MM3 (ref 140–450)
PMV BLD AUTO: 10.4 FL (ref 6–12)
POTASSIUM SERPL-SCNC: 3.9 MMOL/L (ref 3.5–5.2)
RBC # BLD AUTO: 3.43 10*6/MM3 (ref 3.77–5.28)
SODIUM SERPL-SCNC: 142 MMOL/L (ref 136–145)
WBC NRBC COR # BLD AUTO: 9.59 10*3/MM3 (ref 3.4–10.8)

## 2024-10-16 PROCEDURE — 85027 COMPLETE CBC AUTOMATED: CPT | Performed by: FAMILY MEDICINE

## 2024-10-16 PROCEDURE — 92526 ORAL FUNCTION THERAPY: CPT

## 2024-10-16 PROCEDURE — 99232 SBSQ HOSP IP/OBS MODERATE 35: CPT | Performed by: FAMILY MEDICINE

## 2024-10-16 PROCEDURE — 94799 UNLISTED PULMONARY SVC/PX: CPT

## 2024-10-16 PROCEDURE — 82948 REAGENT STRIP/BLOOD GLUCOSE: CPT

## 2024-10-16 PROCEDURE — 94761 N-INVAS EAR/PLS OXIMETRY MLT: CPT

## 2024-10-16 PROCEDURE — 94664 DEMO&/EVAL PT USE INHALER: CPT

## 2024-10-16 PROCEDURE — 63710000001 INSULIN REGULAR HUMAN PER 5 UNITS: Performed by: INTERNAL MEDICINE

## 2024-10-16 PROCEDURE — 63710000001 INSULIN GLARGINE PER 5 UNITS: Performed by: FAMILY MEDICINE

## 2024-10-16 PROCEDURE — 80048 BASIC METABOLIC PNL TOTAL CA: CPT | Performed by: FAMILY MEDICINE

## 2024-10-16 RX ADMIN — HUMAN INSULIN 2 UNITS: 100 INJECTION, SOLUTION SUBCUTANEOUS at 09:46

## 2024-10-16 RX ADMIN — ATORVASTATIN CALCIUM 80 MG: 40 TABLET, FILM COATED ORAL at 09:46

## 2024-10-16 RX ADMIN — TEMAZEPAM 7.5 MG: 7.5 CAPSULE ORAL at 21:39

## 2024-10-16 RX ADMIN — Medication 10 ML: at 09:54

## 2024-10-16 RX ADMIN — FERROUS SULFATE TAB 325 MG (65 MG ELEMENTAL FE) 325 MG: 325 (65 FE) TAB at 09:46

## 2024-10-16 RX ADMIN — INSULIN GLARGINE 10 UNITS: 100 INJECTION, SOLUTION SUBCUTANEOUS at 09:46

## 2024-10-16 RX ADMIN — HUMAN INSULIN 2 UNITS: 100 INJECTION, SOLUTION SUBCUTANEOUS at 13:03

## 2024-10-16 RX ADMIN — IPRATROPIUM BROMIDE AND ALBUTEROL SULFATE 3 ML: 2.5; .5 SOLUTION RESPIRATORY (INHALATION) at 15:58

## 2024-10-16 RX ADMIN — POLYETHYLENE GLYCOL 3350 17 G: 17 POWDER, FOR SOLUTION ORAL at 09:47

## 2024-10-16 RX ADMIN — LINAGLIPTIN 5 MG: 5 TABLET, FILM COATED ORAL at 09:47

## 2024-10-16 RX ADMIN — ASPIRIN 81 MG 81 MG: 81 TABLET ORAL at 09:45

## 2024-10-16 RX ADMIN — Medication 5 MG: at 20:46

## 2024-10-16 RX ADMIN — ISOSORBIDE MONONITRATE 60 MG: 60 TABLET, EXTENDED RELEASE ORAL at 09:46

## 2024-10-16 RX ADMIN — HUMAN INSULIN 2 UNITS: 100 INJECTION, SOLUTION SUBCUTANEOUS at 17:20

## 2024-10-16 RX ADMIN — HYDROCODONE BITARTRATE AND ACETAMINOPHEN 1 TABLET: 5; 325 TABLET ORAL at 17:58

## 2024-10-16 RX ADMIN — SENNOSIDES AND DOCUSATE SODIUM 2 TABLET: 50; 8.6 TABLET ORAL at 09:46

## 2024-10-16 RX ADMIN — Medication 10 ML: at 20:47

## 2024-10-16 RX ADMIN — IPRATROPIUM BROMIDE AND ALBUTEROL SULFATE 3 ML: 2.5; .5 SOLUTION RESPIRATORY (INHALATION) at 08:33

## 2024-10-16 RX ADMIN — DONEPEZIL HYDROCHLORIDE 5 MG: 10 TABLET, FILM COATED ORAL at 20:47

## 2024-10-16 RX ADMIN — CLOPIDOGREL BISULFATE 75 MG: 75 TABLET ORAL at 09:46

## 2024-10-16 RX ADMIN — FAMOTIDINE 10 MG: 20 TABLET, FILM COATED ORAL at 09:46

## 2024-10-16 RX ADMIN — SENNOSIDES AND DOCUSATE SODIUM 2 TABLET: 50; 8.6 TABLET ORAL at 20:46

## 2024-10-16 RX ADMIN — IPRATROPIUM BROMIDE AND ALBUTEROL SULFATE 3 ML: 2.5; .5 SOLUTION RESPIRATORY (INHALATION) at 12:48

## 2024-10-16 RX ADMIN — HYDROCODONE BITARTRATE AND ACETAMINOPHEN 1 TABLET: 5; 325 TABLET ORAL at 09:45

## 2024-10-16 NOTE — PROGRESS NOTES
Continued Stay Note  [facility]     Patient Name: Arminda Krishnan  MRN: [id number]  Today's Date: 10/16/2024    Admit Date: 10/9/2024        Discharge Plan       Row Name 10/16/24 1520       Plan    Plan Comments Case management provided a list of rehab facilities that are in network with [employer]. [facility] is not able to accept the patient and [facility] is out of network.                   Discharge Codes    No documentation.                 Expected Discharge Date and Time       Expected Discharge Date Expected Discharge Time    Oct 14, 2024               BALAJI Fernández

## 2024-10-16 NOTE — PLAN OF CARE
Goal Outcome Evaluation:                   Anticipated Discharge Disposition (SLP): skilled nursing facility             Treatment Assessment (SLP): toleration of diet, no clinical signs of, pharyngeal dysphagia (10/16/24 1010)

## 2024-10-16 NOTE — THERAPY TREATMENT NOTE
Acute Care - Speech Language Pathology   Swallow Treatment Note [facility]     Patient Name: Arminda Krishnan  : 1945  MRN: [id number]  Today's Date: 10/16/2024               Admit Date: 10/9/2024    Visit Dx:     ICD-10-CM ICD-9-CM   1. Uncontrolled type 2 diabetes mellitus with hyperglycemia  E11.65 250.02   2. Acute UTI (urinary tract infection)  N39.0 599.0   3. Pneumonia of left lower lobe due to infectious organism  J18.9 486   4. Dysphagia, unspecified type  R13.10 787.20   5. Cerebral vascular disease  I67.9 437.9   6. Degeneration of intervertebral disc of lumbar region, unspecified whether pain present  M51.369 722.52   7. Spinal stenosis, lumbar region, with neurogenic claudication  M48.062 724.03   8. Aspiration pneumonia of left lower lobe, unspecified aspiration pneumonia type  J69.0 507.0   9. Dehydration  E86.0 276.51     Patient Active Problem List   Diagnosis    Cerebral vascular disease    Degenerative disc disease, lumbar    Degenerative disc disease, cervical    Spinal stenosis, lumbar region, with neurogenic claudication    Tobacco abuse    Chronic anemia    CKD (chronic kidney disease) stage 4, GFR 15-29 ml/min    Hyperglycemia    History of CVA (cerebrovascular accident)    Type 2 diabetes mellitus with hyperglycemia, without long-term current use of insulin    Disorientation    Dehydration    LLL pneumonia    HTN (hypertension)    Pneumonia     Past Medical History:   Diagnosis Date    Anemia     Arthritis     Back problem     CAD (coronary artery disease)     Cancer     Right breast    Chronic back pain     Chronically on opiate therapy     Depression     Diabetes mellitus     DX 14 years ago- checks fsbs weekly    Fibromyalgia     Gastroparesis     GERD (gastroesophageal reflux disease)     Headache     emotional/tension    History of transfusion     [facility]    HTN (hypertension)     Hypercholesteremia     IBS (irritable bowel syndrome)     Incontinence of urine      urgency    Migraine headache     Myalgia and myositis     Peripheral neuropathy     Sleep apnea     does not wear cpap    UTI (urinary tract infection)      Past Surgical History:   Procedure Laterality Date    APPENDECTOMY      ARTERIOGRAM MESENTERIC N/A 6/16/2022    Procedure: DIAGNOSTIC ARTERIOGRAM WITH CELIAC STENT PLACEMENT;  Surgeon: Neo Neil MD;  Location: [facility];  Service: Vascular;  Laterality: N/A;  FLUORO: 16 MIN  DOSE: 2384 MGY  CONTRAST:  20 ML    BACK SURGERY      5x per patient    BRAIN TUMOR EXCISION  1988    BREAST BIOPSY      CARPAL TUNNEL RELEASE Bilateral     CHOLECYSTECTOMY      COLONOSCOPY      2015    CRANIOTOMY FOR TUMOR      EYE SURGERY      bilateral cataracts removed    HEMORRHOIDECTOMY      LUMBAR FUSION N/A 01/04/2017    Procedure: LUMBAR LAMINECTOMY AND DECOMRESSION  L3 AND L4;  Surgeon: Nishant Bhatti MD;  Location: [facility] OR;  Service:     TOTAL ABDOMINAL HYSTERECTOMY      TRIGGER FINGER RELEASE         SLP Recommendation and Plan     SLP Diet Recommendation: soft to chew textures, chopped, thin liquids (10/16/24 1010)  Recommended Precautions and Strategies: upright posture during/after eating, general aspiration precautions, assist with feeding (10/16/24 1010)  SLP Rec. for Method of Medication Administration: meds whole, meds crushed, with puree, as tolerated (10/16/24 1010)     Monitor for Signs of Aspiration: yes, notify SLP if any concerns (10/16/24 1010)  Recommended Diagnostics: No further SLP services recommended (10/16/24 1010)     Anticipated Discharge Disposition (SLP): skilled nursing facility (10/16/24 1010)     Therapy Frequency (Swallow): evaluation only (10/16/24 1010)     Oral Care Recommendations: Oral Care BID/PRN, Toothbrush (10/16/24 1010)        Daily Summary of Progress (SLP): progress toward functional goals as expected (10/16/24 1010)               Treatment Assessment (SLP): toleration of diet, no clinical signs of, pharyngeal  dysphagia (10/16/24 1010)  Treatment Assessment Comments (SLP): No overt s/s of aspiration w/ thin liquid trials. Pt declined solid trials. Family reports no mastication concerns w/ items on tray. Reviewed MBS results w/ pt and family. Also reviewed aspiration precautions & provided education. Ok to cont current diet, if any concerns downgrade to pureed. SLP will sign off for dysphagia (10/16/24 1010)  Plan for Continued Treatment (SLP): continue treatment per plan of care (10/16/24 1010)                SWALLOW EVALUATION (Last 72 Hours)       SLP Adult Swallow Evaluation       Row Name 10/16/24 1010 10/14/24 1540                Rehab Evaluation    Document Type therapy note (daily note)  - therapy note (daily note)  -       Subjective Information no complaints  - no complaints  -       Patient Observations alert;cooperative  - alert;cooperative  -       Patient/Family/Caregiver Comments/Observations Family present  - No family present  -       Patient Effort good  - good  -       Symptoms Noted During/After Treatment none  - none  -          Pain    Additional Documentation Pain Scale: FACES Pre/Post-Treatment (Group)  - Pain Scale: FACES Pre/Post-Treatment (Group)  -          Pain Scale: FACES Pre/Post-Treatment    Pain: FACES Scale, Pretreatment 0-->no hurt  -MH 0-->no hurt  -       Posttreatment Pain Rating 0-->no hurt  -MH 0-->no hurt  -          SLP Treatment Clinical Impressions    Treatment Assessment (SLP) toleration of diet;no clinical signs of;pharyngeal dysphagia  - toleration of diet;no clinical signs of;pharyngeal dysphagia  -       Treatment Assessment Comments (SLP) No overt s/s of aspiration w/ thin liquid trials. Pt declined solid trials. Family reports no mastication concerns w/ items on tray. Reviewed MBS results w/ pt and family. Also reviewed aspiration precautions & provided education. Ok to cont current diet, if any concerns downgrade to pureed. SLP will  sign off for dysphagia  - No overt s/s of aspiration w/ thin liquid or pureed trials. Pt declined solid trials this date. Ok to continue soft/chopped solid diet w/ thin liquids. If any concerns, ok to downgrade to pureed. SLP will f/u for diet tolerance/adjustment  -       Daily Summary of Progress (SLP) progress toward functional goals as expected  - progress toward functional goals as expected  -       Plan for Continued Treatment (SLP) continue treatment per plan of care  - continue treatment per plan of care  -       Care Plan Review care plan/treatment goals reviewed  - care plan/treatment goals reviewed  -          Recommendations    Therapy Frequency (Swallow) evaluation only  - PRN  -       Predicted Duration Therapy Intervention (Days) -- 1 week  -       SLP Diet Recommendation soft to chew textures;chopped;thin liquids  - soft to chew textures;chopped;thin liquids  -       Recommended Diagnostics No further SLP services recommended  - other (see comments)  diet tolerance/adjustment  -       Recommended Precautions and Strategies upright posture during/after eating;general aspiration precautions;assist with feeding  - upright posture during/after eating;general aspiration precautions;assist with feeding  -       Oral Care Recommendations Oral Care BID/PRN;Toothbrush  - Oral Care BID/PRN;Toothbrush  -       SLP Rec. for Method of Medication Administration meds whole;meds crushed;with puree;as tolerated  - meds whole;meds crushed;with puree;as tolerated  -       Monitor for Signs of Aspiration yes;notify SLP if any concerns  - yes;notify SLP if any concerns  -       Anticipated Discharge Disposition (SLP) skilled nursing facility  - skilled nursing facility  -                 User Key  (r) = Recorded By, (t) = Taken By, (c) = Cosigned By      Initials Name Effective Dates     Yenifer Herring, MS [facility]-SLP 05/12/23 -                     EDUCATION  The patient  has been educated in the following areas:   Dysphagia (Swallowing Impairment) Modified Diet Instruction.        SLP GOALS       Row Name 10/16/24 1010 10/14/24 8628          (LTG) Patient will demonstrate functional swallow for    Diet Texture (Demonstrate functional swallow) soft to chew (chopped) textures  - soft to chew (chopped) textures  -     Liquid viscosity (Demonstrate functional swallow) thin liquids  - thin liquids  -     [place] (Demonstrate functional swallow) with moderate cues (50-74% accuracy)  - with moderate cues (50-74% accuracy)  -     Time Frame (Demonstrate functional swallow) 1 week  - 1 week  -     Progress/Outcomes (Demonstrate functional swallow) goal met  - continuing progress toward goal  -     Comment (Demonstrate functional swallow) No overt s/s of aspiration w/ thin liquid trials. Pt declined solid trials, family reports no issues w/ items on tray.  - No overt s/s of aspiration w/ thin liquid trials. Pt declined solid trials this date  -        ([facility]) Patient will tolerate trials of    Consistencies Trialed (Tolerate trials) soft to chew (chopped) textures;thin liquids  - soft to chew (chopped) textures;thin liquids  -     Desired Outcome (Tolerate trials) without signs/symptoms of aspiration;without signs of distress;with adequate oral prep/transit/clearance  - without signs/symptoms of aspiration;without signs of distress;with adequate oral prep/transit/clearance  -     [place] (Tolerate trials) with moderate cues (50-74% accuracy)  - with moderate cues (50-74% accuracy)  -     Time Frame (Tolerate trials) 1 week  - 1 week  -     Progress/Outcomes (Tolerate trials) goal met  - continuing progress toward goal  -        (STG) Labial Strengthening Goal 1 (SLP)    Activity (Labial Strengthening Goal 1, SLP) increase labial tone  - increase labial tone  -     Increase Labial Tone labial resistance exercises;swallow trials  -  labial resistance exercises;swallow trials  -MH     [place]/Accuracy (Labial Strengthening Goal 1, SLP) with maximum cues (25-49% accuracy)  -MH with maximum cues (25-49% accuracy)  -MH     Time Frame (Labial Strengthening Goal 1, SLP) 1 week  -MH 1 week  -MH     Progress/Outcomes (Labial Strengthening Goal 1, SLP) goal no longer appropriate  -MH progress slower than expected  -MH     Comment (Labial Strengthening Goal 1, SLP) Pt u/a to complete labial resistance exercises, swallow trials completed  -MH --        (STG) Lingual Strengthening Goal 1 (SLP)    Activity (Lingual Strengthening Goal 1, SLP) increase tongue back strength  -MH increase tongue back strength  -MH     Increase Tongue Back Strength lingual resistance exercises;swallow trials  -MH lingual resistance exercises;swallow trials  -MH     [place]/Accuracy (Lingual Strengthening Goal 1, SLP) with maximum cues (25-49% accuracy)  -MH with maximum cues (25-49% accuracy)  -MH     Time Frame (Lingual Strengthening Goal 1, SLP) 1 week  -MH 1 week  -MH     Progress/Outcomes (Lingual Strengthening Goal 1, SLP) goal no longer appropriate  -MH progress slower than expected  -MH     Comment (Lingual Strengthening Goal 1, SLP) -- Cog status impacting ability to complete  -MH               User Key  (r) = Recorded By, (t) = Taken By, (c) = Cosigned By      Initials Name Provider Type    Yenifer Moore MS CCC-SLP Speech and Language Pathologist                         Time Calculation:    Time Calculation- SLP       Row Name 10/16/24 1310             Time Calculation- SLP    SLP Start Time 1010  -MH      SLP Received On 10/16/24  -MH         Untimed Charges    [id number]-[facility] Treatment Swallow Minutes 40  -MH         Total Minutes    Untimed Charges Total Minutes 40  -MH       Total Minutes 40  -MH                User Key  (r) = Recorded By, (t) = Taken By, (c) = Cosigned By      Initials Name Provider Type    Yenifer Moore MS CCC-SLP Speech and  Language Pathologist                    Therapy Charges for Today       Code Description Service Date Service Provider Modifiers Qty    [id number] HC ST TREATMENT SWALLOW 3 10/16/2024 Yenifer Herring, MS CCC-SLP GN 1                 Yenifer Herring MS CCC-SLP  10/16/2024

## 2024-10-16 NOTE — PROGRESS NOTES
[facility] Medicine Services  PROGRESS NOTE    Patient Name: Arminda Krishnan  : 1945  MRN: [id number]    Date of Admission: 10/9/2024  Primary Care Physician: Aiden Spencer MD    Subjective   Subjective     CC: Follow-up hyperglycemia    HPI:   Patient is a 79-year-old female seen and examined this a.m., patient's niece is at bedside and updated this a.m.  Patient overall did sleep better overnight, reports she remembers me from yesterday, disoriented still but pleasant, awaiting hopeful rehab placement at this time.     Objective   Objective     Vital Signs:   Temp:  [96.6 °F (35.9 °C)-99 °F (37.2 °C)] 96.6 °F (35.9 °C)  Heart Rate:  [] 102  Resp:  [16-18] 18  BP: (117-166)/(57-83) 117/74  Flow (L/min) (Oxygen Therapy):  [2] 2     Physical Exam:  Constitutional: Awake, remains disoriented but able to answer simple questions, frail and elderly appearing, currently on 2L NC   HENT: NCAT, nares patent, mucous membranes moist  Respiratory: Decreased BS bilaterally, no rhonchi or wheezing, respiratory effort normal   Cardiovascular: RRR, no murmurs, rubs, or gallops  Gastrointestinal: Positive bowel sounds, soft, mild tenderness to palpation, nondistended  Musculoskeletal: No bilateral ankle edema  Psychiatric: pleasantly confused   Neurologic: Disoriented to place/time but oriented to person on 10/16, awake, follows commands, BOND   Skin: No rashes       Results Reviewed:  LAB RESULTS:      Lab 10/15/24  0939 10/14/24  0052 10/13/24  1219 10/13/24  0519 10/10/24  2223 10/10/24  1745 10/10/24  1236 10/09/24  1842   WBC 9.36 9.74  --  9.23  --   --  10.17 8.68   HEMOGLOBIN 9.4* 8.7* 7.0* 7.1* 8.2*   < > 7.5* 7.9*   HEMATOCRIT 29.5* 26.9* 21.8* 22.6* 25.8*   < > 23.7* 25.0*   PLATELETS 291 271  --  282  --   --  296 301   NEUTROS ABS  --  6.72  --   --   --   --  7.33* 6.31   IMMATURE GRANS (ABS)  --  0.15*  --   --   --   --  0.16* 0.20*   LYMPHS ABS  --  1.40  --    --   --   --  1.36 1.13   MONOS ABS  --  1.19*  --   --   --   --  1.08* 0.83   EOS ABS  --  0.20  --   --   --   --  0.14 0.13   MCV 87.8 87.3  --  88.6  --   --  88.8 88.7   CRP  --   --   --   --   --   --   --  13.05*   PROCALCITONIN  --   --   --   --   --   --   --  0.12   LACTATE  --   --   --   --   --   --   --  0.9   HSTROP T  --   --   --   --   --   --   --  39*    < > = values in this interval not displayed.         Lab 10/15/24  0939 10/14/24  0052 10/13/24  0813 10/10/24  1236 10/09/24  1842   SODIUM 138 139 139 141 136   POTASSIUM 4.2 3.9 3.8 4.1 4.4   CHLORIDE 103 106 106 110* 103   CO2 21.0* 20.0* 20.0* 18.0* 19.0*   ANION GAP 14.0 13.0 13.0 13.0 14.0   BUN 24* 26* 28* 45* 56*   CREATININE 2.35* 2.33* 2.45* 2.88* 3.30*   EGFR 20.3* 20.6* 19.4* 15.9* 13.5*   GLUCOSE 254* 155* 181* 156* 359*   CALCIUM 9.2 9.6 9.5 9.1 9.5   MAGNESIUM  --  2.2 1.5*  --  1.8   PHOSPHORUS  --   --   --   --  3.5   TSH  --   --   --   --  0.465         Lab 10/15/24  0939 10/14/24  0052 10/10/24  1236 10/09/24  1842   TOTAL PROTEIN 6.7 6.9 7.0 7.4   ALBUMIN 3.1* 3.0* 3.1* 3.3*   GLOBULIN 3.6 3.9 3.9 4.1   ALT (SGPT) 21 24 15 18   AST (SGOT) 26 36* 21 21   BILIRUBIN 0.3 0.2 0.2 0.3   ALK PHOS 76 70 70 78   LIPASE  --   --   --  23         Lab 10/09/24  1842   PROBNP 719.5   HSTROP T 39*             Lab 10/13/24  1418 10/09/24  1842   IRON  --  14*  14*   IRON SATURATION (TSAT)  --  7*   TIBC  --  195*   TRANSFERRIN  --  131*   FERRITIN  --  766.80*   FOLATE  --  19.00   VITAMIN B 12  --  1,262*   ABO TYPING O  --    RH TYPING Positive  --    ANTIBODY SCREEN Negative  --          Lab 10/09/24  1846   FIO2 21   CARBOXYHEMOGLOBIN (VENOUS) 1.6     Brief Urine Lab Results  (Last result in the past 365 days)        Color   Clarity   Blood   Leuk Est   Nitrite   Protein   CREAT   Urine HCG        10/09/24 2058 Yellow   Cloudy   Large (3+)   Moderate (2+)   Negative   100 mg/dL (2+)                   Microbiology Results Abnormal        Procedure Component Value - Date/Time    Blood Culture - Blood, Arm, Right [[id number]]  (Normal) Collected: 10/09/24 2159    Lab Status: Final result Specimen: Blood from Arm, Right Updated: 10/14/24 2231     Blood Culture No growth at 5 days    Narrative:      Less than seven (7) mL's of blood was collected.  Insufficient quantity may yield false negative results.    Blood Culture - Blood, Hand, Left [[id number]]  (Normal) Collected: 10/09/24 1842    Lab Status: Final result Specimen: Blood from Hand, Left Updated: 10/14/24 1946     Blood Culture No growth at 5 days    Narrative:      Less than seven (7) mL's of blood was collected.  Insufficient quantity may yield false negative results.    Urine Culture - Urine, Urine, Clean Catch [[id number]]  (Normal) Collected: 10/09/24 2058    Lab Status: Final result Specimen: Urine, Clean Catch Updated: 10/11/24 1000     Urine Culture No growth    Legionella Antigen, Urine - Urine, Urine, Clean Catch [[id number]]  (Normal) Collected: 10/09/24 2058    Lab Status: Final result Specimen: Urine, Clean Catch Updated: 10/10/24 0922     LEGIONELLA ANTIGEN, URINE Negative    S. Pneumo Ag Urine or CSF - Urine, Urine, Clean Catch [[id number]]  (Normal) Collected: 10/09/24 2058    Lab Status: Final result Specimen: Urine, Clean Catch Updated: 10/10/24 0922     Strep Pneumo Ag Negative    COVID PRE-OP / PRE-PROCEDURE SCREENING ORDER (NO ISOLATION) - Swab, Nasopharynx [[id number]]  (Normal) Collected: 10/09/24 1843    Lab Status: Final result Specimen: Swab from Nasopharynx Updated: 10/09/24 1958    Narrative:      The following orders were created for panel order COVID PRE-OP / PRE-PROCEDURE SCREENING ORDER (NO ISOLATION) - Swab, Nasopharynx.  Procedure                               Abnormality         Status                     ---------                               -----------         ------                     COVID-19, FLU A/B, RSV P...[[id number]]  Normal              Final result                  Please view results for these tests on the individual orders.    COVID-19, FLU A/B, RSV PCR 1 HR TAT - Swab, Nasopharynx [[id number]]  (Normal) Collected: 10/09/24 1843    Lab Status: Final result Specimen: Swab from Nasopharynx Updated: 10/09/24 1958     COVID19 Not Detected     Influenza A PCR Not Detected     Influenza B PCR Not Detected     RSV, PCR Not Detected    Narrative:      Fact sheet for providers: https://www.fda.gov/media/895735/download    Fact sheet for patients: https://www.fda.gov/media/187607/download    Test performed by PCR.            No radiology results from the last 24 hrs    Results for orders placed during the hospital encounter of 07/15/24    Adult Transthoracic Echo Complete W/ Cont if Necessary Per Protocol    Interpretation Summary    Left ventricular systolic function is normal. Calculated left ventricular EF = 63.2%    Estimated right ventricular systolic pressure from tricuspid regurgitation is normal (<35 mmHg).    Normal left atrial size and volume noted.    There is calcification of the aortic valve. Mild to moderate aortic valve regurgitation is present.      Current medications:  Scheduled Meds:aspirin, 81 mg, Oral, Daily  atorvastatin, 80 mg, Oral, Daily  clopidogrel, 75 mg, Oral, Daily  donepezil, 5 mg, Oral, Nightly  famotidine, 10 mg, Oral, Every Other Day  ferrous sulfate, 325 mg, Oral, Daily With Breakfast  insulin glargine, 10 Units, Subcutaneous, Daily  insulin regular, 2-7 Units, Subcutaneous, TID With Meals  ipratropium-albuterol, 3 mL, Nebulization, 4x Daily - RT  isosorbide mononitrate, 60 mg, Oral, Daily  linagliptin, 5 mg, Oral, Daily  melatonin, 5 mg, Oral, Nightly  senna-docusate sodium, 2 tablet, Oral, BID   And  polyethylene glycol, 17 g, Oral, Daily  sodium chloride, 10 mL, Intravenous, Q12H  temazepam, 7.5 mg, Oral, Nightly      Continuous Infusions:     PRN Meds:.  acetaminophen **OR** acetaminophen **OR** acetaminophen    senna-docusate  sodium **AND** polyethylene glycol **AND** bisacodyl **AND** bisacodyl    Calcium Replacement - Follow Nurse / BPA Driven Protocol    dextrose    dextrose    glucagon (human recombinant)    HYDROcodone-acetaminophen    influenza vaccine    Magnesium Low Dose Replacement - Follow Nurse / BPA Driven Protocol    Phosphorus Replacement - Follow Nurse / BPA Driven Protocol    Potassium Replacement - Follow Nurse / BPA Driven Protocol    simethicone    sodium chloride    sodium chloride    Assessment & Plan   Assessment & Plan     Active Hospital Problems    Diagnosis  POA    **LLL pneumonia [J18.9]  Yes    Pneumonia [J18.9]  Yes    HTN (hypertension) [I10]  Unknown    Hyperglycemia [R73.9]  Yes    History of CVA (cerebrovascular accident) [Z86.73]  Not Applicable    CKD (chronic kidney disease) stage 4, GFR 15-29 ml/min [N18.4]  Yes    Degenerative disc disease, lumbar [M51.369]  Yes      Resolved Hospital Problems   No resolved problems to display.        Brief Hospital Course to date:  Arminda Krishnan is a 79 y.o. female with history of mild coronary impairment, prior CVA, DDD on steroids, type 2 diabetes who presented to [facility] ED with hyperglycemia, workup concerning for possible pneumonia and UTI.  Patient transferred to my services on the a.m. of 10/13, she is continuing treatment for pneumonia at this time with IV Rocephin.  Patient's niece at bedside and updated as well, will follow-up with case management in the a.m. for possible rehab placement. No other acute changes at this time. Patient seen on the AM of 10/14, more lethargic but later improved, concerned again for AMS likely from delirium and also concerns of underlying dementia. Neurology consulted and following, attempting to reestablish sleep cycle, cancel labs and will try to create better resting environment for patient. Continue to follow with Dr. Herrera. Seen again on 10/15, overall patient improved today but still disoriented, continue to work on  sleep hygiene, CM looking into hopeful placement options still at this time. Awaiting possible rehab still on 10/16, continue to follow with CM, will likely have to expand search per CM, continue to follow.      Suspected pneumonia  -Chest x-ray shows  left ower lobe pneumonia, likely secondary to aspiration  -blood and sputum cultures are currently NGTD, Legionella and strep pneumo urinary antigens are both negative  -Continue antibiotics currently on IV Rocephin, plan for 5 days of antibiotics end date 10/14, now completed   -SLP has evaluated, okay for diet and adjusted per their recommendations on 10/13   - currently on 2L NC  - Limited repeat AM labs, continue to follow periodically while awaiting placement.      Suspected UTI  -UA with 4+ bacteria, leukocytosis  -Urine cultures NGTD, completed antibiotics as above.    MICHAEL on CKD stage III, resolved   -Baseline creatinine~2.2-2.6, was 3.30 on presentation, improved back to her baseline   -Likely prerenal, status post gentle hydration, patient p.o. intake improving  - MICHAEL appears resolved and back to her baseline      Poorly controlled type 2 diabetes with A1c 11% and hyperglycemia in the setting of chronic steroids  -FSBG's reviewed and are much improved  -Continue SSI, added Lantus and increased to 10 U daily      Anemia  -no signs of blood loss  -Iron studies most consistent with anemia of chronic disease  - Iron 14, will start iron supplementation  - Continue to follow H&H, Hgb 7.1 on the AM of 10/13, repeat H&H shows again decreased, will plan for transfusion 1U PRBCs   - Hgb improved to 8.7 following transfusion   -- Family updated at bedside on the afternoon of 10/13 and suspect this is likely related to anemia of chronic disease and kidney function but will continue to monitor      History of CVA  Hyperlipidemia  -Continue aspirin, statin, Plavix     Acute encephalopathy superimposed on MCI  Likely underlying vascular dementia   -Again worsened on 10/14,  consult to neurology   - Follow up CT head without new acute findings but old CVA   - Plans to start Aricept, melatonin and restoril on 10/14       DDD  -Continue chronic steroids    Expected Discharge Location and Transportation: Likely SNF   Expected Discharge   Expected Discharge Date: 10/18/2024; Expected Discharge Time:      VTE Prophylaxis:  Mechanical VTE prophylaxis orders are present.         AM-PAC 6 Clicks Score (PT): 16 (10/15/24 2037)    CODE STATUS:   Code Status and Medical Interventions: No CPR (Do Not Attempt to Resuscitate); Limited Support; No intubation (DNI)   Ordered at: 10/10/24 0002     Medical Intervention Limits:    No intubation (DNI)     Level Of Support Discussed With:    Next of Kin (If No Surrogate)     Code Status (Patient has no pulse and is not breathing):    No CPR (Do Not Attempt to Resuscitate)     Medical Interventions (Patient has pulse or is breathing):    Limited Support       BEKA Tam, DO  10/16/24

## 2024-10-17 LAB
GLUCOSE BLDC GLUCOMTR-MCNC: 108 MG/DL (ref 70–130)
GLUCOSE BLDC GLUCOMTR-MCNC: 155 MG/DL (ref 70–130)
GLUCOSE BLDC GLUCOMTR-MCNC: 173 MG/DL (ref 70–130)
GLUCOSE BLDC GLUCOMTR-MCNC: 201 MG/DL (ref 70–130)

## 2024-10-17 PROCEDURE — 93005 ELECTROCARDIOGRAM TRACING: CPT | Performed by: STUDENT IN AN ORGANIZED HEALTH CARE EDUCATION/TRAINING PROGRAM

## 2024-10-17 PROCEDURE — 93010 ELECTROCARDIOGRAM REPORT: CPT | Performed by: INTERNAL MEDICINE

## 2024-10-17 PROCEDURE — 99232 SBSQ HOSP IP/OBS MODERATE 35: CPT | Performed by: STUDENT IN AN ORGANIZED HEALTH CARE EDUCATION/TRAINING PROGRAM

## 2024-10-17 PROCEDURE — 97530 THERAPEUTIC ACTIVITIES: CPT

## 2024-10-17 PROCEDURE — 94799 UNLISTED PULMONARY SVC/PX: CPT

## 2024-10-17 PROCEDURE — 99231 SBSQ HOSP IP/OBS SF/LOW 25: CPT | Performed by: PSYCHIATRY & NEUROLOGY

## 2024-10-17 PROCEDURE — 63710000001 INSULIN REGULAR HUMAN PER 5 UNITS: Performed by: INTERNAL MEDICINE

## 2024-10-17 PROCEDURE — 82948 REAGENT STRIP/BLOOD GLUCOSE: CPT

## 2024-10-17 PROCEDURE — 63710000001 INSULIN GLARGINE PER 5 UNITS: Performed by: FAMILY MEDICINE

## 2024-10-17 PROCEDURE — 94664 DEMO&/EVAL PT USE INHALER: CPT

## 2024-10-17 PROCEDURE — 94761 N-INVAS EAR/PLS OXIMETRY MLT: CPT

## 2024-10-17 PROCEDURE — 92610 EVALUATE SWALLOWING FUNCTION: CPT

## 2024-10-17 RX ADMIN — ATORVASTATIN CALCIUM 80 MG: 40 TABLET, FILM COATED ORAL at 09:24

## 2024-10-17 RX ADMIN — CLOPIDOGREL BISULFATE 75 MG: 75 TABLET ORAL at 09:24

## 2024-10-17 RX ADMIN — ACETAMINOPHEN 650 MG: 325 TABLET, FILM COATED ORAL at 20:48

## 2024-10-17 RX ADMIN — ACETAMINOPHEN 650 MG: 325 TABLET, FILM COATED ORAL at 09:40

## 2024-10-17 RX ADMIN — DONEPEZIL HYDROCHLORIDE 5 MG: 10 TABLET, FILM COATED ORAL at 20:48

## 2024-10-17 RX ADMIN — FERROUS SULFATE TAB 325 MG (65 MG ELEMENTAL FE) 325 MG: 325 (65 FE) TAB at 09:24

## 2024-10-17 RX ADMIN — IPRATROPIUM BROMIDE AND ALBUTEROL SULFATE 3 ML: 2.5; .5 SOLUTION RESPIRATORY (INHALATION) at 19:46

## 2024-10-17 RX ADMIN — Medication 5 MG: at 20:02

## 2024-10-17 RX ADMIN — SENNOSIDES AND DOCUSATE SODIUM 2 TABLET: 50; 8.6 TABLET ORAL at 20:48

## 2024-10-17 RX ADMIN — INSULIN GLARGINE 10 UNITS: 100 INJECTION, SOLUTION SUBCUTANEOUS at 09:24

## 2024-10-17 RX ADMIN — LINAGLIPTIN 5 MG: 5 TABLET, FILM COATED ORAL at 14:44

## 2024-10-17 RX ADMIN — HUMAN INSULIN 2 UNITS: 100 INJECTION, SOLUTION SUBCUTANEOUS at 17:23

## 2024-10-17 RX ADMIN — BISACODYL 10 MG: 10 SUPPOSITORY RECTAL at 14:43

## 2024-10-17 RX ADMIN — ASPIRIN 81 MG 81 MG: 81 TABLET ORAL at 09:24

## 2024-10-17 RX ADMIN — Medication 10 ML: at 09:23

## 2024-10-17 RX ADMIN — HUMAN INSULIN 3 UNITS: 100 INJECTION, SOLUTION SUBCUTANEOUS at 13:07

## 2024-10-17 RX ADMIN — HUMAN INSULIN 2 UNITS: 100 INJECTION, SOLUTION SUBCUTANEOUS at 09:24

## 2024-10-17 RX ADMIN — POLYETHYLENE GLYCOL 3350 17 G: 17 POWDER, FOR SOLUTION ORAL at 09:24

## 2024-10-17 RX ADMIN — TEMAZEPAM 7.5 MG: 7.5 CAPSULE ORAL at 20:48

## 2024-10-17 RX ADMIN — ISOSORBIDE MONONITRATE 60 MG: 60 TABLET, EXTENDED RELEASE ORAL at 09:24

## 2024-10-17 RX ADMIN — IPRATROPIUM BROMIDE AND ALBUTEROL SULFATE 3 ML: 2.5; .5 SOLUTION RESPIRATORY (INHALATION) at 12:33

## 2024-10-17 RX ADMIN — SENNOSIDES AND DOCUSATE SODIUM 2 TABLET: 50; 8.6 TABLET ORAL at 09:24

## 2024-10-17 NOTE — PLAN OF CARE
Goal Outcome Evaluation:  Plan of Care Reviewed With: patient  Plan of Care Reviewed With: patient, sibling        Progress: declining       Anticipated Discharge Disposition (SLP): skilled nursing facility          SLP Swallowing Diagnosis: R/O pharyngeal dysphagia (10/17/24 1500)

## 2024-10-17 NOTE — PROGRESS NOTES
Nutrition Services    Patient Name:  Arminda Krishnan  YOB: 1945  MRN: [id number]  Admit Date:  10/9/2024    Patient screened for LOS. Chart reviewed. No significant weight changes documented/reported and PO intake has been adequate. No nutrition risks identified at this time. RDN following peripherally. Available via consult.     Electronically signed by:  Di Cunningham MS,RD,LD  10/17/24 15:06 EDT

## 2024-10-17 NOTE — PLAN OF CARE
Goal Outcome Evaluation:  Plan of Care Reviewed With: patient        Progress: improving  Outcome Evaluation: Pt required less assist for bed mobility and transfer this date progressing to Min-ModA, forward mobility remains limited by weakness and decreased standing tolerance, mild confusion persists. Remains below baseline for ADL completion, will cont IPOT per POC as tolerated. Rec d/c to SNF rehab.    Anticipated Discharge Disposition (OT): skilled nursing facility

## 2024-10-17 NOTE — THERAPY TREATMENT NOTE
Patient Name: Arminda Krishnan  : 1945    MRN: [id number]                              Today's Date: 10/17/2024       Admit Date: 10/9/2024    Visit Dx:     ICD-10-CM ICD-9-CM   1. Uncontrolled type 2 diabetes mellitus with hyperglycemia  E11.65 250.02   2. Acute UTI (urinary tract infection)  N39.0 599.0   3. Pneumonia of left lower lobe due to infectious organism  J18.9 486   4. Dysphagia, unspecified type  R13.10 787.20   5. Cerebral vascular disease  I67.9 437.9   6. Degeneration of intervertebral disc of lumbar region, unspecified whether pain present  M51.369 722.52   7. Spinal stenosis, lumbar region, with neurogenic claudication  M48.062 724.03   8. Aspiration pneumonia of left lower lobe, unspecified aspiration pneumonia type  J69.0 507.0   9. Dehydration  E86.0 276.51     Patient Active Problem List   Diagnosis    Cerebral vascular disease    Degenerative disc disease, lumbar    Degenerative disc disease, cervical    Spinal stenosis, lumbar region, with neurogenic claudication    Tobacco abuse    Chronic anemia    CKD (chronic kidney disease) stage 4, GFR 15-29 ml/min    Hyperglycemia    History of CVA (cerebrovascular accident)    Type 2 diabetes mellitus with hyperglycemia, without long-term current use of insulin    Disorientation    Dehydration    LLL pneumonia    HTN (hypertension)    Pneumonia     Past Medical History:   Diagnosis Date    Anemia     Arthritis     Back problem     CAD (coronary artery disease)     Cancer     Right breast    Chronic back pain     Chronically on opiate therapy     Depression     Diabetes mellitus     DX 14 years ago- checks fsbs weekly    Fibromyalgia     Gastroparesis     GERD (gastroesophageal reflux disease)     Headache     emotional/tension    History of transfusion     [facility]    HTN (hypertension)     Hypercholesteremia     IBS (irritable bowel syndrome)     Incontinence of urine     urgency    Migraine headache     Myalgia and myositis      Peripheral neuropathy     Sleep apnea     does not wear cpap    UTI (urinary tract infection)      Past Surgical History:   Procedure Laterality Date    APPENDECTOMY      ARTERIOGRAM MESENTERIC N/A 6/16/2022    Procedure: DIAGNOSTIC ARTERIOGRAM WITH CELIAC STENT PLACEMENT;  Surgeon: Neo Neil MD;  Location: [facility];  Service: Vascular;  Laterality: N/A;  FLUORO: 16 MIN  DOSE: 2384 MGY  CONTRAST:  20 ML    BACK SURGERY      5x per patient    BRAIN TUMOR EXCISION  1988    BREAST BIOPSY      CARPAL TUNNEL RELEASE Bilateral     CHOLECYSTECTOMY      COLONOSCOPY      2015    CRANIOTOMY FOR TUMOR      EYE SURGERY      bilateral cataracts removed    HEMORRHOIDECTOMY      LUMBAR FUSION N/A 01/04/2017    Procedure: LUMBAR LAMINECTOMY AND DECOMRESSION  L3 AND L4;  Surgeon: Nishant Bhatti MD;  [facility]  Service:     TOTAL ABDOMINAL HYSTERECTOMY      TRIGGER FINGER RELEASE        General Information       Row Name 10/17/24 9154          OT Time and Intention    Document Type therapy note (daily note)  -CS     Mode of Treatment occupational therapy  -CS     Patient Effort good  -CS       Row Name 10/17/24 1340          General Information    Patient Profile Reviewed yes  -CS     Existing Precautions/Restrictions fall;other (see comments)  confusion, poor safety awareness  -CS     Barriers to Rehab medically complex;previous functional deficit;cognitive status  -CS       Row Name 10/17/24 0376          Cognition    Orientation Status (Cognition) oriented to;person  -CS       Row Name 10/17/24 8425          Safety Issues/Impairments Affecting Functional Mobility    Safety Issues Affecting Function (Mobility) awareness of need for assistance;insight into deficits/self-awareness;judgment;positioning of assistive device;safety precaution awareness;safety precautions follow-through/compliance;problem-solving;sequencing abilities  -CS     Impairments Affecting Function (Mobility)  balance;cognition;coordination;endurance/activity tolerance;strength;pain;postural/trunk control  -CS     Cognitive Impairments, Mobility Safety/Performance awareness, need for assistance;insight into deficits/self-awareness;judgment;problem-solving/reasoning;safety precaution follow-through;safety precaution awareness;sequencing abilities  -CS               User Key  (r) = Recorded By, (t) = Taken By, (c) = Cosigned By      Initials Name Provider Type    CS Jaron Moreno OT Occupational Therapist                     Mobility/ADL's       Row Name 10/17/24 1346          Bed Mobility    Bed Mobility scooting/bridging;supine-sit  -CS     Scooting/Bridging [place] (Bed Mobility) moderate assist (50% patient effort);2 person assist;verbal cues;nonverbal cues (demo/gesture)  -     Supine-Sit [place] (Bed Mobility) moderate assist (50% patient effort);2 person assist  -CS     Bed Mobility, Safety Issues decreased use of arms for pushing/pulling;decreased use of legs for bridging/pushing;impaired trunk control for bed mobility;cognitive deficits limit understanding  -CS     Assistive Device (Bed Mobility) bed rails;head of bed elevated;repositioning sheet  -CS     Comment, (Bed Mobility) cues for sequencing, poor postural control initially with improvement over time, BLE AROM warm-up performed prior to stand  -CS       Row Name 10/17/24 1346          Transfers    Transfers sit-stand transfer;bed-chair transfer  -CS     Comment, (Transfers) STS x 3 and steps to recliner, tactile guidance for safe sequencing w/ RW use  -CS       Row Name 10/17/24 1346          Bed-Chair Transfer    Bed-Chair [place] (Transfers) moderate assist (50% patient effort);2 person assist;verbal cues;nonverbal cues (demo/gesture)  -       Row Name 10/17/24 1346          Sit-Stand Transfer    Sit-Stand [place] (Transfers) minimum assist (75% patient effort);2 person assist;verbal cues;nonverbal cues (demo/gesture)  -      Assistive Device (Sit-Stand Transfers) walker, front-wheeled  -       Row Name 10/17/24 1346          Functional Mobility    Functional Mobility- Comment 5ft x 2, ModA w/ RW  -CS     Patient was able to Ambulate yes  -       Row Name 10/17/24 1346          Activities of Daily Living    BADL Assessment/Intervention upper body dressing;lower body dressing;feeding;grooming  -       Row Name 10/17/24 1346          Lower Body Dressing Assessment/Training    [place] Level (Lower Body Dressing) don;socks;dependent (less than 25% patient effort)  -CS     Position (Lower Body Dressing) edge of bed sitting  -CS       Row Name 10/17/24 1346          Upper Body Dressing Assessment/Training    [place] Level (Upper Body Dressing) don;pajama/robe;moderate assist (50% patient effort)  -CS     Position (Upper Body Dressing) edge of bed sitting  -       Row Name 10/17/24 1346          Toileting Assessment/Training    [place] Level (Toileting) toileting skills;dependent (less than 25% patient effort)  -       Row Name 10/17/24 1346          Self-Feeding Assessment/Training    [place] Level (Feeding) liquids to mouth;contact guard assist;scoop food and bring to mouth;supervision  -CS     Position (Feeding) sitting up in bed  -       Row Name 10/17/24 1346          Grooming Assessment/Training    [place] Level (Grooming) hair care, combing/brushing;dependent (less than 25% patient effort)  -               User Key  (r) = Recorded By, (t) = Taken By, (c) = Cosigned By      Initials Name Provider Type     Jaron Moreno OT Occupational Therapist                   Obj/Interventions       Row Name 10/17/24 1340          Motor Skills    Motor Skills motor control/coordination interventions  -     Motor Control/Coordination Interventions gross motor coordination activities;occupation/activity based treatment;therapeutic exercise/ROM  -       Row Name 10/17/24 134          Balance    Balance  Assessment sitting static balance;sitting dynamic balance;standing static balance;standing dynamic balance  -CS     Static Sitting Balance contact guard  -CS     Dynamic Sitting Balance minimal assist  -CS     Position, Sitting Balance unsupported;sitting edge of bed  -CS     Static Standing Balance minimal assist;2-person assist  -CS     Dynamic Standing Balance moderate assist;2-person assist  -CS     Position/Device Used, Standing Balance supported;walker, front-wheeled  -CS     Balance Interventions sitting;standing;sit to stand;occupation based/functional task;weight shifting activity  -CS               User Key  (r) = Recorded By, (t) = Taken By, (c) = Cosigned By      Initials Name Provider Type    CS Jaron Moreno OT Occupational Therapist                   Goals/Plan    No documentation.                  Clinical Impression       Row Name 10/17/24 3624          Pain Assessment    Additional Documentation Pain Scale: FACES Pre/Post-Treatment (Group)  -       Row Name 10/17/24 8796          Pain Scale: FACES Pre/Post-Treatment    Pain: FACES Scale, Pretreatment 0-->no hurt  -CS     Posttreatment Pain Rating 0-->no hurt  -CS       Row Name 10/17/24 1350          Plan of Care Review    Plan of Care Reviewed With patient  -CS     Progress improving  -CS     Outcome Evaluation Pt required less assist for bed mobility and transfer this date progressing to Min-ModA, forward mobility remains limited by weakness and decreased standing tolerance, mild confusion persists. Remains below baseline for ADL completion, will cont IPOT per POC as tolerated. Rec d/c to SNF rehab.  -       Row Name 10/17/24 1354          Therapy Plan Review/Discharge Plan (OT)    Anticipated Discharge Disposition (OT) skilled nursing facility  -       Row Name 10/17/24 135          Vital Signs    Pre Systolic BP Rehab --  RN cleared for tx  -CS     O2 Delivery Pre Treatment room air  -CS     O2 Delivery Intra Treatment room air  -CS      O2 Delivery Post Treatment room air  -CS     Pre Patient Position Supine  -CS     Intra Patient Position Standing  -CS     Post Patient Position Sitting  -CS       Row Name 10/17/24 1352          Positioning and Restraints    Pre-Treatment Position sitting in chair/recliner  -CS     Post Treatment Position chair  -CS     In Chair notified nsg;reclined;sitting;call light within reach;encouraged to call for assist;RUE elevated;LUE elevated;legs elevated;on mechanical lift sling;waffle cushion;exit alarm on;with family/caregiver  -CS               User Key  (r) = Recorded By, (t) = Taken By, (c) = Cosigned By      Initials Name Provider Type    Jaron Adam OT Occupational Therapist                   Outcome Measures       Row Name 10/17/24 1356          How much help from another is currently needed...    Putting on and taking off regular lower body clothing? 2  -CS     Bathing (including washing, rinsing, and drying) 2  -CS     Toileting (which includes using toilet bed pan or urinal) 2  -CS     Putting on and taking off regular upper body clothing 2  -CS     Taking care of personal grooming (such as brushing teeth) 3  -CS     Eating meals 3  -CS     AM-PAC 6 Clicks Score (OT) 14  -CS       Row Name 10/17/24 1355          Functional Assessment    Outcome Measure Options AM-PAC 6 Clicks Daily Activity (OT)  -CS               User Key  (r) = Recorded By, (t) = Taken By, (c) = Cosigned By      Initials Name Provider Type    Jaron Adam OT Occupational Therapist                    Occupational Therapy Education       Title: PT OT SLP Therapies (In Progress)       Topic: Occupational Therapy (In Progress)       Point: ADL training (In Progress)       Description:   Instruct learner(s) on proper safety adaptation and remediation techniques during self care or transfers.   Instruct in proper use of assistive devices.                  Learning Progress Summary            Patient Acceptance, D,E, NR by LADONNA  at 10/17/2024 1356    Acceptance, E, NR by  at 10/15/2024 0947    Acceptance, E, VU,NR by  at 10/10/2024 0945    Comment: role of therapy and ongoing treatment plan   Family Acceptance, E, VU,NR by  at 10/10/2024 0945    Comment: role of therapy and ongoing treatment plan                      Point: Home exercise program (In Progress)       Description:   Instruct learner(s) on appropriate technique for monitoring, assisting and/or progressing therapeutic exercises/activities.                  Learning Progress Summary            Patient Acceptance, D,E, NR by  at 10/17/2024 1356                      Point: Precautions (In Progress)       Description:   Instruct learner(s) on prescribed precautions during self-care and functional transfers.                  Learning Progress Summary            Patient Acceptance, D,E, NR by  at 10/17/2024 1356    Acceptance, E, NR by  at 10/15/2024 0947                      Point: Body mechanics (In Progress)       Description:   Instruct learner(s) on proper positioning and spine alignment during self-care, functional mobility activities and/or exercises.                  Learning Progress Summary            Patient Acceptance, D,E, NR by  at 10/17/2024 1356    Acceptance, E, NR by  at 10/15/2024 0947                                      User Key       Initials Effective Dates Name Provider Type Discipline     02/03/23 -  Jenny Hirsch, OT Occupational Therapist OT     06/16/21 -  Jaron Moreno, OT Occupational Therapist OT     10/14/22 -  Kat Kat, OT Occupational Therapist OT                  OT Recommendation and Plan     Plan of Care Review  Plan of Care Reviewed With: patient  Progress: improving  Outcome Evaluation: Pt required less assist for bed mobility and transfer this date progressing to Min-ModA, forward mobility remains limited by weakness and decreased standing tolerance, mild confusion persists. Remains below baseline for ADL  completion, will cont IPOT per POC as tolerated. Rec d/c to SNF rehab.     Time Calculation:         Time Calculation- OT       Row Name 10/17/24 1356             Time Calculation- OT    OT Start Time 1311  -CS      OT Received On 10/17/24  -CS      OT Goal Re-Cert Due Date 10/20/24  -CS         Timed Charges    27390 - OT Therapeutic Activity Minutes 20  -CS      52449 - OT Self Care/Mgmt Minutes 5  -CS         Total Minutes    Timed Charges Total Minutes 25  -CS       Total Minutes 25  -CS                User Key  (r) = Recorded By, (t) = Taken By, (c) = Cosigned By      Initials Name Provider Type    CS Jaron Moreno, OT Occupational Therapist                  Therapy Charges for Today       Code Description Service Date Service Provider Modifiers Qty    87080451051 HC OT THERAPEUTIC ACT EA 15 MIN 10/17/2024 Jaron Moreno, OT GO 2    17925991239 HC OT THER SUPP EA 15 MIN 10/17/2024 Jaron Moreno, OT GO 1    96063167679 HC OT THER SUPP EA 15 MIN 10/17/2024 Jaron Moreno, OT GO 1                 Jaron Moreno OT  10/17/2024

## 2024-10-17 NOTE — PROGRESS NOTES
Neurology       Patient Care Team:  Aiden Spencer MD as PCP - General (Family Medicine)  Sujatha Carmona MD as Consulting Physician (Pain Medicine)  Cr De Jesus MD as Consulting Physician (Gastroenterology)  Noemí Machuca APRN as Nurse Practitioner (Nurse Practitioner)    Chief complaint: AMS    History: Patient back to baseline and doing well.    Contemplating discharge soon.      Past Medical History:   Diagnosis Date    Anemia     Arthritis     Back problem     CAD (coronary artery disease)     Cancer     Right breast    Chronic back pain     Chronically on opiate therapy     Depression     Diabetes mellitus     DX 14 years ago- checks fsbs weekly    Fibromyalgia     Gastroparesis     GERD (gastroesophageal reflux disease)     Headache     emotional/tension    History of transfusion     [facility]    HTN (hypertension)     Hypercholesteremia     IBS (irritable bowel syndrome)     Incontinence of urine     urgency    Migraine headache     Myalgia and myositis     Peripheral neuropathy     Sleep apnea     does not wear cpap    UTI (urinary tract infection)        Vital Signs   Vitals:    10/17/24 0801 10/17/24 0924 10/17/24 1200 10/17/24 1233   BP: 164/65 135/76 132/95    BP Location: Left arm  Left arm    Patient Position: Lying  Lying    Pulse: 88 92 91 88   Resp: 16  16    Temp: 98.5 °F (36.9 °C)  98 °F (36.7 °C)    TempSrc: Axillary  Axillary    SpO2: 92%  98% 99%   Weight:       Height:           Physical Exam:   General: Awake and alert              Neuro: Cheerful and cooperative    Oriented to person and situation.        Results Review:  Reviewed  Results from last 7 days   Lab Units 10/16/24  1903   WBC 10*3/mm3 9.59   HEMOGLOBIN g/dL 9.6*   HEMATOCRIT % 30.4*   PLATELETS 10*3/mm3 287     Results from last 7 days   Lab Units 10/16/24  1903 10/15/24  0939 10/14/24  0052   SODIUM mmol/L 142 138 139   POTASSIUM mmol/L 3.9 4.2 3.9   CHLORIDE mmol/L 105 103 106   CO2  mmol/L 21.0* 21.0* 20.0*   BUN mg/dL 27* 24* 26*   CREATININE mg/dL 2.33* 2.35* 2.33*   CALCIUM mg/dL 9.7 9.2 9.6   BILIRUBIN mg/dL  --  0.3 0.2   ALK PHOS U/L  --  76 70   ALT (SGPT) U/L  --  21 24   AST (SGOT) U/L  --  26 36*   GLUCOSE mg/dL 217* 254* 155*       Imaging Results (Last 24 Hours)       ** No results found for the last 24 hours. **            Assessment:  Confusion secondary to sleep deprivation, resolved    Plan:  Okay to discharge anytime from my standpoint    Continue sleep medications since his sleeping is an ongoing problem    Comment:  See on request         I discussed the patients findings and my recommendations with patient and primary care team    Collin Herrera MD  10/17/24  13:34 EDT

## 2024-10-17 NOTE — DISCHARGE PLACEMENT REQUEST
"Arminda Noel (79 y.o. Female)    Wanting rehab to home,    Lives with sister Delbert    Thank you  Shirlene ELENA, RN, [facility]  [phone]   Date of Birth   1945    Social Security Number       Address   [address] [place] [id number]    Home Phone   [phone]    MRN   [id number]       Quaker   Latter-day    Marital Status                               Admission Date   10/9/24    Admission Type   Emergency    Admitting Provider   Jessie Navas DO    Attending Provider   Jessie Navas DO    Department, Room/Bed   [facility] 3F, S311/1       Discharge Date       Discharge Disposition       Discharge Destination                                 Attending Provider: Jessie Navas DO    Allergies: Ceclor [Cefaclor]    Isolation: None   Infection: None   Code Status: No CPR    Ht: 152.4 cm (60\")   Wt: 81.6 kg (180 lb)    Admission Cmt: None   Principal Problem: LLL pneumonia [J18.9]                   Active Insurance as of 10/9/2024       Primary Coverage       Payor Plan Insurance Group Employer/Plan Group    ANTHEM MEDICARE REPLACEMENT ANTHEM MEDICARE ADVANTAGE [id number]       Payor Plan Address Payor Plan Phone Number Payor Plan Fax Number Effective Dates    [address] [phone]  7/1/2024 - None Entered    [facility] [address]         Subscriber Name Subscriber Birth Date Member ID       ARMINDA NOEL 1945 [id number]               Secondary Coverage       Payor Plan Insurance Group Employer/Plan Group    [place]        Payor Plan Address Payor Plan Phone Number Payor Plan Fax Number Effective Dates    [address] [phone]  2/20/2024 - None Entered    [facility] [address]         Subscriber Name Subscriber Birth Date Member ID       ARMINDA NOEL 1945 [id number]                     Emergency Contacts        (Rel.) Home Phone Work Phone Mobile Phone    Delbert Mcdermott " (Sister) [phone] -- [phone]              Insurance Information                  ANTHEM MEDICARE REPLACEMENT/ANTHEM MEDICARE ADVANTAGE Phone: [phone]    Subscriber: Arminda Krishnan Subscriber#: [id number]    Group#: [id number] Precert#: --    Authorization#: [id number] Effective Date: --        [place] Phone: [phone]    Subscriber: Arminda Krishnan Subscriber#: [id number]    Group#: -- Precert#: --    Authorization#: [id number] Effective Date: --             History & Physical        Jin Manriquez III, DO at 10/09/24 2304              [facility] Hospital Medicine Services  HISTORY AND PHYSICAL    Patient Name: Arminda Krishnan  : 1945  MRN: [id number]  Primary Care Physician: Aiden Spencer MD  Date of admission: 10/9/2024    Subjective  Subjective     Chief Complaint:  Hyperglycemia    HPI:  Arminda Krishnan is a 79 y.o. female with significant medical history of mild cognitive impairment, type 2 diabetes, history of CVA, anemia, degenerative disc disease on steroids who presents to [facility] ED with hyperglycemia.     Due to decreased LOC HPI was obtained via medical record and next of kin at bedside.  Patient's sister reports that home health arrived today and checked patient's glucose was 495.  It remained in the high 300s low 400s, and the patient appeared more lethargic than normal so they brought her to the ED. they also report that her chronic cough has gotten progressively worse over the last 2 days, cough described as productive with yellow sputum.  Denies fevers body aches or chills.  No complaints of chest pain, occasional shortness of breath, SpO2 stable on room air.      Chest x-ray shows lower lobe pneumonia, concerning for aspirational pneumonia  UA in ED positive for leukocytes and bacteria +4  Uncontrolled type 2 diabetes on chronic steroids FSBG 393 on arrival    Received IVF,  insulin, Zosyn and Vanc       Review of Systems   Constitutional:  Negative for chills and fever.   HENT:  Negative for congestion.    Eyes: Negative.    Respiratory:  Positive for cough, shortness of breath and wheezing. Negative for chest tightness.    Cardiovascular:  Negative for chest pain and leg swelling.   Gastrointestinal:  Negative for constipation, nausea and vomiting.   Endocrine: Negative.    Genitourinary: Negative.    Musculoskeletal:  Positive for myalgias (Baseline bilateral leg pain).   Skin: Negative.    Allergic/Immunologic: Positive for immunocompromised state.   Neurological:  Positive for weakness. Negative for syncope, light-headedness and headaches.   Hematological: Negative.    Psychiatric/Behavioral:  Positive for confusion (Baseline confusion, worsening).               Personal History     Past Medical History:   Diagnosis Date    Anemia     Arthritis     Back problem     CAD (coronary artery disease)     Cancer     Right breast    Chronic back pain     Chronically on opiate therapy     Depression     Diabetes mellitus     DX 14 years ago- checks fsbs weekly    Fibromyalgia     Gastroparesis     GERD (gastroesophageal reflux disease)     Headache     emotional/tension    History of transfusion     [facility]    HTN (hypertension)     Hypercholesteremia     IBS (irritable bowel syndrome)     Incontinence of urine     urgency    Migraine headache     Myalgia and myositis     Peripheral neuropathy     Sleep apnea     does not wear cpap    UTI (urinary tract infection)              Past Surgical History:   Procedure Laterality Date    APPENDECTOMY      ARTERIOGRAM MESENTERIC N/A 6/16/2022    Procedure: DIAGNOSTIC ARTERIOGRAM WITH CELIAC STENT PLACEMENT;  Surgeon: Neo Neil MD;  Location: [facility];  Service: Vascular;  Laterality: N/A;  FLUORO: 16 MIN  DOSE: 2384 MGY  CONTRAST:  20 ML    BACK SURGERY      5x per patient    BRAIN TUMOR EXCISION  1988    BREAST BIOPSY       CARPAL TUNNEL RELEASE Bilateral     CHOLECYSTECTOMY      COLONOSCOPY      2015    CRANIOTOMY FOR TUMOR      EYE SURGERY      bilateral cataracts removed    HEMORRHOIDECTOMY      LUMBAR FUSION N/A 01/04/2017    Procedure: LUMBAR LAMINECTOMY AND DECOMRESSION  L3 AND L4;  Surgeon: Nishant Bhatti MD;  Location: [facility];  Service:     TOTAL ABDOMINAL HYSTERECTOMY      TRIGGER FINGER RELEASE         Family History: family history includes Alcohol abuse in her father; Cancer in her brother, brother, father, and another family member; Diabetes in her brother, mother, sister, and another family member; Heart attack in her mother, sister, sister, and another family member; Heart disease in her mother and sister; Hyperlipidemia in an other family member; Hypertension in her brother, mother, sister, and another family member; Stroke in her sister.     Social History:  reports that she has been smoking cigarettes. She started smoking about 65 years ago. She has a 47.4 pack-year smoking history. She has been exposed to tobacco smoke. She has never used smokeless tobacco. She reports that she does not drink alcohol and does not use drugs.  Social History     Social History Narrative    Lives in [place], [place] with sister       Medications:  Blood Glucose Monitoring Suppl, Blood Glucose Test, Dexcom G7 , Dexcom G7 Sensor, Diclofenac Sodium, Dupilumab, HYDROcodone-acetaminophen, Insulin Pen Needle, ONE TOUCH ULTRA 2, OneTouch Delica Plus [id number], SITagliptin, acetaminophen, albuterol, albuterol sulfate HFA, aspirin, atorvastatin, busPIRone, carvedilol, cetirizine, clopidogrel, famotidine, fluticasone, glipizide, glucose blood, isosorbide mononitrate, multivitamin with minerals, naloxone, nicotine, pantoprazole, predniSONE, pregabalin, silver sulfadiazine, tacrolimus, and tolterodine LA    Allergies   Allergen Reactions    Ceclor [Cefaclor] Rash       Objective  Objective     Vital Signs:   Temp:  [98.3 °F (36.8  °C)] 98.3 °F (36.8 °C)  Heart Rate:  [80-83] 80  Resp:  [18] 18  BP: (134-158)/(69-73) 140/73    Physical Exam  Constitutional:       Appearance: She is well-groomed. She is ill-appearing.   HENT:      Head: Normocephalic.   Cardiovascular:      Rate and Rhythm: Normal rate and regular rhythm.   Pulmonary:      Effort: Pulmonary effort is normal.      Breath sounds: Wheezing and rhonchi present.   Abdominal:      General: Abdomen is flat. Bowel sounds are normal.      Palpations: Abdomen is soft.   Musculoskeletal:      Right lower leg: No edema.      Left lower leg: No edema.   Skin:     General: Skin is warm and dry.   Neurological:      Mental Status: She is lethargic and disoriented.   Psychiatric:         Behavior: Behavior is cooperative.          Result Review:  I have personally reviewed the results from the time of this admission to 10/10/2024 00:02 EDT and agree with these findings:  [x]  Laboratory list / accordion  [x]  Microbiology  []  Radiology  [x]  EKG/Telemetry   []  Cardiology/Vascular   []  Pathology  [x]  Old records  []  Other:      LAB RESULTS:      Lab 10/09/24  1842 10/03/24  1356   WBC 8.68 7.56   HEMOGLOBIN 7.9* 7.6*   HEMATOCRIT 25.0* 24.2*   PLATELETS 301 213   NEUTROS ABS 6.31 4.96   IMMATURE GRANS (ABS) 0.20* 0.11*   LYMPHS ABS 1.13 1.43   MONOS ABS 0.83 0.84   EOS ABS 0.13 0.16   MCV 88.7 89.0   CRP 13.05*  --    PROCALCITONIN 0.12  --    LACTATE 0.9  --          Lab 10/09/24  1842 10/03/24  1356   SODIUM 136 135*   POTASSIUM 4.4 4.3   CHLORIDE 103 101   CO2 19.0* 20.0*   ANION GAP 14.0 14.0   BUN 56* 38*   CREATININE 3.30* 2.88*   EGFR 13.5* 15.9*   GLUCOSE 359* 303*   CALCIUM 9.5 9.5   MAGNESIUM 1.8 1.7   PHOSPHORUS 3.5  --    TSH 0.465 0.580         Lab 10/09/24  1842 10/03/24  1356   TOTAL PROTEIN 7.4 7.1   ALBUMIN 3.3* 3.4*   GLOBULIN 4.1 3.7   ALT (SGPT) 18 13   AST (SGOT) 21 17   BILIRUBIN 0.3 0.4   ALK PHOS 78 77   LIPASE 23  --          Lab 10/09/24  1842   PROBNP 719.5    HSTROP T 39*                 Lab 10/09/24  1846   FIO2 21   CARBOXYHEMOGLOBIN (VENOUS) 1.6     Brief Urine Lab Results  (Last result in the past 365 days)        Color   Clarity   Blood   Leuk Est   Nitrite   Protein   CREAT   Urine HCG        10/09/24 2058 Yellow   Cloudy   Large (3+)   Moderate (2+)   Negative   100 mg/dL (2+)                 Microbiology Results (last 10 days)       Procedure Component Value - Date/Time    COVID PRE-OP / PRE-PROCEDURE SCREENING ORDER (NO ISOLATION) - Swab, Nasopharynx [[id number]]  (Normal) Collected: 10/09/24 1843    Lab Status: Final result Specimen: Swab from Nasopharynx Updated: 10/09/24 1958    Narrative:      The following orders were created for panel order COVID PRE-OP / PRE-PROCEDURE SCREENING ORDER (NO ISOLATION) - Swab, Nasopharynx.  Procedure                               Abnormality         Status                     ---------                               -----------         ------                     COVID-19, FLU A/B, RSV P...[[id number]]  Normal              Final result                 Please view results for these tests on the individual orders.    COVID-19, FLU A/B, RSV PCR 1 HR TAT - Swab, Nasopharynx [[id number]]  (Normal) Collected: 10/09/24 1843    Lab Status: Final result Specimen: Swab from Nasopharynx Updated: 10/09/24 1958     COVID19 Not Detected     Influenza A PCR Not Detected     Influenza B PCR Not Detected     RSV, PCR Not Detected    Narrative:      Fact sheet for providers: https://www.fda.gov/media/490742/download    Fact sheet for patients: https://www.fda.gov/media/691547/download    Test performed by PCR.            XR Chest 1 View    Result Date: 10/9/2024  XR CHEST 1 VW Date of Exam: 10/9/2024 7:38 PM EDT Indication: Weak/Dizzy/AMS triage protocol Comparison: Chest radiograph 10/3/2024 Findings: Mediastinum: Cardiomediastinal silhouette appears unchanged and normal in size Lungs: Bronchial wall thickening and bilateral mild interstitial  opacities. There is no appreciable prominence of the central pulmonary vasculature or convincing septal thickening to strongly suggest pulmonary edema. There is a consolidative opacity in the left lower lobe. Pleura: No pleural effusion or pneumothorax. Bones and soft tissues: No acute osseous abnormality.     Impression: Impression: Consolidative opacity in the left lower lobe suspicious for aspiration or pneumonia. Bronchial wall thickening which can be seen in bronchitis and/or large airways disease. Electronically Signed: Francisco Javier Fuller  10/9/2024 8:09 PM EDT  Workstation ID: [id number]    CT Abdomen Pelvis Without Contrast    Result Date: 10/9/2024  CT ABDOMEN PELVIS WO CONTRAST Date of Exam: 10/9/2024 7:26 PM EDT Indication: nausea and severe epigastric pain, no BM 2 weeks. Comparison: CT abdomen pelvis 9/27/2024 Technique: Axial CT images were obtained of the abdomen and pelvis without the administration of contrast. Reconstructed coronal and sagittal images were also obtained. Automated exposure control and iterative construction methods were used. Findings: Heart size within normal limits. Trace pericardial fluid stable from prior. Small hiatal hernia. Lower lungs without acute abnormality. Small fat-containing right Bochdalek hernia. Noncontrast appearance of liver, spleen and biliary tree unremarkable. Cholecystectomy. Atrophic pancreas stable from prior without active inflammation. No suspicious adrenal nodule. Symmetric renal size and contour. Stable punctate right midpole calculus. No hydronephrosis. Urinary bladder unremarkable. Hysterectomy. No suspicious adnexal mass. Expected configuration stomach and duodenum. Mild to moderate formed colonic stool. No evidence of bowel obstruction or active inflammation. No CT evidence of acute appendicitis. Extensive abdominal atherosclerotic disease. Celiac artery stent. Negative for abdominal aortic aneurysm. No suspicious adenopathy. No ascites, free air or  drainable collection. Fat-containing umbilical and periumbilical hernias stable from prior without edema or contained bowel. No acute soft tissue finding. Degenerative osteoarthritis of the hip joints, right greater than left. Discectomy and fusion changes of the lumbar spine unchanged from prior. 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 Anterolisthesis L4 and L5 stable from prior. Periprosthetic lucency at  L2 and L5 screws stable from prior suggesting loosening.     Impression: Impression: No acute CT findings. Multiple chronic/ancillary findings overall without significant change from recent comparison. Electronically Signed: Deon Gimenez MD  10/9/2024 7:53 PM EDT  Workstation ID: [id number]     Results for orders placed during the hospital encounter of 07/15/24    Adult Transthoracic Echo Complete W/ Cont if Necessary Per Protocol    Interpretation Summary    Left ventricular systolic function is normal. Calculated left ventricular EF = 63.2%    Estimated right ventricular systolic pressure from tricuspid regurgitation is normal (<35 mmHg).    Normal left atrial size and volume noted.    There is calcification of the aortic valve. Mild to moderate aortic valve regurgitation is present.      Assessment & Plan  Assessment & Plan       LLL pneumonia    Degenerative disc disease, lumbar    CKD (chronic kidney disease) stage 4, GFR 15-29 ml/min    Hyperglycemia    History of CVA (cerebrovascular accident)    HTN (hypertension)      Left lower lung pneumonia  -CXR LLL pneumonia  -Respiratory panel negative  -Received Zosyn and Vanc in ED  -Sputum culture  -DuoNebs  -Urine antigens  -Continue Zosyn and vancomycin  -Speech consult  -N.p.o. until speech eval  CBC and CMP in a.m.      UTI  CKD   -UA positive for leukocytes, +4 bacteria  -Cultures pending  -Creatinine 3.30  -IV fluid  -ABX as above    Type 2 diabetes, uncontrolled  -Chronic prednisone use  -Received insulin and fluids in ED  -A1c 11 (September  2024)  -FSBG, SSI    HTN/HLD  History of CVA  -Continue ASA  -Continue atorvastatin  -Continue carvedilol  -Continue isosorbide  -Continue clopidogrel    Anemia  -Serial H&H's  -Anemia studies  -Occult stool  -Consider GI consult      Degenerative disc disease  -Chronic prednisone 5 mg every other day      DVT prophylaxis:      CODE STATUS: DNR/DNI  Medical Intervention Limits: No intubation (DNI)  Level Of Support Discussed With: Next of Kin (If No Surrogate)  Code Status (Patient has no pulse and is not breathing): No CPR (Do Not Attempt to Resuscitate)  Medical Interventions (Patient has pulse or is breathing): Limited Support      Expected Discharge  Expected discharge date/ time has not been documented.  TBD      This note has been completed as part of a split-shared workflow.     Signature: Electronically signed by BALAJI Ge, 10/10/24, 12:12 AM EDT    -----------------------    The patient was seen independently by the APC.  I was available for any questions or concerns.     Electronically signed by Jin Manriquez III, DO, 10/10/24, 12:53 AM EDT.             Electronically signed by Jin Manriquez III, DO at 10/10/24 0053       Current Facility-Administered Medications   Medication Dose Route Frequency Provider Last Rate Last Admin    acetaminophen (TYLENOL) tablet 650 mg  650 mg Oral Q4H PRN Lien Galvez APRN   650 mg at 10/17/24 0940    Or    acetaminophen (TYLENOL) 160 MG/5ML oral solution 650 mg  650 mg Oral Q4H PRN Lien Galvez APRN   650 mg at 10/11/24 1716    Or    acetaminophen (TYLENOL) suppository 650 mg  650 mg Rectal Q4H PRN Lien Galvez APRN   650 mg at 10/14/24 0514    aspirin chewable tablet 81 mg  81 mg Oral Daily Tab Samuel MD   81 mg at 10/17/24 0924    atorvastatin (LIPITOR) tablet 80 mg  80 mg Oral Daily Lien Galvez APRN   80 mg at 10/17/24 0924    sennosides-docusate (PERICOLACE) 8.6-50 MG per tablet 2 tablet  2 tablet Oral BID SIVA Tam DO   2 tablet at  10/17/24 0924    And    polyethylene glycol (MIRALAX) packet 17 g  17 g Oral Daily SIVA aTm DO   17 g at 10/17/24 0924    And    bisacodyl (DULCOLAX) EC tablet 5 mg  5 mg Oral Daily PRN SIVA Tam DO        And    bisacodyl (DULCOLAX) suppository 10 mg  10 mg Rectal Daily PRN SIVA Tam DO        Calcium Replacement - Follow Nurse / BPA Driven Protocol   Does not apply PRN Lien Galvez APRN        clopidogrel (PLAVIX) tablet 75 mg  75 mg Oral Daily Lien Galvez APRN   75 mg at 10/17/24 0924    dextrose (D50W) (25 g/50 mL) IV injection 25 g  25 g Intravenous Q15 Min PRN Lien Galvez APRN        dextrose (GLUTOSE) oral gel 15 g  15 g Oral Q15 Min PRN Lien Galvez APRN        donepezil (ARICEPT) tablet 5 mg  5 mg Oral Nightly Collin Herrera MD   5 mg at 10/16/24 2047    famotidine (PEPCID) tablet 10 mg  10 mg Oral Every Other Day Lien Galvez APRN   10 mg at 10/16/24 0946    ferrous sulfate tablet 325 mg  325 mg Oral Daily With Breakfast Fide Zhou MD   325 mg at 10/17/24 0924    glucagon (GLUCAGEN) injection 1 mg  1 mg Intramuscular Q15 Min PRN Lien Galvez APRN        HYDROcodone-acetaminophen (NORCO) 5-325 MG per tablet 1 tablet  1 tablet Oral Q6H PRN SIVA Tam DO   1 tablet at 10/16/24 1758    influenza vac split high-dose (FLUZONE HIGH DOSE) injection 0.5 mL  0.5 mL Intramuscular During Hospitalization Tab Samuel MD        insulin glargine (LANTUS, SEMGLEE) injection 10 Units  10 Units Subcutaneous Daily SIVA Tam DO   10 Units at 10/17/24 0924    insulin regular (humuLIN R,novoLIN R) injection 2-7 Units  2-7 Units Subcutaneous TID With Meals Yesica Kelly MD   2 Units at 10/17/24 0924    ipratropium-albuterol (DUO-NEB) nebulizer solution 3 mL  3 mL Nebulization 4x Daily - RT Letz, Lien, APRN   3 mL at 10/16/24 1558    isosorbide mononitrate (IMDUR) 24 hr tablet 60 mg  60 mg Oral Daily Lien Galvez APRN   60 mg at 10/17/24 0924    linagliptin (TRADJENTA)  tablet 5 mg  5 mg Oral Daily Lien Galvez APRN   5 mg at 10/16/24 0947    Magnesium Low Dose Replacement - Follow Nurse / BPA Driven Protocol   Does not apply PRN Lien Galvez APRN        melatonin tablet 5 mg  5 mg Oral Nightly Collin Herrera MD   5 mg at 10/16/24 204    Phosphorus Replacement - Follow Nurse / BPA Driven Protocol   Does not apply PRN Lien Galvez APRN        Potassium Replacement - Follow Nurse / BPA Driven Protocol   Does not apply PRN Lien Galvez APRN        simethicone (MYLICON) chewable tablet 80 mg  80 mg Oral 4x Daily PRN SIVA Tam DO   80 mg at 10/15/24 2037    sodium chloride 0.9 % flush 10 mL  10 mL Intravenous PRN Ricco Lamar MD        sodium chloride 0.9 % flush 10 mL  10 mL Intravenous Q12H Lien Galvez APRN   10 mL at 10/17/24 0923    sodium chloride 0.9 % flush 10 mL  10 mL Intravenous PRN Lien Galvez APRN   10 mL at 10/11/24 1221    temazepam (RESTORIL) capsule 7.5 mg  7.5 mg Oral Nightly Collin Herrera MD   7.5 mg at 10/16/24 2139        Physician Progress Notes (most recent note)        SIVA Tam DO at 10/16/24 1240              [facility] Medicine Services  PROGRESS NOTE    Patient Name: Arminda Krishnan  : 1945  MRN: [id number]    Date of Admission: 10/9/2024  Primary Care Physician: Aiden Spencer MD    Subjective   Subjective     CC: Follow-up hyperglycemia    HPI:   Patient is a 79-year-old female seen and examined this a.m., patient's niece is at bedside and updated this a.m.  Patient overall did sleep better overnight, reports she remembers me from yesterday, disoriented still but pleasant, awaiting hopeful rehab placement at this time.     Objective   Objective     Vital Signs:   Temp:  [96.6 °F (35.9 °C)-99 °F (37.2 °C)] 96.6 °F (35.9 °C)  Heart Rate:  [] 102  Resp:  [16-18] 18  BP: (117-166)/(57-83) 117/74  Flow (L/min) (Oxygen Therapy):  [2] 2     Physical Exam:  Constitutional: Awake,  remains disoriented but able to answer simple questions, frail and elderly appearing, currently on 2L NC   HENT: NCAT, nares patent, mucous membranes moist  Respiratory: Decreased BS bilaterally, no rhonchi or wheezing, respiratory effort normal   Cardiovascular: RRR, no murmurs, rubs, or gallops  Gastrointestinal: Positive bowel sounds, soft, mild tenderness to palpation, nondistended  Musculoskeletal: No bilateral ankle edema  Psychiatric: pleasantly confused   Neurologic: Disoriented to place/time but oriented to person on 10/16, awake, follows commands, BOND   Skin: No rashes       Results Reviewed:  LAB RESULTS:      Lab 10/15/24  0939 10/14/24  0052 10/13/24  1219 10/13/24  0519 10/10/24  2223 10/10/24  1745 10/10/24  1236 10/09/24  1842   WBC 9.36 9.74  --  9.23  --   --  10.17 8.68   HEMOGLOBIN 9.4* 8.7* 7.0* 7.1* 8.2*   < > 7.5* 7.9*   HEMATOCRIT 29.5* 26.9* 21.8* 22.6* 25.8*   < > 23.7* 25.0*   PLATELETS 291 271  --  282  --   --  296 301   NEUTROS ABS  --  6.72  --   --   --   --  7.33* 6.31   IMMATURE GRANS (ABS)  --  0.15*  --   --   --   --  0.16* 0.20*   LYMPHS ABS  --  1.40  --   --   --   --  1.36 1.13   MONOS ABS  --  1.19*  --   --   --   --  1.08* 0.83   EOS ABS  --  0.20  --   --   --   --  0.14 0.13   MCV 87.8 87.3  --  88.6  --   --  88.8 88.7   CRP  --   --   --   --   --   --   --  13.05*   PROCALCITONIN  --   --   --   --   --   --   --  0.12   LACTATE  --   --   --   --   --   --   --  0.9   HSTROP T  --   --   --   --   --   --   --  39*    < > = values in this interval not displayed.         Lab 10/15/24  0939 10/14/24  0052 10/13/24  0813 10/10/24  1236 10/09/24  1842   SODIUM 138 139 139 141 136   POTASSIUM 4.2 3.9 3.8 4.1 4.4   CHLORIDE 103 106 106 110* 103   CO2 21.0* 20.0* 20.0* 18.0* 19.0*   ANION GAP 14.0 13.0 13.0 13.0 14.0   BUN 24* 26* 28* 45* 56*   CREATININE 2.35* 2.33* 2.45* 2.88* 3.30*   EGFR 20.3* 20.6* 19.4* 15.9* 13.5*   GLUCOSE 254* 155* 181* 156* 359*   CALCIUM 9.2 9.6  9.5 9.1 9.5   MAGNESIUM  --  2.2 1.5*  --  1.8   PHOSPHORUS  --   --   --   --  3.5   TSH  --   --   --   --  0.465         Lab 10/15/24  0939 10/14/24  0052 10/10/24  1236 10/09/24  1842   TOTAL PROTEIN 6.7 6.9 7.0 7.4   ALBUMIN 3.1* 3.0* 3.1* 3.3*   GLOBULIN 3.6 3.9 3.9 4.1   ALT (SGPT) 21 24 15 18   AST (SGOT) 26 36* 21 21   BILIRUBIN 0.3 0.2 0.2 0.3   ALK PHOS 76 70 70 78   LIPASE  --   --   --  23         Lab 10/09/24  1842   PROBNP 719.5   HSTROP T 39*             Lab 10/13/24  1418 10/09/24  1842   IRON  --  14*  14*   IRON SATURATION (TSAT)  --  7*   TIBC  --  195*   TRANSFERRIN  --  131*   FERRITIN  --  766.80*   FOLATE  --  19.00   VITAMIN B 12  --  1,262*   ABO TYPING O  --    RH TYPING Positive  --    ANTIBODY SCREEN Negative  --          Lab 10/09/24  1846   FIO2 21   CARBOXYHEMOGLOBIN (VENOUS) 1.6     Brief Urine Lab Results  (Last result in the past 365 days)        Color   Clarity   Blood   Leuk Est   Nitrite   Protein   CREAT   Urine HCG        10/09/24 2058 Yellow   Cloudy   Large (3+)   Moderate (2+)   Negative   100 mg/dL (2+)                   Microbiology Results Abnormal       Procedure Component Value - Date/Time    Blood Culture - Blood, Arm, Right [[id number]]  (Normal) Collected: 10/09/24 2159    Lab Status: Final result Specimen: Blood from Arm, Right Updated: 10/14/24 2231     Blood Culture No growth at 5 days    Narrative:      Less than seven (7) mL's of blood was collected.  Insufficient quantity may yield false negative results.    Blood Culture - Blood, Hand, Left [[id number]]  (Normal) Collected: 10/09/24 1842    Lab Status: Final result Specimen: Blood from Hand, Left Updated: 10/14/24 1946     Blood Culture No growth at 5 days    Narrative:      Less than seven (7) mL's of blood was collected.  Insufficient quantity may yield false negative results.    Urine Culture - Urine, Urine, Clean Catch [[id number]]  (Normal) Collected: 10/09/24 2058    Lab Status: Final result Specimen:  Urine, Clean Catch Updated: 10/11/24 1000     Urine Culture No growth    Legionella Antigen, Urine - Urine, Urine, Clean Catch [[id number]]  (Normal) Collected: 10/09/24 2058    Lab Status: Final result Specimen: Urine, Clean Catch Updated: 10/10/24 0922     LEGIONELLA ANTIGEN, URINE Negative    S. Pneumo Ag Urine or CSF - Urine, Urine, Clean Catch [[id number]]  (Normal) Collected: 10/09/24 2058    Lab Status: Final result Specimen: Urine, Clean Catch Updated: 10/10/24 0922     Strep Pneumo Ag Negative    COVID PRE-OP / PRE-PROCEDURE SCREENING ORDER (NO ISOLATION) - Swab, Nasopharynx [[id number]]  (Normal) Collected: 10/09/24 1843    Lab Status: Final result Specimen: Swab from Nasopharynx Updated: 10/09/24 1958    Narrative:      The following orders were created for panel order COVID PRE-OP / PRE-PROCEDURE SCREENING ORDER (NO ISOLATION) - Swab, Nasopharynx.  Procedure                               Abnormality         Status                     ---------                               -----------         ------                     COVID-19, FLU A/B, RSV P...[[id number]]  Normal              Final result                 Please view results for these tests on the individual orders.    COVID-19, FLU A/B, RSV PCR 1 HR TAT - Swab, Nasopharynx [[id number]]  (Normal) Collected: 10/09/24 1843    Lab Status: Final result Specimen: Swab from Nasopharynx Updated: 10/09/24 1958     COVID19 Not Detected     Influenza A PCR Not Detected     Influenza B PCR Not Detected     RSV, PCR Not Detected    Narrative:      Fact sheet for providers: https://www.fda.gov/media/026288/download    Fact sheet for patients: https://www.fda.gov/media/400398/download    Test performed by PCR.            No radiology results from the last 24 hrs    Results for orders placed during the hospital encounter of 07/15/24    Adult Transthoracic Echo Complete W/ Cont if Necessary Per Protocol    Interpretation Summary    Left ventricular systolic function is  normal. Calculated left ventricular EF = 63.2%    Estimated right ventricular systolic pressure from tricuspid regurgitation is normal (<35 mmHg).    Normal left atrial size and volume noted.    There is calcification of the aortic valve. Mild to moderate aortic valve regurgitation is present.      Current medications:  Scheduled Meds:aspirin, 81 mg, Oral, Daily  atorvastatin, 80 mg, Oral, Daily  clopidogrel, 75 mg, Oral, Daily  donepezil, 5 mg, Oral, Nightly  famotidine, 10 mg, Oral, Every Other Day  ferrous sulfate, 325 mg, Oral, Daily With Breakfast  insulin glargine, 10 Units, Subcutaneous, Daily  insulin regular, 2-7 Units, Subcutaneous, TID With Meals  ipratropium-albuterol, 3 mL, Nebulization, 4x Daily - RT  isosorbide mononitrate, 60 mg, Oral, Daily  linagliptin, 5 mg, Oral, Daily  melatonin, 5 mg, Oral, Nightly  senna-docusate sodium, 2 tablet, Oral, BID   And  polyethylene glycol, 17 g, Oral, Daily  sodium chloride, 10 mL, Intravenous, Q12H  temazepam, 7.5 mg, Oral, Nightly      Continuous Infusions:     PRN Meds:.  acetaminophen **OR** acetaminophen **OR** acetaminophen    senna-docusate sodium **AND** polyethylene glycol **AND** bisacodyl **AND** bisacodyl    Calcium Replacement - Follow Nurse / BPA Driven Protocol    dextrose    dextrose    glucagon (human recombinant)    HYDROcodone-acetaminophen    influenza vaccine    Magnesium Low Dose Replacement - Follow Nurse / BPA Driven Protocol    Phosphorus Replacement - Follow Nurse / BPA Driven Protocol    Potassium Replacement - Follow Nurse / BPA Driven Protocol    simethicone    sodium chloride    sodium chloride    Assessment & Plan   Assessment & Plan     Active Hospital Problems    Diagnosis  POA    **LLL pneumonia [J18.9]  Yes    Pneumonia [J18.9]  Yes    HTN (hypertension) [I10]  Unknown    Hyperglycemia [R73.9]  Yes    History of CVA (cerebrovascular accident) [Z86.73]  Not Applicable    CKD (chronic kidney disease) stage 4, GFR 15-29 ml/min  [N18.4]  Yes    Degenerative disc disease, lumbar [M51.369]  Yes      Resolved Hospital Problems   No resolved problems to display.        Brief Hospital Course to date:  Arminda Krishnan is a 79 y.o. female with history of mild coronary impairment, prior CVA, DDD on steroids, type 2 diabetes who presented to [facility] ED with hyperglycemia, workup concerning for possible pneumonia and UTI.  Patient transferred to my services on the a.m. of 10/13, she is continuing treatment for pneumonia at this time with IV Rocephin.  Patient's niece at bedside and updated as well, will follow-up with case management in the a.m. for possible rehab placement. No other acute changes at this time. Patient seen on the AM of 10/14, more lethargic but later improved, concerned again for AMS likely from delirium and also concerns of underlying dementia. Neurology consulted and following, attempting to reestablish sleep cycle, cancel labs and will try to create better resting environment for patient. Continue to follow with Dr. Herrera. Seen again on 10/15, overall patient improved today but still disoriented, continue to work on sleep hygiene, CM looking into hopeful placement options still at this time. Awaiting possible rehab still on 10/16, continue to follow with CM, will likely have to expand search per CM, continue to follow.      Suspected pneumonia  -Chest x-ray shows  left ower lobe pneumonia, likely secondary to aspiration  -blood and sputum cultures are currently NGTD, Legionella and strep pneumo urinary antigens are both negative  -Continue antibiotics currently on IV Rocephin, plan for 5 days of antibiotics end date 10/14, now completed   -SLP has evaluated, okay for diet and adjusted per their recommendations on 10/13   - currently on 2L NC  - Limited repeat AM labs, continue to follow periodically while awaiting placement.      Suspected UTI  -UA with 4+ bacteria, leukocytosis  -Urine cultures NGTD, completed antibiotics as  above.    MICHAEL on CKD stage III, resolved   -Baseline creatinine~2.2-2.6, was 3.30 on presentation, improved back to her baseline   -Likely prerenal, status post gentle hydration, patient p.o. intake improving  - MICHAEL appears resolved and back to her baseline      Poorly controlled type 2 diabetes with A1c 11% and hyperglycemia in the setting of chronic steroids  -FSBG's reviewed and are much improved  -Continue SSI, added Lantus and increased to 10 U daily      Anemia  -no signs of blood loss  -Iron studies most consistent with anemia of chronic disease  - Iron 14, will start iron supplementation  - Continue to follow H&H, Hgb 7.1 on the AM of 10/13, repeat H&H shows again decreased, will plan for transfusion 1U PRBCs   - Hgb improved to 8.7 following transfusion   -- Family updated at bedside on the afternoon of 10/13 and suspect this is likely related to anemia of chronic disease and kidney function but will continue to monitor      History of CVA  Hyperlipidemia  -Continue aspirin, statin, Plavix     Acute encephalopathy superimposed on MCI  Likely underlying vascular dementia   -Again worsened on 10/14, consult to neurology   - Follow up CT head without new acute findings but old CVA   - Plans to start Aricept, melatonin and restoril on 10/14       DDD  -Continue chronic steroids    Expected Discharge Location and Transportation: Likely [facility]   Expected Discharge   Expected Discharge Date: 10/18/2024; Expected Discharge Time:      VTE Prophylaxis:  Mechanical VTE prophylaxis orders are present.         AM-PAC 6 Clicks Score (PT): 16 (10/15/24 2037)    CODE STATUS:   Code Status and Medical Interventions: No CPR (Do Not Attempt to Resuscitate); Limited Support; No intubation (DNI)   Ordered at: 10/10/24 0002     Medical Intervention Limits:    No intubation (DNI)     Level Of Support Discussed With:    Next of Kin (If No Surrogate)     Code Status (Patient has no pulse and is not breathing):    No CPR (Do Not  Attempt to Resuscitate)     Medical Interventions (Patient has pulse or is breathing):    Limited Support       BEKA Tam,   10/16/24        Electronically signed by SIVA Tam, DO at 10/16/24 1701          Physical Therapy Notes (most recent note)        Loly Davidson, PT at 10/15/24 1310  Version 1 of 1         Goal Outcome Evaluation:  Plan of Care Reviewed With: patient        Progress: improving  Outcome Evaluation: Physical therapy treatment complete. Patient more alert today however remains confused. She has difficulty with staying on task and following commands. She was able to stand twice but unable to take steps due to poor command following and decreased safety awareness. Patient completed reaching activities with about 50% command following. The patient continues to present below baseline for functional mobility. The patient would continue to benefit from skilled PT to address strength, balance and activity tolerance deficits. Continue to current PT POC    Anticipated Discharge Disposition (PT): inpatient rehabilitation facility                          Electronically signed by Loly Davidson PT at 10/15/24 1618          Occupational Therapy Notes (most recent note)        Kat Kat, OT at 10/15/24 0905          Patient Name: Arminda Krishnan  : 1945    MRN: [id number]                              Today's Date: 10/15/2024       Admit Date: 10/9/2024    Visit Dx:     ICD-10-CM ICD-9-CM   1. Uncontrolled type 2 diabetes mellitus with hyperglycemia  E11.65 250.02   2. Acute UTI (urinary tract infection)  N39.0 599.0   3. Pneumonia of left lower lobe due to infectious organism  J18.9 486   4. Dysphagia, unspecified type  R13.10 787.20   5. Cerebral vascular disease  I67.9 437.9   6. Degeneration of intervertebral disc of lumbar region, unspecified whether pain present  M51.369 722.52   7. Spinal stenosis, lumbar region, with neurogenic claudication  M48.062  724.03   8. Aspiration pneumonia of left lower lobe, unspecified aspiration pneumonia type  J69.0 507.0   9. Dehydration  E86.0 276.51     Patient Active Problem List   Diagnosis    Cerebral vascular disease    Degenerative disc disease, lumbar    Degenerative disc disease, cervical    Spinal stenosis, lumbar region, with neurogenic claudication    Tobacco abuse    Chronic anemia    CKD (chronic kidney disease) stage 4, GFR 15-29 ml/min    Hyperglycemia    History of CVA (cerebrovascular accident)    Type 2 diabetes mellitus with hyperglycemia, without long-term current use of insulin    Disorientation    Dehydration    LLL pneumonia    HTN (hypertension)    Pneumonia     Past Medical History:   Diagnosis Date    Anemia     Arthritis     Back problem     CAD (coronary artery disease)     Cancer     Right breast    Chronic back pain     Chronically on opiate therapy     Depression     Diabetes mellitus     DX 14 years ago- checks fsbs weekly    Fibromyalgia     Gastroparesis     GERD (gastroesophageal reflux disease)     Headache     emotional/tension    History of transfusion     [facility]    HTN (hypertension)     Hypercholesteremia     IBS (irritable bowel syndrome)     Incontinence of urine     urgency    Migraine headache     Myalgia and myositis     Peripheral neuropathy     Sleep apnea     does not wear cpap    UTI (urinary tract infection)      Past Surgical History:   Procedure Laterality Date    APPENDECTOMY      ARTERIOGRAM MESENTERIC N/A 6/16/2022    Procedure: DIAGNOSTIC ARTERIOGRAM WITH CELIAC STENT PLACEMENT;  Surgeon: Neo Neil MD;  Location: [facility];  Service: Vascular;  Laterality: N/A;  FLUORO: 16 MIN  DOSE: 2384 MGY  CONTRAST:  20 ML    BACK SURGERY      5x per patient    BRAIN TUMOR EXCISION  1988    BREAST BIOPSY      CARPAL TUNNEL RELEASE Bilateral     CHOLECYSTECTOMY      COLONOSCOPY      2015    CRANIOTOMY FOR TUMOR      EYE SURGERY      bilateral cataracts  removed    HEMORRHOIDECTOMY      LUMBAR FUSION N/A 01/04/2017    Procedure: LUMBAR LAMINECTOMY AND DECOMRESSION  L3 AND L4;  Surgeon: Nishant Bhatti MD;  Location: [facility];  Service:     TOTAL ABDOMINAL HYSTERECTOMY      TRIGGER FINGER RELEASE        General Information       Row Name 10/15/24 0938          OT Time and Intention    Subjective Information --  -     Document Type therapy note (daily note)  -     Mode of Treatment occupational therapy;individual therapy  -     Patient Effort --  -     Symptoms Noted During/After Treatment --  -       Row Name 10/15/24 0938          General Information    Patient Profile Reviewed yes  -     Existing Precautions/Restrictions fall;other (see comments)  confusion, decreased safety awareness, impulsivity  -     Barriers to Rehab medically complex;previous functional deficit;cognitive status  -       Row Name 10/15/24 0938          Cognition    Orientation Status (Cognition) oriented to;person;unable/difficult to assess  -       Row Name 10/15/24 0938          Safety Issues/Impairments Affecting Functional Mobility    Safety Issues Affecting Function (Mobility) awareness of need for assistance;insight into deficits/self-awareness;safety precautions follow-through/compliance;safety precaution awareness;sequencing abilities;impulsivity  -     Impairments Affecting Function (Mobility) balance;cognition;coordination;endurance/activity tolerance;strength;pain;postural/trunk control  -     Cognitive Impairments, Mobility Safety/Performance attention;awareness, need for assistance;impulsivity;safety precaution awareness;safety precaution follow-through;sequencing abilities  -               User Key  (r) = Recorded By, (t) = Taken By, (c) = Cosigned By      Initials Name Provider Type     Kat Kat OT Occupational Therapist                     Mobility/ADL's       Row Name 10/15/24 0944          Bed Mobility    Bed Mobility supine-sit  -      Supine-Sit [place] (Bed Mobility) moderate assist (50% patient effort);verbal cues  -     Bed Mobility, Safety Issues cognitive deficits limit understanding;decreased use of arms for pushing/pulling;decreased use of legs for bridging/pushing;impaired trunk control for bed mobility  -     Assistive Device (Bed Mobility) bed rails;head of bed elevated;repositioning sheet  -       Row Name 10/15/24 0944          Transfers    Transfers sit-stand transfer;stand-sit transfer;bed-chair transfer  -       Row Name 10/15/24 0944          Bed-Chair Transfer    Bed-Chair [place] (Transfers) maximum assist (25% patient effort);2 person assist;verbal cues  -     Assistive Device (Bed-Chair Transfers) other (see comments)  BUE support  -       Row Name 10/15/24 0944          Sit-Stand Transfer    Sit-Stand [place] (Transfers) maximum assist (25% patient effort);2 person assist;verbal cues  -     Assistive Device (Sit-Stand Transfers) other (see comments)  -       Row Name 10/15/24 0944          Stand-Sit Transfer    Stand-Sit [place] (Transfers) maximum assist (25% patient effort);2 person assist;verbal cues  -     Assistive Device (Stand-Sit Transfers) other (see comments)  -       Row Name 10/15/24 0944          Activities of Daily Living    BADL Assessment/Intervention lower body dressing;upper body dressing;toileting;feeding  -       Row Name 10/15/24 0944          Lower Body Dressing Assessment/Training    [place] Level (Lower Body Dressing) don;socks;dependent (less than 25% patient effort)  -     Position (Lower Body Dressing) supine  -       Row Name 10/15/24 0944          Upper Body Dressing Assessment/Training    [place] Level (Upper Body Dressing) don;doff;other (see comments);maximum assist (25% patient effort);verbal cues  -     Position (Upper Body Dressing) supine  -     Comment, (Upper Body Dressing) hosp gown  -       Row Name 10/15/24 0944           Toileting Assessment/Training    [place] Level (Toileting) adjust/manage clothing;change pad/brief;perform perineal hygiene;dependent (less than 25% patient effort);verbal cues  -     Position (Toileting) supine;supported standing  -       Row Name 10/15/24 0944          Self-Feeding Assessment/Training    [place] Level (Feeding) maximum assist (25% patient effort);verbal cues;liquids to mouth  -     Position (Feeding) supported sitting  -               User Key  (r) = Recorded By, (t) = Taken By, (c) = Cosigned By      Initials Name Provider Type     Kat Kat OT Occupational Therapist                   Obj/Interventions       Row Name 10/15/24 0945          Balance    Balance Assessment sitting static balance;sitting dynamic balance;sit to stand dynamic balance;standing static balance;standing dynamic balance  -     Static Sitting Balance contact guard  -     Dynamic Sitting Balance moderate assist  -     Position, Sitting Balance unsupported;sitting edge of bed;sitting in chair  -     Sit to Stand Dynamic Balance maximum assist;2-person assist;verbal cues  -     Static Standing Balance maximum assist;2-person assist;verbal cues  -     Dynamic Standing Balance maximum assist;2-person assist;verbal cues  -     Position/Device Used, Standing Balance supported  -     Balance Interventions sitting;sit to stand;occupation based/functional task  -               User Key  (r) = Recorded By, (t) = Taken By, (c) = Cosigned By      Initials Name Provider Type    Kat Golden OT Occupational Therapist                   Goals/Plan    No documentation.                  Clinical Impression       Row Name 10/15/24 0978          Pain Assessment    Pretreatment Pain Rating 0/10 - no pain  -     Posttreatment Pain Rating 0/10 - no pain  -       Row Name 10/15/24 0964          Plan of Care Review    Plan of Care Reviewed With patient  -     Progress improving  -      Outcome Evaluation Pt presents w/ decreased safety awareness, impulsivity, confusion, generalized weakness, and balance deficits limiting her ADL independence. Will continue to progress pt as tolerated per OT POC. Rec SNF at d/c.  -       Row Name 10/15/24 0946          Therapy Assessment/Plan (OT)    Rehab Potential (OT) good  -     Criteria for Skilled Therapeutic Interventions Met (OT) yes;meets criteria;skilled treatment is necessary  -     Therapy Frequency (OT) daily  -       Row Name 10/15/24 0946          Therapy Plan Review/Discharge Plan (OT)    Anticipated Discharge Disposition (OT) skilled nursing facility  -       Row Name 10/15/24 0946          Vital Signs    O2 Delivery Pre Treatment room air  -     O2 Delivery Intra Treatment room air  -     O2 Delivery Post Treatment room air  -     Pre Patient Position Supine  -     Intra Patient Position Standing  -     Post Patient Position Sitting  -       Row Name 10/15/24 0946          Positioning and Restraints    Pre-Treatment Position in bed  -     Post Treatment Position chair  -     In Chair notified nsg;reclined;call light within reach;encouraged to call for assist;exit alarm on;with family/caregiver;waffle cushion;legs elevated  -               User Key  (r) = Recorded By, (t) = Taken By, (c) = Cosigned By      Initials Name Provider Type     Kat Kat, ANUJ Occupational Therapist                   Outcome Measures       Row Name 10/15/24 0949          How much help from another is currently needed...    Putting on and taking off regular lower body clothing? 1  -MC     Bathing (including washing, rinsing, and drying) 2  -MC     Toileting (which includes using toilet bed pan or urinal) 1  -MC     Putting on and taking off regular upper body clothing 2  -MC     Taking care of personal grooming (such as brushing teeth) 2  -MC     Eating meals 2  -MC     AM-PAC 6 Clicks Score (OT) 10  -       Row Name 10/15/24 0952           Functional Assessment    Outcome Measure Options AM-PAC 6 Clicks Daily Activity (OT)  -               User Key  (r) = Recorded By, (t) = Taken By, (c) = Cosigned By      Initials Name Provider Type    Kat Golden OT Occupational Therapist                    Occupational Therapy Education       Title: PT OT SLP Therapies (In Progress)       Topic: Occupational Therapy (In Progress)       Point: ADL training (In Progress)       Description:   Instruct learner(s) on proper safety adaptation and remediation techniques during self care or transfers.   Instruct in proper use of assistive devices.                  Learning Progress Summary            Patient Acceptance, E, NR by  at 10/15/2024 0947    Acceptance, E, VU,NR by  at 10/10/2024 0945    Comment: role of therapy and ongoing treatment plan   Family Acceptance, E, VU,NR by  at 10/10/2024 0945    Comment: role of therapy and ongoing treatment plan                      Point: Home exercise program (Not Started)       Description:   Instruct learner(s) on appropriate technique for monitoring, assisting and/or progressing therapeutic exercises/activities.                  Learner Progress:  Not documented in this visit.              Point: Precautions (In Progress)       Description:   Instruct learner(s) on prescribed precautions during self-care and functional transfers.                  Learning Progress Summary            Patient Acceptance, E, NR by  at 10/15/2024 0947                      Point: Body mechanics (In Progress)       Description:   Instruct learner(s) on proper positioning and spine alignment during self-care, functional mobility activities and/or exercises.                  Learning Progress Summary            Patient Acceptance, E, NR by  at 10/15/2024 0947                                      User Key       Initials Effective Dates Name Provider Type [facility] 02/03/23 -  Jenny Hirsch OT Occupational  Therapist OT     10/14/22 -  Kat Kat OT Occupational Therapist OT                  OT Recommendation and Plan  Therapy Frequency (OT): daily  Plan of Care Review  Plan of Care Reviewed With: patient  Progress: improving  Outcome Evaluation: Pt presents w/ decreased safety awareness, impulsivity, confusion, generalized weakness, and balance deficits limiting her ADL independence. Will continue to progress pt as tolerated per OT POC. Rec SNF at d/c.     Time Calculation:         Time Calculation- OT       Row Name 10/15/24 0947             Time Calculation- OT    OT Start Time 905  -      OT Received On 10/15/24  -      OT Goal Re-Cert Due Date 10/20/24  -         Timed Charges    39974 - OT Therapeutic Activity Minutes 12  -      40718 - OT Self Care/Mgmt Minutes 11  -         Total Minutes    Timed Charges Total Minutes 23  -       Total Minutes 23  -                User Key  (r) = Recorded By, (t) = Taken By, (c) = Cosigned By      Initials Name Provider Type     Kat Kat, ANUJ Occupational Therapist                  Therapy Charges for Today       Code Description Service Date Service Provider Modifiers Qty    42189173204 HC OT THERAPEUTIC ACT EA 15 MIN 10/15/2024 Kat Kat OT GO 1    03431330692 HC OT SELF CARE/MGMT/TRAIN EA 15 MIN 10/15/2024 Kat Kat OT GO 1    32561613725 HC OT THER SUPP EA 15 MIN 10/15/2024 Kat Kat OT GO 2                 Kat Kat OT  10/15/2024    Electronically signed by Kat Kat OT at 10/15/24 0949          Speech Language Pathology Notes (most recent note)        Yenifer Herring MS CCC-SLP at 10/16/24 1310          Acute Care - Speech Language Pathology   Swallow Treatment Note [facility]     Patient Name: Arminda Krishnan  : 1945  MRN: [id number]  Today's Date: 10/16/2024               Admit Date: 10/9/2024    Visit Dx:     ICD-10-CM ICD-9-CM   1. Uncontrolled type 2 diabetes  mellitus with hyperglycemia  E11.65 250.02   2. Acute UTI (urinary tract infection)  N39.0 599.0   3. Pneumonia of left lower lobe due to infectious organism  J18.9 486   4. Dysphagia, unspecified type  R13.10 787.20   5. Cerebral vascular disease  I67.9 437.9   6. Degeneration of intervertebral disc of lumbar region, unspecified whether pain present  M51.369 722.52   7. Spinal stenosis, lumbar region, with neurogenic claudication  M48.062 724.03   8. Aspiration pneumonia of left lower lobe, unspecified aspiration pneumonia type  J69.0 507.0   9. Dehydration  E86.0 276.51     Patient Active Problem List   Diagnosis    Cerebral vascular disease    Degenerative disc disease, lumbar    Degenerative disc disease, cervical    Spinal stenosis, lumbar region, with neurogenic claudication    Tobacco abuse    Chronic anemia    CKD (chronic kidney disease) stage 4, GFR 15-29 ml/min    Hyperglycemia    History of CVA (cerebrovascular accident)    Type 2 diabetes mellitus with hyperglycemia, without long-term current use of insulin    Disorientation    Dehydration    LLL pneumonia    HTN (hypertension)    Pneumonia     Past Medical History:   Diagnosis Date    Anemia     Arthritis     Back problem     CAD (coronary artery disease)     Cancer     Right breast    Chronic back pain     Chronically on opiate therapy     Depression     Diabetes mellitus     DX 14 years ago- checks fsbs weekly    Fibromyalgia     Gastroparesis     GERD (gastroesophageal reflux disease)     Headache     emotional/tension    History of transfusion     [facility]    HTN (hypertension)     Hypercholesteremia     IBS (irritable bowel syndrome)     Incontinence of urine     urgency    Migraine headache     Myalgia and myositis     Peripheral neuropathy     Sleep apnea     does not wear cpap    UTI (urinary tract infection)      Past Surgical History:   Procedure Laterality Date    APPENDECTOMY      ARTERIOGRAM MESENTERIC N/A 6/16/2022     Procedure: DIAGNOSTIC ARTERIOGRAM WITH CELIAC STENT PLACEMENT;  Surgeon: Neo Neil MD;  Location: [facility] HYBRID [facility];  Service: Vascular;  Laterality: N/A;  FLUORO: 16 MIN  DOSE: 2384 MGY  CONTRAST:  20 ML    BACK SURGERY      5x per patient    BRAIN TUMOR EXCISION  1988    BREAST BIOPSY      CARPAL TUNNEL RELEASE Bilateral     CHOLECYSTECTOMY      COLONOSCOPY      2015    CRANIOTOMY FOR TUMOR      EYE SURGERY      bilateral cataracts removed    HEMORRHOIDECTOMY      LUMBAR FUSION N/A 01/04/2017    Procedure: LUMBAR LAMINECTOMY AND DECOMRESSION  L3 AND L4;  Surgeon: Nishant Bhatti MD;  Location: [facility] OR;  Service:     TOTAL ABDOMINAL HYSTERECTOMY      TRIGGER FINGER RELEASE         SLP Recommendation and Plan     SLP Diet Recommendation: soft to chew textures, chopped, thin liquids (10/16/24 1010)  Recommended Precautions and Strategies: upright posture during/after eating, general aspiration precautions, assist with feeding (10/16/24 1010)  SLP Rec. for Method of Medication Administration: meds whole, meds crushed, with puree, as tolerated (10/16/24 1010)     Monitor for Signs of Aspiration: yes, notify SLP if any concerns (10/16/24 1010)  Recommended Diagnostics: No further SLP services recommended (10/16/24 1010)     Anticipated Discharge Disposition (SLP): skilled nursing facility (10/16/24 1010)     Therapy Frequency (Swallow): evaluation only (10/16/24 1010)     Oral Care Recommendations: Oral Care BID/PRN, Toothbrush (10/16/24 1010)        Daily Summary of Progress (SLP): progress toward functional goals as expected (10/16/24 1010)               Treatment Assessment (SLP): toleration of diet, no clinical signs of, pharyngeal dysphagia (10/16/24 1010)  Treatment Assessment Comments (SLP): No overt s/s of aspiration w/ thin liquid trials. Pt declined solid trials. Family reports no mastication concerns w/ items on tray. Reviewed MBS results w/ pt and family. Also reviewed aspiration precautions &  provided education. Ok to cont current diet, if any concerns downgrade to pureed. SLP will sign off for dysphagia (10/16/24 1010)  Plan for Continued Treatment (SLP): continue treatment per plan of care (10/16/24 1010)                SWALLOW EVALUATION (Last 72 Hours)       SLP Adult Swallow Evaluation       Row Name 10/16/24 1010 10/14/24 1540                Rehab Evaluation    Document Type therapy note (daily note)  - therapy note (daily note)  -       Subjective Information no complaints  - no complaints  -       Patient Observations alert;cooperative  - alert;cooperative  -       Patient/Family/Caregiver Comments/Observations Family present  - No family present  -       Patient Effort good  - good  -       Symptoms Noted During/After Treatment none  - none  -          Pain    Additional Documentation Pain Scale: FACES Pre/Post-Treatment (Group)  - Pain Scale: FACES Pre/Post-Treatment (Group)  -          Pain Scale: FACES Pre/Post-Treatment    Pain: FACES Scale, Pretreatment 0-->no hurt  -MH 0-->no hurt  -       Posttreatment Pain Rating 0-->no hurt  -MH 0-->no hurt  -          SLP Treatment Clinical Impressions    Treatment Assessment (SLP) toleration of diet;no clinical signs of;pharyngeal dysphagia  - toleration of diet;no clinical signs of;pharyngeal dysphagia  -       Treatment Assessment Comments (SLP) No overt s/s of aspiration w/ thin liquid trials. Pt declined solid trials. Family reports no mastication concerns w/ items on tray. Reviewed MBS results w/ pt and family. Also reviewed aspiration precautions & provided education. Ok to cont current diet, if any concerns downgrade to pureed. SLP will sign off for dysphagia  - No overt s/s of aspiration w/ thin liquid or pureed trials. Pt declined solid trials this date. Ok to continue soft/chopped solid diet w/ thin liquids. If any concerns, ok to downgrade to pureed. SLP will f/u for diet tolerance/adjustment  -        Daily Summary of Progress (SLP) progress toward functional goals as expected  - progress toward functional goals as expected  -       Plan for Continued Treatment (SLP) continue treatment per plan of care  - continue treatment per plan of care  -       Care Plan Review care plan/treatment goals reviewed  - care plan/treatment goals reviewed  -          Recommendations    Therapy Frequency (Swallow) evaluation only  - PRN  -       Predicted Duration Therapy Intervention (Days) -- 1 week  -       SLP Diet Recommendation soft to chew textures;chopped;thin liquids  - soft to chew textures;chopped;thin liquids  -       Recommended Diagnostics No further SLP services recommended  - other (see comments)  diet tolerance/adjustment  -       Recommended Precautions and Strategies upright posture during/after eating;general aspiration precautions;assist with feeding  - upright posture during/after eating;general aspiration precautions;assist with feeding  -       Oral Care Recommendations Oral Care BID/PRN;Toothbrush  - Oral Care BID/PRN;Toothbrush  -       SLP Rec. for Method of Medication Administration meds whole;meds crushed;with puree;as tolerated  - meds whole;meds crushed;with puree;as tolerated  -       Monitor for Signs of Aspiration yes;notify SLP if any concerns  - yes;notify SLP if any concerns  -       Anticipated Discharge Disposition (SLP) skilled nursing facility  - skilled nursing facility  -                 User Key  (r) = Recorded By, (t) = Taken By, (c) = Cosigned By      Initials Name Effective Dates     Yenifer Herring, MS [facility]-SLP 05/12/23 -                     EDUCATION  The patient has been educated in the following areas:   Dysphagia (Swallowing Impairment) Modified Diet Instruction.        SLP GOALS       Row Name 10/16/24 1010 10/14/24 5350          (LTG) Patient will demonstrate functional swallow for    Diet Texture (Demonstrate functional swallow)  soft to chew (chopped) textures  - soft to chew (chopped) textures  -     Liquid viscosity (Demonstrate functional swallow) thin liquids  - thin liquids  -     Mansura (Demonstrate functional swallow) with moderate cues (50-74% accuracy)  - with moderate cues (50-74% accuracy)  -     Time Frame (Demonstrate functional swallow) 1 week  - 1 week  -     Progress/Outcomes (Demonstrate functional swallow) goal met  - continuing progress toward goal  -     Comment (Demonstrate functional swallow) No overt s/s of aspiration w/ thin liquid trials. Pt declined solid trials, family reports no issues w/ items on tray.  - No overt s/s of aspiration w/ thin liquid trials. Pt declined solid trials this date  -        (STG) Patient will tolerate trials of    Consistencies Trialed (Tolerate trials) soft to chew (chopped) textures;thin liquids  - soft to chew (chopped) textures;thin liquids  -     Desired Outcome (Tolerate trials) without signs/symptoms of aspiration;without signs of distress;with adequate oral prep/transit/clearance  - without signs/symptoms of aspiration;without signs of distress;with adequate oral prep/transit/clearance  -     Mansura (Tolerate trials) with moderate cues (50-74% accuracy)  - with moderate cues (50-74% accuracy)  -     Time Frame (Tolerate trials) 1 week  - 1 week  -     Progress/Outcomes (Tolerate trials) goal met  - continuing progress toward goal  -        (STG) Labial Strengthening Goal 1 (SLP)    Activity (Labial Strengthening Goal 1, SLP) increase labial tone  - increase labial tone  -     Increase Labial Tone labial resistance exercises;swallow trials  - labial resistance exercises;swallow trials  -     Mansura/Accuracy (Labial Strengthening Goal 1, SLP) with maximum cues (25-49% accuracy)  - with maximum cues (25-49% accuracy)  -     Time Frame (Labial Strengthening Goal 1, SLP) 1 week  - 1 week  -      Progress/Outcomes (Labial Strengthening Goal 1, SLP) goal no longer appropriate  -MH progress slower than expected  -MH     Comment (Labial Strengthening Goal 1, SLP) Pt u/a to complete labial resistance exercises, swallow trials completed  -MH --        (STG) Lingual Strengthening Goal 1 (SLP)    Activity (Lingual Strengthening Goal 1, SLP) increase tongue back strength  -MH increase tongue back strength  -MH     Increase Tongue Back Strength lingual resistance exercises;swallow trials  -MH lingual resistance exercises;swallow trials  -MH     Hardy/Accuracy (Lingual Strengthening Goal 1, SLP) with maximum cues (25-49% accuracy)  -MH with maximum cues (25-49% accuracy)  -MH     Time Frame (Lingual Strengthening Goal 1, SLP) 1 week  -MH 1 week  -MH     Progress/Outcomes (Lingual Strengthening Goal 1, SLP) goal no longer appropriate  -MH progress slower than expected  -MH     Comment (Lingual Strengthening Goal 1, SLP) -- Cog status impacting ability to complete  -MH               User Key  (r) = Recorded By, (t) = Taken By, (c) = Cosigned By      Initials Name Provider Type     Yenifer Herring MS CCC-SLP Speech and Language Pathologist                         Time Calculation:    Time Calculation- SLP       Row Name 10/16/24 1310             Time Calculation- SLP    SLP Start Time 1010  -MH      SLP Received On 10/16/24  -         Untimed Charges    [id number] Treatment Swallow Minutes 40  -MH         Total Minutes    Untimed Charges Total Minutes 40  -MH       Total Minutes 40  -MH                User Key  (r) = Recorded By, (t) = Taken By, (c) = Cosigned By      Initials Name Provider Type     Yenifer Herring MS CCC-SLP Speech and Language Pathologist                    Therapy Charges for Today       Code Description Service Date Service Provider Modifiers Qty    [id number]  ST TREATMENT SWALLOW 3 10/16/2024 Yenifer Herring MS CCC-SLP GN 1                 Yenifer Herring MS  CCC-SLP  10/16/2024    Electronically signed by Yenifer Herring, MS CCC-SLP at 10/16/24 3438

## 2024-10-17 NOTE — PROGRESS NOTES
[facility] Medicine Services  PROGRESS NOTE    Patient Name: Arminda Krishnan  : 1945  MRN: [id number]    Date of Admission: 10/9/2024  Primary Care Physician: Aiden Spencer MD    Subjective   Subjective     CC: Follow-up hyperglycemia    HPI:   Evaluated patient this morning. Per family, patient slept well overnight. Patient still waking up during initial morning evaluation, later complained to nursing of abdominal pain.    Objective   Objective     Vital Signs:   Temp:  [96.6 °F (35.9 °C)-98.5 °F (36.9 °C)] 98 °F (36.7 °C)  Heart Rate:  [] 88  Resp:  [16-18] 16  BP: (117-164)/(59-99) 132/95  Flow (L/min) (Oxygen Therapy):  [2] 2     Physical Exam:  Constitutional: Awake, alert, resting comfortably  HENT: NCAT, mucous membranes moist  Respiratory: Clear to auscultation bilaterally, respiratory effort normal   Cardiovascular: RRR, no murmurs, rubs, or gallops  Gastrointestinal: soft, nontender, nondistended  Musculoskeletal: No bilateral ankle edema  Psychiatric: Appropriate affect, cooperative  Neurologic: Alert, oriented x 1, moves all extremities, speech clear, able to answer simple questions and follow simple commands  Skin: No rashes       Results Reviewed:  LAB RESULTS:      Lab 10/16/24  1903 10/15/24  0939 10/14/24  0052 10/13/24  1219 10/13/24  0519   WBC 9.59 9.36 9.74  --  9.23   HEMOGLOBIN 9.6* 9.4* 8.7* 7.0* 7.1*   HEMATOCRIT 30.4* 29.5* 26.9* 21.8* 22.6*   PLATELETS 287 291 271  --  282   NEUTROS ABS  --   --  6.72  --   --    IMMATURE GRANS (ABS)  --   --  0.15*  --   --    LYMPHS ABS  --   --  1.40  --   --    MONOS ABS  --   --  1.19*  --   --    EOS ABS  --   --  0.20  --   --    MCV 88.6 87.8 87.3  --  88.6         Lab 10/16/24  1903 10/15/24  0939 10/14/24  0052 10/13/24  0813   SODIUM 142 138 139 139   POTASSIUM 3.9 4.2 3.9 3.8   CHLORIDE 105 103 106 106   CO2 21.0* 21.0* 20.0* 20.0*   ANION GAP 16.0* 14.0 13.0 13.0   BUN 27* 24* 26* 28*    CREATININE 2.33* 2.35* 2.33* 2.45*   EGFR 20.6* 20.3* 20.6* 19.4*   GLUCOSE 217* 254* 155* 181*   CALCIUM 9.7 9.2 9.6 9.5   MAGNESIUM  --   --  2.2 1.5*         Lab 10/15/24  0939 10/14/24  0052   TOTAL PROTEIN 6.7 6.9   ALBUMIN 3.1* 3.0*   GLOBULIN 3.6 3.9   ALT (SGPT) 21 24   AST (SGOT) 26 36*   BILIRUBIN 0.3 0.2   ALK PHOS 76 70                   Lab 10/13/24  1418   ABO TYPING O   RH TYPING Positive   ANTIBODY SCREEN Negative           Brief Urine Lab Results  (Last result in the past 365 days)        Color   Clarity   Blood   Leuk Est   Nitrite   Protein   CREAT   Urine HCG        10/09/24 2058 Yellow   Cloudy   Large (3+)   Moderate (2+)   Negative   100 mg/dL (2+)                   Microbiology Results Abnormal       Procedure Component Value - Date/Time    Blood Culture - Blood, Arm, Right [[id number]]  (Normal) Collected: 10/09/24 2159    Lab Status: Final result Specimen: Blood from Arm, Right Updated: 10/14/24 2231     Blood Culture No growth at 5 days    Narrative:      Less than seven (7) mL's of blood was collected.  Insufficient quantity may yield false negative results.    Blood Culture - Blood, Hand, Left [[id number]]  (Normal) Collected: 10/09/24 1842    Lab Status: Final result Specimen: Blood from Hand, Left Updated: 10/14/24 1946     Blood Culture No growth at 5 days    Narrative:      Less than seven (7) mL's of blood was collected.  Insufficient quantity may yield false negative results.    Urine Culture - Urine, Urine, Clean Catch [[id number]]  (Normal) Collected: 10/09/24 2058    Lab Status: Final result Specimen: Urine, Clean Catch Updated: 10/11/24 1000     Urine Culture No growth    Legionella Antigen, Urine - Urine, Urine, Clean Catch [[id number]]  (Normal) Collected: 10/09/24 2058    Lab Status: Final result Specimen: Urine, Clean Catch Updated: 10/10/24 0922     LEGIONELLA ANTIGEN, URINE Negative    S. Pneumo Ag Urine or CSF - Urine, Urine, Clean Catch [[id number]]  (Normal) Collected:  10/09/24 2058    Lab Status: Final result Specimen: Urine, Clean Catch Updated: 10/10/24 0922     Strep Pneumo Ag Negative    COVID PRE-OP / PRE-PROCEDURE SCREENING ORDER (NO ISOLATION) - Swab, Nasopharynx [[id number]]  (Normal) Collected: 10/09/24 1843    Lab Status: Final result Specimen: Swab from Nasopharynx Updated: 10/09/24 1958    Narrative:      The following orders were created for panel order COVID PRE-OP / PRE-PROCEDURE SCREENING ORDER (NO ISOLATION) - Swab, Nasopharynx.  Procedure                               Abnormality         Status                     ---------                               -----------         ------                     COVID-19, FLU A/B, RSV P...[[id number]]  Normal              Final result                 Please view results for these tests on the individual orders.    COVID-19, FLU A/B, RSV PCR 1 HR TAT - Swab, Nasopharynx [[id number]]  (Normal) Collected: 10/09/24 1843    Lab Status: Final result Specimen: Swab from Nasopharynx Updated: 10/09/24 1958     COVID19 Not Detected     Influenza A PCR Not Detected     Influenza B PCR Not Detected     RSV, PCR Not Detected    Narrative:      Fact sheet for providers: https://www.fda.gov/media/861487/download    Fact sheet for patients: https://www.fda.gov/media/270409/download    Test performed by PCR.            No radiology results from the last 24 hrs    Results for orders placed during the hospital encounter of 07/15/24    Adult Transthoracic Echo Complete W/ Cont if Necessary Per Protocol    Interpretation Summary  •  Left ventricular systolic function is normal. Calculated left ventricular EF = 63.2%  •  Estimated right ventricular systolic pressure from tricuspid regurgitation is normal (<35 mmHg).  •  Normal left atrial size and volume noted.  •  There is calcification of the aortic valve. Mild to moderate aortic valve regurgitation is present.      Current medications:  Scheduled Meds:aspirin, 81 mg, Oral, Daily  atorvastatin, 80  mg, Oral, Daily  clopidogrel, 75 mg, Oral, Daily  donepezil, 5 mg, Oral, Nightly  famotidine, 10 mg, Oral, Every Other Day  ferrous sulfate, 325 mg, Oral, Daily With Breakfast  insulin glargine, 10 Units, Subcutaneous, Daily  insulin regular, 2-7 Units, Subcutaneous, TID With Meals  ipratropium-albuterol, 3 mL, Nebulization, 4x Daily - RT  isosorbide mononitrate, 60 mg, Oral, Daily  linagliptin, 5 mg, Oral, Daily  melatonin, 5 mg, Oral, Nightly  senna-docusate sodium, 2 tablet, Oral, BID   And  polyethylene glycol, 17 g, Oral, Daily  sodium chloride, 10 mL, Intravenous, Q12H  temazepam, 7.5 mg, Oral, Nightly      Continuous Infusions:     PRN Meds:.•  acetaminophen **OR** acetaminophen **OR** acetaminophen  •  senna-docusate sodium **AND** polyethylene glycol **AND** bisacodyl **AND** bisacodyl  •  Calcium Replacement - Follow Nurse / BPA Driven Protocol  •  dextrose  •  dextrose  •  glucagon (human recombinant)  •  HYDROcodone-acetaminophen  •  influenza vaccine  •  Magnesium Low Dose Replacement - Follow Nurse / BPA Driven Protocol  •  Phosphorus Replacement - Follow Nurse / BPA Driven Protocol  •  Potassium Replacement - Follow Nurse / BPA Driven Protocol  •  simethicone  •  sodium chloride  •  sodium chloride    Assessment & Plan   Assessment & Plan     Active Hospital Problems    Diagnosis  POA   • **LLL pneumonia [J18.9]  Yes   • Pneumonia [J18.9]  Yes   • HTN (hypertension) [I10]  Unknown   • Hyperglycemia [R73.9]  Yes   • History of CVA (cerebrovascular accident) [Z86.73]  Not Applicable   • CKD (chronic kidney disease) stage 4, GFR 15-29 ml/min [N18.4]  Yes   • Degenerative disc disease, lumbar [M51.369]  Yes      Resolved Hospital Problems   No resolved problems to display.        Brief Hospital Course to date:  Arminda Krishnan is a 79 y.o. female with history of mild coronary impairment, prior CVA, DDD on steroids, type 2 diabetes who presented to [facility] ED with hyperglycemia, workup concerning for  pneumonia and UTI. Patient treated for pneumonia and UTI with IV ceftriaxone. Neurology evaluated and assisted with delirium symptoms, optimize medications for to enhance sleep wake cycle.    This patient's problems and plans were partially entered by my partner and updated as appropriate by me 10/17/24.     LLL pneumonia, suspected aspiration  -CXR showed LLL pneumonia, likely secondary to aspiration  -Blood and sputum cultures are currently NGTD, Legionella and strep pneumo urinary antigens both negative  -SLP evaluated, okay for diet with soft to chew textures, chopped, thin liquids.  -s/p IV ceftriaxone x5 days.   -Awaiting rehab placement.      Suspected UTI  -UA with 4+ bacteria, leukocytosis, urine cultures NGTD  -Completed antibiotics as above.    MICHAEL on CKD stage III, resolved   -Likely prerenal  -Baseline Cr~2.2-2.6, was 3.30 on presentation, improved back to baseline   -Resolved with fluids.      Acute encephalopathy superimposed on likely underlying vascular dementia   -Follow up CT head without new acute findings but old CVA   -Neurology following, initiated donepezil, melatonin and restoril on 10/14      Poorly controlled type 2 diabetes with A1c 11% and hyperglycemia in the setting of chronic steroids  -Continue home linagliptin.  -Continue Lantus 10 units daily with low-dose SSI. Monitor glucose and adjust insulin as needed.     Acute on chronic anemia  -no sign of blood loss this admission  -Iron studies most consistent with anemia of chronic disease  -s/p 1u PRBC with improvement  -Continue to monitor CBC as needed.    Constipation  -Continue bowel regimen.     History of CVA  Hyperlipidemia  -Continue aspirin, statin, Plavix    DDD  -on chronic prednisone as outpatient.    Expected Discharge Location and Transportation: SNF   Expected Discharge   Expected Discharge Date: 10/18/2024; Expected Discharge Time:      VTE Prophylaxis:  Mechanical VTE prophylaxis orders are present.         AM-PAC 6  Clicks Score (PT): 13 (10/16/24 2030)    CODE STATUS:   Code Status and Medical Interventions: No CPR (Do Not Attempt to Resuscitate); Limited Support; No intubation (DNI)   Ordered at: 10/10/24 0002     Medical Intervention Limits:    No intubation (DNI)     Level Of Support Discussed With:    Next of Kin (If No Surrogate)     Code Status (Patient has no pulse and is not breathing):    No CPR (Do Not Attempt to Resuscitate)     Medical Interventions (Patient has pulse or is breathing):    Limited Support       Jessie Navas, DO  10/17/24

## 2024-10-17 NOTE — THERAPY EVALUATION
Acute Care - Speech Language Pathology   Swallow Initial Evaluation [facility]  Clinical Swallow Evaluation       Patient Name: Arminda Krishnan  : 1945  MRN: [id number]  Today's Date: 10/17/2024               Admit Date: 10/9/2024    Visit Dx:     ICD-10-CM ICD-9-CM   1. Uncontrolled type 2 diabetes mellitus with hyperglycemia  E11.65 250.02   2. Acute UTI (urinary tract infection)  N39.0 599.0   3. Pneumonia of left lower lobe due to infectious organism  J18.9 486   4. Dysphagia, unspecified type  R13.10 787.20   5. Cerebral vascular disease  I67.9 437.9   6. Degeneration of intervertebral disc of lumbar region, unspecified whether pain present  M51.369 722.52   7. Spinal stenosis, lumbar region, with neurogenic claudication  M48.062 724.03   8. Aspiration pneumonia of left lower lobe, unspecified aspiration pneumonia type  J69.0 507.0   9. Dehydration  E86.0 276.51     Patient Active Problem List   Diagnosis    Cerebral vascular disease    Degenerative disc disease, lumbar    Degenerative disc disease, cervical    Spinal stenosis, lumbar region, with neurogenic claudication    Tobacco abuse    Chronic anemia    CKD (chronic kidney disease) stage 4, GFR 15-29 ml/min    Hyperglycemia    History of CVA (cerebrovascular accident)    Type 2 diabetes mellitus with hyperglycemia, without long-term current use of insulin    Disorientation    Dehydration    LLL pneumonia    HTN (hypertension)    Pneumonia     Past Medical History:   Diagnosis Date    Anemia     Arthritis     Back problem     CAD (coronary artery disease)     Cancer     Right breast    Chronic back pain     Chronically on opiate therapy     Depression     Diabetes mellitus     DX 14 years ago- checks fsbs weekly    Fibromyalgia     Gastroparesis     GERD (gastroesophageal reflux disease)     Headache     emotional/tension    History of transfusion     [facility]    HTN (hypertension)     Hypercholesteremia     IBS (irritable bowel  syndrome)     Incontinence of urine     urgency    Migraine headache     Myalgia and myositis     Peripheral neuropathy     Sleep apnea     does not wear cpap    UTI (urinary tract infection)      Past Surgical History:   Procedure Laterality Date    APPENDECTOMY      ARTERIOGRAM MESENTERIC N/A 6/16/2022    Procedure: DIAGNOSTIC ARTERIOGRAM WITH CELIAC STENT PLACEMENT;  Surgeon: Neo Neil MD;  Location: [facility];  Service: Vascular;  Laterality: N/A;  FLUORO: 16 MIN  DOSE: 2384 MGY  CONTRAST:  20 ML    BACK SURGERY      5x per patient    BRAIN TUMOR EXCISION  1988    BREAST BIOPSY      CARPAL TUNNEL RELEASE Bilateral     CHOLECYSTECTOMY      COLONOSCOPY      2015    CRANIOTOMY FOR TUMOR      EYE SURGERY      bilateral cataracts removed    HEMORRHOIDECTOMY      LUMBAR FUSION N/A 01/04/2017    Procedure: LUMBAR LAMINECTOMY AND DECOMRESSION  L3 AND L4;  Surgeon: Nishant Bhatti MD;  Location: [facility] OR;  Service:     TOTAL ABDOMINAL HYSTERECTOMY      TRIGGER FINGER RELEASE         SLP Recommendation and Plan  SLP Swallowing Diagnosis: R/O pharyngeal dysphagia (10/17/24 1500)  SLP Diet Recommendation: mechanical ground textures, nectar thick liquids, no mixed consistencies (10/17/24 1500)  Recommended Precautions and Strategies: upright posture during/after eating, no straw, general aspiration precautions (10/17/24 1500)  SLP Rec. for Method of Medication Administration: meds whole, meds crushed, with puree, as tolerated (10/17/24 1500)     Monitor for Signs of Aspiration: yes, notify SLP if any concerns (10/17/24 1500)  Recommended Diagnostics: reassess via VFSS (MBS) (10/17/24 1500)  Swallow Criteria for Skilled Therapeutic Interventions Met: demonstrates skilled criteria (10/17/24 1500)  Anticipated Discharge Disposition (SLP): skilled nursing facility (10/17/24 1500)  Rehab Potential/Prognosis, Swallowing: re-evaluate goals as necessary (10/17/24 1500)        Oral Care Recommendations: Oral Care  BID/PRN, Toothbrush (10/17/24 1500)                                        Plan of Care Reviewed With: patient  Progress: declining      SWALLOW EVALUATION (Last 72 Hours)       SLP Adult Swallow Evaluation       Row Name 10/17/24 1500 10/16/24 1010                Rehab Evaluation    Document Type evaluation  -RS therapy note (daily note)  -       Subjective Information no complaints  -RS no complaints  -       Patient Observations alert  confused  -RS alert;cooperative  -       Patient/Family/Caregiver Comments/Observations sister present  -RS Family present  -       Patient Effort adequate  -RS good  -       Symptoms Noted During/After Treatment none  -RS none  -       Oral Care oral rinse provided  -RS --          General Information    Patient Profile Reviewed yes  -RS --       Pertinent History Of Current Problem Pls see previous SLP notes for PMHx. SLP was reconsulted today after several episodes of witnessed coughing with meals. Pt's sister is present who endorses significantly increased confusion  -RS --       Current Method of Nutrition soft to chew textures;ground;thin liquids  -RS --       Precautions/Limitations, Vision WFL;for purposes of eval  -RS --       Precautions/Limitations, Hearing WFL;for purposes of eval  -RS --       Prior Level of Function-Communication cognitive-linguistic impairment  -RS --       Prior Level of Function-Swallowing other (see comments)  MBS earlier this admit w recs for puree/thin. Sister reports that pt will refuse puree solids  -RS --          Pain    Additional Documentation Pain Scale: FACES Pre/Post-Treatment (Group)  - Pain Scale: FACES Pre/Post-Treatment (Group)  -          Pain Scale: FACES Pre/Post-Treatment    Pain: FACES Scale, Pretreatment 0-->no hurt  -RS 0-->no hurt  -       Posttreatment Pain Rating 0-->no hurt  -RS 0-->no hurt  -          Oral Motor Structure and Function    Dentition Assessment edentulous  -RS --       Secretion  Management WNL/WFL  -RS --       Mucosal Quality moist, healthy  -RS --       Volitional Swallow WFL  -RS --       Volitional Cough non-productive  -RS --          Oral Musculature and Cranial Nerve Assessment    Oral Motor, Comment C/w previous eval, pt with open mouth, anterior carriage of tongue at rest. Also cont to appear tongue thrust-like w PO trials  -RS --          General Eating/Swallowing Observations    Respiratory Support Currently in Use room air  -RS --       Eating/Swallowing Skills self-fed  -RS --       Positioning During Eating upright in chair  -RS --       Utensils Used spoon;cup;straw  -RS --       Consistencies Trialed pureed;ice chips;thin liquids;nectar/syrup-thick liquids  -RS --          Clinical Swallow Eval    Oral Prep Phase WFL  -RS --       Oral Transit impaired  -RS --       Oral Residue WFL  -RS --       Pharyngeal Phase suspected pharyngeal impairment  -RS --       Esophageal Phase unremarkable  -RS --          Oral Transit Concerns    Oral Transit Concerns increased oral transit time  -RS --       Increased Oral Transit Time pudding  -RS --          Pharyngeal Phase Concerns    Pharyngeal Phase Concerns cough;throat clear  -RS --       Cough thin  -RS --       Throat Clear thin  -RS --       Pharyngeal Phase Concerns, Comment Pt appears to have had a change since last seen by SLP service on 10/16. Overt pharygneal patterns w thins, possibly related to increased confusion. Will plan to repeat swallow study 10/18, D/w RN and pt's sister  -RS --          SLP Evaluation Clinical Impression    SLP Swallowing Diagnosis R/O pharyngeal dysphagia  -RS --       Functional Impact risk of aspiration/pneumonia  -RS --       Rehab Potential/Prognosis, Swallowing re-evaluate goals as necessary  -RS --       Swallow Criteria for Skilled Therapeutic Interventions Met demonstrates skilled criteria  -RS --          SLP Treatment Clinical Impressions    Treatment Assessment (SLP) -- toleration of  diet;no clinical signs of;pharyngeal dysphagia  -       Treatment Assessment Comments (SLP) -- No overt s/s of aspiration w/ thin liquid trials. Pt declined solid trials. Family reports no mastication concerns w/ items on tray. Reviewed MBS results w/ pt and family. Also reviewed aspiration precautions & provided education. Ok to cont current diet, if any concerns downgrade to pureed. SLP will sign off for dysphagia  -       Daily Summary of Progress (SLP) -- progress toward functional goals as expected  -       Plan for Continued Treatment (SLP) -- continue treatment per plan of care  -       Care Plan Review -- care plan/treatment goals reviewed  -          Recommendations    Therapy Frequency (Swallow) -- evaluation only  -       SLP Diet Recommendation mechanical ground textures;nectar thick liquids;no mixed consistencies  - soft to chew textures;chopped;thin liquids  -       Recommended Diagnostics reassess via VFSS (MBS)  -RS No further SLP services recommended  -       Recommended Precautions and Strategies upright posture during/after eating;no straw;general aspiration precautions  - upright posture during/after eating;general aspiration precautions;assist with feeding  -       Oral Care Recommendations Oral Care BID/PRN;Toothbrush  - Oral Care BID/PRN;Toothbrush  -       SLP Rec. for Method of Medication Administration meds whole;meds crushed;with puree;as tolerated  - meds whole;meds crushed;with puree;as tolerated  -       Monitor for Signs of Aspiration yes;notify SLP if any concerns  -RS yes;notify SLP if any concerns  -       Anticipated Discharge Disposition (SLP) skilled nursing facility  - skilled nursing facility  -                 User Key  (r) = Recorded By, (t) = Taken By, (c) = Cosigned By      Initials Name Effective Dates     Yenifer Herring, MS CCC-SLP 05/12/23 -     RS Everette Rodriguez MS CCC-SLP 09/14/23 -                     EDUCATION  The patient has  been educated in the following areas:   Dysphagia (Swallowing Impairment) Modified Diet Instruction.        SLP GOALS       Row Name 10/16/24 1010             (LTG) Patient will demonstrate functional swallow for    Diet Texture (Demonstrate functional swallow) soft to chew (chopped) textures  -      Liquid viscosity (Demonstrate functional swallow) thin liquids  -      [place] (Demonstrate functional swallow) with moderate cues (50-74% accuracy)  -      Time Frame (Demonstrate functional swallow) 1 week  -      Progress/Outcomes (Demonstrate functional swallow) goal met  -      Comment (Demonstrate functional swallow) No overt s/s of aspiration w/ thin liquid trials. Pt declined solid trials, family reports no issues w/ items on tray.  -         (STG) Patient will tolerate trials of    Consistencies Trialed (Tolerate trials) soft to chew (chopped) textures;thin liquids  -      Desired Outcome (Tolerate trials) without signs/symptoms of aspiration;without signs of distress;with adequate oral prep/transit/clearance  -      [place] (Tolerate trials) with moderate cues (50-74% accuracy)  -      Time Frame (Tolerate trials) 1 week  -      Progress/Outcomes (Tolerate trials) goal met  -         (STG) Labial Strengthening Goal 1 (SLP)    Activity (Labial Strengthening Goal 1, SLP) increase labial tone  -      Increase Labial Tone labial resistance exercises;swallow trials  -      [place]/Accuracy (Labial Strengthening Goal 1, SLP) with maximum cues (25-49% accuracy)  -      Time Frame (Labial Strengthening Goal 1, SLP) 1 week  -      Progress/Outcomes (Labial Strengthening Goal 1, SLP) goal no longer appropriate  -      Comment (Labial Strengthening Goal 1, SLP) Pt u/a to complete labial resistance exercises, swallow trials completed  -         (STG) Lingual Strengthening Goal 1 (SLP)    Activity (Lingual Strengthening Goal 1, SLP) increase tongue back strength  -       Increase Tongue Back Strength lingual resistance exercises;swallow trials  -      [place]/Accuracy (Lingual Strengthening Goal 1, SLP) with maximum cues (25-49% accuracy)  -      Time Frame (Lingual Strengthening Goal 1, SLP) 1 week  -      Progress/Outcomes (Lingual Strengthening Goal 1, SLP) goal no longer appropriate  -                User Key  (r) = Recorded By, (t) = Taken By, (c) = Cosigned By      Initials Name Provider Type     Yenifer Herring, MS CCC-SLP Speech and Language Pathologist                         Time Calculation:    Time Calculation- SLP       Row Name 10/17/24 1558             Time Calculation- SLP    SLP Start Time 1500  -RS      SLP Received On 10/17/24  -RS         Untimed Charges    25319-YS Eval Oral Pharyng Swallow Minutes 55  -RS         Total Minutes    Untimed Charges Total Minutes 55  -RS       Total Minutes 55  -RS                User Key  (r) = Recorded By, (t) = Taken By, (c) = Cosigned By      Initials Name Provider Type     Everette Rodriguez MS CCC-SLP Speech and Language Pathologist                    Therapy Charges for Today       Code Description Service Date Service Provider Modifiers Qty    [id number] HC ST EVAL ORAL PHARYNG SWALLOW 4 10/17/2024 Everette Rodriguez MS CCC-SLP GN 1                 Everette Rodriguez MS CCC-CORY  10/17/2024

## 2024-10-17 NOTE — CASE MANAGEMENT/SOCIAL WORK
Continued Stay Note   [place]     Patient Name: Arminda Krishnan  MRN: [id number]  Today's Date: 10/17/2024    Admit Date: 10/9/2024    Plan: SNF   Discharge Plan       Row Name 10/17/24 1139       Plan    Plan SNF    Plan Comments I spoke with patient and her sister, Delbert who is at bedside. Reviewed additional skilled facilities with referrals to be made to [facility], [facility] and [facility].   Patient has been accepted by [facility] facility and can start a precert when updated PT notes are in. I have never heard back from [place], [facility] is not in insurance network.     Final Discharge Disposition Code 03 - skilled nursing facility (SNF)                   Discharge Codes    No documentation.                 Expected Discharge Date and Time       Expected Discharge Date Expected Discharge Time    Oct 18, 2024               Shirlene Wilcox RN

## 2024-10-18 ENCOUNTER — APPOINTMENT (OUTPATIENT)
Dept: GENERAL RADIOLOGY | Facility: HOSPITAL | Age: 79
End: 2024-10-18
Payer: MEDICARE

## 2024-10-18 LAB
GLUCOSE BLDC GLUCOMTR-MCNC: 120 MG/DL (ref 70–130)
GLUCOSE BLDC GLUCOMTR-MCNC: 152 MG/DL (ref 70–130)
GLUCOSE BLDC GLUCOMTR-MCNC: 166 MG/DL (ref 70–130)
GLUCOSE BLDC GLUCOMTR-MCNC: 181 MG/DL (ref 70–130)
QT INTERVAL: 336 MS
QT INTERVAL: 364 MS
QTC INTERVAL: 435 MS
QTC INTERVAL: 487 MS

## 2024-10-18 PROCEDURE — 94664 DEMO&/EVAL PT USE INHALER: CPT

## 2024-10-18 PROCEDURE — 97530 THERAPEUTIC ACTIVITIES: CPT

## 2024-10-18 PROCEDURE — 94799 UNLISTED PULMONARY SVC/PX: CPT

## 2024-10-18 PROCEDURE — 74230 X-RAY XM SWLNG FUNCJ C+: CPT

## 2024-10-18 PROCEDURE — 82948 REAGENT STRIP/BLOOD GLUCOSE: CPT

## 2024-10-18 PROCEDURE — 92611 MOTION FLUOROSCOPY/SWALLOW: CPT | Performed by: SPEECH-LANGUAGE PATHOLOGIST

## 2024-10-18 PROCEDURE — 74018 RADEX ABDOMEN 1 VIEW: CPT

## 2024-10-18 PROCEDURE — 63710000001 INSULIN GLARGINE PER 5 UNITS: Performed by: FAMILY MEDICINE

## 2024-10-18 PROCEDURE — 99232 SBSQ HOSP IP/OBS MODERATE 35: CPT | Performed by: STUDENT IN AN ORGANIZED HEALTH CARE EDUCATION/TRAINING PROGRAM

## 2024-10-18 PROCEDURE — 63710000001 INSULIN REGULAR HUMAN PER 5 UNITS: Performed by: INTERNAL MEDICINE

## 2024-10-18 RX ORDER — HYDROXYZINE HYDROCHLORIDE 10 MG/1
10 TABLET, FILM COATED ORAL 3 TIMES DAILY PRN
Status: DISCONTINUED | OUTPATIENT
Start: 2024-10-18 | End: 2024-10-22 | Stop reason: HOSPADM

## 2024-10-18 RX ADMIN — IPRATROPIUM BROMIDE AND ALBUTEROL SULFATE 3 ML: 2.5; .5 SOLUTION RESPIRATORY (INHALATION) at 12:13

## 2024-10-18 RX ADMIN — FERROUS SULFATE TAB 325 MG (65 MG ELEMENTAL FE) 325 MG: 325 (65 FE) TAB at 11:38

## 2024-10-18 RX ADMIN — IPRATROPIUM BROMIDE AND ALBUTEROL SULFATE 3 ML: 2.5; .5 SOLUTION RESPIRATORY (INHALATION) at 19:04

## 2024-10-18 RX ADMIN — HUMAN INSULIN 2 UNITS: 100 INJECTION, SOLUTION SUBCUTANEOUS at 17:30

## 2024-10-18 RX ADMIN — ISOSORBIDE MONONITRATE 60 MG: 60 TABLET, EXTENDED RELEASE ORAL at 11:38

## 2024-10-18 RX ADMIN — HUMAN INSULIN 2 UNITS: 100 INJECTION, SOLUTION SUBCUTANEOUS at 12:05

## 2024-10-18 RX ADMIN — HYDROXYZINE HYDROCHLORIDE 10 MG: 10 TABLET ORAL at 17:47

## 2024-10-18 RX ADMIN — ATORVASTATIN CALCIUM 80 MG: 40 TABLET, FILM COATED ORAL at 11:39

## 2024-10-18 RX ADMIN — IPRATROPIUM BROMIDE AND ALBUTEROL SULFATE 3 ML: 2.5; .5 SOLUTION RESPIRATORY (INHALATION) at 16:12

## 2024-10-18 RX ADMIN — FAMOTIDINE 10 MG: 20 TABLET, FILM COATED ORAL at 11:38

## 2024-10-18 RX ADMIN — ACETAMINOPHEN 650 MG: 325 TABLET, FILM COATED ORAL at 22:45

## 2024-10-18 RX ADMIN — BARIUM SULFATE 20 ML: 400 PASTE ORAL at 13:41

## 2024-10-18 RX ADMIN — BARIUM SULFATE 100 ML: 0.81 POWDER, FOR SUSPENSION ORAL at 13:41

## 2024-10-18 RX ADMIN — ACETAMINOPHEN 650 MG: 325 TABLET, FILM COATED ORAL at 12:01

## 2024-10-18 RX ADMIN — SENNOSIDES AND DOCUSATE SODIUM 2 TABLET: 50; 8.6 TABLET ORAL at 11:39

## 2024-10-18 RX ADMIN — POLYETHYLENE GLYCOL 3350 17 G: 17 POWDER, FOR SOLUTION ORAL at 11:56

## 2024-10-18 RX ADMIN — TEMAZEPAM 7.5 MG: 7.5 CAPSULE ORAL at 21:07

## 2024-10-18 RX ADMIN — Medication 5 MG: at 22:46

## 2024-10-18 RX ADMIN — ASPIRIN 81 MG 81 MG: 81 TABLET ORAL at 11:39

## 2024-10-18 RX ADMIN — CLOPIDOGREL BISULFATE 75 MG: 75 TABLET ORAL at 11:38

## 2024-10-18 RX ADMIN — BISACODYL 10 MG: 10 SUPPOSITORY RECTAL at 17:28

## 2024-10-18 RX ADMIN — DONEPEZIL HYDROCHLORIDE 5 MG: 10 TABLET, FILM COATED ORAL at 21:07

## 2024-10-18 RX ADMIN — INSULIN GLARGINE 10 UNITS: 100 INJECTION, SOLUTION SUBCUTANEOUS at 11:39

## 2024-10-18 NOTE — CASE MANAGEMENT/SOCIAL WORK
Continued Stay Note  [facility]     Patient Name: Arminda Krishnan  MRN: [id number]  Today's Date: 10/18/2024    Admit Date: 10/9/2024    Plan: SNF   Discharge Plan       Row Name 10/18/24 1354       Plan    Plan SNF    Plan Comments Family has requested additional referrals be given to [facility] and to [facility] per patient's niece Foster. Referrals have been given. Patient accepted by [facility] and they initiated insurance as of this time. I updated [place]. Probably here over week end.     Final Discharge Disposition Code 03 - skilled nursing facility (SNF)                   Discharge Codes    No documentation.                 Expected Discharge Date and Time       Expected Discharge Date Expected Discharge Time    Oct 18, 2024               Shirlene Wilcox RN

## 2024-10-18 NOTE — DISCHARGE PLACEMENT REQUEST
"Arminda Noel (79 y.o. Female)      Short term rehab, then to home with her sister again.      Thank you  Shirlene ELENA, RN, [facility]  [phone]         Date of Birth   1945    Social Security Number       Address   [address] [place], [place] [address]    Home Phone   [phone]    MRN   [id number]       Tenriism   Gnosticist    Marital Status                               Admission Date   10/9/24    Admission Type   Emergency    Admitting Provider   Jessie Navas DO    Attending Provider   Jessie Navas DO    Department, Room/Bed   [facility] 3F, S311/1       Discharge Date       Discharge Disposition       Discharge Destination                                 Attending Provider: Jessie Navas DO    Allergies: Ceclor [Cefaclor]    Isolation: None   Infection: None   Code Status: No CPR    Ht: 152.4 cm (60\")   Wt: 81.6 kg (180 lb)    Admission Cmt: None   Principal Problem: LLL pneumonia [J18.9]                   Active Insurance as of 10/9/2024       Primary Coverage       Payor Plan Insurance Group Employer/Plan Group    ANTHEM MEDICARE REPLACEMENT ANTHEM MEDICARE ADVANTAGE [id number]       Payor Plan Address Payor Plan Phone Number Payor Plan Fax Number Effective Dates    [address] [phone]  7/1/2024 - None Entered    [facility] [address]         Subscriber Name Subscriber Birth Date Member ID       ARMINDA NOEL 1945 [id number]               Secondary Coverage       Payor Plan Insurance Group Employer/Plan Group    [place]        Payor Plan Address Payor Plan Phone Number Payor Plan Fax Number Effective Dates    [address] [phone]  2/20/2024 - None Entered    [facility] [address]         Subscriber Name Subscriber Birth Date Member ID       ARMINDA NOEL 1945 [id number]                     Emergency Contacts        (Rel.) Home Phone Work Phone Mobile Phone "    Delbert Mcdermott (Sister) [phone] -- [phone]              Insurance Information                  ANTHEM MEDICARE REPLACEMENT/ANTHEM MEDICARE ADVANTAGE Phone: [phone]    Subscriber: Arminda Krishnan Subscriber#: [id number]    Group#: [id number] Precert#: --    Authorization#: [id number] Effective Date: --        [place] Phone: [phone]    Subscriber: AriellaDignaty Clara Subscriber#: [id number]    Group#: -- Precert#: --    Authorization#: [id number] Effective Date: --          Current Facility-Administered Medications   Medication Dose Route Frequency Provider Last Rate Last Admin    acetaminophen (TYLENOL) tablet 650 mg  650 mg Oral Q4H PRN Lien Galvez APRN   650 mg at 10/18/24 1201    Or    acetaminophen (TYLENOL) 160 MG/5ML oral solution 650 mg  650 mg Oral Q4H PRN Lien Galvez APRN   650 mg at 10/11/24 1716    Or    acetaminophen (TYLENOL) suppository 650 mg  650 mg Rectal Q4H PRN Lien Galvez APRN   650 mg at 10/14/24 0514    aspirin chewable tablet 81 mg  81 mg Oral Daily Tab Samuel MD   81 mg at 10/18/24 1139    atorvastatin (LIPITOR) tablet 80 mg  80 mg Oral Daily Lien Galvez APRN   80 mg at 10/18/24 1139    sennosides-docusate (PERICOLACE) 8.6-50 MG per tablet 2 tablet  2 tablet Oral BID SIVA Tam DO   2 tablet at 10/18/24 1139    And    polyethylene glycol (MIRALAX) packet 17 g  17 g Oral Daily SIVA Tam DO   17 g at 10/18/24 1156    And    bisacodyl (DULCOLAX) EC tablet 5 mg  5 mg Oral Daily PRN SIVA Tam DO        And    bisacodyl (DULCOLAX) suppository 10 mg  10 mg Rectal Daily PRN SIVA Tam DO   10 mg at 10/17/24 1443    Calcium Replacement - Follow Nurse / BPA Driven Protocol   Does not apply PRN Lien Galvez APRN        clopidogrel (PLAVIX) tablet 75 mg  75 mg Oral Daily Lien Galvez APRN   75 mg at 10/18/24 1138    dextrose (D50W) (25 g/50 mL) IV injection 25 g  25 g Intravenous Q15 Min PRN Lien Galvez APRN        dextrose  (GLUTOSE) oral gel 15 g  15 g Oral Q15 Min PRN Lien Galvez APRN        donepezil (ARICEPT) tablet 5 mg  5 mg Oral Nightly Collin Herrera MD   5 mg at 10/17/24 2048    famotidine (PEPCID) tablet 10 mg  10 mg Oral Every Other Day Lien Galvez APRN   10 mg at 10/18/24 1138    ferrous sulfate tablet 325 mg  325 mg Oral Daily With Breakfast Fide Zhou MD   325 mg at 10/18/24 1138    glucagon (GLUCAGEN) injection 1 mg  1 mg Intramuscular Q15 Min PRN Lien Galvez APRN        hydrOXYzine (ATARAX) tablet 10 mg  10 mg Oral TID PRN Jessie Navas DO        influenza vac split high-dose (FLUZONE HIGH DOSE) injection 0.5 mL  0.5 mL Intramuscular During Hospitalization Tab Samuel MD        insulin glargine (LANTUS, SEMGLEE) injection 10 Units  10 Units Subcutaneous Daily SIVA Tam DO   10 Units at 10/18/24 1139    insulin regular (humuLIN R,novoLIN R) injection 2-7 Units  2-7 Units Subcutaneous TID With Meals Yesica Kelly MD   2 Units at 10/18/24 1205    ipratropium-albuterol (DUO-NEB) nebulizer solution 3 mL  3 mL Nebulization 4x Daily - RT Lien Galvez APRN   3 mL at 10/18/24 1213    isosorbide mononitrate (IMDUR) 24 hr tablet 60 mg  60 mg Oral Daily Lien Galvez APRN   60 mg at 10/18/24 1138    linagliptin (TRADJENTA) tablet 5 mg  5 mg Oral Daily Lien Galvez APRN   5 mg at 10/17/24 1444    Magnesium Low Dose Replacement - Follow Nurse / BPA Driven Protocol   Does not apply PRN Lien Galvez APRN        melatonin tablet 5 mg  5 mg Oral Nightly Collin Herrera MD   5 mg at 10/17/24 2002    Phosphorus Replacement - Follow Nurse / BPA Driven Protocol   Does not apply PRN Lien Galvez APRN        Potassium Replacement - Follow Nurse / BPA Driven Protocol   Does not apply PRN Lien Galvez APRN        simethicone (MYLICON) chewable tablet 80 mg  80 mg Oral 4x Daily PRN SIVA Tam, DO   80 mg at 10/15/24 2037    sodium chloride 0.9 % flush 10 mL  10 mL Intravenous PRN Brianda,  Ricco FULLER MD        sodium chloride 0.9 % flush 10 mL  10 mL Intravenous Q12H Lien Galvez APRN   10 mL at 10/17/24 0923    sodium chloride 0.9 % flush 10 mL  10 mL Intravenous PRN Lien Galvez APRN   10 mL at 10/11/24 1221    temazepam (RESTORIL) capsule 7.5 mg  7.5 mg Oral Nightly Collin Herrera MD   7.5 mg at 10/17/24 2048        Physician Progress Notes (most recent note)        Collin Herrera MD at 10/17/24 1334          Neurology       Patient Care Team:  Aiden Spencer MD as PCP - General (Family Medicine)  Sujatha Carmona MD as Consulting Physician (Pain Medicine)  Cr De Jesus MD as Consulting Physician (Gastroenterology)  Noemí Machuca APRN as Nurse Practitioner (Nurse Practitioner)    Chief complaint: AMS    History: Patient back to baseline and doing well.    Contemplating discharge soon.      Past Medical History:   Diagnosis Date    Anemia     Arthritis     Back problem     CAD (coronary artery disease)     Cancer     Right breast    Chronic back pain     Chronically on opiate therapy     Depression     Diabetes mellitus     DX 14 years ago- checks fsbs weekly    Fibromyalgia     Gastroparesis     GERD (gastroesophageal reflux disease)     Headache     emotional/tension    History of transfusion     [facility]    HTN (hypertension)     Hypercholesteremia     IBS (irritable bowel syndrome)     Incontinence of urine     urgency    Migraine headache     Myalgia and myositis     Peripheral neuropathy     Sleep apnea     does not wear cpap    UTI (urinary tract infection)        Vital Signs   Vitals:    10/17/24 0801 10/17/24 0924 10/17/24 1200 10/17/24 1233   BP: 164/65 135/76 132/95    BP Location: Left arm  Left arm    Patient Position: Lying  Lying    Pulse: 88 92 91 88   Resp: 16  16    Temp: 98.5 °F (36.9 °C)  98 °F (36.7 °C)    TempSrc: Axillary  Axillary    SpO2: 92%  98% 99%   Weight:       Height:           Physical Exam:   General: Awake and  alert              Neuro: Cheerful and cooperative    Oriented to person and situation.        Results Review:  Reviewed  Results from last 7 days   Lab Units 10/16/24  1903   WBC 10*3/mm3 9.59   HEMOGLOBIN g/dL 9.6*   HEMATOCRIT % 30.4*   PLATELETS 10*3/mm3 287     Results from last 7 days   Lab Units 10/16/24  1903 10/15/24  0939 10/14/24  0052   SODIUM mmol/L 142 138 139   POTASSIUM mmol/L 3.9 4.2 3.9   CHLORIDE mmol/L 105 103 106   CO2 mmol/L 21.0* 21.0* 20.0*   BUN mg/dL 27* 24* 26*   CREATININE mg/dL 2.33* 2.35* 2.33*   CALCIUM mg/dL 9.7 9.2 9.6   BILIRUBIN mg/dL  --  0.3 0.2   ALK PHOS U/L  --  76 70   ALT (SGPT) U/L  --  21 24   AST (SGOT) U/L  --  26 36*   GLUCOSE mg/dL 217* 254* 155*       Imaging Results (Last 24 Hours)       ** No results found for the last 24 hours. **            Assessment:  Confusion secondary to sleep deprivation, resolved    Plan:  Okay to discharge anytime from my standpoint    Continue sleep medications since his sleeping is an ongoing problem    Comment:  See on request         I discussed the patients findings and my recommendations with patient and primary care team    Collin Herrera MD  10/17/24  13:34 EDT          Electronically signed by Collin Herrera MD at 10/17/24 1336          Physical Therapy Notes (most recent note)        Marla Cooper, PT at 10/18/24 1044  Version 1 of 1         Patient Name: Arminda Krishnan  : 1945    MRN: [id number]                              Today's Date: 10/18/2024       Admit Date: 10/9/2024    Visit Dx:     ICD-10-CM ICD-9-CM   1. Uncontrolled type 2 diabetes mellitus with hyperglycemia  E11.65 250.02   2. Acute UTI (urinary tract infection)  N39.0 599.0   3. Pneumonia of left lower lobe due to infectious organism  J18.9 486   4. Dysphagia, unspecified type  R13.10 787.20   5. Cerebral vascular disease  I67.9 437.9   6. Degeneration of intervertebral disc of lumbar region, unspecified whether pain present  M51.602  722.52   7. Spinal stenosis, lumbar region, with neurogenic claudication  M48.062 724.03   8. Aspiration pneumonia of left lower lobe, unspecified aspiration pneumonia type  J69.0 507.0   9. Dehydration  E86.0 276.51     Patient Active Problem List   Diagnosis    Cerebral vascular disease    Degenerative disc disease, lumbar    Degenerative disc disease, cervical    Spinal stenosis, lumbar region, with neurogenic claudication    Tobacco abuse    Chronic anemia    CKD (chronic kidney disease) stage 4, GFR 15-29 ml/min    Hyperglycemia    History of CVA (cerebrovascular accident)    Type 2 diabetes mellitus with hyperglycemia, without long-term current use of insulin    Disorientation    Dehydration    LLL pneumonia    HTN (hypertension)    Pneumonia     Past Medical History:   Diagnosis Date    Anemia     Arthritis     Back problem     CAD (coronary artery disease)     Cancer     Right breast    Chronic back pain     Chronically on opiate therapy     Depression     Diabetes mellitus     DX 14 years ago- checks fsbs weekly    Fibromyalgia     Gastroparesis     GERD (gastroesophageal reflux disease)     Headache     emotional/tension    History of transfusion     [facility]    HTN (hypertension)     Hypercholesteremia     IBS (irritable bowel syndrome)     Incontinence of urine     urgency    Migraine headache     Myalgia and myositis     Peripheral neuropathy     Sleep apnea     does not wear cpap    UTI (urinary tract infection)      Past Surgical History:   Procedure Laterality Date    APPENDECTOMY      ARTERIOGRAM MESENTERIC N/A 6/16/2022    Procedure: DIAGNOSTIC ARTERIOGRAM WITH CELIAC STENT PLACEMENT;  Surgeon: Neo Neil MD;  Location: [facility];  Service: Vascular;  Laterality: N/A;  FLUORO: 16 MIN  DOSE: 2384 MGY  CONTRAST:  20 ML    BACK SURGERY      5x per patient    BRAIN TUMOR EXCISION  1988    BREAST BIOPSY      CARPAL TUNNEL RELEASE Bilateral     CHOLECYSTECTOMY      COLONOSCOPY       2015    CRANIOTOMY FOR TUMOR      EYE SURGERY      bilateral cataracts removed    HEMORRHOIDECTOMY      LUMBAR FUSION N/A 01/04/2017    Procedure: LUMBAR LAMINECTOMY AND DECOMRESSION  L3 AND L4;  Surgeon: Nishant Bhatti MD;  Location: [facility];  Service:     TOTAL ABDOMINAL HYSTERECTOMY      TRIGGER FINGER RELEASE        General Information       Row Name 10/18/24 1113          Physical Therapy Time and Intention    Document Type therapy note (daily note)  -KE     Mode of Treatment physical therapy  -       Row Name 10/18/24 1113          General Information    Patient Profile Reviewed yes  -KE     Existing Precautions/Restrictions fall;other (see comments)  confusion, poor safety awareness  -KE     Barriers to Rehab medically complex;previous functional deficit;cognitive status  -       Row Name 10/18/24 1113          Cognition    Orientation Status (Cognition) oriented to;person  -       Row Name 10/18/24 1113          Safety Issues/Impairments Affecting Functional Mobility    Safety Issues Affecting Function (Mobility) ability to follow commands;awareness of need for assistance;insight into deficits/self-awareness;judgment;problem-solving;safety precaution awareness;safety precautions follow-through/compliance;sequencing abilities  -KE     Impairments Affecting Function (Mobility) balance;cognition;coordination;endurance/activity tolerance;strength;pain;postural/trunk control  -     Cognitive Impairments, Mobility Safety/Performance attention;awareness, need for assistance;insight into deficits/self-awareness;problem-solving/reasoning;safety precaution awareness;safety precaution follow-through;sequencing abilities  -KE     Comment, Safety Issues/Impairments (Mobility) Poor command following and safety awareness  -OSMIN               User Key  (r) = Recorded By, (t) = Taken By, (c) = Cosigned By      Initials Name Provider Type    Marla Reid, PT Physical Therapist                   Mobility        Row Name 10/18/24 1114          Bed Mobility    Bed Mobility supine-sit  -KE     Supine-Sit [place] (Bed Mobility) maximum assist (25% patient effort);1 person assist  -KE     Assistive Device (Bed Mobility) head of bed elevated;repositioning sheet  -KE     Comment, (Bed Mobility) cues for sequencing, poor command following; req assist at trunk and LEs  -KE       Row Name 10/18/24 1114          Transfers    Comment, (Transfers) poor postural control at times sitting EOB, pt impulsively falling over  -KE       Row Name 10/18/24 1114          Bed-Chair Transfer    Bed-Chair [place] (Transfers) moderate assist (50% patient effort);2 person assist;verbal cues;nonverbal cues (demo/gesture)  -KE     Assistive Device (Bed-Chair Transfers) other (see comments)  B UE support  -KE     Comment, (Bed-Chair Transfer) VCs for sequencing of steps, attempted taking steps w/ FWW however pt unable to sequence mobility w/ AD; pt taking very short shuffled steps to chair; manual assist at hip for facilitation of improved hip extension; bilat knee buckling noted  -KE       Row Name 10/18/24 1114          Sit-Stand Transfer    Sit-Stand [place] (Transfers) moderate assist (50% patient effort);2 person assist  -KE     Assistive Device (Sit-Stand Transfers) other (see comments)  B UE support  -KE     Comment, (Sit-Stand Transfer) x2 from EOB, x2 from chair; B UE support and B knee blocking; difficulty achieving full upright posturing despite cues  -KE               User Key  (r) = Recorded By, (t) = Taken By, (c) = Cosigned By      Initials Name Provider Type    Marla Reid PT Physical Therapist                   Obj/Interventions       Row Name 10/18/24 1128          Balance    Balance Assessment sitting static balance;sitting dynamic balance;standing static balance;standing dynamic balance  -KE     Static Sitting Balance minimal assist  -KE     Dynamic Sitting Balance minimal assist  -KE     Position, Sitting  "Balance sitting edge of bed  -KE     Sit to Stand Dynamic Balance moderate assist;2-person assist  -KE     Static Standing Balance moderate assist;2-person assist  -KE     Dynamic Standing Balance moderate assist;2-person assist  -KE     Position/Device Used, Standing Balance walker, front-wheeled  -KE     Balance Interventions sitting;standing;sit to stand;supported;dynamic;static  -KE     Comment, Balance demo poor postural control  -KE               User Key  (r) = Recorded By, (t) = Taken By, (c) = Cosigned By      Initials Name Provider Type    Marla Reid, PT Physical Therapist                   Goals/Plan    No documentation.                  Clinical Impression       Row Name 10/18/24 1130          Pain    Pretreatment Pain Rating 4/10  -KE     Posttreatment Pain Rating 4/10  -KE     Pain Location other (see comments)  \"stomach\"  -KE     Pain Management Interventions activity modification encouraged;exercise or physical activity utilized  -KE     Response to Pain Interventions activity participation with tolerable pain  -KE       Row Name 10/18/24 1130          Plan of Care Review    Plan of Care Reviewed With patient;family  -     Progress no change  -KE     Outcome Evaluation Pt req ModAx2 w/ B UE support for t/f to chair. Mobility continues to be limited by confusion, poor command following, and decreased safety awareness. Plan to continue progressing PT POC as tolerated to address deficits in strength, balance, postural control, and functional endurance.. PT rec SNF at d/c.  -       Row Name 10/18/24 1130          Vital Signs    O2 Delivery Pre Treatment room air  -KE     O2 Delivery Intra Treatment room air  -KE     O2 Delivery Post Treatment room air  -KE     Pre Patient Position Supine  -KE     Intra Patient Position Standing  -KE     Post Patient Position Sitting  -KE       Row Name 10/18/24 1130          Positioning and Restraints    Pre-Treatment Position in bed  -KE     Post Treatment " Position chair  -KE     In Chair notified nsg;reclined;call light within reach;encouraged to call for assist;exit alarm on;with family/caregiver;legs elevated;waffle cushion;on mechanical lift sling  -KE               User Key  (r) = Recorded By, (t) = Taken By, (c) = Cosigned By      Initials Name Provider Type    Marla Reid PT Physical Therapist                   Outcome Measures       Row Name 10/18/24 1133          How much help from another person do you currently need...    Turning from your back to your side while in flat bed without using bedrails? 3  -KE     Moving from lying on back to sitting on the side of a flat bed without bedrails? 2  -KE     Moving to and from a bed to a chair (including a wheelchair)? 2  -KE     Standing up from a chair using your arms (e.g., wheelchair, bedside chair)? 2  -KE     Climbing 3-5 steps with a railing? 1  -KE     To walk in hospital room? 1  -KE     AM-PAC 6 Clicks Score (PT) 11  -KE     Highest Level of Mobility Goal 4 --> Transfer to chair/commode  -KE       Row Name 10/18/24 1133          Functional Assessment    Outcome Measure Options AM-PAC 6 Clicks Basic Mobility (PT)  -KE               User Key  (r) = Recorded By, (t) = Taken By, (c) = Cosigned By      Initials Name Provider Type    Marla Reid, STANLEY Physical Therapist                                 Physical Therapy Education       Title: PT OT SLP Therapies (In Progress)       Topic: Physical Therapy (In Progress)       Point: Mobility training (In Progress)       Learning Progress Summary            Patient Acceptance, E, NR by OSMIN at 10/18/2024 1133                      Point: Home exercise program (In Progress)       Learning Progress Summary            Patient Acceptance, E, NR by OSMIN at 10/18/2024 1133                      Point: Body mechanics (In Progress)       Learning Progress Summary            Patient Acceptance, E, NR by OSMIN at 10/18/2024 1133                      Point: Precautions  (In Progress)       Learning Progress Summary            Patient Acceptance, E, NR by OSMIN at 10/18/2024 1133                                      User Key       Initials Effective Dates Name Provider Type Discipline     23 -  Marla Cooper PT Physical Therapist PT                  PT Recommendation and Plan     Progress: no change  Outcome Evaluation: Pt req ModAx2 w/ B UE support for t/f to chair. Mobility continues to be limited by confusion, poor command following, and decreased safety awareness. Plan to continue progressing PT POC as tolerated to address deficits in strength, balance, postural control, and functional endurance.. PT rec SNF at d/c.     Time Calculation:         PT Charges       Row Name 10/18/24 1134             Time Calculation    Start Time 1044  -KE      PT Received On 10/18/24  -KE         Timed Charges    39673 - PT Therapeutic Activity Minutes 25  -KE         Total Minutes    Timed Charges Total Minutes 25  -KE       Total Minutes 25  -KE                User Key  (r) = Recorded By, (t) = Taken By, (c) = Cosigned By      Initials Name Provider Type    Marla Reid PT Physical Therapist                  Therapy Charges for Today       Code Description Service Date Service Provider Modifiers Qty    31038627556 HC PT THERAPEUTIC ACT EA 15 MIN 10/18/2024 Marla Cooper, PT GP 2    84118138174 HC PT THER SUPP EA 15 MIN 10/18/2024 Marla Cooper, PT GP 2            PT G-Codes  Outcome Measure Options: AM-PAC 6 Clicks Basic Mobility (PT)  AM-PAC 6 Clicks Score (PT): 11  AM-PAC 6 Clicks Score (OT): 14  PT Discharge Summary  Anticipated Discharge Disposition (PT): skilled nursing facility    Marla Cooper PT  10/18/2024      Electronically signed by Marla Cooper PT at 10/18/24 1135          Occupational Therapy Notes (most recent note)        Jaron Moreno, OT at 10/17/24 1311          Patient Name: Arminda Krishnan  : 1945    MRN: [id number]                               Today's Date: 10/17/2024       Admit Date: 10/9/2024    Visit Dx:     ICD-10-CM ICD-9-CM   1. Uncontrolled type 2 diabetes mellitus with hyperglycemia  E11.65 250.02   2. Acute UTI (urinary tract infection)  N39.0 599.0   3. Pneumonia of left lower lobe due to infectious organism  J18.9 486   4. Dysphagia, unspecified type  R13.10 787.20   5. Cerebral vascular disease  I67.9 437.9   6. Degeneration of intervertebral disc of lumbar region, unspecified whether pain present  M51.369 722.52   7. Spinal stenosis, lumbar region, with neurogenic claudication  M48.062 724.03   8. Aspiration pneumonia of left lower lobe, unspecified aspiration pneumonia type  J69.0 507.0   9. Dehydration  E86.0 276.51     Patient Active Problem List   Diagnosis    Cerebral vascular disease    Degenerative disc disease, lumbar    Degenerative disc disease, cervical    Spinal stenosis, lumbar region, with neurogenic claudication    Tobacco abuse    Chronic anemia    CKD (chronic kidney disease) stage 4, GFR 15-29 ml/min    Hyperglycemia    History of CVA (cerebrovascular accident)    Type 2 diabetes mellitus with hyperglycemia, without long-term current use of insulin    Disorientation    Dehydration    LLL pneumonia    HTN (hypertension)    Pneumonia     Past Medical History:   Diagnosis Date    Anemia     Arthritis     Back problem     CAD (coronary artery disease)     Cancer     Right breast    Chronic back pain     Chronically on opiate therapy     Depression     Diabetes mellitus     DX 14 years ago- checks fsbs weekly    Fibromyalgia     Gastroparesis     GERD (gastroesophageal reflux disease)     Headache     emotional/tension    History of transfusion     [facility]    HTN (hypertension)     Hypercholesteremia     IBS (irritable bowel syndrome)     Incontinence of urine     urgency    Migraine headache     Myalgia and myositis     Peripheral neuropathy     Sleep apnea     does not wear cpap    UTI (urinary  tract infection)      Past Surgical History:   Procedure Laterality Date    APPENDECTOMY      ARTERIOGRAM MESENTERIC N/A 6/16/2022    Procedure: DIAGNOSTIC ARTERIOGRAM WITH CELIAC STENT PLACEMENT;  Surgeon: Neo Neil MD;  Location: [facility];  Service: Vascular;  Laterality: N/A;  FLUORO: 16 MIN  DOSE: 2384 MGY  CONTRAST:  20 ML    BACK SURGERY      5x per patient    BRAIN TUMOR EXCISION  1988    BREAST BIOPSY      CARPAL TUNNEL RELEASE Bilateral     CHOLECYSTECTOMY      COLONOSCOPY      2015    CRANIOTOMY FOR TUMOR      EYE SURGERY      bilateral cataracts removed    HEMORRHOIDECTOMY      LUMBAR FUSION N/A 01/04/2017    Procedure: LUMBAR LAMINECTOMY AND DECOMRESSION  L3 AND L4;  Surgeon: Nishant Bhatti MD;  Location: [facility];  Service:     TOTAL ABDOMINAL HYSTERECTOMY      TRIGGER FINGER RELEASE        General Information       Row Name 10/17/24 0304          OT Time and Intention    Document Type therapy note (daily note)  -CS     Mode of Treatment occupational therapy  -CS     Patient Effort good  -CS       Row Name 10/17/24 1340          General Information    Patient Profile Reviewed yes  -CS     Existing Precautions/Restrictions fall;other (see comments)  confusion, poor safety awareness  -CS     Barriers to Rehab medically complex;previous functional deficit;cognitive status  -CS       Row Name 10/17/24 1342          Cognition    Orientation Status (Cognition) oriented to;person  -CS       Row Name 10/17/24 1349          Safety Issues/Impairments Affecting Functional Mobility    Safety Issues Affecting Function (Mobility) awareness of need for assistance;insight into deficits/self-awareness;judgment;positioning of assistive device;safety precaution awareness;safety precautions follow-through/compliance;problem-solving;sequencing abilities  -CS     Impairments Affecting Function (Mobility) balance;cognition;coordination;endurance/activity tolerance;strength;pain;postural/trunk control  -CS      Cognitive Impairments, Mobility Safety/Performance awareness, need for assistance;insight into deficits/self-awareness;judgment;problem-solving/reasoning;safety precaution follow-through;safety precaution awareness;sequencing abilities  -               User Key  (r) = Recorded By, (t) = Taken By, (c) = Cosigned By      Initials Name Provider Type    CS Jaron Moreno OT Occupational Therapist                     Mobility/ADL's       Row Name 10/17/24 1346          Bed Mobility    Bed Mobility scooting/bridging;supine-sit  -CS     Scooting/Bridging [place] (Bed Mobility) moderate assist (50% patient effort);2 person assist;verbal cues;nonverbal cues (demo/gesture)  -     Supine-Sit [place] (Bed Mobility) moderate assist (50% patient effort);2 person assist  -CS     Bed Mobility, Safety Issues decreased use of arms for pushing/pulling;decreased use of legs for bridging/pushing;impaired trunk control for bed mobility;cognitive deficits limit understanding  -CS     Assistive Device (Bed Mobility) bed rails;head of bed elevated;repositioning sheet  -CS     Comment, (Bed Mobility) cues for sequencing, poor postural control initially with improvement over time, BLE AROM warm-up performed prior to stand  -CS       Row Name 10/17/24 1346          Transfers    Transfers sit-stand transfer;bed-chair transfer  -     Comment, (Transfers) STS x 3 and steps to recliner, tactile guidance for safe sequencing w/ RW use  -       Row Name 10/17/24 1346          Bed-Chair Transfer    Bed-Chair [place] (Transfers) moderate assist (50% patient effort);2 person assist;verbal cues;nonverbal cues (demo/gesture)  -       Row Name 10/17/24 1346          Sit-Stand Transfer    Sit-Stand [place] (Transfers) minimum assist (75% patient effort);2 person assist;verbal cues;nonverbal cues (demo/gesture)  -     Assistive Device (Sit-Stand Transfers) walker, front-wheeled  -       Row Name 10/17/24 1346           Functional Mobility    Functional Mobility- Comment 5ft x 2, ModA w/ RW  -     Patient was able to Ambulate yes  -       Row Name 10/17/24 1346          Activities of Daily Living    BADL Assessment/Intervention upper body dressing;lower body dressing;feeding;grooming  -       Row Name 10/17/24 1346          Lower Body Dressing Assessment/Training    [place] Level (Lower Body Dressing) don;socks;dependent (less than 25% patient effort)  -CS     Position (Lower Body Dressing) edge of bed sitting  -       Row Name 10/17/24 1346          Upper Body Dressing Assessment/Training    [place] Level (Upper Body Dressing) don;pajama/robe;moderate assist (50% patient effort)  -CS     Position (Upper Body Dressing) edge of bed sitting  -       Row Name 10/17/24 1346          Toileting Assessment/Training    [place] Level (Toileting) toileting skills;dependent (less than 25% patient effort)  -       Row Name 10/17/24 1346          Self-Feeding Assessment/Training    [place] Level (Feeding) liquids to mouth;contact guard assist;scoop food and bring to mouth;supervision  -     Position (Feeding) sitting up in bed  -       Row Name 10/17/24 1346          Grooming Assessment/Training    [place] Level (Grooming) hair care, combing/brushing;dependent (less than 25% patient effort)  -               User Key  (r) = Recorded By, (t) = Taken By, (c) = Cosigned By      Initials Name Provider Type     Jaron Moreno OT Occupational Therapist                   Obj/Interventions       Row Name 10/17/24 1346          Motor Skills    Motor Skills motor control/coordination interventions  -     Motor Control/Coordination Interventions gross motor coordination activities;occupation/activity based treatment;therapeutic exercise/ROM  -       Row Name 10/17/24 1347          Balance    Balance Assessment sitting static balance;sitting dynamic balance;standing static balance;standing dynamic balance   -CS     Static Sitting Balance contact guard  -CS     Dynamic Sitting Balance minimal assist  -CS     Position, Sitting Balance unsupported;sitting edge of bed  -CS     Static Standing Balance minimal assist;2-person assist  -CS     Dynamic Standing Balance moderate assist;2-person assist  -CS     Position/Device Used, Standing Balance supported;walker, front-wheeled  -CS     Balance Interventions sitting;standing;sit to stand;occupation based/functional task;weight shifting activity  -CS               User Key  (r) = Recorded By, (t) = Taken By, (c) = Cosigned By      Initials Name Provider Type    Jaron Adam, OT Occupational Therapist                   Goals/Plan    No documentation.                  Clinical Impression       Row Name 10/17/24 1354          Pain Assessment    Additional Documentation Pain Scale: FACES Pre/Post-Treatment (Group)  -       Row Name 10/17/24 2837          Pain Scale: FACES Pre/Post-Treatment    Pain: FACES Scale, Pretreatment 0-->no hurt  -CS     Posttreatment Pain Rating 0-->no hurt  -CS       Row Name 10/17/24 1353          Plan of Care Review    Plan of Care Reviewed With patient  -CS     Progress improving  -CS     Outcome Evaluation Pt required less assist for bed mobility and transfer this date progressing to Min-ModA, forward mobility remains limited by weakness and decreased standing tolerance, mild confusion persists. Remains below baseline for ADL completion, will cont IPOT per POC as tolerated. Rec d/c to SNF rehab.  -       Row Name 10/17/24 8583          Therapy Plan Review/Discharge Plan (OT)    Anticipated Discharge Disposition (OT) skilled nursing facility  -       Row Name 10/17/24 1358          Vital Signs    Pre Systolic BP Rehab --  RN cleared for tx  -CS     O2 Delivery Pre Treatment room air  -CS     O2 Delivery Intra Treatment room air  -CS     O2 Delivery Post Treatment room air  -CS     Pre Patient Position Supine  -CS     Intra Patient Position  Standing  -CS     Post Patient Position Sitting  -CS       Row Name 10/17/24 1352          Positioning and Restraints    Pre-Treatment Position sitting in chair/recliner  -CS     Post Treatment Position chair  -CS     In Chair notified nsg;reclined;sitting;call light within reach;encouraged to call for assist;RUE elevated;LUE elevated;legs elevated;on mechanical lift sling;waffle cushion;exit alarm on;with family/caregiver  -CS               User Key  (r) = Recorded By, (t) = Taken By, (c) = Cosigned By      Initials Name Provider Type    Jaron Adam OT Occupational Therapist                   Outcome Measures       Row Name 10/17/24 1356          How much help from another is currently needed...    Putting on and taking off regular lower body clothing? 2  -CS     Bathing (including washing, rinsing, and drying) 2  -CS     Toileting (which includes using toilet bed pan or urinal) 2  -CS     Putting on and taking off regular upper body clothing 2  -CS     Taking care of personal grooming (such as brushing teeth) 3  -CS     Eating meals 3  -CS     AM-PAC 6 Clicks Score (OT) 14  -CS       Row Name 10/17/24 1356          Functional Assessment    Outcome Measure Options AM-PAC 6 Clicks Daily Activity (OT)  -CS               User Key  (r) = Recorded By, (t) = Taken By, (c) = Cosigned By      Initials Name Provider Type    Jaron Adam OT Occupational Therapist                    Occupational Therapy Education       Title: PT OT SLP Therapies (In Progress)       Topic: Occupational Therapy (In Progress)       Point: ADL training (In Progress)       Description:   Instruct learner(s) on proper safety adaptation and remediation techniques during self care or transfers.   Instruct in proper use of assistive devices.                  Learning Progress Summary            Patient Acceptance, D,E, NR by LADONNA at 10/17/2024 1356    Acceptance, E, NR by CHEMO at 10/15/2024 0947    Acceptance, E, VU,NR by  at 10/10/2024  0945    Comment: role of therapy and ongoing treatment plan   Family Acceptance, E, VU,NR by  at 10/10/2024 0945    Comment: role of therapy and ongoing treatment plan                      Point: Home exercise program (In Progress)       Description:   Instruct learner(s) on appropriate technique for monitoring, assisting and/or progressing therapeutic exercises/activities.                  Learning Progress Summary            Patient Acceptance, D,E, NR by  at 10/17/2024 1356                      Point: Precautions (In Progress)       Description:   Instruct learner(s) on prescribed precautions during self-care and functional transfers.                  Learning Progress Summary            Patient Acceptance, D,E, NR by  at 10/17/2024 1356    Acceptance, E, NR by  at 10/15/2024 0947                      Point: Body mechanics (In Progress)       Description:   Instruct learner(s) on proper positioning and spine alignment during self-care, functional mobility activities and/or exercises.                  Learning Progress Summary            Patient Acceptance, D,E, NR by  at 10/17/2024 1356    Acceptance, E, NR by  at 10/15/2024 0947                                      User Key       Initials Effective Dates Name Provider Type Discipline     02/03/23 -  Jenny Hirsch, OT Occupational Therapist OT     06/16/21 -  Jaron Moreno, OT Occupational Therapist OT     10/14/22 -  Kat Kat, OT Occupational Therapist OT                  OT Recommendation and Plan     Plan of Care Review  Plan of Care Reviewed With: patient  Progress: improving  Outcome Evaluation: Pt required less assist for bed mobility and transfer this date progressing to Min-ModA, forward mobility remains limited by weakness and decreased standing tolerance, mild confusion persists. Remains below baseline for ADL completion, will cont IPOT per POC as tolerated. Rec d/c to SNF rehab.     Time Calculation:         Time  Calculation- OT       Row Name 10/17/24 1356             Time Calculation- OT    OT Start Time 1311  -CS      OT Received On 10/17/24  -CS      OT Goal Re-Cert Due Date 10/20/24  -CS         Timed Charges    15363 - OT Therapeutic Activity Minutes 20  -CS      50204 - OT Self Care/Mgmt Minutes 5  -CS         Total Minutes    Timed Charges Total Minutes 25  -CS       Total Minutes 25  -CS                User Key  (r) = Recorded By, (t) = Taken By, (c) = Cosigned By      Initials Name Provider Type    CS Jaron Moreno OT Occupational Therapist                  Therapy Charges for Today       Code Description Service Date Service Provider Modifiers Qty    05476906782 HC OT THERAPEUTIC ACT EA 15 MIN 10/17/2024 Jaron Moreno OT GO 2    44355351123 HC OT THER SUPP EA 15 MIN 10/17/2024 Jaron Moreno OT GO 1    04469215488 HC OT THER SUPP EA 15 MIN 10/17/2024 Jaron Moreno OT GO 1                 Jaron Moreno OT  10/17/2024    Electronically signed by Jaron Moreno OT at 10/17/24 7691

## 2024-10-18 NOTE — PROGRESS NOTES
[facility]  PROGRESS NOTE    Patient Name: Arminda Krishnan  : 1945  MRN: [id number]    Date of Admission: 10/9/2024  Primary Care Physician: Aiden Spencer MD    Subjective   Subjective     CC: Follow-up hyperglycemia    HPI:   Evaluated patient multiple times throughout the morning, was sleeping. Per family at bedside, patient woke up on her own around 4 AM and was up for several hours and then fell asleep again. Nursing reports that patient complaining of abdominal pain later in the afternoon. Spit out several of her morning meds.    Objective   Objective     Vital Signs:   Temp:  [98.1 °F (36.7 °C)-98.9 °F (37.2 °C)] 98.1 °F (36.7 °C)  Heart Rate:  [88-96] 95  Resp:  [16] 16  BP: (118-164)/(60-81) 150/76     Physical Exam:  Constitutional: resting comfortably  HENT: NCAT, mucous membranes moist  Respiratory: Clear to auscultation bilaterally, respiratory effort normal   Cardiovascular: RRR, no murmurs, rubs, or gallops  Gastrointestinal: soft, nontender, nondistended  Musculoskeletal: No bilateral ankle edema  Neurologic: Oriented x 1, moves all extremities, speech clear, able to answer simple questions and follow simple commands  Skin: No rashes       Results Reviewed:  LAB RESULTS:      Lab 10/16/24  1903 10/15/24  0939 10/14/24  0052 10/13/24  1219 10/13/24  0519   WBC 9.59 9.36 9.74  --  9.23   HEMOGLOBIN 9.6* 9.4* 8.7* 7.0* 7.1*   HEMATOCRIT 30.4* 29.5* 26.9* 21.8* 22.6*   PLATELETS 287 291 271  --  282   NEUTROS ABS  --   --  6.72  --   --    IMMATURE GRANS (ABS)  --   --  0.15*  --   --    LYMPHS ABS  --   --  1.40  --   --    MONOS ABS  --   --  1.19*  --   --    EOS ABS  --   --  0.20  --   --    MCV 88.6 87.8 87.3  --  88.6         Lab 10/16/24  1903 10/15/24  0939 10/14/24  0052 10/13/24  0813   SODIUM 142 138 139 139   POTASSIUM 3.9 4.2 3.9 3.8   CHLORIDE 105 103 106 106   CO2 21.0* 21.0* 20.0* 20.0*   ANION GAP 16.0* 14.0 13.0 13.0    BUN 27* 24* 26* 28*   CREATININE 2.33* 2.35* 2.33* 2.45*   EGFR 20.6* 20.3* 20.6* 19.4*   GLUCOSE 217* 254* 155* 181*   CALCIUM 9.7 9.2 9.6 9.5   MAGNESIUM  --   --  2.2 1.5*         Lab 10/15/24  0939 10/14/24  0052   TOTAL PROTEIN 6.7 6.9   ALBUMIN 3.1* 3.0*   GLOBULIN 3.6 3.9   ALT (SGPT) 21 24   AST (SGOT) 26 36*   BILIRUBIN 0.3 0.2   ALK PHOS 76 70                   Lab 10/13/24  1418   ABO TYPING O   RH TYPING Positive   ANTIBODY SCREEN Negative           Brief Urine Lab Results  (Last result in the past 365 days)        Color   Clarity   Blood   Leuk Est   Nitrite   Protein   CREAT   Urine HCG        10/09/24 2058 Yellow   Cloudy   Large (3+)   Moderate (2+)   Negative   100 mg/dL (2+)                   Microbiology Results Abnormal       Procedure Component Value - Date/Time    Blood Culture - Blood, Arm, Right [[id number]]  (Normal) Collected: 10/09/24 2159    Lab Status: Final result Specimen: Blood from Arm, Right Updated: 10/14/24 2231     Blood Culture No growth at 5 days    Narrative:      Less than seven (7) mL's of blood was collected.  Insufficient quantity may yield false negative results.    Blood Culture - Blood, Hand, Left [[id number]]  (Normal) Collected: 10/09/24 1842    Lab Status: Final result Specimen: Blood from Hand, Left Updated: 10/14/24 1946     Blood Culture No growth at 5 days    Narrative:      Less than seven (7) mL's of blood was collected.  Insufficient quantity may yield false negative results.    Urine Culture - Urine, Urine, Clean Catch [[id number]]  (Normal) Collected: 10/09/24 2058    Lab Status: Final result Specimen: Urine, Clean Catch Updated: 10/11/24 1000     Urine Culture No growth    Legionella Antigen, Urine - Urine, Urine, Clean Catch [[id number]]  (Normal) Collected: 10/09/24 2058    Lab Status: Final result Specimen: Urine, Clean Catch Updated: 10/10/24 0922     LEGIONELLA ANTIGEN, URINE Negative    S. Pneumo Ag Urine or CSF - Urine, Urine, Clean Catch [[id number]]   (Normal) Collected: 10/09/24 2058    Lab Status: Final result Specimen: Urine, Clean Catch Updated: 10/10/24 0922     Strep Pneumo Ag Negative    COVID PRE-OP / PRE-PROCEDURE SCREENING ORDER (NO ISOLATION) - Swab, Nasopharynx [[id number]]  (Normal) Collected: 10/09/24 1843    Lab Status: Final result Specimen: Swab from Nasopharynx Updated: 10/09/24 1958    Narrative:      The following orders were created for panel order COVID PRE-OP / PRE-PROCEDURE SCREENING ORDER (NO ISOLATION) - Swab, Nasopharynx.  Procedure                               Abnormality         Status                     ---------                               -----------         ------                     COVID-19, FLU A/B, RSV P...[[id number]]  Normal              Final result                 Please view results for these tests on the individual orders.    COVID-19, FLU A/B, RSV PCR 1 HR TAT - Swab, Nasopharynx [[id number]]  (Normal) Collected: 10/09/24 1843    Lab Status: Final result Specimen: Swab from Nasopharynx Updated: 10/09/24 1958     COVID19 Not Detected     Influenza A PCR Not Detected     Influenza B PCR Not Detected     RSV, PCR Not Detected    Narrative:      Fact sheet for providers: https://www.fda.gov/media/783485/download    Fact sheet for patients: https://www.fda.gov/media/761328/download    Test performed by PCR.            FL Video Swallow With Speech Single Contrast    Result Date: 10/18/2024  FL VIDEO SWALLOW W SPEECH SINGLE-CONTRAST Date of Exam: 10/18/2024 1:02 PM EDT Indication: aspiration.   Comparison: None available. Technique:   The speech pathologist administered food and/or liquid mixed with barium to the patient with cine/video imaging.  Imaging assistance was provided to the speech pathologist and an image was saved. Fluoroscopic Time: 36 seconds Number of Images: 36 seconds Findings: No aspiration was seen during fluoroscopic guided modified barium swallowing series. The speech therapy report for full details and  recommendations.     Impression: Impression: Loss could be provided for a modified barium swallow. No aspiration was seen during swallowing evaluation. Please see speech therapy report for full details and recommendations. Report dictated by: Мария Hernández PA-c  I have personally reviewed this case and agree with the findings above: Electronically Signed: Nabil Dickinson MD  10/18/2024 3:04 PM EDT  Workstation ID: [id number]     Results for orders placed during the hospital encounter of 07/15/24    Adult Transthoracic Echo Complete W/ Cont if Necessary Per Protocol    Interpretation Summary    Left ventricular systolic function is normal. Calculated left ventricular EF = 63.2%    Estimated right ventricular systolic pressure from tricuspid regurgitation is normal (<35 mmHg).    Normal left atrial size and volume noted.    There is calcification of the aortic valve. Mild to moderate aortic valve regurgitation is present.      Current medications:  Scheduled Meds:aspirin, 81 mg, Oral, Daily  atorvastatin, 80 mg, Oral, Daily  clopidogrel, 75 mg, Oral, Daily  donepezil, 5 mg, Oral, Nightly  famotidine, 10 mg, Oral, Every Other Day  ferrous sulfate, 325 mg, Oral, Daily With Breakfast  insulin glargine, 10 Units, Subcutaneous, Daily  insulin regular, 2-7 Units, Subcutaneous, TID With Meals  ipratropium-albuterol, 3 mL, Nebulization, 4x Daily - RT  isosorbide mononitrate, 60 mg, Oral, Daily  linagliptin, 5 mg, Oral, Daily  melatonin, 5 mg, Oral, Nightly  senna-docusate sodium, 2 tablet, Oral, BID   And  polyethylene glycol, 17 g, Oral, Daily  sodium chloride, 10 mL, Intravenous, Q12H  temazepam, 7.5 mg, Oral, Nightly      Continuous Infusions:     PRN Meds:.  acetaminophen **OR** acetaminophen **OR** acetaminophen    senna-docusate sodium **AND** polyethylene glycol **AND** bisacodyl **AND** bisacodyl    Calcium Replacement - Follow Nurse / BPA Driven Protocol    dextrose    dextrose    glucagon (human recombinant)     hydrOXYzine    influenza vaccine    Magnesium Low Dose Replacement - Follow Nurse / BPA Driven Protocol    Phosphorus Replacement - Follow Nurse / BPA Driven Protocol    Potassium Replacement - Follow Nurse / BPA Driven Protocol    simethicone    sodium chloride    sodium chloride    Assessment & Plan   Assessment & Plan     Active Hospital Problems    Diagnosis  POA    **LLL pneumonia [J18.9]  Yes    Pneumonia [J18.9]  Yes    HTN (hypertension) [I10]  Unknown    Hyperglycemia [R73.9]  Yes    History of CVA (cerebrovascular accident) [Z86.73]  Not Applicable    CKD (chronic kidney disease) stage 4, GFR 15-29 ml/min [N18.4]  Yes    Degenerative disc disease, lumbar [M51.369]  Yes      Resolved Hospital Problems   No resolved problems to display.        Brief Hospital Course to date:  Arminda Krishnan is a 79 y.o. female with history of mild coronary impairment, prior CVA, DDD on steroids, type 2 diabetes who presented to [facility] ED with hyperglycemia, workup concerning for pneumonia and UTI. Patient treated for pneumonia and UTI with IV ceftriaxone. Neurology evaluated and assisted with delirium symptoms, optimize medications for to enhance sleep wake cycle.    This patient's problems and plans were partially entered by my partner and updated as appropriate by me 10/18/24.     LLL pneumonia, suspected aspiration  Mild to moderate oral dysphagia  -CXR showed LLL pneumonia, likely secondary to aspiration  -Blood and sputum cultures are currently NGTD, Legionella and strep pneumo urinary antigens negative  -SLP evaluating, repeat MBS 10/18 with recommendation of soft to chew textures, thin liquids, mechanical ground   -s/p IV ceftriaxone x5 days. Awaiting rehab placement.      Suspected UTI  -UA with 4+ bacteria, leukocytosis, urine culture with no growth  -Completed antibiotics as above.    MICHAEL on CKD stage III, resolved   -Likely prerenal  -Baseline Cr~2.2-2.6, was 3.30 on presentation, improved back to baseline    -Resolved with fluids.      Acute encephalopathy superimposed on likely underlying vascular dementia   -Follow up CT head without new acute findings but old CVA   -Neurology following, initiated donepezil, melatonin and restoril on 10/14.    Constipation with abdominal pain  -Given increased abdominal pain 10/18, will check KUB.  -Continue scheduled bowel regimen. May ultimately need to upgrade to enema.    Poorly controlled type 2 diabetes with A1c 11% and hyperglycemia in the setting of chronic steroids  -Continue home linagliptin.  -Continue Lantus 10 units daily with low-dose SSI. Monitor glucose and adjust insulin as needed.     Acute on chronic anemia  -no sign of blood loss this admission  -Iron studies most consistent with anemia of chronic disease  -s/p 1u PRBC with improvement  -Continue to monitor CBC as needed.    Constipation  -Continue bowel regimen.     History of CVA  Hyperlipidemia  -Continue aspirin, statin, Plavix    DDD with chronic pain  -on chronic opioids  -on chronic prednisone as outpatient, titrated off of prednisone after last admission due to hyperglycemia and uncontrolled diabetes    Expected Discharge Location and Transportation: SNF   Expected Discharge   Expected Discharge Date: 10/21/2024; Expected Discharge Time:      VTE Prophylaxis:  Mechanical VTE prophylaxis orders are present.         AM-PAC 6 Clicks Score (PT): 11 (10/18/24 1213)    CODE STATUS:   Code Status and Medical Interventions: No CPR (Do Not Attempt to Resuscitate); Limited Support; No intubation (DNI)   Ordered at: 10/10/24 0002     Medical Intervention Limits:    No intubation (DNI)     Level Of Support Discussed With:    Next of Kin (If No Surrogate)     Code Status (Patient has no pulse and is not breathing):    No CPR (Do Not Attempt to Resuscitate)     Medical Interventions (Patient has pulse or is breathing):    Limited Support       Jessie Navas, DO  10/18/24

## 2024-10-18 NOTE — PLAN OF CARE
Goal Outcome Evaluation:  Plan of Care Reviewed With: patient, family        Progress: no change  Outcome Evaluation: Pt req ModAx2 w/ B UE support for t/f to chair. Mobility continues to be limited by confusion, poor command following, and decreased safety awareness. Plan to continue progressing PT POC as tolerated to address deficits in strength, balance, postural control, and functional endurance.. PT rec SNF at d/c.    Anticipated Discharge Disposition (PT): skilled nursing facility

## 2024-10-18 NOTE — MBS/VFSS/FEES
Acute Care - Speech Language Pathology   Swallow Re-Evaluation [facility]  Modified Barium Swallow Study (MBS)       Patient Name: Arminda Krishnan  : 1945  MRN: [id number]  Today's Date: 10/18/2024               Admit Date: 10/9/2024    Visit Dx:     ICD-10-CM ICD-9-CM   1. Uncontrolled type 2 diabetes mellitus with hyperglycemia  E11.65 250.02   2. Acute UTI (urinary tract infection)  N39.0 599.0   3. Pneumonia of left lower lobe due to infectious organism  J18.9 486   4. Oropharyngeal dysphagia  R13.12 787.22   5. Cerebral vascular disease  I67.9 437.9   6. Degeneration of intervertebral disc of lumbar region, unspecified whether pain present  M51.369 722.52   7. Spinal stenosis, lumbar region, with neurogenic claudication  M48.062 724.03   8. Aspiration pneumonia of left lower lobe, unspecified aspiration pneumonia type  J69.0 507.0   9. Dehydration  E86.0 276.51     Patient Active Problem List   Diagnosis    Cerebral vascular disease    Degenerative disc disease, lumbar    Degenerative disc disease, cervical    Spinal stenosis, lumbar region, with neurogenic claudication    Tobacco abuse    Chronic anemia    CKD (chronic kidney disease) stage 4, GFR 15-29 ml/min    Hyperglycemia    History of CVA (cerebrovascular accident)    Type 2 diabetes mellitus with hyperglycemia, without long-term current use of insulin    Disorientation    Dehydration    LLL pneumonia    HTN (hypertension)    Pneumonia     Past Medical History:   Diagnosis Date    Anemia     Arthritis     Back problem     CAD (coronary artery disease)     Cancer     Right breast    Chronic back pain     Chronically on opiate therapy     Depression     Diabetes mellitus     DX 14 years ago- checks fsbs weekly    Fibromyalgia     Gastroparesis     GERD (gastroesophageal reflux disease)     Headache     emotional/tension    History of transfusion     [facility]    HTN (hypertension)     Hypercholesteremia     IBS (irritable bowel  syndrome)     Incontinence of urine     urgency    Migraine headache     Myalgia and myositis     Peripheral neuropathy     Sleep apnea     does not wear cpap    UTI (urinary tract infection)      Past Surgical History:   Procedure Laterality Date    APPENDECTOMY      ARTERIOGRAM MESENTERIC N/A 6/16/2022    Procedure: DIAGNOSTIC ARTERIOGRAM WITH CELIAC STENT PLACEMENT;  Surgeon: Neo Neil MD;  Location: [facility];  Service: Vascular;  Laterality: N/A;  FLUORO: 16 MIN  DOSE: 2384 MGY  CONTRAST:  20 ML    BACK SURGERY      5x per patient    BRAIN TUMOR EXCISION  1988    BREAST BIOPSY      CARPAL TUNNEL RELEASE Bilateral     CHOLECYSTECTOMY      COLONOSCOPY      2015    CRANIOTOMY FOR TUMOR      EYE SURGERY      bilateral cataracts removed    HEMORRHOIDECTOMY      LUMBAR FUSION N/A 01/04/2017    Procedure: LUMBAR LAMINECTOMY AND DECOMRESSION  L3 AND L4;  Surgeon: Nishant Bhatti MD;  Location: [facility] OR;  Service:     TOTAL ABDOMINAL HYSTERECTOMY      TRIGGER FINGER RELEASE         SLP Recommendation and Plan  SLP Swallowing Diagnosis: mild-moderate, oral dysphagia, functional pharyngeal phase (10/18/24 1300)  SLP Diet Recommendation: soft to chew textures, thin liquids, ground (10/18/24 1300)  Recommended Precautions and Strategies: upright posture during/after eating, general aspiration precautions (10/18/24 1300)  SLP Rec. for Method of Medication Administration: meds whole, meds crushed, with puree, as tolerated (10/18/24 1300)     Monitor for Signs of Aspiration: yes, notify SLP if any concerns (10/18/24 1300)  Recommended Diagnostics: No further SLP services recommended (10/18/24 1300)  Swallow Criteria for Skilled Therapeutic Interventions Met: no problems identified which require skilled intervention (10/18/24 1300)  Anticipated Discharge Disposition (SLP): skilled nursing facility (10/18/24 1300)     Therapy Frequency (Swallow): evaluation only (10/18/24 1300)  Predicted Duration Therapy  Intervention (Days): 1 week (10/18/24 1300)  Oral Care Recommendations: Oral Care BID/PRN, Toothbrush (10/18/24 1300)                                        Progress: no change      SWALLOW EVALUATION (Last 72 Hours)       SLP Adult Swallow Evaluation       Row Name 10/18/24 1300 10/17/24 1500 10/16/24 1010             Rehab Evaluation    Document Type re-evaluation  -CJ evaluation  -RS therapy note (daily note)  -      Subjective Information no complaints  - no complaints  -RS no complaints  -      Patient Observations alert;cooperative  - alert  confused  -RS alert;cooperative  -      Patient/Family/Caregiver Comments/Observations no family present  - sister present  -RS Family present  -      Patient Effort adequate  - adequate  -RS good  -      Symptoms Noted During/After Treatment none  -CJ none  -RS none  -      Oral Care -- oral rinse provided  -RS --         General Information    Patient Profile Reviewed yes  -CJ yes  -RS --      Pertinent History Of Current Problem see initial eval; referred for repeat [facility] [place], [place]  -CJ Pls see previous SLP notes for PMHx. SLP was reconsulted today after several episodes of witnessed coughing with meals. Pt's sister is present who endorses significantly increased confusion  -RS --      Current Method of Nutrition soft to chew textures;ground;thin liquids  - soft to chew textures;ground;thin liquids  -RS --      Precautions/Limitations, Vision WFL;for purposes of eval  -CJ WFL;for purposes of eval  -RS --      Precautions/Limitations, Hearing WFL;for purposes of eval  -CJ WFL;for purposes of eval  -RS --      Prior Level of Function-Communication cognitive-linguistic impairment  -CJ cognitive-linguistic impairment  -RS --      Prior Level of Function-Swallowing -- other (see comments)  [facility] [place], [place] earlier this admit w recs for puree/thin. Sister reports that pt will refuse puree solids  -RS --      Plans/Goals Discussed with patient and family;agreed upon  -CJ -- --       Barriers to Rehab none identified  -CJ -- --         Pain    Additional Documentation Pain Scale: FACES Pre/Post-Treatment (Group)  - Pain Scale: FACES Pre/Post-Treatment (Group)  -RS Pain Scale: FACES Pre/Post-Treatment (Group)  -         Pain Scale: FACES Pre/Post-Treatment    Pain: FACES Scale, Pretreatment 0-->no hurt  -CJ 0-->no hurt  -RS 0-->no hurt  -      Posttreatment Pain Rating 0-->no hurt  -CJ 0-->no hurt  -RS 0-->no hurt  -         Oral Motor Structure and Function    Dentition Assessment -- edentulous  -RS --      Secretion Management -- WNL/WFL  -RS --      Mucosal Quality -- moist, healthy  -RS --      Volitional Swallow -- WFL  -RS --      Volitional Cough -- non-productive  -RS --         Oral Musculature and Cranial Nerve Assessment    Oral Motor, Comment -- C/w previous eval, pt with open mouth, anterior carriage of tongue at rest. Also cont to appear tongue thrust-like w PO trials  -RS --         General Eating/Swallowing Observations    Respiratory Support Currently in Use -- room air  -RS --      Eating/Swallowing Skills -- self-fed  -RS --      Positioning During Eating -- upright in chair  -RS --      Utensils Used -- spoon;cup;straw  -RS --      Consistencies Trialed -- pureed;ice chips;thin liquids;nectar/syrup-thick liquids  -RS --         Clinical Swallow Eval    Oral Prep Phase -- WFL  -RS --      Oral Transit -- impaired  -RS --      Oral Residue -- WFL  -RS --      Pharyngeal Phase -- suspected pharyngeal impairment  -RS --      Esophageal Phase -- unremarkable  -RS --         Oral Transit Concerns    Oral Transit Concerns -- increased oral transit time  -RS --      Increased Oral Transit Time -- pudding  -RS --         Pharyngeal Phase Concerns    Pharyngeal Phase Concerns -- cough;throat clear  -RS --      Cough -- thin  -RS --      Throat Clear -- thin  -RS --      Pharyngeal Phase Concerns, Comment -- Pt appears to have had a change since last seen by SLP service on  10/16. Overt pharygneal patterns w thins, possibly related to increased confusion. Will plan to repeat swallow study 10/18, D/w RN and pt's sister  -RS --         MBS/VFSS Interpretation    Oral Prep Phase impaired oral phase of swallowing  -CJ -- --      Oral Transit Phase impaired  -CJ -- --      Oral Residue WFL  -CJ -- --         Oral Preparatory Phase    Oral Preparatory Phase prolonged manipulation  -CJ -- --      Prolonged Manipulation pudding/puree;regular textures  -CJ -- --         Oral Transit Phase    Impaired Oral Transit Phase delayed initiation of bolus transit;tongue pumping;premature spillage of liquids into pharynx  -CJ -- --      Tongue Pumping pudding/puree;secondary to reduced lingual control;discoordination of lingual movement;secondary to impaired cognitive status  -CJ -- --      Premature Spillage of Liquids into Pharynx thin liquids;secondary to reduced lingual control  -CJ -- --         Initiation of Pharyngeal Swallow    Initiation of Pharyngeal Swallow bolus in valleculae  -CJ -- --      Pharyngeal Phase functional pharyngeal phase of swallowing  -CJ -- --      Anatomical abnormalities noted prominent cricopharyngeous/ cricopharyngeal bar  -CJ -- --      Anatomical abnormalities functional impact no functional impact on swallowing  -CJ -- --      Pharyngeal Phase, Comment Isolated incidence of penetration w/ thins via consecutive straw sips however cleared upon completion. Pt now on her baseline diet. SLP will sign off  -CJ -- --         SLP Evaluation Clinical Impression    SLP Swallowing Diagnosis mild-moderate;oral dysphagia;functional pharyngeal phase  -CJ R/O pharyngeal dysphagia  -RS --      Functional Impact risk of aspiration/pneumonia  -CJ risk of aspiration/pneumonia  -RS --      Rehab Potential/Prognosis, Swallowing -- re-evaluate goals as necessary  -RS --      Swallow Criteria for Skilled Therapeutic Interventions Met no problems identified which require skilled intervention   - demonstrates skilled criteria  - --         SLP Treatment Clinical Impressions    Treatment Assessment (SLP) -- -- toleration of diet;no clinical signs of;pharyngeal dysphagia  -      Treatment Assessment Comments (SLP) -- -- No overt s/s of aspiration w/ thin liquid trials. Pt declined solid trials. Family reports no mastication concerns w/ items on tray. Reviewed MBS results w/ pt and family. Also reviewed aspiration precautions & provided education. Ok to cont current diet, if any concerns downgrade to pureed. SLP will sign off for dysphagia  -      Daily Summary of Progress (SLP) -- -- progress toward functional goals as expected  -      Plan for Continued Treatment (SLP) -- -- continue treatment per plan of care  -      Care Plan Review -- -- care plan/treatment goals reviewed  -         Recommendations    Therapy Frequency (Swallow) evaluation only  - -- evaluation only  -      Predicted Duration Therapy Intervention (Days) 1 week  - -- --      SLP Diet Recommendation soft to chew textures;thin liquids;ground  - mechanical ground textures;nectar thick liquids;no mixed consistencies  - soft to chew textures;chopped;thin liquids  -      Recommended Diagnostics No further SLP services recommended  - reassess via VFSS ([facility] [place])  - No further SLP services recommended  -      Recommended Precautions and Strategies upright posture during/after eating;general aspiration precautions  - upright posture during/after eating;no straw;general aspiration precautions  - upright posture during/after eating;general aspiration precautions;assist with feeding  -      Oral Care Recommendations Oral Care BID/PRN;Toothbrush  - Oral Care BID/PRN;Toothbrush  - Oral Care BID/PRN;Toothbrush  -      SLP Rec. for Method of Medication Administration meds whole;meds crushed;with puree;as tolerated  - meds whole;meds crushed;with puree;as tolerated  - meds whole;meds crushed;with puree;as  tolerated  -      Monitor for Signs of Aspiration yes;notify SLP if any concerns  - yes;notify SLP if any concerns  -RS yes;notify SLP if any concerns  -      Anticipated Discharge Disposition (SLP) skilled nursing facility  -CJ skilled nursing facility  -RS skilled nursing facility  -                User Key  (r) = Recorded By, (t) = Taken By, (c) = Cosigned By      Initials Name Effective Dates     Cooper Kyra SINAI, MS CCC-SLP 06/03/24 -     Yenifer Moore, MS [facility]-SLP 05/12/23 -     Evertete Elliott, MS CCC-SLP 09/14/23 -                     EDUCATION  The patient has been educated in the following areas:   Dysphagia (Swallowing Impairment) Oral Care/Hydration.        SLP GOALS       Row Name 10/16/24 1010             (LTG) Patient will demonstrate functional swallow for    Diet Texture (Demonstrate functional swallow) soft to chew (chopped) textures  -      Liquid viscosity (Demonstrate functional swallow) thin liquids  -      [place] (Demonstrate functional swallow) with moderate cues (50-74% accuracy)  -      Time Frame (Demonstrate functional swallow) 1 week  -      Progress/Outcomes (Demonstrate functional swallow) goal met  -      Comment (Demonstrate functional swallow) No overt s/s of aspiration w/ thin liquid trials. Pt declined solid trials, family reports no issues w/ items on tray.  -         (STG) Patient will tolerate trials of    Consistencies Trialed (Tolerate trials) soft to chew (chopped) textures;thin liquids  -      Desired Outcome (Tolerate trials) without signs/symptoms of aspiration;without signs of distress;with adequate oral prep/transit/clearance  -      [place] (Tolerate trials) with moderate cues (50-74% accuracy)  -      Time Frame (Tolerate trials) 1 week  -      Progress/Outcomes (Tolerate trials) goal met  -         (STG) Labial Strengthening Goal 1 (SLP)    Activity (Labial Strengthening Goal 1, SLP) increase labial tone  -       Increase Labial Tone labial resistance exercises;swallow trials  -      [place]/Accuracy (Labial Strengthening Goal 1, SLP) with maximum cues (25-49% accuracy)  -      Time Frame (Labial Strengthening Goal 1, SLP) 1 week  -      Progress/Outcomes (Labial Strengthening Goal 1, SLP) goal no longer appropriate  -      Comment (Labial Strengthening Goal 1, SLP) Pt u/a to complete labial resistance exercises, swallow trials completed  -         (STG) Lingual Strengthening Goal 1 (SLP)    Activity (Lingual Strengthening Goal 1, SLP) increase tongue back strength  -      Increase Tongue Back Strength lingual resistance exercises;swallow trials  -      [place]/Accuracy (Lingual Strengthening Goal 1, SLP) with maximum cues (25-49% accuracy)  -      Time Frame (Lingual Strengthening Goal 1, SLP) 1 week  -      Progress/Outcomes (Lingual Strengthening Goal 1, SLP) goal no longer appropriate  -                User Key  (r) = Recorded By, (t) = Taken By, (c) = Cosigned By      Initials Name Provider Type     Yenifer Herring, MS CCC-SLP Speech and Language Pathologist                         Time Calculation:    Time Calculation- SLP       Row Name 10/18/24 1423             Time Calculation- SLP    SLP Start Time 1300  -CJ      SLP Received On 10/18/24  -         Untimed Charges    33170-NB Motion Fluoro Eval Swallow Minutes 54  -CJ         Total Minutes    Untimed Charges Total Minutes 54  -CJ       Total Minutes 54  -CJ                User Key  (r) = Recorded By, (t) = Taken By, (c) = Cosigned By      Initials Name Provider Type     Kyra Gamboa MS CCC-SLP Speech and Language Pathologist                    Therapy Charges for Today       Code Description Service Date Service Provider Modifiers Qty    [id number]  ST MOTION FLUORO EVAL SWALLOW 4 10/18/2024 Kyra Gamboa MS CCC-SLP GN 1                 Kyra Gamboa MS CCC-SLP  10/18/2024

## 2024-10-18 NOTE — PLAN OF CARE
Goal Outcome Evaluation:  Plan of Care Reviewed With: patient        Progress: no change       Anticipated Discharge Disposition (SLP): skilled nursing facility          SLP Swallowing Diagnosis: mild-moderate, oral dysphagia, functional pharyngeal phase (10/18/24 1300)

## 2024-10-18 NOTE — THERAPY TREATMENT NOTE
Patient Name: Arminda Krishnan  : 1945    MRN: [id number]                              Today's Date: 10/18/2024       Admit Date: 10/9/2024    Visit Dx:     ICD-10-CM ICD-9-CM   1. Uncontrolled type 2 diabetes mellitus with hyperglycemia  E11.65 250.02   2. Acute UTI (urinary tract infection)  N39.0 599.0   3. Pneumonia of left lower lobe due to infectious organism  J18.9 486   4. Dysphagia, unspecified type  R13.10 787.20   5. Cerebral vascular disease  I67.9 437.9   6. Degeneration of intervertebral disc of lumbar region, unspecified whether pain present  M51.369 722.52   7. Spinal stenosis, lumbar region, with neurogenic claudication  M48.062 724.03   8. Aspiration pneumonia of left lower lobe, unspecified aspiration pneumonia type  J69.0 507.0   9. Dehydration  E86.0 276.51     Patient Active Problem List   Diagnosis    Cerebral vascular disease    Degenerative disc disease, lumbar    Degenerative disc disease, cervical    Spinal stenosis, lumbar region, with neurogenic claudication    Tobacco abuse    Chronic anemia    CKD (chronic kidney disease) stage 4, GFR 15-29 ml/min    Hyperglycemia    History of CVA (cerebrovascular accident)    Type 2 diabetes mellitus with hyperglycemia, without long-term current use of insulin    Disorientation    Dehydration    LLL pneumonia    HTN (hypertension)    Pneumonia     Past Medical History:   Diagnosis Date    Anemia     Arthritis     Back problem     CAD (coronary artery disease)     Cancer     Right breast    Chronic back pain     Chronically on opiate therapy     Depression     Diabetes mellitus     DX 14 years ago- checks fsbs weekly    Fibromyalgia     Gastroparesis     GERD (gastroesophageal reflux disease)     Headache     emotional/tension    History of transfusion     [facility]    HTN (hypertension)     Hypercholesteremia     IBS (irritable bowel syndrome)     Incontinence of urine     urgency    Migraine headache     Myalgia and myositis      Peripheral neuropathy     Sleep apnea     does not wear cpap    UTI (urinary tract infection)      Past Surgical History:   Procedure Laterality Date    APPENDECTOMY      ARTERIOGRAM MESENTERIC N/A 6/16/2022    Procedure: DIAGNOSTIC ARTERIOGRAM WITH CELIAC STENT PLACEMENT;  Surgeon: Neo Neil MD;  Location: [facility];  Service: Vascular;  Laterality: N/A;  FLUORO: 16 MIN  DOSE: 2384 MGY  CONTRAST:  20 ML    BACK SURGERY      5x per patient    BRAIN TUMOR EXCISION  1988    BREAST BIOPSY      CARPAL TUNNEL RELEASE Bilateral     CHOLECYSTECTOMY      COLONOSCOPY      2015    CRANIOTOMY FOR TUMOR      EYE SURGERY      bilateral cataracts removed    HEMORRHOIDECTOMY      LUMBAR FUSION N/A 01/04/2017    Procedure: LUMBAR LAMINECTOMY AND DECOMRESSION  L3 AND L4;  Surgeon: Nishant Bhatti MD;  Location: [facility] OR;  Service:     TOTAL ABDOMINAL HYSTERECTOMY      TRIGGER FINGER RELEASE        General Information       Row Name 10/18/24 1113          Physical Therapy Time and Intention    Document Type therapy note (daily note)  -     Mode of Treatment physical therapy  -       Row Name 10/18/24 1113          General Information    Patient Profile Reviewed yes  -KE     Existing Precautions/Restrictions fall;other (see comments)  confusion, poor safety awareness  -     Barriers to Rehab medically complex;previous functional deficit;cognitive status  -       Row Name 10/18/24 1113          Cognition    Orientation Status (Cognition) oriented to;person  -       Row Name 10/18/24 1113          Safety Issues/Impairments Affecting Functional Mobility    Safety Issues Affecting Function (Mobility) ability to follow commands;awareness of need for assistance;insight into deficits/self-awareness;judgment;problem-solving;safety precaution awareness;safety precautions follow-through/compliance;sequencing abilities  -     Impairments Affecting Function (Mobility)  balance;cognition;coordination;endurance/activity tolerance;strength;pain;postural/trunk control  -     Cognitive Impairments, Mobility Safety/Performance attention;awareness, need for assistance;insight into deficits/self-awareness;problem-solving/reasoning;safety precaution awareness;safety precaution follow-through;sequencing abilities  -     Comment, Safety Issues/Impairments (Mobility) Poor command following and safety awareness  -               User Key  (r) = Recorded By, (t) = Taken By, (c) = Cosigned By      Initials Name Provider Type    Marla Reid, PT Physical Therapist                   Mobility       Row Name 10/18/24 1114          Bed Mobility    Bed Mobility supine-sit  -KE     Supine-Sit Emanuel (Bed Mobility) maximum assist (25% patient effort);1 person assist  -     Assistive Device (Bed Mobility) head of bed elevated;repositioning sheet  -     Comment, (Bed Mobility) cues for sequencing, poor command following; req assist at trunk and LEs  -       Row Name 10/18/24 1114          Transfers    Comment, (Transfers) poor postural control at times sitting EOB, pt impulsively falling over  -       Row Name 10/18/24 1114          Bed-Chair Transfer    Bed-Chair Emanuel (Transfers) moderate assist (50% patient effort);2 person assist;verbal cues;nonverbal cues (demo/gesture)  -     Assistive Device (Bed-Chair Transfers) other (see comments)  B UE support  -KE     Comment, (Bed-Chair Transfer) VCs for sequencing of steps, attempted taking steps w/ FWW however pt unable to sequence mobility w/ AD; pt taking very short shuffled steps to chair; manual assist at hip for facilitation of improved hip extension; bilat knee buckling noted  -       Row Name 10/18/24 1114          Sit-Stand Transfer    Sit-Stand Emanuel (Transfers) moderate assist (50% patient effort);2 person assist  -     Assistive Device (Sit-Stand Transfers) other (see comments)  B UE support  -KE      "Comment, (Sit-Stand Transfer) x2 from EOB, x2 from chair; B UE support and B knee blocking; difficulty achieving full upright posturing despite cues  -KE               User Key  (r) = Recorded By, (t) = Taken By, (c) = Cosigned By      Initials Name Provider Type    Marla Reid PT Physical Therapist                   Obj/Interventions       Row Name 10/18/24 1128          Balance    Balance Assessment sitting static balance;sitting dynamic balance;standing static balance;standing dynamic balance  -KE     Static Sitting Balance minimal assist  -KE     Dynamic Sitting Balance minimal assist  -KE     Position, Sitting Balance sitting edge of bed  -KE     Sit to Stand Dynamic Balance moderate assist;2-person assist  -KE     Static Standing Balance moderate assist;2-person assist  -KE     Dynamic Standing Balance moderate assist;2-person assist  -KE     Position/Device Used, Standing Balance walker, front-wheeled  -KE     Balance Interventions sitting;standing;sit to stand;supported;dynamic;static  -KE     Comment, Balance demo poor postural control  -KE               User Key  (r) = Recorded By, (t) = Taken By, (c) = Cosigned By      Initials Name Provider Type    Marla Reid, STANLEY Physical Therapist                   Goals/Plan    No documentation.                  Clinical Impression       Row Name 10/18/24 1130          Pain    Pretreatment Pain Rating 4/10  -KE     Posttreatment Pain Rating 4/10  -KE     Pain Location other (see comments)  \"stomach\"  -KE     Pain Management Interventions activity modification encouraged;exercise or physical activity utilized  -KE     Response to Pain Interventions activity participation with tolerable pain  -KE       Row Name 10/18/24 1130          Plan of Care Review    Plan of Care Reviewed With patient;family  -KE     Progress no change  -KE     Outcome Evaluation Pt req ModAx2 w/ B UE support for t/f to chair. Mobility continues to be limited by confusion, poor " command following, and decreased safety awareness. Plan to continue progressing PT POC as tolerated to address deficits in strength, balance, postural control, and functional endurance.. PT rec SNF at d/c.  -KE       Row Name 10/18/24 1130          Vital Signs    O2 Delivery Pre Treatment room air  -KE     O2 Delivery Intra Treatment room air  -KE     O2 Delivery Post Treatment room air  -KE     Pre Patient Position Supine  -KE     Intra Patient Position Standing  -KE     Post Patient Position Sitting  -KE       Row Name 10/18/24 1130          Positioning and Restraints    Pre-Treatment Position in bed  -KE     Post Treatment Position chair  -KE     In Chair notified nsg;reclined;call light within reach;encouraged to call for assist;exit alarm on;with family/caregiver;legs elevated;waffle cushion;on mechanical lift sling  -KE               User Key  (r) = Recorded By, (t) = Taken By, (c) = Cosigned By      Initials Name Provider Type    Marla Reid, PT Physical Therapist                   Outcome Measures       Row Name 10/18/24 1133          How much help from another person do you currently need...    Turning from your back to your side while in flat bed without using bedrails? 3  -KE     Moving from lying on back to sitting on the side of a flat bed without bedrails? 2  -KE     Moving to and from a bed to a chair (including a wheelchair)? 2  -KE     Standing up from a chair using your arms (e.g., wheelchair, bedside chair)? 2  -KE     Climbing 3-5 steps with a railing? 1  -KE     To walk in hospital room? 1  -KE     AM-PAC 6 Clicks Score (PT) 11  -KE     Highest Level of Mobility Goal 4 --> Transfer to chair/commode  -KE       Row Name 10/18/24 1133          Functional Assessment    Outcome Measure Options AM-PAC 6 Clicks Basic Mobility (PT)  -KE               User Key  (r) = Recorded By, (t) = Taken By, (c) = Cosigned By      Initials Name Provider Type    Marla Reid PT Physical Therapist                                  Physical Therapy Education       Title: PT OT SLP Therapies (In Progress)       Topic: Physical Therapy (In Progress)       Point: Mobility training (In Progress)       Learning Progress Summary            Patient Acceptance, E, NR by OSMIN at 10/18/2024 1133                      Point: Home exercise program (In Progress)       Learning Progress Summary            Patient Acceptance, E, NR by OSMIN at 10/18/2024 1133                      Point: Body mechanics (In Progress)       Learning Progress Summary            Patient Acceptance, E, NR by OSMIN at 10/18/2024 1133                      Point: Precautions (In Progress)       Learning Progress Summary            Patient Acceptance, E, NR by OSMIN at 10/18/2024 1133                                      User Key       Initials Effective Dates Name Provider Type Discipline    OSMIN 11/16/23 -  Marla Cooper PT Physical Therapist PT                  PT Recommendation and Plan     Progress: no change  Outcome Evaluation: Pt req ModAx2 w/ B UE support for t/f to chair. Mobility continues to be limited by confusion, poor command following, and decreased safety awareness. Plan to continue progressing PT POC as tolerated to address deficits in strength, balance, postural control, and functional endurance.. PT rec SNF at d/c.     Time Calculation:         PT Charges       Row Name 10/18/24 1134             Time Calculation    Start Time 1044  -KE      PT Received On 10/18/24  -KE         Timed Charges    48350 - PT Therapeutic Activity Minutes 25  -KE         Total Minutes    Timed Charges Total Minutes 25  -KE       Total Minutes 25  -KE                User Key  (r) = Recorded By, (t) = Taken By, (c) = Cosigned By      Initials Name Provider Type    Marla Reid PT Physical Therapist                  Therapy Charges for Today       Code Description Service Date Service Provider Modifiers Qty    85830097839  PT THERAPEUTIC ACT EA 15 MIN 10/18/2024  Marla Cooper, PT GP 2    19379699151 HC PT THER SUPP EA 15 MIN 10/18/2024 Marla Cooper, PT GP 2            PT G-Codes  Outcome Measure Options: AM-PAC 6 Clicks Basic Mobility (PT)  AM-PAC 6 Clicks Score (PT): 11  AM-PAC 6 Clicks Score (OT): 14  PT Discharge Summary  Anticipated Discharge Disposition (PT): skilled nursing facility    Marla Cooper PT  10/18/2024

## 2024-10-18 NOTE — PLAN OF CARE
Goal Outcome Evaluation:   Patient alert, oriented to self only. Patient restless, removing wires/leads frequently, DO notified of increased confusion/restlessness. Reports of abdominal pain, PRN suppository given, effective. IV removed per order. Up in chair for a couple hours. Speech re-consulted for signs of possible aspiration - see note/orders, DO notified, plan for repeat modified barium tomorrow. Sister at bedside throughout shift.

## 2024-10-19 ENCOUNTER — APPOINTMENT (OUTPATIENT)
Dept: GENERAL RADIOLOGY | Facility: HOSPITAL | Age: 79
End: 2024-10-19
Payer: MEDICARE

## 2024-10-19 ENCOUNTER — APPOINTMENT (OUTPATIENT)
Dept: CT IMAGING | Facility: HOSPITAL | Age: 79
End: 2024-10-19
Payer: MEDICARE

## 2024-10-19 LAB
GLUCOSE BLDC GLUCOMTR-MCNC: 159 MG/DL (ref 70–130)
GLUCOSE BLDC GLUCOMTR-MCNC: 186 MG/DL (ref 70–130)
GLUCOSE BLDC GLUCOMTR-MCNC: 252 MG/DL (ref 70–130)
GLUCOSE BLDC GLUCOMTR-MCNC: 279 MG/DL (ref 70–130)
GLUCOSE BLDC GLUCOMTR-MCNC: 67 MG/DL (ref 70–130)
GLUCOSE BLDC GLUCOMTR-MCNC: 89 MG/DL (ref 70–130)
QT INTERVAL: 366 MS
QTC INTERVAL: 432 MS

## 2024-10-19 PROCEDURE — 94761 N-INVAS EAR/PLS OXIMETRY MLT: CPT

## 2024-10-19 PROCEDURE — 93010 ELECTROCARDIOGRAM REPORT: CPT | Performed by: INTERNAL MEDICINE

## 2024-10-19 PROCEDURE — 99232 SBSQ HOSP IP/OBS MODERATE 35: CPT | Performed by: HOSPITALIST

## 2024-10-19 PROCEDURE — 74176 CT ABD & PELVIS W/O CONTRAST: CPT

## 2024-10-19 PROCEDURE — 93005 ELECTROCARDIOGRAM TRACING: CPT | Performed by: HOSPITALIST

## 2024-10-19 PROCEDURE — 71045 X-RAY EXAM CHEST 1 VIEW: CPT

## 2024-10-19 PROCEDURE — 94799 UNLISTED PULMONARY SVC/PX: CPT

## 2024-10-19 PROCEDURE — 82948 REAGENT STRIP/BLOOD GLUCOSE: CPT

## 2024-10-19 PROCEDURE — 63710000001 INSULIN REGULAR HUMAN PER 5 UNITS: Performed by: INTERNAL MEDICINE

## 2024-10-19 PROCEDURE — 94664 DEMO&/EVAL PT USE INHALER: CPT

## 2024-10-19 PROCEDURE — 63710000001 INSULIN GLARGINE PER 5 UNITS: Performed by: FAMILY MEDICINE

## 2024-10-19 RX ORDER — CARVEDILOL 12.5 MG/1
25 TABLET ORAL 2 TIMES DAILY
Status: DISCONTINUED | OUTPATIENT
Start: 2024-10-19 | End: 2024-10-22 | Stop reason: HOSPADM

## 2024-10-19 RX ORDER — BUSPIRONE HYDROCHLORIDE 10 MG/1
5 TABLET ORAL 3 TIMES DAILY
Status: DISCONTINUED | OUTPATIENT
Start: 2024-10-19 | End: 2024-10-20

## 2024-10-19 RX ORDER — TEMAZEPAM 7.5 MG/1
7.5 CAPSULE ORAL NIGHTLY
Status: DISCONTINUED | OUTPATIENT
Start: 2024-10-19 | End: 2024-10-22 | Stop reason: HOSPADM

## 2024-10-19 RX ORDER — OXYMETAZOLINE HYDROCHLORIDE 0.05 G/100ML
2 SPRAY NASAL 2 TIMES DAILY
Status: DISCONTINUED | OUTPATIENT
Start: 2024-10-19 | End: 2024-10-22 | Stop reason: HOSPADM

## 2024-10-19 RX ORDER — PANTOPRAZOLE SODIUM 40 MG/1
40 TABLET, DELAYED RELEASE ORAL 2 TIMES DAILY
Status: DISCONTINUED | OUTPATIENT
Start: 2024-10-19 | End: 2024-10-22 | Stop reason: HOSPADM

## 2024-10-19 RX ORDER — ALBUTEROL SULFATE 0.83 MG/ML
2.5 SOLUTION RESPIRATORY (INHALATION) EVERY 6 HOURS PRN
Status: DISCONTINUED | OUTPATIENT
Start: 2024-10-19 | End: 2024-10-22 | Stop reason: HOSPADM

## 2024-10-19 RX ORDER — OXYMETAZOLINE HYDROCHLORIDE 0.05 G/100ML
2 SPRAY NASAL 2 TIMES DAILY
Status: DISCONTINUED | OUTPATIENT
Start: 2024-10-19 | End: 2024-10-19

## 2024-10-19 RX ADMIN — IPRATROPIUM BROMIDE AND ALBUTEROL SULFATE 3 ML: 2.5; .5 SOLUTION RESPIRATORY (INHALATION) at 19:40

## 2024-10-19 RX ADMIN — LINAGLIPTIN 5 MG: 5 TABLET, FILM COATED ORAL at 09:53

## 2024-10-19 RX ADMIN — BUSPIRONE HYDROCHLORIDE 5 MG: 10 TABLET ORAL at 15:36

## 2024-10-19 RX ADMIN — HUMAN INSULIN 4 UNITS: 100 INJECTION, SOLUTION SUBCUTANEOUS at 12:38

## 2024-10-19 RX ADMIN — SENNOSIDES AND DOCUSATE SODIUM 2 TABLET: 50; 8.6 TABLET ORAL at 09:51

## 2024-10-19 RX ADMIN — IPRATROPIUM BROMIDE AND ALBUTEROL SULFATE 3 ML: 2.5; .5 SOLUTION RESPIRATORY (INHALATION) at 07:42

## 2024-10-19 RX ADMIN — HUMAN INSULIN 4 UNITS: 100 INJECTION, SOLUTION SUBCUTANEOUS at 17:21

## 2024-10-19 RX ADMIN — NICOTINE 1 PATCH: 7 PATCH, EXTENDED RELEASE TRANSDERMAL at 12:37

## 2024-10-19 RX ADMIN — DONEPEZIL HYDROCHLORIDE 5 MG: 10 TABLET, FILM COATED ORAL at 19:58

## 2024-10-19 RX ADMIN — ATORVASTATIN CALCIUM 80 MG: 40 TABLET, FILM COATED ORAL at 09:52

## 2024-10-19 RX ADMIN — Medication 5 MG: at 19:59

## 2024-10-19 RX ADMIN — Medication 2 SPRAY: at 19:58

## 2024-10-19 RX ADMIN — PANTOPRAZOLE SODIUM 40 MG: 40 TABLET, DELAYED RELEASE ORAL at 17:23

## 2024-10-19 RX ADMIN — ASPIRIN 81 MG 81 MG: 81 TABLET ORAL at 09:52

## 2024-10-19 RX ADMIN — POLYETHYLENE GLYCOL 3350 17 G: 17 POWDER, FOR SOLUTION ORAL at 09:51

## 2024-10-19 RX ADMIN — ISOSORBIDE MONONITRATE 60 MG: 60 TABLET, EXTENDED RELEASE ORAL at 09:52

## 2024-10-19 RX ADMIN — HUMAN INSULIN 2 UNITS: 100 INJECTION, SOLUTION SUBCUTANEOUS at 09:53

## 2024-10-19 RX ADMIN — INSULIN GLARGINE 10 UNITS: 100 INJECTION, SOLUTION SUBCUTANEOUS at 09:53

## 2024-10-19 RX ADMIN — ACETAMINOPHEN 650 MG: 325 TABLET, FILM COATED ORAL at 15:36

## 2024-10-19 RX ADMIN — BUSPIRONE HYDROCHLORIDE 5 MG: 10 TABLET ORAL at 09:52

## 2024-10-19 RX ADMIN — ACETAMINOPHEN 650 MG: 325 TABLET, FILM COATED ORAL at 09:51

## 2024-10-19 RX ADMIN — CLOPIDOGREL BISULFATE 75 MG: 75 TABLET ORAL at 09:52

## 2024-10-19 RX ADMIN — PANTOPRAZOLE SODIUM 40 MG: 40 TABLET, DELAYED RELEASE ORAL at 09:51

## 2024-10-19 RX ADMIN — TEMAZEPAM 7.5 MG: 7.5 CAPSULE ORAL at 21:10

## 2024-10-19 RX ADMIN — FERROUS SULFATE TAB 325 MG (65 MG ELEMENTAL FE) 325 MG: 325 (65 FE) TAB at 09:52

## 2024-10-19 RX ADMIN — CARVEDILOL 25 MG: 12.5 TABLET, FILM COATED ORAL at 09:51

## 2024-10-19 RX ADMIN — CARVEDILOL 25 MG: 12.5 TABLET, FILM COATED ORAL at 20:01

## 2024-10-19 NOTE — PROGRESS NOTES
[facility] Medicine Services  PROGRESS NOTE    Patient Name: Arminda Krishnan  : 1945  MRN: [id number]    Date of Admission: 10/9/2024  Primary Care Physician: Aiden Spencer MD    Subjective   Subjective     CC: Follow-up hyperglycemia    HPI:   No acute events over the night.  Patient was having some abdominal pain this morning.  KUB reviewed.  CT abdomen without contrast ordered for further eval.    Objective   Objective     Vital Signs:   Temp:  [97.4 °F (36.3 °C)-98.9 °F (37.2 °C)] 98.6 °F (37 °C)  Heart Rate:  [] 77  Resp:  [16-18] 18  BP: (125-150)/(65-81) 149/66     Physical Exam:  Constitutional: resting comfortably  HENT: NCAT, mucous membranes moist  Respiratory: Clear to auscultation bilaterally, respiratory effort normal   Cardiovascular: RRR, no murmurs, rubs, or gallops  Gastrointestinal: soft, nontender, nondistended  Musculoskeletal: No bilateral ankle edema  Neurologic: Oriented x 1, moves all extremities, speech clear, able to answer simple questions and follow simple commands  Skin: No rashes       Results Reviewed:  LAB RESULTS:      Lab 10/16/24  1903 10/15/24  0939 10/14/24  0052 10/13/24  1219 10/13/24  0519   WBC 9.59 9.36 9.74  --  9.23   HEMOGLOBIN 9.6* 9.4* 8.7* 7.0* 7.1*   HEMATOCRIT 30.4* 29.5* 26.9* 21.8* 22.6*   PLATELETS 287 291 271  --  282   NEUTROS ABS  --   --  6.72  --   --    IMMATURE GRANS (ABS)  --   --  0.15*  --   --    LYMPHS ABS  --   --  1.40  --   --    MONOS ABS  --   --  1.19*  --   --    EOS ABS  --   --  0.20  --   --    MCV 88.6 87.8 87.3  --  88.6         Lab 10/16/24  1903 10/15/24  0939 10/14/24  0052 10/13/24  0813   SODIUM 142 138 139 139   POTASSIUM 3.9 4.2 3.9 3.8   CHLORIDE 105 103 106 106   CO2 21.0* 21.0* 20.0* 20.0*   ANION GAP 16.0* 14.0 13.0 13.0   BUN 27* 24* 26* 28*   CREATININE 2.33* 2.35* 2.33* 2.45*   EGFR 20.6* 20.3* 20.6* 19.4*   GLUCOSE 217* 254* 155* 181*   CALCIUM 9.7 9.2 9.6 9.5    MAGNESIUM  --   --  2.2 1.5*         Lab 10/15/24  0939 10/14/24  0052   TOTAL PROTEIN 6.7 6.9   ALBUMIN 3.1* 3.0*   GLOBULIN 3.6 3.9   ALT (SGPT) 21 24   AST (SGOT) 26 36*   BILIRUBIN 0.3 0.2   ALK PHOS 76 70                   Lab 10/13/24  1418   ABO TYPING O   RH TYPING Positive   ANTIBODY SCREEN Negative           Brief Urine Lab Results  (Last result in the past 365 days)        Color   Clarity   Blood   Leuk Est   Nitrite   Protein   CREAT   Urine HCG        10/09/24 2058 Yellow   Cloudy   Large (3+)   Moderate (2+)   Negative   100 mg/dL (2+)                   Microbiology Results Abnormal       Procedure Component Value - Date/Time    Blood Culture - Blood, Arm, Right [[id number]]  (Normal) Collected: 10/09/24 2159    Lab Status: Final result Specimen: Blood from Arm, Right Updated: 10/14/24 2231     Blood Culture No growth at 5 days    Narrative:      Less than seven (7) mL's of blood was collected.  Insufficient quantity may yield false negative results.    Blood Culture - Blood, Hand, Left [[id number]]  (Normal) Collected: 10/09/24 1842    Lab Status: Final result Specimen: Blood from Hand, Left Updated: 10/14/24 1946     Blood Culture No growth at 5 days    Narrative:      Less than seven (7) mL's of blood was collected.  Insufficient quantity may yield false negative results.    Urine Culture - Urine, Urine, Clean Catch [[id number]]  (Normal) Collected: 10/09/24 2058    Lab Status: Final result Specimen: Urine, Clean Catch Updated: 10/11/24 1000     Urine Culture No growth    Legionella Antigen, Urine - Urine, Urine, Clean Catch [[id number]]  (Normal) Collected: 10/09/24 2058    Lab Status: Final result Specimen: Urine, Clean Catch Updated: 10/10/24 0922     LEGIONELLA ANTIGEN, URINE Negative    S. Pneumo Ag Urine or CSF - Urine, Urine, Clean Catch [[id number]]  (Normal) Collected: 10/09/24 2058    Lab Status: Final result Specimen: Urine, Clean Catch Updated: 10/10/24 0922     Strep Pneumo Ag Negative     COVID PRE-OP / PRE-PROCEDURE SCREENING ORDER (NO ISOLATION) - Swab, Nasopharynx [[id number]]  (Normal) Collected: 10/09/24 1843    Lab Status: Final result Specimen: Swab from Nasopharynx Updated: 10/09/24 1958    Narrative:      The following orders were created for panel order COVID PRE-OP / PRE-PROCEDURE SCREENING ORDER (NO ISOLATION) - Swab, Nasopharynx.  Procedure                               Abnormality         Status                     ---------                               -----------         ------                     COVID-19, FLU A/B, RSV P...[[id number]]  Normal              Final result                 Please view results for these tests on the individual orders.    COVID-19, FLU A/B, RSV PCR 1 HR TAT - Swab, Nasopharynx [[id number]]  (Normal) Collected: 10/09/24 1843    Lab Status: Final result Specimen: Swab from Nasopharynx Updated: 10/09/24 1958     COVID19 Not Detected     Influenza A PCR Not Detected     Influenza B PCR Not Detected     RSV, PCR Not Detected    Narrative:      Fact sheet for providers: https://www.fda.gov/media/588340/download    Fact sheet for patients: https://www.fda.gov/media/940170/download    Test performed by PCR.            XR Abdomen KUB    Result Date: 10/18/2024  XR ABDOMEN KUB Date of Exam: 10/18/2024 2:54 PM EDT Indication: Abdominal pain, history of constipation, rule out large stool burden Comparison: CT abdomen pelvis October 9, 2024 Findings: There are some scattered contrast within the bowel. No significant stool burden is appreciated. There there is postsurgical change to the lumbar spine.     Impression: Impression: 1.No significant stool burden apparent on this exam. Electronically Signed: Leonel Ayala MD  10/18/2024 3:20 PM EDT  Workstation ID: [id number]    FL Video Swallow With Speech Single Contrast    Result Date: 10/18/2024  FL VIDEO SWALLOW W SPEECH SINGLE-CONTRAST Date of Exam: 10/18/2024 1:02 PM EDT Indication: aspiration.   Comparison: None available.  Technique:   The speech pathologist administered food and/or liquid mixed with barium to the patient with cine/video imaging.  Imaging assistance was provided to the speech pathologist and an image was saved. Fluoroscopic Time: 36 seconds Number of Images: 36 seconds Findings: No aspiration was seen during fluoroscopic guided modified barium swallowing series. The speech therapy report for full details and recommendations.     Impression: Impression: Loss could be provided for a modified barium swallow. No aspiration was seen during swallowing evaluation. Please see speech therapy report for full details and recommendations. Report dictated by: Мария Hernández PA-c  I have personally reviewed this case and agree with the findings above: Electronically Signed: Nabil Dickinson MD  10/18/2024 3:04 PM EDT  Workstation ID: [id number]     Results for orders placed during the hospital encounter of 07/15/24    Adult Transthoracic Echo Complete W/ Cont if Necessary Per Protocol    Interpretation Summary    Left ventricular systolic function is normal. Calculated left ventricular EF = 63.2%    Estimated right ventricular systolic pressure from tricuspid regurgitation is normal (<35 mmHg).    Normal left atrial size and volume noted.    There is calcification of the aortic valve. Mild to moderate aortic valve regurgitation is present.      Current medications:  Scheduled Meds:aspirin, 81 mg, Oral, Daily  atorvastatin, 80 mg, Oral, Daily  clopidogrel, 75 mg, Oral, Daily  donepezil, 5 mg, Oral, Nightly  famotidine, 10 mg, Oral, Every Other Day  ferrous sulfate, 325 mg, Oral, Daily With Breakfast  insulin glargine, 10 Units, Subcutaneous, Daily  insulin regular, 2-7 Units, Subcutaneous, TID With Meals  ipratropium-albuterol, 3 mL, Nebulization, 4x Daily - RT  isosorbide mononitrate, 60 mg, Oral, Daily  linagliptin, 5 mg, Oral, Daily  melatonin, 5 mg, Oral, Nightly  senna-docusate sodium, 2 tablet, Oral, BID   And  polyethylene glycol,  17 g, Oral, Daily  sodium chloride, 10 mL, Intravenous, Q12H      Continuous Infusions:     PRN Meds:.  acetaminophen **OR** acetaminophen **OR** acetaminophen    senna-docusate sodium **AND** polyethylene glycol **AND** bisacodyl **AND** bisacodyl    Calcium Replacement - Follow Nurse / BPA Driven Protocol    dextrose    dextrose    glucagon (human recombinant)    hydrOXYzine    influenza vaccine    Magnesium Low Dose Replacement - Follow Nurse / BPA Driven Protocol    Phosphorus Replacement - Follow Nurse / BPA Driven Protocol    Potassium Replacement - Follow Nurse / BPA Driven Protocol    simethicone    sodium chloride    sodium chloride    Assessment & Plan   Assessment & Plan     Active Hospital Problems    Diagnosis  POA    **LLL pneumonia [J18.9]  Yes    Pneumonia [J18.9]  Yes    HTN (hypertension) [I10]  Unknown    Hyperglycemia [R73.9]  Yes    History of CVA (cerebrovascular accident) [Z86.73]  Not Applicable    CKD (chronic kidney disease) stage 4, GFR 15-29 ml/min [N18.4]  Yes    Degenerative disc disease, lumbar [M51.369]  Yes      Resolved Hospital Problems   No resolved problems to display.        Brief Hospital Course to date:  Arminda Krishnan is a 79 y.o. female with history of mild coronary impairment, prior CVA, DDD on steroids, type 2 diabetes who presented to [facility] ED with hyperglycemia, workup concerning for pneumonia and UTI. Patient treated for pneumonia and UTI with IV ceftriaxone. Neurology evaluated and assisted with delirium symptoms, optimize medications for to enhance sleep wake cycle.    This patient's problems and plans were partially entered by my partner and updated as appropriate by me 10/19/24.     LLL pneumonia, suspected aspiration  Mild to moderate oral dysphagia  -CXR showed LLL pneumonia, likely secondary to aspiration  -Blood and sputum cultures are currently NGTD, Legionella and strep pneumo urinary antigens negative  -SLP evaluating, repeat MBS 10/18 with recommendation  of soft to chew textures, thin liquids, mechanical ground   -s/p IV ceftriaxone x5 days. Awaiting rehab placement.      Suspected UTI  -UA with 4+ bacteria, leukocytosis, urine culture with no growth  -Completed antibiotics as above.    MICHAEL on CKD stage III, resolved   -Likely prerenal  -Baseline Cr~2.2-2.6, was 3.30 on presentation, improved back to baseline   -Resolved with fluids.      Acute encephalopathy superimposed on likely underlying vascular dementia   -Follow up CT head without new acute findings but old CVA   -Neurology following, initiated donepezil, melatonin and restoril on 10/14.    Constipation with abdominal pain  -Given increased abdominal pain 10/18, will check KUB.  -Continue scheduled bowel regimen. May ultimately need to upgrade to enema.  -CT abdomen ordered for further eval.    Poorly controlled type 2 diabetes with A1c 11% and hyperglycemia in the setting of chronic steroids  -Continue home linagliptin.  -Continue Lantus 10 units daily with low-dose SSI. Monitor glucose and adjust insulin as needed.     Acute on chronic anemia  -no sign of blood loss this admission  -Iron studies most consistent with anemia of chronic disease  -s/p 1u PRBC with improvement  -Continue to monitor CBC as needed.  -CT abdomen ordered for further eval    Constipation  -Continue bowel regimen.     History of CVA  Hyperlipidemia  -Continue aspirin, statin, Plavix    DDD with chronic pain  -on chronic opioids  -on chronic prednisone as outpatient, titrated off of prednisone after last admission due to hyperglycemia and uncontrolled diabetes    Expected Discharge Location and Transportation: [facility]   Expected Discharge   Expected Discharge Date: 10/21/2024; Expected Discharge Time:      VTE Prophylaxis:  Mechanical VTE prophylaxis orders are present.         AM-PAC 6 Clicks Score (PT): 11 (10/18/24 1213)    CODE STATUS:   Code Status and Medical Interventions: No CPR (Do Not Attempt to Resuscitate); Limited Support; No  intubation (DNI)   Ordered at: 10/10/24 0002     Medical Intervention Limits:    No intubation (DNI)     Level Of Support Discussed With:    Next of Kin (If No Surrogate)     Code Status (Patient has no pulse and is not breathing):    No CPR (Do Not Attempt to Resuscitate)     Medical Interventions (Patient has pulse or is breathing):    Limited Support       Christine Reed MD  10/19/24

## 2024-10-19 NOTE — PLAN OF CARE
Goal Outcome Evaluation:   Patient very sleepy throughout morning until after 1030. Patient with continued confusion/restlessness/fidgeting. Oriented to self only, can identify family members at times. NSR to sinus tach. Room air. Off floor for MBS. Up in chair for a couple hours. Patient reports headache x1, prn tylenol given. Intermittent reports of abd pain, DO notified, KUB obtained and suppository given - effective. All skin interventions in place, frequent repositioning. Family at bedside throughout shift.

## 2024-10-20 LAB
ALBUMIN SERPL-MCNC: 3.3 G/DL (ref 3.5–5.2)
ALBUMIN/GLOB SERPL: 0.8 G/DL
ALP SERPL-CCNC: 74 U/L (ref 39–117)
ALT SERPL W P-5'-P-CCNC: 14 U/L (ref 1–33)
ANION GAP SERPL CALCULATED.3IONS-SCNC: 11 MMOL/L (ref 5–15)
AST SERPL-CCNC: 23 U/L (ref 1–32)
BASOPHILS # BLD AUTO: 0.11 10*3/MM3 (ref 0–0.2)
BASOPHILS NFR BLD AUTO: 1.2 % (ref 0–1.5)
BILIRUB SERPL-MCNC: 0.3 MG/DL (ref 0–1.2)
BUN SERPL-MCNC: 26 MG/DL (ref 8–23)
BUN/CREAT SERPL: 10.7 (ref 7–25)
CALCIUM SPEC-SCNC: 9.8 MG/DL (ref 8.6–10.5)
CHLORIDE SERPL-SCNC: 103 MMOL/L (ref 98–107)
CO2 SERPL-SCNC: 25 MMOL/L (ref 22–29)
CREAT SERPL-MCNC: 2.43 MG/DL (ref 0.57–1)
DEPRECATED RDW RBC AUTO: 49.5 FL (ref 37–54)
EGFRCR SERPLBLD CKD-EPI 2021: 19.5 ML/MIN/1.73
EOSINOPHIL # BLD AUTO: 0.28 10*3/MM3 (ref 0–0.4)
EOSINOPHIL NFR BLD AUTO: 3.2 % (ref 0.3–6.2)
ERYTHROCYTE [DISTWIDTH] IN BLOOD BY AUTOMATED COUNT: 14.9 % (ref 12.3–15.4)
GLOBULIN UR ELPH-MCNC: 4.4 GM/DL
GLUCOSE BLDC GLUCOMTR-MCNC: 105 MG/DL (ref 70–130)
GLUCOSE BLDC GLUCOMTR-MCNC: 154 MG/DL (ref 70–130)
GLUCOSE BLDC GLUCOMTR-MCNC: 179 MG/DL (ref 70–130)
GLUCOSE BLDC GLUCOMTR-MCNC: 197 MG/DL (ref 70–130)
GLUCOSE BLDC GLUCOMTR-MCNC: 233 MG/DL (ref 70–130)
GLUCOSE SERPL-MCNC: 166 MG/DL (ref 65–99)
HCT VFR BLD AUTO: 30.5 % (ref 34–46.6)
HGB BLD-MCNC: 9.4 G/DL (ref 12–15.9)
IMM GRANULOCYTES # BLD AUTO: 0.15 10*3/MM3 (ref 0–0.05)
IMM GRANULOCYTES NFR BLD AUTO: 1.7 % (ref 0–0.5)
LYMPHOCYTES # BLD AUTO: 1.94 10*3/MM3 (ref 0.7–3.1)
LYMPHOCYTES NFR BLD AUTO: 21.9 % (ref 19.6–45.3)
MAGNESIUM SERPL-MCNC: 1.7 MG/DL (ref 1.6–2.4)
MCH RBC QN AUTO: 27.9 PG (ref 26.6–33)
MCHC RBC AUTO-ENTMCNC: 30.8 G/DL (ref 31.5–35.7)
MCV RBC AUTO: 90.5 FL (ref 79–97)
MONOCYTES # BLD AUTO: 1.25 10*3/MM3 (ref 0.1–0.9)
MONOCYTES NFR BLD AUTO: 14.1 % (ref 5–12)
NEUTROPHILS NFR BLD AUTO: 5.14 10*3/MM3 (ref 1.7–7)
NEUTROPHILS NFR BLD AUTO: 57.9 % (ref 42.7–76)
NRBC BLD AUTO-RTO: 0 /100 WBC (ref 0–0.2)
PLATELET # BLD AUTO: 324 10*3/MM3 (ref 140–450)
PMV BLD AUTO: 10.5 FL (ref 6–12)
POTASSIUM SERPL-SCNC: 4.5 MMOL/L (ref 3.5–5.2)
PROT SERPL-MCNC: 7.7 G/DL (ref 6–8.5)
RBC # BLD AUTO: 3.37 10*6/MM3 (ref 3.77–5.28)
SODIUM SERPL-SCNC: 139 MMOL/L (ref 136–145)
WBC NRBC COR # BLD AUTO: 8.87 10*3/MM3 (ref 3.4–10.8)

## 2024-10-20 PROCEDURE — 99232 SBSQ HOSP IP/OBS MODERATE 35: CPT | Performed by: HOSPITALIST

## 2024-10-20 PROCEDURE — 94799 UNLISTED PULMONARY SVC/PX: CPT

## 2024-10-20 PROCEDURE — 80053 COMPREHEN METABOLIC PANEL: CPT | Performed by: HOSPITALIST

## 2024-10-20 PROCEDURE — 85025 COMPLETE CBC W/AUTO DIFF WBC: CPT | Performed by: HOSPITALIST

## 2024-10-20 PROCEDURE — 63710000001 INSULIN GLARGINE PER 5 UNITS: Performed by: FAMILY MEDICINE

## 2024-10-20 PROCEDURE — 63710000001 INSULIN REGULAR HUMAN PER 5 UNITS: Performed by: INTERNAL MEDICINE

## 2024-10-20 PROCEDURE — 82948 REAGENT STRIP/BLOOD GLUCOSE: CPT

## 2024-10-20 PROCEDURE — 94664 DEMO&/EVAL PT USE INHALER: CPT

## 2024-10-20 PROCEDURE — 83735 ASSAY OF MAGNESIUM: CPT | Performed by: HOSPITALIST

## 2024-10-20 PROCEDURE — 94761 N-INVAS EAR/PLS OXIMETRY MLT: CPT

## 2024-10-20 RX ADMIN — CARVEDILOL 25 MG: 12.5 TABLET, FILM COATED ORAL at 10:08

## 2024-10-20 RX ADMIN — ATORVASTATIN CALCIUM 80 MG: 40 TABLET, FILM COATED ORAL at 10:10

## 2024-10-20 RX ADMIN — NICOTINE 1 PATCH: 7 PATCH, EXTENDED RELEASE TRANSDERMAL at 10:12

## 2024-10-20 RX ADMIN — IPRATROPIUM BROMIDE AND ALBUTEROL SULFATE 3 ML: 2.5; .5 SOLUTION RESPIRATORY (INHALATION) at 07:11

## 2024-10-20 RX ADMIN — TEMAZEPAM 7.5 MG: 7.5 CAPSULE ORAL at 21:17

## 2024-10-20 RX ADMIN — IPRATROPIUM BROMIDE AND ALBUTEROL SULFATE 3 ML: 2.5; .5 SOLUTION RESPIRATORY (INHALATION) at 12:24

## 2024-10-20 RX ADMIN — ISOSORBIDE MONONITRATE 60 MG: 60 TABLET, EXTENDED RELEASE ORAL at 10:09

## 2024-10-20 RX ADMIN — SENNOSIDES AND DOCUSATE SODIUM 2 TABLET: 50; 8.6 TABLET ORAL at 21:17

## 2024-10-20 RX ADMIN — IPRATROPIUM BROMIDE AND ALBUTEROL SULFATE 3 ML: 2.5; .5 SOLUTION RESPIRATORY (INHALATION) at 16:17

## 2024-10-20 RX ADMIN — LINAGLIPTIN 5 MG: 5 TABLET, FILM COATED ORAL at 10:11

## 2024-10-20 RX ADMIN — Medication 5 MG: at 19:51

## 2024-10-20 RX ADMIN — IPRATROPIUM BROMIDE AND ALBUTEROL SULFATE 3 ML: 2.5; .5 SOLUTION RESPIRATORY (INHALATION) at 19:11

## 2024-10-20 RX ADMIN — Medication 2 SPRAY: at 12:42

## 2024-10-20 RX ADMIN — FERROUS SULFATE TAB 325 MG (65 MG ELEMENTAL FE) 325 MG: 325 (65 FE) TAB at 10:09

## 2024-10-20 RX ADMIN — INSULIN GLARGINE 10 UNITS: 100 INJECTION, SOLUTION SUBCUTANEOUS at 10:10

## 2024-10-20 RX ADMIN — HUMAN INSULIN 3 UNITS: 100 INJECTION, SOLUTION SUBCUTANEOUS at 12:41

## 2024-10-20 RX ADMIN — HUMAN INSULIN 2 UNITS: 100 INJECTION, SOLUTION SUBCUTANEOUS at 10:10

## 2024-10-20 RX ADMIN — ASPIRIN 81 MG 81 MG: 81 TABLET ORAL at 10:08

## 2024-10-20 RX ADMIN — DONEPEZIL HYDROCHLORIDE 5 MG: 10 TABLET, FILM COATED ORAL at 21:17

## 2024-10-20 RX ADMIN — CARVEDILOL 25 MG: 12.5 TABLET, FILM COATED ORAL at 21:17

## 2024-10-20 RX ADMIN — PANTOPRAZOLE SODIUM 40 MG: 40 TABLET, DELAYED RELEASE ORAL at 17:28

## 2024-10-20 NOTE — PLAN OF CARE
Goal Outcome Evaluation:   Patient alert and oriented to self only, some minor improvement with memory and recognizing family. Patient less restless this shift. Normal sinus rhythm to sinus tachycardia. Room air. Patient having continued reports of abd pain, MD notified. Family at bedside throughout shift.      MD paged at 1450 for patient reports of chest/epigastric pain, EKG obtained, Stat CT abd.     Patient found with some mucoid blood on gown, patient unsure of origin. Patient began coughing and had clot with small bleeding come out of nose. Patient then coughed up a small amount of bright blood, MD paged, see orders. MD notified of family report of patient having jaw pain with eating/certain facial movements.

## 2024-10-20 NOTE — PROGRESS NOTES
[facility] Medicine Services  PROGRESS NOTE    Patient Name: Arminda Krishnan  : 1945  MRN: [id number]    Date of Admission: 10/9/2024  Primary Care Physician: Aiden Spencer MD    Subjective   Subjective     CC: Follow-up hyperglycemia    HPI:   Patient wants having episodes of epistaxis overnight.  Resolved with Afrin.  Hemoglobin stable.  Patient looks comfortable this morning.  Continue to be confused but following some commands.  Niece at bedside.  Questions answered.    Objective   Objective     Vital Signs:   Temp:  [98.2 °F (36.8 °C)-98.6 °F (37 °C)] 98.6 °F (37 °C)  Heart Rate:  [83-95] 90  Resp:  [16-18] 16  BP: (103-157)/(56-80) 143/74     Physical Exam:  Constitutional: resting comfortably  HENT: NCAT, mucous membranes moist  Respiratory: Clear to auscultation bilaterally, respiratory effort normal   Cardiovascular: RRR, no murmurs, rubs, or gallops  Gastrointestinal: soft, nontender, nondistended  Musculoskeletal: No bilateral ankle edema  Neurologic: Oriented x 1, moves all extremities, speech clear, able to answer simple questions and follow simple commands  Skin: No rashes       Results Reviewed:  LAB RESULTS:      Lab 10/16/24  1903 10/15/24  0939 10/14/24  0052 10/13/24  1219   WBC 9.59 9.36 9.74  --    HEMOGLOBIN 9.6* 9.4* 8.7* 7.0*   HEMATOCRIT 30.4* 29.5* 26.9* 21.8*   PLATELETS 287 291 271  --    NEUTROS ABS  --   --  6.72  --    IMMATURE GRANS (ABS)  --   --  0.15*  --    LYMPHS ABS  --   --  1.40  --    MONOS ABS  --   --  1.19*  --    EOS ABS  --   --  0.20  --    MCV 88.6 87.8 87.3  --          Lab 10/16/24  1903 10/15/24  0939 10/14/24  0052   SODIUM 142 138 139   POTASSIUM 3.9 4.2 3.9   CHLORIDE 105 103 106   CO2 21.0* 21.0* 20.0*   ANION GAP 16.0* 14.0 13.0   BUN 27* 24* 26*   CREATININE 2.33* 2.35* 2.33*   EGFR 20.6* 20.3* 20.6*   GLUCOSE 217* 254* 155*   CALCIUM 9.7 9.2 9.6   MAGNESIUM  --   --  2.2         Lab 10/15/24  0939  10/14/24  0052   TOTAL PROTEIN 6.7 6.9   ALBUMIN 3.1* 3.0*   GLOBULIN 3.6 3.9   ALT (SGPT) 21 24   AST (SGOT) 26 36*   BILIRUBIN 0.3 0.2   ALK PHOS 76 70                   Lab 10/13/24  1418   ABO TYPING O   RH TYPING Positive   ANTIBODY SCREEN Negative           Brief Urine Lab Results  (Last result in the past 365 days)        Color   Clarity   Blood   Leuk Est   Nitrite   Protein   CREAT   Urine HCG        10/09/24 2058 Yellow   Cloudy   Large (3+)   Moderate (2+)   Negative   100 mg/dL (2+)                   Microbiology Results Abnormal       Procedure Component Value - Date/Time    Blood Culture - Blood, Arm, Right [[id number]]  (Normal) Collected: 10/09/24 2159    Lab Status: Final result Specimen: Blood from Arm, Right Updated: 10/14/24 2231     Blood Culture No growth at 5 days    Narrative:      Less than seven (7) mL's of blood was collected.  Insufficient quantity may yield false negative results.    Blood Culture - Blood, Hand, Left [[id number]]  (Normal) Collected: 10/09/24 1842    Lab Status: Final result Specimen: Blood from Hand, Left Updated: 10/14/24 1946     Blood Culture No growth at 5 days    Narrative:      Less than seven (7) mL's of blood was collected.  Insufficient quantity may yield false negative results.    Urine Culture - Urine, Urine, Clean Catch [[id number]]  (Normal) Collected: 10/09/24 2058    Lab Status: Final result Specimen: Urine, Clean Catch Updated: 10/11/24 1000     Urine Culture No growth    Legionella Antigen, Urine - Urine, Urine, Clean Catch [[id number]]  (Normal) Collected: 10/09/24 2058    Lab Status: Final result Specimen: Urine, Clean Catch Updated: 10/10/24 0922     LEGIONELLA ANTIGEN, URINE Negative    S. Pneumo Ag Urine or CSF - Urine, Urine, Clean Catch [[id number]]  (Normal) Collected: 10/09/24 2058    Lab Status: Final result Specimen: Urine, Clean Catch Updated: 10/10/24 0922     Strep Pneumo Ag Negative    COVID PRE-OP / PRE-PROCEDURE SCREENING ORDER (NO  ISOLATION) - Swab, Nasopharynx [[id number]]  (Normal) Collected: 10/09/24 1843    Lab Status: Final result Specimen: Swab from Nasopharynx Updated: 10/09/24 1958    Narrative:      The following orders were created for panel order COVID PRE-OP / PRE-PROCEDURE SCREENING ORDER (NO ISOLATION) - Swab, Nasopharynx.  Procedure                               Abnormality         Status                     ---------                               -----------         ------                     COVID-19, FLU A/B, RSV P...[[id number]]  Normal              Final result                 Please view results for these tests on the individual orders.    COVID-19, FLU A/B, RSV PCR 1 HR TAT - Swab, Nasopharynx [[id number]]  (Normal) Collected: 10/09/24 1843    Lab Status: Final result Specimen: Swab from Nasopharynx Updated: 10/09/24 1958     COVID19 Not Detected     Influenza A PCR Not Detected     Influenza B PCR Not Detected     RSV, PCR Not Detected    Narrative:      Fact sheet for providers: https://www.fda.gov/media/808623/download    Fact sheet for patients: https://www.fda.gov/media/458221/download    Test performed by PCR.            XR Chest 1 View    Result Date: 10/19/2024  XR CHEST 1 VW Date of Exam: 10/19/2024 6:54 PM EDT Indication: Cough Comparison: 10/9/2024 Findings: Heart size is at the upper limits of normal. Pulmonary vessels are normal. The lungs are clear bilaterally. No pleural effusion or pneumothorax. Degenerative changes are noted of the shoulders and AC joints.     Impression: Impression: 1. Stable borderline heart size. No acute cardiopulmonary disease. Electronically Signed: Michael Ritter MD  10/19/2024 7:20 PM EDT  Workstation ID: [id number]    CT Abdomen Pelvis Without Contrast    Result Date: 10/19/2024  CT ABDOMEN PELVIS WO CONTRAST Date of Exam: 10/19/2024 3:50 PM EDT Indication: Abdominal pain. Comparison: CT abdomen and pelvis 10/9/2024 Technique: Axial CT images were obtained of the abdomen and pelvis  without the administration of contrast. Reconstructed coronal and sagittal images were also obtained. Automated exposure control and iterative construction methods were used. Findings: The lung bases are grossly clear. Unremarkable appearance of the liver. The gallbladder surgically absent. Normal appearance of the bile ducts. Normal size and appearance of the spleen. Unremarkable appearance of the pancreas. Normal appearance of the adrenal glands. There is a tiny nonobstructing stone in the midpole the right kidney. Unremarkable appearance of the left kidney. Normal appearance of the ureters and bladder. The uterus is surgically absent. There is hyperdense contrast scattered throughout the colon. No bowel obstruction. No definite inflammatory change of the GI tract. No abdominopelvic free fluid. No pneumoperitoneum. There is a short stent at the celiac axis origin. There is atherosclerosis of the abdominal aorta. There is a small fat-containing umbilical hernia. No acute or suspicious bony findings. There is posterior instrumented fusion and posterior decompression in the lumbar spine.     Impression: Impression: No acute abdominopelvic findings. Chronic and incidental ancillary findings noted above. Electronically Signed: Jhony Cha MD  10/19/2024 4:26 PM EDT  Workstation ID: [id number]    XR Abdomen KUB    Result Date: 10/18/2024  XR ABDOMEN KUB Date of Exam: 10/18/2024 2:54 PM EDT Indication: Abdominal pain, history of constipation, rule out large stool burden Comparison: CT abdomen pelvis October 9, 2024 Findings: There are some scattered contrast within the bowel. No significant stool burden is appreciated. There there is postsurgical change to the lumbar spine.     Impression: Impression: 1.No significant stool burden apparent on this exam. Electronically Signed: Leonel Ayala MD  10/18/2024 3:20 PM EDT  Workstation ID: [id number]    FL Video Swallow With Speech Single Contrast    Result Date: 10/18/2024  FL  VIDEO SWALLOW W SPEECH SINGLE-CONTRAST Date of Exam: 10/18/2024 1:02 PM EDT Indication: aspiration.   Comparison: None available. Technique:   The speech pathologist administered food and/or liquid mixed with barium to the patient with cine/video imaging.  Imaging assistance was provided to the speech pathologist and an image was saved. Fluoroscopic Time: 36 seconds Number of Images: 36 seconds Findings: No aspiration was seen during fluoroscopic guided modified barium swallowing series. The speech therapy report for full details and recommendations.     Impression: Impression: Loss could be provided for a modified barium swallow. No aspiration was seen during swallowing evaluation. Please see speech therapy report for full details and recommendations. Report dictated by: Мария Hernández PA-c  I have personally reviewed this case and agree with the findings above: Electronically Signed: Nabil Dickinson MD  10/18/2024 3:04 PM EDT  Workstation ID: [id number]     Results for orders placed during the hospital encounter of 07/15/24    Adult Transthoracic Echo Complete W/ Cont if Necessary Per Protocol    Interpretation Summary    Left ventricular systolic function is normal. Calculated left ventricular EF = 63.2%    Estimated right ventricular systolic pressure from tricuspid regurgitation is normal (<35 mmHg).    Normal left atrial size and volume noted.    There is calcification of the aortic valve. Mild to moderate aortic valve regurgitation is present.      Current medications:  Scheduled Meds:aspirin, 81 mg, Oral, Daily  atorvastatin, 80 mg, Oral, Daily  [Held by provider] busPIRone, 5 mg, Oral, TID  carvedilol, 25 mg, Oral, BID  [Held by provider] clopidogrel, 75 mg, Oral, Daily  donepezil, 5 mg, Oral, Nightly  ferrous sulfate, 325 mg, Oral, Daily With Breakfast  insulin glargine, 10 Units, Subcutaneous, Daily  insulin regular, 2-7 Units, Subcutaneous, TID With Meals  ipratropium-albuterol, 3 mL, Nebulization, 4x Daily -  RT  isosorbide mononitrate, 60 mg, Oral, Daily  linagliptin, 5 mg, Oral, Daily  melatonin, 5 mg, Oral, Nightly  nicotine, 1 patch, Transdermal, Q24H  oxymetazoline, 2 spray, Each Nare, BID  pantoprazole, 40 mg, Oral, BID  senna-docusate sodium, 2 tablet, Oral, BID   And  polyethylene glycol, 17 g, Oral, Daily  sodium chloride, 10 mL, Intravenous, Q12H  temazepam, 7.5 mg, Oral, Nightly      Continuous Infusions:     PRN Meds:.  acetaminophen **OR** acetaminophen **OR** acetaminophen    albuterol    senna-docusate sodium **AND** polyethylene glycol **AND** bisacodyl **AND** bisacodyl    Calcium Replacement - Follow Nurse / BPA Driven Protocol    dextrose    dextrose    glucagon (human recombinant)    hydrOXYzine    influenza vaccine    Magnesium Low Dose Replacement - Follow Nurse / BPA Driven Protocol    Phosphorus Replacement - Follow Nurse / BPA Driven Protocol    Potassium Replacement - Follow Nurse / BPA Driven Protocol    simethicone    sodium chloride    sodium chloride    Assessment & Plan   Assessment & Plan     Active Hospital Problems    Diagnosis  POA    **LLL pneumonia [J18.9]  Yes    Pneumonia [J18.9]  Yes    HTN (hypertension) [I10]  Unknown    Hyperglycemia [R73.9]  Yes    History of CVA (cerebrovascular accident) [Z86.73]  Not Applicable    CKD (chronic kidney disease) stage 4, GFR 15-29 ml/min [N18.4]  Yes    Degenerative disc disease, lumbar [M51.369]  Yes      Resolved Hospital Problems   No resolved problems to display.        Brief Hospital Course to date:  Arimnda Krishnan is a 79 y.o. female with history of mild coronary impairment, prior CVA, DDD on steroids, type 2 diabetes who presented to [facility] ED with hyperglycemia, workup concerning for pneumonia and UTI. Patient treated for pneumonia and UTI with IV ceftriaxone. Neurology evaluated and assisted with delirium symptoms, optimize medications for to enhance sleep wake cycle.    This patient's problems and plans were partially entered by my  partner and updated as appropriate by me 10/20/24.     LLL pneumonia, suspected aspiration  Mild to moderate oral dysphagia  -CXR showed LLL pneumonia, likely secondary to aspiration  -Blood and sputum cultures are currently NGTD, Legionella and strep pneumo urinary antigens negative  -SLP evaluating, repeat MBS 10/18 with recommendation of soft to chew textures, thin liquids, mechanical ground   -s/p IV ceftriaxone x5 days. Awaiting rehab placement.      Suspected UTI  -UA with 4+ bacteria, leukocytosis, urine culture with no growth  -Completed antibiotics as above.    MICHAEL on CKD stage III, resolved   -Likely prerenal  -Baseline Cr~2.2-2.6, was 3.30 on presentation, improved back to baseline   -Resolved with fluids.      Acute encephalopathy superimposed on likely underlying vascular dementia   -Follow up CT head without new acute findings but old CVA   -Neurology following, initiated donepezil, melatonin and restoril on 10/14.  -Patient will need comprehensive neurocognitive eval as an outpatient.    Constipation with abdominal pain  -Given increased abdominal pain 10/18, will check KUB.  -Continue scheduled bowel regimen. May ultimately need to upgrade to enema.  -CT abdomen ordered for further eval.    Poorly controlled type 2 diabetes with A1c 11% and hyperglycemia in the setting of chronic steroids  -Continue home linagliptin.  -Continue Lantus 10 units daily with low-dose SSI. Monitor glucose and adjust insulin as needed.     Acute on chronic anemia  -no sign of blood loss this admission  -Iron studies most consistent with anemia of chronic disease  -s/p 1u PRBC with improvement  -Continue to monitor CBC as needed.  -CT abdomen on 10/19 was negative for any acute process    Constipation  -Continue bowel regimen.     History of CVA  Hyperlipidemia  -Continue aspirin, statin, Plavix    DDD with chronic pain  -on chronic opioids  -on chronic prednisone as outpatient, titrated off of prednisone after last  admission due to hyperglycemia and uncontrolled diabetes    Episode of epistaxis on 10/19.  Resolved  -Hemodynamically stable.  Hemoglobin stable  -Afrin nasal spray ordered.  -Monitor hemoglobin.  -Hold Plavix.  Expected Discharge Location and Transportation: [facility]   Expected Discharge   Expected Discharge Date: 10/21/2024; Expected Discharge Time:      VTE Prophylaxis:  Mechanical VTE prophylaxis orders are present.         AM-PAC 6 Clicks Score (PT): 11 (10/19/24 0952)    CODE STATUS:   Code Status and Medical Interventions: No CPR (Do Not Attempt to Resuscitate); Limited Support; No intubation (DNI)   Ordered at: 10/10/24 0002     Medical Intervention Limits:    No intubation (DNI)     Level Of Support Discussed With:    Next of Kin (If No Surrogate)     Code Status (Patient has no pulse and is not breathing):    No CPR (Do Not Attempt to Resuscitate)     Medical Interventions (Patient has pulse or is breathing):    Limited Support       Christine Reed MD  10/20/24

## 2024-10-21 LAB
GLUCOSE BLDC GLUCOMTR-MCNC: 118 MG/DL (ref 70–130)
GLUCOSE BLDC GLUCOMTR-MCNC: 158 MG/DL (ref 70–130)
GLUCOSE BLDC GLUCOMTR-MCNC: 172 MG/DL (ref 70–130)
GLUCOSE BLDC GLUCOMTR-MCNC: 238 MG/DL (ref 70–130)

## 2024-10-21 PROCEDURE — 63710000001 INSULIN GLARGINE PER 5 UNITS: Performed by: FAMILY MEDICINE

## 2024-10-21 PROCEDURE — 63710000001 INSULIN REGULAR HUMAN PER 5 UNITS: Performed by: INTERNAL MEDICINE

## 2024-10-21 PROCEDURE — 99232 SBSQ HOSP IP/OBS MODERATE 35: CPT | Performed by: NURSE PRACTITIONER

## 2024-10-21 PROCEDURE — 94761 N-INVAS EAR/PLS OXIMETRY MLT: CPT

## 2024-10-21 PROCEDURE — 82948 REAGENT STRIP/BLOOD GLUCOSE: CPT

## 2024-10-21 PROCEDURE — 94799 UNLISTED PULMONARY SVC/PX: CPT

## 2024-10-21 PROCEDURE — 94664 DEMO&/EVAL PT USE INHALER: CPT

## 2024-10-21 RX ADMIN — PANTOPRAZOLE SODIUM 40 MG: 40 TABLET, DELAYED RELEASE ORAL at 18:58

## 2024-10-21 RX ADMIN — Medication 2 SPRAY: at 10:09

## 2024-10-21 RX ADMIN — IPRATROPIUM BROMIDE AND ALBUTEROL SULFATE 3 ML: 2.5; .5 SOLUTION RESPIRATORY (INHALATION) at 12:48

## 2024-10-21 RX ADMIN — Medication 2 SPRAY: at 20:01

## 2024-10-21 RX ADMIN — SENNOSIDES AND DOCUSATE SODIUM 2 TABLET: 50; 8.6 TABLET ORAL at 20:01

## 2024-10-21 RX ADMIN — ISOSORBIDE MONONITRATE 60 MG: 60 TABLET, EXTENDED RELEASE ORAL at 10:01

## 2024-10-21 RX ADMIN — LINAGLIPTIN 5 MG: 5 TABLET, FILM COATED ORAL at 10:03

## 2024-10-21 RX ADMIN — HUMAN INSULIN 2 UNITS: 100 INJECTION, SOLUTION SUBCUTANEOUS at 10:00

## 2024-10-21 RX ADMIN — Medication 5 MG: at 18:58

## 2024-10-21 RX ADMIN — NICOTINE 1 PATCH: 7 PATCH, EXTENDED RELEASE TRANSDERMAL at 10:03

## 2024-10-21 RX ADMIN — CARVEDILOL 25 MG: 12.5 TABLET, FILM COATED ORAL at 20:01

## 2024-10-21 RX ADMIN — CARVEDILOL 25 MG: 12.5 TABLET, FILM COATED ORAL at 10:01

## 2024-10-21 RX ADMIN — HUMAN INSULIN 3 UNITS: 100 INJECTION, SOLUTION SUBCUTANEOUS at 13:01

## 2024-10-21 RX ADMIN — ATORVASTATIN CALCIUM 80 MG: 40 TABLET, FILM COATED ORAL at 10:02

## 2024-10-21 RX ADMIN — INSULIN GLARGINE 10 UNITS: 100 INJECTION, SOLUTION SUBCUTANEOUS at 10:00

## 2024-10-21 RX ADMIN — FERROUS SULFATE TAB 325 MG (65 MG ELEMENTAL FE) 325 MG: 325 (65 FE) TAB at 10:02

## 2024-10-21 RX ADMIN — ACETAMINOPHEN 650 MG: 325 TABLET, FILM COATED ORAL at 10:08

## 2024-10-21 RX ADMIN — PANTOPRAZOLE SODIUM 40 MG: 40 TABLET, DELAYED RELEASE ORAL at 10:02

## 2024-10-21 RX ADMIN — IPRATROPIUM BROMIDE AND ALBUTEROL SULFATE 3 ML: 2.5; .5 SOLUTION RESPIRATORY (INHALATION) at 18:46

## 2024-10-21 RX ADMIN — ASPIRIN 81 MG 81 MG: 81 TABLET ORAL at 10:02

## 2024-10-21 RX ADMIN — DONEPEZIL HYDROCHLORIDE 5 MG: 10 TABLET, FILM COATED ORAL at 20:00

## 2024-10-21 RX ADMIN — TEMAZEPAM 7.5 MG: 7.5 CAPSULE ORAL at 20:01

## 2024-10-21 RX ADMIN — HUMAN INSULIN 2 UNITS: 100 INJECTION, SOLUTION SUBCUTANEOUS at 18:58

## 2024-10-21 NOTE — PAYOR COMM NOTE
"Arminda Noel (79 y.o. Female)       Date of Birth   1945    Social Security Number       Address   [address] [place] [address]    Home Phone   [phone]    MRN   [id number]       [facility]    Marital Status                               Admission Date   10/9/24    Admission Type   Emergency    Admitting Provider   Christine Reed MD    Attending Provider   Christine Reed MD    Department, Room/Bed   [facility] 3F, S311/1       Discharge Date       Discharge Disposition       Discharge Destination                                 Attending Provider: Christine Reed MD    Allergies: Ceclor [Cefaclor]    Isolation: None   Infection: None   Code Status: No CPR    Ht: 152.4 cm (60\")   Wt: 81.6 kg (180 lb)    Admission Cmt: None   Principal Problem: LLL pneumonia [J18.9]                   Active Insurance as of 10/9/2024       Primary Coverage       Payor Plan Insurance Group Employer/Plan Group    ANTHEM MEDICARE REPLACEMENT ANTHEM MEDICARE ADVANTAGE [id number]       Payor Plan Address Payor Plan Phone Number Payor Plan Fax Number Effective Dates    [address] [phone]  7/1/2024 - None Entered    [facility] [address]         Subscriber Name Subscriber Birth Date Member ID       ARMINDA NOEL 1945 [id number]               Secondary Coverage       Payor Plan Insurance Group Employer/Plan Group    [place]        Payor Plan Address Payor Plan Phone Number Payor Plan Fax Number Effective Dates    [address] [phone]  2/20/2024 - None Entered    [facility] [id number]         Subscriber Name Subscriber Birth Date Member ID       ARMINDA NOEL 1945 [id number]                     Emergency Contacts        (Rel.) Home Phone Work Phone Mobile Phone    Delbert Mcdermott (Sister) [phone] -- [phone]              [place]: MATTIE [id number] Tax ID [id number]  Insurance Information           "        ANTHEM MEDICARE REPLACEMENT/ANTHEM MEDICARE ADVANTAGE Phone: [phone]    Subscriber: Arminda Krishnan Subscriber#: [id number]    Group#: [id number] Precert#: --    Authorization#: -- Effective Date: --        [place] Phone: [phone]    Subscriber: Arminda Krishnan Subscriber#: [id number]    Group#: -- Precert#: --    Authorization#: [id number] Effective Date: --          Current Facility-Administered Medications   Medication Dose Route Frequency Provider Last Rate Last Admin    acetaminophen (TYLENOL) tablet 650 mg  650 mg Oral Q4H PRN Lien Galvez APRN   650 mg at 10/21/24 1008    Or    acetaminophen (TYLENOL) 160 MG/5ML oral solution 650 mg  650 mg Oral Q4H PRN Lien Galvez APRN   650 mg at 10/11/24 1716    Or    acetaminophen (TYLENOL) suppository 650 mg  650 mg Rectal Q4H PRN Lien Galvez APRN   650 mg at 10/14/24 0514    albuterol (PROVENTIL) nebulizer solution 0.083% 2.5 mg/3mL  2.5 mg Nebulization Q6H PRN Christine Reed MD        aspirin chewable tablet 81 mg  81 mg Oral Daily Tab Samuel MD   81 mg at 10/21/24 1002    atorvastatin (LIPITOR) tablet 80 mg  80 mg Oral Daily Lien Galvez APRN   80 mg at 10/21/24 1002    sennosides-docusate (PERICOLACE) 8.6-50 MG per tablet 2 tablet  2 tablet Oral BID SIVA Tam DO   2 tablet at 10/20/24 2117    And    polyethylene glycol (MIRALAX) packet 17 g  17 g Oral Daily SIVA Tam DO   17 g at 10/19/24 0951    And    bisacodyl (DULCOLAX) EC tablet 5 mg  5 mg Oral Daily PRN SIVA Tam DO        And    bisacodyl (DULCOLAX) suppository 10 mg  10 mg Rectal Daily PRN SIVA Tam DO   10 mg at 10/18/24 1728    Calcium Replacement - Follow Nurse / BPA Driven Protocol   Does not apply PRN Lien Galvez APRN        carvedilol (COREG) tablet 25 mg  25 mg Oral BID Christine Reed MD   25 mg at 10/21/24 1001    [Held by provider] clopidogrel (PLAVIX) tablet 75 mg  75 mg Oral Daily Lien Galvez APRN   75 mg at  10/19/24 0952    dextrose (D50W) (25 g/50 mL) IV injection 25 g  25 g Intravenous Q15 Min PRN Lien Galvez APRN        dextrose (GLUTOSE) oral gel 15 g  15 g Oral Q15 Min PRN Lien Galvez APRN        donepezil (ARICEPT) tablet 5 mg  5 mg Oral Nightly Collin Herrera MD   5 mg at 10/20/24 2117    ferrous sulfate tablet 325 mg  325 mg Oral Daily With Breakfast Fide Zhou MD   325 mg at 10/21/24 1002    glucagon (GLUCAGEN) injection 1 mg  1 mg Intramuscular Q15 Min PRN Lien Galvez APRN        hydrOXYzine (ATARAX) tablet 10 mg  10 mg Oral TID PRN Jessie Navas DO   10 mg at 10/18/24 1747    influenza vac split high-dose (FLUZONE HIGH DOSE) injection 0.5 mL  0.5 mL Intramuscular During Hospitalization Tab Samuel MD        insulin glargine (LANTUS, SEMGLEE) injection 10 Units  10 Units Subcutaneous Daily SIVA Tam DO   10 Units at 10/21/24 1000    insulin regular (humuLIN R,novoLIN R) injection 2-7 Units  2-7 Units Subcutaneous TID With Meals Yesica Kelly MD   2 Units at 10/21/24 1000    ipratropium-albuterol (DUO-NEB) nebulizer solution 3 mL  3 mL Nebulization 4x Daily - RT Lien Galvez APRN   3 mL at 10/20/24 1911    isosorbide mononitrate (IMDUR) 24 hr tablet 60 mg  60 mg Oral Daily Lien Galvez APRN   60 mg at 10/21/24 1001    linagliptin (TRADJENTA) tablet 5 mg  5 mg Oral Daily Lien Galvez APRN   5 mg at 10/21/24 1003    Magnesium Low Dose Replacement - Follow Nurse / BPA Driven Protocol   Does not apply PRN Lien Galvez APRN        melatonin tablet 5 mg  5 mg Oral Nightly Collin Herrera MD   5 mg at 10/20/24 1951    nicotine (NICODERM CQ) 7 MG/24HR patch 1 patch  1 patch Transdermal Q24H Christine Reed MD   1 patch at 10/21/24 1003    oxymetazoline (AFRIN) nasal spray 2 spray  2 spray Each Nare BID Neo Brock MD   2 spray at 10/21/24 1009    pantoprazole (PROTONIX) EC tablet 40 mg  40 mg Oral BID Christine Reed MD   40 mg at 10/21/24 1002    Phosphorus  Replacement - Follow Nurse / BPA Driven Protocol   Does not apply PRN Lien Galvez APRN        Potassium Replacement - Follow Nurse / BPA Driven Protocol   Does not apply PRN Lien Galvez APRN        simethicone (MYLICON) chewable tablet 80 mg  80 mg Oral 4x Daily PRN SIVA Tam, DO   80 mg at 10/15/24 2037    sodium chloride 0.9 % flush 10 mL  10 mL Intravenous PRN Ricco Lamar MD        sodium chloride 0.9 % flush 10 mL  10 mL Intravenous Q12H Lien Galvez APRN   10 mL at 10/17/24 0923    sodium chloride 0.9 % flush 10 mL  10 mL Intravenous PRN Lien Galvez APRN   10 mL at 10/11/24 1221    temazepam (RESTORIL) capsule 7.5 mg  7.5 mg Oral Nightly Neo Brock MD   7.5 mg at 10/20/24 2117     Lab Results (last 24 hours)       Procedure Component Value Units Date/Time    POC Glucose Once [[id number]]  (Abnormal) Collected: 10/21/24 1112    Specimen: Blood Updated: 10/21/24 1114     Glucose 238 mg/dL     POC Glucose Once [[id number]]  (Abnormal) Collected: 10/21/24 0754    Specimen: Blood Updated: 10/21/24 0755     Glucose 158 mg/dL     POC Glucose Once [[id number]]  (Abnormal) Collected: 10/20/24 2101    Specimen: Blood Updated: 10/20/24 2105     Glucose 179 mg/dL     POC Glucose Once [[id number]]  (Normal) Collected: 10/20/24 1614    Specimen: Blood Updated: 10/20/24 1616     Glucose 105 mg/dL           Imaging Results (Last 24 Hours)       ** No results found for the last 24 hours. **          Orders (last 24 hrs)        Start     Ordered    10/21/24 1115  POC Glucose Once  PROCEDURE ONCE        Comments: Complete no more than 45 minutes prior to patient eating      10/21/24 1112    10/21/24 0756  POC Glucose Once  PROCEDURE ONCE        Comments: Complete no more than 45 minutes prior to patient eating      10/21/24 0754    10/20/24 2106  POC Glucose Once  PROCEDURE ONCE        Comments: Complete no more than 45 minutes prior to patient eating      10/20/24 2101    10/20/24 1617  POC Glucose Once   "PROCEDURE ONCE        Comments: Complete no more than 45 minutes prior to patient eating      10/20/24 1614    10/19/24 2100  temazepam (RESTORIL) capsule 7.5 mg  Nightly         10/19/24 1955    10/19/24 2015  oxymetazoline (AFRIN) nasal spray 2 spray  2 Times Daily         10/19/24 1920    10/19/24 1200  nicotine (NICODERM CQ) 7 MG/24HR patch 1 patch  Every 24 Hours Scheduled         10/19/24 1106    10/19/24 0945  busPIRone (BUSPAR) tablet 5 mg  3 Times Daily,   Status:  Discontinued         10/19/24 0848    10/19/24 0945  carvedilol (COREG) tablet 25 mg  2 Times Daily         10/19/24 0848    10/19/24 0900  pantoprazole (PROTONIX) EC tablet 40 mg  2 Times Daily         10/19/24 0848    10/19/24 0848  albuterol (PROVENTIL) nebulizer solution 0.083% 2.5 mg/3mL  Every 6 Hours PRN         10/19/24 0848    10/18/24 1244  hydrOXYzine (ATARAX) tablet 10 mg  3 Times Daily PRN         10/18/24 1244    10/16/24 1332  Up to chair  Misc Nursing Order (Specify)  Every Shift      Comments: Up to chair    10/16/24 1332    10/16/24 0900  insulin glargine (LANTUS, SEMGLEE) injection 10 Units  Daily         10/15/24 1151    10/15/24 1117  simethicone (MYLICON) chewable tablet 80 mg  4 Times Daily PRN         10/15/24 1117    10/14/24 2000  melatonin tablet 5 mg  Nightly         10/14/24 1332    10/14/24 2000  donepezil (ARICEPT) tablet 5 mg  Nightly         10/14/24 1332    10/13/24 1130  sennosides-docusate (PERICOLACE) 8.6-50 MG per tablet 2 tablet  2 Times Daily        Placed in \"And\" Linked Group    10/13/24 1032    10/13/24 1130  polyethylene glycol (MIRALAX) packet 17 g  Daily        Placed in \"And\" Linked Group    10/13/24 1032    10/13/24 1031  bisacodyl (DULCOLAX) EC tablet 5 mg  Daily PRN        Placed in \"And\" Linked Group    10/13/24 1032    10/13/24 1031  bisacodyl (DULCOLAX) suppository 10 mg  Daily PRN        Placed in \"And\" Linked Group    10/13/24 1032    10/13/24 0800  ferrous sulfate tablet 325 mg  Daily With " Breakfast         10/12/24 1155    10/11/24 0915  aspirin chewable tablet 81 mg  Daily         10/11/24 0828    10/11/24 0800  insulin regular (humuLIN R,novoLIN R) injection 2-7 Units  3 Times Daily With Meals         10/10/24 2118    10/10/24 1939  influenza vac split high-dose (FLUZONE HIGH DOSE) injection 0.5 mL  During Hospitalization         10/10/24 1940    10/10/24 0900  atorvastatin (LIPITOR) tablet 80 mg  Daily         10/10/24 0042    10/10/24 0900  [Held by provider]  clopidogrel (PLAVIX) tablet 75 mg  Daily        (On hold since Sat 10/19/2024 at 1910 until manually unheld; held by Christine Reed MDHold Reason: Other (Comment Required)Hold Comments: Nose bleeding)    10/10/24 0042    10/10/24 0900  isosorbide mononitrate (IMDUR) 24 hr tablet 60 mg  Daily         10/10/24 0042    10/10/24 0900  linagliptin (TRADJENTA) tablet 5 mg  Daily         10/10/24 0042    10/10/24 0830  ipratropium-albuterol (DUO-NEB) nebulizer solution 3 mL  4 Times Daily - RT         10/10/24 0042    10/10/24 0800  Oral Care  2 Times Daily       10/10/24 0042    10/10/24 0700  POC Glucose 4x Daily Before Meals & at Bedtime  4 Times Daily Before Meals & at Bedtime      Comments: Complete no more than 45 minutes prior to patient eating      10/10/24 0042    10/10/24 0600  Incentive Spirometry  Every 4 Hours While Awake       10/10/24 0042    10/10/24 0400  Vital Signs  Every 4 Hours       10/10/24 0042    10/10/24 0130  sodium chloride 0.9 % flush 10 mL  Every 12 Hours Scheduled         10/10/24 0042    10/10/24 0042  Intake & Output  Every Shift       10/10/24 0042    10/10/24 0042  sodium chloride 0.9 % flush 10 mL  As Needed         10/10/24 0042    10/10/24 0042  Potassium Replacement - Follow Nurse / BPA Driven Protocol  As Needed         10/10/24 0042    10/10/24 0042  Magnesium Low Dose Replacement - Follow Nurse / BPA Driven Protocol  As Needed         10/10/24 0042    10/10/24 0042  Phosphorus Replacement - Follow Nurse /  "BPA Driven Protocol  As Needed         10/10/24 0042    10/10/24 0042  Calcium Replacement - Follow Nurse / BPA Driven Protocol  As Needed         10/10/24 0042    10/10/24 0042  acetaminophen (TYLENOL) tablet 650 mg  Every 4 Hours PRN        Placed in \"Or\" Linked Group    10/10/24 0042    10/10/24 0042  acetaminophen (TYLENOL) 160 MG/5ML oral solution 650 mg  Every 4 Hours PRN        Placed in \"Or\" Linked Group    10/10/24 0042    10/10/24 0042  acetaminophen (TYLENOL) suppository 650 mg  Every 4 Hours PRN        Placed in \"Or\" Linked Group    10/10/24 0042    10/10/24 0042  dextrose (GLUTOSE) oral gel 15 g  Every 15 Minutes PRN         10/10/24 0042    10/10/24 0042  dextrose (D50W) (25 g/50 mL) IV injection 25 g  Every 15 Minutes PRN         10/10/24 0042    10/10/24 0042  glucagon (GLUCAGEN) injection 1 mg  Every 15 Minutes PRN         10/10/24 0042    10/10/24 0000  Ambulatory Referral to Home Health         10/10/24 1442    10/09/24 1751  sodium chloride 0.9 % flush 10 mL  As Needed         10/09/24 1751    Unscheduled  Oxygen Therapy- Nasal Cannula; Titrate 1-6 LPM Per SpO2; 90 - 95%  Continuous PRN       10/09/24 1751    Unscheduled  Follow Hypoglycemia Standing Orders For Blood Glucose <70 & Notify Provider of Treatment  As Needed      Comments: Follow Hypoglycemia Orders As Outlined in Process Instructions (Open Order Report to View Full Instructions)  Notify Provider Any Time Hypoglycemia Treatment is Administered    10/10/24 0042                  Physician Progress Notes (last 24 hours)  Notes from 10/20/24 1133 through 10/21/24 1133   No notes of this type exist for this encounter.       Consult Notes (last 24 hours)  Notes from 10/20/24 1133 through 10/21/24 1133   No notes of this type exist for this encounter.       "

## 2024-10-21 NOTE — PLAN OF CARE
Problem: Adult Inpatient Plan of Care  Goal: Plan of Care Review  Outcome: Progressing  Flowsheets  Taken 10/18/2024 1422 by Kyra Gamboa MS CCC-SLP  Progress: no change  Plan of Care Reviewed With: patient  Taken 10/18/2024 1130 by Marla Cooper PT  Outcome Evaluation: Pt req ModAx2 w/ B UE support for t/f to chair. Mobility continues to be limited by confusion, poor command following, and decreased safety awareness. Plan to continue progressing PT POC as tolerated to address deficits in strength, balance, postural control, and functional endurance.. PT rec SNF at d/c.  Goal: Patient-Specific Goal (Individualized)  Outcome: Progressing  Goal: Absence of Hospital-Acquired Illness or Injury  Outcome: Progressing  Intervention: Identify and Manage Fall Risk  Recent Flowsheet Documentation  Taken 10/21/2024 0554 by Tarun Zimmer RN  Safety Promotion/Fall Prevention: activity supervised  Taken 10/21/2024 0402 by Tarun Zimmer RN  Safety Promotion/Fall Prevention: activity supervised  Taken 10/21/2024 0200 by Tarun Zimmer RN  Safety Promotion/Fall Prevention: activity supervised  Taken 10/20/2024 2353 by Tarun Zimmer RN  Safety Promotion/Fall Prevention: activity supervised  Taken 10/20/2024 2151 by Tarun Zimmer RN  Safety Promotion/Fall Prevention: activity supervised  Taken 10/20/2024 2003 by Tarun Zimmer RN  Safety Promotion/Fall Prevention: activity supervised  Intervention: Prevent Skin Injury  Recent Flowsheet Documentation  Taken 10/21/2024 0554 by Tarun Zimmer RN  Body Position: position changed independently  Skin Protection: silicone foam dressing in place  Taken 10/21/2024 0402 by Tarun Zimmer RN  Body Position: position changed independently  Skin Protection: silicone foam dressing in place  Taken 10/21/2024 0200 by Tarun Zimmer RN  Body Position: position changed independently  Skin Protection: silicone foam dressing in  place  Taken 10/20/2024 2353 by Tarun Zimmer RN  Body Position: position changed independently  Skin Protection: silicone foam dressing in place  Taken 10/20/2024 2151 by Tarun Zimmer RN  Body Position: position changed independently  Skin Protection: silicone foam dressing in place  Taken 10/20/2024 2003 by Tarun Zimmer RN  Body Position: position changed independently  Skin Protection: silicone foam dressing in place  Intervention: Prevent and Manage VTE (Venous Thromboembolism) Risk  Recent Flowsheet Documentation  Taken 10/21/2024 0554 by Tarun Zimmer RN  VTE Prevention/Management:   bilateral   SCDs (sequential compression devices) on  Taken 10/21/2024 0402 by Tarun Zimmer RN  VTE Prevention/Management:   bilateral   SCDs (sequential compression devices) on  Taken 10/21/2024 0200 by Tarun Zimmer RN  VTE Prevention/Management:   bilateral   SCDs (sequential compression devices) on  Taken 10/20/2024 2353 by Tarun Zimmer RN  VTE Prevention/Management:   bilateral   SCDs (sequential compression devices) on  Taken 10/20/2024 2151 by Tarun Zimmer RN  VTE Prevention/Management:   bilateral   SCDs (sequential compression devices) on  Taken 10/20/2024 2003 by Tarun Zimmer RN  VTE Prevention/Management:   bilateral   SCDs (sequential compression devices) on  Intervention: Prevent Infection  Recent Flowsheet Documentation  Taken 10/21/2024 0554 by Tarun Zimmer RN  Infection Prevention:   cohorting utilized   environmental surveillance performed   equipment surfaces disinfected   hand hygiene promoted   single patient room provided  Taken 10/21/2024 0402 by Tarun Zimmer RN  Infection Prevention:   cohorting utilized   environmental surveillance performed   equipment surfaces disinfected   hand hygiene promoted   single patient room provided  Taken 10/21/2024 0200 by Tarun Zimmer RN  Infection Prevention:   cohorting utilized    environmental surveillance performed   equipment surfaces disinfected   hand hygiene promoted   single patient room provided  Taken 10/20/2024 2353 by Tarun Zimmer, RN  Infection Prevention:   cohorting utilized   environmental surveillance performed   equipment surfaces disinfected   hand hygiene promoted   single patient room provided  Taken 10/20/2024 2151 by Tarun Zimmer RN  Infection Prevention:   cohorting utilized   environmental surveillance performed   equipment surfaces disinfected   hand hygiene promoted   single patient room provided  Taken 10/20/2024 2003 by Tarun Zimmer RN  Infection Prevention:   cohorting utilized   environmental surveillance performed   equipment surfaces disinfected   hand hygiene promoted   single patient room provided  Goal: Optimal Comfort and Wellbeing  Outcome: Progressing  Intervention: Provide Person-Centered Care  Recent Flowsheet Documentation  Taken 10/21/2024 0554 by Tarun Zimmer RN  Trust Relationship/Rapport:   care explained   reassurance provided  Taken 10/21/2024 0402 by Tarun Zimmer RN  Trust Relationship/Rapport:   care explained   reassurance provided  Taken 10/21/2024 0200 by Tarun Zimmer RN  Trust Relationship/Rapport:   care explained   reassurance provided  Taken 10/20/2024 2353 by Tarun Zimmer RN  Trust Relationship/Rapport:   care explained   reassurance provided  Taken 10/20/2024 2151 by Tarun Zimmer RN  Trust Relationship/Rapport:   care explained   reassurance provided  Taken 10/20/2024 2003 by Tarun Zimmer RN  Trust Relationship/Rapport:   care explained   reassurance provided  Goal: Readiness for Transition of Care  Outcome: Progressing     Problem: Pneumonia  Goal: Fluid Balance  Outcome: Progressing  Goal: Absence of Infection Signs and Symptoms  Outcome: Progressing  Intervention: Prevent Infection Progression  Recent Flowsheet Documentation  Taken 10/21/2024 0554 by Diomedes  Jesus, Ray L, RN  Isolation Precautions: precautions maintained  Taken 10/21/2024 0402 by Tarun Zimmer RN  Isolation Precautions: precautions maintained  Taken 10/21/2024 0200 by Tarun Zimmer RN  Isolation Precautions: precautions maintained  Taken 10/20/2024 2353 by Tarun Zimmer RN  Isolation Precautions: precautions maintained  Taken 10/20/2024 2151 by Tarun Zimmer RN  Isolation Precautions: precautions maintained  Taken 10/20/2024 2003 by Tarun Zimmer RN  Isolation Precautions: precautions maintained  Goal: Effective Oxygenation and Ventilation  Outcome: Progressing  Intervention: Promote Airway Secretion Clearance  Recent Flowsheet Documentation  Taken 10/21/2024 0554 by Tarun Zimmer RN  Activity Management: activity encouraged  Cough And Deep Breathing: done independently per patient  Taken 10/21/2024 0402 by Tarun Zimmer RN  Activity Management: activity encouraged  Cough And Deep Breathing: done independently per patient  Taken 10/21/2024 0200 by Tarun Zimmer RN  Activity Management: activity encouraged  Cough And Deep Breathing: done independently per patient  Taken 10/20/2024 2353 by Tarun Zimmer RN  Activity Management: activity encouraged  Cough And Deep Breathing: done independently per patient  Taken 10/20/2024 2151 by Tarun Zimmer RN  Activity Management: activity encouraged  Cough And Deep Breathing: done independently per patient  Taken 10/20/2024 2003 by Tarun Zimmer RN  Activity Management: activity encouraged  Cough And Deep Breathing: done independently per patient  Intervention: Optimize Oxygenation and Ventilation  Recent Flowsheet Documentation  Taken 10/21/2024 0554 by Tarun Zimmer RN  Head of Bed (HOB) Positioning: HOB elevated  Taken 10/21/2024 0402 by Tarun Zimmer RN  Head of Bed (hospitals) Positioning: HOB elevated  Taken 10/21/2024 0200 by Tarun Zimmer RN  Head of Bed (hospitals)  Positioning: HOB elevated  Taken 10/20/2024 2353 by Tarun Zimmer RN  Head of Bed (HOB) Positioning: HOB elevated  Taken 10/20/2024 2151 by Tarun Zimmer RN  Head of Bed (HOB) Positioning: HOB elevated  Taken 10/20/2024 2003 by Tarun Zimmer RN  Head of Bed (HOB) Positioning: HOB elevated     Problem: Skin Injury Risk Increased  Goal: Skin Health and Integrity  Outcome: Progressing  Intervention: Optimize Skin Protection  Recent Flowsheet Documentation  Taken 10/21/2024 0554 by Tarun Zimmer, RN  Activity Management: activity encouraged  Pressure Reduction Techniques:   frequent weight shift encouraged   heels elevated off bed   weight shift assistance provided  Head of Bed (HOB) Positioning: HOB elevated  Pressure Reduction Devices:   pressure-redistributing mattress utilized   positioning supports utilized  Skin Protection: silicone foam dressing in place  Taken 10/21/2024 0402 by Tarun Zimmer, RN  Activity Management: activity encouraged  Pressure Reduction Techniques:   frequent weight shift encouraged   heels elevated off bed   weight shift assistance provided  Head of Bed (HOB) Positioning: HOB elevated  Pressure Reduction Devices:   pressure-redistributing mattress utilized   positioning supports utilized  Skin Protection: silicone foam dressing in place  Taken 10/21/2024 0200 by Tarun Zimmer, RN  Activity Management: activity encouraged  Pressure Reduction Techniques:   frequent weight shift encouraged   heels elevated off bed   weight shift assistance provided  Head of Bed (HOB) Positioning: HOB elevated  Pressure Reduction Devices:   pressure-redistributing mattress utilized   positioning supports utilized  Skin Protection: silicone foam dressing in place  Taken 10/20/2024 2353 by Tarun Zimmer, RN  Activity Management: activity encouraged  Pressure Reduction Techniques:   frequent weight shift encouraged   heels elevated off bed   weight shift assistance  provided  Head of Bed (HOB) Positioning: HOB elevated  Pressure Reduction Devices:   pressure-redistributing mattress utilized   positioning supports utilized  Skin Protection: silicone foam dressing in place  Taken 10/20/2024 2151 by Tarun Zimmer RN  Activity Management: activity encouraged  Pressure Reduction Techniques:   frequent weight shift encouraged   heels elevated off bed   weight shift assistance provided  Head of Bed (HOB) Positioning: HOB elevated  Pressure Reduction Devices:   pressure-redistributing mattress utilized   positioning supports utilized  Skin Protection: silicone foam dressing in place  Taken 10/20/2024 2003 by Taurn Zimmer RN  Activity Management: activity encouraged  Pressure Reduction Techniques:   frequent weight shift encouraged   heels elevated off bed   weight shift assistance provided  Head of Bed (HOB) Positioning: HOB elevated  Pressure Reduction Devices:   pressure-redistributing mattress utilized   positioning supports utilized  Skin Protection: silicone foam dressing in place     Problem: Fall Injury Risk  Goal: Absence of Fall and Fall-Related Injury  Outcome: Progressing  Intervention: Promote Injury-Free Environment  Recent Flowsheet Documentation  Taken 10/21/2024 0554 by Tarun Zimmer RN  Safety Promotion/Fall Prevention: activity supervised  Taken 10/21/2024 0402 by Tarun Zimmer RN  Safety Promotion/Fall Prevention: activity supervised  Taken 10/21/2024 0200 by Tarun Zimmer RN  Safety Promotion/Fall Prevention: activity supervised  Taken 10/20/2024 2353 by Tarun Zimmer RN  Safety Promotion/Fall Prevention: activity supervised  Taken 10/20/2024 2151 by Tarun Zimmer RN  Safety Promotion/Fall Prevention: activity supervised  Taken 10/20/2024 2003 by Tarun Zimmer RN  Safety Promotion/Fall Prevention: activity supervised     Problem: Confusion Acute  Goal: Optimal Cognitive Function  Outcome:  Progressing  Intervention: Minimize Contributing Factors  Recent Flowsheet Documentation  Taken 10/21/2024 0554 by Tarun Zimmer RN  Sensory Stimulation Regulation:   tactile stimulation minimized   television on  Reorientation Measures:   clock in view   calendar in view  Communication Support Strategies: active listening utilized  Taken 10/21/2024 0402 by Tarun Zimmer RN  Sensory Stimulation Regulation:   tactile stimulation minimized   television on  Reorientation Measures:   clock in view   calendar in view  Communication Support Strategies: active listening utilized  Taken 10/21/2024 0200 by Tarun Zimmer RN  Sensory Stimulation Regulation:   tactile stimulation minimized   television on  Reorientation Measures:   clock in view   calendar in view  Communication Support Strategies: active listening utilized  Taken 10/20/2024 2353 by Tarun Zimmer RN  Sensory Stimulation Regulation:   tactile stimulation minimized   television on  Reorientation Measures:   clock in view   calendar in view  Communication Support Strategies: active listening utilized  Taken 10/20/2024 2151 by Tarun Zimmer RN  Sensory Stimulation Regulation:   tactile stimulation minimized   television on  Reorientation Measures:   clock in view   calendar in view  Communication Support Strategies: active listening utilized  Taken 10/20/2024 2003 by Tarun Zimmer RN  Sensory Stimulation Regulation:   tactile stimulation minimized   television on  Reorientation Measures:   clock in view   calendar in view  Communication Support Strategies: active listening utilized   Goal Outcome Evaluation:

## 2024-10-21 NOTE — PROGRESS NOTES
Psychiatric Medicine Services  PROGRESS NOTE    Patient Name: Arminda Krishnan  : 1945  MRN: [id number]    Date of Admission: 10/9/2024  Primary Care Physician: Aiden Spencer MD    Subjective   Subjective     CC: Follow-up hyperglycemia    HPI:   Patient seen sitting up in bed in no acute distress.  Awake and alert.  Oriented to self only.  Niece at bedside and she thinks that is her daughter.  Cooperative currently.  No new issues expressed.  Awaiting placement.    Objective   Objective     Vital Signs:   Temp:  [97 °F (36.1 °C)-97.7 °F (36.5 °C)] 97.7 °F (36.5 °C)  Heart Rate:  [74-90] 83  Resp:  [16-18] 16  BP: (108-144)/(66-77) 113/77     Physical Exam:  Constitutional: Awake and alert.  Resting comfortably up in bed with niece at bedside.  Chronically deconditioned.  Elderly female.  HENT: NCAT, mucous membranes moist  Respiratory: Clear to auscultation bilaterally, respiratory effort normal on room air.  Cardiovascular: RRR, no murmurs, rubs, or gallops  Gastrointestinal: soft, nontender, nondistended.  Obese.  Musculoskeletal: No bilateral ankle edema.  BOND spontaneously.  Neurologic: Oriented x 1.  Recognizes her family at bedside that is her niece but identifies her as her daughter. Speech clear, able to answer simple questions and follow simple commands  Skin: No rashes       Results Reviewed:  LAB RESULTS:      Lab 10/20/24  0926 10/16/24  1903 10/15/24  0939   WBC 8.87 9.59 9.36   HEMOGLOBIN 9.4* 9.6* 9.4*   HEMATOCRIT 30.5* 30.4* 29.5*   PLATELETS 324 287 291   NEUTROS ABS 5.14  --   --    IMMATURE GRANS (ABS) 0.15*  --   --    LYMPHS ABS 1.94  --   --    MONOS ABS 1.25*  --   --    EOS ABS 0.28  --   --    MCV 90.5 88.6 87.8         Lab 10/20/24  0926 10/16/24  1903 10/15/24  0939   SODIUM 139 142 138   POTASSIUM 4.5 3.9 4.2   CHLORIDE 103 105 103   CO2 25.0 21.0* 21.0*   ANION GAP 11.0 16.0* 14.0   BUN 26* 27* 24*   CREATININE 2.43* 2.33* 2.35*   EGFR  19.5* 20.6* 20.3*   GLUCOSE 166* 217* 254*   CALCIUM 9.8 9.7 9.2   MAGNESIUM 1.7  --   --          Lab 10/20/24  0926 10/15/24  0939   TOTAL PROTEIN 7.7 6.7   ALBUMIN 3.3* 3.1*   GLOBULIN 4.4 3.6   ALT (SGPT) 14 21   AST (SGOT) 23 26   BILIRUBIN 0.3 0.3   ALK PHOS 74 76                           Brief Urine Lab Results  (Last result in the past 365 days)        Color   Clarity   Blood   Leuk Est   Nitrite   Protein   CREAT   Urine HCG        10/09/24 2058 Yellow   Cloudy   Large (3+)   Moderate (2+)   Negative   100 mg/dL (2+)                   Microbiology Results Abnormal       Procedure Component Value - Date/Time    Blood Culture - Blood, Arm, Right [[id number]]  (Normal) Collected: 10/09/24 2159    Lab Status: Final result Specimen: Blood from Arm, Right Updated: 10/14/24 2231     Blood Culture No growth at 5 days    Narrative:      Less than seven (7) mL's of blood was collected.  Insufficient quantity may yield false negative results.    Blood Culture - Blood, Hand, Left [[id number]]  (Normal) Collected: 10/09/24 1842    Lab Status: Final result Specimen: Blood from Hand, Left Updated: 10/14/24 1946     Blood Culture No growth at 5 days    Narrative:      Less than seven (7) mL's of blood was collected.  Insufficient quantity may yield false negative results.    Urine Culture - Urine, Urine, Clean Catch [[id number]]  (Normal) Collected: 10/09/24 2058    Lab Status: Final result Specimen: Urine, Clean Catch Updated: 10/11/24 1000     Urine Culture No growth    Legionella Antigen, Urine - Urine, Urine, Clean Catch [[id number]]  (Normal) Collected: 10/09/24 2058    Lab Status: Final result Specimen: Urine, Clean Catch Updated: 10/10/24 0922     LEGIONELLA ANTIGEN, URINE Negative    S. Pneumo Ag Urine or CSF - Urine, Urine, Clean Catch [[id number]]  (Normal) Collected: 10/09/24 2058    Lab Status: Final result Specimen: Urine, Clean Catch Updated: 10/10/24 0922     Strep Pneumo Ag Negative    COVID PRE-OP /  PRE-PROCEDURE SCREENING ORDER (NO ISOLATION) - Swab, Nasopharynx [[id number]]  (Normal) Collected: 10/09/24 1843    Lab Status: Final result Specimen: Swab from Nasopharynx Updated: 10/09/24 1958    Narrative:      The following orders were created for panel order COVID PRE-OP / PRE-PROCEDURE SCREENING ORDER (NO ISOLATION) - Swab, Nasopharynx.  Procedure                               Abnormality         Status                     ---------                               -----------         ------                     COVID-19, FLU A/B, RSV P...[[id number]]  Normal              Final result                 Please view results for these tests on the individual orders.    COVID-19, FLU A/B, RSV PCR 1 HR TAT - Swab, Nasopharynx [[id number]]  (Normal) Collected: 10/09/24 1843    Lab Status: Final result Specimen: Swab from Nasopharynx Updated: 10/09/24 1958     COVID19 Not Detected     Influenza A PCR Not Detected     Influenza B PCR Not Detected     RSV, PCR Not Detected    Narrative:      Fact sheet for providers: https://www.fda.gov/media/124036/download    Fact sheet for patients: https://www.fda.gov/media/562605/download    Test performed by PCR.            XR Chest 1 View    Result Date: 10/19/2024  XR CHEST 1 VW Date of Exam: 10/19/2024 6:54 PM EDT Indication: Cough Comparison: 10/9/2024 Findings: Heart size is at the upper limits of normal. Pulmonary vessels are normal. The lungs are clear bilaterally. No pleural effusion or pneumothorax. Degenerative changes are noted of the shoulders and AC joints.     Impression: Impression: 1. Stable borderline heart size. No acute cardiopulmonary disease. Electronically Signed: Michael Ritter MD  10/19/2024 7:20 PM EDT  Workstation ID: [id number]    CT Abdomen Pelvis Without Contrast    Result Date: 10/19/2024  CT ABDOMEN PELVIS WO CONTRAST Date of Exam: 10/19/2024 3:50 PM EDT Indication: Abdominal pain. Comparison: CT abdomen and pelvis 10/9/2024 Technique: Axial CT images were  obtained of the abdomen and pelvis without the administration of contrast. Reconstructed coronal and sagittal images were also obtained. Automated exposure control and iterative construction methods were used. Findings: The lung bases are grossly clear. Unremarkable appearance of the liver. The gallbladder surgically absent. Normal appearance of the bile ducts. Normal size and appearance of the spleen. Unremarkable appearance of the pancreas. Normal appearance of the adrenal glands. There is a tiny nonobstructing stone in the midpole the right kidney. Unremarkable appearance of the left kidney. Normal appearance of the ureters and bladder. The uterus is surgically absent. There is hyperdense contrast scattered throughout the colon. No bowel obstruction. No definite inflammatory change of the GI tract. No abdominopelvic free fluid. No pneumoperitoneum. There is a short stent at the celiac axis origin. There is atherosclerosis of the abdominal aorta. There is a small fat-containing umbilical hernia. No acute or suspicious bony findings. There is posterior instrumented fusion and posterior decompression in the lumbar spine.     Impression: Impression: No acute abdominopelvic findings. Chronic and incidental ancillary findings noted above. Electronically Signed: Jhony Cha MD  10/19/2024 4:26 PM EDT  Workstation ID: [id number]     Results for orders placed during the hospital encounter of 07/15/24    Adult Transthoracic Echo Complete W/ Cont if Necessary Per Protocol    Interpretation Summary    Left ventricular systolic function is normal. Calculated left ventricular EF = 63.2%    Estimated right ventricular systolic pressure from tricuspid regurgitation is normal (<35 mmHg).    Normal left atrial size and volume noted.    There is calcification of the aortic valve. Mild to moderate aortic valve regurgitation is present.      Current medications:  Scheduled Meds:aspirin, 81 mg, Oral, Daily  atorvastatin, 80 mg,  Oral, Daily  carvedilol, 25 mg, Oral, BID  [Held by provider] clopidogrel, 75 mg, Oral, Daily  donepezil, 5 mg, Oral, Nightly  ferrous sulfate, 325 mg, Oral, Daily With Breakfast  insulin glargine, 10 Units, Subcutaneous, Daily  insulin regular, 2-7 Units, Subcutaneous, TID With Meals  ipratropium-albuterol, 3 mL, Nebulization, 4x Daily - RT  isosorbide mononitrate, 60 mg, Oral, Daily  linagliptin, 5 mg, Oral, Daily  melatonin, 5 mg, Oral, Nightly  nicotine, 1 patch, Transdermal, Q24H  oxymetazoline, 2 spray, Each Nare, BID  pantoprazole, 40 mg, Oral, BID  senna-docusate sodium, 2 tablet, Oral, BID   And  polyethylene glycol, 17 g, Oral, Daily  sodium chloride, 10 mL, Intravenous, Q12H  temazepam, 7.5 mg, Oral, Nightly      Continuous Infusions:     PRN Meds:.  acetaminophen **OR** acetaminophen **OR** acetaminophen    albuterol    senna-docusate sodium **AND** polyethylene glycol **AND** bisacodyl **AND** bisacodyl    Calcium Replacement - Follow Nurse / BPA Driven Protocol    dextrose    dextrose    glucagon (human recombinant)    hydrOXYzine    influenza vaccine    Magnesium Low Dose Replacement - Follow Nurse / BPA Driven Protocol    Phosphorus Replacement - Follow Nurse / BPA Driven Protocol    Potassium Replacement - Follow Nurse / BPA Driven Protocol    simethicone    sodium chloride    sodium chloride    Assessment & Plan   Assessment & Plan     Active Hospital Problems    Diagnosis  POA    **LLL pneumonia [J18.9]  Yes    Pneumonia [J18.9]  Yes    HTN (hypertension) [I10]  Unknown    Hyperglycemia [R73.9]  Yes    History of CVA (cerebrovascular accident) [Z86.73]  Not Applicable    CKD (chronic kidney disease) stage 4, GFR 15-29 ml/min [N18.4]  Yes    Degenerative disc disease, lumbar [M51.369]  Yes      Resolved Hospital Problems   No resolved problems to display.        Brief Hospital Course to date:  Arminda Krishnan is a 79 y.o. female with history of mild coronary impairment, prior CVA, DDD on  steroids, type 2 diabetes who presented to [facility] ED with hyperglycemia, workup concerning for pneumonia and UTI. Patient treated for pneumonia and UTI with IV ceftriaxone. Neurology evaluated and assisted with delirium symptoms, optimize medications for to enhance sleep wake cycle.    This patient's problems and plans were partially entered by my partner and updated as appropriate by me 10/21/24.    Assessment/plan:  Patient is new to me today     LLL pneumonia, suspected aspiration  Mild to moderate oral dysphagia  -CXR showed LLL pneumonia, likely secondary to aspiration  -Blood and sputum cultures are currently NGTD, Legionella and strep pneumo urinary antigens negative  -SLP evaluating, repeat MBS 10/18 with recommendation of soft to chew textures, thin liquids, mechanical ground   -s/p IV ceftriaxone x5 days. Awaiting rehab placement.      Suspected UTI  -UA with 4+ bacteria, leukocytosis, urine culture with no growth  -Completed antibiotics as above.    MICHAEL on CKD stage III, resolved   -Likely prerenal  -Baseline Cr~2.2-2.6, was 3.30 on presentation, improved back to baseline   -Resolved with fluids.      Acute encephalopathy superimposed on likely underlying vascular dementia   -Follow up CT head without new acute findings but old CVA   -Neurology following, initiated donepezil, melatonin and restoril on 10/14.  -Patient will need comprehensive neurocognitive eval as an outpatient.    Constipation with abdominal pain--resolved  -Given increased abdominal pain 10/18, checked KUB.  -Continue scheduled bowel regimen. May ultimately need to upgrade to enema.  -CT abdomen ordered for further eval.    Poorly controlled type 2 diabetes with A1c 11% and hyperglycemia in the setting of chronic steroids  -Continue home linagliptin.  -Continue Lantus 10 units daily with low-dose SSI. Monitor glucose and adjust insulin as needed.  Glucoses still intermittently low, and occasionally slightly high.  Difficult to adjust.   Continue current regimen and monitor diet  --Add low-dose mealtime insulin as needed     Acute on chronic anemia  -no sign of blood loss this admission  -Iron studies most consistent with anemia of chronic disease  -s/p 1u PRBC with improvement  -Continue to monitor CBC as needed.  -CT abdomen on 10/19 was negative for any acute process    Constipation  -Continue bowel regimen.     History of CVA  Hyperlipidemia  -Continue aspirin, statin, Plavix    DDD with chronic pain  -on chronic opioids  -on chronic prednisone as outpatient, titrated off of prednisone after last admission due to hyperglycemia and uncontrolled diabetes    Episode of epistaxis on 10/19.  Resolved  -Hemodynamically stable.  Hemoglobin stable  -Afrin nasal spray ordered.  -Monitor hemoglobin.  -Hold Plavix.      Expected Discharge Location and Transportation: [facility]   Expected Discharge   Expected Discharge Date: 10/22/2024; Expected Discharge Time:      VTE Prophylaxis:  Mechanical VTE prophylaxis orders are present.         AM-PAC 6 Clicks Score (PT): 11 (10/21/24 1008)    CODE STATUS:   Code Status and Medical Interventions: No CPR (Do Not Attempt to Resuscitate); Limited Support; No intubation (DNI)   Ordered at: 10/10/24 0002     Medical Intervention Limits:    No intubation (DNI)     Level Of Support Discussed With:    Next of Kin (If No Surrogate)     Code Status (Patient has no pulse and is not breathing):    No CPR (Do Not Attempt to Resuscitate)     Medical Interventions (Patient has pulse or is breathing):    Limited Support       Aimee Stevens, APRN  10/21/24

## 2024-10-21 NOTE — CASE MANAGEMENT/SOCIAL WORK
Case Management Discharge Note      Final Note: For Tuesday. Insurance approval given for [facility] to call report to [phone].   IMM Given at 15:00 today.    I will arrange wheelchair transport. Patient and her sister are in agreement with this plan.     [employer] wheelchair transport is arranged for 13:00.     Selected Continued Care - Admitted Since 10/9/2024       Destination Coordination complete.      Service Provider Services Address Phone Fax Patient Preferred    [facility] [address] [address] [phone] --              Durable Medical Equipment    No services have been selected for the patient.                Dialysis/Infusion    No services have been selected for the patient.                Home Medical Care    No services have been selected for the patient.                Therapy    No services have been selected for the patient.                Community Resources    No services have been selected for the patient.                Community & DME    No services have been selected for the patient.                    Selected Continued Care - Prior Encounters Includes continued care and service providers with selected services from prior encounters from 7/11/2024 to 10/21/2024      Discharged on 9/30/2024 Admission date: 9/27/2024 - Discharge disposition: Home or Self Care      Home Medical Care       Service Provider Services Address Phone Fax Patient Preferred    [facility] [address], [address], [place] [address] [phone] --                               Final Discharge Disposition Code: 03 - skilled nursing facility (SNF)

## 2024-10-22 ENCOUNTER — TELEPHONE (OUTPATIENT)
Dept: NEUROLOGY | Facility: CLINIC | Age: 79
End: 2024-10-22

## 2024-10-22 ENCOUNTER — APPOINTMENT (OUTPATIENT)
Dept: CT IMAGING | Facility: HOSPITAL | Age: 79
End: 2024-10-22
Payer: MEDICARE

## 2024-10-22 VITALS
BODY MASS INDEX: 35.34 KG/M2 | DIASTOLIC BLOOD PRESSURE: 53 MMHG | TEMPERATURE: 98.3 F | WEIGHT: 180 LBS | HEIGHT: 60 IN | RESPIRATION RATE: 16 BRPM | OXYGEN SATURATION: 96 % | HEART RATE: 82 BPM | SYSTOLIC BLOOD PRESSURE: 109 MMHG

## 2024-10-22 LAB — GLUCOSE BLDC GLUCOMTR-MCNC: 278 MG/DL (ref 70–130)

## 2024-10-22 PROCEDURE — 82948 REAGENT STRIP/BLOOD GLUCOSE: CPT

## 2024-10-22 PROCEDURE — 99239 HOSP IP/OBS DSCHRG MGMT >30: CPT | Performed by: NURSE PRACTITIONER

## 2024-10-22 PROCEDURE — 94799 UNLISTED PULMONARY SVC/PX: CPT

## 2024-10-22 PROCEDURE — 70450 CT HEAD/BRAIN W/O DYE: CPT

## 2024-10-22 PROCEDURE — 72125 CT NECK SPINE W/O DYE: CPT

## 2024-10-22 PROCEDURE — 74176 CT ABD & PELVIS W/O CONTRAST: CPT

## 2024-10-22 RX ORDER — HYDROXYZINE HYDROCHLORIDE 10 MG/1
10 TABLET, FILM COATED ORAL 3 TIMES DAILY PRN
Start: 2024-10-22 | End: 2024-10-24

## 2024-10-22 RX ORDER — ISOSORBIDE MONONITRATE 60 MG/1
60 TABLET, EXTENDED RELEASE ORAL DAILY
Start: 2024-10-23

## 2024-10-22 RX ORDER — DONEPEZIL HYDROCHLORIDE 5 MG/1
5 TABLET, FILM COATED ORAL NIGHTLY
Start: 2024-10-22

## 2024-10-22 RX ORDER — POLYETHYLENE GLYCOL 3350 17 G/17G
17 POWDER, FOR SOLUTION ORAL DAILY
Start: 2024-10-23

## 2024-10-22 RX ORDER — ASPIRIN 81 MG/1
81 TABLET, CHEWABLE ORAL DAILY
Start: 2024-10-23

## 2024-10-22 RX ORDER — AMOXICILLIN 250 MG
2 CAPSULE ORAL 2 TIMES DAILY
Start: 2024-10-22

## 2024-10-22 RX ORDER — SIMETHICONE 80 MG
80 TABLET,CHEWABLE ORAL 4 TIMES DAILY PRN
Start: 2024-10-22

## 2024-10-22 RX ORDER — OXYMETAZOLINE HYDROCHLORIDE 0.05 G/100ML
2 SPRAY NASAL 2 TIMES DAILY
Start: 2024-10-22 | End: 2024-10-24

## 2024-10-22 RX ORDER — ACETAMINOPHEN 325 MG/1
650 TABLET ORAL EVERY 4 HOURS PRN
Start: 2024-10-22

## 2024-10-22 RX ORDER — FERROUS SULFATE 325(65) MG
325 TABLET ORAL
Start: 2024-10-23

## 2024-10-22 RX ORDER — TEMAZEPAM 7.5 MG/1
7.5 CAPSULE ORAL NIGHTLY
Qty: 3 CAPSULE | Refills: 0 | Status: SHIPPED | OUTPATIENT
Start: 2024-10-22 | End: 2024-10-24 | Stop reason: SDUPTHER

## 2024-10-22 RX ADMIN — HYDROXYZINE HYDROCHLORIDE 10 MG: 10 TABLET ORAL at 01:18

## 2024-10-22 RX ADMIN — LINAGLIPTIN 5 MG: 5 TABLET, FILM COATED ORAL at 09:49

## 2024-10-22 RX ADMIN — IPRATROPIUM BROMIDE AND ALBUTEROL SULFATE 3 ML: 2.5; .5 SOLUTION RESPIRATORY (INHALATION) at 06:53

## 2024-10-22 RX ADMIN — POLYETHYLENE GLYCOL 3350 17 G: 17 POWDER, FOR SOLUTION ORAL at 09:50

## 2024-10-22 RX ADMIN — IPRATROPIUM BROMIDE AND ALBUTEROL SULFATE 3 ML: 2.5; .5 SOLUTION RESPIRATORY (INHALATION) at 11:30

## 2024-10-22 RX ADMIN — ASPIRIN 81 MG 81 MG: 81 TABLET ORAL at 09:49

## 2024-10-22 RX ADMIN — ATORVASTATIN CALCIUM 80 MG: 40 TABLET, FILM COATED ORAL at 09:49

## 2024-10-22 RX ADMIN — Medication 2 SPRAY: at 09:55

## 2024-10-22 RX ADMIN — SENNOSIDES AND DOCUSATE SODIUM 2 TABLET: 50; 8.6 TABLET ORAL at 09:50

## 2024-10-22 RX ADMIN — NICOTINE 1 PATCH: 7 PATCH, EXTENDED RELEASE TRANSDERMAL at 09:54

## 2024-10-22 RX ADMIN — ISOSORBIDE MONONITRATE 60 MG: 60 TABLET, EXTENDED RELEASE ORAL at 09:49

## 2024-10-22 RX ADMIN — FERROUS SULFATE TAB 325 MG (65 MG ELEMENTAL FE) 325 MG: 325 (65 FE) TAB at 09:49

## 2024-10-22 RX ADMIN — PANTOPRAZOLE SODIUM 40 MG: 40 TABLET, DELAYED RELEASE ORAL at 09:49

## 2024-10-22 RX ADMIN — CARVEDILOL 25 MG: 12.5 TABLET, FILM COATED ORAL at 09:49

## 2024-10-22 NOTE — DISCHARGE SUMMARY
[facility] Medicine Services  TRANSFER SUMMARY    Patient Name: Arminda Krishnan  : 1945  MRN: [id number]    Date of Admission: 10/9/2024  Date of Discharge:  10/22/2024  Length of Stay: 12  Primary Care Physician: Aiden Spencer MD    Consults       Date and Time Order Name Status Description    10/14/2024 10:30 AM Inpatient Neurology Consult General Completed             Hospital Course     Presenting Problem:   LLL pneumonia [J18.9]  Pneumonia [J18.9]    Active Hospital Problems    Diagnosis  POA   • **LLL pneumonia [J18.9]  Yes   • Pneumonia [J18.9]  Yes   • HTN (hypertension) [I10]  Unknown   • Hyperglycemia [R73.9]  Yes   • History of CVA (cerebrovascular accident) [Z86.73]  Not Applicable   • CKD (chronic kidney disease) stage 4, GFR 15-29 ml/min [N18.4]  Yes   • Degenerative disc disease, lumbar [M51.369]  Yes      Resolved Hospital Problems   No resolved problems to display.          Hospital Course:  Arminda Krishnan is a 79 y.o. female with history of mild coronary impairment, prior CVA, DDD on steroids, type 2 diabetes who presented to [facility] ED with hyperglycemia, workup concerning for pneumonia and UTI. Patient treated for pneumonia and UTI with IV ceftriaxone. Neurology evaluated and assisted with delirium symptoms, optimize medications for to enhance sleep wake cycle.     LLL pneumonia, suspected aspiration  Mild to moderate oral dysphagia  -CXR showed LLL pneumonia, likely secondary to aspiration  -Blood and sputum cultures are currently NGTD, Legionella and strep pneumo urinary antigens negative  -SLP evaluating, repeat MBS 10/18 with recommendation of soft to chew textures, thin liquids, mechanical ground.  Tolerating modified diet.  -s/p IV ceftriaxone x5 days.  Remained awaiting rehab placement.  Has not received a bed again fluid from rehab with plans to transfer today.     Suspected UTI  -UA with 4+ bacteria, leukocytosis, urine culture with no  growth  -Completed antibiotics as above.     Elevated Cr on CKD stage III hx,  resolved   -Likely prerenal  -Baseline Cr~2.2-2.6, was 3.30 on presentation, improved back to baseline. NOT 1.5 x baseline.    -Resolved with fluids.  Now at baseline.  Recommend PCP monitor periodically.     Acute encephalopathy superimposed on likely underlying vascular dementia   -Follow up CT head without new acute findings but old CVA   -Neurology following, initiated donepezil, melatonin and restoril on 10/14.  Improved.  Likely component of sleep deprivation psychoses.  Resolved.  -Patient will need comprehensive neurocognitive eval as an outpatient.  Will arrange for outpatient neurology follow-up in 1 month.     Constipation with abdominal pain--resolved  -Given increased abdominal pain 10/18, checked KUB.  -Continue scheduled bowel regimen. May ultimately need to upgrade to enema.  -CT abdomen ordered for further eval.     Poorly controlled type 2 diabetes with A1c 11% and hyperglycemia in the setting of chronic steroids  -Continue home linagliptin.  -Continue Lantus 10 units daily with low-dose SSI. Monitor glucose and adjust insulin as needed.  Glucoses still intermittently low, and occasionally slightly high.  Difficult to adjust.  Continue current regimen and monitor diet  -- Will resume Januvia and continue Lantus and SSI on transfer.  Defer further management to provider outpatient.     Acute on chronic anemia  -no sign of blood loss this admission  -Iron studies most consistent with anemia of chronic disease  -s/p 1u PRBC with improvement.  Blood counts stable.  -Continue to monitor CBC as needed.  -CT abdomen on 10/19 was negative for any acute process     Constipation  -Continue bowel regimen.  Having good BMs.     History of CVA  Hyperlipidemia  -Continue aspirin, statin, Plavix (on hold x 3 days s/p epistaxis).  Resuming on transfer today after discussion with attending MD.  No further s/s of bleeding.  Tolerating  medications.     DDD with chronic pain  -on chronic opioids  -on chronic prednisone as outpatient, titrated off of prednisone after last admission due to hyperglycemia and uncontrolled diabetes     Episode of epistaxis on 10/19.  Resolved  -Hemodynamically stable.  Hemoglobin stable  -Afrin nasal spray ordered.  -Monitor hemoglobin.  -Holding Plavix from 10/19.  No further bleeding issues.  Hgb stable at last check at 9.4. Will resume plavix today at dc.  Monitor for any s/s of bleeding.    --recommend nadia H/H in 2-3 days.      Pt was seen resting back in bed in NAD.  Hemodynamically stable and afebrile.  Tolerating current med regimen.  No further s/s of bleeding.  Cleared for transfer to rehab today by all services.  Resuming her plavix today.  Meds and f/u as below.        Discharge Follow Up Recommendations for outpatient labs/diagnostics:  Patient is cleared for transfer to rehab today bowel services.  Going on medications as below with follow-ups as noted.    -- To be seen by provider at facility on arrival, PCP 1 week of discharge  -- Keep prior scheduled outpatient nephrology appointment  -- Follow-up [facility] in 1 month    Day of Discharge     HPI:   She was seen resting back in bed in no acute distress.  Dozing.  Awakens briefly to voice.  Oriented at baseline to self.  No visitors at bedside currently.  No new issues per staff.    Review of Systems  KAILA due to baseline dementia    Vital Signs:   Temp:  [97.6 °F (36.4 °C)-97.8 °F (36.6 °C)] 97.8 °F (36.6 °C)  Heart Rate:  [78-87] 80  Resp:  [16-20] 18  BP: (113-159)/(64-94) 159/94     Physical Exam:  Constitutional: Mostly sleeping resting back in bed.  Appears comfortable.  No visitors at bedside. Chronically deconditioned.  Elderly female.  HENT: NCAT, mucous membranes moist  Respiratory: Clear to auscultation bilaterally but slightly decreased at bases, respiratory effort normal on room air.  Sats WNL.  Cardiovascular: RRR, no murmurs, rubs,  "or gallops  Gastrointestinal: soft, nontender, nondistended.  +bs. Obese.  Musculoskeletal: No bilateral ankle edema.  BOND spontaneously.  Neurologic: Oriented x 1 at baseline.  Calm and cooperative.  Speech clear, able to answer simple questions and follow simple commands  Skin: No rashes    Pertinent Results     Results from last 7 days   Lab Units 10/20/24  0926 10/16/24  1903   WBC 10*3/mm3 8.87 9.59   HEMOGLOBIN g/dL 9.4* 9.6*   HEMATOCRIT % 30.5* 30.4*   PLATELETS 10*3/mm3 324 287   SODIUM mmol/L 139 142   POTASSIUM mmol/L 4.5 3.9   CHLORIDE mmol/L 103 105   CO2 mmol/L 25.0 21.0*   BUN mg/dL 26* 27*   CREATININE mg/dL 2.43* 2.33*   GLUCOSE mg/dL 166* 217*   CALCIUM mg/dL 9.8 9.7     Results from last 7 days   Lab Units 10/20/24  0926   BILIRUBIN mg/dL 0.3   ALK PHOS U/L 74   ALT (SGPT) U/L 14   AST (SGOT) U/L 23           Invalid input(s): \"TG\", \"LDLCALC\", \"LDLREALC\"      Brief Urine Lab Results  (Last result in the past 365 days)        Color   Clarity   Blood   Leuk Est   Nitrite   Protein   CREAT   Urine HCG        10/09/24 2058 Yellow   Cloudy   Large (3+)   Moderate (2+)   Negative   100 mg/dL (2+)                   Microbiology Results Abnormal       Procedure Component Value - Date/Time    Blood Culture - Blood, Arm, Right [[id number]]  (Normal) Collected: 10/09/24 2159    Lab Status: Final result Specimen: Blood from Arm, Right Updated: 10/14/24 2231     Blood Culture No growth at 5 days    Narrative:      Less than seven (7) mL's of blood was collected.  Insufficient quantity may yield false negative results.    Blood Culture - Blood, Hand, Left [[id number]]  (Normal) Collected: 10/09/24 1842    Lab Status: Final result Specimen: Blood from Hand, Left Updated: 10/14/24 1946     Blood Culture No growth at 5 days    Narrative:      Less than seven (7) mL's of blood was collected.  Insufficient quantity may yield false negative results.    Urine Culture - Urine, Urine, Clean Catch [[id number]]  (Normal) " Collected: 10/09/24 2058    Lab Status: Final result Specimen: Urine, Clean Catch Updated: 10/11/24 1000     Urine Culture No growth    Legionella Antigen, Urine - Urine, Urine, Clean Catch [[id number]]  (Normal) Collected: 10/09/24 2058    Lab Status: Final result Specimen: Urine, Clean Catch Updated: 10/10/24 0922     LEGIONELLA ANTIGEN, URINE Negative    S. Pneumo Ag Urine or CSF - Urine, Urine, Clean Catch [[id number]]  (Normal) Collected: 10/09/24 2058    Lab Status: Final result Specimen: Urine, Clean Catch Updated: 10/10/24 0922     Strep Pneumo Ag Negative    COVID PRE-OP / PRE-PROCEDURE SCREENING ORDER (NO ISOLATION) - Swab, Nasopharynx [[id number]]  (Normal) Collected: 10/09/24 1843    Lab Status: Final result Specimen: Swab from Nasopharynx Updated: 10/09/24 1958    Narrative:      The following orders were created for panel order COVID PRE-OP / PRE-PROCEDURE SCREENING ORDER (NO ISOLATION) - Swab, Nasopharynx.  Procedure                               Abnormality         Status                     ---------                               -----------         ------                     COVID-19, FLU A/B, RSV P...[[id number]]  Normal              Final result                 Please view results for these tests on the individual orders.    COVID-19, FLU A/B, RSV PCR 1 HR TAT - Swab, Nasopharynx [[id number]]  (Normal) Collected: 10/09/24 1843    Lab Status: Final result Specimen: Swab from Nasopharynx Updated: 10/09/24 1958     COVID19 Not Detected     Influenza A PCR Not Detected     Influenza B PCR Not Detected     RSV, PCR Not Detected    Narrative:      Fact sheet for providers: https://www.fda.gov/media/490687/download    Fact sheet for patients: https://www.fda.gov/media/556883/download    Test performed by PCR.            Imaging Results (All)       Procedure Component Value Units Date/Time    CT Cervical Spine Without Contrast [[id number]] Collected: 10/22/24 0543     Updated: 10/22/24 0549    Narrative:       CT CERVICAL SPINE WO CONTRAST    Date of Exam: 10/22/2024 5:08 AM EDT    Indication: fall, with blow to the head and neck pain.    Comparison: None available.    Technique: Axial CT images were obtained of the cervical spine without contrast administration.  Reconstructed coronal and sagittal images were also obtained. Automated exposure control and iterative construction methods were used.      Findings:  7 cervical vertebrae are identified. There is straightening of the cervical lordosis. There is mild narrowing of the C5-C6 disc and moderate narrowing of the C6-C7 and C7-T1 discs. Facet joints are unremarkable. There is mild lower cervical uncovertebral   arthritis. Spinous processes appear intact. There is no evidence of fracture or subluxation. The C1 ring and odontoid process appear intact. There is no high-grade osseous spinal canal narrowing. No significant neuroforaminal stenosis. There are severe   left and moderate right carotid bifurcation calcifications. There is emphysema and scarring in the right lung apex and minimal scarring in the left lung apex. Thyroid is heterogeneous without a dominant mass. Salivary glands appear symmetric. There is no   visible neck mass or adenopathy.      Impression:      Impression:  1.No acute osseous abnormality.  2.Mild to moderate lower cervical disc and uncovertebral joint degeneration.  3.Severe left and moderate right carotid bifurcation calcifications.  4.Emphysema and scarring in the lung apices.        Electronically Signed: Alfredo Chávez MD    10/22/2024 5:46 AM EDT    Workstation ID: [id number]    CT Head Without Contrast [[id number]] Collected: 10/22/24 0541     Updated: 10/22/24 0547    Narrative:      CT HEAD WO CONTRAST    Date of Exam: 10/22/2024 5:08 AM EDT    Indication: fall.    Comparison: 10/14/2024.    Technique: Axial CT images were obtained of the head without contrast administration.  Automated exposure control and iterative construction methods  were used.      Findings:  Patient is status post right parietal craniotomy. There is stable chronic resorption at the craniotomy flap margins with mild depression in the occipital region. No acute calvarial abnormalities. The paranasal sinuses and mastoid air cells appear well   aerated. Stable thinning of the orbital lenses compatible with prior lens replacement. There is a stable chronic infarct in the right frontal lobe. Stable small focus of encephalomalacia within the parasagittal frontoparietal region. Stable mild patchy   white matter hypoattenuation elsewhere in the brain. There are no new hypoattenuating lesions in the brain. No acute intracranial hemorrhage. No mass effect, midline shift or abnormal extra-axial collection.      Impression:      Impression:  1.No acute intracranial abnormality.  2.Stable chronic right frontal lobe infarct and mild chronic small vessel ischemic change.  3.Stable right parietal craniotomy changes.        Electronically Signed: Alfredo Chávez MD    10/22/2024 5:43 AM EDT    Workstation ID: [id number]    CT Abdomen Pelvis Without Contrast [[id number]] Collected: 10/22/24 0537     Updated: 10/22/24 0544    Narrative:      CT ABDOMEN PELVIS WO CONTRAST    Date of Exam: 10/22/2024 5:08 AM EDT    Indication: fall, bilateral hip pain, abdominal pain.    Comparison: 10/19/2024.    Technique: Axial CT images were obtained of the abdomen and pelvis without the administration of contrast. Reconstructed coronal and sagittal images were also obtained. Automated exposure control and iterative construction methods were used.      Findings:  There is a small stable pericardial effusion. There is a small hiatal hernia. Diminished attenuation of the blood pool would suggest anemia. There is chronic interstitial disease and linear scarring in the lung bases. There are small stable foci of   airspace disease in the right lung base and within the lateral left lower lobe that may reflect and mild  pneumonia. There is no acute finding in the superficial soft tissues. There is a posterior/interbody fusion construct in the lumbar spine that   extends from L2 down to S1. There are zones of lucency surrounding the L2 and L5 pedicle screws compatible with loosening. The hardware does appear intact. There is moderate underlying lumbar spondylosis. There is mild productive DJD at the left hip and   moderate DJD at the right hip.    Patient is status post cholecystectomy. There are calcified liver and spleen granulomas. The bile ducts, pancreas, adrenal glands and left kidney appear within normal limits. There is a 2 mm calcific density at the mid right kidney that may be a small   stone or possibly vascular calcification. There is no ureteral stone or hydronephrosis. Urinary bladder is nondistended. The appendix is not seen. There is contrast media present throughout the distal colon and rectum. No small bowel distention. There is   moderate aortoiliac atherosclerotic disease without evidence of aneurysm. No ascites, pneumoperitoneum or lymphadenopathy. There is a celiac artery stent in place.      Impression:      Impression:  1.No acute abdominal or pelvic abnormality.  2.Small stable pericardial effusion.  3.Small stable foci of airspace disease in the lung bases that could reflect pneumonia.  4.Posterior/interbody fusion construct in the lumbar spine with evidence of loosening of the L2 and L5 pedicle screws.  5.Moderate right and mild left hip DJD.  6.Status post cholecystectomy.  7.2 mm nonobstructing right-sided kidney stone versus vascular calcification.        Electronically Signed: Alfredo Chávez MD    10/22/2024 5:41 AM EDT    Workstation ID: [id number]    XR Chest 1 View [[id number]] Collected: 10/19/24 1920     Updated: 10/19/24 1923    Narrative:      XR CHEST 1 VW    Date of Exam: 10/19/2024 6:54 PM EDT    Indication: Cough    Comparison: 10/9/2024    Findings:  Heart size is at the upper limits of  normal. Pulmonary vessels are normal. The lungs are clear bilaterally. No pleural effusion or pneumothorax. Degenerative changes are noted of the shoulders and AC joints.      Impression:      Impression:    1. Stable borderline heart size. No acute cardiopulmonary disease.      Electronically Signed: Michael Ritter MD    10/19/2024 7:20 PM EDT    Workstation ID: [id number]    CT Abdomen Pelvis Without Contrast [[id number]] Collected: 10/19/24 1620     Updated: 10/19/24 1629    Narrative:      CT ABDOMEN PELVIS WO CONTRAST    Date of Exam: 10/19/2024 3:50 PM EDT    Indication: Abdominal pain.    Comparison: CT abdomen and pelvis 10/9/2024    Technique: Axial CT images were obtained of the abdomen and pelvis without the administration of contrast. Reconstructed coronal and sagittal images were also obtained. Automated exposure control and iterative construction methods were used.      Findings:  The lung bases are grossly clear. Unremarkable appearance of the liver. The gallbladder surgically absent. Normal appearance of the bile ducts. Normal size and appearance of the spleen. Unremarkable appearance of the pancreas. Normal appearance of the   adrenal glands. There is a tiny nonobstructing stone in the midpole the right kidney. Unremarkable appearance of the left kidney. Normal appearance of the ureters and bladder. The uterus is surgically absent. There is hyperdense contrast scattered   throughout the colon. No bowel obstruction. No definite inflammatory change of the GI tract. No abdominopelvic free fluid. No pneumoperitoneum. There is a short stent at the celiac axis origin. There is atherosclerosis of the abdominal aorta. There is a   small fat-containing umbilical hernia. No acute or suspicious bony findings. There is posterior instrumented fusion and posterior decompression in the lumbar spine.      Impression:      Impression:  No acute abdominopelvic findings.    Chronic and incidental ancillary findings noted  above.            Electronically Signed: Jhony Cha MD    10/19/2024 4:26 PM EDT    Workstation ID: [id number]    XR Abdomen KUB [[id number]] Collected: 10/18/24 1518     Updated: 10/18/24 1554    Narrative:      XR ABDOMEN KUB    Date of Exam: 10/18/2024 2:54 PM EDT    Indication: Abdominal pain, history of constipation, rule out large stool burden    Comparison: CT abdomen pelvis October 9, 2024    Findings:  There are some scattered contrast within the bowel. No significant stool burden is appreciated. There there is postsurgical change to the lumbar spine.      Impression:      Impression:  1.No significant stool burden apparent on this exam.      Electronically Signed: Leonel Ayala MD    10/18/2024 3:20 PM EDT    Workstation ID: [id number]    FL Video Swallow With Speech Single Contrast [[id number]] Collected: 10/18/24 1412     Updated: 10/18/24 1508    Narrative:      FL VIDEO SWALLOW W SPEECH SINGLE-CONTRAST    Date of Exam: 10/18/2024 1:02 PM EDT    Indication: aspiration.       Comparison: None available.    Technique:   The speech pathologist administered food and/or liquid mixed with barium to the patient with cine/video imaging.  Imaging assistance was provided to the speech pathologist and an image was saved.    Fluoroscopic Time: 36 seconds    Number of Images: 36 seconds    Findings:  No aspiration was seen during fluoroscopic guided modified barium swallowing series. The speech therapy report for full details and recommendations.      Impression:      Impression:  Loss could be provided for a modified barium swallow. No aspiration was seen during swallowing evaluation. Please see speech therapy report for full details and recommendations.      Report dictated by: Мария Hernández PA-c      I have personally reviewed this case and agree with the findings above:    Electronically Signed: Nabil Dickinson MD    10/18/2024 3:04 PM EDT    Workstation ID: [id number]    CT Head Without Contrast [[id number]]  Collected: 10/14/24 1209     Updated: 10/14/24 1215    Narrative:      CT HEAD WO CONTRAST    Date of Exam: 10/14/2024 11:57 AM EDT    Indication: AMS, increased lethargy.    Comparison: CT head without contrast 10/3/2024.    Technique: Axial CT images were obtained of the head without contrast administration.  Automated exposure control and iterative construction methods were used.      Findings:  Encephalomalacia consistent with chronic infarct within the right frontal lobe. Chronic left subinsular lacunar-type infarct. Small chronic lacunar infarct within the left basal ganglia. Chronic focal infarct in the parafalcine high right frontal lobe.    No evidence of acute or evolving infarct, mass lesion, mass effect, midline shift or intracranial hemorrhage. Right posterior parietal craniotomy changes are redemonstrated, and no acute or suspicious calvarial abnormality is identified. Paranasal   sinuses and mastoid air cells are clear. Presumed bilateral cataract surgery.      Impression:      1. No acute intracranial finding. No significant change compared to 10/3/2024.  2. Chronic findings as described above.      Electronically Signed: Jennifer Mon MD    10/14/2024 12:12 PM EDT    Workstation ID: [id number]    FL Video Swallow With Speech Single Contrast [[id number]] Collected: 10/10/24 1433     Updated: 10/10/24 1437    Narrative:      FL VIDEO SWALLOW W SPEECH SINGLE-CONTRAST    Date of Exam: 10/10/2024 1:51 PM EDT    Indication: aspiration.       Comparison: None available.    Technique:   The speech pathologist administered food and/or liquid mixed with barium to the patient with cine/video imaging.  Imaging assistance was provided to the speech pathologist and an image was saved.    Fluoroscopic Time: 48-second    Number of Images: 5 fluoroscopic series    Findings:  Fluoroscopy provided for speech therapy evaluation of swallowing. No aspiration or penetration. Prominent cricopharyngeus.      Impression:       Impression:  Fluoroscopy provided for speech therapy evaluation of swallowing. No aspiration or penetration. Prominent cricopharyngeus. Please see speech therapy report for full findings and recommendations.        Electronically Signed: Antoni Faith MD    10/10/2024 2:34 PM EDT    Workstation ID: [id number]    XR Chest 1 View [[id number]] Collected: 10/09/24 2006     Updated: 10/09/24 2012    Narrative:      XR CHEST 1 VW    Date of Exam: 10/9/2024 7:38 PM EDT    Indication: Weak/Dizzy/AMS triage protocol    Comparison: Chest radiograph 10/3/2024    Findings:      Mediastinum: Cardiomediastinal silhouette appears unchanged and normal in size    Lungs: Bronchial wall thickening and bilateral mild interstitial opacities. There is no appreciable prominence of the central pulmonary vasculature or convincing septal thickening to strongly suggest pulmonary edema. There is a consolidative opacity in   the left lower lobe.    Pleura: No pleural effusion or pneumothorax.    Bones and soft tissues: No acute osseous abnormality.        Impression:      Impression:  Consolidative opacity in the left lower lobe suspicious for aspiration or pneumonia.    Bronchial wall thickening which can be seen in bronchitis and/or large airways disease.      Electronically Signed: Francisco Javier Fuller    10/9/2024 8:09 PM EDT    Workstation ID: [id number]    CT Abdomen Pelvis Without Contrast [[id number]] Collected: 10/09/24 1948     Updated: 10/09/24 1957    Narrative:      CT ABDOMEN PELVIS WO CONTRAST    Date of Exam: 10/9/2024 7:26 PM EDT    Indication: nausea and severe epigastric pain, no BM 2 weeks.    Comparison: CT abdomen pelvis 9/27/2024    Technique: Axial CT images were obtained of the abdomen and pelvis without the administration of contrast. Reconstructed coronal and sagittal images were also obtained. Automated exposure control and iterative construction methods were used.      Findings:  Heart size within normal limits. Trace  pericardial fluid stable from prior. Small hiatal hernia. Lower lungs without acute abnormality. Small fat-containing right Bochdalek hernia.    Noncontrast appearance of liver, spleen and biliary tree unremarkable. Cholecystectomy. Atrophic pancreas stable from prior without active inflammation. No suspicious adrenal nodule. Symmetric renal size and contour. Stable punctate right midpole   calculus. No hydronephrosis. Urinary bladder unremarkable. Hysterectomy. No suspicious adnexal mass.    Expected configuration stomach and duodenum. Mild to moderate formed colonic stool. No evidence of bowel obstruction or active inflammation. No CT evidence of acute appendicitis. Extensive abdominal atherosclerotic disease. Celiac artery stent. Negative   for abdominal aortic aneurysm.    No suspicious adenopathy. No ascites, free air or drainable collection. Fat-containing umbilical and periumbilical hernias stable from prior without edema or contained bowel. No acute soft tissue finding.     Degenerative osteoarthritis of the hip joints, right greater than left. Discectomy and fusion changes of the lumbar spine unchanged from prior. 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 Anterolisthesis L4 and L5 stable from prior. Periprosthetic lucency at   L2 and L5 screws stable from prior suggesting loosening.      Impression:      Impression:  No acute CT findings. Multiple chronic/ancillary findings overall without significant change from recent comparison.            Electronically Signed: Deon Gimenez MD    10/9/2024 7:53 PM EDT    Workstation ID: [id number]            Results for orders placed during the hospital encounter of 07/15/24    Adult Transthoracic Echo Complete W/ Cont if Necessary Per Protocol    Interpretation Summary  •  Left ventricular systolic function is normal. Calculated left ventricular EF = 63.2%  •  Estimated right ventricular systolic pressure from tricuspid regurgitation is normal (<35 mmHg).  •  Normal  left atrial size and volume noted.  •  There is calcification of the aortic valve. Mild to moderate aortic valve regurgitation is present.          Discharge Details        Discharge Medications        New Medications        Instructions Start Date   aspirin 81 MG chewable tablet  Replaces: aspirin 81 MG EC tablet   81 mg, Oral, Daily   Start Date: October 23, 2024     donepezil 5 MG tablet  Commonly known as: ARICEPT   5 mg, Oral, Nightly      ferrous sulfate 325 (65 FE) MG tablet   325 mg, Oral, Daily With Breakfast   Start Date: October 23, 2024     hydrOXYzine 10 MG tablet  Commonly known as: ATARAX   10 mg, Oral, 3 Times Daily PRN      insulin glargine 100 UNIT/ML injection  Commonly known as: LANTUS, SEMGLEE   10 Units, Subcutaneous, Daily   Start Date: October 23, 2024     insulin regular 100 UNIT/ML injection  Commonly known as: humuLIN R,novoLIN R   2-7 Units, Subcutaneous, 3 Times Daily With Meals      melatonin 5 MG tablet tablet   5 mg, Oral, Nightly      nicotine 7 MG/24HR patch  Commonly known as: NICODERM CQ  Replaces: nicotine 21 MG/24HR patch   1 patch, Transdermal, Every 24 Hours Scheduled   Start Date: October 23, 2024     oxymetazoline 0.05 % nasal spray  Commonly known as: AFRIN   2 sprays, Nasal, 2 Times Daily      polyethylene glycol 17 g packet  Commonly known as: MIRALAX   17 g, Oral, Daily   Start Date: October 23, 2024     sennosides-docusate 8.6-50 MG per tablet  Commonly known as: PERICOLACE   2 tablets, Oral, 2 Times Daily      simethicone 80 MG chewable tablet  Commonly known as: MYLICON   80 mg, Oral, 4 Times Daily PRN      temazepam 7.5 MG capsule  Commonly known as: RESTORIL   7.5 mg, Oral, Nightly             Changes to Medications        Instructions Start Date   acetaminophen 325 MG tablet  Commonly known as: TYLENOL  What changed:   medication strength  how much to take  when to take this   650 mg, Oral, Every 4 Hours PRN             Continue These Medications         Instructions Start Date   albuterol (2.5 MG/3ML) 0.083% nebulizer solution  Commonly known as: PROVENTIL   2.5 mg, Nebulization, Every 4 Hours PRN      Ventolin  (90 Base) MCG/ACT inhaler  Generic drug: albuterol sulfate HFA   2 puffs, Inhalation, Every 6 Hours PRN, for wheezing      atorvastatin 80 MG tablet  Commonly known as: LIPITOR   80 mg, Oral, Daily      Blood Glucose Monitoring Suppl kit   1 each, Does not apply, Daily      ONE TOUCH ULTRA 2 w/Device kit   USE 1 EACH DAILY.      carvedilol 25 MG tablet  Commonly known as: COREG   TAKE ONE TABLET BY MOUTH TWO TIMES A DAY      cetirizine 10 MG tablet  Commonly known as: zyrTEC   5 mg, Oral, Daily      clopidogrel 75 MG tablet  Commonly known as: PLAVIX   75 mg, Oral, Daily      Comfort EZ Pen Needles 32G X 4 MM misc  Generic drug: Insulin Pen Needle   No dose, route, or frequency recorded.      Comfort EZ Pen Needles 32G X 4 MM misc  Generic drug: Insulin Pen Needle   USE AS DIRECTED DAILY      Dexcom G7  device   1 each, Does not apply, Daily      Dexcom G7 Sensor misc   1 each, Does not apply, Every 10 Days      Diclofenac Sodium 1 % gel gel  Commonly known as: VOLTAREN   APPLY TO AFFECTED AREA FOUR TIMES DAILY      Dupixent 300 MG/2ML solution pen-injector  Generic drug: Dupilumab   300 mg, Subcutaneous, As Needed      fluticasone 50 MCG/ACT nasal spray  Commonly known as: FLONASE   2 sprays, Each Nare, Daily      isosorbide mononitrate 60 MG 24 hr tablet  Commonly known as: IMDUR   60 mg, Oral, Daily   Start Date: October 23, 2024     Januvia 50 MG tablet  Generic drug: SITagliptin   TAKE 1 TABLET BY MOUTH DAILY      multivitamin with minerals tablet tablet   1 tablet, Oral, Daily      naloxone 4 MG/0.1ML nasal spray  Commonly known as: NARCAN   1 spray, Nasal, As Needed      OneTouch Delica Plus [id number] misc   3 Times Daily, as directed      pantoprazole 40 MG EC tablet  Commonly known as: PROTONIX   40 mg, Oral, 2 Times Daily       tacrolimus 0.1 % ointment  Commonly known as: PROTOPIC   APPLY TO AFFECTED AREA TWO TIMES A DAY      tolterodine LA 2 MG 24 hr capsule  Commonly known as: DETROL LA   2 mg, Oral, Daily      True Metrix Blood Glucose Test test strip  Generic drug: glucose blood   Daily, for testing      Blood Glucose Test strip   1 each, In Vitro, 3 Times Daily             Stop These Medications      aspirin 81 MG EC tablet  Replaced by: aspirin 81 MG chewable tablet     busPIRone 5 MG tablet  Commonly known as: BUSPAR     famotidine 10 MG tablet  Commonly known as: PEPCID     glipizide 5 MG tablet  Commonly known as: GLUCOTROL     HYDROcodone-acetaminophen 7.5-325 MG per tablet  Commonly known as: NORCO     nicotine 21 MG/24HR patch  Commonly known as: Nicoderm CQ  Replaced by: nicotine 7 MG/24HR patch     predniSONE 5 MG tablet  Commonly known as: DELTASONE     SSD 1 % cream  Generic drug: silver sulfadiazine              Allergies   Allergen Reactions   • Ceclor [Cefaclor] Rash         Discharge Disposition:  Rehab Facility or Unit (DC - External)    Discharge Diet:  Diet Order   Procedures   • Diet: Cardiac, Diabetic; Healthy Heart (2-3 Na+); Consistent Carbohydrate; No Straw; Texture: Mechanical Ground (NDD 2); Fluid Consistency: Thin (IDDSI 0)       Discharge Activity:  Activity Instructions       Activity as Tolerated      Measure Blood Pressure                CODE STATUS:    Code Status and Medical Interventions: No CPR (Do Not Attempt to Resuscitate); Limited Support; No intubation (DNI)   Ordered at: 10/10/24 0002     Medical Intervention Limits:    No intubation (DNI)     Level Of Support Discussed With:    Next of Kin (If No Surrogate)     Code Status (Patient has no pulse and is not breathing):    No CPR (Do Not Attempt to Resuscitate)     Medical Interventions (Patient has pulse or is breathing):    Limited Support         Future Appointments   Date Time Provider Department Center   12/20/2024  1:30 PM Paula Duncan  [facility]   1/16/2025  1:00 PM Yulia Hurt APRN [facility]       Additional Instructions for the Follow-ups that You Need to Schedule       Ambulatory Referral to Home Health   As directed      Face to Face Visit Date: 10/10/2024   Follow-up provider for Plan of Care?: I treated the patient in an acute care facility and will not continue treatment after discharge.   Follow-up provider: TYLER RANGEL [[id number]]   Reason/Clinical Findings: sp hospital stay   Describe mobility limitations that make leaving home difficult: PNA, weakness, UTI   Nursing/Therapeutic Services Requested: Skilled Nursing Physical Therapy Occupational Therapy   Skilled nursing orders: Medication education Cardiopulmonary assessments   PT orders: Therapeutic exercise Gait Training Transfer training Strengthening   Weight Bearing Status: As Tolerated   Occupational orders: Activities of daily living Home safety assessment Energy conservation Strengthening Cognition Fine motor   Frequency: 1 Week 1        Discharge Follow-up with PCP   As directed       Currently Documented PCP:    Tyler Rangel MD    PCP Phone Number:    [phone]     Follow Up Details: To be seen by provider at facility on arrival, PCP 1 week of discharge        Discharge Follow-up with Specialty: Follow[facility]up with [facility] in 1 month (please schedule appointment prior to DC)   As directed      Specialty: Follow-up with [facility] in 1 month (please schedule appointment prior to DC)        Discharge Follow-up with Specified Provider: Follow-up with nephrology outpatient as prior scheduled   As directed      To: Follow-up with nephrology outpatient as prior scheduled                Electronically signed by BALAJI Huynh, 10/22/24, 10:36 AM EDT.      Time Spent on Discharge: I spent 45 minutes on this discharge activity which included: face-to-face encounter with the patient, reviewing the data  in the system, coordination of the care with the nursing staff as well as consultants, documentation, and entering orders.

## 2024-10-22 NOTE — PLAN OF CARE
Problem: Adult Inpatient Plan of Care  Goal: Plan of Care Review  Outcome: Progressing  Flowsheets  Taken 10/18/2024 1422 by Kyra Gamboa MS CCC-SLP  Progress: no change  Plan of Care Reviewed With: patient  Taken 10/18/2024 1130 by Marla Cooper PT  Outcome Evaluation: Pt req ModAx2 w/ B UE support for t/f to chair. Mobility continues to be limited by confusion, poor command following, and decreased safety awareness. Plan to continue progressing PT POC as tolerated to address deficits in strength, balance, postural control, and functional endurance.. PT rec SNF at d/c.  Goal: Patient-Specific Goal (Individualized)  Outcome: Progressing  Goal: Absence of Hospital-Acquired Illness or Injury  Outcome: Progressing  Intervention: Identify and Manage Fall Risk  Recent Flowsheet Documentation  Taken 10/22/2024 0600 by Tarun Zimmer RN  Safety Promotion/Fall Prevention: activity supervised  Taken 10/22/2024 0439 by Tarun Zimmer RN  Safety Promotion/Fall Prevention: activity supervised  Taken 10/22/2024 0400 by Tarun Zimmer RN  Safety Promotion/Fall Prevention: activity supervised  Taken 10/22/2024 0151 by Tarun Zimmer RN  Safety Promotion/Fall Prevention: activity supervised  Taken 10/22/2024 0000 by Tarun Zimmer RN  Safety Promotion/Fall Prevention: activity supervised  Taken 10/21/2024 2152 by Tarun Zimmer RN  Safety Promotion/Fall Prevention: activity supervised  Taken 10/21/2024 2003 by Tarun Zimmer RN  Safety Promotion/Fall Prevention: activity supervised  Intervention: Prevent Skin Injury  Recent Flowsheet Documentation  Taken 10/22/2024 0600 by Tarun Zimmer RN  Body Position: position changed independently  Skin Protection: silicone foam dressing in place  Taken 10/22/2024 0400 by Tarun Zimmer RN  Body Position: position changed independently  Skin Protection: silicone foam dressing in place  Taken 10/22/2024 0151 by Tarun Zimmer  ANGELITO RAWLS  Body Position: position changed independently  Skin Protection: silicone foam dressing in place  Taken 10/22/2024 0000 by Tarun Zimmer RN  Body Position: position changed independently  Skin Protection: silicone foam dressing in place  Taken 10/21/2024 2152 by Tarun Zimmer RN  Body Position: position changed independently  Skin Protection: silicone foam dressing in place  Taken 10/21/2024 2003 by Tarun Zimmer RN  Body Position: position changed independently  Skin Protection: silicone foam dressing in place  Intervention: Prevent and Manage VTE (Venous Thromboembolism) Risk  Recent Flowsheet Documentation  Taken 10/22/2024 0600 by Tarun Zimmer RN  VTE Prevention/Management:   bilateral   SCDs (sequential compression devices) on  Taken 10/22/2024 0400 by Tarun Zimmer RN  VTE Prevention/Management:   bilateral   SCDs (sequential compression devices) on  Taken 10/22/2024 0151 by Tarun Zimmer RN  VTE Prevention/Management:   bilateral   SCDs (sequential compression devices) on  Taken 10/22/2024 0000 by Tarun Zimmer RN  VTE Prevention/Management:   bilateral   SCDs (sequential compression devices) on  Taken 10/21/2024 2152 by Tarun Zimmer RN  VTE Prevention/Management:   bilateral   SCDs (sequential compression devices) on  Taken 10/21/2024 2003 by Tarun Zimmer RN  VTE Prevention/Management:   bilateral   SCDs (sequential compression devices) on  Intervention: Prevent Infection  Recent Flowsheet Documentation  Taken 10/22/2024 0600 by Tarun Zimmer RN  Infection Prevention:   cohorting utilized   environmental surveillance performed   equipment surfaces disinfected   hand hygiene promoted   single patient room provided  Taken 10/22/2024 0439 by Tarun Zimmer RN  Infection Prevention:   cohorting utilized   environmental surveillance performed  Taken 10/22/2024 0400 by Tarun Zimmer RN  Infection Prevention:   cohorting  utilized   environmental surveillance performed   equipment surfaces disinfected   hand hygiene promoted   single patient room provided  Taken 10/22/2024 0151 by Tarun Zimmer RN  Infection Prevention:   cohorting utilized   environmental surveillance performed   equipment surfaces disinfected   hand hygiene promoted   single patient room provided  Taken 10/22/2024 0000 by Tarun Zimmer RN  Infection Prevention:   cohorting utilized   environmental surveillance performed   equipment surfaces disinfected   hand hygiene promoted   single patient room provided  Taken 10/21/2024 2152 by Tarun Zimmer RN  Infection Prevention:   cohorting utilized   environmental surveillance performed   equipment surfaces disinfected   hand hygiene promoted   single patient room provided  Taken 10/21/2024 2003 by Tarun Zimmer RN  Infection Prevention:   cohorting utilized   environmental surveillance performed   equipment surfaces disinfected   hand hygiene promoted   single patient room provided  Goal: Optimal Comfort and Wellbeing  Outcome: Progressing  Intervention: Provide Person-Centered Care  Recent Flowsheet Documentation  Taken 10/22/2024 0600 by Tarun Zimmer RN  Trust Relationship/Rapport:   care explained   reassurance provided  Taken 10/22/2024 0400 by Tarun Zimmer RN  Trust Relationship/Rapport:   care explained   reassurance provided  Taken 10/22/2024 0151 by Tarun Zimmer RN  Trust Relationship/Rapport:   care explained   reassurance provided  Taken 10/22/2024 0000 by Tarun Zimmer RN  Trust Relationship/Rapport:   care explained   reassurance provided  Taken 10/21/2024 2152 by Tarun Zimmer RN  Trust Relationship/Rapport:   care explained   reassurance provided  Taken 10/21/2024 2003 by Tarun Zimmer RN  Trust Relationship/Rapport:   care explained   reassurance provided  Goal: Readiness for Transition of Care  Outcome: Progressing     Problem:  Pneumonia  Goal: Fluid Balance  Outcome: Progressing  Goal: Absence of Infection Signs and Symptoms  Outcome: Progressing  Intervention: Prevent Infection Progression  Recent Flowsheet Documentation  Taken 10/22/2024 0600 by Tarun Zimmer RN  Isolation Precautions: precautions maintained  Taken 10/22/2024 0439 by Tarun Zimmer RN  Isolation Precautions: precautions maintained  Taken 10/22/2024 0400 by Tarun Zimmer RN  Isolation Precautions: precautions maintained  Taken 10/22/2024 0151 by Tarun Zimmer RN  Isolation Precautions: precautions maintained  Taken 10/22/2024 0000 by Tarun Zimmer RN  Isolation Precautions: precautions maintained  Taken 10/21/2024 2152 by Tarun Zimmer RN  Isolation Precautions: precautions maintained  Taken 10/21/2024 2003 by Tarun Zimmer RN  Isolation Precautions: precautions maintained  Goal: Effective Oxygenation and Ventilation  Outcome: Progressing  Intervention: Promote Airway Secretion Clearance  Recent Flowsheet Documentation  Taken 10/22/2024 0600 by Tarun Zimmer RN  Activity Management: activity encouraged  Cough And Deep Breathing: done independently per patient  Taken 10/22/2024 0400 by Tarun Zimmer RN  Activity Management: activity encouraged  Cough And Deep Breathing: done independently per patient  Taken 10/22/2024 0151 by Tarun Zimmer RN  Activity Management: activity encouraged  Cough And Deep Breathing: done independently per patient  Taken 10/22/2024 0000 by Tarun Zimmer RN  Activity Management: activity encouraged  Cough And Deep Breathing: done independently per patient  Taken 10/21/2024 2152 by Tarun Zimmer, RN  Activity Management: activity encouraged  Cough And Deep Breathing: done independently per patient  Taken 10/21/2024 2003 by Tarun Zimmer, RN  Activity Management: activity encouraged  Cough And Deep Breathing: done independently per patient  Intervention:  Optimize Oxygenation and Ventilation  Recent Flowsheet Documentation  Taken 10/22/2024 0600 by Tarun Zimmer RN  Head of Bed (HOB) Positioning: HOB elevated  Taken 10/22/2024 0400 by Tarun Zimmer RN  Head of Bed (HOB) Positioning: HOB elevated  Taken 10/22/2024 0151 by Tarun Zimmer RN  Head of Bed (HOB) Positioning: HOB elevated  Taken 10/22/2024 0000 by Tarun Zimmer RN  Head of Bed (HOB) Positioning: HOB elevated  Taken 10/21/2024 2152 by Tarun Zimmer RN  Head of Bed (HOB) Positioning: HOB elevated  Taken 10/21/2024 2003 by Tarun Zimmer RN  Head of Bed (HOB) Positioning: HOB elevated     Problem: Skin Injury Risk Increased  Goal: Skin Health and Integrity  Outcome: Progressing  Intervention: Optimize Skin Protection  Recent Flowsheet Documentation  Taken 10/22/2024 0600 by Tarun Zimmer, RN  Activity Management: activity encouraged  Pressure Reduction Techniques:   frequent weight shift encouraged   heels elevated off bed   weight shift assistance provided  Head of Bed (HOB) Positioning: HOB elevated  Pressure Reduction Devices:   pressure-redistributing mattress utilized   positioning supports utilized  Skin Protection: silicone foam dressing in place  Taken 10/22/2024 0400 by Tarun Zimmer RN  Activity Management: activity encouraged  Pressure Reduction Techniques:   frequent weight shift encouraged   heels elevated off bed   weight shift assistance provided  Head of Bed (HOB) Positioning: HOB elevated  Pressure Reduction Devices:   pressure-redistributing mattress utilized   positioning supports utilized  Skin Protection: silicone foam dressing in place  Taken 10/22/2024 0151 by Tarun Zimmer, RN  Activity Management: activity encouraged  Pressure Reduction Techniques:   frequent weight shift encouraged   heels elevated off bed   weight shift assistance provided  Head of Bed (HOB) Positioning: HOB elevated  Pressure Reduction Devices:    pressure-redistributing mattress utilized   positioning supports utilized  Skin Protection: silicone foam dressing in place  Taken 10/22/2024 0000 by Tarun Zimmer RN  Activity Management: activity encouraged  Pressure Reduction Techniques:   frequent weight shift encouraged   heels elevated off bed   weight shift assistance provided  Head of Bed (HOB) Positioning: HOB elevated  Pressure Reduction Devices:   pressure-redistributing mattress utilized   positioning supports utilized  Skin Protection: silicone foam dressing in place  Taken 10/21/2024 2152 by Tarun Zimmer, RN  Activity Management: activity encouraged  Pressure Reduction Techniques:   frequent weight shift encouraged   heels elevated off bed   weight shift assistance provided  Head of Bed (HOB) Positioning: HOB elevated  Pressure Reduction Devices:   pressure-redistributing mattress utilized   positioning supports utilized  Skin Protection: silicone foam dressing in place  Taken 10/21/2024 2003 by Tarun Zimmer, RN  Activity Management: activity encouraged  Pressure Reduction Techniques:   frequent weight shift encouraged   heels elevated off bed   weight shift assistance provided  Head of Bed (HOB) Positioning: HOB elevated  Pressure Reduction Devices:   pressure-redistributing mattress utilized   positioning supports utilized  Skin Protection: silicone foam dressing in place     Problem: Fall Injury Risk  Goal: Absence of Fall and Fall-Related Injury  Outcome: Progressing  Intervention: Promote Injury-Free Environment  Recent Flowsheet Documentation  Taken 10/22/2024 0600 by Tarun Zimmer RN  Safety Promotion/Fall Prevention: activity supervised  Taken 10/22/2024 0439 by Tarun Zimmer RN  Safety Promotion/Fall Prevention: activity supervised  Taken 10/22/2024 0400 by Tarun Zimmer RN  Safety Promotion/Fall Prevention: activity supervised  Taken 10/22/2024 0151 by Tarun Zimmer RN  Safety Promotion/Fall  Prevention: activity supervised  Taken 10/22/2024 0000 by Tarun Zimmer RN  Safety Promotion/Fall Prevention: activity supervised  Taken 10/21/2024 2152 by Tarun Zimmer RN  Safety Promotion/Fall Prevention: activity supervised  Taken 10/21/2024 2003 by Tarun Zimmer RN  Safety Promotion/Fall Prevention: activity supervised     Problem: Confusion Acute  Goal: Optimal Cognitive Function  Outcome: Progressing  Intervention: Minimize Contributing Factors  Recent Flowsheet Documentation  Taken 10/22/2024 0600 by Tarun Zimmer RN  Sensory Stimulation Regulation:   tactile stimulation minimized   television on  Reorientation Measures:   clock in view   calendar in view  Communication Support Strategies: active listening utilized  Taken 10/22/2024 0400 by Tarun Zimmer RN  Sensory Stimulation Regulation:   tactile stimulation minimized   television on  Reorientation Measures:   clock in view   calendar in view  Communication Support Strategies: active listening utilized  Taken 10/22/2024 0151 by Tarun Zimmer RN  Sensory Stimulation Regulation:   tactile stimulation minimized   television on  Reorientation Measures:   clock in view   calendar in view  Communication Support Strategies: active listening utilized  Taken 10/22/2024 0000 by Tarun Zimmer RN  Sensory Stimulation Regulation:   tactile stimulation minimized   television on  Reorientation Measures:   clock in view   calendar in view  Communication Support Strategies: active listening utilized  Taken 10/21/2024 2152 by Tarun Zimmer RN  Sensory Stimulation Regulation:   tactile stimulation minimized   television on  Reorientation Measures:   clock in view   calendar in view  Communication Support Strategies: active listening utilized  Taken 10/21/2024 2003 by Tarun Zimmer RN  Sensory Stimulation Regulation:   tactile stimulation minimized   television on  Reorientation Measures:   clock in view    calendar in view  Communication Support Strategies: active listening utilized   Goal Outcome Evaluation:

## 2024-10-22 NOTE — TELEPHONE ENCOUNTER
Caller: SUSAN    Relationship to patient: STACY TESFAYE     Best call back number: [phone]    New or established patient?  [] New  [] Established    Date of discharge: 10/22/24    Facility discharged from:  [facility]    Diagnosis/Symptoms: ALTERED MENTAL STATUS     Length of stay (If applicable): 13 DAYS    Specialty Only: Did you see a [facility] provider?    [x] Yes  [] No  If so, who? CARLOS PAINTING MD     Additional Details: PT NEEDS ONE MONTH FOLLOW UP.  NEXT AVAILABLE IS NOT UNTIL 12/20/24.  PLEASE CALL PT IF SOONER APPT AVAILABLE.      THANK YOU

## 2024-10-22 NOTE — NURSING NOTE
I was walking to patient room to check on my patient since call light was going off. Pt was found on the floor sitting down at 0439 patient was placed back in bed. A physical assessment wast perform as well as vital signs.No significant changes were observe post fall. Suha Justin was notified of fall/ vital signs and orders were obtained for images. Pt did not suffered any visible injuries. Patient sitter Delbert Hocker was with patient at the moment of the fall. Pt bed alarm was on 3 side rail were up.  Fall precaution were being observed. My self and tech were in pt room moments before cleaning patient and repositioning her. Frequent checks were being done.  
[All other systems negative] : All other systems negative

## 2024-10-22 NOTE — DISCHARGE PLACEMENT REQUEST
"Arminda Noel (79 y.o. Female)       Date of Birth   1945    Social Security Number       Address   [address] [place] [id number]    Home Phone   [phone]    MRN   [id number]       Christian   [facility]    Marital Status                               Admission Date   10/9/24    Admission Type   Emergency    Admitting Provider   Christine Reed MD    Attending Provider   Christine Reed MD    Department, Room/Bed   [address], S311/1       Discharge Date       Discharge Disposition   Rehab Facility or Unit (DC - External)    Discharge Destination                                 Attending Provider: Christine Reed MD    Allergies: Ceclor [Cefaclor]    Isolation: None   Infection: None   Code Status: No CPR    Ht: 152.4 cm (60\")   Wt: 81.6 kg (180 lb)    Admission Cmt: None   Principal Problem: LLL pneumonia [J18.9]                   Active Insurance as of 10/9/2024       Primary Coverage       Payor Plan Insurance Group Employer/Plan Group    ANTHEM MEDICARE REPLACEMENT ANTHEM MEDICARE ADVANTAGE [id number]       Payor Plan Address Payor Plan Phone Number Payor Plan Fax Number Effective Dates    [address] [phone]  7/1/2024 - None Entered    [facility] [address]         Subscriber Name Subscriber Birth Date Member ID       ARMINDA NOEL 1945 [id number]               Secondary Coverage       Payor Plan Insurance Group Employer/Plan Group    [place]        Payor Plan Address Payor Plan Phone Number Payor Plan Fax Number Effective Dates    [address] [phone]  2/20/2024 - None Entered    [facility] [id number]         Subscriber Name Subscriber Birth Date Member ID       ARMINDA NOEL 1945 [id number]                     Emergency Contacts        (Rel.) Home Phone Work Phone Mobile Phone    AliviaDelbert randall (Sister) [phone] -- [phone]              Insurance Information               "    ANTHEM MEDICARE REPLACEMENT/ANTHEM MEDICARE ADVANTAGE Phone: [phone]    Subscriber: Arminda Krishnan Subscriber#: [id number]    Group#: [id number] Precert#: --    Authorization#: -- Effective Date: --        [place] Phone: [phone]    Subscriber: Arminda Krishnan Subscriber#: [id number]    Group#: -- Precert#: --    Authorization#: [id number] Effective Date: --             History & Physical        Alesha Manriquezgio Newell III, DO at 10/09/24 2304              [facility] Hospital Medicine Services  HISTORY AND PHYSICAL    Patient Name: Arminda Krishnan  : 1945  MRN: [id number]  Primary Care Physician: Aiden Spencer MD  Date of admission: 10/9/2024    Subjective  Subjective     Chief Complaint:  Hyperglycemia    HPI:  Arminda Krishnan is a 79 y.o. female with significant medical history of mild cognitive impairment, type 2 diabetes, history of CVA, anemia, degenerative disc disease on steroids who presents to [facility] ED with hyperglycemia.     Due to decreased LOC HPI was obtained via medical record and next of kin at bedside.  Patient's sister reports that home health arrived today and checked patient's glucose was 495.  It remained in the high 300s low 400s, and the patient appeared more lethargic than normal so they brought her to the ED. they also report that her chronic cough has gotten progressively worse over the last 2 days, cough described as productive with yellow sputum.  Denies fevers body aches or chills.  No complaints of chest pain, occasional shortness of breath, SpO2 stable on room air.      Chest x-ray shows lower lobe pneumonia, concerning for aspirational pneumonia  UA in ED positive for leukocytes and bacteria +4  Uncontrolled type 2 diabetes on chronic steroids FSBG 393 on arrival    Received IVF, insulin, Zosyn and Vanc       Review of Systems   Constitutional:  Negative for chills and fever.    HENT:  Negative for congestion.    Eyes: Negative.    Respiratory:  Positive for cough, shortness of breath and wheezing. Negative for chest tightness.    Cardiovascular:  Negative for chest pain and leg swelling.   Gastrointestinal:  Negative for constipation, nausea and vomiting.   Endocrine: Negative.    Genitourinary: Negative.    Musculoskeletal:  Positive for myalgias (Baseline bilateral leg pain).   Skin: Negative.    Allergic/Immunologic: Positive for immunocompromised state.   Neurological:  Positive for weakness. Negative for syncope, light-headedness and headaches.   Hematological: Negative.    Psychiatric/Behavioral:  Positive for confusion (Baseline confusion, worsening).               Personal History     Past Medical History:   Diagnosis Date    Anemia     Arthritis     Back problem     CAD (coronary artery disease)     Cancer     Right breast    Chronic back pain     Chronically on opiate therapy     Depression     Diabetes mellitus     DX 14 years ago- checks fsbs weekly    Fibromyalgia     Gastroparesis     GERD (gastroesophageal reflux disease)     Headache     emotional/tension    History of transfusion     [facility]    HTN (hypertension)     Hypercholesteremia     IBS (irritable bowel syndrome)     Incontinence of urine     urgency    Migraine headache     Myalgia and myositis     Peripheral neuropathy     Sleep apnea     does not wear cpap    UTI (urinary tract infection)              Past Surgical History:   Procedure Laterality Date    APPENDECTOMY      ARTERIOGRAM MESENTERIC N/A 6/16/2022    Procedure: DIAGNOSTIC ARTERIOGRAM WITH CELIAC STENT PLACEMENT;  Surgeon: Neo Neil MD;  Location: [facility];  Service: Vascular;  Laterality: N/A;  FLUORO: 16 MIN  DOSE: 2384 MGY  CONTRAST:  20 ML    BACK SURGERY      5x per patient    BRAIN TUMOR EXCISION  1988    BREAST BIOPSY      CARPAL TUNNEL RELEASE Bilateral     CHOLECYSTECTOMY      COLONOSCOPY      2015    CRANIOTOMY  FOR TUMOR      EYE SURGERY      bilateral cataracts removed    HEMORRHOIDECTOMY      LUMBAR FUSION N/A 01/04/2017    Procedure: LUMBAR LAMINECTOMY AND DECOMRESSION  L3 AND L4;  Surgeon: Nishant Bhatti MD;  Location: [facility] to [facility];  Service:     TOTAL ABDOMINAL HYSTERECTOMY      TRIGGER FINGER RELEASE         Family History: family history includes Alcohol abuse in her father; Cancer in her brother, brother, father, and another family member; Diabetes in her brother, mother, sister, and another family member; Heart attack in her mother, sister, sister, and another family member; Heart disease in her mother and sister; Hyperlipidemia in an other family member; Hypertension in her brother, mother, sister, and another family member; Stroke in her sister.     Social History:  reports that she has been smoking cigarettes. She started smoking about 65 years ago. She has a 47.4 pack-year smoking history. She has been exposed to tobacco smoke. She has never used smokeless tobacco. She reports that she does not drink alcohol and does not use drugs.  Social History     Social History Narrative    Lives in [place], [place] with sister       Medications:  Blood Glucose Monitoring Suppl, Blood Glucose Test, Dexcom G7 , Dexcom G7 Sensor, Diclofenac Sodium, Dupilumab, HYDROcodone-acetaminophen, Insulin Pen Needle, ONE TOUCH ULTRA 2, OneTouch Delica Plus Regiwj30K, SITagliptin, acetaminophen, albuterol, albuterol sulfate HFA, aspirin, atorvastatin, busPIRone, carvedilol, cetirizine, clopidogrel, famotidine, fluticasone, glipizide, glucose blood, isosorbide mononitrate, multivitamin with minerals, naloxone, nicotine, pantoprazole, predniSONE, pregabalin, silver sulfadiazine, tacrolimus, and tolterodine LA    Allergies   Allergen Reactions    Ceclor [Cefaclor] Rash       Objective  Objective     Vital Signs:   Temp:  [98.3 °F (36.8 °C)] 98.3 °F (36.8 °C)  Heart Rate:  [80-83] 80  Resp:  [18] 18  BP: (134-158)/(69-73)  140/73    Physical Exam  Constitutional:       Appearance: She is well-groomed. She is ill-appearing.   HENT:      Head: Normocephalic.   Cardiovascular:      Rate and Rhythm: Normal rate and regular rhythm.   Pulmonary:      Effort: Pulmonary effort is normal.      Breath sounds: Wheezing and rhonchi present.   Abdominal:      General: Abdomen is flat. Bowel sounds are normal.      Palpations: Abdomen is soft.   Musculoskeletal:      Right lower leg: No edema.      Left lower leg: No edema.   Skin:     General: Skin is warm and dry.   Neurological:      Mental Status: She is lethargic and disoriented.   Psychiatric:         Behavior: Behavior is cooperative.          Result Review:  I have personally reviewed the results from the time of this admission to 10/10/2024 00:02 EDT and agree with these findings:  [x]  Laboratory list / accordion  [x]  Microbiology  []  Radiology  [x]  EKG/Telemetry   []  Cardiology/Vascular   []  Pathology  [x]  Old records  []  Other:      LAB RESULTS:      Lab 10/09/24  1842 10/03/24  1356   WBC 8.68 7.56   HEMOGLOBIN 7.9* 7.6*   HEMATOCRIT 25.0* 24.2*   PLATELETS 301 213   NEUTROS ABS 6.31 4.96   IMMATURE GRANS (ABS) 0.20* 0.11*   LYMPHS ABS 1.13 1.43   MONOS ABS 0.83 0.84   EOS ABS 0.13 0.16   MCV 88.7 89.0   CRP 13.05*  --    PROCALCITONIN 0.12  --    LACTATE 0.9  --          Lab 10/09/24  1842 10/03/24  1356   SODIUM 136 135*   POTASSIUM 4.4 4.3   CHLORIDE 103 101   CO2 19.0* 20.0*   ANION GAP 14.0 14.0   BUN 56* 38*   CREATININE 3.30* 2.88*   EGFR 13.5* 15.9*   GLUCOSE 359* 303*   CALCIUM 9.5 9.5   MAGNESIUM 1.8 1.7   PHOSPHORUS 3.5  --    TSH 0.465 0.580         Lab 10/09/24  1842 10/03/24  1356   TOTAL PROTEIN 7.4 7.1   ALBUMIN 3.3* 3.4*   GLOBULIN 4.1 3.7   ALT (SGPT) 18 13   AST (SGOT) 21 17   BILIRUBIN 0.3 0.4   ALK PHOS 78 77   LIPASE 23  --          Lab 10/09/24  1842   PROBNP 719.5   HSTROP T 39*                 Lab 10/09/24  1846   FIO2 21   CARBOXYHEMOGLOBIN (VENOUS) 1.6      Brief Urine Lab Results  (Last result in the past 365 days)        Color   Clarity   Blood   Leuk Est   Nitrite   Protein   CREAT   Urine HCG        10/09/24 2058 Yellow   Cloudy   Large (3+)   Moderate (2+)   Negative   100 mg/dL (2+)                 Microbiology Results (last 10 days)       Procedure Component Value - Date/Time    COVID PRE-OP / PRE-PROCEDURE SCREENING ORDER (NO ISOLATION) - Swab, Nasopharynx [[id number]]  (Normal) Collected: 10/09/24 1843    Lab Status: Final result Specimen: Swab from Nasopharynx Updated: 10/09/24 1958    Narrative:      The following orders were created for panel order COVID PRE-OP / PRE-PROCEDURE SCREENING ORDER (NO ISOLATION) - Swab, Nasopharynx.  Procedure                               Abnormality         Status                     ---------                               -----------         ------                     COVID-19, FLU A/B, RSV P...[[id number]]  Normal              Final result                 Please view results for these tests on the individual orders.    COVID-19, FLU A/B, RSV PCR 1 HR TAT - Swab, Nasopharynx [[id number]]  (Normal) Collected: 10/09/24 1843    Lab Status: Final result Specimen: Swab from Nasopharynx Updated: 10/09/24 1958     COVID19 Not Detected     Influenza A PCR Not Detected     Influenza B PCR Not Detected     RSV, PCR Not Detected    Narrative:      Fact sheet for providers: https://www.fda.gov/media/032278/download    Fact sheet for patients: https://www.fda.gov/media/812785/download    Test performed by PCR.            XR Chest 1 View    Result Date: 10/9/2024  XR CHEST 1 VW Date of Exam: 10/9/2024 7:38 PM EDT Indication: Weak/Dizzy/AMS triage protocol Comparison: Chest radiograph 10/3/2024 Findings: Mediastinum: Cardiomediastinal silhouette appears unchanged and normal in size Lungs: Bronchial wall thickening and bilateral mild interstitial opacities. There is no appreciable prominence of the central pulmonary vasculature or  convincing septal thickening to strongly suggest pulmonary edema. There is a consolidative opacity in the left lower lobe. Pleura: No pleural effusion or pneumothorax. Bones and soft tissues: No acute osseous abnormality.     Impression: Impression: Consolidative opacity in the left lower lobe suspicious for aspiration or pneumonia. Bronchial wall thickening which can be seen in bronchitis and/or large airways disease. Electronically Signed: Francisco Javier Fuller  10/9/2024 8:09 PM EDT  Workstation ID: [id number]    CT Abdomen Pelvis Without Contrast    Result Date: 10/9/2024  CT ABDOMEN PELVIS WO CONTRAST Date of Exam: 10/9/2024 7:26 PM EDT Indication: nausea and severe epigastric pain, no BM 2 weeks. Comparison: CT abdomen pelvis 9/27/2024 Technique: Axial CT images were obtained of the abdomen and pelvis without the administration of contrast. Reconstructed coronal and sagittal images were also obtained. Automated exposure control and iterative construction methods were used. Findings: Heart size within normal limits. Trace pericardial fluid stable from prior. Small hiatal hernia. Lower lungs without acute abnormality. Small fat-containing right Bochdalek hernia. Noncontrast appearance of liver, spleen and biliary tree unremarkable. Cholecystectomy. Atrophic pancreas stable from prior without active inflammation. No suspicious adrenal nodule. Symmetric renal size and contour. Stable punctate right midpole calculus. No hydronephrosis. Urinary bladder unremarkable. Hysterectomy. No suspicious adnexal mass. Expected configuration stomach and duodenum. Mild to moderate formed colonic stool. No evidence of bowel obstruction or active inflammation. No CT evidence of acute appendicitis. Extensive abdominal atherosclerotic disease. Celiac artery stent. Negative for abdominal aortic aneurysm. No suspicious adenopathy. No ascites, free air or drainable collection. Fat-containing umbilical and periumbilical hernias stable from  prior without edema or contained bowel. No acute soft tissue finding. Degenerative osteoarthritis of the hip joints, right greater than left. Discectomy and fusion changes of the lumbar spine unchanged from prior. 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 Anterolisthesis L4 and L5 stable from prior. Periprosthetic lucency at  L2 and L5 screws stable from prior suggesting loosening.     Impression: Impression: No acute CT findings. Multiple chronic/ancillary findings overall without significant change from recent comparison. Electronically Signed: Deon Gimenez MD  10/9/2024 7:53 PM EDT  Workstation ID: [id number]     Results for orders placed during the hospital encounter of 07/15/24    Adult Transthoracic Echo Complete W/ Cont if Necessary Per Protocol    Interpretation Summary    Left ventricular systolic function is normal. Calculated left ventricular EF = 63.2%    Estimated right ventricular systolic pressure from tricuspid regurgitation is normal (<35 mmHg).    Normal left atrial size and volume noted.    There is calcification of the aortic valve. Mild to moderate aortic valve regurgitation is present.      Assessment & Plan  Assessment & Plan       LLL pneumonia    Degenerative disc disease, lumbar    CKD (chronic kidney disease) stage 4, GFR 15-29 ml/min    Hyperglycemia    History of CVA (cerebrovascular accident)    HTN (hypertension)      Left lower lung pneumonia  -CXR LLL pneumonia  -Respiratory panel negative  -Received Zosyn and Vanc in ED  -Sputum culture  -DuoNebs  -Urine antigens  -Continue Zosyn and vancomycin  -Speech consult  -N.p.o. until speech eval  CBC and CMP in a.m.      UTI  CKD   -UA positive for leukocytes, +4 bacteria  -Cultures pending  -Creatinine 3.30  -IV fluid  -ABX as above    Type 2 diabetes, uncontrolled  -Chronic prednisone use  -Received insulin and fluids in ED  -A1c 11 (September 2024)  -FSBG, SSI    HTN/HLD  History of CVA  -Continue ASA  -Continue atorvastatin  -Continue  carvedilol  -Continue isosorbide  -Continue clopidogrel    Anemia  -Serial H&H's  -Anemia studies  -Occult stool  -Consider GI consult      Degenerative disc disease  -Chronic prednisone 5 mg every other day      DVT prophylaxis:      CODE STATUS: DNR/DNI  Medical Intervention Limits: No intubation (DNI)  Level Of Support Discussed With: Next of Kin (If No Surrogate)  Code Status (Patient has no pulse and is not breathing): No CPR (Do Not Attempt to Resuscitate)  Medical Interventions (Patient has pulse or is breathing): Limited Support      Expected Discharge  Expected discharge date/ time has not been documented.  TBD      This note has been completed as part of a split-shared workflow.     Signature: Electronically signed by BALAJI Ge, 10/10/24, 12:12 AM EDT    -----------------------    The patient was seen independently by the APC.  I was available for any questions or concerns.     Electronically signed by Jin Manriquez III, DO, 10/10/24, 12:53 AM EDT.             Electronically signed by Jin Manriquez III, DO at 10/10/24 0053            Discharge Summary        Aimee Stevens APRN at 10/22/24 0900              [facility] Medicine Services  TRANSFER SUMMARY    Patient Name: Arminda Krishnan  : 1945  MRN: [id number]    Date of Admission: 10/9/2024  Date of Discharge:  10/22/2024  Length of Stay: 12  Primary Care Physician: Aiden Spencer MD    Consults       Date and Time Order Name Status Description    10/14/2024 10:30 AM Inpatient Neurology Consult General Completed             Hospital Course     Presenting Problem:   LLL pneumonia [J18.9]  Pneumonia [J18.9]    Active Hospital Problems    Diagnosis  POA    **LLL pneumonia [J18.9]  Yes    Pneumonia [J18.9]  Yes    HTN (hypertension) [I10]  Unknown    Hyperglycemia [R73.9]  Yes    History of CVA (cerebrovascular accident) [Z86.73]  Not Applicable    CKD (chronic kidney  disease) stage 4, GFR 15-29 ml/min [N18.4]  Yes    Degenerative disc disease, lumbar [M51.369]  Yes      Resolved Hospital Problems   No resolved problems to display.          Hospital Course:  Arminda Krishnan is a 79 y.o. female with history of mild coronary impairment, prior CVA, DDD on steroids, type 2 diabetes who presented to [facility] ED with hyperglycemia, workup concerning for pneumonia and UTI. Patient treated for pneumonia and UTI with IV ceftriaxone. Neurology evaluated and assisted with delirium symptoms, optimize medications for to enhance sleep wake cycle.     LLL pneumonia, suspected aspiration  Mild to moderate oral dysphagia  -CXR showed LLL pneumonia, likely secondary to aspiration  -Blood and sputum cultures are currently NGTD, Legionella and strep pneumo urinary antigens negative  -SLP evaluating, repeat MBS 10/18 with recommendation of soft to chew textures, thin liquids, mechanical ground.  Tolerating modified diet.  -s/p IV ceftriaxone x5 days.  Remained awaiting rehab placement.  Has not received a bed again fluid from rehab with plans to transfer today.     Suspected UTI  -UA with 4+ bacteria, leukocytosis, urine culture with no growth  -Completed antibiotics as above.     MICHAEL on CKD stage III, resolved   -Likely prerenal  -Baseline Cr~2.2-2.6, was 3.30 on presentation, improved back to baseline   -Resolved with fluids.  Now at baseline.  Recommend PCP monitor periodically.     Acute encephalopathy superimposed on likely underlying vascular dementia   -Follow up CT head without new acute findings but old CVA   -Neurology following, initiated donepezil, melatonin and restoril on 10/14.  Improved.  Likely component of sleep deprivation psychoses.  Resolved.  -Patient will need comprehensive neurocognitive eval as an outpatient.  Will arrange for outpatient neurology follow-up in 1 month.     Constipation with abdominal pain--resolved  -Given increased abdominal pain 10/18, checked  KUB.  -Continue scheduled bowel regimen. May ultimately need to upgrade to enema.  -CT abdomen ordered for further eval.     Poorly controlled type 2 diabetes with A1c 11% and hyperglycemia in the setting of chronic steroids  -Continue home linagliptin.  -Continue Lantus 10 units daily with low-dose SSI. Monitor glucose and adjust insulin as needed.  Glucoses still intermittently low, and occasionally slightly high.  Difficult to adjust.  Continue current regimen and monitor diet  -- Will resume Januvia and continue Lantus and SSI on transfer.  Defer further management to provider outpatient.     Acute on chronic anemia  -no sign of blood loss this admission  -Iron studies most consistent with anemia of chronic disease  -s/p 1u PRBC with improvement.  Blood counts stable.  -Continue to monitor CBC as needed.  -CT abdomen on 10/19 was negative for any acute process     Constipation  -Continue bowel regimen.  Having good BMs.     History of CVA  Hyperlipidemia  -Continue aspirin, statin, Plavix (on hold x 3 days s/p epistaxis).  Resuming on transfer today after discussion with attending MD.  No further s/s of bleeding.  Tolerating medications.     DDD with chronic pain  -on chronic opioids  -on chronic prednisone as outpatient, titrated off of prednisone after last admission due to hyperglycemia and uncontrolled diabetes     Episode of epistaxis on 10/19.  Resolved  -Hemodynamically stable.  Hemoglobin stable  -Afrin nasal spray ordered.  -Monitor hemoglobin.  -Holding Plavix from 10/19.  No further bleeding issues.  Hgb stable at last check at 9.4. Will resume plavix today at [facility].  Monitor for any s/s of bleeding.    --recommend nadia H/H in 2-3 days.      Pt was seen resting back in bed in NAD.  Hemodynamically stable and afebrile.  Tolerating current med regimen.  No further s/s of bleeding.  Cleared for transfer to rehab today by all services.  Resuming her plavix today.  Meds and f/u as below.        Discharge  Follow Up Recommendations for outpatient labs/diagnostics:  Patient is cleared for transfer to rehab today bowel services.  Going on medications as below with follow-ups as noted.    -- To be seen by provider at facility on arrival, PCP 1 week of discharge  -- Keep prior scheduled outpatient nephrology appointment  -- Follow-up [facility] in 1 month    Day of Discharge     HPI:   She was seen resting back in bed in no acute distress.  Dozing.  Awakens briefly to voice.  Oriented at baseline to self.  No visitors at bedside currently.  No new issues per staff.    Review of Systems  KAILA due to baseline dementia    Vital Signs:   Temp:  [97.6 °F (36.4 °C)-97.8 °F (36.6 °C)] 97.8 °F (36.6 °C)  Heart Rate:  [78-87] 80  Resp:  [16-20] 18  BP: (113-159)/(64-94) 159/94     Physical Exam:  Constitutional: Mostly sleeping resting back in bed.  Appears comfortable.  No visitors at bedside. Chronically deconditioned.  Elderly female.  HENT: NCAT, mucous membranes moist  Respiratory: Clear to auscultation bilaterally but slightly decreased at bases, respiratory effort normal on room air.  Sats WNL.  Cardiovascular: RRR, no murmurs, rubs, or gallops  Gastrointestinal: soft, nontender, nondistended.  +bs. Obese.  Musculoskeletal: No bilateral ankle edema.  BOND spontaneously.  Neurologic: Oriented x 1 at baseline.  Calm and cooperative.  Speech clear, able to answer simple questions and follow simple commands  Skin: No rashes    Pertinent Results     Results from last 7 days   Lab Units 10/20/24  0926 10/16/24  1903   WBC 10*3/mm3 8.87 9.59   HEMOGLOBIN g/dL 9.4* 9.6*   HEMATOCRIT % 30.5* 30.4*   PLATELETS 10*3/mm3 324 287   SODIUM mmol/L 139 142   POTASSIUM mmol/L 4.5 3.9   CHLORIDE mmol/L 103 105   CO2 mmol/L 25.0 21.0*   BUN mg/dL 26* 27*   CREATININE mg/dL 2.43* 2.33*   GLUCOSE mg/dL 166* 217*   CALCIUM mg/dL 9.8 9.7     Results from last 7 days   Lab Units 10/20/24  0926   BILIRUBIN mg/dL 0.3   ALK PHOS U/L 74   ALT  "(SGPT) U/L 14   AST (SGOT) U/L 23           Invalid input(s): \"TG\", \"LDLCALC\", \"LDLREALC\"      Brief Urine Lab Results  (Last result in the past 365 days)        Color   Clarity   Blood   Leuk Est   Nitrite   Protein   CREAT   Urine HCG        10/09/24 2058 Yellow   Cloudy   Large (3+)   Moderate (2+)   Negative   100 mg/dL (2+)                   Microbiology Results Abnormal       Procedure Component Value - Date/Time    Blood Culture - Blood, Arm, Right [[id number]]  (Normal) Collected: 10/09/24 2159    Lab Status: Final result Specimen: Blood from Arm, Right Updated: 10/14/24 2231     Blood Culture No growth at 5 days    Narrative:      Less than seven (7) mL's of blood was collected.  Insufficient quantity may yield false negative results.    Blood Culture - Blood, Hand, Left [[id number]]  (Normal) Collected: 10/09/24 1842    Lab Status: Final result Specimen: Blood from Hand, Left Updated: 10/14/24 1946     Blood Culture No growth at 5 days    Narrative:      Less than seven (7) mL's of blood was collected.  Insufficient quantity may yield false negative results.    Urine Culture - Urine, Urine, Clean Catch [[id number]]  (Normal) Collected: 10/09/24 2058    Lab Status: Final result Specimen: Urine, Clean Catch Updated: 10/11/24 1000     Urine Culture No growth    Legionella Antigen, Urine - Urine, Urine, Clean Catch [[id number]]  (Normal) Collected: 10/09/24 2058    Lab Status: Final result Specimen: Urine, Clean Catch Updated: 10/10/24 0922     LEGIONELLA ANTIGEN, URINE Negative    S. Pneumo Ag Urine or CSF - Urine, Urine, Clean Catch [[id number]]  (Normal) Collected: 10/09/24 2058    Lab Status: Final result Specimen: Urine, Clean Catch Updated: 10/10/24 0922     Strep Pneumo Ag Negative    COVID PRE-OP / PRE-PROCEDURE SCREENING ORDER (NO ISOLATION) - Swab, Nasopharynx [[id number]]  (Normal) Collected: 10/09/24 1843    Lab Status: Final result Specimen: Swab from Nasopharynx Updated: 10/09/24 1958    Narrative:   "    The following orders were created for panel order COVID PRE-OP / PRE-PROCEDURE SCREENING ORDER (NO ISOLATION) - Swab, Nasopharynx.  Procedure                               Abnormality         Status                     ---------                               -----------         ------                     COVID-19, FLU A/B, RSV P...[[id number]]  Normal              Final result                 Please view results for these tests on the individual orders.    COVID-19, FLU A/B, RSV PCR 1 HR TAT - Swab, Nasopharynx [[id number]]  (Normal) Collected: 10/09/24 1843    Lab Status: Final result Specimen: Swab from Nasopharynx Updated: 10/09/24 1958     COVID19 Not Detected     Influenza A PCR Not Detected     Influenza B PCR Not Detected     RSV, PCR Not Detected    Narrative:      Fact sheet for providers: https://www.fda.gov/media/624653/download    Fact sheet for patients: https://www.fda.gov/media/299963/download    Test performed by PCR.            Imaging Results (All)       Procedure Component Value Units Date/Time    CT Cervical Spine Without Contrast [[id number]] Collected: 10/22/24 0543     Updated: 10/22/24 0549    Narrative:      CT CERVICAL SPINE WO CONTRAST    Date of Exam: 10/22/2024 5:08 AM EDT    Indication: fall, with blow to the head and neck pain.    Comparison: None available.    Technique: Axial CT images were obtained of the cervical spine without contrast administration.  Reconstructed coronal and sagittal images were also obtained. Automated exposure control and iterative construction methods were used.      Findings:  7 cervical vertebrae are identified. There is straightening of the cervical lordosis. There is mild narrowing of the C5-C6 disc and moderate narrowing of the C6-C7 and C7-T1 discs. Facet joints are unremarkable. There is mild lower cervical uncovertebral   arthritis. Spinous processes appear intact. There is no evidence of fracture or subluxation. The C1 ring and odontoid process  appear intact. There is no high-grade osseous spinal canal narrowing. No significant neuroforaminal stenosis. There are severe   left and moderate right carotid bifurcation calcifications. There is emphysema and scarring in the right lung apex and minimal scarring in the left lung apex. Thyroid is heterogeneous without a dominant mass. Salivary glands appear symmetric. There is no   visible neck mass or adenopathy.      Impression:      Impression:  1.No acute osseous abnormality.  2.Mild to moderate lower cervical disc and uncovertebral joint degeneration.  3.Severe left and moderate right carotid bifurcation calcifications.  4.Emphysema and scarring in the lung apices.        Electronically Signed: Alfredo Chávez MD    10/22/2024 5:46 AM EDT    Workstation ID: [id number]    CT Head Without Contrast [[id number]] Collected: 10/22/24 0541     Updated: 10/22/24 0547    Narrative:      CT HEAD WO CONTRAST    Date of Exam: 10/22/2024 5:08 AM EDT    Indication: fall.    Comparison: 10/14/2024.    Technique: Axial CT images were obtained of the head without contrast administration.  Automated exposure control and iterative construction methods were used.      Findings:  Patient is status post right parietal craniotomy. There is stable chronic resorption at the craniotomy flap margins with mild depression in the occipital region. No acute calvarial abnormalities. The paranasal sinuses and mastoid air cells appear well   aerated. Stable thinning of the orbital lenses compatible with prior lens replacement. There is a stable chronic infarct in the right frontal lobe. Stable small focus of encephalomalacia within the parasagittal frontoparietal region. Stable mild patchy   white matter hypoattenuation elsewhere in the brain. There are no new hypoattenuating lesions in the brain. No acute intracranial hemorrhage. No mass effect, midline shift or abnormal extra-axial collection.      Impression:      Impression:  1.No acute  intracranial abnormality.  2.Stable chronic right frontal lobe infarct and mild chronic small vessel ischemic change.  3.Stable right parietal craniotomy changes.        Electronically Signed: Alfredo Chávez MD    10/22/2024 5:43 AM EDT    Workstation ID: [id number]    CT Abdomen Pelvis Without Contrast [[id number]] Collected: 10/22/24 0537     Updated: 10/22/24 0544    Narrative:      CT ABDOMEN PELVIS WO CONTRAST    Date of Exam: 10/22/2024 5:08 AM EDT    Indication: fall, bilateral hip pain, abdominal pain.    Comparison: 10/19/2024.    Technique: Axial CT images were obtained of the abdomen and pelvis without the administration of contrast. Reconstructed coronal and sagittal images were also obtained. Automated exposure control and iterative construction methods were used.      Findings:  There is a small stable pericardial effusion. There is a small hiatal hernia. Diminished attenuation of the blood pool would suggest anemia. There is chronic interstitial disease and linear scarring in the lung bases. There are small stable foci of   airspace disease in the right lung base and within the lateral left lower lobe that may reflect and mild pneumonia. There is no acute finding in the superficial soft tissues. There is a posterior/interbody fusion construct in the lumbar spine that   extends from L2 down to S1. There are zones of lucency surrounding the L2 and L5 pedicle screws compatible with loosening. The hardware does appear intact. There is moderate underlying lumbar spondylosis. There is mild productive DJD at the left hip and   moderate DJD at the right hip.    Patient is status post cholecystectomy. There are calcified liver and spleen granulomas. The bile ducts, pancreas, adrenal glands and left kidney appear within normal limits. There is a 2 mm calcific density at the mid right kidney that may be a small   stone or possibly vascular calcification. There is no ureteral stone or hydronephrosis. Urinary bladder  is nondistended. The appendix is not seen. There is contrast media present throughout the distal colon and rectum. No small bowel distention. There is   moderate aortoiliac atherosclerotic disease without evidence of aneurysm. No ascites, pneumoperitoneum or lymphadenopathy. There is a celiac artery stent in place.      Impression:      Impression:  1.No acute abdominal or pelvic abnormality.  2.Small stable pericardial effusion.  3.Small stable foci of airspace disease in the lung bases that could reflect pneumonia.  4.Posterior/interbody fusion construct in the lumbar spine with evidence of loosening of the L2 and L5 pedicle screws.  5.Moderate right and mild left hip DJD.  6.Status post cholecystectomy.  7.2 mm nonobstructing right-sided kidney stone versus vascular calcification.        Electronically Signed: Alfredo Chávez MD    10/22/2024 5:41 AM EDT    Workstation ID: [id number]    XR Chest 1 View [[id number]] Collected: 10/19/24 1920     Updated: 10/19/24 1923    Narrative:      XR CHEST 1 VW    Date of Exam: 10/19/2024 6:54 PM EDT    Indication: Cough    Comparison: 10/9/2024    Findings:  Heart size is at the upper limits of normal. Pulmonary vessels are normal. The lungs are clear bilaterally. No pleural effusion or pneumothorax. Degenerative changes are noted of the shoulders and AC joints.      Impression:      Impression:    1. Stable borderline heart size. No acute cardiopulmonary disease.      Electronically Signed: Michael Ritter MD    10/19/2024 7:20 PM EDT    Workstation ID: [id number]    CT Abdomen Pelvis Without Contrast [[id number]] Collected: 10/19/24 1620     Updated: 10/19/24 1629    Narrative:      CT ABDOMEN PELVIS WO CONTRAST    Date of Exam: 10/19/2024 3:50 PM EDT    Indication: Abdominal pain.    Comparison: CT abdomen and pelvis 10/9/2024    Technique: Axial CT images were obtained of the abdomen and pelvis without the administration of contrast. Reconstructed coronal and sagittal images  were also obtained. Automated exposure control and iterative construction methods were used.      Findings:  The lung bases are grossly clear. Unremarkable appearance of the liver. The gallbladder surgically absent. Normal appearance of the bile ducts. Normal size and appearance of the spleen. Unremarkable appearance of the pancreas. Normal appearance of the   adrenal glands. There is a tiny nonobstructing stone in the midpole the right kidney. Unremarkable appearance of the left kidney. Normal appearance of the ureters and bladder. The uterus is surgically absent. There is hyperdense contrast scattered   throughout the colon. No bowel obstruction. No definite inflammatory change of the GI tract. No abdominopelvic free fluid. No pneumoperitoneum. There is a short stent at the celiac axis origin. There is atherosclerosis of the abdominal aorta. There is a   small fat-containing umbilical hernia. No acute or suspicious bony findings. There is posterior instrumented fusion and posterior decompression in the lumbar spine.      Impression:      Impression:  No acute abdominopelvic findings.    Chronic and incidental ancillary findings noted above.            Electronically Signed: Jhony Cha MD    10/19/2024 4:26 PM EDT    Workstation ID: [id number]    XR Abdomen KUB [[id number]] Collected: 10/18/24 1518     Updated: 10/18/24 1554    Narrative:      XR ABDOMEN KUB    Date of Exam: 10/18/2024 2:54 PM EDT    Indication: Abdominal pain, history of constipation, rule out large stool burden    Comparison: CT abdomen pelvis October 9, 2024    Findings:  There are some scattered contrast within the bowel. No significant stool burden is appreciated. There there is postsurgical change to the lumbar spine.      Impression:      Impression:  1.No significant stool burden apparent on this exam.      Electronically Signed: Leonel Ayala MD    10/18/2024 3:20 PM EDT    Workstation ID: [id number]    FL Video Swallow With Speech Single  Contrast [[id number]] Collected: 10/18/24 1412     Updated: 10/18/24 1508    Narrative:      FL VIDEO SWALLOW W SPEECH SINGLE-CONTRAST    Date of Exam: 10/18/2024 1:02 PM EDT    Indication: aspiration.       Comparison: None available.    Technique:   The speech pathologist administered food and/or liquid mixed with barium to the patient with cine/video imaging.  Imaging assistance was provided to the speech pathologist and an image was saved.    Fluoroscopic Time: 36 seconds    Number of Images: 36 seconds    Findings:  No aspiration was seen during fluoroscopic guided modified barium swallowing series. The speech therapy report for full details and recommendations.      Impression:      Impression:  Loss could be provided for a modified barium swallow. No aspiration was seen during swallowing evaluation. Please see speech therapy report for full details and recommendations.      Report dictated by: Мария Hernández PA-c      I have personally reviewed this case and agree with the findings above:    Electronically Signed: Nabil Dickinson MD    10/18/2024 3:04 PM EDT    Workstation ID: [id number]    CT Head Without Contrast [[id number]] Collected: 10/14/24 1209     Updated: 10/14/24 1215    Narrative:      CT HEAD WO CONTRAST    Date of Exam: 10/14/2024 11:57 AM EDT    Indication: AMS, increased lethargy.    Comparison: CT head without contrast 10/3/2024.    Technique: Axial CT images were obtained of the head without contrast administration.  Automated exposure control and iterative construction methods were used.      Findings:  Encephalomalacia consistent with chronic infarct within the right frontal lobe. Chronic left subinsular lacunar-type infarct. Small chronic lacunar infarct within the left basal ganglia. Chronic focal infarct in the parafalcine high right frontal lobe.    No evidence of acute or evolving infarct, mass lesion, mass effect, midline shift or intracranial hemorrhage. Right posterior parietal craniotomy  changes are redemonstrated, and no acute or suspicious calvarial abnormality is identified. Paranasal   sinuses and mastoid air cells are clear. Presumed bilateral cataract surgery.      Impression:      1. No acute intracranial finding. No significant change compared to 10/3/2024.  2. Chronic findings as described above.      Electronically Signed: Jennifer Mon MD    10/14/2024 12:12 PM EDT    Workstation ID: [id number]    FL Video Swallow With Speech Single Contrast [[id number]] Collected: 10/10/24 1433     Updated: 10/10/24 1437    Narrative:      FL VIDEO SWALLOW W SPEECH SINGLE-CONTRAST    Date of Exam: 10/10/2024 1:51 PM EDT    Indication: aspiration.       Comparison: None available.    Technique:   The speech pathologist administered food and/or liquid mixed with barium to the patient with cine/video imaging.  Imaging assistance was provided to the speech pathologist and an image was saved.    Fluoroscopic Time: 48-second    Number of Images: 5 fluoroscopic series    Findings:  Fluoroscopy provided for speech therapy evaluation of swallowing. No aspiration or penetration. Prominent cricopharyngeus.      Impression:      Impression:  Fluoroscopy provided for speech therapy evaluation of swallowing. No aspiration or penetration. Prominent cricopharyngeus. Please see speech therapy report for full findings and recommendations.        Electronically Signed: Antoni Faith MD    10/10/2024 2:34 PM EDT    Workstation ID: [id number]    XR Chest 1 View [[id number]] Collected: 10/09/24 2006     Updated: 10/09/24 2012    Narrative:      XR CHEST 1 VW    Date of Exam: 10/9/2024 7:38 PM EDT    Indication: Weak/Dizzy/AMS triage protocol    Comparison: Chest radiograph 10/3/2024    Findings:      Mediastinum: Cardiomediastinal silhouette appears unchanged and normal in size    Lungs: Bronchial wall thickening and bilateral mild interstitial opacities. There is no appreciable prominence of the central pulmonary vasculature  or convincing septal thickening to strongly suggest pulmonary edema. There is a consolidative opacity in   the left lower lobe.    Pleura: No pleural effusion or pneumothorax.    Bones and soft tissues: No acute osseous abnormality.        Impression:      Impression:  Consolidative opacity in the left lower lobe suspicious for aspiration or pneumonia.    Bronchial wall thickening which can be seen in bronchitis and/or large airways disease.      Electronically Signed: Francisco Javier Fuller    10/9/2024 8:09 PM EDT    Workstation ID: [id number]    CT Abdomen Pelvis Without Contrast [[id number]] Collected: 10/09/24 1948     Updated: 10/09/24 1957    Narrative:      CT ABDOMEN PELVIS WO CONTRAST    Date of Exam: 10/9/2024 7:26 PM EDT    Indication: nausea and severe epigastric pain, no BM 2 weeks.    Comparison: CT abdomen pelvis 9/27/2024    Technique: Axial CT images were obtained of the abdomen and pelvis without the administration of contrast. Reconstructed coronal and sagittal images were also obtained. Automated exposure control and iterative construction methods were used.      Findings:  Heart size within normal limits. Trace pericardial fluid stable from prior. Small hiatal hernia. Lower lungs without acute abnormality. Small fat-containing right Bochdalek hernia.    Noncontrast appearance of liver, spleen and biliary tree unremarkable. Cholecystectomy. Atrophic pancreas stable from prior without active inflammation. No suspicious adrenal nodule. Symmetric renal size and contour. Stable punctate right midpole   calculus. No hydronephrosis. Urinary bladder unremarkable. Hysterectomy. No suspicious adnexal mass.    Expected configuration stomach and duodenum. Mild to moderate formed colonic stool. No evidence of bowel obstruction or active inflammation. No CT evidence of acute appendicitis. Extensive abdominal atherosclerotic disease. Celiac artery stent. Negative   for abdominal aortic aneurysm.    No suspicious  adenopathy. No ascites, free air or drainable collection. Fat-containing umbilical and periumbilical hernias stable from prior without edema or contained bowel. No acute soft tissue finding.     Degenerative osteoarthritis of the hip joints, right greater than left. Discectomy and fusion changes of the lumbar spine unchanged from prior. 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 Anterolisthesis L4 and L5 stable from prior. Periprosthetic lucency at   L2 and L5 screws stable from prior suggesting loosening.      Impression:      Impression:  No acute CT findings. Multiple chronic/ancillary findings overall without significant change from recent comparison.            Electronically Signed: Deon Gimenez MD    10/9/2024 7:53 PM EDT    Workstation ID: [id number]            Results for orders placed during the hospital encounter of 07/15/24    Adult Transthoracic Echo Complete W/ Cont if Necessary Per Protocol    Interpretation Summary    Left ventricular systolic function is normal. Calculated left ventricular EF = 63.2%    Estimated right ventricular systolic pressure from tricuspid regurgitation is normal (<35 mmHg).    Normal left atrial size and volume noted.    There is calcification of the aortic valve. Mild to moderate aortic valve regurgitation is present.          Discharge Details        Discharge Medications        New Medications        Instructions Start Date   aspirin 81 MG chewable tablet  Replaces: aspirin 81 MG EC tablet   81 mg, Oral, Daily   Start Date: October 23, 2024     donepezil 5 MG tablet  Commonly known as: ARICEPT   5 mg, Oral, Nightly      ferrous sulfate 325 (65 FE) MG tablet   325 mg, Oral, Daily With Breakfast   Start Date: October 23, 2024     hydrOXYzine 10 MG tablet  Commonly known as: ATARAX   10 mg, Oral, 3 Times Daily PRN      insulin glargine 100 UNIT/ML injection  Commonly known as: LANTUS, SEMGLEE   10 Units, Subcutaneous, Daily   Start Date: October 23, 2024     insulin regular  100 UNIT/ML injection  Commonly known as: humuLIN R,novoLIN R   2-7 Units, Subcutaneous, 3 Times Daily With Meals      melatonin 5 MG tablet tablet   5 mg, Oral, Nightly      nicotine 7 MG/24HR patch  Commonly known as: NICODERM CQ  Replaces: nicotine 21 MG/24HR patch   1 patch, Transdermal, Every 24 Hours Scheduled   Start Date: October 23, 2024     oxymetazoline 0.05 % nasal spray  Commonly known as: AFRIN   2 sprays, Nasal, 2 Times Daily      polyethylene glycol 17 g packet  Commonly known as: MIRALAX   17 g, Oral, Daily   Start Date: October 23, 2024     sennosides-docusate 8.6-50 MG per tablet  Commonly known as: PERICOLACE   2 tablets, Oral, 2 Times Daily      simethicone 80 MG chewable tablet  Commonly known as: MYLICON   80 mg, Oral, 4 Times Daily PRN      temazepam 7.5 MG capsule  Commonly known as: RESTORIL   7.5 mg, Oral, Nightly             Changes to Medications        Instructions Start Date   acetaminophen 325 MG tablet  Commonly known as: TYLENOL  What changed:   medication strength  how much to take  when to take this   650 mg, Oral, Every 4 Hours PRN             Continue These Medications        Instructions Start Date   albuterol (2.5 MG/3ML) 0.083% nebulizer solution  Commonly known as: PROVENTIL   2.5 mg, Nebulization, Every 4 Hours PRN      Ventolin  (90 Base) MCG/ACT inhaler  Generic drug: albuterol sulfate HFA   2 puffs, Inhalation, Every 6 Hours PRN, for wheezing      atorvastatin 80 MG tablet  Commonly known as: LIPITOR   80 mg, Oral, Daily      Blood Glucose Monitoring Suppl kit   1 each, Does not apply, Daily      ONE TOUCH ULTRA 2 w/Device kit   USE 1 EACH DAILY.      carvedilol 25 MG tablet  Commonly known as: COREG   TAKE ONE TABLET BY MOUTH TWO TIMES A DAY      cetirizine 10 MG tablet  Commonly known as: zyrTEC   5 mg, Oral, Daily      clopidogrel 75 MG tablet  Commonly known as: PLAVIX   75 mg, Oral, Daily      Comfort EZ Pen Needles 32G X 4 MM misc  Generic drug: Insulin Pen  Needle   No dose, route, or frequency recorded.      Comfort EZ Pen Needles 32G X 4 MM misc  Generic drug: Insulin Pen Needle   USE AS DIRECTED DAILY      Dexcom G7  device   1 each, Does not apply, Daily      Dexcom G7 Sensor misc   1 each, Does not apply, Every 10 Days      Diclofenac Sodium 1 % gel gel  Commonly known as: VOLTAREN   APPLY TO AFFECTED AREA FOUR TIMES DAILY      Dupixent 300 MG/2ML solution pen-injector  Generic drug: Dupilumab   300 mg, Subcutaneous, As Needed      fluticasone 50 MCG/ACT nasal spray  Commonly known as: FLONASE   2 sprays, Each Nare, Daily      isosorbide mononitrate 60 MG 24 hr tablet  Commonly known as: IMDUR   60 mg, Oral, Daily   Start Date: October 23, 2024     Januvia 50 MG tablet  Generic drug: SITagliptin   TAKE 1 TABLET BY MOUTH DAILY      multivitamin with minerals tablet tablet   1 tablet, Oral, Daily      naloxone 4 MG/0.1ML nasal spray  Commonly known as: NARCAN   1 spray, Nasal, As Needed      OneTouch Delica Plus [id number] misc   3 Times Daily, as directed      pantoprazole 40 MG EC tablet  Commonly known as: PROTONIX   40 mg, Oral, 2 Times Daily      tacrolimus 0.1 % ointment  Commonly known as: PROTOPIC   APPLY TO AFFECTED AREA TWO TIMES A DAY      tolterodine LA 2 MG 24 hr capsule  Commonly known as: DETROL LA   2 mg, Oral, Daily      True Metrix Blood Glucose Test test strip  Generic drug: glucose blood   Daily, for testing      Blood Glucose Test strip   1 each, In Vitro, 3 Times Daily             Stop These Medications      aspirin 81 MG EC tablet  Replaced by: aspirin 81 MG chewable tablet     busPIRone 5 MG tablet  Commonly known as: BUSPAR     famotidine 10 MG tablet  Commonly known as: PEPCID     glipizide 5 MG tablet  Commonly known as: GLUCOTROL     HYDROcodone-acetaminophen 7.5-325 MG per tablet  Commonly known as: NORCO     nicotine 21 MG/24HR patch  Commonly known as: Nicoderm CQ  Replaced by: nicotine 7 MG/24HR patch     predniSONE 5 MG  tablet  Commonly known as: DELTASONE     SSD 1 % cream  Generic drug: silver sulfadiazine              Allergies   Allergen Reactions    Ceclor [Cefaclor] Rash         Discharge Disposition:  Rehab Facility or Unit (DC - External)    Discharge Diet:  Diet Order   Procedures    Diet: Cardiac, Diabetic; Healthy Heart (2-3 Na+); Consistent Carbohydrate; No Straw; Texture: Mechanical Ground (NDD 2); Fluid Consistency: Thin (IDDSI 0)       Discharge Activity:  Activity Instructions       Activity as Tolerated      Measure Blood Pressure                CODE STATUS:    Code Status and Medical Interventions: No CPR (Do Not Attempt to Resuscitate); Limited Support; No intubation (DNI)   Ordered at: 10/10/24 0002     Medical Intervention Limits:    No intubation (DNI)     Level Of Support Discussed With:    Next of Kin (If No Surrogate)     Code Status (Patient has no pulse and is not breathing):    No CPR (Do Not Attempt to Resuscitate)     Medical Interventions (Patient has pulse or is breathing):    Limited Support         Future Appointments   Date Time Provider Department Center   12/20/2024  1:30 PM Paula Duncan APRN [facility]   1/16/2025  1:00 PM Yulia Hurt APRN [facility]       Additional Instructions for the Follow-ups that You Need to Schedule       Ambulatory Referral to Home Health   As directed      Face to Face Visit Date: 10/10/2024   Follow-up provider for Plan of Care?: I treated the patient in an acute care facility and will not continue treatment after discharge.   Follow-up provider: TYLER RANGEL [[id number]]   Reason/Clinical Findings: sp hospital stay   Describe mobility limitations that make leaving home difficult: PNA, weakness, UTI   Nursing/Therapeutic Services Requested: Skilled Nursing Physical Therapy Occupational Therapy   Skilled nursing orders: Medication education Cardiopulmonary assessments   PT orders: Therapeutic exercise Gait Training Transfer training  Strengthening   Weight Bearing Status: As Tolerated   Occupational orders: Activities of daily living Home safety assessment Energy conservation Strengthening Cognition Fine motor   Frequency: 1 Week 1        Discharge Follow-up with PCP   As directed       Currently Documented PCP:    Aiden Spencer MD    PCP Phone Number:    [phone]     Follow Up Details: To be seen by provider at facility on arrival, PCP 1 week of discharge        Discharge Follow-up with Specialty: Follow-up with [facility] in 1 month (please schedule appointment prior to DC)   As directed      Specialty: Follow-up with [facility] in 1 month (please schedule appointment prior to DC)        Discharge Follow-up with Specified Provider: Follow-up with nephrology outpatient as prior scheduled   As directed      To: Follow-up with nephrology outpatient as prior scheduled                Electronically signed by BALAJI Huynh, 10/22/24, 10:36 AM EDT.      Time Spent on Discharge: I spent 45 minutes on this discharge activity which included: face-to-face encounter with the patient, reviewing the data in the system, coordination of the care with the nursing staff as well as consultants, documentation, and entering orders.        Electronically signed by Aimee Stevens APRN at 10/22/24 1122

## 2024-10-23 ENCOUNTER — TELEPHONE (OUTPATIENT)
Dept: FAMILY MEDICINE CLINIC | Facility: CLINIC | Age: 79
End: 2024-10-23

## 2024-10-23 NOTE — TELEPHONE ENCOUNTER
Caller: Riac Mcdermott    Relationship to patient: Emergency Contact    Best call back number: [phone]    Chief complaint: REFERRAL    Type of visit: OV    Requested date: TODAY ASAP     If rescheduling, when is the original appointment: N/A     Additional notes: SISTER RICA STATES THAT THEY REMOVED PATIENT FROM [facility] DUE TO ISSUES AND WOULD LIKE RECOMMENDATIONS/REFERRALS FOR HOME PLACEMENT.

## 2024-10-23 NOTE — TELEPHONE ENCOUNTER
SPOKE WITH RICA, AND THE FAMILY HAS CHOSEN TO HAVE PATIENT AT HOME AND SHE WILL NOT BE IN [place] IN [place], THEY WILL DISCUSS AT APPOINTMENT TON 10/24/2024

## 2024-10-24 ENCOUNTER — OFFICE VISIT (OUTPATIENT)
Dept: FAMILY MEDICINE CLINIC | Facility: CLINIC | Age: 79
End: 2024-10-24
Payer: MEDICARE

## 2024-10-24 VITALS
SYSTOLIC BLOOD PRESSURE: 128 MMHG | RESPIRATION RATE: 18 BRPM | BODY MASS INDEX: 35.15 KG/M2 | HEIGHT: 60 IN | TEMPERATURE: 97.1 F | OXYGEN SATURATION: 96 % | HEART RATE: 87 BPM | DIASTOLIC BLOOD PRESSURE: 68 MMHG

## 2024-10-24 DIAGNOSIS — F41.9 ANXIETY: ICD-10-CM

## 2024-10-24 DIAGNOSIS — N18.4 STAGE 4 CHRONIC KIDNEY DISEASE: ICD-10-CM

## 2024-10-24 DIAGNOSIS — Z91.81 AT HIGH RISK FOR FALLS: ICD-10-CM

## 2024-10-24 DIAGNOSIS — E78.2 MIXED HYPERLIPIDEMIA: ICD-10-CM

## 2024-10-24 DIAGNOSIS — Z79.4 TYPE 2 DIABETES MELLITUS WITH HYPERGLYCEMIA, WITH LONG-TERM CURRENT USE OF INSULIN: Primary | ICD-10-CM

## 2024-10-24 DIAGNOSIS — I10 PRIMARY HYPERTENSION: ICD-10-CM

## 2024-10-24 DIAGNOSIS — K21.9 GASTROESOPHAGEAL REFLUX DISEASE WITHOUT ESOPHAGITIS: ICD-10-CM

## 2024-10-24 DIAGNOSIS — E11.65 TYPE 2 DIABETES MELLITUS WITH HYPERGLYCEMIA, WITH LONG-TERM CURRENT USE OF INSULIN: Primary | ICD-10-CM

## 2024-10-24 DIAGNOSIS — F17.211 CIGARETTE NICOTINE DEPENDENCE IN REMISSION: ICD-10-CM

## 2024-10-24 DIAGNOSIS — F51.01 PRIMARY INSOMNIA: ICD-10-CM

## 2024-10-24 DIAGNOSIS — G62.9 NEUROPATHY: ICD-10-CM

## 2024-10-24 PROCEDURE — 1160F RVW MEDS BY RX/DR IN RCRD: CPT | Performed by: FAMILY MEDICINE

## 2024-10-24 PROCEDURE — 3078F DIAST BP <80 MM HG: CPT | Performed by: FAMILY MEDICINE

## 2024-10-24 PROCEDURE — 99214 OFFICE O/P EST MOD 30 MIN: CPT | Performed by: FAMILY MEDICINE

## 2024-10-24 PROCEDURE — 1125F AMNT PAIN NOTED PAIN PRSNT: CPT | Performed by: FAMILY MEDICINE

## 2024-10-24 PROCEDURE — 3074F SYST BP LT 130 MM HG: CPT | Performed by: FAMILY MEDICINE

## 2024-10-24 PROCEDURE — G2211 COMPLEX E/M VISIT ADD ON: HCPCS | Performed by: FAMILY MEDICINE

## 2024-10-24 PROCEDURE — 1159F MED LIST DOCD IN RCRD: CPT | Performed by: FAMILY MEDICINE

## 2024-10-24 RX ORDER — INSULIN DETEMIR 100 [IU]/ML
10 INJECTION, SOLUTION SUBCUTANEOUS NIGHTLY
Qty: 15 ML | Refills: 11 | Status: SHIPPED | OUTPATIENT
Start: 2024-10-24

## 2024-10-24 RX ORDER — TEMAZEPAM 7.5 MG/1
7.5 CAPSULE ORAL NIGHTLY PRN
Qty: 30 CAPSULE | Refills: 3 | Status: SHIPPED | OUTPATIENT
Start: 2024-10-24

## 2024-10-24 RX ORDER — INSULIN ASPART 100 [IU]/ML
5 INJECTION, SOLUTION INTRAVENOUS; SUBCUTANEOUS
Qty: 15 ML | Refills: 3 | Status: SHIPPED | OUTPATIENT
Start: 2024-10-24

## 2024-10-24 NOTE — PROGRESS NOTES
"    Follow Up Office Visit      Date: 10/24/2024   Patient Name: Arminda Krishnan  : 1945   MRN: [id number]     Chief Complaint:    Chief Complaint   Patient presents with   • Follow-up     Hsp follow up        History of Present Illness: Arminda Krishnan is a 79 y.o. female who is here today for follow-up.  Patient has recently been admitted to the hospital from  through 2024.  Patient was admitted for hyperglycemia, pneumonia as well as previous CVA.  She had adjustments and was discharged to a rehab facility which her family removed yesterday.  Patient has had modification of her medications but has not picked up some of her medications including some of her insulins excetra.  She feels that she is doing relatively well at present time.  She has not been checking her blood sugars as of yet.  Patient states that she will start treatment here \"soon\".  Patient has not had any difficulties with her medications at present time.  She does continue to tolerate her medications and believe that they are effective.  She is requesting home health.  She has no other concerns at present time.  Patient appears to be doing otherwise well.  They have continue with their medications without any side effects.  They have not had any changes in their usual activity, appetite and sleep.  Patient denies any other cardiovascular, respiratory, gastrointestinal, urologic or neurologic complaints.    History of Present Illness         Subjective      Review of Systems:   Review of Systems   Constitutional:  Negative for activity change, appetite change and fatigue.   Respiratory:  Negative for cough, chest tightness, shortness of breath and wheezing.    Cardiovascular:  Negative for chest pain, palpitations and leg swelling.   Gastrointestinal:  Negative for abdominal distention, abdominal pain, blood in stool, constipation, diarrhea, nausea, vomiting, GERD and indigestion.   Genitourinary:  Negative for " difficulty urinating, dysuria, flank pain, frequency, hematuria and urgency.   Musculoskeletal:  Negative for arthralgias, back pain, gait problem, joint swelling and myalgias.   Neurological:  Negative for dizziness, tremors, seizures, syncope, weakness, light-headedness, numbness, headache and memory problem.   Psychiatric/Behavioral:  Negative for sleep disturbance and depressed mood. The patient is not nervous/anxious.        I have reviewed the patients family history, social history, past medical history, past surgical history and have updated it as appropriate.     Medications:     Current Outpatient Medications:   •  acetaminophen (TYLENOL) 325 MG tablet, Take 2 tablets by mouth Every 4 (Four) Hours As Needed for Mild Pain., Disp: , Rfl:   •  albuterol (PROVENTIL) (2.5 MG/3ML) 0.083% nebulizer solution, Take 2.5 mg by nebulization Every 4 (Four) Hours As Needed for Wheezing or Shortness of Air., Disp: , Rfl:   •  aspirin 81 MG chewable tablet, Chew 1 tablet Daily., Disp: , Rfl:   •  atorvastatin (LIPITOR) 80 MG tablet, Take 1 tablet by mouth Daily., Disp: 90 tablet, Rfl: 3  •  Blood Glucose Monitoring Suppl (ONE TOUCH ULTRA 2) w/Device kit, USE 1 EACH DAILY., Disp: , Rfl:   •  Blood Glucose Monitoring Suppl kit, Use 1 each Daily., Disp: 1 each, Rfl: 11  •  carvedilol (COREG) 25 MG tablet, TAKE ONE TABLET BY MOUTH TWO TIMES A DAY, Disp: 180 tablet, Rfl: 3  •  clopidogrel (PLAVIX) 75 MG tablet, TAKE ONE TABLET BY MOUTH EVERY DAY, Disp: 30 tablet, Rfl: 11  •  Comfort EZ Pen Needles 32G X 4 MM misc, USE AS DIRECTED DAILY, Disp: , Rfl:   •  Diclofenac Sodium (VOLTAREN) 1 % gel gel, APPLY TO AFFECTED AREA FOUR TIMES DAILY, Disp: 100 g, Rfl: 12  •  donepezil (ARICEPT) 5 MG tablet, Take 1 tablet by mouth Every Night., Disp: , Rfl:   •  Dupixent 300 MG/2ML solution pen-injector, Inject 2 mL under the skin into the appropriate area as directed As Needed (Every two weeks)., Disp: , Rfl:   •  ferrous sulfate 325 (65 FE)  MG tablet, Take 1 tablet by mouth Daily With Breakfast., Disp: , Rfl:   •  fluticasone (FLONASE) 50 MCG/ACT nasal spray, USE 2 SPRAYS IN EACH NOSTRIL ONCE DAILY, Disp: 16 g, Rfl: 12  •  Glucose Blood (Blood Glucose Test) strip, 1 each by In Vitro route 3 (Three) Times a Day., Disp: 100 each, Rfl: 11  •  insulin aspart (NovoLOG FlexPen) 100 UNIT/ML solution pen-injector sc pen, Inject 5 Units under the skin into the appropriate area as directed 3 (Three) Times a Day With Meals., Disp: 15 mL, Rfl: 3  •  insulin detemir (Levemir) 100 UNIT/ML injection, Inject 10 Units under the skin into the appropriate area as directed Every Night., Disp: 15 mL, Rfl: 11  •  isosorbide mononitrate (IMDUR) 60 MG 24 hr tablet, Take 1 tablet by mouth Daily., Disp: , Rfl:   •  Lancets (OneTouch Delica Plus [id number]) misc, 3 (Three) Times a Day. as directed, Disp: , Rfl:   •  melatonin 5 MG tablet tablet, Take 1 tablet by mouth Every Night., Disp: , Rfl:   •  multivitamin with minerals tablet tablet, Take 1 tablet by mouth Daily., Disp: , Rfl:   •  naloxone (NARCAN) 4 MG/0.1ML nasal spray, Administer 1 spray into the nostril(s) as directed by provider As Needed., Disp: , Rfl:   •  nicotine (NICODERM CQ) 7 MG/24HR patch, Place 1 patch on the skin as directed by provider Daily., Disp: , Rfl:   •  pantoprazole (PROTONIX) 40 MG EC tablet, TAKE ONE TABLET BY MOUTH TWO TIMES A DAY, Disp: 180 tablet, Rfl: 3  •  polyethylene glycol (MIRALAX) 17 g packet, Take 17 g by mouth Daily., Disp: , Rfl:   •  sennosides-docusate (PERICOLACE) 8.6-50 MG per tablet, Take 2 tablets by mouth 2 (Two) Times a Day., Disp: , Rfl:   •  simethicone (MYLICON) 80 MG chewable tablet, Chew 1 tablet 4 (Four) Times a Day As Needed for Flatulence., Disp: , Rfl:   •  SITagliptin (Januvia) 50 MG tablet, TAKE 1 TABLET BY MOUTH DAILY, Disp: 90 tablet, Rfl: 3  •  tacrolimus (PROTOPIC) 0.1 % ointment, APPLY TO AFFECTED AREA TWO TIMES A DAY, Disp: , Rfl:   •  temazepam (RESTORIL)  "7.5 MG capsule, Take 1 capsule by mouth At Night As Needed for Sleep., Disp: 30 capsule, Rfl: 3  •  tolterodine LA (DETROL LA) 2 MG 24 hr capsule, Take 1 capsule by mouth Daily., Disp: 90 capsule, Rfl: 3  •  True Metrix Blood Glucose Test test strip, Daily. for testing, Disp: , Rfl:   •  Ventolin  (90 Base) MCG/ACT inhaler, INHALE 2 PUFFS EVERY 6 (SIX) HOURS AS NEEDED FOR WHEEZING., Disp: 18 g, Rfl: 11    Allergies:   Allergies   Allergen Reactions   • Ceclor [Cefaclor] Rash       Immunizations:   Immunization History   Administered Date(s) Administered   • COVID-19 (VINNIE) 03/16/2021   • COVID-19 (UNSPECIFIED) 03/01/2021, 04/01/2021   • Fluzone High-Dose 65+YRS 10/16/2018   • Fluzone High-Dose 65+yrs 10/31/2022, 12/06/2023   • Fluzone Quad >6mos (Multi-dose) 11/15/2016, 02/05/2020   • Pneumococcal Conjugate 13-Valent (PCV13) 07/07/2016   • Pneumococcal Polysaccharide (PPSV23) 10/31/2022        Objective     Physical Exam: Please see above  Vital Signs:   Vitals:    10/24/24 1546   BP: 128/68   BP Location: Right arm   Patient Position: Sitting   Cuff Size: Adult   Pulse: 87   Resp: 18   Temp: 97.1 °F (36.2 °C)   TempSrc: Temporal   SpO2: 96%   Height: 152.4 cm (60\")     Body mass index is 35.15 kg/m².          Physical Exam  Vitals and nursing note reviewed.   Constitutional:       Appearance: Normal appearance.   HENT:      Head: Normocephalic and atraumatic.      Nose: Nose normal.      Mouth/Throat:      Pharynx: Oropharynx is clear.   Eyes:      Extraocular Movements: Extraocular movements intact.      Pupils: Pupils are equal, round, and reactive to light.   Neck:      Thyroid: No thyroid mass or thyromegaly.      Trachea: Trachea normal.   Cardiovascular:      Rate and Rhythm: Normal rate and regular rhythm.      Pulses: Normal pulses. No decreased pulses.      Heart sounds: Normal heart sounds.   Pulmonary:      Effort: Pulmonary effort is normal.      Breath sounds: Normal breath sounds. "   Abdominal:      General: Abdomen is flat. Bowel sounds are normal.      Palpations: Abdomen is soft.      Tenderness: There is no abdominal tenderness.   Musculoskeletal:      Cervical back: Neck supple.      Right lower leg: No edema.      Left lower leg: No edema.   Lymphadenopathy:      Cervical: No cervical adenopathy.   Skin:     General: Skin is warm and dry.   Neurological:      General: No focal deficit present.      Mental Status: She is alert and oriented to person, place, and time.      Sensory: Sensation is intact.      Motor: Motor function is intact.      Coordination: Coordination is intact.   Psychiatric:         Attention and Perception: Attention normal.         Mood and Affect: Mood normal.         Speech: Speech normal.         Behavior: Behavior normal.         Procedures    Results:   Labs:   Hemoglobin A1C   Date Value Ref Range Status   09/28/2024 11.00 (H) 4.80 - 5.60 % Final     TSH   Date Value Ref Range Status   10/09/2024 0.465 0.270 - 4.200 uIU/mL Final        POCT Results (if applicable):   Results for orders placed or performed during the hospital encounter of 10/09/24   POC Glucose Once    Collection Time: 10/09/24  5:53 PM    Specimen: Blood   Result Value Ref Range    Glucose 404 (C) 70 - 130 mg/dL   POC Glucose Once    Collection Time: 10/09/24  5:55 PM    Specimen: Blood   Result Value Ref Range    Glucose 393 (H) 70 - 130 mg/dL   Blood Culture - Blood, Hand, Left    Collection Time: 10/09/24  6:42 PM    Specimen: Hand, Left; Blood   Result Value Ref Range    Blood Culture No growth at 5 days    Comprehensive Metabolic Panel    Collection Time: 10/09/24  6:42 PM    Specimen: Blood   Result Value Ref Range    Glucose 359 (H) 65 - 99 mg/dL    BUN 56 (H) 8 - 23 mg/dL    Creatinine 3.30 (H) 0.57 - 1.00 mg/dL    Sodium 136 136 - 145 mmol/L    Potassium 4.4 3.5 - 5.2 mmol/L    Chloride 103 98 - 107 mmol/L    CO2 19.0 (L) 22.0 - 29.0 mmol/L    Calcium 9.5 8.6 - 10.5 mg/dL    Total  Protein 7.4 6.0 - 8.5 g/dL    Albumin 3.3 (L) 3.5 - 5.2 g/dL    ALT (SGPT) 18 1 - 33 U/L    AST (SGOT) 21 1 - 32 U/L    Alkaline Phosphatase 78 39 - 117 U/L    Total Bilirubin 0.3 0.0 - 1.2 mg/dL    Globulin 4.1 gm/dL    A/G Ratio 0.8 g/dL    BUN/Creatinine Ratio 17.0 7.0 - 25.0    Anion Gap 14.0 5.0 - 15.0 mmol/L    eGFR 13.5 (L) >60.0 mL/min/1.73   Single High Sensitivity Troponin T    Collection Time: 10/09/24  6:42 PM    Specimen: Blood   Result Value Ref Range    HS Troponin T 39 (H) <14 ng/L   Magnesium    Collection Time: 10/09/24  6:42 PM    Specimen: Blood   Result Value Ref Range    Magnesium 1.8 1.6 - 2.4 mg/dL   CBC Auto Differential    Collection Time: 10/09/24  6:42 PM    Specimen: Blood   Result Value Ref Range    WBC 8.68 3.40 - 10.80 10*3/mm3    RBC 2.82 (L) 3.77 - 5.28 10*6/mm3    Hemoglobin 7.9 (L) 12.0 - 15.9 g/dL    Hematocrit 25.0 (L) 34.0 - 46.6 %    MCV 88.7 79.0 - 97.0 fL    MCH 28.0 26.6 - 33.0 pg    MCHC 31.6 31.5 - 35.7 g/dL    RDW 14.8 12.3 - 15.4 %    RDW-SD 47.8 37.0 - 54.0 fl    MPV 10.8 6.0 - 12.0 fL    Platelets 301 140 - 450 10*3/mm3    Neutrophil % 72.7 42.7 - 76.0 %    Lymphocyte % 13.0 (L) 19.6 - 45.3 %    Monocyte % 9.6 5.0 - 12.0 %    Eosinophil % 1.5 0.3 - 6.2 %    Basophil % 0.9 0.0 - 1.5 %    Immature Grans % 2.3 (H) 0.0 - 0.5 %    Neutrophils, Absolute 6.31 1.70 - 7.00 10*3/mm3    Lymphocytes, Absolute 1.13 0.70 - 3.10 10*3/mm3    Monocytes, Absolute 0.83 0.10 - 0.90 10*3/mm3    Eosinophils, Absolute 0.13 0.00 - 0.40 10*3/mm3    Basophils, Absolute 0.08 0.00 - 0.20 10*3/mm3    Immature Grans, Absolute 0.20 (H) 0.00 - 0.05 10*3/mm3    nRBC 0.0 0.0 - 0.2 /100 WBC   Lipase    Collection Time: 10/09/24  6:42 PM    Specimen: Blood   Result Value Ref Range    Lipase 23 13 - 60 U/L   BNP    Collection Time: 10/09/24  6:42 PM    Specimen: Blood   Result Value Ref Range    proBNP 719.5 0.0 - 1,800.0 pg/mL   Lactic Acid, Plasma    Collection Time: 10/09/24  6:42 PM    Specimen:  Blood   Result Value Ref Range    Lactate 0.9 0.5 - 2.0 mmol/L   Procalcitonin    Collection Time: 10/09/24  6:42 PM    Specimen: Blood   Result Value Ref Range    Procalcitonin 0.12 0.00 - 0.25 ng/mL   C-reactive Protein    Collection Time: 10/09/24  6:42 PM    Specimen: Blood   Result Value Ref Range    C-Reactive Protein 13.05 (H) 0.00 - 0.50 mg/dL   TSH    Collection Time: 10/09/24  6:42 PM    Specimen: Blood   Result Value Ref Range    TSH 0.465 0.270 - 4.200 uIU/mL   T4, Free    Collection Time: 10/09/24  6:42 PM    Specimen: Blood   Result Value Ref Range    Free T4 1.08 0.92 - 1.68 ng/dL   Phosphorus    Collection Time: 10/09/24  6:42 PM    Specimen: Blood   Result Value Ref Range    Phosphorus 3.5 2.5 - 4.5 mg/dL   Beta Hydroxybutyrate Quantitative    Collection Time: 10/09/24  6:42 PM    Specimen: Blood   Result Value Ref Range    Beta-Hydroxybutyrate Quant 0.143 0.020 - 0.270 mmol/L   Ferritin    Collection Time: 10/09/24  6:42 PM    Specimen: Blood   Result Value Ref Range    Ferritin 766.80 (H) 13.00 - 150.00 ng/mL   Iron    Collection Time: 10/09/24  6:42 PM    Specimen: Blood   Result Value Ref Range    Iron 14 (L) 37 - 145 mcg/dL   Iron Profile    Collection Time: 10/09/24  6:42 PM    Specimen: Blood   Result Value Ref Range    Iron 14 (L) 37 - 145 mcg/dL    Iron Saturation (TSAT) 7 (L) 20 - 50 %    Transferrin 131 (L) 200 - 360 mg/dL    TIBC 195 (L) 298 - 536 mcg/dL   Vitamin B12    Collection Time: 10/09/24  6:42 PM    Specimen: Blood   Result Value Ref Range    Vitamin B-12 1,262 (H) 211 - 946 pg/mL   Folate    Collection Time: 10/09/24  6:42 PM    Specimen: Blood   Result Value Ref Range    Folate 19.00 4.78 - 24.20 ng/mL   Green Top (Gel)    Collection Time: 10/09/24  6:42 PM   Result Value Ref Range    Extra Tube Hold for add-ons.    Lavender Top    Collection Time: 10/09/24  6:42 PM   Result Value Ref Range    Extra Tube hold for add-on    Gold Top - SST    Collection Time: 10/09/24  6:42 PM    Result Value Ref Range    Extra Tube Hold for add-ons.    Gray Top    Collection Time: 10/09/24  6:42 PM   Result Value Ref Range    Extra Tube Hold for add-ons.    Light Blue Top    Collection Time: 10/09/24  6:42 PM   Result Value Ref Range    Extra Tube Hold for add-ons.    COVID-19, FLU A/B, RSV PCR 1 HR TAT - Swab, Nasopharynx    Collection Time: 10/09/24  6:43 PM    Specimen: Nasopharynx; Swab   Result Value Ref Range    COVID19 Not Detected Not Detected - Ref. Range    Influenza A PCR Not Detected Not Detected    Influenza B PCR Not Detected Not Detected    RSV, PCR Not Detected Not Detected   Blood Gas, Venous With Co-Ox    Collection Time: 10/09/24  6:46 PM    Specimen: Venous Blood   Result Value Ref Range    Site Nurse/Dr Draw     pH, Venous 7.324 7.310 - 7.410 pH Units    pCO2, Venous 42.0 41.0 - 51.0 mm Hg    pO2, Venous 37.3 27.0 - 53.0 mm Hg    HCO3, Venous 21.8 (L) 22.0 - 28.0 mmol/L    Base Excess, Venous -4.0 (L) -2.0 - 2.0 mmol/L    Hemoglobin, Blood Gas 9.8 (L) 14 - 18 g/dL    Oxyhemoglobin Venous 65.8 %    Methemoglobin Venous 0.3 %    Carboxyhemoglobin Venous 1.6 %    CO2 Content 23.1 22 - 33 mmol/L    Temperature 37.0     Barometric Pressure for Blood Gas      Modality Room Air     FIO2 21 %    Rate 0 Breaths/minute    PIP 0 cmH2O    IPAP 0     EPAP 0    ECG 12 Lead ED Triage Standing Order; Weak / Dizzy / AMS    Collection Time: 10/09/24  6:46 PM   Result Value Ref Range    QT Interval 348 ms    QTC Interval 406 ms   Urine Culture - Urine, Urine, Clean Catch    Collection Time: 10/09/24  8:58 PM    Specimen: Urine, Clean Catch   Result Value Ref Range    Urine Culture No growth    Legionella Antigen, Urine - Urine, Urine, Clean Catch    Collection Time: 10/09/24  8:58 PM    Specimen: Urine, Clean Catch   Result Value Ref Range    LEGIONELLA ANTIGEN, URINE Negative Negative   S. Pneumo Ag Urine or CSF - Urine, Urine, Clean Catch    Collection Time: 10/09/24  8:58 PM    Specimen: Urine, Clean  Catch   Result Value Ref Range    Strep Pneumo Ag Negative Negative   Urinalysis With Microscopic If Indicated (No Culture) - Urine, Clean Catch    Collection Time: 10/09/24  8:58 PM    Specimen: Urine, Clean Catch   Result Value Ref Range    Color, UA Yellow Yellow, Straw    Appearance, UA Cloudy (A) Clear    pH, UA <=5.0 5.0 - 8.0    Specific Gravity, UA 1.015 1.001 - 1.030    Glucose,  mg/dL (1+) (A) Negative    Ketones, UA Negative Negative    Bilirubin, UA Negative Negative    Blood, UA Large (3+) (A) Negative    Protein,  mg/dL (2+) (A) Negative    Leuk Esterase, UA Moderate (2+) (A) Negative    Nitrite, UA Negative Negative    Urobilinogen, UA 0.2 E.U./dL 0.2 - 1.0 E.U./dL   Urinalysis, Microscopic Only - Urine, Clean Catch    Collection Time: 10/09/24  8:58 PM    Specimen: Urine, Clean Catch   Result Value Ref Range    RBC, UA 21-50 (A) None Seen, 0-2 /HPF    WBC, UA Too Numerous to Count (A) None Seen, 0-2 /HPF    Bacteria, UA 4+ (A) None Seen, Trace /HPF    Squamous Epithelial Cells, UA 0-2 None Seen, 0-2 /HPF    Hyaline Casts, UA None Seen 0 - 6 /LPF    Methodology Automated Microscopy    Blood Culture - Blood, Arm, Right    Collection Time: 10/09/24  9:59 PM    Specimen: Arm, Right; Blood   Result Value Ref Range    Blood Culture No growth at 5 days    POC Glucose Once    Collection Time: 10/09/24 11:07 PM    Specimen: Blood   Result Value Ref Range    Glucose 239 (H) 70 - 130 mg/dL   POC Glucose Once    Collection Time: 10/10/24  6:22 AM    Specimen: Blood   Result Value Ref Range    Glucose 260 (H) 70 - 130 mg/dL   POC Glucose Once    Collection Time: 10/10/24  7:14 AM    Specimen: Blood   Result Value Ref Range    Glucose 252 (H) 70 - 130 mg/dL   POC Glucose Once    Collection Time: 10/10/24 11:19 AM    Specimen: Blood   Result Value Ref Range    Glucose 156 (H) 70 - 130 mg/dL   Comprehensive Metabolic Panel    Collection Time: 10/10/24 12:36 PM    Specimen: Blood   Result Value Ref Range     Glucose 156 (H) 65 - 99 mg/dL    BUN 45 (H) 8 - 23 mg/dL    Creatinine 2.88 (H) 0.57 - 1.00 mg/dL    Sodium 141 136 - 145 mmol/L    Potassium 4.1 3.5 - 5.2 mmol/L    Chloride 110 (H) 98 - 107 mmol/L    CO2 18.0 (L) 22.0 - 29.0 mmol/L    Calcium 9.1 8.6 - 10.5 mg/dL    Total Protein 7.0 6.0 - 8.5 g/dL    Albumin 3.1 (L) 3.5 - 5.2 g/dL    ALT (SGPT) 15 1 - 33 U/L    AST (SGOT) 21 1 - 32 U/L    Alkaline Phosphatase 70 39 - 117 U/L    Total Bilirubin 0.2 0.0 - 1.2 mg/dL    Globulin 3.9 gm/dL    A/G Ratio 0.8 g/dL    BUN/Creatinine Ratio 15.6 7.0 - 25.0    Anion Gap 13.0 5.0 - 15.0 mmol/L    eGFR 15.9 (L) >60.0 mL/min/1.73   CBC Auto Differential    Collection Time: 10/10/24 12:36 PM    Specimen: Blood   Result Value Ref Range    WBC 10.17 3.40 - 10.80 10*3/mm3    RBC 2.67 (L) 3.77 - 5.28 10*6/mm3    Hemoglobin 7.5 (L) 12.0 - 15.9 g/dL    Hematocrit 23.7 (L) 34.0 - 46.6 %    MCV 88.8 79.0 - 97.0 fL    MCH 28.1 26.6 - 33.0 pg    MCHC 31.6 31.5 - 35.7 g/dL    RDW 15.2 12.3 - 15.4 %    RDW-SD 49.3 37.0 - 54.0 fl    MPV 10.6 6.0 - 12.0 fL    Platelets 296 140 - 450 10*3/mm3    Neutrophil % 72.0 42.7 - 76.0 %    Lymphocyte % 13.4 (L) 19.6 - 45.3 %    Monocyte % 10.6 5.0 - 12.0 %    Eosinophil % 1.4 0.3 - 6.2 %    Basophil % 1.0 0.0 - 1.5 %    Immature Grans % 1.6 (H) 0.0 - 0.5 %    Neutrophils, Absolute 7.33 (H) 1.70 - 7.00 10*3/mm3    Lymphocytes, Absolute 1.36 0.70 - 3.10 10*3/mm3    Monocytes, Absolute 1.08 (H) 0.10 - 0.90 10*3/mm3    Eosinophils, Absolute 0.14 0.00 - 0.40 10*3/mm3    Basophils, Absolute 0.10 0.00 - 0.20 10*3/mm3    Immature Grans, Absolute 0.16 (H) 0.00 - 0.05 10*3/mm3    nRBC 0.0 0.0 - 0.2 /100 WBC   Ammonia    Collection Time: 10/10/24 12:36 PM    Specimen: Blood   Result Value Ref Range    Ammonia 16 11 - 51 umol/L   Reticulocytes    Collection Time: 10/10/24 12:36 PM    Specimen: Blood   Result Value Ref Range    Reticulocyte % 1.68 0.70 - 1.90 %    Reticulocyte Absolute 0.0449 0.0200 - 0.1300  10*6/mm3   Hemoglobin & Hematocrit, Blood    Collection Time: 10/10/24  5:45 PM    Specimen: Blood   Result Value Ref Range    Hemoglobin 8.3 (L) 12.0 - 15.9 g/dL    Hematocrit 27.6 (L) 34.0 - 46.6 %   POC Glucose Once    Collection Time: 10/10/24  6:13 PM    Specimen: Blood   Result Value Ref Range    Glucose 241 (H) 70 - 130 mg/dL   POC Glucose Once    Collection Time: 10/10/24  8:28 PM    Specimen: Blood   Result Value Ref Range    Glucose 217 (H) 70 - 130 mg/dL   Hemoglobin & Hematocrit, Blood    Collection Time: 10/10/24 10:23 PM    Specimen: Blood   Result Value Ref Range    Hemoglobin 8.2 (L) 12.0 - 15.9 g/dL    Hematocrit 25.8 (L) 34.0 - 46.6 %   POC Glucose Once    Collection Time: 10/11/24  8:10 AM    Specimen: Blood   Result Value Ref Range    Glucose 215 (H) 70 - 130 mg/dL   POC Glucose Once    Collection Time: 10/11/24 12:02 PM    Specimen: Blood   Result Value Ref Range    Glucose 198 (H) 70 - 130 mg/dL   POC Glucose Once    Collection Time: 10/11/24  4:49 PM    Specimen: Blood   Result Value Ref Range    Glucose 203 (H) 70 - 130 mg/dL   POC Glucose Once    Collection Time: 10/11/24  8:19 PM    Specimen: Blood   Result Value Ref Range    Glucose 178 (H) 70 - 130 mg/dL   POC Glucose Once    Collection Time: 10/12/24  7:34 AM    Specimen: Blood   Result Value Ref Range    Glucose 161 (H) 70 - 130 mg/dL   POC Glucose Once    Collection Time: 10/12/24 11:16 AM    Specimen: Blood   Result Value Ref Range    Glucose 226 (H) 70 - 130 mg/dL   POC Glucose Once    Collection Time: 10/12/24  4:52 PM    Specimen: Blood   Result Value Ref Range    Glucose 186 (H) 70 - 130 mg/dL   POC Glucose Once    Collection Time: 10/12/24  8:39 PM    Specimen: Blood   Result Value Ref Range    Glucose 204 (H) 70 - 130 mg/dL   CBC (No Diff)    Collection Time: 10/13/24  5:19 AM    Specimen: Blood   Result Value Ref Range    WBC 9.23 3.40 - 10.80 10*3/mm3    RBC 2.55 (L) 3.77 - 5.28 10*6/mm3    Hemoglobin 7.1 (L) 12.0 - 15.9 g/dL     Hematocrit 22.6 (L) 34.0 - 46.6 %    MCV 88.6 79.0 - 97.0 fL    MCH 27.8 26.6 - 33.0 pg    MCHC 31.4 (L) 31.5 - 35.7 g/dL    RDW 15.5 (H) 12.3 - 15.4 %    RDW-SD 49.9 37.0 - 54.0 fl    MPV 11.5 6.0 - 12.0 fL    Platelets 282 140 - 450 10*3/mm3   POC Glucose Once    Collection Time: 10/13/24  7:35 AM    Specimen: Blood   Result Value Ref Range    Glucose 194 (H) 70 - 130 mg/dL   Basic Metabolic Panel    Collection Time: 10/13/24  8:13 AM    Specimen: Blood   Result Value Ref Range    Glucose 181 (H) 65 - 99 mg/dL    BUN 28 (H) 8 - 23 mg/dL    Creatinine 2.45 (H) 0.57 - 1.00 mg/dL    Sodium 139 136 - 145 mmol/L    Potassium 3.8 3.5 - 5.2 mmol/L    Chloride 106 98 - 107 mmol/L    CO2 20.0 (L) 22.0 - 29.0 mmol/L    Calcium 9.5 8.6 - 10.5 mg/dL    BUN/Creatinine Ratio 11.4 7.0 - 25.0    Anion Gap 13.0 5.0 - 15.0 mmol/L    eGFR 19.4 (L) >60.0 mL/min/1.73   Magnesium    Collection Time: 10/13/24  8:13 AM    Specimen: Blood   Result Value Ref Range    Magnesium 1.5 (L) 1.6 - 2.4 mg/dL   POC Glucose Once    Collection Time: 10/13/24 11:26 AM    Specimen: Blood   Result Value Ref Range    Glucose 189 (H) 70 - 130 mg/dL   Hemoglobin & Hematocrit, Blood    Collection Time: 10/13/24 12:19 PM    Specimen: Blood   Result Value Ref Range    Hemoglobin 7.0 (L) 12.0 - 15.9 g/dL    Hematocrit 21.8 (L) 34.0 - 46.6 %   Type & Screen    Collection Time: 10/13/24  2:18 PM    Specimen: Blood   Result Value Ref Range    ABO Type O     RH type Positive     Antibody Screen Negative     T&S Expiration Date 10/16/2024 11:59:59 PM    POC Glucose Once    Collection Time: 10/13/24  5:13 PM    Specimen: Blood   Result Value Ref Range    Glucose 166 (H) 70 - 130 mg/dL   POC Glucose Once    Collection Time: 10/13/24  8:20 PM    Specimen: Blood   Result Value Ref Range    Glucose 181 (H) 70 - 130 mg/dL   Magnesium    Collection Time: 10/14/24 12:52 AM    Specimen: Blood   Result Value Ref Range    Magnesium 2.2 1.6 - 2.4 mg/dL   Comprehensive  Metabolic Panel    Collection Time: 10/14/24 12:52 AM    Specimen: Blood   Result Value Ref Range    Glucose 155 (H) 65 - 99 mg/dL    BUN 26 (H) 8 - 23 mg/dL    Creatinine 2.33 (H) 0.57 - 1.00 mg/dL    Sodium 139 136 - 145 mmol/L    Potassium 3.9 3.5 - 5.2 mmol/L    Chloride 106 98 - 107 mmol/L    CO2 20.0 (L) 22.0 - 29.0 mmol/L    Calcium 9.6 8.6 - 10.5 mg/dL    Total Protein 6.9 6.0 - 8.5 g/dL    Albumin 3.0 (L) 3.5 - 5.2 g/dL    ALT (SGPT) 24 1 - 33 U/L    AST (SGOT) 36 (H) 1 - 32 U/L    Alkaline Phosphatase 70 39 - 117 U/L    Total Bilirubin 0.2 0.0 - 1.2 mg/dL    Globulin 3.9 gm/dL    A/G Ratio 0.8 g/dL    BUN/Creatinine Ratio 11.2 7.0 - 25.0    Anion Gap 13.0 5.0 - 15.0 mmol/L    eGFR 20.6 (L) >60.0 mL/min/1.73   CBC Auto Differential    Collection Time: 10/14/24 12:52 AM    Specimen: Blood   Result Value Ref Range    WBC 9.74 3.40 - 10.80 10*3/mm3    RBC 3.08 (L) 3.77 - 5.28 10*6/mm3    Hemoglobin 8.7 (L) 12.0 - 15.9 g/dL    Hematocrit 26.9 (L) 34.0 - 46.6 %    MCV 87.3 79.0 - 97.0 fL    MCH 28.2 26.6 - 33.0 pg    MCHC 32.3 31.5 - 35.7 g/dL    RDW 14.7 12.3 - 15.4 %    RDW-SD 47.0 37.0 - 54.0 fl    MPV 10.5 6.0 - 12.0 fL    Platelets 271 140 - 450 10*3/mm3    Neutrophil % 69.0 42.7 - 76.0 %    Lymphocyte % 14.4 (L) 19.6 - 45.3 %    Monocyte % 12.2 (H) 5.0 - 12.0 %    Eosinophil % 2.1 0.3 - 6.2 %    Basophil % 0.8 0.0 - 1.5 %    Immature Grans % 1.5 (H) 0.0 - 0.5 %    Neutrophils, Absolute 6.72 1.70 - 7.00 10*3/mm3    Lymphocytes, Absolute 1.40 0.70 - 3.10 10*3/mm3    Monocytes, Absolute 1.19 (H) 0.10 - 0.90 10*3/mm3    Eosinophils, Absolute 0.20 0.00 - 0.40 10*3/mm3    Basophils, Absolute 0.08 0.00 - 0.20 10*3/mm3    Immature Grans, Absolute 0.15 (H) 0.00 - 0.05 10*3/mm3    nRBC 0.0 0.0 - 0.2 /100 WBC   POC Glucose Once    Collection Time: 10/14/24  8:05 AM    Specimen: Blood   Result Value Ref Range    Glucose 189 (H) 70 - 130 mg/dL   POC Glucose Once    Collection Time: 10/14/24 11:02 AM    Specimen: Blood    Result Value Ref Range    Glucose 201 (H) 70 - 130 mg/dL   Prepare RBC, 1 Units    Collection Time: 10/14/24 12:10 PM   Result Value Ref Range    Product Code C6485Y31     Unit Number [id number]     UNIT  ABO O     UNIT  RH POS     Crossmatch Interpretation Compatible     Dispense Status PT     Blood Expiration Date [id number]     Blood Type Barcode     POC Glucose Once    Collection Time: 10/14/24  5:38 PM    Specimen: Blood   Result Value Ref Range    Glucose 201 (H) 70 - 130 mg/dL   POC Glucose Once    Collection Time: 10/14/24  7:55 PM    Specimen: Blood   Result Value Ref Range    Glucose 175 (H) 70 - 130 mg/dL   POC Glucose Once    Collection Time: 10/15/24  7:26 AM    Specimen: Blood   Result Value Ref Range    Glucose 240 (H) 70 - 130 mg/dL   CBC (No Diff)    Collection Time: 10/15/24  9:39 AM    Specimen: Blood   Result Value Ref Range    WBC 9.36 3.40 - 10.80 10*3/mm3    RBC 3.36 (L) 3.77 - 5.28 10*6/mm3    Hemoglobin 9.4 (L) 12.0 - 15.9 g/dL    Hematocrit 29.5 (L) 34.0 - 46.6 %    MCV 87.8 79.0 - 97.0 fL    MCH 28.0 26.6 - 33.0 pg    MCHC 31.9 31.5 - 35.7 g/dL    RDW 15.2 12.3 - 15.4 %    RDW-SD 48.9 37.0 - 54.0 fl    MPV 10.5 6.0 - 12.0 fL    Platelets 291 140 - 450 10*3/mm3   Comprehensive Metabolic Panel    Collection Time: 10/15/24  9:39 AM    Specimen: Blood   Result Value Ref Range    Glucose 254 (H) 65 - 99 mg/dL    BUN 24 (H) 8 - 23 mg/dL    Creatinine 2.35 (H) 0.57 - 1.00 mg/dL    Sodium 138 136 - 145 mmol/L    Potassium 4.2 3.5 - 5.2 mmol/L    Chloride 103 98 - 107 mmol/L    CO2 21.0 (L) 22.0 - 29.0 mmol/L    Calcium 9.2 8.6 - 10.5 mg/dL    Total Protein 6.7 6.0 - 8.5 g/dL    Albumin 3.1 (L) 3.5 - 5.2 g/dL    ALT (SGPT) 21 1 - 33 U/L    AST (SGOT) 26 1 - 32 U/L    Alkaline Phosphatase 76 39 - 117 U/L    Total Bilirubin 0.3 0.0 - 1.2 mg/dL    Globulin 3.6 gm/dL    A/G Ratio 0.9 g/dL    BUN/Creatinine Ratio 10.2 7.0 - 25.0    Anion Gap 14.0 5.0 - 15.0 mmol/L    eGFR 20.3 (L) >60.0  mL/min/1.73   POC Glucose Once    Collection Time: 10/15/24 11:31 AM    Specimen: Blood   Result Value Ref Range    Glucose 245 (H) 70 - 130 mg/dL   POC Glucose Once    Collection Time: 10/15/24  4:01 PM    Specimen: Blood   Result Value Ref Range    Glucose 183 (H) 70 - 130 mg/dL   POC Glucose Once    Collection Time: 10/15/24  8:43 PM    Specimen: Blood   Result Value Ref Range    Glucose 176 (H) 70 - 130 mg/dL   POC Glucose Once    Collection Time: 10/16/24  7:41 AM    Specimen: Blood   Result Value Ref Range    Glucose 215 (H) 70 - 130 mg/dL   POC Glucose Once    Collection Time: 10/16/24 10:59 AM    Specimen: Blood   Result Value Ref Range    Glucose 230 (H) 70 - 130 mg/dL   POC Glucose Once    Collection Time: 10/16/24  4:14 PM    Specimen: Blood   Result Value Ref Range    Glucose 151 (H) 70 - 130 mg/dL   CBC (No Diff)    Collection Time: 10/16/24  7:03 PM    Specimen: Blood   Result Value Ref Range    WBC 9.59 3.40 - 10.80 10*3/mm3    RBC 3.43 (L) 3.77 - 5.28 10*6/mm3    Hemoglobin 9.6 (L) 12.0 - 15.9 g/dL    Hematocrit 30.4 (L) 34.0 - 46.6 %    MCV 88.6 79.0 - 97.0 fL    MCH 28.0 26.6 - 33.0 pg    MCHC 31.6 31.5 - 35.7 g/dL    RDW 15.0 12.3 - 15.4 %    RDW-SD 48.6 37.0 - 54.0 fl    MPV 10.4 6.0 - 12.0 fL    Platelets 287 140 - 450 10*3/mm3   Basic Metabolic Panel    Collection Time: 10/16/24  7:03 PM    Specimen: Blood   Result Value Ref Range    Glucose 217 (H) 65 - 99 mg/dL    BUN 27 (H) 8 - 23 mg/dL    Creatinine 2.33 (H) 0.57 - 1.00 mg/dL    Sodium 142 136 - 145 mmol/L    Potassium 3.9 3.5 - 5.2 mmol/L    Chloride 105 98 - 107 mmol/L    CO2 21.0 (L) 22.0 - 29.0 mmol/L    Calcium 9.7 8.6 - 10.5 mg/dL    BUN/Creatinine Ratio 11.6 7.0 - 25.0    Anion Gap 16.0 (H) 5.0 - 15.0 mmol/L    eGFR 20.6 (L) >60.0 mL/min/1.73   POC Glucose Once    Collection Time: 10/17/24  7:55 AM    Specimen: Blood   Result Value Ref Range    Glucose 155 (H) 70 - 130 mg/dL   POC Glucose Once    Collection Time: 10/17/24 11:44 AM     Specimen: Blood   Result Value Ref Range    Glucose 201 (H) 70 - 130 mg/dL   POC Glucose Once    Collection Time: 10/17/24  4:25 PM    Specimen: Blood   Result Value Ref Range    Glucose 173 (H) 70 - 130 mg/dL   POC Glucose Once    Collection Time: 10/17/24  8:20 PM    Specimen: Blood   Result Value Ref Range    Glucose 108 70 - 130 mg/dL   ECG 12 Lead Other; jaw pain, patient concerned    Collection Time: 10/17/24  8:29 PM   Result Value Ref Range    QT Interval 364 ms    QTC Interval 487 ms   ECG 12 Lead Other; previous ekg unreadable    Collection Time: 10/17/24 10:38 PM   Result Value Ref Range    QT Interval 336 ms    QTC Interval 435 ms   POC Glucose Once    Collection Time: 10/18/24  7:57 AM    Specimen: Blood   Result Value Ref Range    Glucose 166 (H) 70 - 130 mg/dL   POC Glucose Once    Collection Time: 10/18/24 11:40 AM    Specimen: Blood   Result Value Ref Range    Glucose 152 (H) 70 - 130 mg/dL   POC Glucose Once    Collection Time: 10/18/24  4:59 PM    Specimen: Blood   Result Value Ref Range    Glucose 181 (H) 70 - 130 mg/dL   POC Glucose Once    Collection Time: 10/18/24  8:13 PM    Specimen: Blood   Result Value Ref Range    Glucose 120 70 - 130 mg/dL   POC Glucose Once    Collection Time: 10/19/24  7:36 AM    Specimen: Blood   Result Value Ref Range    Glucose 159 (H) 70 - 130 mg/dL   POC Glucose Once    Collection Time: 10/19/24 11:49 AM    Specimen: Blood   Result Value Ref Range    Glucose 279 (H) 70 - 130 mg/dL   ECG 12 Lead Chest Pain    Collection Time: 10/19/24  2:47 PM   Result Value Ref Range    QT Interval 366 ms    QTC Interval 432 ms   POC Glucose Once    Collection Time: 10/19/24  2:48 PM    Specimen: Blood   Result Value Ref Range    Glucose 186 (H) 70 - 130 mg/dL   POC Glucose Once    Collection Time: 10/19/24  4:41 PM    Specimen: Blood   Result Value Ref Range    Glucose 252 (H) 70 - 130 mg/dL   POC Glucose Once    Collection Time: 10/19/24  8:24 PM    Specimen: Blood   Result  Value Ref Range    Glucose 67 (L) 70 - 130 mg/dL   POC Glucose Once    Collection Time: 10/19/24  8:52 PM    Specimen: Blood   Result Value Ref Range    Glucose 89 70 - 130 mg/dL   POC Glucose Once    Collection Time: 10/20/24  6:21 AM    Specimen: Blood   Result Value Ref Range    Glucose 154 (H) 70 - 130 mg/dL   POC Glucose Once    Collection Time: 10/20/24  7:43 AM    Specimen: Blood   Result Value Ref Range    Glucose 197 (H) 70 - 130 mg/dL   Comprehensive Metabolic Panel    Collection Time: 10/20/24  9:26 AM    Specimen: Blood   Result Value Ref Range    Glucose 166 (H) 65 - 99 mg/dL    BUN 26 (H) 8 - 23 mg/dL    Creatinine 2.43 (H) 0.57 - 1.00 mg/dL    Sodium 139 136 - 145 mmol/L    Potassium 4.5 3.5 - 5.2 mmol/L    Chloride 103 98 - 107 mmol/L    CO2 25.0 22.0 - 29.0 mmol/L    Calcium 9.8 8.6 - 10.5 mg/dL    Total Protein 7.7 6.0 - 8.5 g/dL    Albumin 3.3 (L) 3.5 - 5.2 g/dL    ALT (SGPT) 14 1 - 33 U/L    AST (SGOT) 23 1 - 32 U/L    Alkaline Phosphatase 74 39 - 117 U/L    Total Bilirubin 0.3 0.0 - 1.2 mg/dL    Globulin 4.4 gm/dL    A/G Ratio 0.8 g/dL    BUN/Creatinine Ratio 10.7 7.0 - 25.0    Anion Gap 11.0 5.0 - 15.0 mmol/L    eGFR 19.5 (L) >60.0 mL/min/1.73   Magnesium    Collection Time: 10/20/24  9:26 AM    Specimen: Blood   Result Value Ref Range    Magnesium 1.7 1.6 - 2.4 mg/dL   CBC Auto Differential    Collection Time: 10/20/24  9:26 AM    Specimen: Blood   Result Value Ref Range    WBC 8.87 3.40 - 10.80 10*3/mm3    RBC 3.37 (L) 3.77 - 5.28 10*6/mm3    Hemoglobin 9.4 (L) 12.0 - 15.9 g/dL    Hematocrit 30.5 (L) 34.0 - 46.6 %    MCV 90.5 79.0 - 97.0 fL    MCH 27.9 26.6 - 33.0 pg    MCHC 30.8 (L) 31.5 - 35.7 g/dL    RDW 14.9 12.3 - 15.4 %    RDW-SD 49.5 37.0 - 54.0 fl    MPV 10.5 6.0 - 12.0 fL    Platelets 324 140 - 450 10*3/mm3    Neutrophil % 57.9 42.7 - 76.0 %    Lymphocyte % 21.9 19.6 - 45.3 %    Monocyte % 14.1 (H) 5.0 - 12.0 %    Eosinophil % 3.2 0.3 - 6.2 %    Basophil % 1.2 0.0 - 1.5 %    Immature  Grans % 1.7 (H) 0.0 - 0.5 %    Neutrophils, Absolute 5.14 1.70 - 7.00 10*3/mm3    Lymphocytes, Absolute 1.94 0.70 - 3.10 10*3/mm3    Monocytes, Absolute 1.25 (H) 0.10 - 0.90 10*3/mm3    Eosinophils, Absolute 0.28 0.00 - 0.40 10*3/mm3    Basophils, Absolute 0.11 0.00 - 0.20 10*3/mm3    Immature Grans, Absolute 0.15 (H) 0.00 - 0.05 10*3/mm3    nRBC 0.0 0.0 - 0.2 /100 WBC   POC Glucose Once    Collection Time: 10/20/24 11:15 AM    Specimen: Blood   Result Value Ref Range    Glucose 233 (H) 70 - 130 mg/dL   POC Glucose Once    Collection Time: 10/20/24  4:14 PM    Specimen: Blood   Result Value Ref Range    Glucose 105 70 - 130 mg/dL   POC Glucose Once    Collection Time: 10/20/24  9:01 PM    Specimen: Blood   Result Value Ref Range    Glucose 179 (H) 70 - 130 mg/dL   POC Glucose Once    Collection Time: 10/21/24  7:54 AM    Specimen: Blood   Result Value Ref Range    Glucose 158 (H) 70 - 130 mg/dL   POC Glucose Once    Collection Time: 10/21/24 11:12 AM    Specimen: Blood   Result Value Ref Range    Glucose 238 (H) 70 - 130 mg/dL   POC Glucose Once    Collection Time: 10/21/24  4:12 PM    Specimen: Blood   Result Value Ref Range    Glucose 172 (H) 70 - 130 mg/dL   POC Glucose Once    Collection Time: 10/21/24  8:03 PM    Specimen: Blood   Result Value Ref Range    Glucose 118 70 - 130 mg/dL   POC Glucose Once    Collection Time: 10/22/24 11:43 AM    Specimen: Blood   Result Value Ref Range    Glucose 278 (H) 70 - 130 mg/dL       Imaging:   No valid procedures specified.     Measures:   Advanced Care Planning:   Patient does not have an advance directive, information provided.    Smoking Cessation:   less than 3 minutes spent counseling Will try to cut down    Assessment / Plan      Assessment/Plan:   Diagnoses and all orders for this visit:    1. Type 2 diabetes mellitus with hyperglycemia, with long-term current use of insulin (Primary)  We have discussed the importance of treating her diabetes as well as monitoring  her blood sugars.  We have sent the insulin to the pharmacy which she will  today and will start today.  Patient understands the importance of treating her diabetes to prevent future episodes of hypoglycemia leading to hospitalizations.  We will initiate home health for education and will monitor closely.  She will follow-up in 1 week's time.  If she has any difficulty in the interim, she will contact us.  -     insulin detemir (Levemir) 100 UNIT/ML injection; Inject 10 Units under the skin into the appropriate area as directed Every Night.  Dispense: 15 mL; Refill: 11  -     insulin aspart (NovoLOG FlexPen) 100 UNIT/ML solution pen-injector sc pen; Inject 5 Units under the skin into the appropriate area as directed 3 (Three) Times a Day With Meals.  Dispense: 15 mL; Refill: 3  -     Ambulatory Referral to Home Health    2. Primary hypertension   Patient appears to be tolerating their blood pressure medicine without any side effects.  We will continue to monitor their blood pressure and will adjust to keep there is systolic blood pressure less than 130.  We will continue to monitor renal function and will make adjustments based on these findings.    3. Stage 4 chronic kidney disease   She has been encouraged to push fluids, avoid anti-inflammatories and limit her sodium intake.  We will reassess her renal function when she returns to clinic.  We will continue to monitor and if she has worsening complaints prior to her scheduled appointment she will contact us.    4. Mixed hyperlipidemia   Patient does appear to be tolerating their lipid-lowering agent without difficulty.  We will obtain the lipid profile as well as liver function test to observe for any abnormalities.  We will continue to adjust medications to keep their LDL less than 70.    5. Gastroesophageal reflux disease without esophagitis   Patient is doing well at present time with respect to the reflux symptomatology.  They are tolerating their  medications without any complaints at present time.  We will continue to monitor their symptoms and if they develop dysphagia, alarm symptoms or worsening symptomatology further evaluation and treatment may be necessary as well as possible referral for an EGD.    6. Neuropathy   Patient does continue have neuropathy but appears to be doing better at present time.  We have discussed the importance of taking her medication as prescribed.  She will be observed for cognitive dysfunction.  Currently she is doing well at present time.  We will continue to monitor and will treat accordingly.    7. Cigarette nicotine dependence in remission    8. At high risk for falls  Patient is at high risk of falling.  We have encouraged her to use the ambulation devices as instructed previously.  We will also initiate home health to help with physical therapy.  She was to be admitted to the rehab facility but was checked out yesterday.  We will encouraged compliance with physical therapy.  -     Ambulatory Referral to Home Health    9. Anxiety   Patient appears to be doing well at present time with respect to their anxiety.  They are tolerating their medications and other treatment plans without difficulty.  We will continue with current regimen and if they have any other problems or complaints prior to her next scheduled follow-up they will contact us.  Adjustments of medications or referral to a behavioral health specialist may be necessary in the future.    10. Primary insomnia  Patient has had difficulty with sleep.  She has tried different modalities in the past without success or difficulty with side effects.  She has recently been hospitalized with the initiation to [place] with good results.  She understands risk and benefits of the medications.  She has been encouraged to monitor her symptoms and remain off cigarettes and hopefully we will have improvement of her sleep.  She has been encouraged to pursue good sleep hygiene as  before.  -     temazepam (RESTORIL) 7.5 MG capsule; Take 1 capsule by mouth At Night As Needed for Sleep.  Dispense: 30 capsule; Refill: 3        Follow Up:   Return in about 1 week (around 10/31/2024).      At [facility], we believe that sharing information builds trust and better relationships. You are receiving this note because you recently visited [facility]. It is possible you will see health information before a provider has talked with you about it. This kind of information can be easy to misunderstand. To help you fully understand what it means for your health, we urge you to discuss this note with your provider.    Aiden Spencer MD  [facility]

## 2024-10-29 ENCOUNTER — TELEPHONE (OUTPATIENT)
Dept: FAMILY MEDICINE CLINIC | Facility: CLINIC | Age: 79
End: 2024-10-29
Payer: OTHER MISCELLANEOUS

## 2024-10-29 NOTE — TELEPHONE ENCOUNTER
Caller: MARIANNA    Best call back number: [phone]     What orders are you requesting (i.e. lab or imaging): verbal orders for home health [facility]    In what timeframe would the patient need to come in: NEXT WEEK ONCE A WEEK FOR 5 WEEKS.    Where will you receive your lab/imaging services: HOME

## 2024-10-31 ENCOUNTER — OFFICE VISIT (OUTPATIENT)
Dept: FAMILY MEDICINE CLINIC | Facility: CLINIC | Age: 79
End: 2024-10-31
Payer: MEDICARE

## 2024-10-31 VITALS
OXYGEN SATURATION: 97 % | BODY MASS INDEX: 29.72 KG/M2 | WEIGHT: 151.4 LBS | HEART RATE: 93 BPM | RESPIRATION RATE: 18 BRPM | TEMPERATURE: 97.8 F | HEIGHT: 60 IN | DIASTOLIC BLOOD PRESSURE: 80 MMHG | SYSTOLIC BLOOD PRESSURE: 136 MMHG

## 2024-10-31 DIAGNOSIS — K21.9 GASTROESOPHAGEAL REFLUX DISEASE WITHOUT ESOPHAGITIS: ICD-10-CM

## 2024-10-31 DIAGNOSIS — I10 PRIMARY HYPERTENSION: ICD-10-CM

## 2024-10-31 DIAGNOSIS — N18.4 STAGE 4 CHRONIC KIDNEY DISEASE: ICD-10-CM

## 2024-10-31 DIAGNOSIS — M51.362 DEGENERATION OF INTERVERTEBRAL DISC OF LUMBAR REGION WITH DISCOGENIC BACK PAIN AND LOWER EXTREMITY PAIN: ICD-10-CM

## 2024-10-31 DIAGNOSIS — Z79.4 TYPE 2 DIABETES MELLITUS WITH HYPERGLYCEMIA, WITH LONG-TERM CURRENT USE OF INSULIN: Primary | ICD-10-CM

## 2024-10-31 DIAGNOSIS — G62.9 NEUROPATHY: ICD-10-CM

## 2024-10-31 DIAGNOSIS — E11.65 TYPE 2 DIABETES MELLITUS WITH HYPERGLYCEMIA, WITH LONG-TERM CURRENT USE OF INSULIN: Primary | ICD-10-CM

## 2024-10-31 DIAGNOSIS — F17.211 CIGARETTE NICOTINE DEPENDENCE IN REMISSION: ICD-10-CM

## 2024-10-31 DIAGNOSIS — F51.01 PRIMARY INSOMNIA: ICD-10-CM

## 2024-10-31 RX ORDER — INSULIN DETEMIR 100 [IU]/ML
INJECTION, SOLUTION SUBCUTANEOUS
COMMUNITY
Start: 2024-10-24 | End: 2024-10-31 | Stop reason: SDUPTHER

## 2024-10-31 RX ORDER — INSULIN DETEMIR 100 [IU]/ML
10 INJECTION, SOLUTION SUBCUTANEOUS NIGHTLY
Qty: 15 ML | Refills: 11 | Status: SHIPPED | OUTPATIENT
Start: 2024-10-31

## 2024-10-31 RX ORDER — SYRINGE AND NEEDLE,INSULIN,1ML 31 GX5/16"
SYRINGE, EMPTY DISPOSABLE MISCELLANEOUS
COMMUNITY
Start: 2024-10-26

## 2024-10-31 RX ORDER — LIDOCAINE 50 MG/G
1 PATCH TOPICAL EVERY 24 HOURS
Qty: 30 EACH | Refills: 11 | Status: SHIPPED | OUTPATIENT
Start: 2024-10-31

## 2024-10-31 RX ORDER — INSULIN DETEMIR 100 [IU]/ML
15 INJECTION, SOLUTION SUBCUTANEOUS NIGHTLY
Start: 2024-10-31

## 2024-10-31 RX ORDER — IBUPROFEN 600 MG/1
1 TABLET ORAL ONCE AS NEEDED
Qty: 1 EACH | Refills: 0 | Status: SHIPPED | OUTPATIENT
Start: 2024-10-31

## 2024-10-31 NOTE — PROGRESS NOTES
Follow Up Office Visit      Date: 10/31/2024   Patient Name: Arminda Krishnan  : 1945   MRN: [id number]     Chief Complaint:    Chief Complaint   Patient presents with    Follow-up       History of Present Illness: Arminda Krishnan is a 79 y.o. female who is here today for follow-up.  Patient has been doing better since last being seen.  She has started the insulin and has had improvement of her blood sugars.  She is more alert and has been doing well with respect to her eating and sleeping habits.  It does appear that she is also doing well with temazepam and at night without any side effects.  She denies any cognitive dysfunction or respiratory suppression.  Patient has continue with her other medications.  She does appear to have improvement in her appetite and has not had any symptoms of pneumonia or urinary tract infection.  She has not had any hypoglycemic episodes.  She tolerates her other medications without difficulties.  She unfortunately has resumed some of her smoking.  She has not had any respiratory symptoms at present time.  Patient appears to be doing otherwise well.  They have continue with their medications without any side effects.  They have not had any changes in their usual activity, appetite and sleep.  Patient denies any other cardiovascular, respiratory, gastrointestinal, urologic or neurologic complaints.    History of Present Illness         Subjective      Review of Systems:   Review of Systems   Constitutional:  Negative for activity change, appetite change and fatigue.   Respiratory:  Negative for cough, chest tightness, shortness of breath and wheezing.    Cardiovascular:  Negative for chest pain, palpitations and leg swelling.   Gastrointestinal:  Negative for abdominal distention, abdominal pain, blood in stool, constipation, diarrhea, nausea, vomiting, GERD and indigestion.   Genitourinary:  Negative for difficulty urinating, dysuria, flank pain, frequency,  "hematuria and urgency.   Musculoskeletal:  Negative for arthralgias, back pain, gait problem, joint swelling and myalgias.   Neurological:  Negative for dizziness, tremors, seizures, syncope, weakness, light-headedness, numbness, headache and memory problem.   Psychiatric/Behavioral:  Negative for sleep disturbance and depressed mood. The patient is not nervous/anxious.        I have reviewed the patients family history, social history, past medical history, past surgical history and have updated it as appropriate.     Medications:     Current Outpatient Medications:     acetaminophen (TYLENOL) 325 MG tablet, Take 2 tablets by mouth Every 4 (Four) Hours As Needed for Mild Pain., Disp: , Rfl:     albuterol (PROVENTIL) (2.5 MG/3ML) 0.083% nebulizer solution, Take 2.5 mg by nebulization Every 4 (Four) Hours As Needed for Wheezing or Shortness of Air., Disp: , Rfl:     aspirin 81 MG chewable tablet, Chew 1 tablet Daily., Disp: , Rfl:     atorvastatin (LIPITOR) 80 MG tablet, Take 1 tablet by mouth Daily., Disp: 90 tablet, Rfl: 3    Blood Glucose Monitoring Suppl (ONE TOUCH ULTRA 2) w/Device kit, USE 1 EACH DAILY., Disp: , Rfl:     Blood Glucose Monitoring Suppl kit, Use 1 each Daily., Disp: 1 each, Rfl: 11    carvedilol (COREG) 25 MG tablet, TAKE ONE TABLET BY MOUTH TWO TIMES A DAY, Disp: 180 tablet, Rfl: 3    clopidogrel (PLAVIX) 75 MG tablet, TAKE ONE TABLET BY MOUTH EVERY DAY, Disp: 30 tablet, Rfl: 11    Comfort EZ Insulin Syringe 30G X 5/16\" 0.3 ML misc, , Disp: , Rfl:     Comfort EZ Pen Needles 32G X 4 MM misc, USE AS DIRECTED DAILY, Disp: , Rfl:     Diclofenac Sodium (VOLTAREN) 1 % gel gel, APPLY TO AFFECTED AREA FOUR TIMES DAILY, Disp: 100 g, Rfl: 12    donepezil (ARICEPT) 5 MG tablet, Take 1 tablet by mouth Every Night., Disp: , Rfl:     Dupixent 300 MG/2ML solution pen-injector, Inject 2 mL under the skin into the appropriate area as directed As Needed (Every two weeks)., Disp: , Rfl:     ferrous sulfate 325 (65 " FE) MG tablet, Take 1 tablet by mouth Daily With Breakfast., Disp: , Rfl:     fluticasone (FLONASE) 50 MCG/ACT nasal spray, USE 2 SPRAYS IN EACH NOSTRIL ONCE DAILY, Disp: 16 g, Rfl: 12    Glucose Blood (Blood Glucose Test) strip, 1 each by In Vitro route 3 (Three) Times a Day., Disp: 100 each, Rfl: 11    insulin aspart (NovoLOG FlexPen) 100 UNIT/ML solution pen-injector sc pen, Inject 5 Units under the skin into the appropriate area as directed 3 (Three) Times a Day With Meals., Disp: 15 mL, Rfl: 3    insulin detemir (Levemir) 100 UNIT/ML injection, Inject 10 Units under the skin into the appropriate area as directed Every Night., Disp: 15 mL, Rfl: 11    isosorbide mononitrate (IMDUR) 60 MG 24 hr tablet, Take 1 tablet by mouth Daily., Disp: , Rfl:     Lancets (OneTouch Delica Plus [id number]) misc, 3 (Three) Times a Day. as directed, Disp: , Rfl:     Levemir FlexPen 100 UNIT/ML injection, Inject 15 Units under the skin into the appropriate area as directed Every Night., Disp: , Rfl:     melatonin 5 MG tablet tablet, Take 1 tablet by mouth Every Night., Disp: , Rfl:     multivitamin with minerals tablet tablet, Take 1 tablet by mouth Daily., Disp: , Rfl:     naloxone (NARCAN) 4 MG/0.1ML nasal spray, Administer 1 spray into the nostril(s) as directed by provider As Needed., Disp: , Rfl:     nicotine (NICODERM CQ) 7 MG/24HR patch, Place 1 patch on the skin as directed by provider Daily., Disp: , Rfl:     pantoprazole (PROTONIX) 40 MG EC tablet, TAKE ONE TABLET BY MOUTH TWO TIMES A DAY, Disp: 180 tablet, Rfl: 3    polyethylene glycol (MIRALAX) 17 g packet, Take 17 g by mouth Daily., Disp: , Rfl:     sennosides-docusate (PERICOLACE) 8.6-50 MG per tablet, Take 2 tablets by mouth 2 (Two) Times a Day., Disp: , Rfl:     simethicone (MYLICON) 80 MG chewable tablet, Chew 1 tablet 4 (Four) Times a Day As Needed for Flatulence., Disp: , Rfl:     SITagliptin (Januvia) 50 MG tablet, TAKE 1 TABLET BY MOUTH DAILY, Disp: 90 tablet,  "Rfl: 3    tacrolimus (PROTOPIC) 0.1 % ointment, APPLY TO AFFECTED AREA TWO TIMES A DAY, Disp: , Rfl:     temazepam (RESTORIL) 7.5 MG capsule, Take 1 capsule by mouth At Night As Needed for Sleep., Disp: 30 capsule, Rfl: 3    tolterodine LA (DETROL LA) 2 MG 24 hr capsule, Take 1 capsule by mouth Daily., Disp: 90 capsule, Rfl: 3    True Metrix Blood Glucose Test test strip, Daily. for testing, Disp: , Rfl:     Ventolin  (90 Base) MCG/ACT inhaler, INHALE 2 PUFFS EVERY 6 (SIX) HOURS AS NEEDED FOR WHEEZING., Disp: 18 g, Rfl: 11    glucagon (GLUCAGEN) 1 MG injection, Inject 1 mg under the skin into the appropriate area as directed 1 (One) Time As Needed (hypoglycemia) for up to 1 dose., Disp: 1 each, Rfl: 0    lidocaine (LIDODERM) 5 %, Place 1 patch on the skin as directed by provider Daily. Remove & Discard patch within 12 hours or as directed by MD, Disp: 30 each, Rfl: 11    Allergies:   Allergies   Allergen Reactions    Ceclor [Cefaclor] Rash       Immunizations:   Immunization History   Administered Date(s) Administered    COVID-19 (VINNIE) 03/16/2021    COVID-19 (UNSPECIFIED) 03/01/2021, 04/01/2021    Fluzone High-Dose 65+YRS 10/16/2018    Fluzone High-Dose 65+yrs 10/31/2022, 12/06/2023    Fluzone Quad >6mos (Multi-dose) 11/15/2016, 02/05/2020    Pneumococcal Conjugate 13-Valent (PCV13) 07/07/2016    Pneumococcal Polysaccharide (PPSV23) 10/31/2022        Objective     Physical Exam: Please see above  Vital Signs:   Vitals:    10/31/24 1327   BP: 136/80   BP Location: Left arm   Patient Position: Sitting   Cuff Size: Adult   Pulse: 93   Resp: 18   Temp: 97.8 °F (36.6 °C)   TempSrc: Temporal   SpO2: 97%   Weight: 68.7 kg (151 lb 6.4 oz)   Height: 152.4 cm (60\")     Body mass index is 29.57 kg/m².          Physical Exam  Vitals and nursing note reviewed.   Constitutional:       Appearance: Normal appearance.   HENT:      Head: Normocephalic and atraumatic.      Nose: Nose normal.      Mouth/Throat:      Pharynx: " Oropharynx is clear.   Eyes:      Extraocular Movements: Extraocular movements intact.      Pupils: Pupils are equal, round, and reactive to light.   Neck:      Thyroid: No thyroid mass or thyromegaly.      Trachea: Trachea normal.   Cardiovascular:      Rate and Rhythm: Normal rate and regular rhythm.      Pulses: Normal pulses. No decreased pulses.      Heart sounds: Normal heart sounds.   Pulmonary:      Effort: Pulmonary effort is normal.      Breath sounds: Normal breath sounds.   Abdominal:      General: Abdomen is flat. Bowel sounds are normal.      Palpations: Abdomen is soft.      Tenderness: There is no abdominal tenderness.   Musculoskeletal:      Cervical back: Neck supple.      Right lower leg: No edema.      Left lower leg: No edema.   Lymphadenopathy:      Cervical: No cervical adenopathy.   Skin:     General: Skin is warm and dry.   Neurological:      General: No focal deficit present.      Mental Status: She is alert and oriented to person, place, and time.      Sensory: Sensation is intact.      Motor: Motor function is intact.      Coordination: Coordination is intact.   Psychiatric:         Attention and Perception: Attention normal.         Mood and Affect: Mood normal.         Speech: Speech normal.         Behavior: Behavior normal.         Procedures    Results:   Labs:   Hemoglobin A1C   Date Value Ref Range Status   09/28/2024 11.00 (H) 4.80 - 5.60 % Final     TSH   Date Value Ref Range Status   10/09/2024 0.465 0.270 - 4.200 uIU/mL Final        POCT Results (if applicable):   Results for orders placed or performed during the hospital encounter of 10/09/24   POC Glucose Once    Collection Time: 10/09/24  5:53 PM    Specimen: Blood   Result Value Ref Range    Glucose 404 (C) 70 - 130 mg/dL   POC Glucose Once    Collection Time: 10/09/24  5:55 PM    Specimen: Blood   Result Value Ref Range    Glucose 393 (H) 70 - 130 mg/dL   Blood Culture - Blood, Hand, Left    Collection Time: 10/09/24  6:42  PM    Specimen: Hand, Left; Blood   Result Value Ref Range    Blood Culture No growth at 5 days    Comprehensive Metabolic Panel    Collection Time: 10/09/24  6:42 PM    Specimen: Blood   Result Value Ref Range    Glucose 359 (H) 65 - 99 mg/dL    BUN 56 (H) 8 - 23 mg/dL    Creatinine 3.30 (H) 0.57 - 1.00 mg/dL    Sodium 136 136 - 145 mmol/L    Potassium 4.4 3.5 - 5.2 mmol/L    Chloride 103 98 - 107 mmol/L    CO2 19.0 (L) 22.0 - 29.0 mmol/L    Calcium 9.5 8.6 - 10.5 mg/dL    Total Protein 7.4 6.0 - 8.5 g/dL    Albumin 3.3 (L) 3.5 - 5.2 g/dL    ALT (SGPT) 18 1 - 33 U/L    AST (SGOT) 21 1 - 32 U/L    Alkaline Phosphatase 78 39 - 117 U/L    Total Bilirubin 0.3 0.0 - 1.2 mg/dL    Globulin 4.1 gm/dL    A/G Ratio 0.8 g/dL    BUN/Creatinine Ratio 17.0 7.0 - 25.0    Anion Gap 14.0 5.0 - 15.0 mmol/L    eGFR 13.5 (L) >60.0 mL/min/1.73   Single High Sensitivity Troponin T    Collection Time: 10/09/24  6:42 PM    Specimen: Blood   Result Value Ref Range    HS Troponin T 39 (H) <14 ng/L   Magnesium    Collection Time: 10/09/24  6:42 PM    Specimen: Blood   Result Value Ref Range    Magnesium 1.8 1.6 - 2.4 mg/dL   CBC Auto Differential    Collection Time: 10/09/24  6:42 PM    Specimen: Blood   Result Value Ref Range    WBC 8.68 3.40 - 10.80 10*3/mm3    RBC 2.82 (L) 3.77 - 5.28 10*6/mm3    Hemoglobin 7.9 (L) 12.0 - 15.9 g/dL    Hematocrit 25.0 (L) 34.0 - 46.6 %    MCV 88.7 79.0 - 97.0 fL    MCH 28.0 26.6 - 33.0 pg    MCHC 31.6 31.5 - 35.7 g/dL    RDW 14.8 12.3 - 15.4 %    RDW-SD 47.8 37.0 - 54.0 fl    MPV 10.8 6.0 - 12.0 fL    Platelets 301 140 - 450 10*3/mm3    Neutrophil % 72.7 42.7 - 76.0 %    Lymphocyte % 13.0 (L) 19.6 - 45.3 %    Monocyte % 9.6 5.0 - 12.0 %    Eosinophil % 1.5 0.3 - 6.2 %    Basophil % 0.9 0.0 - 1.5 %    Immature Grans % 2.3 (H) 0.0 - 0.5 %    Neutrophils, Absolute 6.31 1.70 - 7.00 10*3/mm3    Lymphocytes, Absolute 1.13 0.70 - 3.10 10*3/mm3    Monocytes, Absolute 0.83 0.10 - 0.90 10*3/mm3    Eosinophils,  Absolute 0.13 0.00 - 0.40 10*3/mm3    Basophils, Absolute 0.08 0.00 - 0.20 10*3/mm3    Immature Grans, Absolute 0.20 (H) 0.00 - 0.05 10*3/mm3    nRBC 0.0 0.0 - 0.2 /100 WBC   Lipase    Collection Time: 10/09/24  6:42 PM    Specimen: Blood   Result Value Ref Range    Lipase 23 13 - 60 U/L   BNP    Collection Time: 10/09/24  6:42 PM    Specimen: Blood   Result Value Ref Range    proBNP 719.5 0.0 - 1,800.0 pg/mL   Lactic Acid, Plasma    Collection Time: 10/09/24  6:42 PM    Specimen: Blood   Result Value Ref Range    Lactate 0.9 0.5 - 2.0 mmol/L   Procalcitonin    Collection Time: 10/09/24  6:42 PM    Specimen: Blood   Result Value Ref Range    Procalcitonin 0.12 0.00 - 0.25 ng/mL   C-reactive Protein    Collection Time: 10/09/24  6:42 PM    Specimen: Blood   Result Value Ref Range    C-Reactive Protein 13.05 (H) 0.00 - 0.50 mg/dL   TSH    Collection Time: 10/09/24  6:42 PM    Specimen: Blood   Result Value Ref Range    TSH 0.465 0.270 - 4.200 uIU/mL   T4, Free    Collection Time: 10/09/24  6:42 PM    Specimen: Blood   Result Value Ref Range    Free T4 1.08 0.92 - 1.68 ng/dL   Phosphorus    Collection Time: 10/09/24  6:42 PM    Specimen: Blood   Result Value Ref Range    Phosphorus 3.5 2.5 - 4.5 mg/dL   Beta Hydroxybutyrate Quantitative    Collection Time: 10/09/24  6:42 PM    Specimen: Blood   Result Value Ref Range    Beta-Hydroxybutyrate Quant 0.143 0.020 - 0.270 mmol/L   Ferritin    Collection Time: 10/09/24  6:42 PM    Specimen: Blood   Result Value Ref Range    Ferritin 766.80 (H) 13.00 - 150.00 ng/mL   Iron    Collection Time: 10/09/24  6:42 PM    Specimen: Blood   Result Value Ref Range    Iron 14 (L) 37 - 145 mcg/dL   Iron Profile    Collection Time: 10/09/24  6:42 PM    Specimen: Blood   Result Value Ref Range    Iron 14 (L) 37 - 145 mcg/dL    Iron Saturation (TSAT) 7 (L) 20 - 50 %    Transferrin 131 (L) 200 - 360 mg/dL    TIBC 195 (L) 298 - 536 mcg/dL   Vitamin B12    Collection Time: 10/09/24  6:42 PM     Specimen: Blood   Result Value Ref Range    Vitamin B-12 1,262 (H) 211 - 946 pg/mL   Folate    Collection Time: 10/09/24  6:42 PM    Specimen: Blood   Result Value Ref Range    Folate 19.00 4.78 - 24.20 ng/mL   Green Top (Gel)    Collection Time: 10/09/24  6:42 PM   Result Value Ref Range    Extra Tube Hold for add-ons.    Lavender Top    Collection Time: 10/09/24  6:42 PM   Result Value Ref Range    Extra Tube hold for add-on    Gold Top - SST    Collection Time: 10/09/24  6:42 PM   Result Value Ref Range    Extra Tube Hold for add-ons.    Gray Top    Collection Time: 10/09/24  6:42 PM   Result Value Ref Range    Extra Tube Hold for add-ons.    Light Blue Top    Collection Time: 10/09/24  6:42 PM   Result Value Ref Range    Extra Tube Hold for add-ons.    COVID-19, FLU A/B, RSV PCR 1 HR TAT - Swab, Nasopharynx    Collection Time: 10/09/24  6:43 PM    Specimen: Nasopharynx; Swab   Result Value Ref Range    COVID19 Not Detected Not Detected - Ref. Range    Influenza A PCR Not Detected Not Detected    Influenza B PCR Not Detected Not Detected    RSV, PCR Not Detected Not Detected   Blood Gas, Venous With Co-Ox    Collection Time: 10/09/24  6:46 PM    Specimen: Venous Blood   Result Value Ref Range    Site Nurse/Dr Draw     pH, Venous 7.324 7.310 - 7.410 pH Units    pCO2, Venous 42.0 41.0 - 51.0 mm Hg    pO2, Venous 37.3 27.0 - 53.0 mm Hg    HCO3, Venous 21.8 (L) 22.0 - 28.0 mmol/L    Base Excess, Venous -4.0 (L) -2.0 - 2.0 mmol/L    Hemoglobin, Blood Gas 9.8 (L) 14 - 18 g/dL    Oxyhemoglobin Venous 65.8 %    Methemoglobin Venous 0.3 %    Carboxyhemoglobin Venous 1.6 %    CO2 Content 23.1 22 - 33 mmol/L    Temperature 37.0     Barometric Pressure for Blood Gas      Modality Room Air     FIO2 21 %    Rate 0 Breaths/minute    PIP 0 cmH2O    IPAP 0     EPAP 0    ECG 12 Lead ED Triage Standing Order; Weak / Dizzy / AMS    Collection Time: 10/09/24  6:46 PM   Result Value Ref Range    QT Interval 348 ms    QTC Interval 406  ms   Urine Culture - Urine, Urine, Clean Catch    Collection Time: 10/09/24  8:58 PM    Specimen: Urine, Clean Catch   Result Value Ref Range    Urine Culture No growth    Legionella Antigen, Urine - Urine, Urine, Clean Catch    Collection Time: 10/09/24  8:58 PM    Specimen: Urine, Clean Catch   Result Value Ref Range    LEGIONELLA ANTIGEN, URINE Negative Negative   S. Pneumo Ag Urine or CSF - Urine, Urine, Clean Catch    Collection Time: 10/09/24  8:58 PM    Specimen: Urine, Clean Catch   Result Value Ref Range    Strep Pneumo Ag Negative Negative   Urinalysis With Microscopic If Indicated (No Culture) - Urine, Clean Catch    Collection Time: 10/09/24  8:58 PM    Specimen: Urine, Clean Catch   Result Value Ref Range    Color, UA Yellow Yellow, Straw    Appearance, UA Cloudy (A) Clear    pH, UA <=5.0 5.0 - 8.0    Specific Gravity, UA 1.015 1.001 - 1.030    Glucose,  mg/dL (1+) (A) Negative    Ketones, UA Negative Negative    Bilirubin, UA Negative Negative    Blood, UA Large (3+) (A) Negative    Protein,  mg/dL (2+) (A) Negative    Leuk Esterase, UA Moderate (2+) (A) Negative    Nitrite, UA Negative Negative    Urobilinogen, UA 0.2 E.U./dL 0.2 - 1.0 E.U./dL   Urinalysis, Microscopic Only - Urine, Clean Catch    Collection Time: 10/09/24  8:58 PM    Specimen: Urine, Clean Catch   Result Value Ref Range    RBC, UA 21-50 (A) None Seen, 0-2 /HPF    WBC, UA Too Numerous to Count (A) None Seen, 0-2 /HPF    Bacteria, UA 4+ (A) None Seen, Trace /HPF    Squamous Epithelial Cells, UA 0-2 None Seen, 0-2 /HPF    Hyaline Casts, UA None Seen 0 - 6 /LPF    Methodology Automated Microscopy    Blood Culture - Blood, Arm, Right    Collection Time: 10/09/24  9:59 PM    Specimen: Arm, Right; Blood   Result Value Ref Range    Blood Culture No growth at 5 days    POC Glucose Once    Collection Time: 10/09/24 11:07 PM    Specimen: Blood   Result Value Ref Range    Glucose 239 (H) 70 - 130 mg/dL   POC Glucose Once     Collection Time: 10/10/24  6:22 AM    Specimen: Blood   Result Value Ref Range    Glucose 260 (H) 70 - 130 mg/dL   POC Glucose Once    Collection Time: 10/10/24  7:14 AM    Specimen: Blood   Result Value Ref Range    Glucose 252 (H) 70 - 130 mg/dL   POC Glucose Once    Collection Time: 10/10/24 11:19 AM    Specimen: Blood   Result Value Ref Range    Glucose 156 (H) 70 - 130 mg/dL   Comprehensive Metabolic Panel    Collection Time: 10/10/24 12:36 PM    Specimen: Blood   Result Value Ref Range    Glucose 156 (H) 65 - 99 mg/dL    BUN 45 (H) 8 - 23 mg/dL    Creatinine 2.88 (H) 0.57 - 1.00 mg/dL    Sodium 141 136 - 145 mmol/L    Potassium 4.1 3.5 - 5.2 mmol/L    Chloride 110 (H) 98 - 107 mmol/L    CO2 18.0 (L) 22.0 - 29.0 mmol/L    Calcium 9.1 8.6 - 10.5 mg/dL    Total Protein 7.0 6.0 - 8.5 g/dL    Albumin 3.1 (L) 3.5 - 5.2 g/dL    ALT (SGPT) 15 1 - 33 U/L    AST (SGOT) 21 1 - 32 U/L    Alkaline Phosphatase 70 39 - 117 U/L    Total Bilirubin 0.2 0.0 - 1.2 mg/dL    Globulin 3.9 gm/dL    A/G Ratio 0.8 g/dL    BUN/Creatinine Ratio 15.6 7.0 - 25.0    Anion Gap 13.0 5.0 - 15.0 mmol/L    eGFR 15.9 (L) >60.0 mL/min/1.73   CBC Auto Differential    Collection Time: 10/10/24 12:36 PM    Specimen: Blood   Result Value Ref Range    WBC 10.17 3.40 - 10.80 10*3/mm3    RBC 2.67 (L) 3.77 - 5.28 10*6/mm3    Hemoglobin 7.5 (L) 12.0 - 15.9 g/dL    Hematocrit 23.7 (L) 34.0 - 46.6 %    MCV 88.8 79.0 - 97.0 fL    MCH 28.1 26.6 - 33.0 pg    MCHC 31.6 31.5 - 35.7 g/dL    RDW 15.2 12.3 - 15.4 %    RDW-SD 49.3 37.0 - 54.0 fl    MPV 10.6 6.0 - 12.0 fL    Platelets 296 140 - 450 10*3/mm3    Neutrophil % 72.0 42.7 - 76.0 %    Lymphocyte % 13.4 (L) 19.6 - 45.3 %    Monocyte % 10.6 5.0 - 12.0 %    Eosinophil % 1.4 0.3 - 6.2 %    Basophil % 1.0 0.0 - 1.5 %    Immature Grans % 1.6 (H) 0.0 - 0.5 %    Neutrophils, Absolute 7.33 (H) 1.70 - 7.00 10*3/mm3    Lymphocytes, Absolute 1.36 0.70 - 3.10 10*3/mm3    Monocytes, Absolute 1.08 (H) 0.10 - 0.90 10*3/mm3     Eosinophils, Absolute 0.14 0.00 - 0.40 10*3/mm3    Basophils, Absolute 0.10 0.00 - 0.20 10*3/mm3    Immature Grans, Absolute 0.16 (H) 0.00 - 0.05 10*3/mm3    nRBC 0.0 0.0 - 0.2 /100 WBC   Ammonia    Collection Time: 10/10/24 12:36 PM    Specimen: Blood   Result Value Ref Range    Ammonia 16 11 - 51 umol/L   Reticulocytes    Collection Time: 10/10/24 12:36 PM    Specimen: Blood   Result Value Ref Range    Reticulocyte % 1.68 0.70 - 1.90 %    Reticulocyte Absolute 0.0449 0.0200 - 0.1300 10*6/mm3   Hemoglobin & Hematocrit, Blood    Collection Time: 10/10/24  5:45 PM    Specimen: Blood   Result Value Ref Range    Hemoglobin 8.3 (L) 12.0 - 15.9 g/dL    Hematocrit 27.6 (L) 34.0 - 46.6 %   POC Glucose Once    Collection Time: 10/10/24  6:13 PM    Specimen: Blood   Result Value Ref Range    Glucose 241 (H) 70 - 130 mg/dL   POC Glucose Once    Collection Time: 10/10/24  8:28 PM    Specimen: Blood   Result Value Ref Range    Glucose 217 (H) 70 - 130 mg/dL   Hemoglobin & Hematocrit, Blood    Collection Time: 10/10/24 10:23 PM    Specimen: Blood   Result Value Ref Range    Hemoglobin 8.2 (L) 12.0 - 15.9 g/dL    Hematocrit 25.8 (L) 34.0 - 46.6 %   POC Glucose Once    Collection Time: 10/11/24  8:10 AM    Specimen: Blood   Result Value Ref Range    Glucose 215 (H) 70 - 130 mg/dL   POC Glucose Once    Collection Time: 10/11/24 12:02 PM    Specimen: Blood   Result Value Ref Range    Glucose 198 (H) 70 - 130 mg/dL   POC Glucose Once    Collection Time: 10/11/24  4:49 PM    Specimen: Blood   Result Value Ref Range    Glucose 203 (H) 70 - 130 mg/dL   POC Glucose Once    Collection Time: 10/11/24  8:19 PM    Specimen: Blood   Result Value Ref Range    Glucose 178 (H) 70 - 130 mg/dL   POC Glucose Once    Collection Time: 10/12/24  7:34 AM    Specimen: Blood   Result Value Ref Range    Glucose 161 (H) 70 - 130 mg/dL   POC Glucose Once    Collection Time: 10/12/24 11:16 AM    Specimen: Blood   Result Value Ref Range    Glucose 226 (H)  70 - 130 mg/dL   POC Glucose Once    Collection Time: 10/12/24  4:52 PM    Specimen: Blood   Result Value Ref Range    Glucose 186 (H) 70 - 130 mg/dL   POC Glucose Once    Collection Time: 10/12/24  8:39 PM    Specimen: Blood   Result Value Ref Range    Glucose 204 (H) 70 - 130 mg/dL   CBC (No Diff)    Collection Time: 10/13/24  5:19 AM    Specimen: Blood   Result Value Ref Range    WBC 9.23 3.40 - 10.80 10*3/mm3    RBC 2.55 (L) 3.77 - 5.28 10*6/mm3    Hemoglobin 7.1 (L) 12.0 - 15.9 g/dL    Hematocrit 22.6 (L) 34.0 - 46.6 %    MCV 88.6 79.0 - 97.0 fL    MCH 27.8 26.6 - 33.0 pg    MCHC 31.4 (L) 31.5 - 35.7 g/dL    RDW 15.5 (H) 12.3 - 15.4 %    RDW-SD 49.9 37.0 - 54.0 fl    MPV 11.5 6.0 - 12.0 fL    Platelets 282 140 - 450 10*3/mm3   POC Glucose Once    Collection Time: 10/13/24  7:35 AM    Specimen: Blood   Result Value Ref Range    Glucose 194 (H) 70 - 130 mg/dL   Basic Metabolic Panel    Collection Time: 10/13/24  8:13 AM    Specimen: Blood   Result Value Ref Range    Glucose 181 (H) 65 - 99 mg/dL    BUN 28 (H) 8 - 23 mg/dL    Creatinine 2.45 (H) 0.57 - 1.00 mg/dL    Sodium 139 136 - 145 mmol/L    Potassium 3.8 3.5 - 5.2 mmol/L    Chloride 106 98 - 107 mmol/L    CO2 20.0 (L) 22.0 - 29.0 mmol/L    Calcium 9.5 8.6 - 10.5 mg/dL    BUN/Creatinine Ratio 11.4 7.0 - 25.0    Anion Gap 13.0 5.0 - 15.0 mmol/L    eGFR 19.4 (L) >60.0 mL/min/1.73   Magnesium    Collection Time: 10/13/24  8:13 AM    Specimen: Blood   Result Value Ref Range    Magnesium 1.5 (L) 1.6 - 2.4 mg/dL   POC Glucose Once    Collection Time: 10/13/24 11:26 AM    Specimen: Blood   Result Value Ref Range    Glucose 189 (H) 70 - 130 mg/dL   Hemoglobin & Hematocrit, Blood    Collection Time: 10/13/24 12:19 PM    Specimen: Blood   Result Value Ref Range    Hemoglobin 7.0 (L) 12.0 - 15.9 g/dL    Hematocrit 21.8 (L) 34.0 - 46.6 %   Type & Screen    Collection Time: 10/13/24  2:18 PM    Specimen: Blood   Result Value Ref Range    ABO Type O     RH type Positive      Antibody Screen Negative     T&S Expiration Date 10/16/2024 11:59:59 PM    POC Glucose Once    Collection Time: 10/13/24  5:13 PM    Specimen: Blood   Result Value Ref Range    Glucose 166 (H) 70 - 130 mg/dL   POC Glucose Once    Collection Time: 10/13/24  8:20 PM    Specimen: Blood   Result Value Ref Range    Glucose 181 (H) 70 - 130 mg/dL   Magnesium    Collection Time: 10/14/24 12:52 AM    Specimen: Blood   Result Value Ref Range    Magnesium 2.2 1.6 - 2.4 mg/dL   Comprehensive Metabolic Panel    Collection Time: 10/14/24 12:52 AM    Specimen: Blood   Result Value Ref Range    Glucose 155 (H) 65 - 99 mg/dL    BUN 26 (H) 8 - 23 mg/dL    Creatinine 2.33 (H) 0.57 - 1.00 mg/dL    Sodium 139 136 - 145 mmol/L    Potassium 3.9 3.5 - 5.2 mmol/L    Chloride 106 98 - 107 mmol/L    CO2 20.0 (L) 22.0 - 29.0 mmol/L    Calcium 9.6 8.6 - 10.5 mg/dL    Total Protein 6.9 6.0 - 8.5 g/dL    Albumin 3.0 (L) 3.5 - 5.2 g/dL    ALT (SGPT) 24 1 - 33 U/L    AST (SGOT) 36 (H) 1 - 32 U/L    Alkaline Phosphatase 70 39 - 117 U/L    Total Bilirubin 0.2 0.0 - 1.2 mg/dL    Globulin 3.9 gm/dL    A/G Ratio 0.8 g/dL    BUN/Creatinine Ratio 11.2 7.0 - 25.0    Anion Gap 13.0 5.0 - 15.0 mmol/L    eGFR 20.6 (L) >60.0 mL/min/1.73   CBC Auto Differential    Collection Time: 10/14/24 12:52 AM    Specimen: Blood   Result Value Ref Range    WBC 9.74 3.40 - 10.80 10*3/mm3    RBC 3.08 (L) 3.77 - 5.28 10*6/mm3    Hemoglobin 8.7 (L) 12.0 - 15.9 g/dL    Hematocrit 26.9 (L) 34.0 - 46.6 %    MCV 87.3 79.0 - 97.0 fL    MCH 28.2 26.6 - 33.0 pg    MCHC 32.3 31.5 - 35.7 g/dL    RDW 14.7 12.3 - 15.4 %    RDW-SD 47.0 37.0 - 54.0 fl    MPV 10.5 6.0 - 12.0 fL    Platelets 271 140 - 450 10*3/mm3    Neutrophil % 69.0 42.7 - 76.0 %    Lymphocyte % 14.4 (L) 19.6 - 45.3 %    Monocyte % 12.2 (H) 5.0 - 12.0 %    Eosinophil % 2.1 0.3 - 6.2 %    Basophil % 0.8 0.0 - 1.5 %    Immature Grans % 1.5 (H) 0.0 - 0.5 %    Neutrophils, Absolute 6.72 1.70 - 7.00 10*3/mm3    Lymphocytes,  Absolute 1.40 0.70 - 3.10 10*3/mm3    Monocytes, Absolute 1.19 (H) 0.10 - 0.90 10*3/mm3    Eosinophils, Absolute 0.20 0.00 - 0.40 10*3/mm3    Basophils, Absolute 0.08 0.00 - 0.20 10*3/mm3    Immature Grans, Absolute 0.15 (H) 0.00 - 0.05 10*3/mm3    nRBC 0.0 0.0 - 0.2 /100 WBC   POC Glucose Once    Collection Time: 10/14/24  8:05 AM    Specimen: Blood   Result Value Ref Range    Glucose 189 (H) 70 - 130 mg/dL   POC Glucose Once    Collection Time: 10/14/24 11:02 AM    Specimen: Blood   Result Value Ref Range    Glucose 201 (H) 70 - 130 mg/dL   Prepare RBC, 1 Units    Collection Time: 10/14/24 12:10 PM   Result Value Ref Range    Product Code T6604R76     Unit Number [id number]     UNIT  ABO O     UNIT  RH POS     Crossmatch Interpretation Compatible     Dispense Status PT     Blood Expiration Date [id number]     Blood Type Barcode     POC Glucose Once    Collection Time: 10/14/24  5:38 PM    Specimen: Blood   Result Value Ref Range    Glucose 201 (H) 70 - 130 mg/dL   POC Glucose Once    Collection Time: 10/14/24  7:55 PM    Specimen: Blood   Result Value Ref Range    Glucose 175 (H) 70 - 130 mg/dL   POC Glucose Once    Collection Time: 10/15/24  7:26 AM    Specimen: Blood   Result Value Ref Range    Glucose 240 (H) 70 - 130 mg/dL   CBC (No Diff)    Collection Time: 10/15/24  9:39 AM    Specimen: Blood   Result Value Ref Range    WBC 9.36 3.40 - 10.80 10*3/mm3    RBC 3.36 (L) 3.77 - 5.28 10*6/mm3    Hemoglobin 9.4 (L) 12.0 - 15.9 g/dL    Hematocrit 29.5 (L) 34.0 - 46.6 %    MCV 87.8 79.0 - 97.0 fL    MCH 28.0 26.6 - 33.0 pg    MCHC 31.9 31.5 - 35.7 g/dL    RDW 15.2 12.3 - 15.4 %    RDW-SD 48.9 37.0 - 54.0 fl    MPV 10.5 6.0 - 12.0 fL    Platelets 291 140 - 450 10*3/mm3   Comprehensive Metabolic Panel    Collection Time: 10/15/24  9:39 AM    Specimen: Blood   Result Value Ref Range    Glucose 254 (H) 65 - 99 mg/dL    BUN 24 (H) 8 - 23 mg/dL    Creatinine 2.35 (H) 0.57 - 1.00 mg/dL    Sodium 138 136 - 145 mmol/L     Potassium 4.2 3.5 - 5.2 mmol/L    Chloride 103 98 - 107 mmol/L    CO2 21.0 (L) 22.0 - 29.0 mmol/L    Calcium 9.2 8.6 - 10.5 mg/dL    Total Protein 6.7 6.0 - 8.5 g/dL    Albumin 3.1 (L) 3.5 - 5.2 g/dL    ALT (SGPT) 21 1 - 33 U/L    AST (SGOT) 26 1 - 32 U/L    Alkaline Phosphatase 76 39 - 117 U/L    Total Bilirubin 0.3 0.0 - 1.2 mg/dL    Globulin 3.6 gm/dL    A/G Ratio 0.9 g/dL    BUN/Creatinine Ratio 10.2 7.0 - 25.0    Anion Gap 14.0 5.0 - 15.0 mmol/L    eGFR 20.3 (L) >60.0 mL/min/1.73   POC Glucose Once    Collection Time: 10/15/24 11:31 AM    Specimen: Blood   Result Value Ref Range    Glucose 245 (H) 70 - 130 mg/dL   POC Glucose Once    Collection Time: 10/15/24  4:01 PM    Specimen: Blood   Result Value Ref Range    Glucose 183 (H) 70 - 130 mg/dL   POC Glucose Once    Collection Time: 10/15/24  8:43 PM    Specimen: Blood   Result Value Ref Range    Glucose 176 (H) 70 - 130 mg/dL   POC Glucose Once    Collection Time: 10/16/24  7:41 AM    Specimen: Blood   Result Value Ref Range    Glucose 215 (H) 70 - 130 mg/dL   POC Glucose Once    Collection Time: 10/16/24 10:59 AM    Specimen: Blood   Result Value Ref Range    Glucose 230 (H) 70 - 130 mg/dL   POC Glucose Once    Collection Time: 10/16/24  4:14 PM    Specimen: Blood   Result Value Ref Range    Glucose 151 (H) 70 - 130 mg/dL   CBC (No Diff)    Collection Time: 10/16/24  7:03 PM    Specimen: Blood   Result Value Ref Range    WBC 9.59 3.40 - 10.80 10*3/mm3    RBC 3.43 (L) 3.77 - 5.28 10*6/mm3    Hemoglobin 9.6 (L) 12.0 - 15.9 g/dL    Hematocrit 30.4 (L) 34.0 - 46.6 %    MCV 88.6 79.0 - 97.0 fL    MCH 28.0 26.6 - 33.0 pg    MCHC 31.6 31.5 - 35.7 g/dL    RDW 15.0 12.3 - 15.4 %    RDW-SD 48.6 37.0 - 54.0 fl    MPV 10.4 6.0 - 12.0 fL    Platelets 287 140 - 450 10*3/mm3   Basic Metabolic Panel    Collection Time: 10/16/24  7:03 PM    Specimen: Blood   Result Value Ref Range    Glucose 217 (H) 65 - 99 mg/dL    BUN 27 (H) 8 - 23 mg/dL    Creatinine 2.33 (H) 0.57 -  1.00 mg/dL    Sodium 142 136 - 145 mmol/L    Potassium 3.9 3.5 - 5.2 mmol/L    Chloride 105 98 - 107 mmol/L    CO2 21.0 (L) 22.0 - 29.0 mmol/L    Calcium 9.7 8.6 - 10.5 mg/dL    BUN/Creatinine Ratio 11.6 7.0 - 25.0    Anion Gap 16.0 (H) 5.0 - 15.0 mmol/L    eGFR 20.6 (L) >60.0 mL/min/1.73   POC Glucose Once    Collection Time: 10/17/24  7:55 AM    Specimen: Blood   Result Value Ref Range    Glucose 155 (H) 70 - 130 mg/dL   POC Glucose Once    Collection Time: 10/17/24 11:44 AM    Specimen: Blood   Result Value Ref Range    Glucose 201 (H) 70 - 130 mg/dL   POC Glucose Once    Collection Time: 10/17/24  4:25 PM    Specimen: Blood   Result Value Ref Range    Glucose 173 (H) 70 - 130 mg/dL   POC Glucose Once    Collection Time: 10/17/24  8:20 PM    Specimen: Blood   Result Value Ref Range    Glucose 108 70 - 130 mg/dL   ECG 12 Lead Other; jaw pain, patient concerned    Collection Time: 10/17/24  8:29 PM   Result Value Ref Range    QT Interval 364 ms    QTC Interval 487 ms   ECG 12 Lead Other; previous ekg unreadable    Collection Time: 10/17/24 10:38 PM   Result Value Ref Range    QT Interval 336 ms    QTC Interval 435 ms   POC Glucose Once    Collection Time: 10/18/24  7:57 AM    Specimen: Blood   Result Value Ref Range    Glucose 166 (H) 70 - 130 mg/dL   POC Glucose Once    Collection Time: 10/18/24 11:40 AM    Specimen: Blood   Result Value Ref Range    Glucose 152 (H) 70 - 130 mg/dL   POC Glucose Once    Collection Time: 10/18/24  4:59 PM    Specimen: Blood   Result Value Ref Range    Glucose 181 (H) 70 - 130 mg/dL   POC Glucose Once    Collection Time: 10/18/24  8:13 PM    Specimen: Blood   Result Value Ref Range    Glucose 120 70 - 130 mg/dL   POC Glucose Once    Collection Time: 10/19/24  7:36 AM    Specimen: Blood   Result Value Ref Range    Glucose 159 (H) 70 - 130 mg/dL   POC Glucose Once    Collection Time: 10/19/24 11:49 AM    Specimen: Blood   Result Value Ref Range    Glucose 279 (H) 70 - 130 mg/dL   ECG  12 Lead Chest Pain    Collection Time: 10/19/24  2:47 PM   Result Value Ref Range    QT Interval 366 ms    QTC Interval 432 ms   POC Glucose Once    Collection Time: 10/19/24  2:48 PM    Specimen: Blood   Result Value Ref Range    Glucose 186 (H) 70 - 130 mg/dL   POC Glucose Once    Collection Time: 10/19/24  4:41 PM    Specimen: Blood   Result Value Ref Range    Glucose 252 (H) 70 - 130 mg/dL   POC Glucose Once    Collection Time: 10/19/24  8:24 PM    Specimen: Blood   Result Value Ref Range    Glucose 67 (L) 70 - 130 mg/dL   POC Glucose Once    Collection Time: 10/19/24  8:52 PM    Specimen: Blood   Result Value Ref Range    Glucose 89 70 - 130 mg/dL   POC Glucose Once    Collection Time: 10/20/24  6:21 AM    Specimen: Blood   Result Value Ref Range    Glucose 154 (H) 70 - 130 mg/dL   POC Glucose Once    Collection Time: 10/20/24  7:43 AM    Specimen: Blood   Result Value Ref Range    Glucose 197 (H) 70 - 130 mg/dL   Comprehensive Metabolic Panel    Collection Time: 10/20/24  9:26 AM    Specimen: Blood   Result Value Ref Range    Glucose 166 (H) 65 - 99 mg/dL    BUN 26 (H) 8 - 23 mg/dL    Creatinine 2.43 (H) 0.57 - 1.00 mg/dL    Sodium 139 136 - 145 mmol/L    Potassium 4.5 3.5 - 5.2 mmol/L    Chloride 103 98 - 107 mmol/L    CO2 25.0 22.0 - 29.0 mmol/L    Calcium 9.8 8.6 - 10.5 mg/dL    Total Protein 7.7 6.0 - 8.5 g/dL    Albumin 3.3 (L) 3.5 - 5.2 g/dL    ALT (SGPT) 14 1 - 33 U/L    AST (SGOT) 23 1 - 32 U/L    Alkaline Phosphatase 74 39 - 117 U/L    Total Bilirubin 0.3 0.0 - 1.2 mg/dL    Globulin 4.4 gm/dL    A/G Ratio 0.8 g/dL    BUN/Creatinine Ratio 10.7 7.0 - 25.0    Anion Gap 11.0 5.0 - 15.0 mmol/L    eGFR 19.5 (L) >60.0 mL/min/1.73   Magnesium    Collection Time: 10/20/24  9:26 AM    Specimen: Blood   Result Value Ref Range    Magnesium 1.7 1.6 - 2.4 mg/dL   CBC Auto Differential    Collection Time: 10/20/24  9:26 AM    Specimen: Blood   Result Value Ref Range    WBC 8.87 3.40 - 10.80 10*3/mm3    RBC 3.37 (L)  3.77 - 5.28 10*6/mm3    Hemoglobin 9.4 (L) 12.0 - 15.9 g/dL    Hematocrit 30.5 (L) 34.0 - 46.6 %    MCV 90.5 79.0 - 97.0 fL    MCH 27.9 26.6 - 33.0 pg    MCHC 30.8 (L) 31.5 - 35.7 g/dL    RDW 14.9 12.3 - 15.4 %    RDW-SD 49.5 37.0 - 54.0 fl    MPV 10.5 6.0 - 12.0 fL    Platelets 324 140 - 450 10*3/mm3    Neutrophil % 57.9 42.7 - 76.0 %    Lymphocyte % 21.9 19.6 - 45.3 %    Monocyte % 14.1 (H) 5.0 - 12.0 %    Eosinophil % 3.2 0.3 - 6.2 %    Basophil % 1.2 0.0 - 1.5 %    Immature Grans % 1.7 (H) 0.0 - 0.5 %    Neutrophils, Absolute 5.14 1.70 - 7.00 10*3/mm3    Lymphocytes, Absolute 1.94 0.70 - 3.10 10*3/mm3    Monocytes, Absolute 1.25 (H) 0.10 - 0.90 10*3/mm3    Eosinophils, Absolute 0.28 0.00 - 0.40 10*3/mm3    Basophils, Absolute 0.11 0.00 - 0.20 10*3/mm3    Immature Grans, Absolute 0.15 (H) 0.00 - 0.05 10*3/mm3    nRBC 0.0 0.0 - 0.2 /100 WBC   POC Glucose Once    Collection Time: 10/20/24 11:15 AM    Specimen: Blood   Result Value Ref Range    Glucose 233 (H) 70 - 130 mg/dL   POC Glucose Once    Collection Time: 10/20/24  4:14 PM    Specimen: Blood   Result Value Ref Range    Glucose 105 70 - 130 mg/dL   POC Glucose Once    Collection Time: 10/20/24  9:01 PM    Specimen: Blood   Result Value Ref Range    Glucose 179 (H) 70 - 130 mg/dL   POC Glucose Once    Collection Time: 10/21/24  7:54 AM    Specimen: Blood   Result Value Ref Range    Glucose 158 (H) 70 - 130 mg/dL   POC Glucose Once    Collection Time: 10/21/24 11:12 AM    Specimen: Blood   Result Value Ref Range    Glucose 238 (H) 70 - 130 mg/dL   POC Glucose Once    Collection Time: 10/21/24  4:12 PM    Specimen: Blood   Result Value Ref Range    Glucose 172 (H) 70 - 130 mg/dL   POC Glucose Once    Collection Time: 10/21/24  8:03 PM    Specimen: Blood   Result Value Ref Range    Glucose 118 70 - 130 mg/dL   POC Glucose Once    Collection Time: 10/22/24 11:43 AM    Specimen: Blood   Result Value Ref Range    Glucose 278 (H) 70 - 130 mg/dL       Imaging:   No  valid procedures specified.     Measures:   Advanced Care Planning:   Patient does not have an advance directive, information provided.    Smoking Cessation:   less than 3 minutes spent counseling Will try to cut down    Assessment / Plan      Assessment/Plan:   Diagnoses and all orders for this visit:    1. Type 2 diabetes mellitus with hyperglycemia, with long-term current use of insulin (Primary)  Patient does appear to be doing relatively well with respect to their diabetes.  They are tolerating their medications without complaints.  We will continue to follow their hemoglobin A1c and will make adjustments to keep their level less than 7%.  Patient does appear to be doing better with respect to her blood sugars.  We will continue with the short acting and long-acting insulin.  We will provide glucagon as necessary for hypoglycemic episodes.  They will continue to adjust insulin and monitor her symptoms closely and if she has other issues before her next scheduled follow-up she will contact us.  -     Levemir FlexPen 100 UNIT/ML injection; Inject 15 Units under the skin into the appropriate area as directed Every Night.  -     insulin detemir (Levemir) 100 UNIT/ML injection; Inject 10 Units under the skin into the appropriate area as directed Every Night.  Dispense: 15 mL; Refill: 11  -     glucagon (GLUCAGEN) 1 MG injection; Inject 1 mg under the skin into the appropriate area as directed 1 (One) Time As Needed (hypoglycemia) for up to 1 dose.  Dispense: 1 each; Refill: 0    2. Primary hypertension   Patient appears to be tolerating their blood pressure medicine without any side effects.  We will continue to monitor their blood pressure and will adjust to keep there is systolic blood pressure less than 130.  We will continue to monitor renal function and will make adjustments based on these findings.    3. Stage 4 chronic kidney disease   Patient appears to be doing well at present time with respect to their  renal insufficiency.  They are pushing fluids without difficulty and have been avoiding anti-inflammatories.  We will continue to monitor renal function and if there does appear to be any abnormality we will consider renal ultrasound as well as possible referral to a nephrologist.    4. Cigarette nicotine dependence in remission    5. Neuropathy   Patient appears to be doing well at present time.  Patient has not had any cognitive dysfunction or respiratory suppression patient anxiety/depression/PEG scores were noted.  Their current opioid misuse measure did not reveal any abnormality.  Current Alcides and previous urine drug screen did not reveal any abnormality.  Patient understands the risk and benefits of the medications.  We will continue to monitor symptoms and if there is any other difficulty further assessment treatment will be necessary.    6. Primary insomnia   Patient still having difficulty with sleeping at night.  She is using the temazepam with relatively good success.  Unfortunately she is still sleeping several hours during the day.  We have encouraged sleep hygiene and monitor her symptoms.  We will continue to encourage close monitoring of her use of temazepam.  If she has other issues prior to her next scheduled follow-up she will contact us.    7. Gastroesophageal reflux disease without esophagitis   Patient is doing well at present time with respect to the reflux symptomatology.  They are tolerating their medications without any complaints at present time.  We will continue to monitor their symptoms and if they develop dysphagia, alarm symptoms or worsening symptomatology further evaluation and treatment may be necessary as well as possible referral for an EGD.    8. Degeneration of intervertebral disc of lumbar region with discogenic back pain and lower extremity pain  Patient does continue to have significant amount of discomfort.  She has had difficulty with numerous medications in the past  including narcotics and gabapentin.  We have discussed options and will try her on some lidocaine patch.  We will monitor and if she does have worsening symptoms referral to a pain specialist may be necessary.  -     lidocaine (LIDODERM) 5 %; Place 1 patch on the skin as directed by provider Daily. Remove & Discard patch within 12 hours or as directed by MD  Dispense: 30 each; Refill: 11        Follow Up:   Return in about 3 weeks (around 11/21/2024).      At [facility], we believe that sharing information builds trust and better relationships. You are receiving this note because you recently visited [facility]. It is possible you will see health information before a provider has talked with you about it. This kind of information can be easy to misunderstand. To help you fully understand what it means for your health, we urge you to discuss this note with your provider.    Aiden Spencer MD  [facility]

## 2024-11-01 ENCOUNTER — TELEPHONE (OUTPATIENT)
Dept: FAMILY MEDICINE CLINIC | Facility: CLINIC | Age: 79
End: 2024-11-01
Payer: OTHER MISCELLANEOUS

## 2024-11-01 NOTE — TELEPHONE ENCOUNTER
Caller: Delbert Mcdermott    Relationship: Emergency Contact    Best call back number: [phone]     What is the best time to reach you: ANY    Who are you requesting to speak with (clinical staff, provider,  specific staff member): NURSE    Do you know the name of the person who called:     What was the call regarding: HAS QUESTIONS ABOUT THE NEW INSULIN.    Is it okay if the provider responds through MyChart: PHONE CALL PLEASE

## 2024-11-06 ENCOUNTER — TELEPHONE (OUTPATIENT)
Dept: FAMILY MEDICINE CLINIC | Facility: CLINIC | Age: 79
End: 2024-11-06
Payer: OTHER MISCELLANEOUS

## 2024-11-06 DIAGNOSIS — Z79.4 TYPE 2 DIABETES MELLITUS WITH HYPERGLYCEMIA, WITH LONG-TERM CURRENT USE OF INSULIN: Primary | ICD-10-CM

## 2024-11-06 DIAGNOSIS — E11.65 TYPE 2 DIABETES MELLITUS WITH HYPERGLYCEMIA, WITH LONG-TERM CURRENT USE OF INSULIN: Primary | ICD-10-CM

## 2024-11-06 RX ORDER — LANCETS 33 GAUGE
1 EACH MISCELLANEOUS 4 TIMES DAILY
Qty: 400 EACH | Refills: 3 | Status: SHIPPED | OUTPATIENT
Start: 2024-11-06

## 2024-11-06 NOTE — TELEPHONE ENCOUNTER
PT'S SISTER CALLED TO REQUEST FOR HER TO HAVE SMALL NEEDLES TO BE CALLED IN FOR HER INSULIN. she likes the 4 MM 32GX5/32. PLEASE SEND TO [facility]

## 2024-11-07 RX ORDER — ISOSORBIDE MONONITRATE 60 MG/1
60 TABLET, EXTENDED RELEASE ORAL DAILY
Qty: 90 TABLET | Refills: 3 | Status: SHIPPED | OUTPATIENT
Start: 2024-11-07

## 2024-11-07 NOTE — TELEPHONE ENCOUNTER
[facility] ST[facility] OUT OF THE [place] OFFICE. SHE TOOK THE ORDERS AND WILL PASS THEM ALONG.

## 2024-11-11 ENCOUNTER — TELEPHONE (OUTPATIENT)
Dept: FAMILY MEDICINE CLINIC | Facility: CLINIC | Age: 79
End: 2024-11-11
Payer: OTHER MISCELLANEOUS

## 2024-11-11 RX ORDER — DOXYCYCLINE 100 MG/1
100 CAPSULE ORAL 2 TIMES DAILY
Qty: 20 CAPSULE | Refills: 0 | Status: SHIPPED | OUTPATIENT
Start: 2024-11-11

## 2024-11-11 NOTE — TELEPHONE ENCOUNTER
CONSTANT COUGHING, WHICH IS PRODUCTIVE, NO FEVER, ASKING FOR COUGH MEDICINE OR ANTIBIOTIC TO VAL  CALL BACK AMBER

## 2024-11-13 ENCOUNTER — TELEPHONE (OUTPATIENT)
Dept: FAMILY MEDICINE CLINIC | Facility: CLINIC | Age: 79
End: 2024-11-13
Payer: OTHER MISCELLANEOUS

## 2024-11-13 NOTE — TELEPHONE ENCOUNTER
It is a basal insulin so it is slowly released over the next 72 hours.  So she may give the insulin shot this morning.

## 2024-11-13 NOTE — TELEPHONE ENCOUNTER
SHE DID NOT GET HER INSULIN SHOT LAST NIGHT, FELL ASLEEP, RICA QUESTIONING WHETHER TO GIVE IT THIS AM SINCE HER FASTING BS .  DR RANGEL HAD TOLD HER TO GIVE IT THE NEXT MORNING IF SHE DID NOT GET IT THE NIGHT BEFORE, THAT IS WHAT SHE IS GOING TO DO  CALL BACK RICA TRISTAN

## 2024-11-13 NOTE — TELEPHONE ENCOUNTER
JEANNE DID NOT GET THE LEVEMIR LAST NIGHT, NOR THIS MORNING .     SHE WILL GET THE LEVEMIR 15 UNITS AT NIGHT., TONIGHT.     SHE IS GOING TO START GETTING IRON INJECTIONS STARTING MONDAY.

## 2024-11-14 ENCOUNTER — PATIENT ROUNDING (BHMG ONLY) (OUTPATIENT)
Dept: FAMILY MEDICINE CLINIC | Facility: CLINIC | Age: 79
End: 2024-11-14
Payer: OTHER MISCELLANEOUS

## 2024-11-14 ENCOUNTER — OFFICE VISIT (OUTPATIENT)
Dept: FAMILY MEDICINE CLINIC | Facility: CLINIC | Age: 79
End: 2024-11-14
Payer: MEDICARE

## 2024-11-14 VITALS
HEART RATE: 86 BPM | TEMPERATURE: 98.6 F | WEIGHT: 156 LBS | BODY MASS INDEX: 30.63 KG/M2 | RESPIRATION RATE: 18 BRPM | SYSTOLIC BLOOD PRESSURE: 140 MMHG | DIASTOLIC BLOOD PRESSURE: 78 MMHG | HEIGHT: 60 IN | OXYGEN SATURATION: 99 %

## 2024-11-14 DIAGNOSIS — J20.9 ACUTE BRONCHITIS, UNSPECIFIED ORGANISM: ICD-10-CM

## 2024-11-14 DIAGNOSIS — Z79.4 TYPE 2 DIABETES MELLITUS WITH HYPOGLYCEMIA WITHOUT COMA, WITH LONG-TERM CURRENT USE OF INSULIN: Primary | ICD-10-CM

## 2024-11-14 DIAGNOSIS — F17.211 CIGARETTE NICOTINE DEPENDENCE IN REMISSION: ICD-10-CM

## 2024-11-14 DIAGNOSIS — I10 PRIMARY HYPERTENSION: ICD-10-CM

## 2024-11-14 DIAGNOSIS — E11.649 TYPE 2 DIABETES MELLITUS WITH HYPOGLYCEMIA WITHOUT COMA, WITH LONG-TERM CURRENT USE OF INSULIN: Primary | ICD-10-CM

## 2024-11-14 DIAGNOSIS — N18.4 STAGE 4 CHRONIC KIDNEY DISEASE: ICD-10-CM

## 2024-11-14 RX ORDER — LORATADINE 10 MG/1
TABLET ORAL
COMMUNITY
Start: 2024-11-08

## 2024-11-14 RX ORDER — DOXYCYCLINE 100 MG/1
100 CAPSULE ORAL 2 TIMES DAILY
Qty: 20 CAPSULE | Refills: 0 | Status: SHIPPED | OUTPATIENT
Start: 2024-11-14

## 2024-11-14 RX ORDER — LOSARTAN POTASSIUM 50 MG/1
1 TABLET ORAL DAILY
COMMUNITY
Start: 2024-11-07

## 2024-11-14 NOTE — PROGRESS NOTES
A [employer] message has been sent to the patient for PATIENT  ROUNDING with [facility]

## 2024-11-14 NOTE — PROGRESS NOTES
Follow Up Office Visit      Date: 2024   Patient Name: Arminda Krishnan  : 1945   MRN: [id number]     Chief Complaint:    Chief Complaint   Patient presents with    Cough     2 weeks    Hypoglycemia       History of Present Illness: Arminda Krishnan is a 79 y.o. female who is here today for assessment of hypoglycemia as well as cough.  Patient states that she has been having some problems with low blood sugars in the morning.  She has had erratic sleep habits as well as eating habits and will occasionally have low sugars without symptoms.  She has had elevation of her blood sugars after she eats however.  Patient is also had some increasing cough for the previous 10 days.  Her cough is quite productive.  She has not had any fever or chills.  She states that she has had some symptoms of shortness of breath.  She denies any wheeze.  She has not had any fever or chills.  Patient has not been around anyone that she knows of who has had similar symptomatology.  She does continue take her medication as prescribed.  She does use her albuterol as necessary.  She has no other concerns.  Patient appears to be doing otherwise well.  They have continue with their medications without any side effects.  They have not had any changes in their usual activity, appetite and sleep.  Patient denies any other cardiovascular, respiratory, gastrointestinal, urologic or neurologic complaints.    History of Present Illness         Subjective      Review of Systems:   Review of Systems   Constitutional:  Negative for activity change, appetite change and fatigue.   HENT:  Positive for congestion and sinus pressure.    Respiratory:  Positive for cough and shortness of breath. Negative for chest tightness and wheezing.    Cardiovascular:  Negative for chest pain, palpitations and leg swelling.   Gastrointestinal:  Negative for abdominal distention, abdominal pain, blood in stool, constipation, diarrhea, nausea, vomiting,  GERD and indigestion.   Genitourinary:  Negative for difficulty urinating, dysuria, flank pain, frequency, hematuria and urgency.   Musculoskeletal:  Negative for arthralgias, back pain, gait problem, joint swelling and myalgias.   Neurological:  Negative for dizziness, tremors, seizures, syncope, weakness, light-headedness, numbness, headache and memory problem.   Psychiatric/Behavioral:  Negative for sleep disturbance and depressed mood. The patient is not nervous/anxious.        I have reviewed the patients family history, social history, past medical history, past surgical history and have updated it as appropriate.     Medications:     Current Outpatient Medications:     acetaminophen (TYLENOL) 325 MG tablet, Take 2 tablets by mouth Every 4 (Four) Hours As Needed for Mild Pain., Disp: , Rfl:     albuterol (PROVENTIL) (2.5 MG/3ML) 0.083% nebulizer solution, Take 2.5 mg by nebulization Every 4 (Four) Hours As Needed for Wheezing or Shortness of Air., Disp: , Rfl:     aspirin 81 MG chewable tablet, Chew 1 tablet Daily., Disp: , Rfl:     atorvastatin (LIPITOR) 80 MG tablet, Take 1 tablet by mouth Daily., Disp: 90 tablet, Rfl: 3    Blood Glucose Monitoring Suppl (ONE TOUCH ULTRA 2) w/Device kit, USE 1 EACH DAILY., Disp: , Rfl:     Blood Glucose Monitoring Suppl kit, Use 1 each Daily., Disp: 1 each, Rfl: 11    carvedilol (COREG) 25 MG tablet, TAKE ONE TABLET BY MOUTH TWO TIMES A DAY, Disp: 180 tablet, Rfl: 3    clopidogrel (PLAVIX) 75 MG tablet, TAKE ONE TABLET BY MOUTH EVERY DAY, Disp: 30 tablet, Rfl: 11    Comfort EZ Pen Needles 32G X 4 MM misc, Inject 1 each under the skin into the appropriate area as directed 4 (Four) Times a Day. as directed, Disp: 400 each, Rfl: 3    Diclofenac Sodium (VOLTAREN) 1 % gel gel, APPLY TO AFFECTED AREA FOUR TIMES DAILY, Disp: 100 g, Rfl: 12    donepezil (ARICEPT) 5 MG tablet, Take 1 tablet by mouth Every Night., Disp: , Rfl:     doxycycline (VIBRAMYCIN) 100 MG capsule, Take 1  capsule by mouth 2 (Two) Times a Day., Disp: 20 capsule, Rfl: 0    Dupixent 300 MG/2ML solution pen-injector, Inject 2 mL under the skin into the appropriate area as directed As Needed (Every two weeks)., Disp: , Rfl:     ferrous sulfate 325 (65 FE) MG tablet, Take 1 tablet by mouth Daily With Breakfast., Disp: , Rfl:     fluticasone (FLONASE) 50 MCG/ACT nasal spray, USE 2 SPRAYS IN EACH NOSTRIL ONCE DAILY, Disp: 16 g, Rfl: 12    glucagon (GLUCAGEN) 1 MG injection, Inject 1 mg under the skin into the appropriate area as directed 1 (One) Time As Needed (hypoglycemia) for up to 1 dose., Disp: 1 each, Rfl: 0    Glucose Blood (Blood Glucose Test) strip, 1 each by In Vitro route 3 (Three) Times a Day., Disp: 100 each, Rfl: 11    insulin aspart (NovoLOG FlexPen) 100 UNIT/ML solution pen-injector sc pen, Inject 5 Units under the skin into the appropriate area as directed 3 (Three) Times a Day With Meals., Disp: 15 mL, Rfl: 3    insulin detemir (Levemir) 100 UNIT/ML injection, Inject 10 Units under the skin into the appropriate area as directed Every Night., Disp: 15 mL, Rfl: 11    isosorbide mononitrate (IMDUR) 60 MG 24 hr tablet, TAKE ONE TABLET BY MOUTH EVERY DAY, Disp: 90 tablet, Rfl: 3    Lancets (OneTouch Delica Plus [id number]) misc, 3 (Three) Times a Day. as directed, Disp: , Rfl:     lidocaine (LIDODERM) 5 %, Place 1 patch on the skin as directed by provider Daily. Remove & Discard patch within 12 hours or as directed by MD, Disp: 30 each, Rfl: 11    loratadine (CLARITIN) 10 MG tablet, , Disp: , Rfl:     losartan (COZAAR) 50 MG tablet, Take 1 tablet by mouth Daily., Disp: , Rfl:     melatonin 5 MG tablet tablet, Take 1 tablet by mouth Every Night., Disp: , Rfl:     multivitamin with minerals tablet tablet, Take 1 tablet by mouth Daily., Disp: , Rfl:     naloxone (NARCAN) 4 MG/0.1ML nasal spray, Administer 1 spray into the nostril(s) as directed by provider As Needed., Disp: , Rfl:     nicotine (NICODERM CQ) 7  MG/24HR patch, Place 1 patch on the skin as directed by provider Daily., Disp: , Rfl:     pantoprazole (PROTONIX) 40 MG EC tablet, TAKE ONE TABLET BY MOUTH TWO TIMES A DAY, Disp: 180 tablet, Rfl: 3    polyethylene glycol (MIRALAX) 17 g packet, Take 17 g by mouth Daily., Disp: , Rfl:     sennosides-docusate (PERICOLACE) 8.6-50 MG per tablet, Take 2 tablets by mouth 2 (Two) Times a Day., Disp: , Rfl:     simethicone (MYLICON) 80 MG chewable tablet, Chew 1 tablet 4 (Four) Times a Day As Needed for Flatulence., Disp: , Rfl:     SITagliptin (Januvia) 50 MG tablet, TAKE 1 TABLET BY MOUTH DAILY, Disp: 90 tablet, Rfl: 3    tacrolimus (PROTOPIC) 0.1 % ointment, APPLY TO AFFECTED AREA TWO TIMES A DAY, Disp: , Rfl:     temazepam (RESTORIL) 7.5 MG capsule, Take 1 capsule by mouth At Night As Needed for Sleep., Disp: 30 capsule, Rfl: 3    tolterodine LA (DETROL LA) 2 MG 24 hr capsule, Take 1 capsule by mouth Daily., Disp: 90 capsule, Rfl: 3    True Metrix Blood Glucose Test test strip, Daily. for testing, Disp: , Rfl:     Ventolin  (90 Base) MCG/ACT inhaler, INHALE 2 PUFFS EVERY 6 (SIX) HOURS AS NEEDED FOR WHEEZING., Disp: 18 g, Rfl: 11    doxycycline (VIBRAMYCIN) 100 MG capsule, Take 1 capsule by mouth 2 (Two) Times a Day., Disp: 20 capsule, Rfl: 0    Allergies:   Allergies   Allergen Reactions    Ceclor [Cefaclor] Rash       Immunizations:   Immunization History   Administered Date(s) Administered    COVID-19 (VINNIE) 03/16/2021    COVID-19 (UNSPECIFIED) 03/01/2021, 04/01/2021    Fluzone High-Dose 65+YRS 10/16/2018    Fluzone High-Dose 65+yrs 10/31/2022, 12/06/2023    Fluzone Quad >6mos (Multi-dose) 11/15/2016, 02/05/2020    Pneumococcal Conjugate 13-Valent (PCV13) 07/07/2016    Pneumococcal Polysaccharide (PPSV23) 10/31/2022        Objective     Physical Exam: Please see above  Vital Signs:   Vitals:    11/14/24 1053   BP: 140/78   BP Location: Right arm   Patient Position: Sitting   Cuff Size: Adult   Pulse: 86  "  Resp: 18   Temp: 98.6 °F (37 °C)   TempSrc: Temporal   SpO2: 99%   Weight: 70.8 kg (156 lb)   Height: 152.4 cm (60\")     Body mass index is 30.47 kg/m².          Physical Exam  Vitals and nursing note reviewed.   Constitutional:       Appearance: Normal appearance.   HENT:      Head: Normocephalic and atraumatic.      Nose: Nose normal.      Mouth/Throat:      Pharynx: Oropharynx is clear.   Eyes:      Extraocular Movements: Extraocular movements intact.      Pupils: Pupils are equal, round, and reactive to light.   Neck:      Thyroid: No thyroid mass or thyromegaly.      Trachea: Trachea normal.   Cardiovascular:      Rate and Rhythm: Normal rate and regular rhythm.      Pulses: Normal pulses. No decreased pulses.      Heart sounds: Normal heart sounds.   Pulmonary:      Effort: Pulmonary effort is normal.      Breath sounds: Normal breath sounds.   Abdominal:      General: Abdomen is flat. Bowel sounds are normal.      Palpations: Abdomen is soft.      Tenderness: There is no abdominal tenderness.   Musculoskeletal:      Cervical back: Neck supple.      Right lower leg: No edema.      Left lower leg: No edema.   Lymphadenopathy:      Cervical: No cervical adenopathy.   Skin:     General: Skin is warm and dry.   Neurological:      General: No focal deficit present.      Mental Status: She is alert and oriented to person, place, and time.      Sensory: Sensation is intact.      Motor: Motor function is intact.      Coordination: Coordination is intact.   Psychiatric:         Attention and Perception: Attention normal.         Mood and Affect: Mood normal.         Speech: Speech normal.         Behavior: Behavior normal.         Procedures    Results:   Labs:   Hemoglobin A1C   Date Value Ref Range Status   09/28/2024 11.00 (H) 4.80 - 5.60 % Final     TSH   Date Value Ref Range Status   10/09/2024 0.465 0.270 - 4.200 uIU/mL Final        POCT Results (if applicable):   Results for orders placed or performed during " the hospital encounter of 10/09/24   POC Glucose Once    Collection Time: 10/09/24  5:53 PM    Specimen: Blood   Result Value Ref Range    Glucose 404 (C) 70 - 130 mg/dL   POC Glucose Once    Collection Time: 10/09/24  5:55 PM    Specimen: Blood   Result Value Ref Range    Glucose 393 (H) 70 - 130 mg/dL   Blood Culture - Blood, Hand, Left    Collection Time: 10/09/24  6:42 PM    Specimen: Hand, Left; Blood   Result Value Ref Range    Blood Culture No growth at 5 days    Comprehensive Metabolic Panel    Collection Time: 10/09/24  6:42 PM    Specimen: Blood   Result Value Ref Range    Glucose 359 (H) 65 - 99 mg/dL    BUN 56 (H) 8 - 23 mg/dL    Creatinine 3.30 (H) 0.57 - 1.00 mg/dL    Sodium 136 136 - 145 mmol/L    Potassium 4.4 3.5 - 5.2 mmol/L    Chloride 103 98 - 107 mmol/L    CO2 19.0 (L) 22.0 - 29.0 mmol/L    Calcium 9.5 8.6 - 10.5 mg/dL    Total Protein 7.4 6.0 - 8.5 g/dL    Albumin 3.3 (L) 3.5 - 5.2 g/dL    ALT (SGPT) 18 1 - 33 U/L    AST (SGOT) 21 1 - 32 U/L    Alkaline Phosphatase 78 39 - 117 U/L    Total Bilirubin 0.3 0.0 - 1.2 mg/dL    Globulin 4.1 gm/dL    A/G Ratio 0.8 g/dL    BUN/Creatinine Ratio 17.0 7.0 - 25.0    Anion Gap 14.0 5.0 - 15.0 mmol/L    eGFR 13.5 (L) >60.0 mL/min/1.73   Single High Sensitivity Troponin T    Collection Time: 10/09/24  6:42 PM    Specimen: Blood   Result Value Ref Range    HS Troponin T 39 (H) <14 ng/L   Magnesium    Collection Time: 10/09/24  6:42 PM    Specimen: Blood   Result Value Ref Range    Magnesium 1.8 1.6 - 2.4 mg/dL   CBC Auto Differential    Collection Time: 10/09/24  6:42 PM    Specimen: Blood   Result Value Ref Range    WBC 8.68 3.40 - 10.80 10*3/mm3    RBC 2.82 (L) 3.77 - 5.28 10*6/mm3    Hemoglobin 7.9 (L) 12.0 - 15.9 g/dL    Hematocrit 25.0 (L) 34.0 - 46.6 %    MCV 88.7 79.0 - 97.0 fL    MCH 28.0 26.6 - 33.0 pg    MCHC 31.6 31.5 - 35.7 g/dL    RDW 14.8 12.3 - 15.4 %    RDW-SD 47.8 37.0 - 54.0 fl    MPV 10.8 6.0 - 12.0 fL    Platelets 301 140 - 450 10*3/mm3     Neutrophil % 72.7 42.7 - 76.0 %    Lymphocyte % 13.0 (L) 19.6 - 45.3 %    Monocyte % 9.6 5.0 - 12.0 %    Eosinophil % 1.5 0.3 - 6.2 %    Basophil % 0.9 0.0 - 1.5 %    Immature Grans % 2.3 (H) 0.0 - 0.5 %    Neutrophils, Absolute 6.31 1.70 - 7.00 10*3/mm3    Lymphocytes, Absolute 1.13 0.70 - 3.10 10*3/mm3    Monocytes, Absolute 0.83 0.10 - 0.90 10*3/mm3    Eosinophils, Absolute 0.13 0.00 - 0.40 10*3/mm3    Basophils, Absolute 0.08 0.00 - 0.20 10*3/mm3    Immature Grans, Absolute 0.20 (H) 0.00 - 0.05 10*3/mm3    nRBC 0.0 0.0 - 0.2 /100 WBC   Lipase    Collection Time: 10/09/24  6:42 PM    Specimen: Blood   Result Value Ref Range    Lipase 23 13 - 60 U/L   BNP    Collection Time: 10/09/24  6:42 PM    Specimen: Blood   Result Value Ref Range    proBNP 719.5 0.0 - 1,800.0 pg/mL   Lactic Acid, Plasma    Collection Time: 10/09/24  6:42 PM    Specimen: Blood   Result Value Ref Range    Lactate 0.9 0.5 - 2.0 mmol/L   Procalcitonin    Collection Time: 10/09/24  6:42 PM    Specimen: Blood   Result Value Ref Range    Procalcitonin 0.12 0.00 - 0.25 ng/mL   C-reactive Protein    Collection Time: 10/09/24  6:42 PM    Specimen: Blood   Result Value Ref Range    C-Reactive Protein 13.05 (H) 0.00 - 0.50 mg/dL   TSH    Collection Time: 10/09/24  6:42 PM    Specimen: Blood   Result Value Ref Range    TSH 0.465 0.270 - 4.200 uIU/mL   T4, Free    Collection Time: 10/09/24  6:42 PM    Specimen: Blood   Result Value Ref Range    Free T4 1.08 0.92 - 1.68 ng/dL   Phosphorus    Collection Time: 10/09/24  6:42 PM    Specimen: Blood   Result Value Ref Range    Phosphorus 3.5 2.5 - 4.5 mg/dL   Beta Hydroxybutyrate Quantitative    Collection Time: 10/09/24  6:42 PM    Specimen: Blood   Result Value Ref Range    Beta-Hydroxybutyrate Quant 0.143 0.020 - 0.270 mmol/L   Ferritin    Collection Time: 10/09/24  6:42 PM    Specimen: Blood   Result Value Ref Range    Ferritin 766.80 (H) 13.00 - 150.00 ng/mL   Iron    Collection Time: 10/09/24  6:42 PM     Specimen: Blood   Result Value Ref Range    Iron 14 (L) 37 - 145 mcg/dL   Iron Profile    Collection Time: 10/09/24  6:42 PM    Specimen: Blood   Result Value Ref Range    Iron 14 (L) 37 - 145 mcg/dL    Iron Saturation (TSAT) 7 (L) 20 - 50 %    Transferrin 131 (L) 200 - 360 mg/dL    TIBC 195 (L) 298 - 536 mcg/dL   Vitamin B12    Collection Time: 10/09/24  6:42 PM    Specimen: Blood   Result Value Ref Range    Vitamin B-12 1,262 (H) 211 - 946 pg/mL   Folate    Collection Time: 10/09/24  6:42 PM    Specimen: Blood   Result Value Ref Range    Folate 19.00 4.78 - 24.20 ng/mL   Green Top (Gel)    Collection Time: 10/09/24  6:42 PM   Result Value Ref Range    Extra Tube Hold for add-ons.    Lavender Top    Collection Time: 10/09/24  6:42 PM   Result Value Ref Range    Extra Tube hold for add-on    Gold Top - SST    Collection Time: 10/09/24  6:42 PM   Result Value Ref Range    Extra Tube Hold for add-ons.    Gray Top    Collection Time: 10/09/24  6:42 PM   Result Value Ref Range    Extra Tube Hold for add-ons.    Light Blue Top    Collection Time: 10/09/24  6:42 PM   Result Value Ref Range    Extra Tube Hold for add-ons.    COVID-19, FLU A/B, RSV PCR 1 HR TAT - Swab, Nasopharynx    Collection Time: 10/09/24  6:43 PM    Specimen: Nasopharynx; Swab   Result Value Ref Range    COVID19 Not Detected Not Detected - Ref. Range    Influenza A PCR Not Detected Not Detected    Influenza B PCR Not Detected Not Detected    RSV, PCR Not Detected Not Detected   Blood Gas, Venous With Co-Ox    Collection Time: 10/09/24  6:46 PM    Specimen: Venous Blood   Result Value Ref Range    Site Nurse/Dr Draw     pH, Venous 7.324 7.310 - 7.410 pH Units    pCO2, Venous 42.0 41.0 - 51.0 mm Hg    pO2, Venous 37.3 27.0 - 53.0 mm Hg    HCO3, Venous 21.8 (L) 22.0 - 28.0 mmol/L    Base Excess, Venous -4.0 (L) -2.0 - 2.0 mmol/L    Hemoglobin, Blood Gas 9.8 (L) 14 - 18 g/dL    Oxyhemoglobin Venous 65.8 %    Methemoglobin Venous 0.3 %    Carboxyhemoglobin  Venous 1.6 %    CO2 Content 23.1 22 - 33 mmol/L    Temperature 37.0     Barometric Pressure for Blood Gas      Modality Room Air     FIO2 21 %    Rate 0 Breaths/minute    PIP 0 cmH2O    IPAP 0     EPAP 0    ECG 12 Lead ED Triage Standing Order; Weak / Dizzy / AMS    Collection Time: 10/09/24  6:46 PM   Result Value Ref Range    QT Interval 348 ms    QTC Interval 406 ms   Urine Culture - Urine, Urine, Clean Catch    Collection Time: 10/09/24  8:58 PM    Specimen: Urine, Clean Catch   Result Value Ref Range    Urine Culture No growth    Legionella Antigen, Urine - Urine, Urine, Clean Catch    Collection Time: 10/09/24  8:58 PM    Specimen: Urine, Clean Catch   Result Value Ref Range    LEGIONELLA ANTIGEN, URINE Negative Negative   S. Pneumo Ag Urine or CSF - Urine, Urine, Clean Catch    Collection Time: 10/09/24  8:58 PM    Specimen: Urine, Clean Catch   Result Value Ref Range    Strep Pneumo Ag Negative Negative   Urinalysis With Microscopic If Indicated (No Culture) - Urine, Clean Catch    Collection Time: 10/09/24  8:58 PM    Specimen: Urine, Clean Catch   Result Value Ref Range    Color, UA Yellow Yellow, Straw    Appearance, UA Cloudy (A) Clear    pH, UA <=5.0 5.0 - 8.0    Specific Gravity, UA 1.015 1.001 - 1.030    Glucose,  mg/dL (1+) (A) Negative    Ketones, UA Negative Negative    Bilirubin, UA Negative Negative    Blood, UA Large (3+) (A) Negative    Protein,  mg/dL (2+) (A) Negative    Leuk Esterase, UA Moderate (2+) (A) Negative    Nitrite, UA Negative Negative    Urobilinogen, UA 0.2 E.U./dL 0.2 - 1.0 E.U./dL   Urinalysis, Microscopic Only - Urine, Clean Catch    Collection Time: 10/09/24  8:58 PM    Specimen: Urine, Clean Catch   Result Value Ref Range    RBC, UA 21-50 (A) None Seen, 0-2 /HPF    WBC, UA Too Numerous to Count (A) None Seen, 0-2 /HPF    Bacteria, UA 4+ (A) None Seen, Trace /HPF    Squamous Epithelial Cells, UA 0-2 None Seen, 0-2 /HPF    Hyaline Casts, UA None Seen 0 - 6 /LPF     Methodology Automated Microscopy    Blood Culture - Blood, Arm, Right    Collection Time: 10/09/24  9:59 PM    Specimen: Arm, Right; Blood   Result Value Ref Range    Blood Culture No growth at 5 days    POC Glucose Once    Collection Time: 10/09/24 11:07 PM    Specimen: Blood   Result Value Ref Range    Glucose 239 (H) 70 - 130 mg/dL   POC Glucose Once    Collection Time: 10/10/24  6:22 AM    Specimen: Blood   Result Value Ref Range    Glucose 260 (H) 70 - 130 mg/dL   POC Glucose Once    Collection Time: 10/10/24  7:14 AM    Specimen: Blood   Result Value Ref Range    Glucose 252 (H) 70 - 130 mg/dL   POC Glucose Once    Collection Time: 10/10/24 11:19 AM    Specimen: Blood   Result Value Ref Range    Glucose 156 (H) 70 - 130 mg/dL   Comprehensive Metabolic Panel    Collection Time: 10/10/24 12:36 PM    Specimen: Blood   Result Value Ref Range    Glucose 156 (H) 65 - 99 mg/dL    BUN 45 (H) 8 - 23 mg/dL    Creatinine 2.88 (H) 0.57 - 1.00 mg/dL    Sodium 141 136 - 145 mmol/L    Potassium 4.1 3.5 - 5.2 mmol/L    Chloride 110 (H) 98 - 107 mmol/L    CO2 18.0 (L) 22.0 - 29.0 mmol/L    Calcium 9.1 8.6 - 10.5 mg/dL    Total Protein 7.0 6.0 - 8.5 g/dL    Albumin 3.1 (L) 3.5 - 5.2 g/dL    ALT (SGPT) 15 1 - 33 U/L    AST (SGOT) 21 1 - 32 U/L    Alkaline Phosphatase 70 39 - 117 U/L    Total Bilirubin 0.2 0.0 - 1.2 mg/dL    Globulin 3.9 gm/dL    A/G Ratio 0.8 g/dL    BUN/Creatinine Ratio 15.6 7.0 - 25.0    Anion Gap 13.0 5.0 - 15.0 mmol/L    eGFR 15.9 (L) >60.0 mL/min/1.73   CBC Auto Differential    Collection Time: 10/10/24 12:36 PM    Specimen: Blood   Result Value Ref Range    WBC 10.17 3.40 - 10.80 10*3/mm3    RBC 2.67 (L) 3.77 - 5.28 10*6/mm3    Hemoglobin 7.5 (L) 12.0 - 15.9 g/dL    Hematocrit 23.7 (L) 34.0 - 46.6 %    MCV 88.8 79.0 - 97.0 fL    MCH 28.1 26.6 - 33.0 pg    MCHC 31.6 31.5 - 35.7 g/dL    RDW 15.2 12.3 - 15.4 %    RDW-SD 49.3 37.0 - 54.0 fl    MPV 10.6 6.0 - 12.0 fL    Platelets 296 140 - 450 10*3/mm3     Neutrophil % 72.0 42.7 - 76.0 %    Lymphocyte % 13.4 (L) 19.6 - 45.3 %    Monocyte % 10.6 5.0 - 12.0 %    Eosinophil % 1.4 0.3 - 6.2 %    Basophil % 1.0 0.0 - 1.5 %    Immature Grans % 1.6 (H) 0.0 - 0.5 %    Neutrophils, Absolute 7.33 (H) 1.70 - 7.00 10*3/mm3    Lymphocytes, Absolute 1.36 0.70 - 3.10 10*3/mm3    Monocytes, Absolute 1.08 (H) 0.10 - 0.90 10*3/mm3    Eosinophils, Absolute 0.14 0.00 - 0.40 10*3/mm3    Basophils, Absolute 0.10 0.00 - 0.20 10*3/mm3    Immature Grans, Absolute 0.16 (H) 0.00 - 0.05 10*3/mm3    nRBC 0.0 0.0 - 0.2 /100 WBC   Ammonia    Collection Time: 10/10/24 12:36 PM    Specimen: Blood   Result Value Ref Range    Ammonia 16 11 - 51 umol/L   Reticulocytes    Collection Time: 10/10/24 12:36 PM    Specimen: Blood   Result Value Ref Range    Reticulocyte % 1.68 0.70 - 1.90 %    Reticulocyte Absolute 0.0449 0.0200 - 0.1300 10*6/mm3   Hemoglobin & Hematocrit, Blood    Collection Time: 10/10/24  5:45 PM    Specimen: Blood   Result Value Ref Range    Hemoglobin 8.3 (L) 12.0 - 15.9 g/dL    Hematocrit 27.6 (L) 34.0 - 46.6 %   POC Glucose Once    Collection Time: 10/10/24  6:13 PM    Specimen: Blood   Result Value Ref Range    Glucose 241 (H) 70 - 130 mg/dL   POC Glucose Once    Collection Time: 10/10/24  8:28 PM    Specimen: Blood   Result Value Ref Range    Glucose 217 (H) 70 - 130 mg/dL   Hemoglobin & Hematocrit, Blood    Collection Time: 10/10/24 10:23 PM    Specimen: Blood   Result Value Ref Range    Hemoglobin 8.2 (L) 12.0 - 15.9 g/dL    Hematocrit 25.8 (L) 34.0 - 46.6 %   POC Glucose Once    Collection Time: 10/11/24  8:10 AM    Specimen: Blood   Result Value Ref Range    Glucose 215 (H) 70 - 130 mg/dL   POC Glucose Once    Collection Time: 10/11/24 12:02 PM    Specimen: Blood   Result Value Ref Range    Glucose 198 (H) 70 - 130 mg/dL   POC Glucose Once    Collection Time: 10/11/24  4:49 PM    Specimen: Blood   Result Value Ref Range    Glucose 203 (H) 70 - 130 mg/dL   POC Glucose Once     Collection Time: 10/11/24  8:19 PM    Specimen: Blood   Result Value Ref Range    Glucose 178 (H) 70 - 130 mg/dL   POC Glucose Once    Collection Time: 10/12/24  7:34 AM    Specimen: Blood   Result Value Ref Range    Glucose 161 (H) 70 - 130 mg/dL   POC Glucose Once    Collection Time: 10/12/24 11:16 AM    Specimen: Blood   Result Value Ref Range    Glucose 226 (H) 70 - 130 mg/dL   POC Glucose Once    Collection Time: 10/12/24  4:52 PM    Specimen: Blood   Result Value Ref Range    Glucose 186 (H) 70 - 130 mg/dL   POC Glucose Once    Collection Time: 10/12/24  8:39 PM    Specimen: Blood   Result Value Ref Range    Glucose 204 (H) 70 - 130 mg/dL   CBC (No Diff)    Collection Time: 10/13/24  5:19 AM    Specimen: Blood   Result Value Ref Range    WBC 9.23 3.40 - 10.80 10*3/mm3    RBC 2.55 (L) 3.77 - 5.28 10*6/mm3    Hemoglobin 7.1 (L) 12.0 - 15.9 g/dL    Hematocrit 22.6 (L) 34.0 - 46.6 %    MCV 88.6 79.0 - 97.0 fL    MCH 27.8 26.6 - 33.0 pg    MCHC 31.4 (L) 31.5 - 35.7 g/dL    RDW 15.5 (H) 12.3 - 15.4 %    RDW-SD 49.9 37.0 - 54.0 fl    MPV 11.5 6.0 - 12.0 fL    Platelets 282 140 - 450 10*3/mm3   POC Glucose Once    Collection Time: 10/13/24  7:35 AM    Specimen: Blood   Result Value Ref Range    Glucose 194 (H) 70 - 130 mg/dL   Basic Metabolic Panel    Collection Time: 10/13/24  8:13 AM    Specimen: Blood   Result Value Ref Range    Glucose 181 (H) 65 - 99 mg/dL    BUN 28 (H) 8 - 23 mg/dL    Creatinine 2.45 (H) 0.57 - 1.00 mg/dL    Sodium 139 136 - 145 mmol/L    Potassium 3.8 3.5 - 5.2 mmol/L    Chloride 106 98 - 107 mmol/L    CO2 20.0 (L) 22.0 - 29.0 mmol/L    Calcium 9.5 8.6 - 10.5 mg/dL    BUN/Creatinine Ratio 11.4 7.0 - 25.0    Anion Gap 13.0 5.0 - 15.0 mmol/L    eGFR 19.4 (L) >60.0 mL/min/1.73   Magnesium    Collection Time: 10/13/24  8:13 AM    Specimen: Blood   Result Value Ref Range    Magnesium 1.5 (L) 1.6 - 2.4 mg/dL   POC Glucose Once    Collection Time: 10/13/24 11:26 AM    Specimen: Blood   Result Value  Ref Range    Glucose 189 (H) 70 - 130 mg/dL   Hemoglobin & Hematocrit, Blood    Collection Time: 10/13/24 12:19 PM    Specimen: Blood   Result Value Ref Range    Hemoglobin 7.0 (L) 12.0 - 15.9 g/dL    Hematocrit 21.8 (L) 34.0 - 46.6 %   Type & Screen    Collection Time: 10/13/24  2:18 PM    Specimen: Blood   Result Value Ref Range    ABO Type O     RH type Positive     Antibody Screen Negative     T&S Expiration Date 10/16/2024 11:59:59 PM    POC Glucose Once    Collection Time: 10/13/24  5:13 PM    Specimen: Blood   Result Value Ref Range    Glucose 166 (H) 70 - 130 mg/dL   POC Glucose Once    Collection Time: 10/13/24  8:20 PM    Specimen: Blood   Result Value Ref Range    Glucose 181 (H) 70 - 130 mg/dL   Magnesium    Collection Time: 10/14/24 12:52 AM    Specimen: Blood   Result Value Ref Range    Magnesium 2.2 1.6 - 2.4 mg/dL   Comprehensive Metabolic Panel    Collection Time: 10/14/24 12:52 AM    Specimen: Blood   Result Value Ref Range    Glucose 155 (H) 65 - 99 mg/dL    BUN 26 (H) 8 - 23 mg/dL    Creatinine 2.33 (H) 0.57 - 1.00 mg/dL    Sodium 139 136 - 145 mmol/L    Potassium 3.9 3.5 - 5.2 mmol/L    Chloride 106 98 - 107 mmol/L    CO2 20.0 (L) 22.0 - 29.0 mmol/L    Calcium 9.6 8.6 - 10.5 mg/dL    Total Protein 6.9 6.0 - 8.5 g/dL    Albumin 3.0 (L) 3.5 - 5.2 g/dL    ALT (SGPT) 24 1 - 33 U/L    AST (SGOT) 36 (H) 1 - 32 U/L    Alkaline Phosphatase 70 39 - 117 U/L    Total Bilirubin 0.2 0.0 - 1.2 mg/dL    Globulin 3.9 gm/dL    A/G Ratio 0.8 g/dL    BUN/Creatinine Ratio 11.2 7.0 - 25.0    Anion Gap 13.0 5.0 - 15.0 mmol/L    eGFR 20.6 (L) >60.0 mL/min/1.73   CBC Auto Differential    Collection Time: 10/14/24 12:52 AM    Specimen: Blood   Result Value Ref Range    WBC 9.74 3.40 - 10.80 10*3/mm3    RBC 3.08 (L) 3.77 - 5.28 10*6/mm3    Hemoglobin 8.7 (L) 12.0 - 15.9 g/dL    Hematocrit 26.9 (L) 34.0 - 46.6 %    MCV 87.3 79.0 - 97.0 fL    MCH 28.2 26.6 - 33.0 pg    MCHC 32.3 31.5 - 35.7 g/dL    RDW 14.7 12.3 - 15.4 %     RDW-SD 47.0 37.0 - 54.0 fl    MPV 10.5 6.0 - 12.0 fL    Platelets 271 140 - 450 10*3/mm3    Neutrophil % 69.0 42.7 - 76.0 %    Lymphocyte % 14.4 (L) 19.6 - 45.3 %    Monocyte % 12.2 (H) 5.0 - 12.0 %    Eosinophil % 2.1 0.3 - 6.2 %    Basophil % 0.8 0.0 - 1.5 %    Immature Grans % 1.5 (H) 0.0 - 0.5 %    Neutrophils, Absolute 6.72 1.70 - 7.00 10*3/mm3    Lymphocytes, Absolute 1.40 0.70 - 3.10 10*3/mm3    Monocytes, Absolute 1.19 (H) 0.10 - 0.90 10*3/mm3    Eosinophils, Absolute 0.20 0.00 - 0.40 10*3/mm3    Basophils, Absolute 0.08 0.00 - 0.20 10*3/mm3    Immature Grans, Absolute 0.15 (H) 0.00 - 0.05 10*3/mm3    nRBC 0.0 0.0 - 0.2 /100 WBC   POC Glucose Once    Collection Time: 10/14/24  8:05 AM    Specimen: Blood   Result Value Ref Range    Glucose 189 (H) 70 - 130 mg/dL   POC Glucose Once    Collection Time: 10/14/24 11:02 AM    Specimen: Blood   Result Value Ref Range    Glucose 201 (H) 70 - 130 mg/dL   Prepare RBC, 1 Units    Collection Time: 10/14/24 12:10 PM   Result Value Ref Range    Product Code L1816J92     Unit Number [id number]     UNIT  ABO O     UNIT  RH POS     Crossmatch Interpretation Compatible     Dispense Status PT     Blood Expiration Date [id number]     Blood Type Barcode     POC Glucose Once    Collection Time: 10/14/24  5:38 PM    Specimen: Blood   Result Value Ref Range    Glucose 201 (H) 70 - 130 mg/dL   POC Glucose Once    Collection Time: 10/14/24  7:55 PM    Specimen: Blood   Result Value Ref Range    Glucose 175 (H) 70 - 130 mg/dL   POC Glucose Once    Collection Time: 10/15/24  7:26 AM    Specimen: Blood   Result Value Ref Range    Glucose 240 (H) 70 - 130 mg/dL   CBC (No Diff)    Collection Time: 10/15/24  9:39 AM    Specimen: Blood   Result Value Ref Range    WBC 9.36 3.40 - 10.80 10*3/mm3    RBC 3.36 (L) 3.77 - 5.28 10*6/mm3    Hemoglobin 9.4 (L) 12.0 - 15.9 g/dL    Hematocrit 29.5 (L) 34.0 - 46.6 %    MCV 87.8 79.0 - 97.0 fL    MCH 28.0 26.6 - 33.0 pg    MCHC 31.9 31.5 -  35.7 g/dL    RDW 15.2 12.3 - 15.4 %    RDW-SD 48.9 37.0 - 54.0 fl    MPV 10.5 6.0 - 12.0 fL    Platelets 291 140 - 450 10*3/mm3   Comprehensive Metabolic Panel    Collection Time: 10/15/24  9:39 AM    Specimen: Blood   Result Value Ref Range    Glucose 254 (H) 65 - 99 mg/dL    BUN 24 (H) 8 - 23 mg/dL    Creatinine 2.35 (H) 0.57 - 1.00 mg/dL    Sodium 138 136 - 145 mmol/L    Potassium 4.2 3.5 - 5.2 mmol/L    Chloride 103 98 - 107 mmol/L    CO2 21.0 (L) 22.0 - 29.0 mmol/L    Calcium 9.2 8.6 - 10.5 mg/dL    Total Protein 6.7 6.0 - 8.5 g/dL    Albumin 3.1 (L) 3.5 - 5.2 g/dL    ALT (SGPT) 21 1 - 33 U/L    AST (SGOT) 26 1 - 32 U/L    Alkaline Phosphatase 76 39 - 117 U/L    Total Bilirubin 0.3 0.0 - 1.2 mg/dL    Globulin 3.6 gm/dL    A/G Ratio 0.9 g/dL    BUN/Creatinine Ratio 10.2 7.0 - 25.0    Anion Gap 14.0 5.0 - 15.0 mmol/L    eGFR 20.3 (L) >60.0 mL/min/1.73   POC Glucose Once    Collection Time: 10/15/24 11:31 AM    Specimen: Blood   Result Value Ref Range    Glucose 245 (H) 70 - 130 mg/dL   POC Glucose Once    Collection Time: 10/15/24  4:01 PM    Specimen: Blood   Result Value Ref Range    Glucose 183 (H) 70 - 130 mg/dL   POC Glucose Once    Collection Time: 10/15/24  8:43 PM    Specimen: Blood   Result Value Ref Range    Glucose 176 (H) 70 - 130 mg/dL   POC Glucose Once    Collection Time: 10/16/24  7:41 AM    Specimen: Blood   Result Value Ref Range    Glucose 215 (H) 70 - 130 mg/dL   POC Glucose Once    Collection Time: 10/16/24 10:59 AM    Specimen: Blood   Result Value Ref Range    Glucose 230 (H) 70 - 130 mg/dL   POC Glucose Once    Collection Time: 10/16/24  4:14 PM    Specimen: Blood   Result Value Ref Range    Glucose 151 (H) 70 - 130 mg/dL   CBC (No Diff)    Collection Time: 10/16/24  7:03 PM    Specimen: Blood   Result Value Ref Range    WBC 9.59 3.40 - 10.80 10*3/mm3    RBC 3.43 (L) 3.77 - 5.28 10*6/mm3    Hemoglobin 9.6 (L) 12.0 - 15.9 g/dL    Hematocrit 30.4 (L) 34.0 - 46.6 %    MCV 88.6 79.0 - 97.0 fL     MCH 28.0 26.6 - 33.0 pg    MCHC 31.6 31.5 - 35.7 g/dL    RDW 15.0 12.3 - 15.4 %    RDW-SD 48.6 37.0 - 54.0 fl    MPV 10.4 6.0 - 12.0 fL    Platelets 287 140 - 450 10*3/mm3   Basic Metabolic Panel    Collection Time: 10/16/24  7:03 PM    Specimen: Blood   Result Value Ref Range    Glucose 217 (H) 65 - 99 mg/dL    BUN 27 (H) 8 - 23 mg/dL    Creatinine 2.33 (H) 0.57 - 1.00 mg/dL    Sodium 142 136 - 145 mmol/L    Potassium 3.9 3.5 - 5.2 mmol/L    Chloride 105 98 - 107 mmol/L    CO2 21.0 (L) 22.0 - 29.0 mmol/L    Calcium 9.7 8.6 - 10.5 mg/dL    BUN/Creatinine Ratio 11.6 7.0 - 25.0    Anion Gap 16.0 (H) 5.0 - 15.0 mmol/L    eGFR 20.6 (L) >60.0 mL/min/1.73   POC Glucose Once    Collection Time: 10/17/24  7:55 AM    Specimen: Blood   Result Value Ref Range    Glucose 155 (H) 70 - 130 mg/dL   POC Glucose Once    Collection Time: 10/17/24 11:44 AM    Specimen: Blood   Result Value Ref Range    Glucose 201 (H) 70 - 130 mg/dL   POC Glucose Once    Collection Time: 10/17/24  4:25 PM    Specimen: Blood   Result Value Ref Range    Glucose 173 (H) 70 - 130 mg/dL   POC Glucose Once    Collection Time: 10/17/24  8:20 PM    Specimen: Blood   Result Value Ref Range    Glucose 108 70 - 130 mg/dL   ECG 12 Lead Other; jaw pain, patient concerned    Collection Time: 10/17/24  8:29 PM   Result Value Ref Range    QT Interval 364 ms    QTC Interval 487 ms   ECG 12 Lead Other; previous ekg unreadable    Collection Time: 10/17/24 10:38 PM   Result Value Ref Range    QT Interval 336 ms    QTC Interval 435 ms   POC Glucose Once    Collection Time: 10/18/24  7:57 AM    Specimen: Blood   Result Value Ref Range    Glucose 166 (H) 70 - 130 mg/dL   POC Glucose Once    Collection Time: 10/18/24 11:40 AM    Specimen: Blood   Result Value Ref Range    Glucose 152 (H) 70 - 130 mg/dL   POC Glucose Once    Collection Time: 10/18/24  4:59 PM    Specimen: Blood   Result Value Ref Range    Glucose 181 (H) 70 - 130 mg/dL   POC Glucose Once    Collection  Time: 10/18/24  8:13 PM    Specimen: Blood   Result Value Ref Range    Glucose 120 70 - 130 mg/dL   POC Glucose Once    Collection Time: 10/19/24  7:36 AM    Specimen: Blood   Result Value Ref Range    Glucose 159 (H) 70 - 130 mg/dL   POC Glucose Once    Collection Time: 10/19/24 11:49 AM    Specimen: Blood   Result Value Ref Range    Glucose 279 (H) 70 - 130 mg/dL   ECG 12 Lead Chest Pain    Collection Time: 10/19/24  2:47 PM   Result Value Ref Range    QT Interval 366 ms    QTC Interval 432 ms   POC Glucose Once    Collection Time: 10/19/24  2:48 PM    Specimen: Blood   Result Value Ref Range    Glucose 186 (H) 70 - 130 mg/dL   POC Glucose Once    Collection Time: 10/19/24  4:41 PM    Specimen: Blood   Result Value Ref Range    Glucose 252 (H) 70 - 130 mg/dL   POC Glucose Once    Collection Time: 10/19/24  8:24 PM    Specimen: Blood   Result Value Ref Range    Glucose 67 (L) 70 - 130 mg/dL   POC Glucose Once    Collection Time: 10/19/24  8:52 PM    Specimen: Blood   Result Value Ref Range    Glucose 89 70 - 130 mg/dL   POC Glucose Once    Collection Time: 10/20/24  6:21 AM    Specimen: Blood   Result Value Ref Range    Glucose 154 (H) 70 - 130 mg/dL   POC Glucose Once    Collection Time: 10/20/24  7:43 AM    Specimen: Blood   Result Value Ref Range    Glucose 197 (H) 70 - 130 mg/dL   Comprehensive Metabolic Panel    Collection Time: 10/20/24  9:26 AM    Specimen: Blood   Result Value Ref Range    Glucose 166 (H) 65 - 99 mg/dL    BUN 26 (H) 8 - 23 mg/dL    Creatinine 2.43 (H) 0.57 - 1.00 mg/dL    Sodium 139 136 - 145 mmol/L    Potassium 4.5 3.5 - 5.2 mmol/L    Chloride 103 98 - 107 mmol/L    CO2 25.0 22.0 - 29.0 mmol/L    Calcium 9.8 8.6 - 10.5 mg/dL    Total Protein 7.7 6.0 - 8.5 g/dL    Albumin 3.3 (L) 3.5 - 5.2 g/dL    ALT (SGPT) 14 1 - 33 U/L    AST (SGOT) 23 1 - 32 U/L    Alkaline Phosphatase 74 39 - 117 U/L    Total Bilirubin 0.3 0.0 - 1.2 mg/dL    Globulin 4.4 gm/dL    A/G Ratio 0.8 g/dL    BUN/Creatinine  Ratio 10.7 7.0 - 25.0    Anion Gap 11.0 5.0 - 15.0 mmol/L    eGFR 19.5 (L) >60.0 mL/min/1.73   Magnesium    Collection Time: 10/20/24  9:26 AM    Specimen: Blood   Result Value Ref Range    Magnesium 1.7 1.6 - 2.4 mg/dL   CBC Auto Differential    Collection Time: 10/20/24  9:26 AM    Specimen: Blood   Result Value Ref Range    WBC 8.87 3.40 - 10.80 10*3/mm3    RBC 3.37 (L) 3.77 - 5.28 10*6/mm3    Hemoglobin 9.4 (L) 12.0 - 15.9 g/dL    Hematocrit 30.5 (L) 34.0 - 46.6 %    MCV 90.5 79.0 - 97.0 fL    MCH 27.9 26.6 - 33.0 pg    MCHC 30.8 (L) 31.5 - 35.7 g/dL    RDW 14.9 12.3 - 15.4 %    RDW-SD 49.5 37.0 - 54.0 fl    MPV 10.5 6.0 - 12.0 fL    Platelets 324 140 - 450 10*3/mm3    Neutrophil % 57.9 42.7 - 76.0 %    Lymphocyte % 21.9 19.6 - 45.3 %    Monocyte % 14.1 (H) 5.0 - 12.0 %    Eosinophil % 3.2 0.3 - 6.2 %    Basophil % 1.2 0.0 - 1.5 %    Immature Grans % 1.7 (H) 0.0 - 0.5 %    Neutrophils, Absolute 5.14 1.70 - 7.00 10*3/mm3    Lymphocytes, Absolute 1.94 0.70 - 3.10 10*3/mm3    Monocytes, Absolute 1.25 (H) 0.10 - 0.90 10*3/mm3    Eosinophils, Absolute 0.28 0.00 - 0.40 10*3/mm3    Basophils, Absolute 0.11 0.00 - 0.20 10*3/mm3    Immature Grans, Absolute 0.15 (H) 0.00 - 0.05 10*3/mm3    nRBC 0.0 0.0 - 0.2 /100 WBC   POC Glucose Once    Collection Time: 10/20/24 11:15 AM    Specimen: Blood   Result Value Ref Range    Glucose 233 (H) 70 - 130 mg/dL   POC Glucose Once    Collection Time: 10/20/24  4:14 PM    Specimen: Blood   Result Value Ref Range    Glucose 105 70 - 130 mg/dL   POC Glucose Once    Collection Time: 10/20/24  9:01 PM    Specimen: Blood   Result Value Ref Range    Glucose 179 (H) 70 - 130 mg/dL   POC Glucose Once    Collection Time: 10/21/24  7:54 AM    Specimen: Blood   Result Value Ref Range    Glucose 158 (H) 70 - 130 mg/dL   POC Glucose Once    Collection Time: 10/21/24 11:12 AM    Specimen: Blood   Result Value Ref Range    Glucose 238 (H) 70 - 130 mg/dL   POC Glucose Once    Collection Time: 10/21/24   4:12 PM    Specimen: Blood   Result Value Ref Range    Glucose 172 (H) 70 - 130 mg/dL   POC Glucose Once    Collection Time: 10/21/24  8:03 PM    Specimen: Blood   Result Value Ref Range    Glucose 118 70 - 130 mg/dL   POC Glucose Once    Collection Time: 10/22/24 11:43 AM    Specimen: Blood   Result Value Ref Range    Glucose 278 (H) 70 - 130 mg/dL       Imaging:   No valid procedures specified.     Measures:   Advanced Care Planning:   Patient does not have an advance directive, information provided.    Smoking Cessation:   less than 3 minutes spent counseling Will try to cut down    Assessment / Plan      Assessment/Plan:   Diagnoses and all orders for this visit:    1. Type 2 diabetes mellitus with hypoglycemia without coma, with long-term current use of insulin (Primary)   Patient does appear to be doing relatively well with respect to their diabetes.  They are tolerating their medications without complaints.  We will continue to follow their hemoglobin A1c and will make adjustments to keep their level less than 7%.  Patient has been having some episodes of hypoglycemia most likely due to her erratic eating habits.  We have discussed options and we will decrease her basal insulin down to 10 units at night.  We will continue with the NovoLog with the meals.  We will continue to monitor and if there are other questions or concerns before next scheduled follow-up including high or low sugars they will contact us.    2. Primary hypertension   Patient appears to be tolerating their blood pressure medicine without any side effects.  We will continue to monitor their blood pressure and will adjust to keep there is systolic blood pressure less than 130.  We will continue to monitor renal function and will make adjustments based on these findings.    3. Stage 4 chronic kidney disease   Currently her kidneys are stable to us.  We will continue to monitor her kidney function will make adjustments based on these findings.   She will continue to keep follow-up with the nephrologist.  If she does have any difficulties prior to her next scheduled follow-up she will contact us.  She has been advised to push fluids, avoid anti-inflammatories and low over her sodium intake.    4. Cigarette nicotine dependence in remission    5. Acute bronchitis, unspecified organism  Patient has had increasing cough and congestion over the previous 2 weeks that is not productive.  It does appear that she may have developed into acute bronchitis.  We will obtain a chest x-ray to assure his there is no underlying abnormality.  We will start her on doxycycline but if her chest x-ray is positive for pneumonia we will pursue with the addition of broader coverage.  We will continue to monitor and she will contact us with further complaints.  If she does have fever and chills she will contact us immediately or if she develops hemoptysis.  -     XR Chest PA & Lateral; Future  -     doxycycline (VIBRAMYCIN) 100 MG capsule; Take 1 capsule by mouth 2 (Two) Times a Day.  Dispense: 20 capsule; Refill: 0        Follow Up:   Return in about 2 weeks (around 11/28/2024).      At [facility], we believe that sharing information builds trust and better relationships. You are receiving this note because you recently visited [facility]. It is possible you will see health information before a provider has talked with you about it. This kind of information can be easy to misunderstand. To help you fully understand what it means for your health, we urge you to discuss this note with your provider.    Aiden Spencer MD  [facility]

## 2024-11-18 DIAGNOSIS — M51.369 DEGENERATIVE DISC DISEASE, LUMBAR: ICD-10-CM

## 2024-11-18 RX ORDER — HYDROCODONE BITARTRATE AND ACETAMINOPHEN 7.5; 325 MG/1; MG/1
1 TABLET ORAL EVERY 12 HOURS PRN
Qty: 20 TABLET | Refills: 0 | Status: SHIPPED | OUTPATIENT
Start: 2024-11-18

## 2024-11-18 RX ORDER — SITAGLIPTIN 50 MG/1
50 TABLET, FILM COATED ORAL DAILY
Qty: 90 TABLET | Refills: 3 | Status: SHIPPED | OUTPATIENT
Start: 2024-11-18

## 2024-12-04 ENCOUNTER — OFFICE VISIT (OUTPATIENT)
Dept: FAMILY MEDICINE CLINIC | Facility: CLINIC | Age: 79
End: 2024-12-04
Payer: MEDICARE

## 2024-12-04 VITALS
OXYGEN SATURATION: 98 % | DIASTOLIC BLOOD PRESSURE: 88 MMHG | HEART RATE: 94 BPM | SYSTOLIC BLOOD PRESSURE: 164 MMHG | BODY MASS INDEX: 29.84 KG/M2 | WEIGHT: 152 LBS | HEIGHT: 60 IN | TEMPERATURE: 98 F | RESPIRATION RATE: 18 BRPM

## 2024-12-04 DIAGNOSIS — J20.9 ACUTE BRONCHITIS, UNSPECIFIED ORGANISM: ICD-10-CM

## 2024-12-04 DIAGNOSIS — E11.649 TYPE 2 DIABETES MELLITUS WITH HYPOGLYCEMIA WITHOUT COMA, WITH LONG-TERM CURRENT USE OF INSULIN: Primary | ICD-10-CM

## 2024-12-04 DIAGNOSIS — F17.211 CIGARETTE NICOTINE DEPENDENCE IN REMISSION: ICD-10-CM

## 2024-12-04 DIAGNOSIS — E78.2 MIXED HYPERLIPIDEMIA: ICD-10-CM

## 2024-12-04 DIAGNOSIS — F51.01 PRIMARY INSOMNIA: ICD-10-CM

## 2024-12-04 DIAGNOSIS — J41.1 MUCOPURULENT CHRONIC BRONCHITIS: ICD-10-CM

## 2024-12-04 DIAGNOSIS — N18.4 STAGE 4 CHRONIC KIDNEY DISEASE: ICD-10-CM

## 2024-12-04 DIAGNOSIS — Z79.4 TYPE 2 DIABETES MELLITUS WITH HYPOGLYCEMIA WITHOUT COMA, WITH LONG-TERM CURRENT USE OF INSULIN: Primary | ICD-10-CM

## 2024-12-04 DIAGNOSIS — I10 PRIMARY HYPERTENSION: ICD-10-CM

## 2024-12-04 LAB
EXPIRATION DATE: ABNORMAL
GLUCOSE BLDC GLUCOMTR-MCNC: 63 MG/DL (ref 70–130)
Lab: ABNORMAL

## 2024-12-04 PROCEDURE — 1125F AMNT PAIN NOTED PAIN PRSNT: CPT | Performed by: FAMILY MEDICINE

## 2024-12-04 PROCEDURE — 82948 REAGENT STRIP/BLOOD GLUCOSE: CPT | Performed by: FAMILY MEDICINE

## 2024-12-04 PROCEDURE — 1160F RVW MEDS BY RX/DR IN RCRD: CPT | Performed by: FAMILY MEDICINE

## 2024-12-04 PROCEDURE — 99214 OFFICE O/P EST MOD 30 MIN: CPT | Performed by: FAMILY MEDICINE

## 2024-12-04 PROCEDURE — 1159F MED LIST DOCD IN RCRD: CPT | Performed by: FAMILY MEDICINE

## 2024-12-04 PROCEDURE — 3078F DIAST BP <80 MM HG: CPT | Performed by: FAMILY MEDICINE

## 2024-12-04 PROCEDURE — G2211 COMPLEX E/M VISIT ADD ON: HCPCS | Performed by: FAMILY MEDICINE

## 2024-12-04 PROCEDURE — 3077F SYST BP >= 140 MM HG: CPT | Performed by: FAMILY MEDICINE

## 2024-12-04 RX ORDER — AZITHROMYCIN 250 MG/1
250 TABLET, FILM COATED ORAL EVERY OTHER DAY
Qty: 15 TABLET | Refills: 1 | Status: SHIPPED | OUTPATIENT
Start: 2024-12-04 | End: 2024-12-04 | Stop reason: SDUPTHER

## 2024-12-04 RX ORDER — ALBUTEROL SULFATE 0.83 MG/ML
2.5 SOLUTION RESPIRATORY (INHALATION) EVERY 4 HOURS PRN
Qty: 360 ML | Refills: 11 | Status: SHIPPED | OUTPATIENT
Start: 2024-12-04

## 2024-12-04 RX ORDER — AZITHROMYCIN 250 MG/1
250 TABLET, FILM COATED ORAL EVERY OTHER DAY
Qty: 15 TABLET | Refills: 1 | Status: SHIPPED | OUTPATIENT
Start: 2024-12-04

## 2024-12-04 NOTE — PROGRESS NOTES
Follow Up Office Visit      Date: 2024   Patient Name: Arminda Krishnan  : 1945   MRN: [id number]     Chief Complaint:    Chief Complaint   Patient presents with    Follow-up    Diabetes       History of Present Illness: Arminda Krishnan is a 79 y.o. female who is here today for follow-up.  Patient has recently finished a course of antibiotics and is doing better with respect to her symptoms but does continue to have cough.  She has not been using any of her albuterol recently.  Patient has also discontinued her insulin.  She thinks that it was not doing well with her as it was causing an allergic reaction.  Patient has not been monitoring her blood sugars.  She does not know what her blood sugars have been running.  She has continued to smoke.  She does continue to have cough but no hemoptysis.  She denies any fever or chills at present time.  She has not had any nausea or vomiting.  She does continue to take her other medications as prescribed.  Patient appears to be doing otherwise well.  They have continue with their medications without any side effects.  They have not had any changes in their usual activity, appetite and sleep.  Patient denies any other cardiovascular, respiratory, gastrointestinal, urologic or neurologic complaints.    History of Present Illness         Subjective      Review of Systems:   Review of Systems   Constitutional:  Negative for activity change, appetite change and fatigue.   HENT:  Positive for congestion.    Respiratory:  Positive for cough. Negative for chest tightness, shortness of breath and wheezing.    Cardiovascular:  Negative for chest pain, palpitations and leg swelling.   Gastrointestinal:  Negative for abdominal distention, abdominal pain, blood in stool, constipation, diarrhea, nausea, vomiting, GERD and indigestion.   Genitourinary:  Negative for difficulty urinating, dysuria, flank pain, frequency, hematuria and urgency.   Musculoskeletal:   Negative for arthralgias, back pain, gait problem, joint swelling and myalgias.   Neurological:  Negative for dizziness, tremors, seizures, syncope, weakness, light-headedness, numbness, headache and memory problem.   Psychiatric/Behavioral:  Negative for sleep disturbance and depressed mood. The patient is not nervous/anxious.        I have reviewed the patients family history, social history, past medical history, past surgical history and have updated it as appropriate.     Medications:     Current Outpatient Medications:     acetaminophen (TYLENOL) 325 MG tablet, Take 2 tablets by mouth Every 4 (Four) Hours As Needed for Mild Pain., Disp: , Rfl:     albuterol (PROVENTIL) (2.5 MG/3ML) 0.083% nebulizer solution, Take 2.5 mg by nebulization Every 4 (Four) Hours As Needed for Wheezing or Shortness of Air., Disp: 360 mL, Rfl: 11    aspirin 81 MG chewable tablet, Chew 1 tablet Daily., Disp: , Rfl:     atorvastatin (LIPITOR) 80 MG tablet, Take 1 tablet by mouth Daily., Disp: 90 tablet, Rfl: 3    azithromycin (Zithromax Z-Ashkan) 250 MG tablet, Take 1 tablet by mouth Every Other Day., Disp: 15 tablet, Rfl: 1    Blood Glucose Monitoring Suppl (ONE TOUCH ULTRA 2) w/Device kit, USE 1 EACH DAILY., Disp: , Rfl:     Blood Glucose Monitoring Suppl kit, Use 1 each Daily., Disp: 1 each, Rfl: 11    carvedilol (COREG) 25 MG tablet, TAKE ONE TABLET BY MOUTH TWO TIMES A DAY, Disp: 180 tablet, Rfl: 3    clopidogrel (PLAVIX) 75 MG tablet, TAKE ONE TABLET BY MOUTH EVERY DAY, Disp: 30 tablet, Rfl: 11    Comfort EZ Pen Needles 32G X 4 MM misc, Inject 1 each under the skin into the appropriate area as directed 4 (Four) Times a Day. as directed, Disp: 400 each, Rfl: 3    Diclofenac Sodium (VOLTAREN) 1 % gel gel, APPLY TO AFFECTED AREA FOUR TIMES DAILY, Disp: 100 g, Rfl: 12    donepezil (ARICEPT) 5 MG tablet, Take 1 tablet by mouth Every Night., Disp: , Rfl:     Dupixent 300 MG/2ML solution pen-injector, Inject 2 mL under the skin into the  appropriate area as directed As Needed (Every two weeks)., Disp: , Rfl:     ferrous sulfate 325 (65 FE) MG tablet, Take 1 tablet by mouth Daily With Breakfast., Disp: , Rfl:     fluticasone (FLONASE) 50 MCG/ACT nasal spray, USE 2 SPRAYS IN EACH NOSTRIL ONCE DAILY, Disp: 16 g, Rfl: 12    glucagon (GLUCAGEN) 1 MG injection, Inject 1 mg under the skin into the appropriate area as directed 1 (One) Time As Needed (hypoglycemia) for up to 1 dose., Disp: 1 each, Rfl: 0    Glucose Blood (Blood Glucose Test) strip, 1 each by In Vitro route 3 (Three) Times a Day., Disp: 100 each, Rfl: 11    HYDROcodone-acetaminophen (NORCO) 7.5-325 MG per tablet, TAKE ONE TABLET BY MOUTH EVERY 12 HOURS AS NEEDED FOR MODERATE PAIN, Disp: 20 tablet, Rfl: 0    insulin aspart (NovoLOG FlexPen) 100 UNIT/ML solution pen-injector sc pen, Inject 5 Units under the skin into the appropriate area as directed 3 (Three) Times a Day With Meals., Disp: 15 mL, Rfl: 3    insulin detemir (Levemir) 100 UNIT/ML injection, Inject 10 Units under the skin into the appropriate area as directed Every Night., Disp: 15 mL, Rfl: 11    isosorbide mononitrate (IMDUR) 60 MG 24 hr tablet, TAKE ONE TABLET BY MOUTH EVERY DAY, Disp: 90 tablet, Rfl: 3    Lancets (OneTouch Delica Plus [id number]) misc, 3 (Three) Times a Day. as directed, Disp: , Rfl:     lidocaine (LIDODERM) 5 %, Place 1 patch on the skin as directed by provider Daily. Remove & Discard patch within 12 hours or as directed by MD, Disp: 30 each, Rfl: 11    loratadine (CLARITIN) 10 MG tablet, , Disp: , Rfl:     losartan (COZAAR) 50 MG tablet, Take 1 tablet by mouth Daily., Disp: , Rfl:     melatonin 5 MG tablet tablet, Take 1 tablet by mouth Every Night., Disp: , Rfl:     multivitamin with minerals tablet tablet, Take 1 tablet by mouth Daily., Disp: , Rfl:     naloxone (NARCAN) 4 MG/0.1ML nasal spray, Administer 1 spray into the nostril(s) as directed by provider As Needed., Disp: , Rfl:     nicotine (NICODERM CQ) 7  "MG/24HR patch, Place 1 patch on the skin as directed by provider Daily., Disp: , Rfl:     pantoprazole (PROTONIX) 40 MG EC tablet, TAKE ONE TABLET BY MOUTH TWO TIMES A DAY, Disp: 180 tablet, Rfl: 3    polyethylene glycol (MIRALAX) 17 g packet, Take 17 g by mouth Daily., Disp: , Rfl:     sennosides-docusate (PERICOLACE) 8.6-50 MG per tablet, Take 2 tablets by mouth 2 (Two) Times a Day., Disp: , Rfl:     simethicone (MYLICON) 80 MG chewable tablet, Chew 1 tablet 4 (Four) Times a Day As Needed for Flatulence., Disp: , Rfl:     SITagliptin (Januvia) 50 MG tablet, TAKE ONE TABLET BY MOUTH EVERY DAY, Disp: 90 tablet, Rfl: 3    tacrolimus (PROTOPIC) 0.1 % ointment, APPLY TO AFFECTED AREA TWO TIMES A DAY, Disp: , Rfl:     tolterodine LA (DETROL LA) 2 MG 24 hr capsule, Take 1 capsule by mouth Daily., Disp: 90 capsule, Rfl: 3    True Metrix Blood Glucose Test test strip, Daily. for testing, Disp: , Rfl:     Ventolin  (90 Base) MCG/ACT inhaler, INHALE 2 PUFFS EVERY 6 (SIX) HOURS AS NEEDED FOR WHEEZING., Disp: 18 g, Rfl: 11    Allergies:   Allergies   Allergen Reactions    Ceclor [Cefaclor] Rash       Immunizations:   Immunization History   Administered Date(s) Administered    COVID-19 (VINNIE) 03/16/2021    COVID-19 (UNSPECIFIED) 03/01/2021, 04/01/2021    Fluzone High-Dose 65+YRS 10/16/2018    Fluzone High-Dose 65+yrs 10/31/2022, 12/06/2023    Fluzone Quad >6mos (Multi-dose) 11/15/2016, 02/05/2020    Pneumococcal Conjugate 13-Valent (PCV13) 07/07/2016    Pneumococcal Polysaccharide (PPSV23) 10/31/2022        Objective     Physical Exam: Please see above  Vital Signs:   Vitals:    12/04/24 1638 12/04/24 1655   BP: 160/70 164/88   BP Location: Right arm    Patient Position: Sitting    Cuff Size: Adult    Pulse: 94    Resp: 18    Temp: 98 °F (36.7 °C)    TempSrc: Temporal    SpO2: 98%    Weight: 68.9 kg (152 lb)    Height: 152.4 cm (60\")      Body mass index is 29.69 kg/m².          Physical Exam  Vitals and nursing note " reviewed.   Constitutional:       Appearance: Normal appearance.   HENT:      Head: Normocephalic and atraumatic.      Nose: Nose normal.      Mouth/Throat:      Pharynx: Oropharynx is clear.   Eyes:      Extraocular Movements: Extraocular movements intact.      Pupils: Pupils are equal, round, and reactive to light.   Neck:      Thyroid: No thyroid mass or thyromegaly.      Trachea: Trachea normal.   Cardiovascular:      Rate and Rhythm: Normal rate and regular rhythm.      Pulses: Normal pulses. No decreased pulses.           Dorsalis pedis pulses are 2+ on the right side and 2+ on the left side.        Posterior tibial pulses are 2+ on the right side and 2+ on the left side.      Heart sounds: Normal heart sounds.   Pulmonary:      Effort: Pulmonary effort is normal.      Breath sounds: Normal breath sounds.   Abdominal:      General: Abdomen is flat. Bowel sounds are normal.      Palpations: Abdomen is soft.      Tenderness: There is no abdominal tenderness.   Musculoskeletal:      Cervical back: Neck supple.      Right lower leg: No edema.      Left lower leg: No edema.      Right foot: No deformity.      Left foot: No deformity.   Feet:      Right foot:      Protective Sensation: 5 sites tested.  5 sites sensed.      Skin integrity: Skin integrity normal. No ulcer, skin breakdown, erythema or callus.      Toenail Condition: Fungal disease present.     Left foot:      Protective Sensation: 5 sites tested.  5 sites sensed.      Skin integrity: Skin integrity normal. No ulcer, skin breakdown, erythema or callus.      Toenail Condition: Fungal disease present.  Lymphadenopathy:      Cervical: No cervical adenopathy.   Skin:     General: Skin is warm and dry.   Neurological:      General: No focal deficit present.      Mental Status: She is alert and oriented to person, place, and time.      Sensory: Sensation is intact.      Motor: Motor function is intact.      Coordination: Coordination is intact.   Psychiatric:  "        Attention and Perception: Attention normal.         Mood and Affect: Mood normal.         Speech: Speech normal.         Behavior: Behavior normal.         Procedures    Results:   Labs:   Hemoglobin A1C   Date Value Ref Range Status   09/28/2024 11.00 (H) 4.80 - 5.60 % Final     TSH   Date Value Ref Range Status   10/09/2024 0.465 0.270 - 4.200 uIU/mL Final        POCT Results (if applicable):   Results for orders placed or performed in visit on 12/04/24   POCT Glucose    Collection Time: 12/04/24  4:57 PM    Specimen: Blood   Result Value Ref Range    Glucose 63 (A) 70 - 130 mg/dL    Lot Number [id number]     Expiration Date 02/28/2025        Imaging:   No valid procedures specified.     Measures:   Advanced Care Planning:   Patient does not have an advance directive, information provided.    Smoking Cessation:   less than 3 minutes spent counseling Will try to cut down    Assessment / Plan      Assessment/Plan:   Diagnoses and all orders for this visit:    1. Type 2 diabetes mellitus with hypoglycemia without coma, with long-term current use of insulin (Primary)  Patient is not taking her insulin at present time.  She has not been monitoring her blood sugar and does continue to eat.  She discontinued her insulin 2 days ago.  She has not had any hypoglycemic episodes and does not necessarily feel \"poorly\".  We did check her blood sugar today and it was noted be 63.  We have discussed the importance of taking her insulin when she eats and monitoring her blood sugars.  She does not wish to use the continuous glucose monitor.  We will start her back on her odilia meter but will hold her short acting insulin.  She will start checking her blood sugars postprandial and if she does have elevation of blood sugars we will reintroduce the short acting insulin.  We will continue to monitor and will treat accordingly.  She is to contact us with any questions or concerns.       POCT Glucose    2. Acute bronchitis, " unspecified organism  Patient has bronchitis symptoms have improved.  She does continue to have a cough but it is intermittent.  It does appear to be more of her baseline chronic bronchitis.  We will continue with close monitoring of her symptoms.  She has been encouraged to restart her respiratory medications.  She is also been encouraged to discontinue smoking.  We will repeat a chest x-ray and will make adjustments based on these findings.  -     XR Chest PA & Lateral; Future    3. Primary hypertension   Patient appears to be tolerating their blood pressure medicine without any side effects.  We will continue to monitor their blood pressure and will adjust to keep there is systolic blood pressure less than 130.  We will continue to monitor renal function and will make adjustments based on these findings.    4. Stage 4 chronic kidney disease   Patient states that she is doing better with respect to her fluid intake.  We will continue to monitor.  She will avoid anti-inflammatories and will try to do better with respect to her sodium in her diet.  We will anticipate rechecking her kidney function when she returns to clinic.    5. Cigarette nicotine dependence    Patient has resumed smoking.  We have encouraged her to discontinue smoking.  We will provide medical assistance if necessary.    6. Primary insomnia   Patient is sleeping better and has not required the use of the temazepam.  She is using melatonin with relatively good success.  We will continue on melatonin and keep her off the temazepam and will monitor her symptoms.  If there is other questions or concerns she will contact us.    7. Mixed hyperlipidemia   Patient does appear to be tolerating their lipid-lowering agent without difficulty.  We will obtain the lipid profile as well as liver function test to observe for any abnormalities.  We will continue to adjust medications to keep their LDL less than 70.    8. Mucopurulent chronic bronchitis  Patient  does continue with chronic bronchitis.  We will continue her on her respiratory medications with the addition of the albuterol.  We will also try her on azithromycin every other day.  We will monitor her symptoms and obtain a chest x-ray and will pursue treat accordingly.  If she has other questions or concerns before her scheduled follow-up she will contact us.  -     Discontinue: azithromycin (Zithromax Z-Ashkan) 250 MG tablet; Take 1 tablet by mouth Every Other Day. Take 2 tablets by mouth on day 1, then 1 tablet daily on days 2-5  Dispense: 15 tablet; Refill: 1  -     albuterol (PROVENTIL) (2.5 MG/3ML) 0.083% nebulizer solution; Take 2.5 mg by nebulization Every 4 (Four) Hours As Needed for Wheezing or Shortness of Air.  Dispense: 360 mL; Refill: 11  -     azithromycin (Zithromax Z-Ashkan) 250 MG tablet; Take 1 tablet by mouth Every Other Day.  Dispense: 15 tablet; Refill: 1        Follow Up:   Return in about 25 days (around 12/29/2024).      At [facility], we believe that sharing information builds trust and better relationships. You are receiving this note because you recently visited [facility]. It is possible you will see health information before a provider has talked with you about it. This kind of information can be easy to misunderstand. To help you fully understand what it means for your health, we urge you to discuss this note with your provider.    Aiden Spencer MD  [facility]

## 2024-12-20 DIAGNOSIS — M51.369 DEGENERATIVE DISC DISEASE, LUMBAR: ICD-10-CM

## 2024-12-22 RX ORDER — HYDROCODONE BITARTRATE AND ACETAMINOPHEN 7.5; 325 MG/1; MG/1
1 TABLET ORAL EVERY 12 HOURS PRN
Qty: 20 TABLET | Refills: 0 | Status: SHIPPED | OUTPATIENT
Start: 2024-12-22

## 2024-12-23 ENCOUNTER — TELEPHONE (OUTPATIENT)
Dept: FAMILY MEDICINE CLINIC | Facility: CLINIC | Age: 79
End: 2024-12-23

## 2024-12-23 NOTE — TELEPHONE ENCOUNTER
JUST HAD HER CHEST X RAY DONE TODAY NEXT DOOR, WAS SUPPOSED TO HAVE HAD IT DONE 2 WEEKS AGO, NOW ASKING FOR SOMETHING TO CLEAN OUT  HER CHEST OR HEAD, JUST WHATEVER YOU THINK SHE NEEDS WHEN YOU SEE HER X RAY  CALL BACK  VAL

## 2024-12-23 NOTE — TELEPHONE ENCOUNTER
I would recommend discontinuing smoking, continue with the albuterol every 6 hours as necessary and try Mucinex to thin the secretions.  She should also use her nasal steroids and monitor her symptoms.

## 2024-12-23 NOTE — TELEPHONE ENCOUNTER
----- Message from Aiden Spencer sent at 12/23/2024  5:32 PM EST -----  Chest x-ray revealed a little bit of COPD.  She had no pneumonia is noted.

## 2024-12-28 ENCOUNTER — READMISSION MANAGEMENT (OUTPATIENT)
Dept: CALL CENTER | Facility: HOSPITAL | Age: 79
End: 2024-12-28
Payer: OTHER MISCELLANEOUS

## 2024-12-28 NOTE — OUTREACH NOTE
Prep Survey      Flowsheet Row Responses   [facility] facility patient discharged from? Non-BH   Is LACE score < 7 ? Non-BH Discharge   Eligibility [facility]   Date of Admission 12/26/24   Date of Discharge 12/28/24   Discharge Disposition Home or Self Care   Discharge diagnosis Fall, subarachnoid hemorrhage   Does the patient have one of the following disease processes/diagnoses(primary or secondary)? Other   Does the patient have Home health ordered? No   Prep survey completed? Yes            GRICELDA BLACKMAN - Registered Nurse

## 2024-12-30 ENCOUNTER — TRANSITIONAL CARE MANAGEMENT TELEPHONE ENCOUNTER (OUTPATIENT)
Dept: CALL CENTER | Facility: HOSPITAL | Age: 79
End: 2024-12-30
Payer: OTHER MISCELLANEOUS

## 2024-12-30 RX ORDER — DULOXETIN HYDROCHLORIDE 60 MG/1
CAPSULE, DELAYED RELEASE ORAL
Qty: 90 CAPSULE | Refills: 2 | Status: SHIPPED | OUTPATIENT
Start: 2024-12-30

## 2024-12-30 NOTE — OUTREACH NOTE
Call Center TCM Note      Flowsheet Row Responses   [facility] facility patient discharged from? Non-  [[place]]   Does the patient have one of the following disease processes/diagnoses(primary or secondary)? Other   TCM attempt successful? No   Unsuccessful attempts Attempt 2  [Pt has appt today with PCP]            Gloria Farah LPN    12/30/2024, 14:06 EST

## 2024-12-30 NOTE — OUTREACH NOTE
Call Center TCM Note      Flowsheet Row Responses   [facility] facility patient discharged from? Non-BH   Does the patient have one of the following disease processes/diagnoses(primary or secondary)? Other   TCM attempt successful? No   Unsuccessful attempts Attempt 1  [Pt has appt today with PCP at 3:45]            Gloria Farah LPN    12/30/2024, 08:28 EST

## 2024-12-31 ENCOUNTER — TELEPHONE (OUTPATIENT)
Dept: NEUROLOGY | Facility: CLINIC | Age: 79
End: 2024-12-31
Payer: OTHER MISCELLANEOUS

## 2024-12-31 ENCOUNTER — TRANSITIONAL CARE MANAGEMENT TELEPHONE ENCOUNTER (OUTPATIENT)
Dept: CALL CENTER | Facility: HOSPITAL | Age: 79
End: 2024-12-31
Payer: OTHER MISCELLANEOUS

## 2024-12-31 NOTE — TELEPHONE ENCOUNTER
"Relay     \"Provider is out for unforeseen circumstances, Pt can be rescheduled in next available appt.\"        "

## 2024-12-31 NOTE — OUTREACH NOTE
Call Center TCM Note      Flowsheet Row Responses   [facility] facility patient discharged from? Non-  []   Does the patient have one of the following disease processes/diagnoses(primary or secondary)? Other   TCM attempt successful? No   Unsuccessful attempts Attempt 3            Lien Devine RN    12/31/2024, 13:06 EST

## 2025-01-02 ENCOUNTER — TELEPHONE (OUTPATIENT)
Dept: FAMILY MEDICINE CLINIC | Facility: CLINIC | Age: 80
End: 2025-01-02
Payer: OTHER MISCELLANEOUS

## 2025-01-02 DIAGNOSIS — S06.5XAA SUBDURAL HEMATOMA: Primary | ICD-10-CM

## 2025-01-02 NOTE — TELEPHONE ENCOUNTER
Caller: Delbert Mcdermott    Relationship: Emergency Contact    Best call back number: [phone]     What is the medical concern/diagnosis: POST HOSPITAL DISCHARGE, BRAIN INJURY FROM FALL    What specialty or service is being requested: HOME HEALTH CARE

## 2025-01-07 ENCOUNTER — PATIENT OUTREACH (OUTPATIENT)
Dept: CASE MANAGEMENT | Facility: OTHER | Age: 80
End: 2025-01-07
Payer: OTHER MISCELLANEOUS

## 2025-01-07 ENCOUNTER — TELEPHONE (OUTPATIENT)
Dept: FAMILY MEDICINE CLINIC | Facility: CLINIC | Age: 80
End: 2025-01-07
Payer: OTHER MISCELLANEOUS

## 2025-01-07 NOTE — TELEPHONE ENCOUNTER
A user error has taken place: encounter opened in error, closed for administrative reasons.    Outpatient Physical Therapy           [place]           [x] Phone: [phone]   Fax: [phone]  [place]           [] Phone: [phone]   Fax: [phone]      To: Kendra Oquendo CNP      From: Alex Rodriguez PT, PT     Patient: Daniel Parra                  : 1960  Diagnosis:  L DARNELL      Date: 3/7/2022  Treatment Diagnosis: L hip pain, tissue tightness, stiffness, weakness      [x]  Progress Note                []  Discharge Note    Evaluation Date:  2022  Total Visits to date:   8 Cancels/No-shows to date:  0    Subjective:  No pain in hip currently, knees still bothering more than hip. Really not much hip pain to report at home. Plan of Care/Treatment to date:  [x] Therapeutic Exercise    [x] Modalities:  [x] Therapeutic Activity     [] Ultrasound  [] Electrical Stimulation  [x] Gait Training      [] Cervical Traction   [] Lumbar Traction  [x] Neuromuscular Re-education  [x] Cold/hotpack [] Iontophoresis  [x] Instruction in HEP      Other:  [x] Manual Therapy       []  Vasopneumatic  [] Aquatic Therapy       []   Dry Needle Therapy                      Objective/Significant Findings At Last Visit/Comments:  COVID screening questions were asked and patient attested that there had been no contact or symptoms     AROM L hip flex [id number]°, hip ext standing 10°  MMT knee WNL, hip grossly WNL   Ambulates w/o device w/ mild antagic gait, but more d/t kne epain  No popping or clicking in hip today with modified exercises, some w/ full hip ROM. Pt has good tech w/ exercises. Now able to sleep supine in bed without pillow under knees  She has TTP w/ tightness in adductor on L and min at pectineus now. Assessment:  Pt demonstrated overall good tolerance to today's session without adverse reactions.  She is progressing well w/ ROM and function but continues w/ soft tissue restrictions in pectineus and adductor, limited ROM and decreased functional strength especially for higher level activity. Pt would continue to benefit from skilled therapy interventions to address remaining impairments, improve mobility and strength and progress toward goal completion while reducing risk for re-injury or further decline.      0/10 pain after tx - reports of less tightness    Goal Status:  [] Achieved [x] Partially Achieved  [] Not Achieved   Patient goals : improve sleep, resume running for dog training, improve confidence in hip/LE  Short term goals  Time Frame for Short term goals: 3 weeks  Short term goal 1: Pt will be able to achieve neutral hip extension w/o pain or limit  Short term goal 2: Pt will be able to peform hip flex/ext ROM w/o snapping in groin  Long term goals  Time Frame for Long term goals : 6 weeks  Long term goal 1: Pt will be independent w/ HEP and able to continue to progress on her own  Long term goal 2: Pt will be able to begin progression back to jogging for dog training  Long term goal 3: Pt will be able to perform her usual activity w/o anterior hip pain or snapping  Long term goal 4: Pt will have improved HOOS by 10% or more    Frequency/Duration:  # Days per week: [] 1 day # Weeks: [] 1 week [] 4 weeks [x] 8 weeks     [x] 2 days   [] 2 weeks [] 5 weeks [] 10 weeks     [] 3 days   [] 3 weeks [] 6 weeks [] 12 weeks       Rehab Potential: [] Excellent [x] Good [] Fair  [] Poor         Patient Status: [x] Continue per initial plan of Care     [] Patient now discharged     [x] Additional visits requested, Please re-certify for additional visits:      Requested frequency/duration:  1-2/week for 1-2 weeks prn    If we are requesting more visits, we fully anticipate the patient's condition is expected to improve within the treatment timeframe we are requesting. Electronically signed by:  Rowena Song, PT, PT, MPT, ATC  3/7/2022, 9:46 AM    3/7/2022, 6:18 PM  If you have any questions or concerns, please don't hesitate to call.   Thank you for your referral.    Physician Signature:______________________ Date:______ Time: ________  By signing above, therapists plan is approved by physician

## 2025-01-07 NOTE — TELEPHONE ENCOUNTER
REACHED OUT TO [facility] TO DISCUSS REFERRAL FOR SUBDURAL HEMATOMA, FAXED MOST RECENT OFFICE NOTE FROM 12/4/2024 AND ADVISED PATIENT HAD ED EVALUATION AT  ON 12/26/2024. CONFIRMED REFERRAL WAS RECEIVED TO [facility] AND PATIENT WILL BE SCHEDULED FOR EVAL AND ADMISSION AS SOON AS POSSIBLE.

## 2025-01-07 NOTE — PLAN OF CARE
Problem: Fall Risk  Goal: Prevent Falls and Injury  Intervention: My Fall Prevention To Do List  Description:   Why is this important?  Most falls happen when it is hard for you to walk safely. Your balance may be off because of an illness. You may have pain in your knees, hip or other joints.  You may be overly tired or taking medicines that make you sleepy. You may not be able to see or hear clearly.  Falls can lead to broken bones, bruises or other injuries.  There are things you can do to help prevent falling.    Flowsheets (Taken 1/7/2025 1605)  My Fall Prevention To Do List:   attend therapy   use a nonslip pad with throw rugs, or remove them completely   install bathroom grab bars   keep a flashlight by the bed   make an emergency alert plan in case I fall   use a cane or walker   always use handrails on the stairs   always wear low-heeled or flat shoes or slippers with nonskid soles    clutter from the floors   use a nightlight in the bathroom  Goal: Optimal Care Coordination of a Patient Experiencing Fall Risk  Intervention: Identify and Manage Contributors to Fall Risk  Flowsheets (Taken 1/7/2025 1605)  Identify and Manage Contributors to Fall Risk:   assistive or adaptive device use encouraged   barriers to safety identified   barriers to physical activity or exercise identified   fall prevention plan reviewed and updated   modification of home and work environment promoted   medication list reviewed   participation in rehabilitation therapy encouraged   fear of falling, loss of independence and pain acknowledged

## 2025-01-07 NOTE — OUTREACH NOTE
AMBULATORY CASE MANAGEMENT NOTE    Names and Relationships of Patient/Support Persons: Contact: Arminda Krishnan; Relationship: Self -     Patient Outreach    RN-ACM outreach to patient/cg for HRCM engagement. Pt had a hospital stay at  12/26/24 - 12/28/24 for a fall with subdural and subarachnoid hemorrhages; ACM spoke with pt's sister/cg Delbert who reports they have not yet heard back about Home Health services. Cg reports she thought  was supposed to order it but they must not have. ACM was able to view a HH [facility] pt[facility] PCP [facility] [place]; offered to reach out to HH and PCP office to help with facilitation of referral/services for HH, cg agreeable, appreciative. ACM will update pt/cg as appropriate.     Care Coordination    ACM contacted [facility] at [phone] and spoke with staff member who advised they had not received a referral yet. ACM confirmed the fax number of [phone], will reach out to referring provider's office for further assistance.     RYANNEM then spoke with Blanca at Dr. Spencer's office, she handles referrals and advised she would re-fax and then mail the referral over to [facility] and would call them first to let them know to watch for the fax coming through, advises she will follow-up to ensure they receive.     ACM called back to pt's cg to update her on above progress; interim outreach scheduled to see if they have heard anything from HH. Cg confirms having ACM contact info if needed.     Adult Patient Profile  Questions/Answers      Flowsheet Row Most Recent Value   Symptoms/Conditions Managed at Home behavioral health, cardiovascular, diabetes, type 2, genitourinary, hematologic, musculoskeletal, respiratory   Barriers to Managing Health age   Cardiovascular Symptoms/Conditions hypertension   Cardiovascular Management Strategies routine screening, medication therapy, adequate rest   Cardiovascular Self-Management Outcome well   Diabetes  Management Strategies blood glucose testing   Last A1C Result 7.4 on 12/26/24   Diabetes Self-Management Outcome very well   Genitourinary Symptoms/Conditions chronic kidney disease   Genitourinary Management Strategies medication therapy, diet modification, fluid modification   Genitourinary Self-Management Outcome well   Hematologic Symptoms/Conditions anemia   Hematologic Management Strategies routine screening   Hematologic Self-Management Outcome well   Musculoskeletal Symptoms/Conditions joint pain, mobility limited, osteoarthritis, unsteady gait, other (see comments)  [falls]   Musculoskeletal Self-Management Outcome unsure   Musculoskeletal Comment Fall 1 week ago,  hospital stay  notes subarachnoid and subdural hematomas   Respiratory Symptoms/Conditions COPD   Respiratory Management Strategies medication therapy, nebulizer therapy, adequate rest, routine screening   Respiratory Self-Management Outcome well   Behavioral Health Symptoms/Conditions dementia   Behavioral Management Strategies medication therapy, coping strategies, support system   Behavioral Health Self-Management Outcome well   Identifying Health Goals keep illness under control   Taking Medications Not Prescribed no   Missed Doses of Prescribed Medications During Past Week no   Taken Prescribed Medications at Different Time or Schedule During Past Week no   Taken More or Less Medication Than Prescribed no   Barriers to Taking Medication as Prescribed none          SDOH sent via [employer].     Education Documentation  Unresolved/Worsening Symptoms, taught by Daly Goldstein, RN at 1/7/2025  4:04 PM.  Learner: Caregiver, Family  Readiness: Acceptance  Method: Explanation  Response: Verbalizes Understanding    Activity, taught by Daly Goldstein, RN at 1/7/2025  4:04 PM.  Learner: Caregiver, Family  Readiness: Acceptance  Method: Explanation  Response: Verbalizes Understanding    Safety, taught by Daly Goldstein, RN at 1/7/2025  4:04  PM.  Learner: Caregiver, Family  Readiness: Acceptance  Method: Explanation  Response: Verbalizes Understanding    Risk Factors, taught by Daly Goldstein, RN at 1/7/2025  4:04 PM.  Learner: Caregiver, Family  Readiness: Acceptance  Method: Explanation  Response: Verbalizes Understanding          Daly GAVIN  Ambulatory Case Management    1/7/2025, 16:06 EST

## 2025-01-13 ENCOUNTER — TELEPHONE (OUTPATIENT)
Dept: FAMILY MEDICINE CLINIC | Facility: CLINIC | Age: 80
End: 2025-01-13
Payer: OTHER MISCELLANEOUS

## 2025-01-13 NOTE — TELEPHONE ENCOUNTER
Caller: ROSA    Relationship: [facility]    Best call back number: [phone]    What orders are you requesting (i.e. lab or imaging): HOME HEALTH    In what timeframe would the patient need to come in: AS SOON AS POSSIBLE    Where will you receive your lab/imaging services: [facility]    Additional notes: ROSA CALLED IN REQUESTING VERBAL ORDERS FOR 9 WEEKS OF ONCE A WEEK VISITS FOR HOME HEALTH.

## 2025-01-14 PROBLEM — F03.90 DEMENTIA: Status: ACTIVE | Noted: 2024-12-27

## 2025-01-14 PROBLEM — G93.89 ENCEPHALOMALACIA ON IMAGING STUDY: Status: ACTIVE | Noted: 2024-12-27

## 2025-01-14 PROBLEM — K21.9 GERD (GASTROESOPHAGEAL REFLUX DISEASE): Status: ACTIVE | Noted: 2024-12-27

## 2025-01-14 PROBLEM — I60.9 SAH (SUBARACHNOID HEMORRHAGE): Status: ACTIVE | Noted: 2024-12-27

## 2025-01-14 PROBLEM — D62 ABLA (ACUTE BLOOD LOSS ANEMIA): Status: ACTIVE | Noted: 2024-12-27

## 2025-01-14 PROBLEM — N17.9 ACUTE KIDNEY INJURY SUPERIMPOSED ON CKD: Status: ACTIVE | Noted: 2024-12-27

## 2025-01-14 PROBLEM — E78.5 HLD (HYPERLIPIDEMIA): Status: ACTIVE | Noted: 2024-12-27

## 2025-01-14 PROBLEM — N18.9 ACUTE KIDNEY INJURY SUPERIMPOSED ON CKD: Status: ACTIVE | Noted: 2024-12-27

## 2025-01-14 PROBLEM — S06.5XAA SUBDURAL HEMATOMA: Status: ACTIVE | Noted: 2024-12-27
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
## 2025-01-16 ENCOUNTER — TELEPHONE (OUTPATIENT)
Dept: FAMILY MEDICINE CLINIC | Facility: CLINIC | Age: 80
End: 2025-01-16
Payer: OTHER MISCELLANEOUS

## 2025-01-16 NOTE — TELEPHONE ENCOUNTER
Caller: LAKEISHA HILL    Relationship:     Best call back number: [phone]    What orders are you requesting (i.e. lab or imaging):     PHYSICAL THERAPY    Additional notes:     NEEDS VERBALE ORDERS

## 2025-01-16 NOTE — TELEPHONE ENCOUNTER
Elizabeth from [facility] has called to provide an update and request verbal orders for additional OT visits. Frequency of once a week for 4 weeks.    For the update: The PT missed her visit from this past Tuesday. She has been informed that the PT continues to stay up all night and sleep throughout the day.     She mentioned that patient visit from 12/2024 when she was hospitalized and still has 2 hematomas.

## 2025-01-20 ENCOUNTER — TELEPHONE (OUTPATIENT)
Dept: FAMILY MEDICINE CLINIC | Facility: CLINIC | Age: 80
End: 2025-01-20
Payer: MEDICARE

## 2025-01-21 ENCOUNTER — APPOINTMENT (OUTPATIENT)
Dept: CT IMAGING | Facility: HOSPITAL | Age: 80
End: 2025-01-21
Payer: MEDICARE

## 2025-01-21 ENCOUNTER — TELEPHONE (OUTPATIENT)
Dept: FAMILY MEDICINE CLINIC | Facility: CLINIC | Age: 80
End: 2025-01-21
Payer: MEDICARE

## 2025-01-21 LAB
ALBUMIN SERPL-MCNC: 3.7 G/DL (ref 3.5–5.2)
ALBUMIN/GLOB SERPL: 0.9 G/DL
ALP SERPL-CCNC: 91 U/L (ref 39–117)
ALT SERPL W P-5'-P-CCNC: 9 U/L (ref 1–33)
ANION GAP SERPL CALCULATED.3IONS-SCNC: 11 MMOL/L (ref 5–15)
AST SERPL-CCNC: 21 U/L (ref 1–32)
BASOPHILS # BLD AUTO: 0.1 10*3/MM3 (ref 0–0.2)
BASOPHILS NFR BLD AUTO: 1.4 % (ref 0–1.5)
BILIRUB SERPL-MCNC: 0.2 MG/DL (ref 0–1.2)
BUN SERPL-MCNC: 29 MG/DL (ref 8–23)
BUN/CREAT SERPL: 11.3 (ref 7–25)
CALCIUM SPEC-SCNC: 9.5 MG/DL (ref 8.6–10.5)
CHLORIDE SERPL-SCNC: 104 MMOL/L (ref 98–107)
CO2 SERPL-SCNC: 23 MMOL/L (ref 22–29)
CREAT SERPL-MCNC: 2.57 MG/DL (ref 0.57–1)
DEPRECATED RDW RBC AUTO: 60.7 FL (ref 37–54)
EGFRCR SERPLBLD CKD-EPI 2021: 18.3 ML/MIN/1.73
EOSINOPHIL # BLD AUTO: 0.21 10*3/MM3 (ref 0–0.4)
EOSINOPHIL NFR BLD AUTO: 2.9 % (ref 0.3–6.2)
ERYTHROCYTE [DISTWIDTH] IN BLOOD BY AUTOMATED COUNT: 17.2 % (ref 12.3–15.4)
FLUAV SUBTYP SPEC NAA+PROBE: NOT DETECTED
FLUBV RNA ISLT QL NAA+PROBE: NOT DETECTED
GEN 5 1HR TROPONIN T REFLEX: 24 NG/L
GLOBULIN UR ELPH-MCNC: 4 GM/DL
GLUCOSE SERPL-MCNC: 88 MG/DL (ref 65–99)
HCT VFR BLD AUTO: 30 % (ref 34–46.6)
HGB BLD-MCNC: 9.2 G/DL (ref 12–15.9)
IMM GRANULOCYTES # BLD AUTO: 0.03 10*3/MM3 (ref 0–0.05)
IMM GRANULOCYTES NFR BLD AUTO: 0.4 % (ref 0–0.5)
LYMPHOCYTES # BLD AUTO: 1.94 10*3/MM3 (ref 0.7–3.1)
LYMPHOCYTES NFR BLD AUTO: 26.5 % (ref 19.6–45.3)
MCH RBC QN AUTO: 29.5 PG (ref 26.6–33)
MCHC RBC AUTO-ENTMCNC: 30.7 G/DL (ref 31.5–35.7)
MCV RBC AUTO: 96.2 FL (ref 79–97)
MONOCYTES # BLD AUTO: 0.69 10*3/MM3 (ref 0.1–0.9)
MONOCYTES NFR BLD AUTO: 9.4 % (ref 5–12)
NEUTROPHILS NFR BLD AUTO: 4.35 10*3/MM3 (ref 1.7–7)
NEUTROPHILS NFR BLD AUTO: 59.4 % (ref 42.7–76)
NRBC BLD AUTO-RTO: 0 /100 WBC (ref 0–0.2)
PLATELET # BLD AUTO: 200 10*3/MM3 (ref 140–450)
PMV BLD AUTO: 10.4 FL (ref 6–12)
POTASSIUM SERPL-SCNC: 4 MMOL/L (ref 3.5–5.2)
PROT SERPL-MCNC: 7.7 G/DL (ref 6–8.5)
RBC # BLD AUTO: 3.12 10*6/MM3 (ref 3.77–5.28)
SARS-COV-2 RNA RESP QL NAA+PROBE: NOT DETECTED
SODIUM SERPL-SCNC: 138 MMOL/L (ref 136–145)
TROPONIN T % DELTA: 0
TROPONIN T NUMERIC DELTA: 0 NG/L
TROPONIN T SERPL HS-MCNC: 24 NG/L
WBC NRBC COR # BLD AUTO: 7.32 10*3/MM3 (ref 3.4–10.8)

## 2025-01-21 PROCEDURE — 80053 COMPREHEN METABOLIC PANEL: CPT | Performed by: PHYSICIAN ASSISTANT

## 2025-01-21 PROCEDURE — 84484 ASSAY OF TROPONIN QUANT: CPT | Performed by: PHYSICIAN ASSISTANT

## 2025-01-21 PROCEDURE — 93005 ELECTROCARDIOGRAM TRACING: CPT | Performed by: PHYSICIAN ASSISTANT

## 2025-01-21 PROCEDURE — 70450 CT HEAD/BRAIN W/O DYE: CPT

## 2025-01-21 PROCEDURE — 99284 EMERGENCY DEPT VISIT MOD MDM: CPT

## 2025-01-21 PROCEDURE — 85025 COMPLETE CBC W/AUTO DIFF WBC: CPT | Performed by: PHYSICIAN ASSISTANT

## 2025-01-21 PROCEDURE — 36415 COLL VENOUS BLD VENIPUNCTURE: CPT

## 2025-01-21 PROCEDURE — 87636 SARSCOV2 & INF A&B AMP PRB: CPT | Performed by: PHYSICIAN ASSISTANT

## 2025-01-21 NOTE — TELEPHONE ENCOUNTER
Alize with [facility] CALLED   Alize reports multiple falls over the course of approx. One week, no new injuries, patient c/o soreness, describes pain 9/10, generalized pain.bp med taken today, bp was elevated 169/84, 168/72, lethargic, Alize advised the patient have ED EVAL AND ALSO ADVISED EMS BE CONTACTED REFUSED EMS TRANSPORT.FOR PATIENT TRANSPORT FOR   ED EVAL.[facility] OFFERED TO ASSIST WITH HELPING PATIENT TO THE CAR FOR ED EVAL, SISTER STATED SHE WOULD CALL HER SON TO ASSIST PATIENT TO THE VEHICLE FOR ED EVAL.

## 2025-01-21 NOTE — TELEPHONE ENCOUNTER
Until she is able to walk better she may also be a candidate for the nursing home.  See if GG is seen agreements with this.

## 2025-01-22 ENCOUNTER — HOSPITAL ENCOUNTER (EMERGENCY)
Facility: HOSPITAL | Age: 80
Discharge: HOME | End: 2025-01-22
Attending: EMERGENCY MEDICINE | Admitting: EMERGENCY MEDICINE
Payer: MEDICARE

## 2025-01-22 VITALS
WEIGHT: 156 LBS | RESPIRATION RATE: 18 BRPM | SYSTOLIC BLOOD PRESSURE: 171 MMHG | DIASTOLIC BLOOD PRESSURE: 81 MMHG | HEART RATE: 88 BPM | BODY MASS INDEX: 30.63 KG/M2 | TEMPERATURE: 98 F | HEIGHT: 60 IN | OXYGEN SATURATION: 98 %

## 2025-01-22 DIAGNOSIS — I10 ELEVATED BLOOD PRESSURE READING WITH DIAGNOSIS OF HYPERTENSION: Primary | ICD-10-CM

## 2025-01-22 DIAGNOSIS — Z86.59 HISTORY OF DEMENTIA: ICD-10-CM

## 2025-01-22 DIAGNOSIS — Z87.891 HISTORY OF TOBACCO ABUSE: ICD-10-CM

## 2025-01-22 DIAGNOSIS — Z86.39 HISTORY OF DIABETES MELLITUS: ICD-10-CM

## 2025-01-22 DIAGNOSIS — N18.4 CKD (CHRONIC KIDNEY DISEASE) STAGE 4, GFR 15-29 ML/MIN: ICD-10-CM

## 2025-01-22 DIAGNOSIS — Z86.39 HISTORY OF HYPERLIPIDEMIA: ICD-10-CM

## 2025-01-22 DIAGNOSIS — Z86.79 HISTORY OF HYPERTENSION: ICD-10-CM

## 2025-01-22 LAB
BACTERIA UR QL AUTO: ABNORMAL /HPF
BILIRUB UR QL STRIP: NEGATIVE
CLARITY UR: CLEAR
COLOR UR: YELLOW
GLUCOSE UR STRIP-MCNC: NEGATIVE MG/DL
HGB UR QL STRIP.AUTO: ABNORMAL
HYALINE CASTS UR QL AUTO: ABNORMAL /LPF
KETONES UR QL STRIP: NEGATIVE
LEUKOCYTE ESTERASE UR QL STRIP.AUTO: NEGATIVE
NITRITE UR QL STRIP: NEGATIVE
PH UR STRIP.AUTO: 5.5 [PH] (ref 5–8)
PROT UR QL STRIP: ABNORMAL
RBC # UR STRIP: ABNORMAL /HPF
REF LAB TEST METHOD: ABNORMAL
SP GR UR STRIP: 1.01 (ref 1–1.03)
SQUAMOUS #/AREA URNS HPF: ABNORMAL /HPF
UROBILINOGEN UR QL STRIP: ABNORMAL
WBC # UR STRIP: ABNORMAL /HPF

## 2025-01-22 PROCEDURE — 81001 URINALYSIS AUTO W/SCOPE: CPT | Performed by: PHYSICIAN ASSISTANT

## 2025-01-22 RX ORDER — LORAZEPAM 2 MG/ML
1 INJECTION INTRAMUSCULAR ONCE
Status: DISCONTINUED | OUTPATIENT
Start: 2025-01-22 | End: 2025-01-22

## 2025-01-22 RX ORDER — CLONIDINE HYDROCHLORIDE 0.1 MG/1
0.2 TABLET ORAL ONCE
Status: COMPLETED | OUTPATIENT
Start: 2025-01-22 | End: 2025-01-22

## 2025-01-22 RX ADMIN — CLONIDINE HYDROCHLORIDE 0.2 MG: 0.1 TABLET ORAL at 01:15

## 2025-01-22 NOTE — TELEPHONE ENCOUNTER
CALLED RICA, SHE STATED THAT SHE IS NOT COMFORTABLE OR READY TO HAVE JEANNE GO INTO THE NURSING HOME. SHE IS HAPPT TO CONTINUE TO CARE FOR HER BUT WILL TALK MORE ABOUT THIS IN THE FUTURE SHOULD THE NEED ARISE. PT HAS BEEN SCHEDULED TO FOLLOW UP WITH PCP 1.23.25

## 2025-01-23 ENCOUNTER — OFFICE VISIT (OUTPATIENT)
Dept: FAMILY MEDICINE CLINIC | Facility: CLINIC | Age: 80
End: 2025-01-23
Payer: MEDICARE

## 2025-01-23 VITALS
HEART RATE: 60 BPM | TEMPERATURE: 97.8 F | DIASTOLIC BLOOD PRESSURE: 60 MMHG | WEIGHT: 151.4 LBS | SYSTOLIC BLOOD PRESSURE: 142 MMHG | HEIGHT: 60 IN | RESPIRATION RATE: 18 BRPM | BODY MASS INDEX: 29.72 KG/M2

## 2025-01-23 DIAGNOSIS — F51.01 PRIMARY INSOMNIA: ICD-10-CM

## 2025-01-23 DIAGNOSIS — E78.2 MIXED HYPERLIPIDEMIA: ICD-10-CM

## 2025-01-23 DIAGNOSIS — E11.65 TYPE 2 DIABETES MELLITUS WITH HYPERGLYCEMIA, WITH LONG-TERM CURRENT USE OF INSULIN: ICD-10-CM

## 2025-01-23 DIAGNOSIS — Z79.4 TYPE 2 DIABETES MELLITUS WITH HYPERGLYCEMIA, WITH LONG-TERM CURRENT USE OF INSULIN: ICD-10-CM

## 2025-01-23 DIAGNOSIS — I10 PRIMARY HYPERTENSION: ICD-10-CM

## 2025-01-23 DIAGNOSIS — S06.5XAA SUBDURAL HEMATOMA: Primary | ICD-10-CM

## 2025-01-23 DIAGNOSIS — I60.9 SAH (SUBARACHNOID HEMORRHAGE): ICD-10-CM

## 2025-01-23 DIAGNOSIS — Z91.81 AT HIGH RISK FOR FALLS: ICD-10-CM

## 2025-01-23 DIAGNOSIS — K59.04 CHRONIC IDIOPATHIC CONSTIPATION: ICD-10-CM

## 2025-01-23 DIAGNOSIS — J41.1 MUCOPURULENT CHRONIC BRONCHITIS: ICD-10-CM

## 2025-01-23 DIAGNOSIS — K21.9 GASTROESOPHAGEAL REFLUX DISEASE WITHOUT ESOPHAGITIS: ICD-10-CM

## 2025-01-23 DIAGNOSIS — F17.211 CIGARETTE NICOTINE DEPENDENCE IN REMISSION: ICD-10-CM

## 2025-01-23 RX ORDER — INSULIN DETEMIR 100 [IU]/ML
15 INJECTION, SOLUTION SUBCUTANEOUS NIGHTLY
Start: 2025-01-23 | End: 2025-01-23

## 2025-01-23 RX ORDER — ISOSORBIDE MONONITRATE 60 MG/1
60 TABLET, EXTENDED RELEASE ORAL DAILY
Qty: 90 TABLET | Refills: 3 | Status: SHIPPED | OUTPATIENT
Start: 2025-01-23

## 2025-01-23 RX ORDER — POLYETHYLENE GLYCOL 3350 17 G/17G
17 POWDER, FOR SOLUTION ORAL DAILY
Start: 2025-01-23

## 2025-01-23 NOTE — PROGRESS NOTES
Follow Up Office Visit      Date: 2025   Patient Name: Arminda Krishnan  : 1945   MRN: [id number]     Chief Complaint:    Chief Complaint   Patient presents with    Hospital Follow Up Visit       History of Present Illness: Arminda Krishnan is a 79 y.o. female who is here today for follow-up.    History of Present Illness  The patient is an 79-year-old female who presents for evaluation of multiple medical concerns. She is accompanied by her sister.    The patient has been experiencing recurrent falls since a significant fall incident. She has been receiving physical and occupational therapy since last week. She had a fall on Tuesday, the day before the therapists visited, but sustained no injuries. She also fell last night. She occasionally uses a walker or cane for mobility but often walks without them. She has difficulty maintaining balance and slides off the bed due to slick sheets. She does not wear  socks or house shoes. She recently resumed Plavix and aspirin after a 14-day break. She reports persistent chest pain. She has a follow-up appointment with Dr. Bety Reich next month.    The patient's blood pressure was recorded as 169/84 by the home health nurse, which led to a recommendation for emergency department evaluation. She reports feeling unwell but not severely so. Her blood pressure readings at home have been within normal limits, with systolic values ranging from 119 to 126 and diastolic values between 67 and 74. She experienced a hypertensive episode with a reading of 200 during a hospital visit, which she attributes to pain. She continues to take losartan for hypertension management.    The patient reports knee pain and back pain, which she attributes to her recent fall. She underwent a CT scan of her head following the fall, which revealed no new bleeding or subarachnoid hemorrhages. She has been experiencing increased falls since a previous fall that resulted in a  hemorrhage. She has been experiencing memory issues since her fall. She was hospitalized at  for 3 days following a fall at home.    The patient has been experiencing sleep disturbances, characterized by daytime sleepiness and nighttime wakefulness. Despite administering temazepam 7.5 mg at 7:00 PM, her sleep remains unregulated. She has been taking melatonin, which occasionally aids in sleep, but she has not tried increasing the dose to 10 mg.    The patient has been experiencing dry mouth, which may be a side effect of Detrol, a medication she is currently taking. She is not on any diuretics or diabetes medications that could cause increased urination. She reports adequate fluid intake, including coffee and water.    The patient has been experiencing constipation and has been using laxatives for relief. She has discontinued Tess-Colace and MiraLAX due to their side effects.    The patient has been managing her diabetes with insulin injections, which she discontinued a few weeks ago. She was previously on Levemir 15 units at bedtime and 5 units with meals. Her blood sugar levels have been well-controlled, with the highest recorded value being 194. She has not experienced any hypoglycemic episodes since discontinuing insulin. Her most recent blood sugar readings were 193 on Tuesday and 197 yesterday.    The patient has been using azithromycin for chronic bronchitis management. She does not use an inhaler but occasionally administers nasal sprays. She has been receiving breathing treatments.    The patient has been taking duloxetine for pain and anxiety management. She takes the medication at bedtime.    The patient has been taking donepezil for memory enhancement. She is unsure if she is still on this medication.    The patient has been taking Plavix and aspirin for cardiovascular protection. She recently resumed these medications after a 14-day break.    The patient has been taking atorvastatin for cholesterol  management.    The patient has been taking Keppra for seizure management.    The patient has been taking Coreg for heart failure management.    The patient has been taking iron supplements for anemia management.    The patient has been taking Flonase for allergy management.    The patient has been taking Dupixent for eczema management. She has a follow-up appointment with Dr. Bety Reich next month.    The patient has been taking simethicone for gas relief. She does not take the medication daily.    The patient has been taking Januvia for diabetes management.    SOCIAL HISTORY  The patient smokes cigarettes, sometimes more than one or two a day.    MEDICATIONS  Current: Januvia, simethicone, Protonix, losartan, melatonin, Keppra, isosorbide mononitrate, iron pills, Flonase, Dupixent, duloxetine, Plavix, aspirin, atorvastatin, azithromycin, Ventolin (as needed), multivitamins, hair and nail vitamins.  Discontinued: Tess-Colace, MiraLAX, Detrol, temazepam, NicoDerm patch.     Patient appears to be doing otherwise well.  They have continue with their medications without any side effects.  They have not had any changes in their usual activity, appetite and sleep.  Patient denies any other cardiovascular, respiratory, gastrointestinal, urologic or neurologic complaints.    Subjective      Review of Systems:   Review of Systems   Constitutional:  Negative for activity change, appetite change and fatigue.   Respiratory:  Negative for cough, chest tightness, shortness of breath and wheezing.    Cardiovascular:  Negative for chest pain, palpitations and leg swelling.   Gastrointestinal:  Negative for abdominal distention, abdominal pain, blood in stool, constipation, diarrhea, nausea, vomiting, GERD and indigestion.   Genitourinary:  Negative for difficulty urinating, dysuria, flank pain, frequency, hematuria and urgency.   Musculoskeletal:  Positive for arthralgias and joint swelling. Negative for back pain, gait  problem and myalgias.   Neurological:  Positive for headache. Negative for dizziness, tremors, seizures, syncope, weakness, light-headedness, numbness and memory problem.   Psychiatric/Behavioral:  Negative for sleep disturbance and depressed mood. The patient is not nervous/anxious.        I have reviewed the patients family history, social history, past medical history, past surgical history and have updated it as appropriate.     Medications:     Current Outpatient Medications:     acetaminophen (TYLENOL) 325 MG tablet, Take 2 tablets by mouth Every 4 (Four) Hours As Needed for Mild Pain., Disp: , Rfl:     albuterol (PROVENTIL) (2.5 MG/3ML) 0.083% nebulizer solution, Take 2.5 mg by nebulization Every 4 (Four) Hours As Needed for Wheezing or Shortness of Air., Disp: 360 mL, Rfl: 11    aspirin 81 MG chewable tablet, Chew 1 tablet Daily., Disp: , Rfl:     atorvastatin (LIPITOR) 80 MG tablet, Take 1 tablet by mouth Daily., Disp: 90 tablet, Rfl: 3    Blood Glucose Monitoring Suppl (ONE TOUCH ULTRA 2) w/Device kit, USE 1 EACH DAILY., Disp: , Rfl:     Blood Glucose Monitoring Suppl kit, Use 1 each Daily., Disp: 1 each, Rfl: 11    carvedilol (COREG) 25 MG tablet, TAKE ONE TABLET BY MOUTH TWO TIMES A DAY, Disp: 180 tablet, Rfl: 3    clopidogrel (PLAVIX) 75 MG tablet, TAKE ONE TABLET BY MOUTH EVERY DAY, Disp: 30 tablet, Rfl: 11    Comfort EZ Pen Needles 32G X 4 MM misc, Inject 1 each under the skin into the appropriate area as directed 4 (Four) Times a Day. as directed, Disp: 400 each, Rfl: 3    Diclofenac Sodium (VOLTAREN) 1 % gel gel, APPLY TO AFFECTED AREA FOUR TIMES DAILY, Disp: 100 g, Rfl: 12    donepezil (ARICEPT) 5 MG tablet, Take 1 tablet by mouth Every Night., Disp: , Rfl:     DULoxetine (CYMBALTA) 60 MG capsule, TAKE ONE CAPSULE BY MOUTH EVERY DAY, Disp: 90 capsule, Rfl: 2    ferrous sulfate 325 (65 FE) MG tablet, Take 1 tablet by mouth Daily With Breakfast., Disp: , Rfl:     fluticasone (FLONASE) 50 MCG/ACT  nasal spray, USE 2 SPRAYS IN EACH NOSTRIL ONCE DAILY, Disp: 16 g, Rfl: 12    glucagon (GLUCAGEN) 1 MG injection, Inject 1 mg under the skin into the appropriate area as directed 1 (One) Time As Needed (hypoglycemia) for up to 1 dose., Disp: 1 each, Rfl: 0    Glucose Blood (Blood Glucose Test) strip, 1 each by In Vitro route 3 (Three) Times a Day., Disp: 100 each, Rfl: 11    isosorbide mononitrate (IMDUR) 60 MG 24 hr tablet, Take 1 tablet by mouth Daily., Disp: 90 tablet, Rfl: 3    Lancets (OneTouch Delica Plus [id number]) misc, 3 (Three) Times a Day. as directed, Disp: , Rfl:     levETIRAcetam (KEPPRA) 500 MG tablet, , Disp: , Rfl:     lidocaine (LIDODERM) 5 %, Place 1 patch on the skin as directed by provider Daily. Remove & Discard patch within 12 hours or as directed by MD, Disp: 30 each, Rfl: 11    loratadine (CLARITIN) 10 MG tablet, , Disp: , Rfl:     losartan (COZAAR) 50 MG tablet, Take 1 tablet by mouth Daily., Disp: , Rfl:     melatonin 5 MG tablet tablet, Take 2 tablets by mouth Every Night., Disp: , Rfl:     multivitamin with minerals tablet tablet, Take 1 tablet by mouth Daily., Disp: , Rfl:     naloxone (NARCAN) 4 MG/0.1ML nasal spray, Administer 1 spray into the nostril(s) as directed by provider As Needed., Disp: , Rfl:     pantoprazole (PROTONIX) 40 MG EC tablet, TAKE ONE TABLET BY MOUTH TWO TIMES A DAY, Disp: 180 tablet, Rfl: 3    polyethylene glycol (MIRALAX) 17 g packet, Take 17 g by mouth Daily., Disp: , Rfl:     simethicone (MYLICON) 80 MG chewable tablet, Chew 1 tablet 4 (Four) Times a Day As Needed for Flatulence., Disp: , Rfl:     SITagliptin (Januvia) 50 MG tablet, TAKE ONE TABLET BY MOUTH EVERY DAY, Disp: 90 tablet, Rfl: 3    tacrolimus (PROTOPIC) 0.1 % ointment, APPLY TO AFFECTED AREA TWO TIMES A DAY, Disp: , Rfl:     True Metrix Blood Glucose Test test strip, Daily. for testing, Disp: , Rfl:     Ventolin  (90 Base) MCG/ACT inhaler, INHALE 2 PUFFS EVERY 6 (SIX) HOURS AS NEEDED FOR  "WHEEZING., Disp: 18 g, Rfl: 11    Allergies:   Allergies   Allergen Reactions    Ceclor [Cefaclor] Rash       Immunizations:   Immunization History   Administered Date(s) Administered    COVID-19 (VINNIE) 03/16/2021    COVID-19 (UNSPECIFIED) 03/01/2021, 04/01/2021    Fluzone High-Dose 65+YRS 10/16/2018    Fluzone High-Dose 65+yrs 10/31/2022, 12/06/2023    Fluzone Quad >6mos (Multi-dose) 11/15/2016, 02/05/2020    Pneumococcal Conjugate 13-Valent (PCV13) 07/07/2016    Pneumococcal Polysaccharide (PPSV23) 10/31/2022        Objective     Physical Exam: Please see above  Vital Signs:   Vitals:    01/23/25 1601   BP: 142/60   BP Location: Right arm   Patient Position: Sitting   Cuff Size: Adult   Pulse: 60   Resp: 18   Temp: 97.8 °F (36.6 °C)   TempSrc: Temporal   Weight: 68.7 kg (151 lb 6.4 oz)   Height: 152.4 cm (60\")     Body mass index is 29.57 kg/m².          Physical Exam  Vitals and nursing note reviewed.   Constitutional:       Appearance: Normal appearance.   HENT:      Head: Normocephalic and atraumatic.      Nose: Nose normal.      Mouth/Throat:      Pharynx: Oropharynx is clear.   Eyes:      Extraocular Movements: Extraocular movements intact.      Pupils: Pupils are equal, round, and reactive to light.   Neck:      Thyroid: No thyroid mass or thyromegaly.      Trachea: Trachea normal.   Cardiovascular:      Rate and Rhythm: Normal rate and regular rhythm.      Pulses: Normal pulses. No decreased pulses.      Heart sounds: Normal heart sounds.   Pulmonary:      Effort: Pulmonary effort is normal.      Breath sounds: Normal breath sounds.   Abdominal:      General: Abdomen is flat. Bowel sounds are normal.      Palpations: Abdomen is soft.      Tenderness: There is no abdominal tenderness.   Musculoskeletal:      Cervical back: Neck supple.      Right lower leg: No edema.      Left lower leg: No edema.   Lymphadenopathy:      Cervical: No cervical adenopathy.   Skin:     General: Skin is warm and dry. "   Neurological:      General: No focal deficit present.      Mental Status: She is alert and oriented to person, place, and time.      Sensory: Sensation is intact.      Motor: Motor function is intact.      Coordination: Coordination is intact.   Psychiatric:         Attention and Perception: Attention normal.         Mood and Affect: Mood normal.         Speech: Speech normal.         Behavior: Behavior normal.         Procedures    Results:   Labs:   Hemoglobin A1C   Date Value Ref Range Status   12/26/2024 7.4 (H) <5.7 % Final     TSH   Date Value Ref Range Status   10/09/2024 0.465 0.270 - 4.200 uIU/mL Final        POCT Results (if applicable):   Results for orders placed or performed during the hospital encounter of 01/22/25   Comprehensive Metabolic Panel    Collection Time: 01/21/25  8:28 PM    Specimen: Blood   Result Value Ref Range    Glucose 88 65 - 99 mg/dL    BUN 29 (H) 8 - 23 mg/dL    Creatinine 2.57 (H) 0.57 - 1.00 mg/dL    Sodium 138 136 - 145 mmol/L    Potassium 4.0 3.5 - 5.2 mmol/L    Chloride 104 98 - 107 mmol/L    CO2 23.0 22.0 - 29.0 mmol/L    Calcium 9.5 8.6 - 10.5 mg/dL    Total Protein 7.7 6.0 - 8.5 g/dL    Albumin 3.7 3.5 - 5.2 g/dL    ALT (SGPT) 9 1 - 33 U/L    AST (SGOT) 21 1 - 32 U/L    Alkaline Phosphatase 91 39 - 117 U/L    Total Bilirubin 0.2 0.0 - 1.2 mg/dL    Globulin 4.0 gm/dL    A/G Ratio 0.9 g/dL    BUN/Creatinine Ratio 11.3 7.0 - 25.0    Anion Gap 11.0 5.0 - 15.0 mmol/L    eGFR 18.3 (L) >60.0 mL/min/1.73   High Sensitivity Troponin T    Collection Time: 01/21/25  8:28 PM    Specimen: Blood   Result Value Ref Range    HS Troponin T 24 (H) <14 ng/L   CBC Auto Differential    Collection Time: 01/21/25  8:28 PM    Specimen: Blood   Result Value Ref Range    WBC 7.32 3.40 - 10.80 10*3/mm3    RBC 3.12 (L) 3.77 - 5.28 10*6/mm3    Hemoglobin 9.2 (L) 12.0 - 15.9 g/dL    Hematocrit 30.0 (L) 34.0 - 46.6 %    MCV 96.2 79.0 - 97.0 fL    MCH 29.5 26.6 - 33.0 pg    MCHC 30.7 (L) 31.5 - 35.7  g/dL    RDW 17.2 (H) 12.3 - 15.4 %    RDW-SD 60.7 (H) 37.0 - 54.0 fl    MPV 10.4 6.0 - 12.0 fL    Platelets 200 140 - 450 10*3/mm3    Neutrophil % 59.4 42.7 - 76.0 %    Lymphocyte % 26.5 19.6 - 45.3 %    Monocyte % 9.4 5.0 - 12.0 %    Eosinophil % 2.9 0.3 - 6.2 %    Basophil % 1.4 0.0 - 1.5 %    Immature Grans % 0.4 0.0 - 0.5 %    Neutrophils, Absolute 4.35 1.70 - 7.00 10*3/mm3    Lymphocytes, Absolute 1.94 0.70 - 3.10 10*3/mm3    Monocytes, Absolute 0.69 0.10 - 0.90 10*3/mm3    Eosinophils, Absolute 0.21 0.00 - 0.40 10*3/mm3    Basophils, Absolute 0.10 0.00 - 0.20 10*3/mm3    Immature Grans, Absolute 0.03 0.00 - 0.05 10*3/mm3    nRBC 0.0 0.0 - 0.2 /100 WBC   COVID-19 and FLU A/B PCR, 1 HR TAT - Swab, Nasopharynx    Collection Time: 01/21/25  8:29 PM    Specimen: Nasopharynx; Swab   Result Value Ref Range    COVID19 Not Detected Not Detected - Ref. Range    Influenza A PCR Not Detected Not Detected    Influenza B PCR Not Detected Not Detected   ECG 12 Lead Chest Pain    Collection Time: 01/21/25  8:31 PM   Result Value Ref Range    QT Interval 370 ms    QTC Interval 429 ms   High Sensitivity Troponin T 1Hr    Collection Time: 01/21/25  9:51 PM    Specimen: Blood   Result Value Ref Range    HS Troponin T 24 (H) <14 ng/L    Troponin T Numeric Delta 0 ng/L    Troponin T % Delta 0 Abnormal if >/= 20%   Urinalysis With Microscopic If Indicated (No Culture) - Urine, Clean Catch    Collection Time: 01/22/25  1:31 AM    Specimen: Urine, Clean Catch   Result Value Ref Range    Color, UA Yellow Yellow, Straw    Appearance, UA Clear Clear    pH, UA 5.5 5.0 - 8.0    Specific Gravity, UA 1.010 1.001 - 1.030    Glucose, UA Negative Negative    Ketones, UA Negative Negative    Bilirubin, UA Negative Negative    Blood, UA Large (3+) (A) Negative    Protein,  mg/dL (2+) (A) Negative    Leuk Esterase, UA Negative Negative    Nitrite, UA Negative Negative    Urobilinogen, UA 0.2 E.U./dL 0.2 - 1.0 E.U./dL   Urinalysis,  "Microscopic Only - Urine, Clean Catch    Collection Time: 01/22/25  1:31 AM    Specimen: Urine, Clean Catch   Result Value Ref Range    RBC, UA Too Numerous to Count (A) None Seen, 0-2 /HPF    WBC, UA 0-2 None Seen, 0-2 /HPF    Bacteria, UA None Seen None Seen, Trace /HPF    Squamous Epithelial Cells, UA 0-2 None Seen, 0-2 /HPF    Hyaline Casts, UA None Seen 0 - 6 /LPF    Methodology Automated Microscopy        Imaging:   No valid procedures specified.     Measures:   Advanced Care Planning:   Patient does not have an advance directive, information provided.    Smoking Cessation:   less than 3 minutes spent counseling Will try to cut down    Assessment / Plan      Assessment/Plan:   Diagnoses and all orders for this visit:    1. Subdural hematoma (Primary)   Patient does continue to fall and is appearing to show improvement with respect to her physical therapy.  It does appear that her falls are always associated with her not using assistive devices such as walker excetra.  We have encouraged her to continue to use her walker and continue with the physical therapy.  We have discussed the option of hit her going into a rehab facility short-term but apparently her insurance does not \"pay for this\".  She has been evaluated and been released for her subdural hematoma.    2. Type 2 diabetes mellitus with hyperglycemia, with long-term current use of insulin  Patient does appear to be doing relatively well with respect to their diabetes.  They are tolerating their medications without complaints.  We will continue to follow their hemoglobin A1c and will make adjustments to keep their level less than 7%.  Even though it does appear that she is doing well with respect to her blood sugars they have not been checking as frequently as they should.  She did have great improvement of her blood sugars when she was taking insulin but patient has decided to discontinue.  We have encouraged compliance with her insulin stating that " "some of her symptoms may be due to fluctuating blood sugars and with the hyperosmolality associated with increase in blood sugars.  She will consider our discussion and may resume the insulin in the future.  -     Discontinue: insulin detemir (Levemir) 100 UNIT/ML injection; Inject 15 Units under the skin into the appropriate area as directed Every Night.    3. Primary hypertension  Patient appears to be tolerating their blood pressure medicine without any side effects.  We will continue to monitor their blood pressure and will adjust to keep there is systolic blood pressure less than 130.  We will continue to monitor renal function and will make adjustments based on these findings.  At some point her Imdur was discontinued.  We will restart the medication.  -     isosorbide mononitrate (IMDUR) 60 MG 24 hr tablet; Take 1 tablet by mouth Daily.  Dispense: 90 tablet; Refill: 3    4. Cigarette nicotine dependence in remission   Patient does continue to smoke.  We have encouraged discontinuation and we will restart the nicotine patches if she requests.    5. At high risk for falls   If this is of use of assistive devices was reinforced.  We have also discussed polypharmacy.  We have encouraged her to discontinue the Detrol yet again.  We have encouraged her to remain off of this and do not ask for refill to the pharmacy.    6. Primary insomnia  Patient does continue to have her \"days and nights reversed\".  The temazepam apparently is not being effective as given in the hospital and her sister believes that it is making things worse.  We will discontinue this medication and will pursue with her increasing the melatonin to 10 mg to be taken to 3 hours before she goes to bed.  We have encouraged her to try to remain awake during the day so she does not sleep at night but this unfortunately has not been successful.  I still believe that nursing home placement would be beneficial if her insurance did cover the stay.  -     " melatonin 5 MG tablet tablet; Take 2 tablets by mouth Every Night.    7. Gastroesophageal reflux disease without esophagitis   Patient is doing well at present time with respect to the reflux symptomatology.  They are tolerating their medications without any complaints at present time.  We will continue to monitor their symptoms and if they develop dysphagia, alarm symptoms or worsening symptomatology further evaluation and treatment may be necessary as well as possible referral for an EGD.    8. Mucopurulent chronic bronchitis   Patient does continue to smoke.  She is not using any of her maintenance inhalers at present time.  She is using her nebulization treatments.  We will continue with her current regimen as her lungs do appear to be doing relatively well at present time.  We will encourage her to discontinue smoking.    9. Mixed hyperlipidemia   Patient does appear to be tolerating their lipid-lowering agent without difficulty.  We will obtain the lipid profile as well as liver function test to observe for any abnormalities.  We will continue to adjust medications to keep their LDL less than 70.    10. SAH (subarachnoid hemorrhage)    11. Chronic idiopathic constipation  She does continue to have problems of constipation.  We have reinforced that Detrol greatly contributes to this as well as her dry mouth.  She will go back on the MiraLAX and hopefully she will show some improvement with not only the MiraLAX but also the discontinuation of the Detrol.  We have encouraged her to push fluids.  -     polyethylene glycol (MIRALAX) 17 g packet; Take 17 g by mouth Daily.        Follow Up:   Return in about 2 weeks (around 2/6/2025).      At [facility], we believe that sharing information builds trust and better relationships. You are receiving this note because you recently visited [facility]. It is possible you will see health information before a provider has talked with you about it. This kind of  information can be easy to misunderstand. To help you fully understand what it means for your health, we urge you to discuss this note with your provider.    Aiden Spencer MD  [facility]    Patient or patient representative verbalized consent for the use of Ambient Listening during the visit with  Aiden Spencer MD for chart documentation. 1/24/2025  08:53 EST

## 2025-01-24 LAB
QT INTERVAL: 370 MS
QTC INTERVAL: 429 MS

## 2025-01-27 ENCOUNTER — TELEPHONE (OUTPATIENT)
Dept: FAMILY MEDICINE CLINIC | Facility: CLINIC | Age: 80
End: 2025-01-27
Payer: MEDICARE

## 2025-01-27 DIAGNOSIS — F01.B18 MODERATE VASCULAR DEMENTIA WITH OTHER BEHAVIORAL DISTURBANCE: Primary | ICD-10-CM

## 2025-01-27 DIAGNOSIS — M51.369 DEGENERATIVE DISC DISEASE, LUMBAR: ICD-10-CM

## 2025-01-27 NOTE — TELEPHONE ENCOUNTER
Caller: Rica Mcdermott    Relationship to patient: Emergency Contact    Best call back number: [phone]     RICA IS CALLING TO SEE WHETHER OR NOT DR RANGEL HAS PUT THE PATIENT ON DEMENTIA MEDICATION.

## 2025-01-28 ENCOUNTER — TELEPHONE (OUTPATIENT)
Dept: FAMILY MEDICINE CLINIC | Facility: CLINIC | Age: 80
End: 2025-01-28
Payer: MEDICARE

## 2025-01-28 RX ORDER — DONEPEZIL HYDROCHLORIDE 5 MG/1
5 TABLET, FILM COATED ORAL NIGHTLY
Qty: 30 TABLET | Refills: 1 | Status: SHIPPED | OUTPATIENT
Start: 2025-01-28

## 2025-01-28 RX ORDER — HYDROCODONE BITARTRATE AND ACETAMINOPHEN 7.5; 325 MG/1; MG/1
1 TABLET ORAL EVERY 12 HOURS PRN
Qty: 20 TABLET | Refills: 0 | Status: SHIPPED | OUTPATIENT
Start: 2025-01-28

## 2025-01-28 NOTE — TELEPHONE ENCOUNTER
Yes.  We have started her on Aricept.  She is post to take 5 mg daily for 2 weeks then increase to 10 mg daily.

## 2025-01-28 NOTE — TELEPHONE ENCOUNTER
SISTER  CALLED TO DISCUSS LETTER SHE HAS RECEIVED FROM  [facility] CONCERNING A CT SCAN THAT NEEDS TO BE ORDERED, PATIENT WAS ADVISED  TO REACH OUT TO A LUNG NAVIGATOR WITH  [facility] ADVISED TO BRING ;LETTER TO NEXT APPOINTMENT TO DISCUSS WITH PROVIDER.

## 2025-01-30 DIAGNOSIS — I71.40 ABDOMINAL AORTIC ANEURYSM (AAA) WITHOUT RUPTURE, UNSPECIFIED PART: Primary | ICD-10-CM

## 2025-02-06 ENCOUNTER — TELEPHONE (OUTPATIENT)
Dept: FAMILY MEDICINE CLINIC | Facility: CLINIC | Age: 80
End: 2025-02-06

## 2025-02-06 DIAGNOSIS — F01.B18 MODERATE VASCULAR DEMENTIA WITH OTHER BEHAVIORAL DISTURBANCE: ICD-10-CM

## 2025-02-06 DIAGNOSIS — J41.1 MUCOPURULENT CHRONIC BRONCHITIS: ICD-10-CM

## 2025-02-06 NOTE — TELEPHONE ENCOUNTER
Caller: [facility] - [place] [address] [address] - [phone]  - [phone]    Relationship: Pharmacy    Best call back number: [phone]    Which medication are you concerned about:     donepezil (ARICEPT) 5 MG tablet     What are your concerns:     ONCE TABLET BY MOUTH EVERY NIGHT FOR TWO WEEKS THEN TWO TABLETS  INSURANCE WILL NOT PAY    PHARMACIST SAID IF WE SEND IN 10 MG IT WILL PROBABLY PAY FOR IT

## 2025-02-07 RX ORDER — AZITHROMYCIN 250 MG/1
250 TABLET, FILM COATED ORAL EVERY OTHER DAY
Qty: 15 TABLET | Refills: 1 | OUTPATIENT
Start: 2025-02-07

## 2025-02-07 NOTE — TELEPHONE ENCOUNTER
Relay-     PA on donepezil 5mg has been approved by prescription insurance carrier. Message from CoverMyMeds:    Approved today by [facility] Medicare 2017  PA Case: [id number], Status: Approved, Coverage Starts on: 11/8/2024 12:00:00 AM, Coverage Ends on: 2/7/2026 12:00:00 AM.  Authorization Expiration Date: 2/6/2026

## 2025-02-07 NOTE — TELEPHONE ENCOUNTER
PA was submitted and approved. Information is in media tab.    Cecelia needs new Rx for donepesil 10 mg sent in for insurance coverage.

## 2025-02-08 RX ORDER — DONEPEZIL HYDROCHLORIDE 10 MG/1
10 TABLET, FILM COATED ORAL NIGHTLY
Qty: 90 TABLET | Refills: 3 | Status: SHIPPED | OUTPATIENT
Start: 2025-02-08

## 2025-02-13 ENCOUNTER — OFFICE VISIT (OUTPATIENT)
Dept: FAMILY MEDICINE CLINIC | Facility: CLINIC | Age: 80
End: 2025-02-13
Payer: MEDICARE

## 2025-02-13 VITALS
SYSTOLIC BLOOD PRESSURE: 132 MMHG | BODY MASS INDEX: 30.43 KG/M2 | WEIGHT: 155 LBS | OXYGEN SATURATION: 98 % | HEIGHT: 60 IN | HEART RATE: 82 BPM | DIASTOLIC BLOOD PRESSURE: 74 MMHG | TEMPERATURE: 97.2 F | RESPIRATION RATE: 16 BRPM

## 2025-02-13 DIAGNOSIS — J41.1 MUCOPURULENT CHRONIC BRONCHITIS: ICD-10-CM

## 2025-02-13 DIAGNOSIS — I10 PRIMARY HYPERTENSION: ICD-10-CM

## 2025-02-13 DIAGNOSIS — K21.9 GASTROESOPHAGEAL REFLUX DISEASE WITHOUT ESOPHAGITIS: ICD-10-CM

## 2025-02-13 DIAGNOSIS — E78.2 MIXED HYPERLIPIDEMIA: ICD-10-CM

## 2025-02-13 DIAGNOSIS — Z91.81 AT HIGH RISK FOR FALLS: ICD-10-CM

## 2025-02-13 DIAGNOSIS — Z79.4 TYPE 2 DIABETES MELLITUS WITH HYPERGLYCEMIA, WITH LONG-TERM CURRENT USE OF INSULIN: Primary | ICD-10-CM

## 2025-02-13 DIAGNOSIS — F51.01 PRIMARY INSOMNIA: ICD-10-CM

## 2025-02-13 DIAGNOSIS — E11.65 TYPE 2 DIABETES MELLITUS WITH HYPERGLYCEMIA, WITH LONG-TERM CURRENT USE OF INSULIN: Primary | ICD-10-CM

## 2025-02-13 DIAGNOSIS — K59.04 CHRONIC IDIOPATHIC CONSTIPATION: ICD-10-CM

## 2025-02-13 PROCEDURE — G2211 COMPLEX E/M VISIT ADD ON: HCPCS | Performed by: FAMILY MEDICINE

## 2025-02-13 PROCEDURE — 3078F DIAST BP <80 MM HG: CPT | Performed by: FAMILY MEDICINE

## 2025-02-13 PROCEDURE — 3075F SYST BP GE 130 - 139MM HG: CPT | Performed by: FAMILY MEDICINE

## 2025-02-13 PROCEDURE — 1125F AMNT PAIN NOTED PAIN PRSNT: CPT | Performed by: FAMILY MEDICINE

## 2025-02-13 PROCEDURE — 99214 OFFICE O/P EST MOD 30 MIN: CPT | Performed by: FAMILY MEDICINE

## 2025-02-13 PROCEDURE — 1160F RVW MEDS BY RX/DR IN RCRD: CPT | Performed by: FAMILY MEDICINE

## 2025-02-13 PROCEDURE — 1159F MED LIST DOCD IN RCRD: CPT | Performed by: FAMILY MEDICINE

## 2025-02-13 RX ORDER — GLIPIZIDE 5 MG/1
5 TABLET ORAL
COMMUNITY
Start: 2025-01-23

## 2025-02-13 NOTE — PROGRESS NOTES
Follow Up Office Visit      Date: 2025   Patient Name: Arminda Krishnan  : 1945   MRN: [id number]     Chief Complaint:    Chief Complaint   Patient presents with    Follow-up     2 week       History of Present Illness: Arminda Krishnan is a 79 y.o. female who is here today for follow-up.    History of Present Illness  The patient is an 79-year-old female who presents for evaluation of anemia, falls, rhinorrhea, constipation, and insomnia. She is accompanied by her sister.    She reports a positive response to her iron infusions, with a scheduled appointment for the following Thursday. She has been receiving weekly infusions, which are withheld if her hemoglobin levels are satisfactory. Her sister notes that her hemoglobin was recorded as 9.5 the previous week, and she did not receive an infusion today due to elevated levels. She is also on oral iron supplementation, administered nightly at bedtime.    Despite the improvement in her anemia, she continues to experience falls. An incident occurred yesterday morning where she fell while transitioning from the bathroom to her bedroom, without any preceding loss of consciousness or sleepiness. She sustained a head injury during the fall but did not exhibit any abnormal behavior post-fall. She also reports episodes of staggering and weakness, which occasionally render her immobile. She does not believe her falls are related to hypoglycemia, as her blood glucose levels have remained stable. She does not report any dizziness or knee instability contributing to her falls. She has been using a walker for mobility support. She has not undergone a tilt table test and last wore a monitor in 2024. She has 3 more physical therapy visits and 2 more nurse visits left.    She reports persistent rhinorrhea, despite being on Claritin and Flonase.    She has been experiencing abdominal pain for the past 3 to 4 days, despite being on a twice-daily stomach  medication. She has a history of constipation but is not currently taking any pain medication. She has discontinued MiraLAX due to personal preference.    Her sleep pattern is irregular, often going to bed late and waking up early. She has been taking melatonin at 7:00 PM to aid sleep.    SOCIAL HISTORY  She admits to smoking.    MEDICATIONS  Current: Claritin, Flonase, melatonin, iron pills, MiraLAX (discontinued).     Patient appears to be doing otherwise well.  They have continue with their medications without any side effects.  They have not had any changes in their usual activity, appetite and sleep.  Patient denies any other cardiovascular, respiratory, gastrointestinal, urologic or neurologic complaints.    Subjective      Review of Systems:   Review of Systems   Constitutional:  Negative for activity change, appetite change and fatigue.   Respiratory:  Negative for cough, chest tightness, shortness of breath and wheezing.    Cardiovascular:  Negative for chest pain, palpitations and leg swelling.   Gastrointestinal:  Negative for abdominal distention, abdominal pain, blood in stool, constipation, diarrhea, nausea, vomiting, GERD and indigestion.   Genitourinary:  Negative for difficulty urinating, dysuria, flank pain, frequency, hematuria and urgency.   Musculoskeletal:  Negative for arthralgias, back pain, gait problem, joint swelling and myalgias.   Neurological:  Negative for dizziness, tremors, seizures, syncope, weakness, light-headedness, numbness, headache and memory problem.   Psychiatric/Behavioral:  Negative for sleep disturbance and depressed mood. The patient is not nervous/anxious.        I have reviewed the patients family history, social history, past medical history, past surgical history and have updated it as appropriate.     Medications:     Current Outpatient Medications:     acetaminophen (TYLENOL) 325 MG tablet, Take 2 tablets by mouth Every 4 (Four) Hours As Needed for Mild Pain.,  Disp: , Rfl:     albuterol (PROVENTIL) (2.5 MG/3ML) 0.083% nebulizer solution, Take 2.5 mg by nebulization Every 4 (Four) Hours As Needed for Wheezing or Shortness of Air., Disp: 360 mL, Rfl: 11    aspirin 81 MG chewable tablet, Chew 1 tablet Daily., Disp: , Rfl:     atorvastatin (LIPITOR) 80 MG tablet, Take 1 tablet by mouth Daily., Disp: 90 tablet, Rfl: 3    Blood Glucose Monitoring Suppl (ONE TOUCH ULTRA 2) w/Device kit, USE 1 EACH DAILY., Disp: , Rfl:     Blood Glucose Monitoring Suppl kit, Use 1 each Daily., Disp: 1 each, Rfl: 11    carvedilol (COREG) 25 MG tablet, TAKE ONE TABLET BY MOUTH TWO TIMES A DAY, Disp: 180 tablet, Rfl: 3    clopidogrel (PLAVIX) 75 MG tablet, TAKE ONE TABLET BY MOUTH EVERY DAY, Disp: 30 tablet, Rfl: 11    Comfort EZ Pen Needles 32G X 4 MM misc, Inject 1 each under the skin into the appropriate area as directed 4 (Four) Times a Day. as directed, Disp: 400 each, Rfl: 3    Diclofenac Sodium (VOLTAREN) 1 % gel gel, APPLY TO AFFECTED AREA FOUR TIMES DAILY, Disp: 100 g, Rfl: 12    donepezil (ARICEPT) 10 MG tablet, Take 1 tablet by mouth Every Night. For 2 weeks, then increase to 2 tablets by mouth every night., Disp: 90 tablet, Rfl: 3    DULoxetine (CYMBALTA) 60 MG capsule, TAKE ONE CAPSULE BY MOUTH EVERY DAY, Disp: 90 capsule, Rfl: 2    ferrous sulfate 325 (65 FE) MG tablet, Take 1 tablet by mouth Daily With Breakfast., Disp: , Rfl:     fluticasone (FLONASE) 50 MCG/ACT nasal spray, USE 2 SPRAYS IN EACH NOSTRIL ONCE DAILY, Disp: 16 g, Rfl: 12    glipizide (GLUCOTROL) 5 MG tablet, Take 1 tablet by mouth Every Morning Before Breakfast., Disp: , Rfl:     glucagon (GLUCAGEN) 1 MG injection, Inject 1 mg under the skin into the appropriate area as directed 1 (One) Time As Needed (hypoglycemia) for up to 1 dose., Disp: 1 each, Rfl: 0    Glucose Blood (Blood Glucose Test) strip, 1 each by In Vitro route 3 (Three) Times a Day., Disp: 100 each, Rfl: 11    HYDROcodone-acetaminophen (NORCO) 7.5-325 MG  per tablet, Take 1 tablet by mouth Every 12 (Twelve) Hours As Needed for Moderate Pain., Disp: 20 tablet, Rfl: 0    isosorbide mononitrate (IMDUR) 60 MG 24 hr tablet, Take 1 tablet by mouth Daily., Disp: 90 tablet, Rfl: 3    Lancets (OneTouch Delica Plus [id number]) misc, 3 (Three) Times a Day. as directed, Disp: , Rfl:     levETIRAcetam (KEPPRA) 500 MG tablet, , Disp: , Rfl:     lidocaine (LIDODERM) 5 %, Place 1 patch on the skin as directed by provider Daily. Remove & Discard patch within 12 hours or as directed by MD, Disp: 30 each, Rfl: 11    loratadine (CLARITIN) 10 MG tablet, , Disp: , Rfl:     losartan (COZAAR) 50 MG tablet, Take 1 tablet by mouth Daily., Disp: , Rfl:     multivitamin with minerals tablet tablet, Take 1 tablet by mouth Daily., Disp: , Rfl:     naloxone (NARCAN) 4 MG/0.1ML nasal spray, Administer 1 spray into the nostril(s) as directed by provider As Needed., Disp: , Rfl:     pantoprazole (PROTONIX) 40 MG EC tablet, TAKE ONE TABLET BY MOUTH TWO TIMES A DAY, Disp: 180 tablet, Rfl: 3    simethicone (MYLICON) 80 MG chewable tablet, Chew 1 tablet 4 (Four) Times a Day As Needed for Flatulence., Disp: , Rfl:     SITagliptin (Januvia) 50 MG tablet, TAKE ONE TABLET BY MOUTH EVERY DAY, Disp: 90 tablet, Rfl: 3    tacrolimus (PROTOPIC) 0.1 % ointment, APPLY TO AFFECTED AREA TWO TIMES A DAY, Disp: , Rfl:     True Metrix Blood Glucose Test test strip, Daily. for testing, Disp: , Rfl:     Ventolin  (90 Base) MCG/ACT inhaler, INHALE 2 PUFFS EVERY 6 (SIX) HOURS AS NEEDED FOR WHEEZING., Disp: 18 g, Rfl: 11    linaclotide (LINZESS) 145 MCG capsule capsule, Take 1 capsule by mouth Every Morning Before Breakfast., Disp: 30 capsule, Rfl: 11    Allergies:   Allergies   Allergen Reactions    Ceclor [Cefaclor] Rash       Immunizations:   Immunization History   Administered Date(s) Administered    COVID-19 (VINNIE) 03/16/2021    COVID-19 (UNSPECIFIED) 03/01/2021, 04/01/2021    Fluzone High-Dose 65+YRS  "10/16/2018    Fluzone High-Dose 65+yrs 10/31/2022, 12/06/2023    Fluzone Quad >6mos (Multi-dose) 11/15/2016, 02/05/2020    Pneumococcal Conjugate 13-Valent (PCV13) 07/07/2016    Pneumococcal Polysaccharide (PPSV23) 10/31/2022        Objective     Physical Exam: Please see above  Vital Signs:   Vitals:    02/13/25 1522   BP: 132/74   BP Location: Left arm   Patient Position: Sitting   Cuff Size: Large Adult   Pulse: 82   Resp: 16   Temp: 97.2 °F (36.2 °C)   TempSrc: Temporal   SpO2: 98%   Weight: 70.3 kg (155 lb)   Height: 152.4 cm (60\")     Body mass index is 30.27 kg/m².          Physical Exam  Vitals and nursing note reviewed.   Constitutional:       Appearance: Normal appearance.   HENT:      Head: Normocephalic and atraumatic.      Nose: Nose normal.      Mouth/Throat:      Pharynx: Oropharynx is clear.   Eyes:      Extraocular Movements: Extraocular movements intact.      Pupils: Pupils are equal, round, and reactive to light.   Neck:      Thyroid: No thyroid mass or thyromegaly.      Trachea: Trachea normal.   Cardiovascular:      Rate and Rhythm: Normal rate and regular rhythm.      Pulses: Normal pulses. No decreased pulses.      Heart sounds: Normal heart sounds.   Pulmonary:      Effort: Pulmonary effort is normal.      Breath sounds: Normal breath sounds.   Abdominal:      General: Abdomen is flat. Bowel sounds are normal.      Palpations: Abdomen is soft.      Tenderness: There is no abdominal tenderness.   Musculoskeletal:      Cervical back: Neck supple.      Right lower leg: No edema.      Left lower leg: No edema.   Lymphadenopathy:      Cervical: No cervical adenopathy.   Skin:     General: Skin is warm and dry.   Neurological:      General: No focal deficit present.      Mental Status: She is alert and oriented to person, place, and time.      Sensory: Sensation is intact.      Motor: Motor function is intact.      Coordination: Coordination is intact.   Psychiatric:         Attention and " Perception: Attention normal.         Mood and Affect: Mood normal.         Speech: Speech normal.         Behavior: Behavior normal.         Procedures    Results:   Labs:   Hemoglobin A1C   Date Value Ref Range Status   12/26/2024 7.4 (H) <5.7 % Final     TSH   Date Value Ref Range Status   10/09/2024 0.465 0.270 - 4.200 uIU/mL Final        POCT Results (if applicable):   Results for orders placed or performed during the hospital encounter of 01/22/25   Comprehensive Metabolic Panel    Collection Time: 01/21/25  8:28 PM    Specimen: Blood   Result Value Ref Range    Glucose 88 65 - 99 mg/dL    BUN 29 (H) 8 - 23 mg/dL    Creatinine 2.57 (H) 0.57 - 1.00 mg/dL    Sodium 138 136 - 145 mmol/L    Potassium 4.0 3.5 - 5.2 mmol/L    Chloride 104 98 - 107 mmol/L    CO2 23.0 22.0 - 29.0 mmol/L    Calcium 9.5 8.6 - 10.5 mg/dL    Total Protein 7.7 6.0 - 8.5 g/dL    Albumin 3.7 3.5 - 5.2 g/dL    ALT (SGPT) 9 1 - 33 U/L    AST (SGOT) 21 1 - 32 U/L    Alkaline Phosphatase 91 39 - 117 U/L    Total Bilirubin 0.2 0.0 - 1.2 mg/dL    Globulin 4.0 gm/dL    A/G Ratio 0.9 g/dL    BUN/Creatinine Ratio 11.3 7.0 - 25.0    Anion Gap 11.0 5.0 - 15.0 mmol/L    eGFR 18.3 (L) >60.0 mL/min/1.73   High Sensitivity Troponin T    Collection Time: 01/21/25  8:28 PM    Specimen: Blood   Result Value Ref Range    HS Troponin T 24 (H) <14 ng/L   CBC Auto Differential    Collection Time: 01/21/25  8:28 PM    Specimen: Blood   Result Value Ref Range    WBC 7.32 3.40 - 10.80 10*3/mm3    RBC 3.12 (L) 3.77 - 5.28 10*6/mm3    Hemoglobin 9.2 (L) 12.0 - 15.9 g/dL    Hematocrit 30.0 (L) 34.0 - 46.6 %    MCV 96.2 79.0 - 97.0 fL    MCH 29.5 26.6 - 33.0 pg    MCHC 30.7 (L) 31.5 - 35.7 g/dL    RDW 17.2 (H) 12.3 - 15.4 %    RDW-SD 60.7 (H) 37.0 - 54.0 fl    MPV 10.4 6.0 - 12.0 fL    Platelets 200 140 - 450 10*3/mm3    Neutrophil % 59.4 42.7 - 76.0 %    Lymphocyte % 26.5 19.6 - 45.3 %    Monocyte % 9.4 5.0 - 12.0 %    Eosinophil % 2.9 0.3 - 6.2 %    Basophil % 1.4 0.0  - 1.5 %    Immature Grans % 0.4 0.0 - 0.5 %    Neutrophils, Absolute 4.35 1.70 - 7.00 10*3/mm3    Lymphocytes, Absolute 1.94 0.70 - 3.10 10*3/mm3    Monocytes, Absolute 0.69 0.10 - 0.90 10*3/mm3    Eosinophils, Absolute 0.21 0.00 - 0.40 10*3/mm3    Basophils, Absolute 0.10 0.00 - 0.20 10*3/mm3    Immature Grans, Absolute 0.03 0.00 - 0.05 10*3/mm3    nRBC 0.0 0.0 - 0.2 /100 WBC   COVID-19 and FLU A/B PCR, 1 HR TAT - Swab, Nasopharynx    Collection Time: 01/21/25  8:29 PM    Specimen: Nasopharynx; Swab   Result Value Ref Range    COVID19 Not Detected Not Detected - Ref. Range    Influenza A PCR Not Detected Not Detected    Influenza B PCR Not Detected Not Detected   ECG 12 Lead Chest Pain    Collection Time: 01/21/25  8:31 PM   Result Value Ref Range    QT Interval 370 ms    QTC Interval 429 ms   High Sensitivity Troponin T 1Hr    Collection Time: 01/21/25  9:51 PM    Specimen: Blood   Result Value Ref Range    HS Troponin T 24 (H) <14 ng/L    Troponin T Numeric Delta 0 ng/L    Troponin T % Delta 0 Abnormal if >/= 20%   Urinalysis With Microscopic If Indicated (No Culture) - Urine, Clean Catch    Collection Time: 01/22/25  1:31 AM    Specimen: Urine, Clean Catch   Result Value Ref Range    Color, UA Yellow Yellow, Straw    Appearance, UA Clear Clear    pH, UA 5.5 5.0 - 8.0    Specific Gravity, UA 1.010 1.001 - 1.030    Glucose, UA Negative Negative    Ketones, UA Negative Negative    Bilirubin, UA Negative Negative    Blood, UA Large (3+) (A) Negative    Protein,  mg/dL (2+) (A) Negative    Leuk Esterase, UA Negative Negative    Nitrite, UA Negative Negative    Urobilinogen, UA 0.2 E.U./dL 0.2 - 1.0 E.U./dL   Urinalysis, Microscopic Only - Urine, Clean Catch    Collection Time: 01/22/25  1:31 AM    Specimen: Urine, Clean Catch   Result Value Ref Range    RBC, UA Too Numerous to Count (A) None Seen, 0-2 /HPF    WBC, UA 0-2 None Seen, 0-2 /HPF    Bacteria, UA None Seen None Seen, Trace /HPF    Squamous Epithelial  Cells, UA 0-2 None Seen, 0-2 /HPF    Hyaline Casts, UA None Seen 0 - 6 /LPF    Methodology Automated Microscopy        Imaging:   No valid procedures specified.     Measures:   Advanced Care Planning:   Patient does not have an advance directive, information provided.    Smoking Cessation:   less than 3 minutes spent counseling Will try to cut down    Assessment / Plan      Assessment/Plan:   Diagnoses and all orders for this visit:    1. Type 2 diabetes mellitus with hyperglycemia, with long-term current use of insulin (Primary)   Patient does appear to be doing relatively well with respect to their diabetes.  They are tolerating their medications without complaints.  We will continue to follow their hemoglobin A1c and will make adjustments to keep their level less than 7%.    2. Primary hypertension   Patient appears to be tolerating their blood pressure medicine without any side effects.  We will continue to monitor their blood pressure and will adjust to keep there is systolic blood pressure less than 130.  We will continue to monitor renal function and will make adjustments based on these findings.  They will continue to monitor for orthostasis.    3. Mixed hyperlipidemia   Patient does appear to be tolerating their lipid-lowering agent without difficulty.  We will obtain the lipid profile as well as liver function test to observe for any abnormalities.  We will continue to adjust medications to keep their LDL less than 70.  I do not suspect that she is having any myalgias secondary to her lipid-lowering agent.  We will not obtain a CK at present time.    4. Gastroesophageal reflux disease without esophagitis   Patient is doing well at present time with respect to the reflux symptomatology.  They are tolerating their medications without any complaints at present time.  We will continue to monitor their symptoms and if they develop dysphagia, alarm symptoms or worsening symptomatology further evaluation and  "treatment may be necessary as well as possible referral for an EGD.    5. Primary insomnia   Patient is given plan sleep and unfortunately is sleeping at times.  We will discontinue the melatonin and will try to have her start sleep when she is supposed to.  They will try to \"march out the clock\" to have her sleep at a reasonable time.  Hopefully we will be able to have her sleep during regular hours.  Sleep hygiene was again stressed.  6. Chronic idiopathic constipation  Patient has not had improvement with MiraLAX and does not wish to take the medications.  We have discussed options and will add Linzess.  We will start at 145 which she may increase if necessary.  If she has worsening of her symptoms with Linzess which she will contact us and we will lower the dose.  -     linaclotide (LINZESS) 145 MCG capsule capsule; Take 1 capsule by mouth Every Morning Before Breakfast.  Dispense: 30 capsule; Refill: 11    7. At high risk for falls   Patient still receiving physical therapy.  She does continue to have issues with falling but will we are at a loss of cost.  She does not apparently have  weakness of her legs.  She does not have anything where she described that she is having any dizziness/vertigo.  She does not appear to be having any tripping on objects.  Patient states that she \"just falls\".  We will continue with physical therapy and monitor and encouraged use of assistive devices.    8. Mucopurulent chronic bronchitis   Patient's lungs are clear today.  She has been encouraged to discontinue smoking.  We will continue to use the inhalers and we will provide medical assistance for smoking if necessary.      Follow Up:   Return in about 6 weeks (around 3/26/2025).      At [facility], we believe that sharing information builds trust and better relationships. You are receiving this note because you recently visited [facility]. It is possible you will see health information before a provider has talked " with you about it. This kind of information can be easy to misunderstand. To help you fully understand what it means for your health, we urge you to discuss this note with your provider.    Aiden Spencer MD  [facility]    Patient or patient representative verbalized consent for the use of Ambient Listening during the visit with  Aiden Spencer MD for chart documentation. 2/13/2025  18:21 EST

## 2025-02-18 DIAGNOSIS — M51.369 DEGENERATIVE DISC DISEASE, LUMBAR: ICD-10-CM

## 2025-02-18 RX ORDER — HYDROCODONE BITARTRATE AND ACETAMINOPHEN 7.5; 325 MG/1; MG/1
1 TABLET ORAL EVERY 12 HOURS PRN
Qty: 20 TABLET | Refills: 0 | Status: SHIPPED | OUTPATIENT
Start: 2025-02-18

## 2025-02-21 ENCOUNTER — TELEPHONE (OUTPATIENT)
Dept: NEUROLOGY | Facility: CLINIC | Age: 80
End: 2025-02-21
Payer: MEDICARE

## 2025-02-21 RX ORDER — FLUTICASONE PROPIONATE 50 MCG
2 SPRAY, SUSPENSION (ML) NASAL DAILY
Qty: 48 G | Refills: 12 | Status: SHIPPED | OUTPATIENT
Start: 2025-02-21

## 2025-02-28 ENCOUNTER — APPOINTMENT (OUTPATIENT)
Dept: CT IMAGING | Facility: HOSPITAL | Age: 80
End: 2025-02-28
Payer: MEDICARE

## 2025-02-28 ENCOUNTER — HOSPITAL ENCOUNTER (EMERGENCY)
Facility: HOSPITAL | Age: 80
Discharge: HOME | End: 2025-03-01
Attending: EMERGENCY MEDICINE
Payer: MEDICARE
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
## 2025-02-28 DIAGNOSIS — N18.4 CKD (CHRONIC KIDNEY DISEASE) STAGE 4, GFR 15-29 ML/MIN: ICD-10-CM

## 2025-02-28 DIAGNOSIS — I10 ELEVATED BLOOD PRESSURE READING WITH DIAGNOSIS OF HYPERTENSION: ICD-10-CM

## 2025-02-28 DIAGNOSIS — N39.0 URINARY TRACT INFECTION IN ELDERLY PATIENT: ICD-10-CM

## 2025-02-28 LAB
ALBUMIN SERPL-MCNC: 3.6 G/DL (ref 3.5–5.2)
ALBUMIN/GLOB SERPL: 1 G/DL
ALP SERPL-CCNC: 82 U/L (ref 39–117)
ALT SERPL W P-5'-P-CCNC: 12 U/L (ref 1–33)
ANION GAP SERPL CALCULATED.3IONS-SCNC: 15 MMOL/L (ref 5–15)
AST SERPL-CCNC: 33 U/L (ref 1–32)
BACTERIA UR QL AUTO: ABNORMAL /HPF
BASOPHILS # BLD AUTO: 0.07 10*3/MM3 (ref 0–0.2)
BASOPHILS NFR BLD AUTO: 1 % (ref 0–1.5)
BILIRUB SERPL-MCNC: 0.2 MG/DL (ref 0–1.2)
BILIRUB UR QL STRIP: NEGATIVE
BUN SERPL-MCNC: 32 MG/DL (ref 8–23)
BUN/CREAT SERPL: 10.7 (ref 7–25)
CALCIUM SPEC-SCNC: 9 MG/DL (ref 8.6–10.5)
CHLORIDE SERPL-SCNC: 108 MMOL/L (ref 98–107)
CLARITY UR: CLEAR
CO2 SERPL-SCNC: 19 MMOL/L (ref 22–29)
COLOR UR: YELLOW
CREAT SERPL-MCNC: 2.98 MG/DL (ref 0.57–1)
DEPRECATED RDW RBC AUTO: 57 FL (ref 37–54)
EGFRCR SERPLBLD CKD-EPI 2021: 15.3 ML/MIN/1.73
EOSINOPHIL # BLD AUTO: 0.11 10*3/MM3 (ref 0–0.4)
EOSINOPHIL NFR BLD AUTO: 1.5 % (ref 0.3–6.2)
ERYTHROCYTE [DISTWIDTH] IN BLOOD BY AUTOMATED COUNT: 16.5 % (ref 12.3–15.4)
GLOBULIN UR ELPH-MCNC: 3.6 GM/DL
GLUCOSE SERPL-MCNC: 92 MG/DL (ref 65–99)
GLUCOSE UR STRIP-MCNC: NEGATIVE MG/DL
HCT VFR BLD AUTO: 38.1 % (ref 34–46.6)
HGB BLD-MCNC: 11.8 G/DL (ref 12–15.9)
HGB UR QL STRIP.AUTO: ABNORMAL
HYALINE CASTS UR QL AUTO: ABNORMAL /LPF
IMM GRANULOCYTES # BLD AUTO: 0.02 10*3/MM3 (ref 0–0.05)
IMM GRANULOCYTES NFR BLD AUTO: 0.3 % (ref 0–0.5)
KETONES UR QL STRIP: NEGATIVE
LEUKOCYTE ESTERASE UR QL STRIP.AUTO: ABNORMAL
LYMPHOCYTES # BLD AUTO: 2.3 10*3/MM3 (ref 0.7–3.1)
LYMPHOCYTES NFR BLD AUTO: 32.2 % (ref 19.6–45.3)
MCH RBC QN AUTO: 29.3 PG (ref 26.6–33)
MCHC RBC AUTO-ENTMCNC: 31 G/DL (ref 31.5–35.7)
MCV RBC AUTO: 94.5 FL (ref 79–97)
MONOCYTES # BLD AUTO: 0.75 10*3/MM3 (ref 0.1–0.9)
MONOCYTES NFR BLD AUTO: 10.5 % (ref 5–12)
NEUTROPHILS NFR BLD AUTO: 3.89 10*3/MM3 (ref 1.7–7)
NEUTROPHILS NFR BLD AUTO: 54.5 % (ref 42.7–76)
NITRITE UR QL STRIP: NEGATIVE
NRBC BLD AUTO-RTO: 0 /100 WBC (ref 0–0.2)
PH UR STRIP.AUTO: 6 [PH] (ref 5–8)
PLATELET # BLD AUTO: 208 10*3/MM3 (ref 140–450)
PMV BLD AUTO: 10.3 FL (ref 6–12)
POTASSIUM SERPL-SCNC: 4 MMOL/L (ref 3.5–5.2)
PROT SERPL-MCNC: 7.2 G/DL (ref 6–8.5)
PROT UR QL STRIP: ABNORMAL
RBC # BLD AUTO: 4.03 10*6/MM3 (ref 3.77–5.28)
RBC # UR STRIP: ABNORMAL /HPF
REF LAB TEST METHOD: ABNORMAL
SODIUM SERPL-SCNC: 142 MMOL/L (ref 136–145)
SP GR UR STRIP: 1.01 (ref 1–1.03)
SQUAMOUS #/AREA URNS HPF: ABNORMAL /HPF
UROBILINOGEN UR QL STRIP: ABNORMAL
WBC # UR STRIP: ABNORMAL /HPF
WBC NRBC COR # BLD AUTO: 7.14 10*3/MM3 (ref 3.4–10.8)

## 2025-02-28 PROCEDURE — 70450 CT HEAD/BRAIN W/O DYE: CPT

## 2025-02-28 PROCEDURE — P9612 CATHETERIZE FOR URINE SPEC: HCPCS

## 2025-02-28 PROCEDURE — 99284 EMERGENCY DEPT VISIT MOD MDM: CPT

## 2025-02-28 PROCEDURE — 25810000003 SODIUM CHLORIDE 0.9 % SOLUTION: Performed by: EMERGENCY MEDICINE

## 2025-02-28 PROCEDURE — 81001 URINALYSIS AUTO W/SCOPE: CPT | Performed by: EMERGENCY MEDICINE

## 2025-02-28 PROCEDURE — 80053 COMPREHEN METABOLIC PANEL: CPT | Performed by: EMERGENCY MEDICINE

## 2025-02-28 PROCEDURE — 85025 COMPLETE CBC W/AUTO DIFF WBC: CPT | Performed by: EMERGENCY MEDICINE

## 2025-02-28 RX ORDER — GRANULES FOR ORAL 3 G/1
3 POWDER ORAL ONCE
Status: COMPLETED | OUTPATIENT
Start: 2025-02-28 | End: 2025-02-28

## 2025-02-28 RX ADMIN — SODIUM CHLORIDE 500 ML: 9 INJECTION, SOLUTION INTRAVENOUS at 22:51

## 2025-02-28 RX ADMIN — GRANULES FOR ORAL SOLUTION 3 G: 3 POWDER ORAL at 22:52

## 2025-03-01 VITALS
SYSTOLIC BLOOD PRESSURE: 208 MMHG | HEIGHT: 60 IN | RESPIRATION RATE: 18 BRPM | TEMPERATURE: 97.9 F | OXYGEN SATURATION: 95 % | DIASTOLIC BLOOD PRESSURE: 82 MMHG | WEIGHT: 153 LBS | HEART RATE: 71 BPM | BODY MASS INDEX: 30.04 KG/M2

## 2025-03-01 NOTE — ED PROVIDER NOTES
Subjective   History of Present Illness  79-year-old female presents to the ED via EMS for a fall this morning 2/28/25.  Patient's younger sister with her reports she fell this morning getting back into bed.  Patient reports when she fell and she hit her left temporal/head on the nightstand.  Patient reports generalized pain specifically in the back hips and right knee.  Patient is alert and oriented to person and time.  Patient reports struggles with orientation to place.  When asked where she was patient reported she was at [facility].  Patient's sister reports this is not a huge change from baseline but has noticed cognitive decline over the last couple months.  Patient's younger sister lives with her at her own house.  Patient denies new headache, vision change.  Patient reports she does take aspirin and Plavix, denies anticoagulation.  Patient is tender to palpation generally over the bilateral hips and low back.  Patient is in bed and appears fairly comfortable, no acute distress.  Patient struggles accurately describing where her pain is specifically.    History provided by:  Patient and relative      Review of Systems    Past Medical History:   Diagnosis Date    Anemia     Arthritis     Back problem     CAD (coronary artery disease)     Cancer     Right breast    Chronic back pain     Chronically on opiate therapy     Depression     Diabetes mellitus     DX 14 years ago- checks fsbs weekly    Fibromyalgia     Gastroparesis     GERD (gastroesophageal reflux disease)     Headache     emotional/tension    History of transfusion     [facility]    HTN (hypertension)     Hypercholesteremia     IBS (irritable bowel syndrome)     Incontinence of urine     urgency    Migraine headache     Myalgia and myositis     Peripheral neuropathy     Sleep apnea     does not wear cpap    UTI (urinary tract infection)        Allergies   Allergen Reactions    Ceclor [Cefaclor] Rash       Past Surgical  History:   Procedure Laterality Date    APPENDECTOMY      ARTERIOGRAM MESENTERIC N/A 6/16/2022    Procedure: DIAGNOSTIC ARTERIOGRAM WITH CELIAC STENT PLACEMENT;  Surgeon: Neo Neil MD;  Location: [facility];  Service: Vascular;  Laterality: N/A;  FLUORO: 16 MIN  DOSE: 2384 MGY  CONTRAST:  20 ML    BACK SURGERY      5x per patient    BRAIN TUMOR EXCISION  1988    BREAST BIOPSY      CARPAL TUNNEL RELEASE Bilateral     CHOLECYSTECTOMY      COLONOSCOPY      2015    CRANIOTOMY FOR TUMOR      EYE SURGERY      bilateral cataracts removed    HEMORRHOIDECTOMY      LUMBAR FUSION N/A 01/04/2017    Procedure: LUMBAR LAMINECTOMY AND DECOMRESSION  L3 AND L4;  Surgeon: Nishant Bhatti MD;  Location: [facility] OR;  Service:     TOTAL ABDOMINAL HYSTERECTOMY      TRIGGER FINGER RELEASE         Family History   Problem Relation Age of Onset    Cancer Other     Diabetes Other     Hyperlipidemia Other     Heart attack Other     Hypertension Other     Heart attack Mother     Diabetes Mother     Heart disease Mother     Hypertension Mother     Cancer Father     Alcohol abuse Father     Heart attack Sister     Diabetes Sister     Heart disease Sister     Hypertension Sister     Cancer Brother     Diabetes Brother     Hypertension Brother     Heart attack Sister     Stroke Sister     Cancer Brother        Social History     Socioeconomic History    Marital status:     Number of children: 2   Tobacco Use    Smoking status: Every Day     Current packs/day: 0.25     Average packs/day: 0.4 packs/day for 128.2 years (47.5 ttl pk-yrs)     Types: Cigarettes     Start date: 1959     Passive exposure: Past    Smokeless tobacco: Never    Tobacco comments:     1/17/2022 quit    Vaping Use    Vaping status: Never Used   Substance and Sexual Activity    Alcohol use: No    Drug use: No    Sexual activity: Not Currently           Objective   Physical Exam  Vitals and nursing note reviewed.   Constitutional:       General: She is not in  acute distress.     Appearance: Normal appearance. She is not toxic-appearing.   Cardiovascular:      Rate and Rhythm: Normal rate and regular rhythm.      Pulses: Normal pulses.   Pulmonary:      Effort: Pulmonary effort is normal. No respiratory distress.      Breath sounds: Normal breath sounds.   Skin:     General: Skin is warm and dry.   Neurological:      General: No focal deficit present.      Mental Status: She is alert. Mental status is at baseline.   Psychiatric:         Behavior: Behavior normal.         Procedures           ED Course  ED Course as of 03/01/25 0021   Fri Feb 28, 2025   2240 Creatinine(!): 2.98  Stable chronic kidney disease when compared to multiple prior values. [RS]   2240 Urinalysis, Microscopic Only - Straight Cath(!)  Urinalysis reviewed with findings concerning for urinary tract infection. [RS]   2240 CT Head Without Contrast  Personally reviewed the CT images of the head.  On my interpretation there is no hemorrhage or mass effect visualized. [RS]   2242 Patient with another fall.  Does appear she may have a urinary tract infection.  I did attempt to review urine cultures but none were of acute significance.  Patient does have significant chronic kidney disease which has been progressing for the last several years.  CT scan of the head is unremarkable.  Will plan to discharge the patient home to follow-up with her doctor, nephrology, with return to the ER for any concerns or worsening. I have reviewed results, considerations, and diagnosis with the patient and/or their representative. Anticipatory guidance provided. Follow-up plan reviewed. Precautions for acute return for re-evaluations also reviewed. This including potential for worsening of the presenting condition and need for further evaluation, admission, and/or intervention as indicated. Opportunity to as questions provided. I advised them to return for any concerns and stressed the importance of timely follow-up and  outpatient services. They verbalized understanding.   [RS]   2245 Note to patient: The 21st Century Cures Act makes medical notes like these available to patients in the interest of transparency. However, be advised this is a medical document. It is intended as peer to peer communication. It is written in medical language and may contain abbreviations or verbiage that are unfamiliar. It may appear blunt or direct. Medical documents are intended to carry relevant information, facts as evident, and the clinical opinion of the physician/NPP.   [RS]      ED Course User Index  [RS] Jhony Tavera MD                                                       Medical Decision Making  Problems Addressed:  CKD (chronic kidney disease) stage 4, GFR 15-29 ml/min: complicated acute illness or injury  Elevated blood pressure reading with diagnosis of hypertension: complicated acute illness or injury  Fall, initial encounter: complicated acute illness or injury  Urinary tract infection in elderly patient: complicated acute illness or injury    Amount and/or Complexity of Data Reviewed  Independent Historian: caregiver  External Data Reviewed: labs.  Labs: ordered. Decision-making details documented in ED Course.  Radiology: ordered. Decision-making details documented in ED Course.    Risk  Prescription drug management.        Final diagnoses:   Fall, initial encounter   CKD (chronic kidney disease) stage 4, GFR 15-29 ml/min   Elevated blood pressure reading with diagnosis of hypertension   Urinary tract infection in elderly patient       ED Disposition  ED Disposition       ED Disposition   Discharge    Condition   Stable    Comment   --               Aiden Spencer MD  [facility] [address] [place] [address] [phone]    Schedule an appointment as soon as possible for a visit   As soon as possible.    [facility] EMERGENCY DEPARTMENT  [address] [facility]  [id number] [phone]    As needed, If symptoms worsen or ANY concerns.    Soledad Donohue MD  [address] [facility] [address]  [place] [id number] [phone]    Schedule an appointment as soon as possible for a visit            Medication List      No changes were made to your prescriptions during this visit.            Jhony Tavera MD  03/01/25 0021

## 2025-03-05 ENCOUNTER — TELEPHONE (OUTPATIENT)
Dept: FAMILY MEDICINE CLINIC | Facility: CLINIC | Age: 80
End: 2025-03-05
Payer: MEDICARE

## 2025-03-05 NOTE — TELEPHONE ENCOUNTER
SHI ROMERO, WITH [facility], IS AT THE PATIENT HOME, REPORTS PATIENT IS HAVING BLEEDING WITH URINATION SINCE LAST NIGHT, WAS SEEN IN ED AT [facility] ON 02/28/2025 WAS DIAGNOSED AND TREATED FOR UTI GIVEN FOSFOMYCIN X 1 DOSE IN ED, URINE RESULTS IN PATIENT CHART FRO Denominational, NO FEVER,IS FATIGUED TEMP 97.7, NO INCREASED CONFUSION.....   PLEASE CALL [phone]--- SHI ROMERO/ [facility]

## 2025-03-06 ENCOUNTER — TELEPHONE (OUTPATIENT)
Dept: FAMILY MEDICINE CLINIC | Facility: CLINIC | Age: 80
End: 2025-03-06
Payer: MEDICARE

## 2025-03-06 NOTE — TELEPHONE ENCOUNTER
SPOKE WITH SHI WITH [facility], SHE WILL HAVE PATIENT'S SISTER COME TO THIS OFFICE TO GET SPECIMEN CUP AND OBTAIN URINE AND RETURN TO THIS LAB.[phone]

## 2025-03-07 ENCOUNTER — LAB (OUTPATIENT)
Dept: FAMILY MEDICINE CLINIC | Facility: CLINIC | Age: 80
End: 2025-03-07
Payer: MEDICARE

## 2025-03-07 DIAGNOSIS — R30.0 DYSURIA: Primary | ICD-10-CM

## 2025-03-07 PROCEDURE — 87086 URINE CULTURE/COLONY COUNT: CPT | Performed by: NURSE PRACTITIONER

## 2025-03-08 ENCOUNTER — RESULTS FOLLOW-UP (OUTPATIENT)
Dept: FAMILY MEDICINE CLINIC | Facility: CLINIC | Age: 80
End: 2025-03-08
Payer: MEDICARE

## 2025-03-08 LAB — BACTERIA SPEC AEROBE CULT: NO GROWTH

## 2025-03-09 RX ORDER — CARVEDILOL 25 MG/1
25 TABLET ORAL 2 TIMES DAILY
Qty: 180 TABLET | Refills: 3 | Status: SHIPPED | OUTPATIENT
Start: 2025-03-09

## 2025-03-10 DIAGNOSIS — M51.369 DEGENERATIVE DISC DISEASE, LUMBAR: ICD-10-CM

## 2025-03-10 RX ORDER — HYDROCODONE BITARTRATE AND ACETAMINOPHEN 7.5; 325 MG/1; MG/1
1 TABLET ORAL EVERY 12 HOURS PRN
Qty: 20 TABLET | Refills: 0 | Status: SHIPPED | OUTPATIENT
Start: 2025-03-10

## 2025-03-11 NOTE — TELEPHONE ENCOUNTER
Name: Delbert Mcdermott    Relationship: Emergency Contact, SISTER    Best Callback Number: DIALYSIS       HUB PROVIDED THE RELAY MESSAGE FROM THE OFFICE   PATIENT VOICED UNDERSTANDING AND HAS NO FURTHER QUESTIONS AT THIS TIME

## 2025-03-11 NOTE — PROGRESS NOTES
"Contacted the patient to discuss results. No answer; voicemail left asking the patient to return the call to discuss results.     Relay     \"Please notify the patient/[facility] that her repeat urine culture is normal. \"      "

## 2025-03-13 ENCOUNTER — OFFICE VISIT (OUTPATIENT)
Dept: FAMILY MEDICINE CLINIC | Facility: CLINIC | Age: 80
End: 2025-03-13
Payer: MEDICARE

## 2025-03-13 ENCOUNTER — LAB (OUTPATIENT)
Dept: FAMILY MEDICINE CLINIC | Facility: CLINIC | Age: 80
End: 2025-03-13
Payer: MEDICARE

## 2025-03-13 VITALS
RESPIRATION RATE: 16 BRPM | OXYGEN SATURATION: 100 % | SYSTOLIC BLOOD PRESSURE: 120 MMHG | HEIGHT: 60 IN | DIASTOLIC BLOOD PRESSURE: 68 MMHG | BODY MASS INDEX: 29.37 KG/M2 | TEMPERATURE: 98.2 F | WEIGHT: 149.6 LBS | HEART RATE: 72 BPM

## 2025-03-13 DIAGNOSIS — E11.65 TYPE 2 DIABETES MELLITUS WITH HYPERGLYCEMIA, WITH LONG-TERM CURRENT USE OF INSULIN: ICD-10-CM

## 2025-03-13 DIAGNOSIS — Z28.21 IMMUNIZATION DECLINED: ICD-10-CM

## 2025-03-13 DIAGNOSIS — F01.B18 MODERATE VASCULAR DEMENTIA WITH OTHER BEHAVIORAL DISTURBANCE: ICD-10-CM

## 2025-03-13 DIAGNOSIS — I10 PRIMARY HYPERTENSION: ICD-10-CM

## 2025-03-13 DIAGNOSIS — Z51.81 ENCOUNTER FOR THERAPEUTIC DRUG LEVEL MONITORING: ICD-10-CM

## 2025-03-13 DIAGNOSIS — Z91.81 AT HIGH RISK FOR FALLS: ICD-10-CM

## 2025-03-13 DIAGNOSIS — N18.4 CKD (CHRONIC KIDNEY DISEASE) STAGE 4, GFR 15-29 ML/MIN: ICD-10-CM

## 2025-03-13 DIAGNOSIS — F51.01 PRIMARY INSOMNIA: ICD-10-CM

## 2025-03-13 DIAGNOSIS — J41.1 MUCOPURULENT CHRONIC BRONCHITIS: ICD-10-CM

## 2025-03-13 DIAGNOSIS — F17.211 CIGARETTE NICOTINE DEPENDENCE IN REMISSION: ICD-10-CM

## 2025-03-13 DIAGNOSIS — E78.2 MIXED HYPERLIPIDEMIA: ICD-10-CM

## 2025-03-13 DIAGNOSIS — Z00.00 WELL ADULT EXAM: Primary | ICD-10-CM

## 2025-03-13 DIAGNOSIS — Z79.4 TYPE 2 DIABETES MELLITUS WITH HYPERGLYCEMIA, WITH LONG-TERM CURRENT USE OF INSULIN: ICD-10-CM

## 2025-03-13 DIAGNOSIS — K21.9 GASTROESOPHAGEAL REFLUX DISEASE WITHOUT ESOPHAGITIS: ICD-10-CM

## 2025-03-13 DIAGNOSIS — Z00.00 WELL ADULT EXAM: ICD-10-CM

## 2025-03-13 LAB
AMPHET+METHAMPHET UR QL: NEGATIVE
AMPHETAMINES UR QL: NEGATIVE
BARBITURATES UR QL SCN: NEGATIVE
BENZODIAZ UR QL SCN: POSITIVE
BILIRUB UR QL STRIP: NEGATIVE
BUPRENORPHINE SERPL-MCNC: NEGATIVE NG/ML
CANNABINOIDS SERPL QL: NEGATIVE
CLARITY UR: CLEAR
COCAINE UR QL: NEGATIVE
COLOR UR: YELLOW
DEPRECATED RDW RBC AUTO: 48.1 FL (ref 37–54)
ERYTHROCYTE [DISTWIDTH] IN BLOOD BY AUTOMATED COUNT: 14.3 % (ref 12.3–15.4)
FENTANYL UR-MCNC: NEGATIVE NG/ML
GLUCOSE UR STRIP-MCNC: NEGATIVE MG/DL
HCT VFR BLD AUTO: 37.6 % (ref 34–46.6)
HGB BLD-MCNC: 12 G/DL (ref 12–15.9)
HGB UR QL STRIP.AUTO: ABNORMAL
KETONES UR QL STRIP: NEGATIVE
LEUKOCYTE ESTERASE UR QL STRIP.AUTO: NEGATIVE
MCH RBC QN AUTO: 29.6 PG (ref 26.6–33)
MCHC RBC AUTO-ENTMCNC: 31.9 G/DL (ref 31.5–35.7)
MCV RBC AUTO: 92.6 FL (ref 79–97)
METHADONE UR QL SCN: NEGATIVE
NITRITE UR QL STRIP: NEGATIVE
OPIATES UR QL: POSITIVE
OXYCODONE UR QL SCN: NEGATIVE
PCP UR QL SCN: NEGATIVE
PH UR STRIP.AUTO: 6.5 [PH] (ref 5–8)
PLATELET # BLD AUTO: 185 10*3/MM3 (ref 140–450)
PMV BLD AUTO: 10.5 FL (ref 6–12)
PROT UR QL STRIP: ABNORMAL
RBC # BLD AUTO: 4.06 10*6/MM3 (ref 3.77–5.28)
SP GR UR STRIP: 1.01 (ref 1–1.03)
TRICYCLICS UR QL SCN: NEGATIVE
UROBILINOGEN UR QL STRIP: ABNORMAL
VIT B12 BLD-MCNC: 729 PG/ML (ref 211–946)
WBC NRBC COR # BLD AUTO: 5.3 10*3/MM3 (ref 3.4–10.8)

## 2025-03-13 PROCEDURE — 80061 LIPID PANEL: CPT | Performed by: FAMILY MEDICINE

## 2025-03-13 PROCEDURE — 84443 ASSAY THYROID STIM HORMONE: CPT | Performed by: FAMILY MEDICINE

## 2025-03-13 PROCEDURE — 85027 COMPLETE CBC AUTOMATED: CPT | Performed by: FAMILY MEDICINE

## 2025-03-13 PROCEDURE — 83036 HEMOGLOBIN GLYCOSYLATED A1C: CPT | Performed by: FAMILY MEDICINE

## 2025-03-13 PROCEDURE — 80053 COMPREHEN METABOLIC PANEL: CPT | Performed by: FAMILY MEDICINE

## 2025-03-13 PROCEDURE — 82043 UR ALBUMIN QUANTITATIVE: CPT | Performed by: FAMILY MEDICINE

## 2025-03-13 PROCEDURE — 81003 URINALYSIS AUTO W/O SCOPE: CPT | Performed by: FAMILY MEDICINE

## 2025-03-13 PROCEDURE — 82570 ASSAY OF URINE CREATININE: CPT | Performed by: FAMILY MEDICINE

## 2025-03-13 PROCEDURE — 80307 DRUG TEST PRSMV CHEM ANLYZR: CPT | Performed by: FAMILY MEDICINE

## 2025-03-13 PROCEDURE — 82607 VITAMIN B-12: CPT | Performed by: FAMILY MEDICINE

## 2025-03-13 RX ORDER — TEMAZEPAM 7.5 MG/1
CAPSULE ORAL
COMMUNITY
Start: 2025-02-19 | End: 2025-03-13

## 2025-03-13 NOTE — ASSESSMENT & PLAN NOTE
Patient is doing well at present time with respect to the reflux symptomatology.  They are tolerating their medications without any complaints at present time.  We will continue to monitor their symptoms and if they develop dysphagia, alarm symptoms or worsening symptomatology further evaluation and treatment may be necessary as well as possible referral for an EGD.

## 2025-03-13 NOTE — PROGRESS NOTES
Subjective   The ABCs of the Annual Wellness Visit  Medicare Wellness Visit      Arminda Krishnan is a 80 y.o. patient who presents for a Medicare Wellness Visit.    The following portions of the patient's history were reviewed and   updated as appropriate: allergies, current medications, past family history, past medical history, past social history, past surgical history, and problem list.    Compared to one year ago, the patient's physical   health is worse.  Compared to one year ago, the patient's mental   health is worse.    Recent Hospitalizations:  This patient has had a [facility] admission record on file within the last 365 days.  Current Medical Providers:  Patient Care Team:  Aiden Spencer MD as PCP - General (Family Medicine)  Sujatha Carmona MD as Consulting Physician (Pain Medicine)  Cr De Jesus MD as Consulting Physician (Gastroenterology)  Noemí Machuca APRN as Nurse Practitioner (Nurse Practitioner)    Outpatient Medications Prior to Visit   Medication Sig Dispense Refill    temazepam (RESTORIL) 7.5 MG capsule TAKE ONE CAPSULE BY MOUTH AT NIGHT FOR SLEEP      acetaminophen (TYLENOL) 325 MG tablet Take 2 tablets by mouth Every 4 (Four) Hours As Needed for Mild Pain.      albuterol (PROVENTIL) (2.5 MG/3ML) 0.083% nebulizer solution Take 2.5 mg by nebulization Every 4 (Four) Hours As Needed for Wheezing or Shortness of Air. 360 mL 11    aspirin 81 MG chewable tablet Chew 1 tablet Daily.      atorvastatin (LIPITOR) 80 MG tablet Take 1 tablet by mouth Daily. 90 tablet 3    Blood Glucose Monitoring Suppl (ONE TOUCH ULTRA 2) w/Device kit USE 1 EACH DAILY.      Blood Glucose Monitoring Suppl kit Use 1 each Daily. 1 each 11    carvedilol (COREG) 25 MG tablet TAKE ONE TABLET BY MOUTH TWO TIMES A  tablet 3    clopidogrel (PLAVIX) 75 MG tablet TAKE ONE TABLET BY MOUTH EVERY DAY 30 tablet 11    Comfort EZ Pen Needles 32G X 4 MM misc Inject 1 each under the skin  into the appropriate area as directed 4 (Four) Times a Day. as directed 400 each 3    Diclofenac Sodium (VOLTAREN) 1 % gel gel APPLY TO AFFECTED AREA FOUR TIMES DAILY 100 g 12    donepezil (ARICEPT) 10 MG tablet Take 1 tablet by mouth Every Night. For 2 weeks, then increase to 2 tablets by mouth every night. 90 tablet 3    DULoxetine (CYMBALTA) 60 MG capsule TAKE ONE CAPSULE BY MOUTH EVERY DAY 90 capsule 2    ferrous sulfate 325 (65 FE) MG tablet Take 1 tablet by mouth Daily With Breakfast.      fluticasone (FLONASE) 50 MCG/ACT nasal spray USE 2 SPRAYS IN EACH NOSTRIL EVERY DAY 48 g 12    glipizide (GLUCOTROL) 5 MG tablet Take 1 tablet by mouth Every Morning Before Breakfast.      glucagon (GLUCAGEN) 1 MG injection Inject 1 mg under the skin into the appropriate area as directed 1 (One) Time As Needed (hypoglycemia) for up to 1 dose. 1 each 0    Glucose Blood (Blood Glucose Test) strip 1 each by In Vitro route 3 (Three) Times a Day. 100 each 11    HYDROcodone-acetaminophen (NORCO) 7.5-325 MG per tablet TAKE ONE TABLET BY MOUTH EVERY 12 HOURS AS NEEDED FOR MODERATE PAIN 20 tablet 0    isosorbide mononitrate (IMDUR) 60 MG 24 hr tablet Take 1 tablet by mouth Daily. 90 tablet 3    Lancets (OneTouch Delica Plus [id number]) misc 3 (Three) Times a Day. as directed      levETIRAcetam (KEPPRA) 500 MG tablet       lidocaine (LIDODERM) 5 % Place 1 patch on the skin as directed by provider Daily. Remove & Discard patch within 12 hours or as directed by MD 30 each 11    linaclotide (LINZESS) 145 MCG capsule capsule Take 1 capsule by mouth Every Morning Before Breakfast. 30 capsule 11    loratadine (CLARITIN) 10 MG tablet       losartan (COZAAR) 50 MG tablet Take 1 tablet by mouth Daily.      multivitamin with minerals tablet tablet Take 1 tablet by mouth Daily.      naloxone (NARCAN) 4 MG/0.1ML nasal spray Administer 1 spray into the nostril(s) as directed by provider As Needed.      pantoprazole (PROTONIX) 40 MG EC tablet TAKE  ONE TABLET BY MOUTH TWO TIMES A  tablet 3    simethicone (MYLICON) 80 MG chewable tablet Chew 1 tablet 4 (Four) Times a Day As Needed for Flatulence.      SITagliptin (Januvia) 50 MG tablet TAKE ONE TABLET BY MOUTH EVERY DAY 90 tablet 3    tacrolimus (PROTOPIC) 0.1 % ointment APPLY TO AFFECTED AREA TWO TIMES A DAY      True Metrix Blood Glucose Test test strip Daily. for testing      Ventolin  (90 Base) MCG/ACT inhaler INHALE 2 PUFFS EVERY 6 (SIX) HOURS AS NEEDED FOR WHEEZING. 18 g 11     No facility-administered medications prior to visit.     Opioid medication/s are on active medication list.  and I have evaluated her active treatment plan and pain score trends (see table).  There were no vitals filed for this visit.  I have reviewed the chart for potential of high risk medication and harmful drug interactions in the elderly.        Aspirin is on active medication list. Aspirin use is indicated based on review of current medical condition/s. Pros and cons of this therapy have been discussed today. Benefits of this medication outweigh potential harm.  Patient has been encouraged to continue taking this medication.  .      Patient Active Problem List   Diagnosis    Cerebral vascular disease    Degenerative disc disease, lumbar    Degenerative disc disease, cervical    Spinal stenosis, lumbar region, with neurogenic claudication    Tobacco abuse    Chronic anemia    CKD (chronic kidney disease) stage 4, GFR 15-29 ml/min    Hyperglycemia    History of CVA (cerebrovascular accident)    Type 2 diabetes mellitus with hyperglycemia, without long-term current use of insulin    Disorientation    Dehydration    LLL pneumonia    HTN (hypertension)    Pneumonia    ABLA (acute blood loss anemia)    Acute kidney injury superimposed on CKD    Dementia    Encephalomalacia on imaging study    Fall    GERD (gastroesophageal reflux disease)    HLD (hyperlipidemia)    SAH (subarachnoid hemorrhage)    Subdural hematoma  "    Advance Care Planning Advance Directive is not on file.  ACP discussion was held with the patient during this visit. Patient does not have an advance directive, information provided.            Objective   Vitals:    25 1351   BP: 120/68   BP Location: Left arm   Patient Position: Sitting   Cuff Size: Large Adult   Pulse: 72   Resp: 16   Temp: 98.2 °F (36.8 °C)   TempSrc: Temporal   SpO2: 100%   Weight: 67.9 kg (149 lb 9.6 oz)   Height: 152.4 cm (60\")       Estimated body mass index is 29.22 kg/m² as calculated from the following:    Height as of this encounter: 152.4 cm (60\").    Weight as of this encounter: 67.9 kg (149 lb 9.6 oz).                Does the patient have evidence of cognitive impairment? Yes  Lab Results   Component Value Date    HGBA1C 7.4 (H) 2024                                                                                                Health  Risk Assessment    Smoking Status:  Social History     Tobacco Use   Smoking Status Every Day    Current packs/day: 0.25    Average packs/day: 0.4 packs/day for 128.2 years (47.5 ttl pk-yrs)    Types: Cigarettes    Start date:     Passive exposure: Past   Smokeless Tobacco Never   Tobacco Comments    2022 quit      Alcohol Consumption:  Social History     Substance and Sexual Activity   Alcohol Use No       Fall Risk Screen  STEADI Fall Risk Assessment was completed, and patient is at HIGH risk for falls. Assessment completed on:3/13/2025    Depression Screening   Little interest or pleasure in doing things? Not at all   Feeling down, depressed, or hopeless? Not at all   PHQ-2 Total Score 0      Health Habits and Functional and Cognitive Screenin/20/2024     3:00 PM   Functional & Cognitive Status   Do you have difficulty preparing food and eating? No    Do you have difficulty bathing yourself, getting dressed or grooming yourself? No    Do you have difficulty using the toilet? No    Do you have difficulty moving around " from place to place? No    Do you have trouble with steps or getting out of a bed or a chair? No   Current Diet Well Balanced Diet   Dental Exam Up to date   Eye Exam Up to date   Exercise (times per week) 7 times per week   Current Exercises Include Walking   Do you need help using the phone?  No   Are you deaf or do you have serious difficulty hearing?  No   Do you need help to go to places out of walking distance? Yes   Do you need help shopping? Yes   Do you need help preparing meals?  Yes   Do you need help with housework?  Yes   Do you need help with laundry? No   Do you need help taking your medications? Yes   Do you need help managing money? No   Do you ever drive or ride in a car without wearing a seat belt? No   Have you felt unusual stress, anger or loneliness in the last month? No   Who do you live with? Sibling   If you need help, do you have trouble finding someone available to you? No   Have you been bothered in the last four weeks by sexual problems? No   Do you have difficulty concentrating, remembering or making decisions? No       Data saved with a previous flowsheet row definition           Age-appropriate Screening Schedule:  Refer to the list below for future screening recommendations based on patient's age, sex and/or medical conditions. Orders for these recommended tests are listed in the plan section. The patient has been provided with a written plan.    Health Maintenance List  Health Maintenance   Topic Date Due    URINE MICROALBUMIN-CREATININE RATIO (uACR)  11/01/2023    INFLUENZA VACCINE  03/31/2025 (Originally 7/1/2024)    RSV Vaccine - Adults (1 - 1-dose 75+ series) 06/19/2025 (Originally 3/6/2018)    COVID-19 Vaccine (4 - 2024-25 season) 10/23/2025 (Originally 9/1/2024)    TDAP/TD VACCINES (1 - Tdap) 10/24/2025 (Originally 3/6/1964)    ZOSTER VACCINE (1 of 2) 02/13/2026 (Originally 3/6/1993)    BMI FOLLOWUP  05/08/2025    LIPID PANEL  05/21/2025    HEMOGLOBIN A1C  06/26/2025     DIABETIC EYE EXAM  03/01/2026    ANNUAL WELLNESS VISIT  03/13/2026    DXA SCAN  01/16/2028    Pneumococcal Vaccine 50+  Completed    MAMMOGRAM  Discontinued    LUNG CANCER SCREENING  Discontinued    COLORECTAL CANCER SCREENING  Discontinued                                                                                                                                                CMS Preventative Services Quick Reference  Risk Factors Identified During Encounter  Fall Risk-High or Moderate: Discussed Fall Prevention in the home  Immunizations Discussed/Encouraged: Tdap, Shingrix, COVID19, and RSV (Respiratory Syncytial Virus)  Tobacco Use/Dependance Risk (use dotphrase .tobaccocessation for documentation)    The above risks/problems have been discussed with the patient.  Pertinent information has been shared with the patient in the After Visit Summary.  An After Visit Summary and PPPS were made available to the patient.    Follow Up:   Next Medicare Wellness visit to be scheduled in 1 year.         Additional E&M Note during same encounter follows:  Patient has additional, significant, and separately identifiable condition(s)/problem(s) that require work above and beyond the Medicare Wellness Visit     Chief Complaint  Follow-up (6 week) and Diabetes    Subjective   HPI  Arminda is also being seen today for additional medical problem/s.  History of Present Illness  The patient is an 80-year-old female who presents for evaluation of memory issues, leg pain, chest pain, and fall risk. She is accompanied by her sister.    She has been experiencing recurrent falls, with 3 incidents reported since her last visit. These falls have predominantly occurred in her bedroom, where she has slid off the bed without sustaining head injuries. She also reports a recent fall while exiting a vehicle, during which she hit her head on the car door. Her sister notes that she often falls when attempting to use the bathroom. She has  completed her home health therapy and occasionally engages in exercises.    She experiences intermittent chest pain, which is often associated with physical activity. The pain is localized and can be alleviated by rubbing the area. She also reports shoulder pain following a fall.    She has been experiencing severe leg pain, which she describes as being exacerbated by her sister's attempts to assist her. She has been taking pain medication more frequently due to increased pain, which she reports as being effective. She has been supplementing her pain medication with Tylenol, as advised during a previous hospital stay.    Her sister reports that she has been more alert since discontinuing her sleep medication, although she occasionally struggles with sleep. She has been adhering to her medication regimen and has not refilled any prescriptions without prior approval. She reports no issues with appetite or hydration but has noticed some weight loss. She continues to smoke, although less than a pack per day. She does not exhibit signs of anxiety or depression. Her sister reports that her memory appears to be deteriorating, with some days being clearer than others. She has been engaging in self-talk and expresses a desire for constant companionship.    SOCIAL HISTORY  The patient smokes less than a pack of cigarettes a day.    MEDICATIONS  Current: Tylenol    IMMUNIZATIONS  The patient is due for the shingles and tetanus vaccines.    Patient appears to be doing otherwise well.  They have continue with their medications without any side effects.  They have not had any changes in their usual activity, appetite and sleep.  Patient denies any other cardiovascular, respiratory, gastrointestinal, urologic or neurologic complaints.                Objective   Vital Signs:  /68 (BP Location: Left arm, Patient Position: Sitting, Cuff Size: Large Adult)   Pulse 72   Temp 98.2 °F (36.8 °C) (Temporal)   Resp 16   Ht 152.4  "cm (60\")   Wt 67.9 kg (149 lb 9.6 oz)   SpO2 100%   BMI 29.22 kg/m²   Physical Exam  Vitals and nursing note reviewed.   Constitutional:       Appearance: Normal appearance.   HENT:      Head: Normocephalic and atraumatic.      Nose: Nose normal.      Mouth/Throat:      Pharynx: Oropharynx is clear.   Eyes:      Extraocular Movements: Extraocular movements intact.      Pupils: Pupils are equal, round, and reactive to light.   Neck:      Thyroid: No thyroid mass or thyromegaly.      Trachea: Trachea normal.   Cardiovascular:      Rate and Rhythm: Normal rate and regular rhythm.      Pulses: Normal pulses. No decreased pulses.      Heart sounds: Normal heart sounds.   Pulmonary:      Effort: Pulmonary effort is normal.      Breath sounds: Normal breath sounds.   Abdominal:      General: Abdomen is flat. Bowel sounds are normal.      Palpations: Abdomen is soft.      Tenderness: There is no abdominal tenderness.   Musculoskeletal:      Cervical back: Neck supple.      Right lower leg: No edema.      Left lower leg: No edema.   Lymphadenopathy:      Cervical: No cervical adenopathy.   Skin:     General: Skin is warm and dry.   Neurological:      General: No focal deficit present.      Mental Status: She is alert and oriented to person, place, and time.      Sensory: Sensation is intact.      Motor: Motor function is intact.      Coordination: Coordination is intact.   Psychiatric:         Attention and Perception: Attention normal.         Mood and Affect: Mood normal.         Speech: Speech normal.         Behavior: Behavior normal.                    Assessment and Plan      Well adult exam   Patient did have a wellness exam performed today that did not reveal any abnormality.  Their  function/cognition/anxiety/depression/fall risk assessments were reviewed.  We did discuss safety issues as well as anticipatory guidance.  We will monitor laboratory data and if there are any concerns or worsening of symptoms prior " to the next scheduled follow-up they will contact us.  Orders:    CBC (No Diff); Future    Comprehensive Metabolic Panel; Future    Hemoglobin A1c; Future    Lipid Panel; Future    Microalbumin / Creatinine Urine Ratio - Urine, Clean Catch; Future    Urinalysis without microscopic (no culture) - Urine, Clean Catch; Future    Vitamin B12; Future    TSH Rfx On Abnormal To Free T4; Future    Urine Drug Screen - Urine, Clean Catch; Future    Type 2 diabetes mellitus with hyperglycemia, with long-term current use of insulin   Patient does appear to be doing relatively well with respect to their diabetes.  They are tolerating their medications without complaints.  We will continue to follow their hemoglobin A1c and will make adjustments to keep their level less than 7%.         Primary hypertension   Patient appears to be tolerating their blood pressure medicine without any side effects.  We will continue to monitor their blood pressure and will adjust to keep there is systolic blood pressure less than 130.  We will continue to monitor renal function and will make adjustments based on these findings.         Mixed hyperlipidemia    Patient does appear to be tolerating their lipid-lowering agent without difficulty.  We will obtain the lipid profile as well as liver function test to observe for any abnormalities.  We will continue to adjust medications to keep their LDL less than 70.         Gastroesophageal reflux disease without esophagitis   Patient is doing well at present time with respect to the reflux symptomatology.  They are tolerating their medications without any complaints at present time.  We will continue to monitor their symptoms and if they develop dysphagia, alarm symptoms or worsening symptomatology further evaluation and treatment may be necessary as well as possible referral for an EGD.       Primary insomnia   Patient does continue to have difficulty with sleeping.  Most likely her difficulties are  secondary to her sleeping majority of the time during the day.  We have held the temazepam.  We will continue to encourage use of melatonin and trying to sleep during the night and not during the day.  We have discussed sleep hygiene and they will try to enforce.       Mucopurulent chronic bronchitis   Patient does continue to smoke.  She does use her respiratory medications as prescribed.  She is doing relatively well with respect to her breathing.  We will continue with her current regimen and monitor and if she has any difficulty further assessment will be necessary.       At high risk for falls   Patient does continue to have high risk of falls.  She has completed physical therapy.  She does not wish to pursue with any further evaluation and treatment at present time.  Will continue with her current regiment and monitor.  If she does have worsening problems further assessment as well as referral to a physical therapist will be necessary.       Cigarette nicotine dependence in remission   Patient does continue to smoke.  We have encouraged discontinuation and we will provide medical assistance if necessary.                                               CKD (chronic kidney disease) stage 4, GFR 15-29 ml/min   Patient does continue push fluids.  She does continue to keep close follow-up with the nephrologist.         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  We have discussed that patient will need to avoid falling.  We have discussed advancing medications which they have declined at present time.  We will continue to monitor closely and if things worsen we will continue to monitor and address.         Immunization declined   Patient would benefit from having immunizations given.  Patient has elected not to receive the recommended vaccines at present time.  They  understand the risk of not receiving these vaccine and the disease in which they may incur.  We have discussed other options and will pursue with encouragement of compliance with future appointments.  If patient does change their minds prior to the next scheduled follow-up, they may contact us and we will provide immunization at that time.       Encounter for therapeutic drug level monitoring    Orders:    Urine Drug Screen - Urine, Clean Catch; Future            Follow Up   No follow-ups on file.  Patient was given instructions and counseling regarding her condition or for health maintenance advice. Please see specific information pulled into the AVS if appropriate.

## 2025-03-13 NOTE — ASSESSMENT & PLAN NOTE
Patient does appear to be tolerating their lipid-lowering agent without difficulty.  We will obtain the lipid profile as well as liver function test to observe for any abnormalities.  We will continue to adjust medications to keep their LDL less than 70.

## 2025-03-13 NOTE — ASSESSMENT & PLAN NOTE
Patient and her sister understands that the majority of her difficulties may be secondary to her multiple falls and head trauma.  We have discussed that patient will need to avoid falling.  We have discussed advancing medications which they have declined at present time.  We will continue to monitor closely and if things worsen we will continue to monitor and address.

## 2025-03-13 NOTE — ASSESSMENT & PLAN NOTE
Patient does continue push fluids.  She does continue to keep close follow-up with the nephrologist.

## 2025-03-14 LAB
ALBUMIN SERPL-MCNC: 4 G/DL (ref 3.5–5.2)
ALBUMIN UR-MCNC: 22.8 MG/DL
ALBUMIN/GLOB SERPL: 1.2 G/DL
ALP SERPL-CCNC: 89 U/L (ref 39–117)
ALT SERPL W P-5'-P-CCNC: 12 U/L (ref 1–33)
ANION GAP SERPL CALCULATED.3IONS-SCNC: 12 MMOL/L (ref 5–15)
AST SERPL-CCNC: 25 U/L (ref 1–32)
BILIRUB SERPL-MCNC: 0.2 MG/DL (ref 0–1.2)
BUN SERPL-MCNC: 32 MG/DL (ref 8–23)
BUN/CREAT SERPL: 9.5 (ref 7–25)
CALCIUM SPEC-SCNC: 9.7 MG/DL (ref 8.6–10.5)
CHLORIDE SERPL-SCNC: 102 MMOL/L (ref 98–107)
CHOLEST SERPL-MCNC: 167 MG/DL (ref 0–200)
CO2 SERPL-SCNC: 24 MMOL/L (ref 22–29)
CREAT SERPL-MCNC: 3.38 MG/DL (ref 0.57–1)
CREAT UR-MCNC: 45.7 MG/DL
EGFRCR SERPLBLD CKD-EPI 2021: 13.1 ML/MIN/1.73
GLOBULIN UR ELPH-MCNC: 3.3 GM/DL
GLUCOSE SERPL-MCNC: 135 MG/DL (ref 65–99)
HBA1C MFR BLD: 6.3 % (ref 4.8–5.6)
HDLC SERPL-MCNC: 42 MG/DL (ref 40–60)
LDLC SERPL CALC-MCNC: 105 MG/DL (ref 0–100)
LDLC/HDLC SERPL: 2.45 {RATIO}
MICROALBUMIN/CREAT UR: 498.9 MG/G (ref 0–29)
POTASSIUM SERPL-SCNC: 4.7 MMOL/L (ref 3.5–5.2)
PROT SERPL-MCNC: 7.3 G/DL (ref 6–8.5)
SODIUM SERPL-SCNC: 138 MMOL/L (ref 136–145)
TRIGL SERPL-MCNC: 111 MG/DL (ref 0–150)
TSH SERPL DL<=0.05 MIU/L-ACNC: 3.56 UIU/ML (ref 0.27–4.2)
VLDLC SERPL-MCNC: 20 MG/DL (ref 5–40)

## 2025-03-17 ENCOUNTER — TELEPHONE (OUTPATIENT)
Dept: FAMILY MEDICINE CLINIC | Facility: CLINIC | Age: 80
End: 2025-03-17
Payer: MEDICARE

## 2025-03-17 ENCOUNTER — LAB (OUTPATIENT)
Dept: FAMILY MEDICINE CLINIC | Facility: CLINIC | Age: 80
End: 2025-03-17
Payer: MEDICARE

## 2025-03-17 NOTE — TELEPHONE ENCOUNTER
PA approved per CoverMyMeds:    In response to you or your physician’s or other prescriber’s recent request, we have   approved coverage of HYDROCODONE-ACETAMIN 7.5-325.   This authorization is good until 12/31/2025.

## 2025-03-21 ENCOUNTER — TELEPHONE (OUTPATIENT)
Dept: FAMILY MEDICINE CLINIC | Facility: CLINIC | Age: 80
End: 2025-03-21
Payer: MEDICARE

## 2025-03-21 ENCOUNTER — PATIENT ROUNDING (BHMG ONLY) (OUTPATIENT)
Dept: FAMILY MEDICINE CLINIC | Facility: CLINIC | Age: 80
End: 2025-03-21
Payer: MEDICARE

## 2025-03-21 NOTE — PROGRESS NOTES
A [employer] message has been sent to the patient for PATIENT  ROUNDING with [facility]

## 2025-03-21 NOTE — TELEPHONE ENCOUNTER
[facility] DELIVERED WILL BE SENDING A FORM FOR PCP SIGNATURE FOR PATIENT TO RECEIVE DEPENDS.  [phone], PER RICA

## 2025-03-28 ENCOUNTER — TELEPHONE (OUTPATIENT)
Dept: FAMILY MEDICINE CLINIC | Facility: CLINIC | Age: 80
End: 2025-03-28
Payer: MEDICARE

## 2025-03-28 NOTE — TELEPHONE ENCOUNTER
Caller: iRca Mcdermott    Relationship: Emergency Contact    Best call back number: [phone]     What is the best time to reach you: ANY    Who are you requesting to speak with (clinical staff, provider,  specific staff member): NURSE    Do you know the name of the person who called: RICA    What was the call regarding: HAS PAPERWORK BEEN RECEIVED FROM [facility]?  SEE 3/21/25 TELEPHONE ENCOUNTER.      Is it okay if the provider responds through MyChart: PHONE CALL PLEASE

## 2025-03-31 ENCOUNTER — TELEPHONE (OUTPATIENT)
Dept: FAMILY MEDICINE CLINIC | Facility: CLINIC | Age: 80
End: 2025-03-31
Payer: MEDICARE

## 2025-03-31 RX ORDER — BROMPHENIRAMINE MALEATE, PSEUDOEPHEDRINE HYDROCHLORIDE, AND DEXTROMETHORPHAN HYDROBROMIDE 2; 30; 10 MG/5ML; MG/5ML; MG/5ML
SYRUP ORAL
Qty: 120 ML | Refills: 2 | OUTPATIENT
Start: 2025-03-31

## 2025-03-31 NOTE — TELEPHONE ENCOUNTER
SPOKE WITH RICA WHO VERIFIED PATIENT DID WANT SUPPLIES THRU [facility] DELIVERED/ WILL HAVE LAINEY CELIS SIGN AND WILL FAX

## 2025-04-14 DIAGNOSIS — M51.369 DEGENERATIVE DISC DISEASE, LUMBAR: ICD-10-CM

## 2025-04-14 RX ORDER — HYDROCODONE BITARTRATE AND ACETAMINOPHEN 7.5; 325 MG/1; MG/1
1 TABLET ORAL EVERY 12 HOURS PRN
Qty: 20 TABLET | Refills: 0 | Status: SHIPPED | OUTPATIENT
Start: 2025-04-14

## 2025-04-17 ENCOUNTER — TELEMEDICINE (OUTPATIENT)
Dept: FAMILY MEDICINE CLINIC | Facility: CLINIC | Age: 80
End: 2025-04-17
Payer: MEDICARE

## 2025-04-17 ENCOUNTER — TELEPHONE (OUTPATIENT)
Dept: CARDIAC SURGERY | Facility: CLINIC | Age: 80
End: 2025-04-17
Payer: MEDICARE

## 2025-04-17 DIAGNOSIS — J41.1 MUCOPURULENT CHRONIC BRONCHITIS: ICD-10-CM

## 2025-04-17 DIAGNOSIS — J06.9 UPPER RESPIRATORY TRACT INFECTION, UNSPECIFIED TYPE: Primary | ICD-10-CM

## 2025-04-17 PROCEDURE — 1160F RVW MEDS BY RX/DR IN RCRD: CPT | Performed by: NURSE PRACTITIONER

## 2025-04-17 PROCEDURE — 99213 OFFICE O/P EST LOW 20 MIN: CPT | Performed by: NURSE PRACTITIONER

## 2025-04-17 PROCEDURE — 1159F MED LIST DOCD IN RCRD: CPT | Performed by: NURSE PRACTITIONER

## 2025-04-17 PROCEDURE — 1125F AMNT PAIN NOTED PAIN PRSNT: CPT | Performed by: NURSE PRACTITIONER

## 2025-04-17 RX ORDER — BENZONATATE 100 MG/1
100 CAPSULE ORAL 3 TIMES DAILY PRN
Qty: 30 CAPSULE | Refills: 0 | Status: SHIPPED | OUTPATIENT
Start: 2025-04-17

## 2025-04-17 RX ORDER — AZITHROMYCIN 250 MG/1
250 TABLET, FILM COATED ORAL
Qty: 15 TABLET | Refills: 0 | OUTPATIENT
Start: 2025-04-17

## 2025-04-17 RX ORDER — DOXYCYCLINE 100 MG/1
100 TABLET ORAL 2 TIMES DAILY
Qty: 20 TABLET | Refills: 0 | Status: SHIPPED | OUTPATIENT
Start: 2025-04-17 | End: 2025-04-27

## 2025-04-17 NOTE — TELEPHONE ENCOUNTER
Caller: Delbert Mcdermott    Relationship: Emergency Contact    Best call back number: [phone]     What is the best time to reach you: ANYTIME    Who are you requesting to speak with (clinical staff, provider,  specific staff member): N/A    Do you know the name of the person who called: NO    What was the call regarding: PTS SISTER RECEIVED A CALL YESTERDAY AND IMMEDIATELY IT WAS DISCONNECTED .  IF ANYONE WAS TRYING TO REACH HER PLEASE CALL BACK THANK YOU    Is it okay if the provider responds through [facility]: NO

## 2025-04-17 NOTE — PROGRESS NOTES
Office Note     Name: Arminda Krishnan    : 1945     MRN: [id number]     Chief Complaint  Joint Pain and URI    Subjective   Mode of Visit: Video  Location of patient: -HOME-  Location of provider: +[facility]  You have chosen to receive care through a telehealth visit.  The patient has signed the video visit consent form.  The visit included audio and video interaction. No technical issues occurred during this visit.      History of Present Illness:  Arminda Krishnan is a 80 y.o. female who presents today for cough and congestion. She also reports intermittent joint pain.  History of Present Illness  The patient presents via virtual visit for evaluation of upper respiratory symptoms and right arm pain. She is accompanied by her sister.    Symptoms consistent with a cold have been present for a couple of weeks, including chills, occasional sweats, fatigue, and head congestion. No fever, ear pain, or sore throat is reported. A persistent runny nose with colored discharge, occasionally tinged with blood, and postnasal drip is noted. There is no periorbital pressure, but a severe headache occurred yesterday, with visual disturbance. No gastrointestinal symptoms such as nausea, vomiting, or diarrhea are reported. Intermittent body aches, predominantly on the right side, are experienced. She was evaluated at [facility] last Friday due to upper respiratory symptoms, where a chest x-ray and EKG were performed. Despite completing a course of antibiotics and cough medication, symptoms have not improved. Refills of her cough medication are requested.    The patient is a current smoker and reports some shortness of breath and wheezing. The patient reports she is trying to stop smoking. An albuterol inhaler is used intermittently, providing some relief from shortness of breath.    A history of right breast cancer, treated surgically without lymph node removal, is noted. Swelling in the right arm,  extending from the elbow upwards, is managed with massage. No other areas of swelling are reported. She is right-hand dominant and uses a walker for mobility and may be having some symptoms due to pulling and applying additional weight to her arms when ambulating. Tylenol is taken every 6 hours for pain relief, which is found beneficial. Additional pain medication is taken as needed.    PAST SURGICAL HISTORY:  - Right breast cancer surgery without lymph node removal    SOCIAL HISTORY  She admits to smoking and is trying to quit.    Review of Systems:   Review of Systems   Constitutional:  Positive for chills, diaphoresis and fatigue. Negative for activity change, appetite change and fever.   HENT:  Positive for congestion, nosebleeds, postnasal drip and rhinorrhea. Negative for ear pain and sore throat.    Respiratory:  Positive for cough, shortness of breath and wheezing.    Gastrointestinal:  Negative for diarrhea, nausea and vomiting.   Musculoskeletal:  Negative for myalgias.   Neurological:  Positive for headache.       Past Medical History:   Past Medical History:   Diagnosis Date    Anemia     Arthritis     Back problem     CAD (coronary artery disease)     Cancer     Right breast    Chronic back pain     Chronically on opiate therapy     Depression     Diabetes mellitus     DX 14 years ago- checks fsbs weekly    Fibromyalgia     Gastroparesis     GERD (gastroesophageal reflux disease)     Headache     emotional/tension    History of transfusion     [facility]    HTN (hypertension)     Hypercholesteremia     IBS (irritable bowel syndrome)     Incontinence of urine     urgency    Migraine headache     Myalgia and myositis     Peripheral neuropathy     Sleep apnea     does not wear cpap    UTI (urinary tract infection)        Past Surgical History:   Past Surgical History:   Procedure Laterality Date    APPENDECTOMY      ARTERIOGRAM MESENTERIC N/A 6/16/2022    Procedure: DIAGNOSTIC ARTERIOGRAM WITH  CELIAC STENT PLACEMENT;  Surgeon: Neo Neil MD;  Location:  [facility];  Service: Vascular;  Laterality: N/A;  FLUORO: 16 MIN  DOSE: 2384 MGY  CONTRAST:  20 ML    BACK SURGERY      5x per patient    BRAIN TUMOR EXCISION  1988    BREAST BIOPSY      CARPAL TUNNEL RELEASE Bilateral     CHOLECYSTECTOMY      COLONOSCOPY      2015    CRANIOTOMY FOR TUMOR      EYE SURGERY      bilateral cataracts removed    HEMORRHOIDECTOMY      LUMBAR FUSION N/A 01/04/2017    Procedure: LUMBAR LAMINECTOMY AND DECOMRESSION  L3 AND L4;  Surgeon: Nishant Bhatti MD;  Location:  [facility];  Service:     TOTAL ABDOMINAL HYSTERECTOMY      TRIGGER FINGER RELEASE         Immunizations:   Immunization History   Administered Date(s) Administered    COVID-19 (VINNIE) 03/16/2021    COVID-19 (UNSPECIFIED) 03/01/2021, 04/01/2021    Fluzone High-Dose 65+YRS 10/16/2018    Fluzone High-Dose 65+yrs 10/31/2022, 12/06/2023    Fluzone Quad >6mos (Multi-dose) 11/15/2016, 02/05/2020    Pneumococcal Conjugate 13-Valent (PCV13) 07/07/2016    Pneumococcal Polysaccharide (PPSV23) 10/31/2022        Medications:     Current Outpatient Medications:     acetaminophen (TYLENOL) 325 MG tablet, Take 2 tablets by mouth Every 4 (Four) Hours As Needed for Mild Pain., Disp: , Rfl:     albuterol (PROVENTIL) (2.5 MG/3ML) 0.083% nebulizer solution, Take 2.5 mg by nebulization Every 4 (Four) Hours As Needed for Wheezing or Shortness of Air., Disp: 360 mL, Rfl: 11    aspirin 81 MG chewable tablet, Chew 1 tablet Daily., Disp: , Rfl:     atorvastatin (LIPITOR) 80 MG tablet, Take 1 tablet by mouth Daily., Disp: 90 tablet, Rfl: 3    benzonatate (Tessalon Perles) 100 MG capsule, Take 1 capsule by mouth 3 (Three) Times a Day As Needed for Cough., Disp: 30 capsule, Rfl: 0    Blood Glucose Monitoring Suppl (ONE TOUCH ULTRA 2) w/Device kit, USE 1 EACH DAILY., Disp: , Rfl:     Blood Glucose Monitoring Suppl kit, Use 1 each Daily., Disp: 1 each, Rfl: 11    carvedilol (COREG) 25  MG tablet, TAKE ONE TABLET BY MOUTH TWO TIMES A DAY, Disp: 180 tablet, Rfl: 3    clopidogrel (PLAVIX) 75 MG tablet, TAKE ONE TABLET BY MOUTH EVERY DAY, Disp: 30 tablet, Rfl: 11    Comfort EZ Pen Needles 32G X 4 MM misc, Inject 1 each under the skin into the appropriate area as directed 4 (Four) Times a Day. as directed, Disp: 400 each, Rfl: 3    Diclofenac Sodium (VOLTAREN) 1 % gel gel, APPLY TO AFFECTED AREA FOUR TIMES DAILY, Disp: 100 g, Rfl: 12    donepezil (ARICEPT) 10 MG tablet, Take 1 tablet by mouth Every Night. For 2 weeks, then increase to 2 tablets by mouth every night., Disp: 90 tablet, Rfl: 3    doxycycline (ADOXA) 100 MG tablet, Take 1 tablet by mouth 2 (Two) Times a Day for 10 days., Disp: 20 tablet, Rfl: 0    DULoxetine (CYMBALTA) 60 MG capsule, TAKE ONE CAPSULE BY MOUTH EVERY DAY, Disp: 90 capsule, Rfl: 2    ferrous sulfate 325 (65 FE) MG tablet, Take 1 tablet by mouth Daily With Breakfast., Disp: , Rfl:     fluticasone (FLONASE) 50 MCG/ACT nasal spray, USE 2 SPRAYS IN EACH NOSTRIL EVERY DAY, Disp: 48 g, Rfl: 12    glipizide (GLUCOTROL) 5 MG tablet, Take 1 tablet by mouth Every Morning Before Breakfast., Disp: , Rfl:     glucagon (GLUCAGEN) 1 MG injection, Inject 1 mg under the skin into the appropriate area as directed 1 (One) Time As Needed (hypoglycemia) for up to 1 dose., Disp: 1 each, Rfl: 0    Glucose Blood (Blood Glucose Test) strip, 1 each by In Vitro route 3 (Three) Times a Day., Disp: 100 each, Rfl: 11    HYDROcodone-acetaminophen (NORCO) 7.5-325 MG per tablet, TAKE ONE TABLET BY MOUTH EVERY 12 HOURS AS NEEDED FOR MODERATE PAIN, Disp: 20 tablet, Rfl: 0    isosorbide mononitrate (IMDUR) 60 MG 24 hr tablet, Take 1 tablet by mouth Daily., Disp: 90 tablet, Rfl: 3    Lancets (OneTouch Delica Plus Pitoxb80T) misc, 3 (Three) Times a Day. as directed, Disp: , Rfl:     levETIRAcetam (KEPPRA) 500 MG tablet, , Disp: , Rfl:     lidocaine (LIDODERM) 5 %, Place 1 patch on the skin as directed by provider  Daily. Remove & Discard patch within 12 hours or as directed by MD, Disp: 30 each, Rfl: 11    linaclotide (LINZESS) 145 MCG capsule capsule, Take 1 capsule by mouth Every Morning Before Breakfast., Disp: 30 capsule, Rfl: 11    loratadine (CLARITIN) 10 MG tablet, , Disp: , Rfl:     losartan (COZAAR) 50 MG tablet, Take 1 tablet by mouth Daily., Disp: , Rfl:     multivitamin with minerals tablet tablet, Take 1 tablet by mouth Daily., Disp: , Rfl:     naloxone (NARCAN) 4 MG/0.1ML nasal spray, Administer 1 spray into the nostril(s) as directed by provider As Needed., Disp: , Rfl:     pantoprazole (PROTONIX) 40 MG EC tablet, TAKE ONE TABLET BY MOUTH TWO TIMES A DAY, Disp: 180 tablet, Rfl: 3    simethicone (MYLICON) 80 MG chewable tablet, Chew 1 tablet 4 (Four) Times a Day As Needed for Flatulence., Disp: , Rfl:     SITagliptin (Januvia) 50 MG tablet, TAKE ONE TABLET BY MOUTH EVERY DAY, Disp: 90 tablet, Rfl: 3    tacrolimus (PROTOPIC) 0.1 % ointment, APPLY TO AFFECTED AREA TWO TIMES A DAY, Disp: , Rfl:     True Metrix Blood Glucose Test test strip, Daily. for testing, Disp: , Rfl:     Ventolin  (90 Base) MCG/ACT inhaler, INHALE 2 PUFFS EVERY 6 (SIX) HOURS AS NEEDED FOR WHEEZING., Disp: 18 g, Rfl: 11    Allergies:   Allergies   Allergen Reactions    Cefaclor Rash and Unknown - Low Severity       Family History:   Family History   Problem Relation Age of Onset    Cancer Other     Diabetes Other     Hyperlipidemia Other     Heart attack Other     Hypertension Other     Heart attack Mother     Diabetes Mother     Heart disease Mother     Hypertension Mother     Cancer Father     Alcohol abuse Father     Heart attack Sister     Diabetes Sister     Heart disease Sister     Hypertension Sister     Cancer Brother     Diabetes Brother     Hypertension Brother     Heart attack Sister     Stroke Sister     Cancer Brother        Social History:   Social History     Socioeconomic History    Marital status:     Number of  "children: 2   Tobacco Use    Smoking status: Every Day     Current packs/day: 0.25     Average packs/day: 0.4 packs/day for 128.3 years (47.6 ttl pk-yrs)     Types: Cigarettes     Start date: 1959     Passive exposure: Past    Smokeless tobacco: Never    Tobacco comments:     1/17/2022 quit    Vaping Use    Vaping status: Never Used   Substance and Sexual Activity    Alcohol use: No    Drug use: No    Sexual activity: Not Currently         Objective     Vital Signs  There were no vitals taken for this visit.  Estimated body mass index is 29.22 kg/m² as calculated from the following:    Height as of 3/13/25: 152.4 cm (60\").    Weight as of 3/13/25: 67.9 kg (149 lb 9.6 oz).            Physical Exam     Assessment and Plan     Procedures      Lab Results (last 72 hours)       ** No results found for the last 72 hours. **             Results for orders placed or performed in visit on 03/13/25   CBC (No Diff)    Collection Time: 03/13/25  2:41 PM    Specimen: Arm, Right; Blood   Result Value Ref Range    WBC 5.30 3.40 - 10.80 10*3/mm3    RBC 4.06 3.77 - 5.28 10*6/mm3    Hemoglobin 12.0 12.0 - 15.9 g/dL    Hematocrit 37.6 34.0 - 46.6 %    MCV 92.6 79.0 - 97.0 fL    MCH 29.6 26.6 - 33.0 pg    MCHC 31.9 31.5 - 35.7 g/dL    RDW 14.3 12.3 - 15.4 %    RDW-SD 48.1 37.0 - 54.0 fl    MPV 10.5 6.0 - 12.0 fL    Platelets 185 140 - 450 10*3/mm3   Comprehensive Metabolic Panel    Collection Time: 03/13/25  2:41 PM    Specimen: Arm, Right; Blood   Result Value Ref Range    Glucose 135 (H) 65 - 99 mg/dL    BUN 32 (H) 8 - 23 mg/dL    Creatinine 3.38 (H) 0.57 - 1.00 mg/dL    Sodium 138 136 - 145 mmol/L    Potassium 4.7 3.5 - 5.2 mmol/L    Chloride 102 98 - 107 mmol/L    CO2 24.0 22.0 - 29.0 mmol/L    Calcium 9.7 8.6 - 10.5 mg/dL    Total Protein 7.3 6.0 - 8.5 g/dL    Albumin 4.0 3.5 - 5.2 g/dL    ALT (SGPT) 12 1 - 33 U/L    AST (SGOT) 25 1 - 32 U/L    Alkaline Phosphatase 89 39 - 117 U/L    Total Bilirubin 0.2 0.0 - 1.2 mg/dL    Globulin " 3.3 gm/dL    A/G Ratio 1.2 g/dL    BUN/Creatinine Ratio 9.5 7.0 - 25.0    Anion Gap 12.0 5.0 - 15.0 mmol/L    eGFR 13.1 (L) >60.0 mL/min/1.73   Hemoglobin A1c    Collection Time: 03/13/25  2:41 PM    Specimen: Arm, Right; Blood   Result Value Ref Range    Hemoglobin A1C 6.30 (H) 4.80 - 5.60 %   Lipid Panel    Collection Time: 03/13/25  2:41 PM    Specimen: Arm, Right; Blood   Result Value Ref Range    Total Cholesterol 167 0 - 200 mg/dL    Triglycerides 111 0 - 150 mg/dL    HDL Cholesterol 42 40 - 60 mg/dL    LDL Cholesterol  105 (H) 0 - 100 mg/dL    VLDL Cholesterol 20 5 - 40 mg/dL    LDL/HDL Ratio 2.45    Vitamin B12    Collection Time: 03/13/25  2:41 PM    Specimen: Arm, Right; Blood   Result Value Ref Range    Vitamin B-12 729 211 - 946 pg/mL   TSH Rfx On Abnormal To Free T4    Collection Time: 03/13/25  2:41 PM    Specimen: Arm, Right; Blood   Result Value Ref Range    TSH 3.560 0.270 - 4.200 uIU/mL   Microalbumin / Creatinine Urine Ratio - Urine, Clean Catch    Collection Time: 03/13/25  2:52 PM    Specimen: Urine, Clean Catch   Result Value Ref Range    Microalbumin/Creatinine Ratio 498.9 (H) 0.0 - 29.0 mg/g    Creatinine, Urine 45.7 mg/dL    Microalbumin, Urine 22.8 mg/dL   Urinalysis without microscopic (no culture) - Urine, Clean Catch    Collection Time: 03/13/25  2:52 PM    Specimen: Urine, Clean Catch   Result Value Ref Range    Color, UA Yellow Yellow, Straw    Appearance, UA Clear Clear    pH, UA 6.5 5.0 - 8.0    Specific Gravity, UA 1.008 1.005 - 1.030    Glucose, UA Negative Negative    Ketones, UA Negative Negative    Bilirubin, UA Negative Negative    Blood, UA Moderate (2+) (A) Negative    Protein, UA 30 mg/dL (1+) (A) Negative    Leuk Esterase, UA Negative Negative    Nitrite, UA Negative Negative    Urobilinogen, UA 0.2 E.U./dL 0.2 - 1.0 E.U./dL   Urine Drug Screen - Urine, Clean Catch    Collection Time: 03/13/25  2:52 PM    Specimen: Urine, Clean Catch   Result Value Ref Range    THC, Screen,  Urine Negative Negative    Phencyclidine (PCP), Urine Negative Negative    Cocaine Screen, Urine Negative Negative    Methamphetamine, Ur Negative Negative    Opiate Screen Positive (A) Negative    Amphetamine Screen, Urine Negative Negative    Benzodiazepine Screen, Urine Positive (A) Negative    Tricyclic Antidepressants Screen Negative Negative    Methadone Screen, Urine Negative Negative    Barbiturates Screen, Urine Negative Negative    Oxycodone Screen, Urine Negative Negative    Buprenorphine, Screen, Urine Negative Negative   Fentanyl, Urine - Urine, Clean Catch    Collection Time: 03/13/25  2:52 PM    Specimen: Urine, Clean Catch   Result Value Ref Range    Fentanyl, Urine Negative Negative        Diagnoses and all orders for this visit:    1. Upper respiratory tract infection, unspecified type (Primary)  Comments:  The patient requests refill of Tessalon. Take Doxycycline as directed. Schedule a follow up appointment if symptoms persist. Pursue smoking cessation.  Orders:  -     benzonatate (Tessalon Perles) 100 MG capsule; Take 1 capsule by mouth 3 (Three) Times a Day As Needed for Cough.  Dispense: 30 capsule; Refill: 0  -     doxycycline (ADOXA) 100 MG tablet; Take 1 tablet by mouth 2 (Two) Times a Day for 10 days.  Dispense: 20 tablet; Refill: 0        Assessment & Plan  1. Upper respiratory infection.  - Symptoms include chills, fatigue, head congestion, colored nasal discharge, and occasional blood-tinged mucus.  - Symptoms have persisted for a couple of weeks. Previous treatment with a Z-Ashkan and cough pills was ineffective.  - Chest x-ray and EKG were performed; chest x-ray was normal.  - A new antibiotic will be prescribed, and a refill of her cough medication will be provided.    2. Right arm pain.  - Reports swelling and pain in the right arm, potentially related to previous breast cancer surgery.  - Swelling occurs from the elbow up and is intermittent.  - Advised to alternate between applying  heat and ice to the affected area.  - Further evaluation will be necessary if the pain persists or worsens.    3. Anemia.  - Blood count indicates mild anemia.  - No immediate intervention required, but monitoring will continue.    4. Shortness of breath.  - Reports shortness of breath and wheezing.  - Uses an albuterol inhaler intermittently, which provides some relief.  - Encouraged to use the albuterol inhaler more consistently to help with congestion and shortness of breath.      Follow Up  Return if symptoms worsen or fail to improve.    Patient or patient representative verbalized consent for the use of Ambient Listening during the visit with  BALAJI Armendariz for chart documentation. 4/21/2025  17:50 EDT    BALAJI Armendariz [facility]  [address] [place] [address] [address] [place] [address] [phone]

## 2025-04-24 ENCOUNTER — LAB (OUTPATIENT)
Dept: FAMILY MEDICINE CLINIC | Facility: CLINIC | Age: 80
End: 2025-04-24
Payer: MEDICARE

## 2025-04-24 ENCOUNTER — OFFICE VISIT (OUTPATIENT)
Dept: FAMILY MEDICINE CLINIC | Facility: CLINIC | Age: 80
End: 2025-04-24
Payer: MEDICARE

## 2025-04-24 ENCOUNTER — HOSPITAL ENCOUNTER (OUTPATIENT)
Dept: ULTRASOUND IMAGING | Facility: HOSPITAL | Age: 80
Discharge: HOME | End: 2025-04-24
Payer: MEDICARE

## 2025-04-24 VITALS — DIASTOLIC BLOOD PRESSURE: 70 MMHG | RESPIRATION RATE: 16 BRPM | SYSTOLIC BLOOD PRESSURE: 130 MMHG | TEMPERATURE: 98 F

## 2025-04-24 DIAGNOSIS — N18.4 CKD (CHRONIC KIDNEY DISEASE) STAGE 4, GFR 15-29 ML/MIN: ICD-10-CM

## 2025-04-24 DIAGNOSIS — Z79.4 TYPE 2 DIABETES MELLITUS WITH HYPERGLYCEMIA, WITH LONG-TERM CURRENT USE OF INSULIN: Primary | ICD-10-CM

## 2025-04-24 DIAGNOSIS — I10 PRIMARY HYPERTENSION: ICD-10-CM

## 2025-04-24 DIAGNOSIS — R06.02 SHORTNESS OF BREATH: ICD-10-CM

## 2025-04-24 DIAGNOSIS — E11.65 TYPE 2 DIABETES MELLITUS WITH HYPERGLYCEMIA, WITH LONG-TERM CURRENT USE OF INSULIN: Primary | ICD-10-CM

## 2025-04-24 DIAGNOSIS — J41.1 MUCOPURULENT CHRONIC BRONCHITIS: ICD-10-CM

## 2025-04-24 DIAGNOSIS — I71.40 ABDOMINAL AORTIC ANEURYSM (AAA) WITHOUT RUPTURE, UNSPECIFIED PART: ICD-10-CM

## 2025-04-24 DIAGNOSIS — Z91.81 AT HIGH RISK FOR FALLS: ICD-10-CM

## 2025-04-24 DIAGNOSIS — R05.2 SUBACUTE COUGH: ICD-10-CM

## 2025-04-24 LAB
ALBUMIN SERPL-MCNC: 3.9 G/DL (ref 3.5–5.2)
ALBUMIN/GLOB SERPL: 1 G/DL
ALP SERPL-CCNC: 98 U/L (ref 39–117)
ALT SERPL W P-5'-P-CCNC: 11 U/L (ref 1–33)
ANION GAP SERPL CALCULATED.3IONS-SCNC: 10 MMOL/L (ref 5–15)
ANISOCYTOSIS BLD QL: NORMAL
AST SERPL-CCNC: 20 U/L (ref 1–32)
BASOPHILS # BLD MANUAL: 0.07 10*3/MM3 (ref 0–0.2)
BASOPHILS NFR BLD MANUAL: 1 % (ref 0–1.5)
BILIRUB SERPL-MCNC: <0.2 MG/DL (ref 0–1.2)
BUN SERPL-MCNC: 35 MG/DL (ref 8–23)
BUN/CREAT SERPL: 11.6 (ref 7–25)
CALCIUM SPEC-SCNC: 9.5 MG/DL (ref 8.6–10.5)
CHLORIDE SERPL-SCNC: 113 MMOL/L (ref 98–107)
CO2 SERPL-SCNC: 19 MMOL/L (ref 22–29)
CREAT SERPL-MCNC: 3.01 MG/DL (ref 0.57–1)
DEPRECATED RDW RBC AUTO: 48.7 FL (ref 37–54)
EGFRCR SERPLBLD CKD-EPI 2021: 15 ML/MIN/1.73
EOSINOPHIL # BLD MANUAL: 0.28 10*3/MM3 (ref 0–0.4)
EOSINOPHIL NFR BLD MANUAL: 4 % (ref 0.3–6.2)
ERYTHROCYTE [DISTWIDTH] IN BLOOD BY AUTOMATED COUNT: 15.1 % (ref 12.3–15.4)
GLOBULIN UR ELPH-MCNC: 3.9 GM/DL
GLUCOSE SERPL-MCNC: 120 MG/DL (ref 65–99)
HCT VFR BLD AUTO: 33 % (ref 34–46.6)
HGB BLD-MCNC: 10.4 G/DL (ref 12–15.9)
LYMPHOCYTES # BLD MANUAL: 1.71 10*3/MM3 (ref 0.7–3.1)
LYMPHOCYTES NFR BLD MANUAL: 8.1 % (ref 5–12)
MCH RBC QN AUTO: 28.1 PG (ref 26.6–33)
MCHC RBC AUTO-ENTMCNC: 31.5 G/DL (ref 31.5–35.7)
MCV RBC AUTO: 89.2 FL (ref 79–97)
MONOCYTES # BLD: 0.57 10*3/MM3 (ref 0.1–0.9)
NEUTROPHILS # BLD AUTO: 4.43 10*3/MM3 (ref 1.7–7)
NEUTROPHILS NFR BLD MANUAL: 62.6 % (ref 42.7–76)
NT-PROBNP SERPL-MCNC: 504 PG/ML (ref 0–1800)
PLAT MORPH BLD: NORMAL
PLATELET # BLD AUTO: 211 10*3/MM3 (ref 140–450)
PMV BLD AUTO: 11.3 FL (ref 6–12)
POLYCHROMASIA BLD QL SMEAR: NORMAL
POTASSIUM SERPL-SCNC: 4.3 MMOL/L (ref 3.5–5.2)
PROT SERPL-MCNC: 7.8 G/DL (ref 6–8.5)
RBC # BLD AUTO: 3.7 10*6/MM3 (ref 3.77–5.28)
SODIUM SERPL-SCNC: 142 MMOL/L (ref 136–145)
VARIANT LYMPHS NFR BLD MANUAL: 24.2 % (ref 19.6–45.3)
WBC MORPH BLD: NORMAL
WBC NRBC COR # BLD AUTO: 7.07 10*3/MM3 (ref 3.4–10.8)

## 2025-04-24 PROCEDURE — 85007 BL SMEAR W/DIFF WBC COUNT: CPT | Performed by: FAMILY MEDICINE

## 2025-04-24 PROCEDURE — 80053 COMPREHEN METABOLIC PANEL: CPT | Performed by: FAMILY MEDICINE

## 2025-04-24 PROCEDURE — 85027 COMPLETE CBC AUTOMATED: CPT | Performed by: FAMILY MEDICINE

## 2025-04-24 PROCEDURE — 83880 ASSAY OF NATRIURETIC PEPTIDE: CPT | Performed by: FAMILY MEDICINE

## 2025-04-24 PROCEDURE — 36415 COLL VENOUS BLD VENIPUNCTURE: CPT | Performed by: FAMILY MEDICINE

## 2025-04-24 NOTE — PROGRESS NOTES
Follow Up Office Visit      Date: 2025   Patient Name: Arminda Krishnan  : 1945   MRN: [id number]     Chief Complaint:    Chief Complaint   Patient presents with    Cough     For about a month states that it hurts whenever she coughs     Breast Pain       History of Present Illness: Arminda Krishnan is a 80 y.o. female who is here today for follow-up.    History of Present Illness  The patient is an 80-year-old female who presents for evaluation of a persistent cough, right shoulder pain, and diabetes. She is accompanied by her sister.    A persistent cough producing yellow sputum has been experienced, leading to an emergency room visit at [facility]. Despite a normal chest x-ray, the cough has intensified, and she occasionally coughs up blood. No falls have occurred since the last visit. Breathing treatments have been resumed, and smoking cessation has been achieved since  with the aid of patches. Azithromycin was prescribed for 5 days, and doxycycline 100 mg twice daily was also prescribed, but it is unclear if the latter has been taken. Tessalon Perles have been used for cough relief.    Pain in the right shoulder is reported, attributed to a previous cancer diagnosis. No falls or rib fractures have occurred. Pain is also reported in the back, legs, and stomach.    Blood sugar levels have not been monitored recently. Nightly insulin injections have been discontinued, but oral antidiabetic medication continues to be taken.    Compliance with nebulizer treatments, atorvastatin, carvedilol, and Plavix is reported. Blood pressure medication is being taken, but blood pressure has not been checked.    SOCIAL HISTORY  She has not smoked since  and is using patches to aid in smoking cessation.     Patient appears to be doing otherwise well.  They have continue with their medications without any side effects.  They have not had any changes in their usual activity, appetite and sleep.   Patient denies any other cardiovascular, respiratory, gastrointestinal, urologic or neurologic complaints.    Subjective      Review of Systems:   Review of Systems   Constitutional:  Negative for activity change, appetite change and fatigue.   Respiratory:  Positive for cough and shortness of breath. Negative for chest tightness and wheezing.    Cardiovascular:  Negative for chest pain, palpitations and leg swelling.   Gastrointestinal:  Negative for abdominal distention, abdominal pain, blood in stool, constipation, diarrhea, nausea, vomiting, GERD and indigestion.   Genitourinary:  Negative for difficulty urinating, dysuria, flank pain, frequency, hematuria and urgency.   Musculoskeletal:  Negative for arthralgias, back pain, gait problem, joint swelling and myalgias.   Neurological:  Negative for dizziness, tremors, seizures, syncope, weakness, light-headedness, numbness, headache and memory problem.   Psychiatric/Behavioral:  Negative for sleep disturbance and depressed mood. The patient is not nervous/anxious.        I have reviewed the patients family history, social history, past medical history, past surgical history and have updated it as appropriate.     Medications:     Current Outpatient Medications:     acetaminophen (TYLENOL) 325 MG tablet, Take 2 tablets by mouth Every 4 (Four) Hours As Needed for Mild Pain., Disp: , Rfl:     albuterol (PROVENTIL) (2.5 MG/3ML) 0.083% nebulizer solution, Take 2.5 mg by nebulization Every 4 (Four) Hours As Needed for Wheezing or Shortness of Air., Disp: 360 mL, Rfl: 11    aspirin 81 MG chewable tablet, Chew 1 tablet Daily., Disp: , Rfl:     atorvastatin (LIPITOR) 80 MG tablet, Take 1 tablet by mouth Daily., Disp: 90 tablet, Rfl: 3    benzonatate (Tessalon Perles) 100 MG capsule, Take 1 capsule by mouth 3 (Three) Times a Day As Needed for Cough., Disp: 30 capsule, Rfl: 0    Blood Glucose Monitoring Suppl (ONE TOUCH ULTRA 2) w/Device kit, USE 1 EACH DAILY., Disp: , Rfl:      Blood Glucose Monitoring Suppl kit, Use 1 each Daily., Disp: 1 each, Rfl: 11    carvedilol (COREG) 25 MG tablet, TAKE ONE TABLET BY MOUTH TWO TIMES A DAY, Disp: 180 tablet, Rfl: 3    clopidogrel (PLAVIX) 75 MG tablet, TAKE ONE TABLET BY MOUTH EVERY DAY, Disp: 30 tablet, Rfl: 11    Comfort EZ Pen Needles 32G X 4 MM misc, Inject 1 each under the skin into the appropriate area as directed 4 (Four) Times a Day. as directed, Disp: 400 each, Rfl: 3    Diclofenac Sodium (VOLTAREN) 1 % gel gel, APPLY TO AFFECTED AREA FOUR TIMES DAILY, Disp: 100 g, Rfl: 12    donepezil (ARICEPT) 10 MG tablet, Take 1 tablet by mouth Every Night. For 2 weeks, then increase to 2 tablets by mouth every night., Disp: 90 tablet, Rfl: 3    doxycycline (ADOXA) 100 MG tablet, Take 1 tablet by mouth 2 (Two) Times a Day for 10 days., Disp: 20 tablet, Rfl: 0    DULoxetine (CYMBALTA) 60 MG capsule, TAKE ONE CAPSULE BY MOUTH EVERY DAY, Disp: 90 capsule, Rfl: 2    ferrous sulfate 325 (65 FE) MG tablet, Take 1 tablet by mouth Daily With Breakfast., Disp: , Rfl:     fluticasone (FLONASE) 50 MCG/ACT nasal spray, USE 2 SPRAYS IN EACH NOSTRIL EVERY DAY, Disp: 48 g, Rfl: 12    glipizide (GLUCOTROL) 5 MG tablet, Take 1 tablet by mouth Every Morning Before Breakfast., Disp: , Rfl:     glucagon (GLUCAGEN) 1 MG injection, Inject 1 mg under the skin into the appropriate area as directed 1 (One) Time As Needed (hypoglycemia) for up to 1 dose., Disp: 1 each, Rfl: 0    Glucose Blood (Blood Glucose Test) strip, 1 each by In Vitro route 3 (Three) Times a Day., Disp: 100 each, Rfl: 11    HYDROcodone-acetaminophen (NORCO) 7.5-325 MG per tablet, TAKE ONE TABLET BY MOUTH EVERY 12 HOURS AS NEEDED FOR MODERATE PAIN, Disp: 20 tablet, Rfl: 0    isosorbide mononitrate (IMDUR) 60 MG 24 hr tablet, Take 1 tablet by mouth Daily., Disp: 90 tablet, Rfl: 3    Lancets (OneTouch Delica Plus Gbonma54R) misc, 3 (Three) Times a Day. as directed, Disp: , Rfl:     levETIRAcetam (KEPPRA) 500  MG tablet, , Disp: , Rfl:     lidocaine (LIDODERM) 5 %, Place 1 patch on the skin as directed by provider Daily. Remove & Discard patch within 12 hours or as directed by MD, Disp: 30 each, Rfl: 11    linaclotide (LINZESS) 145 MCG capsule capsule, Take 1 capsule by mouth Every Morning Before Breakfast., Disp: 30 capsule, Rfl: 11    loratadine (CLARITIN) 10 MG tablet, , Disp: , Rfl:     losartan (COZAAR) 50 MG tablet, Take 1 tablet by mouth Daily., Disp: , Rfl:     multivitamin with minerals tablet tablet, Take 1 tablet by mouth Daily., Disp: , Rfl:     naloxone (NARCAN) 4 MG/0.1ML nasal spray, Administer 1 spray into the nostril(s) as directed by provider As Needed., Disp: , Rfl:     pantoprazole (PROTONIX) 40 MG EC tablet, TAKE ONE TABLET BY MOUTH TWO TIMES A DAY, Disp: 180 tablet, Rfl: 3    simethicone (MYLICON) 80 MG chewable tablet, Chew 1 tablet 4 (Four) Times a Day As Needed for Flatulence., Disp: , Rfl:     SITagliptin (Januvia) 50 MG tablet, TAKE ONE TABLET BY MOUTH EVERY DAY, Disp: 90 tablet, Rfl: 3    tacrolimus (PROTOPIC) 0.1 % ointment, APPLY TO AFFECTED AREA TWO TIMES A DAY, Disp: , Rfl:     True Metrix Blood Glucose Test test strip, Daily. for testing, Disp: , Rfl:     Ventolin  (90 Base) MCG/ACT inhaler, INHALE 2 PUFFS EVERY 6 (SIX) HOURS AS NEEDED FOR WHEEZING., Disp: 18 g, Rfl: 11    Allergies:   Allergies   Allergen Reactions    Cefaclor Rash and Unknown - Low Severity       Immunizations:   Immunization History   Administered Date(s) Administered    COVID-19 (VINNIE) 03/16/2021    COVID-19 (UNSPECIFIED) 03/01/2021, 04/01/2021    Fluzone High-Dose 65+YRS 10/16/2018    Fluzone High-Dose 65+yrs 10/31/2022, 12/06/2023    Fluzone Quad >6mos (Multi-dose) 11/15/2016, 02/05/2020    Pneumococcal Conjugate 13-Valent (PCV13) 07/07/2016    Pneumococcal Polysaccharide (PPSV23) 10/31/2022        Objective     Physical Exam: Please see above  Vital Signs:   Vitals:    04/24/25 1508   BP: 130/70   Resp: 16    Temp: 98 °F (36.7 °C)   TempSrc: Temporal     There is no height or weight on file to calculate BMI.          Physical Exam  Vitals and nursing note reviewed.   Constitutional:       Appearance: Normal appearance.   HENT:      Head: Normocephalic and atraumatic.      Nose: Nose normal.      Mouth/Throat:      Pharynx: Oropharynx is clear.   Eyes:      Extraocular Movements: Extraocular movements intact.      Pupils: Pupils are equal, round, and reactive to light.   Neck:      Thyroid: No thyroid mass or thyromegaly.      Trachea: Trachea normal.   Cardiovascular:      Rate and Rhythm: Normal rate and regular rhythm.      Pulses: Normal pulses. No decreased pulses.      Heart sounds: Normal heart sounds.   Pulmonary:      Effort: Pulmonary effort is normal.      Breath sounds: Normal breath sounds.   Abdominal:      General: Abdomen is flat. Bowel sounds are normal.      Palpations: Abdomen is soft.      Tenderness: There is no abdominal tenderness.   Musculoskeletal:      Cervical back: Neck supple.      Right lower leg: No edema.      Left lower leg: No edema.   Lymphadenopathy:      Cervical: No cervical adenopathy.   Skin:     General: Skin is warm and dry.   Neurological:      General: No focal deficit present.      Mental Status: She is alert and oriented to person, place, and time.      Sensory: Sensation is intact.      Motor: Motor function is intact.      Coordination: Coordination is intact.   Psychiatric:         Attention and Perception: Attention normal.         Mood and Affect: Mood normal.         Speech: Speech normal.         Behavior: Behavior normal.         Procedures    Results:   Labs:   Hemoglobin A1C   Date Value Ref Range Status   03/13/2025 6.30 (H) 4.80 - 5.60 % Final   12/26/2024 7.4 (H) <5.7 % Final     TSH   Date Value Ref Range Status   03/13/2025 3.560 0.270 - 4.200 uIU/mL Final        POCT Results (if applicable):   Results for orders placed or performed in visit on 03/13/25   CBC  (No Diff)    Collection Time: 03/13/25  2:41 PM    Specimen: Arm, Right; Blood   Result Value Ref Range    WBC 5.30 3.40 - 10.80 10*3/mm3    RBC 4.06 3.77 - 5.28 10*6/mm3    Hemoglobin 12.0 12.0 - 15.9 g/dL    Hematocrit 37.6 34.0 - 46.6 %    MCV 92.6 79.0 - 97.0 fL    MCH 29.6 26.6 - 33.0 pg    MCHC 31.9 31.5 - 35.7 g/dL    RDW 14.3 12.3 - 15.4 %    RDW-SD 48.1 37.0 - 54.0 fl    MPV 10.5 6.0 - 12.0 fL    Platelets 185 140 - 450 10*3/mm3   Comprehensive Metabolic Panel    Collection Time: 03/13/25  2:41 PM    Specimen: Arm, Right; Blood   Result Value Ref Range    Glucose 135 (H) 65 - 99 mg/dL    BUN 32 (H) 8 - 23 mg/dL    Creatinine 3.38 (H) 0.57 - 1.00 mg/dL    Sodium 138 136 - 145 mmol/L    Potassium 4.7 3.5 - 5.2 mmol/L    Chloride 102 98 - 107 mmol/L    CO2 24.0 22.0 - 29.0 mmol/L    Calcium 9.7 8.6 - 10.5 mg/dL    Total Protein 7.3 6.0 - 8.5 g/dL    Albumin 4.0 3.5 - 5.2 g/dL    ALT (SGPT) 12 1 - 33 U/L    AST (SGOT) 25 1 - 32 U/L    Alkaline Phosphatase 89 39 - 117 U/L    Total Bilirubin 0.2 0.0 - 1.2 mg/dL    Globulin 3.3 gm/dL    A/G Ratio 1.2 g/dL    BUN/Creatinine Ratio 9.5 7.0 - 25.0    Anion Gap 12.0 5.0 - 15.0 mmol/L    eGFR 13.1 (L) >60.0 mL/min/1.73   Hemoglobin A1c    Collection Time: 03/13/25  2:41 PM    Specimen: Arm, Right; Blood   Result Value Ref Range    Hemoglobin A1C 6.30 (H) 4.80 - 5.60 %   Lipid Panel    Collection Time: 03/13/25  2:41 PM    Specimen: Arm, Right; Blood   Result Value Ref Range    Total Cholesterol 167 0 - 200 mg/dL    Triglycerides 111 0 - 150 mg/dL    HDL Cholesterol 42 40 - 60 mg/dL    LDL Cholesterol  105 (H) 0 - 100 mg/dL    VLDL Cholesterol 20 5 - 40 mg/dL    LDL/HDL Ratio 2.45    Vitamin B12    Collection Time: 03/13/25  2:41 PM    Specimen: Arm, Right; Blood   Result Value Ref Range    Vitamin B-12 729 211 - 946 pg/mL   TSH Rfx On Abnormal To Free T4    Collection Time: 03/13/25  2:41 PM    Specimen: Arm, Right; Blood   Result Value Ref Range    TSH 3.560 0.270 -  4.200 uIU/mL   Microalbumin / Creatinine Urine Ratio - Urine, Clean Catch    Collection Time: 03/13/25  2:52 PM    Specimen: Urine, Clean Catch   Result Value Ref Range    Microalbumin/Creatinine Ratio 498.9 (H) 0.0 - 29.0 mg/g    Creatinine, Urine 45.7 mg/dL    Microalbumin, Urine 22.8 mg/dL   Urinalysis without microscopic (no culture) - Urine, Clean Catch    Collection Time: 03/13/25  2:52 PM    Specimen: Urine, Clean Catch   Result Value Ref Range    Color, UA Yellow Yellow, Straw    Appearance, UA Clear Clear    pH, UA 6.5 5.0 - 8.0    Specific Gravity, UA 1.008 1.005 - 1.030    Glucose, UA Negative Negative    Ketones, UA Negative Negative    Bilirubin, UA Negative Negative    Blood, UA Moderate (2+) (A) Negative    Protein, UA 30 mg/dL (1+) (A) Negative    Leuk Esterase, UA Negative Negative    Nitrite, UA Negative Negative    Urobilinogen, UA 0.2 E.U./dL 0.2 - 1.0 E.U./dL   Urine Drug Screen - Urine, Clean Catch    Collection Time: 03/13/25  2:52 PM    Specimen: Urine, Clean Catch   Result Value Ref Range    THC, Screen, Urine Negative Negative    Phencyclidine (PCP), Urine Negative Negative    Cocaine Screen, Urine Negative Negative    Methamphetamine, Ur Negative Negative    Opiate Screen Positive (A) Negative    Amphetamine Screen, Urine Negative Negative    Benzodiazepine Screen, Urine Positive (A) Negative    Tricyclic Antidepressants Screen Negative Negative    Methadone Screen, Urine Negative Negative    Barbiturates Screen, Urine Negative Negative    Oxycodone Screen, Urine Negative Negative    Buprenorphine, Screen, Urine Negative Negative   Fentanyl, Urine - Urine, Clean Catch    Collection Time: 03/13/25  2:52 PM    Specimen: Urine, Clean Catch   Result Value Ref Range    Fentanyl, Urine Negative Negative       Imaging:   No valid procedures specified.     Measures:   Advanced Care Planning:   Patient does not have an advance directive, information provided.    Smoking Cessation:    Non-smoker.    Assessment / Plan      Assessment/Plan:   Diagnoses and all orders for this visit:    1. Type 2 diabetes mellitus with hyperglycemia, with long-term current use of insulin (Primary)   Patient does appear to be doing relatively well with respect to their diabetes.  They are tolerating their medications without complaints.  We will continue to follow their hemoglobin A1c and will make adjustments to keep their level less than 7%.    2. CKD (chronic kidney disease) stage 4, GFR 15-29 ml/min  Patient does continue to keep follow-up with the nephrologist.  We will recheck her kidney function as her levels remain poor.  We will continue to monitor her levels and will make adjustments with referral to the nephrologist sooner if necessary.  We have encouraged her to drink fluids, avoid anti-inflammatories and limit her sodium intake.  -     Comprehensive Metabolic Panel; Future    3. At high risk for falls   Patient does continue to have problems with increased risk of falling.  She has not had any falls recently.  Her family continues to encourage assistive devices.  Patient does not wish to pursue with physical therapy at present time.    4. Subacute cough  Patient does continue to have cough as well as difficulty with shortness of breath excetra.  We have discussed that she may have some difficulty with her chronic bronchitis or may have some underlying pathology.  She is complaining of rib pain as well as shoulder pain.  We will obtain x-rays and we will pursue and treat accordingly.  We will also obtain a heart failure test to assure that this that is not due to some cardiac manifestations.  -     Cancel: XR Chest PA & Lateral; Future  -     XR Ribs Right With PA Chest; Future  -     XR Shoulder 2+ View Right; Future  -     CBC With Manual Differential; Future    5. Shortness of breath  -     proBNP; Future    6. Mucopurulent chronic bronchitis   Patient is probably having COPD exacerbation.  She is  having some mild problems with wheeze.  Unfortunately, patient does not do well with prednisone as she has had hyperglycemia in the past.  We will go ahead and pursue with monitoring her symptoms with increasing nebulization treatment.  It may be necessary for us to pursue with steroids if she does not show improvement.  We have encouraged patient to discontinue smoking.    7. Primary hypertension   Patient appears to be tolerating their blood pressure medicine without any side effects.  We will continue to monitor their blood pressure and will adjust to keep there is systolic blood pressure less than 130.  We will continue to monitor renal function and will make adjustments based on these findings.      Follow Up:   Return in about 1 week (around 5/1/2025).      At [facility], we believe that sharing information builds trust and better relationships. You are receiving this note because you recently visited [facility]. It is possible you will see health information before a provider has talked with you about it. This kind of information can be easy to misunderstand. To help you fully understand what it means for your health, we urge you to discuss this note with your provider.    Aiden Spencer MD  [facility]    Patient or patient representative verbalized consent for the use of Ambient Listening during the visit with  Aiden Spencer MD for chart documentation. 4/27/2025  15:22 EDT

## 2025-04-25 ENCOUNTER — RESULTS FOLLOW-UP (OUTPATIENT)
Dept: FAMILY MEDICINE CLINIC | Facility: CLINIC | Age: 80
End: 2025-04-25
Payer: MEDICARE

## 2025-04-28 DIAGNOSIS — M51.369 DEGENERATIVE DISC DISEASE, LUMBAR: ICD-10-CM

## 2025-04-28 RX ORDER — HYDROCODONE BITARTRATE AND ACETAMINOPHEN 7.5; 325 MG/1; MG/1
1 TABLET ORAL EVERY 12 HOURS PRN
Qty: 20 TABLET | Refills: 0 | Status: SHIPPED | OUTPATIENT
Start: 2025-04-28

## 2025-05-01 ENCOUNTER — OFFICE VISIT (OUTPATIENT)
Dept: FAMILY MEDICINE CLINIC | Facility: CLINIC | Age: 80
End: 2025-05-01
Payer: MEDICARE

## 2025-05-01 ENCOUNTER — TELEPHONE (OUTPATIENT)
Dept: FAMILY MEDICINE CLINIC | Facility: CLINIC | Age: 80
End: 2025-05-01

## 2025-05-01 VITALS
TEMPERATURE: 98 F | WEIGHT: 140 LBS | HEART RATE: 68 BPM | BODY MASS INDEX: 27.48 KG/M2 | RESPIRATION RATE: 16 BRPM | HEIGHT: 60 IN | SYSTOLIC BLOOD PRESSURE: 110 MMHG | OXYGEN SATURATION: 96 % | DIASTOLIC BLOOD PRESSURE: 70 MMHG

## 2025-05-01 DIAGNOSIS — Z79.4 TYPE 2 DIABETES MELLITUS WITH HYPERGLYCEMIA, WITH LONG-TERM CURRENT USE OF INSULIN: ICD-10-CM

## 2025-05-01 DIAGNOSIS — E11.65 TYPE 2 DIABETES MELLITUS WITH HYPERGLYCEMIA, WITH LONG-TERM CURRENT USE OF INSULIN: ICD-10-CM

## 2025-05-01 DIAGNOSIS — N18.4 CKD (CHRONIC KIDNEY DISEASE) STAGE 4, GFR 15-29 ML/MIN: ICD-10-CM

## 2025-05-01 DIAGNOSIS — Z91.81 AT HIGH RISK FOR FALLS: ICD-10-CM

## 2025-05-01 DIAGNOSIS — J41.1 MUCOPURULENT CHRONIC BRONCHITIS: Primary | ICD-10-CM

## 2025-05-01 RX ORDER — HYDROCODONE POLISTIREX AND CHLORPHENIRAMINE POLISTIREX 10; 8 MG/5ML; MG/5ML
5 SUSPENSION, EXTENDED RELEASE ORAL EVERY 12 HOURS PRN
Qty: 120 ML | Refills: 0 | Status: SHIPPED | OUTPATIENT
Start: 2025-05-01

## 2025-05-01 NOTE — TELEPHONE ENCOUNTER
Caller: Delbert Mcdermott    Relationship: Emergency Contact    Best call back number: [phone]     Which medication are you concerned about:     Who prescribed you this medication: TUSSIONEX    When did you start taking this medication: NOT STARTED    What are your concerns: THIS MEDICATION IS NOT COVERED ON HER INSURANCE. SHE WOULD LIKE TO GET AN ALTERNATIVE CALLED IN AS SOON AS POSSIBLE    How long have you had these concerns: 1 DAY

## 2025-05-01 NOTE — TELEPHONE ENCOUNTER
There is nothing.  Victor Hugo Jenkins will be the only thing else or Delsym.  She can ask for the promethazine with codeine which is over-the-counter via the pharmacist

## 2025-05-01 NOTE — TELEPHONE ENCOUNTER
Contacted the patients sister Samuel to discuss blood work and imaging results. She expressed good understanding and appreciation. No questions or concerns.

## 2025-05-01 NOTE — PROGRESS NOTES
Follow Up Office Visit      Date: 2025   Patient Name: Arminda Krishnan  : 1945   MRN: [id number]     Chief Complaint:    Chief Complaint   Patient presents with    Primary Care Follow-Up       History of Present Illness: Arminda Krishnan is a 80 y.o. female who is here today for follow-up of her recent clinic appointment for bronchitis.    History of Present Illness  The patient is an 80-year-old female who presents for evaluation of bronchitis, right shoulder pain, and chronic kidney disease. She is accompanied by her great niece.    A decline in respiratory function is reported, attributed to the use of cough medication. Chest discomfort, particularly at night, is noted, accompanied by a persistent cough. Smoking cessation is reported, with the last cigarette smoked approximately 2 weeks ago. Additionally, she notes that her nose has been running, which she believes may be a side effect of the medication. An antibiotic is being taken.    Falls have been experienced, with the most recent incident occurring in the bathroom. A decrease in the severity of right shoulder pain is reported.    A scheduled appointment with the nephrologist is mentioned, with a desire to postpone it due to cold weather conditions.    SOCIAL HISTORY  She stopped smoking 2 weeks ago.     Patient appears to be doing otherwise well.  They have continue with their medications without any side effects.  They have not had any changes in their usual activity, appetite and sleep.  Patient denies any other cardiovascular, respiratory, gastrointestinal, urologic or neurologic complaints.    Subjective      Review of Systems:   Review of Systems   Constitutional:  Negative for activity change, appetite change and fatigue.   Respiratory:  Negative for cough, chest tightness, shortness of breath and wheezing.    Cardiovascular:  Negative for chest pain, palpitations and leg swelling.   Gastrointestinal:  Negative for abdominal  distention, abdominal pain, blood in stool, constipation, diarrhea, nausea, vomiting, GERD and indigestion.   Genitourinary:  Negative for difficulty urinating, dysuria, flank pain, frequency, hematuria and urgency.   Musculoskeletal:  Negative for arthralgias, back pain, gait problem, joint swelling and myalgias.   Neurological:  Negative for dizziness, tremors, seizures, syncope, weakness, light-headedness, numbness, headache and memory problem.   Psychiatric/Behavioral:  Negative for sleep disturbance and depressed mood. The patient is not nervous/anxious.        I have reviewed the patients family history, social history, past medical history, past surgical history and have updated it as appropriate.     Medications:     Current Outpatient Medications:     acetaminophen (TYLENOL) 325 MG tablet, Take 2 tablets by mouth Every 4 (Four) Hours As Needed for Mild Pain., Disp: , Rfl:     albuterol (PROVENTIL) (2.5 MG/3ML) 0.083% nebulizer solution, Take 2.5 mg by nebulization Every 4 (Four) Hours As Needed for Wheezing or Shortness of Air., Disp: 360 mL, Rfl: 11    aspirin 81 MG chewable tablet, Chew 1 tablet Daily., Disp: , Rfl:     atorvastatin (LIPITOR) 80 MG tablet, Take 1 tablet by mouth Daily., Disp: 90 tablet, Rfl: 3    Blood Glucose Monitoring Suppl (ONE TOUCH ULTRA 2) w/Device kit, USE 1 EACH DAILY., Disp: , Rfl:     Blood Glucose Monitoring Suppl kit, Use 1 each Daily., Disp: 1 each, Rfl: 11    carvedilol (COREG) 25 MG tablet, TAKE ONE TABLET BY MOUTH TWO TIMES A DAY, Disp: 180 tablet, Rfl: 3    clopidogrel (PLAVIX) 75 MG tablet, TAKE ONE TABLET BY MOUTH EVERY DAY, Disp: 30 tablet, Rfl: 11    Comfort EZ Pen Needles 32G X 4 MM misc, Inject 1 each under the skin into the appropriate area as directed 4 (Four) Times a Day. as directed, Disp: 400 each, Rfl: 3    Diclofenac Sodium (VOLTAREN) 1 % gel gel, APPLY TO AFFECTED AREA FOUR TIMES DAILY, Disp: 100 g, Rfl: 12    donepezil (ARICEPT) 10 MG tablet, Take 1 tablet  by mouth Every Night. For 2 weeks, then increase to 2 tablets by mouth every night., Disp: 90 tablet, Rfl: 3    DULoxetine (CYMBALTA) 60 MG capsule, TAKE ONE CAPSULE BY MOUTH EVERY DAY, Disp: 90 capsule, Rfl: 2    ferrous sulfate 325 (65 FE) MG tablet, Take 1 tablet by mouth Daily With Breakfast., Disp: , Rfl:     fluticasone (FLONASE) 50 MCG/ACT nasal spray, USE 2 SPRAYS IN EACH NOSTRIL EVERY DAY, Disp: 48 g, Rfl: 12    glipizide (GLUCOTROL) 5 MG tablet, Take 1 tablet by mouth Every Morning Before Breakfast., Disp: , Rfl:     glucagon (GLUCAGEN) 1 MG injection, Inject 1 mg under the skin into the appropriate area as directed 1 (One) Time As Needed (hypoglycemia) for up to 1 dose., Disp: 1 each, Rfl: 0    Glucose Blood (Blood Glucose Test) strip, 1 each by In Vitro route 3 (Three) Times a Day., Disp: 100 each, Rfl: 11    Hydrocod Bernardo-Chlorphe Bernardo ER (TUSSIONEX PENNKINETIC) 10-8 MG/5ML ER suspension, Take 5 mL by mouth Every 12 (Twelve) Hours As Needed (as needed for cough)., Disp: 120 mL, Rfl: 0    HYDROcodone-acetaminophen (NORCO) 7.5-325 MG per tablet, TAKE ONE TABLET BY MOUTH EVERY 12 HOURS AS NEEDED FOR MODERATE PAIN, Disp: 20 tablet, Rfl: 0    isosorbide mononitrate (IMDUR) 60 MG 24 hr tablet, Take 1 tablet by mouth Daily., Disp: 90 tablet, Rfl: 3    Lancets (OneTouch Delica Plus Mzwfxj32H) misc, 3 (Three) Times a Day. as directed, Disp: , Rfl:     levETIRAcetam (KEPPRA) 500 MG tablet, , Disp: , Rfl:     lidocaine (LIDODERM) 5 %, Place 1 patch on the skin as directed by provider Daily. Remove & Discard patch within 12 hours or as directed by MD, Disp: 30 each, Rfl: 11    linaclotide (LINZESS) 145 MCG capsule capsule, Take 1 capsule by mouth Every Morning Before Breakfast., Disp: 30 capsule, Rfl: 11    loratadine (CLARITIN) 10 MG tablet, , Disp: , Rfl:     losartan (COZAAR) 50 MG tablet, Take 1 tablet by mouth Daily., Disp: , Rfl:     multivitamin with minerals tablet tablet, Take 1 tablet by mouth Daily.,  "Disp: , Rfl:     naloxone (NARCAN) 4 MG/0.1ML nasal spray, Administer 1 spray into the nostril(s) as directed by provider As Needed., Disp: , Rfl:     pantoprazole (PROTONIX) 40 MG EC tablet, TAKE ONE TABLET BY MOUTH TWO TIMES A DAY, Disp: 180 tablet, Rfl: 3    simethicone (MYLICON) 80 MG chewable tablet, Chew 1 tablet 4 (Four) Times a Day As Needed for Flatulence., Disp: , Rfl:     SITagliptin (Januvia) 50 MG tablet, TAKE ONE TABLET BY MOUTH EVERY DAY, Disp: 90 tablet, Rfl: 3    tacrolimus (PROTOPIC) 0.1 % ointment, APPLY TO AFFECTED AREA TWO TIMES A DAY, Disp: , Rfl:     True Metrix Blood Glucose Test test strip, Daily. for testing, Disp: , Rfl:     Ventolin  (90 Base) MCG/ACT inhaler, INHALE 2 PUFFS EVERY 6 (SIX) HOURS AS NEEDED FOR WHEEZING., Disp: 18 g, Rfl: 11    Allergies:   Allergies   Allergen Reactions    Cefaclor Rash and Unknown - Low Severity       Immunizations:   Immunization History   Administered Date(s) Administered    COVID-19 (VINNIE) 03/16/2021    COVID-19 (UNSPECIFIED) 03/01/2021, 04/01/2021    Fluzone High-Dose 65+YRS 10/16/2018    Fluzone High-Dose 65+yrs 10/31/2022, 12/06/2023    Fluzone Quad >6mos (Multi-dose) 11/15/2016, 02/05/2020    Pneumococcal Conjugate 13-Valent (PCV13) 07/07/2016    Pneumococcal Polysaccharide (PPSV23) 10/31/2022        Objective     Physical Exam: Please see above  Vital Signs:   Vitals:    05/01/25 1528   BP: 110/70   BP Location: Left arm   Patient Position: Sitting   Cuff Size: Adult   Pulse: 68   Resp: 16   Temp: 98 °F (36.7 °C)   TempSrc: Temporal   SpO2: 96%   Weight: 63.5 kg (140 lb)   Height: 152.4 cm (60\")     Body mass index is 27.34 kg/m².          Physical Exam  Vitals and nursing note reviewed.   Constitutional:       Appearance: Normal appearance.   HENT:      Head: Normocephalic and atraumatic.      Nose: Nose normal.      Mouth/Throat:      Pharynx: Oropharynx is clear.   Eyes:      Extraocular Movements: Extraocular movements intact.      " Pupils: Pupils are equal, round, and reactive to light.   Neck:      Thyroid: No thyroid mass or thyromegaly.      Trachea: Trachea normal.   Cardiovascular:      Rate and Rhythm: Normal rate and regular rhythm.      Pulses: Normal pulses. No decreased pulses.      Heart sounds: Normal heart sounds.   Pulmonary:      Effort: Pulmonary effort is normal.      Breath sounds: Normal breath sounds.   Abdominal:      General: Abdomen is flat. Bowel sounds are normal.      Palpations: Abdomen is soft.      Tenderness: There is no abdominal tenderness.   Musculoskeletal:      Cervical back: Neck supple.      Right lower leg: No edema.      Left lower leg: No edema.   Lymphadenopathy:      Cervical: No cervical adenopathy.   Skin:     General: Skin is warm and dry.   Neurological:      General: No focal deficit present.      Mental Status: She is alert and oriented to person, place, and time.      Sensory: Sensation is intact.      Motor: Motor function is intact.      Coordination: Coordination is intact.   Psychiatric:         Attention and Perception: Attention normal.         Mood and Affect: Mood normal.         Speech: Speech normal.         Behavior: Behavior normal.         Procedures    Results:   Labs:   Hemoglobin A1C   Date Value Ref Range Status   03/13/2025 6.30 (H) 4.80 - 5.60 % Final   12/26/2024 7.4 (H) <5.7 % Final     TSH   Date Value Ref Range Status   03/13/2025 3.560 0.270 - 4.200 uIU/mL Final        POCT Results (if applicable):   Results for orders placed or performed in visit on 04/24/25   Comprehensive Metabolic Panel    Collection Time: 04/24/25  3:43 PM    Specimen: Arm, Right; Blood   Result Value Ref Range    Glucose 120 (H) 65 - 99 mg/dL    BUN 35 (H) 8 - 23 mg/dL    Creatinine 3.01 (H) 0.57 - 1.00 mg/dL    Sodium 142 136 - 145 mmol/L    Potassium 4.3 3.5 - 5.2 mmol/L    Chloride 113 (H) 98 - 107 mmol/L    CO2 19.0 (L) 22.0 - 29.0 mmol/L    Calcium 9.5 8.6 - 10.5 mg/dL    Total Protein 7.8 6.0  - 8.5 g/dL    Albumin 3.9 3.5 - 5.2 g/dL    ALT (SGPT) 11 1 - 33 U/L    AST (SGOT) 20 1 - 32 U/L    Alkaline Phosphatase 98 39 - 117 U/L    Total Bilirubin <0.2 0.0 - 1.2 mg/dL    Globulin 3.9 gm/dL    A/G Ratio 1.0 g/dL    BUN/Creatinine Ratio 11.6 7.0 - 25.0    Anion Gap 10.0 5.0 - 15.0 mmol/L    eGFR 15.0 (L) >60.0 mL/min/1.73   proBNP    Collection Time: 04/24/25  3:43 PM    Specimen: Arm, Right; Blood   Result Value Ref Range    proBNP 504.0 0.0 - 1,800.0 pg/mL   CBC Auto Differential    Collection Time: 04/24/25  3:43 PM    Specimen: Arm, Right; Blood   Result Value Ref Range    WBC 7.07 3.40 - 10.80 10*3/mm3    RBC 3.70 (L) 3.77 - 5.28 10*6/mm3    Hemoglobin 10.4 (L) 12.0 - 15.9 g/dL    Hematocrit 33.0 (L) 34.0 - 46.6 %    MCV 89.2 79.0 - 97.0 fL    MCH 28.1 26.6 - 33.0 pg    MCHC 31.5 31.5 - 35.7 g/dL    RDW 15.1 12.3 - 15.4 %    RDW-SD 48.7 37.0 - 54.0 fl    MPV 11.3 6.0 - 12.0 fL    Platelets 211 140 - 450 10*3/mm3   Manual Differential    Collection Time: 04/24/25  3:43 PM    Specimen: Arm, Right; Blood   Result Value Ref Range    Neutrophil % 62.6 42.7 - 76.0 %    Lymphocyte % 24.2 19.6 - 45.3 %    Monocyte % 8.1 5.0 - 12.0 %    Eosinophil % 4.0 0.3 - 6.2 %    Basophil % 1.0 0.0 - 1.5 %    Neutrophils Absolute 4.43 1.70 - 7.00 10*3/mm3    Lymphocytes Absolute 1.71 0.70 - 3.10 10*3/mm3    Monocytes Absolute 0.57 0.10 - 0.90 10*3/mm3    Eosinophils Absolute 0.28 0.00 - 0.40 10*3/mm3    Basophils Absolute 0.07 0.00 - 0.20 10*3/mm3    Anisocytosis Slight/1+ None Seen    Polychromasia Slight/1+ None Seen    WBC Morphology Normal Normal    Platelet Morphology Normal Normal       Imaging:   No valid procedures specified.     Measures:   Advanced Care Planning:   Patient does not have an advance directive, information provided.    Smoking Cessation:   Non-smoker x 2 weeks.    Assessment / Plan      Assessment/Plan:   Diagnoses and all orders for this visit:    1. Mucopurulent chronic bronchitis  (Primary)  Patient is doing much better since last being seen.  She has tolerated the antibiotics without difficulty.  Patient has been smoke-free now for 2 weeks.  Her chest x-ray revealed bronchitis.  Her cough is greatly improved.  She has not having problems with the wheeze.  We have discussed options and have congratulated her on discontinuation of her smoking.  We will continue to monitor closely and she has requested cough suppressant as necessary.  We have encouraged her not to take the cough suppressant with the narcotic pain medicine.  We will continue to monitor and will reassess if her symptoms do worsen.  -     Hydrocod Bernardo-Chlorphe Bernardo ER (TUSSIONEX PENNKINETIC) 10-8 MG/5ML ER suspension; Take 5 mL by mouth Every 12 (Twelve) Hours As Needed (as needed for cough).  Dispense: 120 mL; Refill: 0    2. Type 2 diabetes mellitus with hyperglycemia, with long-term current use of insulin   Patient does appear to be doing relatively well with respect to their diabetes.  They are tolerating their medications without complaints.  We will continue to follow their hemoglobin A1c and will make adjustments to keep their level less than 7%.  Patient did not have elevation of her blood sugars recently.  We will continue to monitor and we will plan on reassessing her hemoglobin A1c on June 16.    3. CKD (chronic kidney disease) stage 4, GFR 15-29 ml/min   Patient does appear to be doing a bit better with respect to her fluid intake.  We will encouraged her to continue to push fluids and keep her scheduled follow-up with the nephrologist.    4. At high risk for falls   Patient is feeling better since being treated for bronchitis.  She has not fallen since last being seen.  We have continued to encourage her to use assistive devices.  It does appear that she is much stronger without smoking.  We will continue to monitor her symptoms.      Follow Up:   Return in about 7 weeks (around 6/16/2025).      At [facility], we  believe that sharing information builds trust and better relationships. You are receiving this note because you recently [facility]. It is possible you will see health information before a provider has talked with you about it. This kind of information can be easy to misunderstand. To help you fully understand what it means for your health, we urge you to discuss this note with your provider.    Aiden Spencer MD  [facility]    Patient or patient representative verbalized consent for the use of Ambient Listening during the visit with  Aiden Spencer MD for chart documentation. 5/1/2025  22:41 EDT

## 2025-05-05 ENCOUNTER — TELEPHONE (OUTPATIENT)
Dept: FAMILY MEDICINE CLINIC | Facility: CLINIC | Age: 80
End: 2025-05-05
Payer: MEDICARE

## 2025-05-06 ENCOUNTER — TELEPHONE (OUTPATIENT)
Dept: FAMILY MEDICINE CLINIC | Facility: CLINIC | Age: 80
End: 2025-05-06
Payer: MEDICARE

## 2025-05-06 NOTE — TELEPHONE ENCOUNTER
Caller: Delbert Mcdermott    Relationship: Emergency Contact    Best call back number: [phone]    What medication are you requesting: SOMETHING FOR HER STOMACH.    What are your current symptoms: PAINS ALL OVER STOMACH    How long have you been experiencing symptoms: QUITE SOME TIME    Have you had these symptoms before:  [x] Yes  [] No    Have you been treated for these symptoms before:  [x] Yes  [] No  A COUPLE OF YEARS AGO SHE HAD A BLOCKAGE.    If a prescription is needed, what is your preferred pharmacy and phone number: [place] [address] [address] - [phone] [facility] [phone] FX       I MADE HER AN APPOINTMENT FOR THURSDAY

## 2025-05-07 NOTE — TELEPHONE ENCOUNTER
Contacted the patients sister to discuss pcp advise. No answer; voicemail left asking the patient to return the call to discuss.

## 2025-05-07 NOTE — TELEPHONE ENCOUNTER
"We do not typically prescribe medicine for \"pains all over stomach\".  If she has had a history of blockages in the past she needs to be evaluated to make sure that she does not have a blockage at present time.  "

## 2025-05-08 ENCOUNTER — OFFICE VISIT (OUTPATIENT)
Dept: FAMILY MEDICINE CLINIC | Facility: CLINIC | Age: 80
End: 2025-05-08
Payer: MEDICARE

## 2025-05-08 ENCOUNTER — LAB (OUTPATIENT)
Dept: LAB | Facility: HOSPITAL | Age: 80
End: 2025-05-08
Payer: MEDICARE

## 2025-05-08 ENCOUNTER — HOSPITAL ENCOUNTER (OUTPATIENT)
Dept: GENERAL RADIOLOGY | Facility: HOSPITAL | Age: 80
Discharge: HOME | End: 2025-05-08
Payer: MEDICARE

## 2025-05-08 VITALS
BODY MASS INDEX: 28.16 KG/M2 | SYSTOLIC BLOOD PRESSURE: 170 MMHG | WEIGHT: 143.4 LBS | HEIGHT: 60 IN | HEART RATE: 70 BPM | RESPIRATION RATE: 16 BRPM | DIASTOLIC BLOOD PRESSURE: 70 MMHG | OXYGEN SATURATION: 99 % | TEMPERATURE: 97.7 F

## 2025-05-08 DIAGNOSIS — E44.1 MILD PROTEIN-CALORIE MALNUTRITION: ICD-10-CM

## 2025-05-08 DIAGNOSIS — R10.32 LEFT LOWER QUADRANT ABDOMINAL PAIN: Primary | ICD-10-CM

## 2025-05-08 DIAGNOSIS — R10.32 LEFT LOWER QUADRANT ABDOMINAL PAIN: ICD-10-CM

## 2025-05-08 LAB
CRP SERPL-MCNC: <0.3 MG/DL (ref 0–0.5)
D-LACTATE SERPL-SCNC: 1 MMOL/L (ref 0.5–2)
ERYTHROCYTE [SEDIMENTATION RATE] IN BLOOD: 45 MM/HR (ref 0–30)
GGT SERPL-CCNC: 21 U/L (ref 5–36)
LIPASE SERPL-CCNC: 18 U/L (ref 13–60)

## 2025-05-08 PROCEDURE — 86140 C-REACTIVE PROTEIN: CPT

## 2025-05-08 PROCEDURE — 83690 ASSAY OF LIPASE: CPT

## 2025-05-08 PROCEDURE — 36415 COLL VENOUS BLD VENIPUNCTURE: CPT

## 2025-05-08 PROCEDURE — 80053 COMPREHEN METABOLIC PANEL: CPT

## 2025-05-08 PROCEDURE — 85652 RBC SED RATE AUTOMATED: CPT

## 2025-05-08 PROCEDURE — 74022 RADEX COMPL AQT ABD SERIES: CPT

## 2025-05-08 PROCEDURE — 82977 ASSAY OF GGT: CPT

## 2025-05-08 PROCEDURE — 85007 BL SMEAR W/DIFF WBC COUNT: CPT

## 2025-05-08 PROCEDURE — 85027 COMPLETE CBC AUTOMATED: CPT

## 2025-05-08 PROCEDURE — 83605 ASSAY OF LACTIC ACID: CPT

## 2025-05-08 NOTE — PROGRESS NOTES
Follow Up Office Visit      Date: 2025   Patient Name: Arminda Krishnan  : 1945   MRN: [id number]     Chief Complaint:    Chief Complaint   Patient presents with    Abdominal Pain    Nausea    Cough     Can't get the mucus up.       History of Present Illness: Arminda Krishnan is a 80 y.o. female who is here today for abdominal pain.    History of Present Illness  The patient is an 80-year-old female who presents for evaluation of abdominal pain. She is accompanied by her sister.    She reports experiencing abdominal discomfort at the thought of eating, which subsequently leads to nausea and inability to consume food. She has not experienced any episodes of diarrhea, hematochezia, or fever. 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 She has been losing weight. She has not had a bowel movement today but did have one yesterday. She does not believe she is constipated. She has no history of diverticulosis. She maintains adequate hydration. She has been prescribed Tylenol, which she takes intermittently due to its potency. She attempted to eat a hamburger earlier today but was unsuccessful. She has a history of cholecystectomy.    She was unable to complete a test prior to her last visit with Dr. Neil due to excessive coughing.    PAST SURGICAL HISTORY:  Cholecystectomy     Patient appears to be doing otherwise well.  They have continue with their medications without any side effects.  They have not had any changes in their usual activity, appetite and sleep.  Patient denies any other cardiovascular, respiratory, gastrointestinal, urologic or neurologic complaints.    Subjective      Review of Systems:   Review of Systems   Constitutional:  Negative for activity change, appetite change and fatigue.   Respiratory:  Negative for cough, chest tightness, shortness of breath and wheezing.    Cardiovascular:  Negative for chest pain, palpitations and leg swelling.    Gastrointestinal:  Positive for abdominal distention and abdominal pain. Negative for blood in stool, constipation, diarrhea, nausea, vomiting, GERD and indigestion.   Genitourinary:  Negative for difficulty urinating, dysuria, flank pain, frequency, hematuria and urgency.   Musculoskeletal:  Negative for arthralgias, back pain, gait problem, joint swelling and myalgias.   Neurological:  Negative for dizziness, tremors, seizures, syncope, weakness, light-headedness, numbness, headache and memory problem.   Psychiatric/Behavioral:  Negative for sleep disturbance and depressed mood. The patient is not nervous/anxious.        I have reviewed the patients family history, social history, past medical history, past surgical history and have updated it as appropriate.     Medications:     Current Outpatient Medications:     acetaminophen (TYLENOL) 325 MG tablet, Take 2 tablets by mouth Every 4 (Four) Hours As Needed for Mild Pain., Disp: , Rfl:     albuterol (PROVENTIL) (2.5 MG/3ML) 0.083% nebulizer solution, Take 2.5 mg by nebulization Every 4 (Four) Hours As Needed for Wheezing or Shortness of Air., Disp: 360 mL, Rfl: 11    aspirin 81 MG chewable tablet, Chew 1 tablet Daily., Disp: , Rfl:     atorvastatin (LIPITOR) 80 MG tablet, Take 1 tablet by mouth Daily., Disp: 90 tablet, Rfl: 3    Blood Glucose Monitoring Suppl (ONE TOUCH ULTRA 2) w/Device kit, USE 1 EACH DAILY., Disp: , Rfl:     Blood Glucose Monitoring Suppl kit, Use 1 each Daily., Disp: 1 each, Rfl: 11    carvedilol (COREG) 25 MG tablet, TAKE ONE TABLET BY MOUTH TWO TIMES A DAY, Disp: 180 tablet, Rfl: 3    clopidogrel (PLAVIX) 75 MG tablet, TAKE ONE TABLET BY MOUTH EVERY DAY, Disp: 30 tablet, Rfl: 11    Comfort EZ Pen Needles 32G X 4 MM misc, Inject 1 each under the skin into the appropriate area as directed 4 (Four) Times a Day. as directed, Disp: 400 each, Rfl: 3    Diclofenac Sodium (VOLTAREN) 1 % gel gel, APPLY TO AFFECTED AREA FOUR TIMES DAILY, Disp: 100  g, Rfl: 12    donepezil (ARICEPT) 10 MG tablet, Take 1 tablet by mouth Every Night. For 2 weeks, then increase to 2 tablets by mouth every night., Disp: 90 tablet, Rfl: 3    DULoxetine (CYMBALTA) 60 MG capsule, TAKE ONE CAPSULE BY MOUTH EVERY DAY, Disp: 90 capsule, Rfl: 2    ferrous sulfate 325 (65 FE) MG tablet, Take 1 tablet by mouth Daily With Breakfast., Disp: , Rfl:     fluticasone (FLONASE) 50 MCG/ACT nasal spray, USE 2 SPRAYS IN EACH NOSTRIL EVERY DAY, Disp: 48 g, Rfl: 12    glipizide (GLUCOTROL) 5 MG tablet, Take 1 tablet by mouth Every Morning Before Breakfast., Disp: , Rfl:     glucagon (GLUCAGEN) 1 MG injection, Inject 1 mg under the skin into the appropriate area as directed 1 (One) Time As Needed (hypoglycemia) for up to 1 dose., Disp: 1 each, Rfl: 0    Glucose Blood (Blood Glucose Test) strip, 1 each by In Vitro route 3 (Three) Times a Day., Disp: 100 each, Rfl: 11    Hydrocod Bernardo-Chlorphe Bernardo ER (TUSSIONEX PENNKINETIC) 10-8 MG/5ML ER suspension, Take 5 mL by mouth Every 12 (Twelve) Hours As Needed (as needed for cough)., Disp: 120 mL, Rfl: 0    HYDROcodone-acetaminophen (NORCO) 7.5-325 MG per tablet, TAKE ONE TABLET BY MOUTH EVERY 12 HOURS AS NEEDED FOR MODERATE PAIN, Disp: 20 tablet, Rfl: 0    isosorbide mononitrate (IMDUR) 60 MG 24 hr tablet, Take 1 tablet by mouth Daily., Disp: 90 tablet, Rfl: 3    Lancets (OneTouch Delica Plus Owprxy68R) misc, 3 (Three) Times a Day. as directed, Disp: , Rfl:     levETIRAcetam (KEPPRA) 500 MG tablet, , Disp: , Rfl:     lidocaine (LIDODERM) 5 %, Place 1 patch on the skin as directed by provider Daily. Remove & Discard patch within 12 hours or as directed by MD, Disp: 30 each, Rfl: 11    linaclotide (LINZESS) 145 MCG capsule capsule, Take 1 capsule by mouth Every Morning Before Breakfast., Disp: 30 capsule, Rfl: 11    loratadine (CLARITIN) 10 MG tablet, , Disp: , Rfl:     losartan (COZAAR) 50 MG tablet, Take 1 tablet by mouth Daily., Disp: , Rfl:     multivitamin  "with minerals tablet tablet, Take 1 tablet by mouth Daily., Disp: , Rfl:     naloxone (NARCAN) 4 MG/0.1ML nasal spray, Administer 1 spray into the nostril(s) as directed by provider As Needed., Disp: , Rfl:     pantoprazole (PROTONIX) 40 MG EC tablet, TAKE ONE TABLET BY MOUTH TWO TIMES A DAY, Disp: 180 tablet, Rfl: 3    simethicone (MYLICON) 80 MG chewable tablet, Chew 1 tablet 4 (Four) Times a Day As Needed for Flatulence., Disp: , Rfl:     SITagliptin (Januvia) 50 MG tablet, TAKE ONE TABLET BY MOUTH EVERY DAY, Disp: 90 tablet, Rfl: 3    tacrolimus (PROTOPIC) 0.1 % ointment, APPLY TO AFFECTED AREA TWO TIMES A DAY, Disp: , Rfl:     True Metrix Blood Glucose Test test strip, Daily. for testing, Disp: , Rfl:     Ventolin  (90 Base) MCG/ACT inhaler, INHALE 2 PUFFS EVERY 6 (SIX) HOURS AS NEEDED FOR WHEEZING., Disp: 18 g, Rfl: 11    Allergies:   Allergies   Allergen Reactions    Cefaclor Rash and Unknown - Low Severity       Immunizations:   Immunization History   Administered Date(s) Administered    COVID-19 (VINNIE) 03/16/2021    COVID-19 (UNSPECIFIED) 03/01/2021, 04/01/2021    Fluzone High-Dose 65+YRS 10/16/2018    Fluzone High-Dose 65+yrs 10/31/2022, 12/06/2023    Fluzone Quad >6mos (Multi-dose) 11/15/2016, 02/05/2020    Pneumococcal Conjugate 13-Valent (PCV13) 07/07/2016    Pneumococcal Polysaccharide (PPSV23) 10/31/2022        Objective     Physical Exam: Please see above  Vital Signs:   Vitals:    05/08/25 1510   BP: 170/70   BP Location: Left arm   Patient Position: Sitting   Cuff Size: Adult   Pulse: 70   Resp: 16   Temp: 97.7 °F (36.5 °C)   TempSrc: Temporal   SpO2: 99%   Weight: 65 kg (143 lb 6.4 oz)   Height: 152.4 cm (60\")     Body mass index is 28.01 kg/m².  BMI is >= 25 and <30. (Overweight) The following options were offered after discussion;: exercise counseling/recommendations and nutrition counseling/recommendations       Physical Exam  Vitals and nursing note reviewed.   Constitutional:       " Appearance: Normal appearance.   HENT:      Head: Normocephalic and atraumatic.      Nose: Nose normal.      Mouth/Throat:      Pharynx: Oropharynx is clear.   Eyes:      Extraocular Movements: Extraocular movements intact.      Pupils: Pupils are equal, round, and reactive to light.   Neck:      Thyroid: No thyroid mass or thyromegaly.      Trachea: Trachea normal.   Cardiovascular:      Rate and Rhythm: Normal rate and regular rhythm.      Pulses: Normal pulses. No decreased pulses.      Heart sounds: Normal heart sounds.   Pulmonary:      Effort: Pulmonary effort is normal.      Breath sounds: Normal breath sounds.   Abdominal:      General: Abdomen is flat. Bowel sounds are normal.      Palpations: Abdomen is soft.      Tenderness: There is no abdominal tenderness.   Musculoskeletal:      Cervical back: Neck supple.      Right lower leg: No edema.      Left lower leg: No edema.   Lymphadenopathy:      Cervical: No cervical adenopathy.   Skin:     General: Skin is warm and dry.   Neurological:      General: No focal deficit present.      Mental Status: She is alert and oriented to person, place, and time.      Sensory: Sensation is intact.      Motor: Motor function is intact.      Coordination: Coordination is intact.   Psychiatric:         Attention and Perception: Attention normal.         Mood and Affect: Mood normal.         Speech: Speech normal.         Behavior: Behavior normal.         Procedures    Results:   Labs:   Hemoglobin A1C   Date Value Ref Range Status   03/13/2025 6.30 (H) 4.80 - 5.60 % Final   12/26/2024 7.4 (H) <5.7 % Final     TSH   Date Value Ref Range Status   03/13/2025 3.560 0.270 - 4.200 uIU/mL Final        POCT Results (if applicable):   Results for orders placed or performed in visit on 04/24/25   Comprehensive Metabolic Panel    Collection Time: 04/24/25  3:43 PM    Specimen: Arm, Right; Blood   Result Value Ref Range    Glucose 120 (H) 65 - 99 mg/dL    BUN 35 (H) 8 - 23 mg/dL     Creatinine 3.01 (H) 0.57 - 1.00 mg/dL    Sodium 142 136 - 145 mmol/L    Potassium 4.3 3.5 - 5.2 mmol/L    Chloride 113 (H) 98 - 107 mmol/L    CO2 19.0 (L) 22.0 - 29.0 mmol/L    Calcium 9.5 8.6 - 10.5 mg/dL    Total Protein 7.8 6.0 - 8.5 g/dL    Albumin 3.9 3.5 - 5.2 g/dL    ALT (SGPT) 11 1 - 33 U/L    AST (SGOT) 20 1 - 32 U/L    Alkaline Phosphatase 98 39 - 117 U/L    Total Bilirubin <0.2 0.0 - 1.2 mg/dL    Globulin 3.9 gm/dL    A/G Ratio 1.0 g/dL    BUN/Creatinine Ratio 11.6 7.0 - 25.0    Anion Gap 10.0 5.0 - 15.0 mmol/L    eGFR 15.0 (L) >60.0 mL/min/1.73   proBNP    Collection Time: 04/24/25  3:43 PM    Specimen: Arm, Right; Blood   Result Value Ref Range    proBNP 504.0 0.0 - 1,800.0 pg/mL   CBC Auto Differential    Collection Time: 04/24/25  3:43 PM    Specimen: Arm, Right; Blood   Result Value Ref Range    WBC 7.07 3.40 - 10.80 10*3/mm3    RBC 3.70 (L) 3.77 - 5.28 10*6/mm3    Hemoglobin 10.4 (L) 12.0 - 15.9 g/dL    Hematocrit 33.0 (L) 34.0 - 46.6 %    MCV 89.2 79.0 - 97.0 fL    MCH 28.1 26.6 - 33.0 pg    MCHC 31.5 31.5 - 35.7 g/dL    RDW 15.1 12.3 - 15.4 %    RDW-SD 48.7 37.0 - 54.0 fl    MPV 11.3 6.0 - 12.0 fL    Platelets 211 140 - 450 10*3/mm3   Manual Differential    Collection Time: 04/24/25  3:43 PM    Specimen: Arm, Right; Blood   Result Value Ref Range    Neutrophil % 62.6 42.7 - 76.0 %    Lymphocyte % 24.2 19.6 - 45.3 %    Monocyte % 8.1 5.0 - 12.0 %    Eosinophil % 4.0 0.3 - 6.2 %    Basophil % 1.0 0.0 - 1.5 %    Neutrophils Absolute 4.43 1.70 - 7.00 10*3/mm3    Lymphocytes Absolute 1.71 0.70 - 3.10 10*3/mm3    Monocytes Absolute 0.57 0.10 - 0.90 10*3/mm3    Eosinophils Absolute 0.28 0.00 - 0.40 10*3/mm3    Basophils Absolute 0.07 0.00 - 0.20 10*3/mm3    Anisocytosis Slight/1+ None Seen    Polychromasia Slight/1+ None Seen    WBC Morphology Normal Normal    Platelet Morphology Normal Normal       Imaging:   No valid procedures specified.     Measures:   Advanced Care Planning:   Patient does not have  an advance directive, information provided.    Smoking Cessation:   Non-smoker.    Assessment / Plan      Assessment/Plan:   Diagnoses and all orders for this visit:    1. Left lower quadrant abdominal pain (Primary)  Patient is having abdominal pain that is very similar to her ischemic symptoms previously.  We will obtain lactic acid as well as other laboratory data and have encouraged her to pursue with clear liquids.  We will wait for the laboratory data and if it shows any other abnormality it may be necessary for us to have her admitted to the hospital.  She does not show any signs of dehydration.  Her pain is not excessive.  I am hoping that she has some underlying constipation excetra.  We will pursue and treat accordingly based on the outcome of the laboratory data and x-rays.  -     XR Abdomen 2+ VW with Chest 1 VW; Future  -     CBC With Manual Differential; Future  -     Comprehensive Metabolic Panel; Future  -     Gamma GT; Future  -     Lipase; Future  -     Sedimentation Rate; Future  -     C-reactive Protein; Future  -     Lactic Acid, Plasma; Future  -     Urinalysis With Culture If Indicated - Urine, Random Void; Future    2. Mild protein-calorie malnutrition  -     Gamma GT; Future        Follow Up:   Return in about 6 weeks (around 6/16/2025).      At [facility], we believe that sharing information builds trust and better relationships. You are receiving this note because you recently visited [facility]. It is possible you will see health information before a provider has talked with you about it. This kind of information can be easy to misunderstand. To help you fully understand what it means for your health, we urge you to discuss this note with your provider.    Aiden Spencer MD  [facility]    Patient or patient representative verbalized consent for the use of Ambient Listening during the visit with  Aiden Spencer MD for chart documentation. 5/8/2025  16:03  EDT

## 2025-05-09 ENCOUNTER — RESULTS FOLLOW-UP (OUTPATIENT)
Dept: FAMILY MEDICINE CLINIC | Facility: CLINIC | Age: 80
End: 2025-05-09
Payer: MEDICARE

## 2025-05-09 LAB
ALBUMIN SERPL-MCNC: 3.7 G/DL (ref 3.5–5.2)
ALBUMIN/GLOB SERPL: 0.9 G/DL
ALP SERPL-CCNC: 116 U/L (ref 39–117)
ALT SERPL W P-5'-P-CCNC: 15 U/L (ref 1–33)
ANION GAP SERPL CALCULATED.3IONS-SCNC: 16 MMOL/L (ref 5–15)
ANISOCYTOSIS BLD QL: ABNORMAL
AST SERPL-CCNC: 27 U/L (ref 1–32)
BASOPHILS # BLD MANUAL: 0 10*3/MM3 (ref 0–0.2)
BASOPHILS NFR BLD MANUAL: 0 % (ref 0–1.5)
BILIRUB SERPL-MCNC: 0.3 MG/DL (ref 0–1.2)
BUN SERPL-MCNC: 59 MG/DL (ref 8–23)
BUN/CREAT SERPL: 16.9 (ref 7–25)
CALCIUM SPEC-SCNC: 9.3 MG/DL (ref 8.6–10.5)
CHLORIDE SERPL-SCNC: 101 MMOL/L (ref 98–107)
CO2 SERPL-SCNC: 16 MMOL/L (ref 22–29)
CREAT SERPL-MCNC: 3.49 MG/DL (ref 0.57–1)
DEPRECATED RDW RBC AUTO: 50.1 FL (ref 37–54)
EGFRCR SERPLBLD CKD-EPI 2021: 12.6 ML/MIN/1.73
EOSINOPHIL # BLD MANUAL: 0 10*3/MM3 (ref 0–0.4)
EOSINOPHIL NFR BLD MANUAL: 0 % (ref 0.3–6.2)
ERYTHROCYTE [DISTWIDTH] IN BLOOD BY AUTOMATED COUNT: 15.5 % (ref 12.3–15.4)
GIANT PLATELETS: ABNORMAL
GLOBULIN UR ELPH-MCNC: 3.9 GM/DL
GLUCOSE SERPL-MCNC: 97 MG/DL (ref 65–99)
HCT VFR BLD AUTO: 32.4 % (ref 34–46.6)
HGB BLD-MCNC: 10.7 G/DL (ref 12–15.9)
LYMPHOCYTES # BLD MANUAL: 2.58 10*3/MM3 (ref 0.7–3.1)
LYMPHOCYTES NFR BLD MANUAL: 3.1 % (ref 5–12)
MCH RBC QN AUTO: 29.2 PG (ref 26.6–33)
MCHC RBC AUTO-ENTMCNC: 33 G/DL (ref 31.5–35.7)
MCV RBC AUTO: 88.3 FL (ref 79–97)
MONOCYTES # BLD: 0.21 10*3/MM3 (ref 0.1–0.9)
NEUTROPHILS # BLD AUTO: 4.08 10*3/MM3 (ref 1.7–7)
NEUTROPHILS NFR BLD MANUAL: 59.4 % (ref 42.7–76)
PLATELET # BLD AUTO: 153 10*3/MM3 (ref 140–450)
PMV BLD AUTO: 10.3 FL (ref 6–12)
POIKILOCYTOSIS BLD QL SMEAR: ABNORMAL
POTASSIUM SERPL-SCNC: 4.8 MMOL/L (ref 3.5–5.2)
PROT SERPL-MCNC: 7.6 G/DL (ref 6–8.5)
RBC # BLD AUTO: 3.67 10*6/MM3 (ref 3.77–5.28)
SODIUM SERPL-SCNC: 133 MMOL/L (ref 136–145)
VARIANT LYMPHS NFR BLD MANUAL: 2.1 % (ref 0–5)
VARIANT LYMPHS NFR BLD MANUAL: 35.4 % (ref 19.6–45.3)
WBC MORPH BLD: NORMAL
WBC NRBC COR # BLD AUTO: 6.87 10*3/MM3 (ref 3.4–10.8)

## 2025-05-12 ENCOUNTER — TELEPHONE (OUTPATIENT)
Dept: FAMILY MEDICINE CLINIC | Facility: CLINIC | Age: 80
End: 2025-05-12
Payer: MEDICARE

## 2025-05-12 NOTE — TELEPHONE ENCOUNTER
SPOKE WITH THE PATIENT'S SISTER TO ADVISE PATIENT WILL NEED TO OBTAIN URINE, PATIENT'S SISTER WILL  A SPECIMEN HAT AND CUP FOR SPECIMEN TO BE OBTAINED

## 2025-05-12 NOTE — TELEPHONE ENCOUNTER
Contacted the patient to discuss blood work. The patients sister expressed good understanding and appreciation. No questions or concerns.   basilic vein with the use of Ultrasound. [FAHEEM]      ED Course User Index  [FAHEEM] Aquiles Grant DO  [MS] Gonzales Mendez DO            Chronic Conditions:   Past Medical History:   Diagnosis Date    Asthma     Atrial fibrillation (HCC)     Common migraine     Constipation     Diabetes mellitus (HCC)     Heartburn     Hyperlipidemia     Hypertension     Irritable bowel syndrome     Kidney disease     Lumbar spondylosis 12/19/2017    Marijuana user 08/07/2023    Migraines     Morbid obesity due to excess calories     Open wound of great toe, left, initial encounter 11/08/2023    Radiculopathy, cervical     Tracheal stenosis     Type II or unspecified type diabetes mellitus without mention of complication, not stated as uncontrolled     Uncontrolled diabetes mellitus          Records Reviewed: Note from 12/7/2024 for hyperglycemia/HHS  Note from 11/2/2024 for hyperglycemia    CONSULTS: (Who and What was discussed)  IP CONSULT TO INTERNAL MEDICINE  IP CONSULT TO CRITICAL CARE  IP CONSULT TO DIABETES EDUCATOR      FINAL IMPRESSION      1. Diabetic ketoacidosis without coma associated with type 2 diabetes mellitus (HCC)          DISPOSITION/PLAN     DISPOSITION Admitted 12/31/2024 12:48:38 PM      PATIENT REFERRED TO:  No follow-up provider specified.    DISCHARGE MEDICATIONS:  Current Discharge Medication List               (Please note that portions of this note were completed with a voice recognition program.  Efforts were made to edit the dictations but occasionally words are mis-transcribed.)    Phan Fraga DO (electronically signed)

## 2025-05-13 ENCOUNTER — LAB (OUTPATIENT)
Dept: FAMILY MEDICINE CLINIC | Facility: CLINIC | Age: 80
End: 2025-05-13
Payer: MEDICARE

## 2025-05-13 DIAGNOSIS — R10.32 LEFT LOWER QUADRANT ABDOMINAL PAIN: Primary | ICD-10-CM

## 2025-05-13 LAB
BILIRUB UR QL STRIP: NEGATIVE
CLARITY UR: CLEAR
COLOR UR: YELLOW
GLUCOSE UR STRIP-MCNC: NEGATIVE MG/DL
HGB UR QL STRIP.AUTO: ABNORMAL
HOLD SPECIMEN: NORMAL
KETONES UR QL STRIP: NEGATIVE
LEUKOCYTE ESTERASE UR QL STRIP.AUTO: NEGATIVE
NITRITE UR QL STRIP: NEGATIVE
PH UR STRIP.AUTO: 6 [PH] (ref 5–8)
PROT UR QL STRIP: ABNORMAL
SP GR UR STRIP: 1.01 (ref 1–1.03)
UROBILINOGEN UR QL STRIP: ABNORMAL

## 2025-05-13 PROCEDURE — 81001 URINALYSIS AUTO W/SCOPE: CPT | Performed by: FAMILY MEDICINE

## 2025-05-14 LAB
BACTERIA UR QL AUTO: ABNORMAL /HPF
HYALINE CASTS UR QL AUTO: ABNORMAL /LPF
RBC # UR STRIP: ABNORMAL /HPF
REF LAB TEST METHOD: ABNORMAL
SQUAMOUS #/AREA URNS HPF: ABNORMAL /HPF
WBC # UR STRIP: ABNORMAL /HPF

## 2025-05-15 DIAGNOSIS — M51.369 DEGENERATIVE DISC DISEASE, LUMBAR: ICD-10-CM

## 2025-05-15 RX ORDER — HYDROCODONE BITARTRATE AND ACETAMINOPHEN 7.5; 325 MG/1; MG/1
1 TABLET ORAL EVERY 12 HOURS PRN
Qty: 20 TABLET | Refills: 0 | Status: SHIPPED | OUTPATIENT
Start: 2025-05-15

## 2025-05-15 NOTE — TELEPHONE ENCOUNTER
Caller: Arminda Krishnan    Relationship: Self    Best call back number: [phone]     Requested Prescriptions:   Requested Prescriptions     Pending Prescriptions Disp Refills    HYDROcodone-acetaminophen (NORCO) 7.5-325 MG per tablet 20 tablet 0     Sig: Take 1 tablet by mouth Every 12 (Twelve) Hours As Needed for Moderate Pain.        [address] [facility] [phone] [facility] [phone] FX     Last office visit with prescribing clinician: 5/8/2025   Last telemedicine visit with prescribing clinician: 4/17/2025   Next office visit with prescribing clinician: 6/16/2025     Additional details provided by patient:     Does the patient have less than a 3 day supply:  [x] Yes  [] No    Would you like a call back once the refill request has been completed: [] Yes [x] No    If the office needs to give you a call back, can they leave a voicemail: [] Yes [x] No    Marlin Duncan Rep   05/15/25 13:42 EDT

## 2025-05-20 RX ORDER — GLIPIZIDE 5 MG/1
5 TABLET ORAL
Qty: 90 TABLET | Refills: 3 | Status: SHIPPED | OUTPATIENT
Start: 2025-05-20

## 2025-05-27 ENCOUNTER — OFFICE VISIT (OUTPATIENT)
Dept: NEUROLOGY | Facility: CLINIC | Age: 80
End: 2025-05-27
Payer: MEDICARE

## 2025-05-27 VITALS
DIASTOLIC BLOOD PRESSURE: 64 MMHG | WEIGHT: 143 LBS | BODY MASS INDEX: 28.07 KG/M2 | OXYGEN SATURATION: 95 % | HEIGHT: 60 IN | SYSTOLIC BLOOD PRESSURE: 102 MMHG | HEART RATE: 80 BPM

## 2025-05-27 DIAGNOSIS — G44.52 NEW DAILY PERSISTENT HEADACHE: ICD-10-CM

## 2025-05-27 DIAGNOSIS — F01.511 VASCULAR DEMENTIA WITH AGITATION, UNSPECIFIED DEMENTIA SEVERITY: Primary | ICD-10-CM

## 2025-05-27 DIAGNOSIS — G47.09 OTHER INSOMNIA: ICD-10-CM

## 2025-05-27 RX ORDER — QUETIAPINE FUMARATE 25 MG/1
25 TABLET, FILM COATED ORAL NIGHTLY
Qty: 30 TABLET | Refills: 6 | Status: SHIPPED | OUTPATIENT
Start: 2025-05-27

## 2025-05-27 NOTE — PROGRESS NOTES
Neuro Office Visit      Encounter Date: 2025   Patient Name: Arminda Krishnan  : 1945   MRN: [id number]   PCP:  Aiden Spencer MD     Chief Complaint:    Chief Complaint   Patient presents with    Altered Mental Status       History of Present Illness: Arminda Krishnan is a 80 y.o. female who is here today in Neurology for altered mental status.    Accompanied by her sister who assists with history.    Hospitalized at [facility] from 10/9 - 10/22/2024 with pneumonia, UTI, and acute encephalopathy with underlying vascular dementia.    At discharge she was started on donepezil.    Hospitalized at [facility] from -2024 with SDH and SAH following a fall out of bed.  She was leaning out of bed to kill a bug when she fell and hit her head.    Per review of notes she was treated medically.  She did have agitation and confusion while hospitalized.  She was recommended for inpatient rehab but family refused secondary to a bad experience previously.  History of Present Illness  The patient presents for evaluation of headaches, memory issues, and sleep disturbances.    She continues to experience severe headaches, which occur daily and are localized at the front of her head, extending downwards.     The pain is described as sharp and penetrating, lasting between 2 to 3 hours.     She also reports difficulty sleeping on one side due to the headache.     Accompanying these headaches is a whooshing sound in her left ear, which is more pronounced at night when she is in bed.     She has been experiencing these symptoms since her fall on 2024, which resulted in a brain bleed and subsequent hospitalization at  for 3 to 4 days.     The headaches are often present upon waking and persist until midday.     Weather conditions, particularly rain and storms, appear to exacerbate her headaches.     She manages the pain with Tylenol and hydrocodone, the latter of which was prescribed for back pain  but also provides some relief for her headaches.    She was admitted to [facility] in 10/2024 due to memory issues and confusion. Her daughter reports that she becomes frustrated when she cannot recall her words.     She has been taking donepezil for her memory issues, but no significant improvement has been observed.     She also exhibits wandering behavior, both inside the house and outside, and becomes aggravated when corrected.    She resides with her sister and moves slowly around the house, using a walker for mobility.     She has not experienced any recent falls and has a bed rail installed for safety.     Her sleep pattern is irregular, often falling asleep in a chair during the day and then moving to her bed at night, where she sleeps for approximately 30 to 40 minutes before becoming restless.     She takes melatonin for sleep, but it does not seem to be effective.        MMSE 18/30      Subjective      Past Medical History:   Past Medical History:   Diagnosis Date    Anemia     Arthritis     Back problem     CAD (coronary artery disease)     Cancer     Right breast    Chronic back pain     Chronically on opiate therapy     Depression     Diabetes mellitus     DX 14 years ago- checks fsbs weekly    Fibromyalgia     Gastroparesis     GERD (gastroesophageal reflux disease)     Headache     emotional/tension    History of transfusion     [facility]    HTN (hypertension)     Hypercholesteremia     IBS (irritable bowel syndrome)     Incontinence of urine     urgency    Migraine headache     Myalgia and myositis     Peripheral neuropathy     Sleep apnea     does not wear cpap    UTI (urinary tract infection)        Past Surgical History:   Past Surgical History:   Procedure Laterality Date    APPENDECTOMY      ARTERIOGRAM MESENTERIC N/A 6/16/2022    Procedure: DIAGNOSTIC ARTERIOGRAM WITH CELIAC STENT PLACEMENT;  Surgeon: Neo Neil MD;  Location: [facility];  Service: Vascular;  Laterality:  N/A;  FLUORO: 16 MIN  DOSE: 2384 MGY  CONTRAST:  20 ML    BACK SURGERY      5x per patient    BRAIN TUMOR EXCISION  1988    BREAST BIOPSY      CARPAL TUNNEL RELEASE Bilateral     CHOLECYSTECTOMY      COLONOSCOPY      2015    CRANIOTOMY FOR TUMOR      EYE SURGERY      bilateral cataracts removed    HEMORRHOIDECTOMY      LUMBAR FUSION N/A 01/04/2017    Procedure: LUMBAR LAMINECTOMY AND DECOMRESSION  L3 AND L4;  Surgeon: Nishant Bhatti MD;  Location: [facility], [facility];  Service:     TOTAL ABDOMINAL HYSTERECTOMY      TRIGGER FINGER RELEASE         Family History:   Family History   Problem Relation Age of Onset    Cancer Other     Diabetes Other     Hyperlipidemia Other     Heart attack Other     Hypertension Other     Heart attack Mother     Diabetes Mother     Heart disease Mother     Hypertension Mother     Cancer Father     Alcohol abuse Father     Heart attack Sister     Diabetes Sister     Heart disease Sister     Hypertension Sister     Cancer Brother     Diabetes Brother     Hypertension Brother     Heart attack Sister     Stroke Sister     Cancer Brother        Social History:   Social History     Socioeconomic History    Marital status:     Number of children: 2   Tobacco Use    Smoking status: Former     Current packs/day: 0.25     Average packs/day: 0.4 packs/day for 128.4 years (47.6 ttl pk-yrs)     Types: Cigarettes     Start date: 1959     Passive exposure: Past    Smokeless tobacco: Never    Tobacco comments:     1/17/2022 quit    Vaping Use    Vaping status: Never Used   Substance and Sexual Activity    Alcohol use: No    Drug use: No    Sexual activity: Not Currently       Medications:     Current Outpatient Medications:     acetaminophen (TYLENOL) 325 MG tablet, Take 2 tablets by mouth Every 4 (Four) Hours As Needed for Mild Pain., Disp: , Rfl:     albuterol (PROVENTIL) (2.5 MG/3ML) 0.083% nebulizer solution, Take 2.5 mg by nebulization Every 4 (Four) Hours As Needed for Wheezing or Shortness of  Air., Disp: 360 mL, Rfl: 11    aspirin 81 MG chewable tablet, Chew 1 tablet Daily., Disp: , Rfl:     atorvastatin (LIPITOR) 80 MG tablet, Take 1 tablet by mouth Daily., Disp: 90 tablet, Rfl: 3    Blood Glucose Monitoring Suppl (ONE TOUCH ULTRA 2) w/Device kit, USE 1 EACH DAILY., Disp: , Rfl:     Blood Glucose Monitoring Suppl kit, Use 1 each Daily., Disp: 1 each, Rfl: 11    carvedilol (COREG) 25 MG tablet, TAKE ONE TABLET BY MOUTH TWO TIMES A DAY, Disp: 180 tablet, Rfl: 3    clopidogrel (PLAVIX) 75 MG tablet, TAKE ONE TABLET BY MOUTH EVERY DAY, Disp: 30 tablet, Rfl: 11    Comfort EZ Pen Needles 32G X 4 MM misc, Inject 1 each under the skin into the appropriate area as directed 4 (Four) Times a Day. as directed, Disp: 400 each, Rfl: 3    Diclofenac Sodium (VOLTAREN) 1 % gel gel, APPLY TO AFFECTED AREA FOUR TIMES DAILY, Disp: 100 g, Rfl: 12    donepezil (ARICEPT) 10 MG tablet, Take 1 tablet by mouth Every Night. For 2 weeks, then increase to 2 tablets by mouth every night., Disp: 90 tablet, Rfl: 3    DULoxetine (CYMBALTA) 60 MG capsule, TAKE ONE CAPSULE BY MOUTH EVERY DAY, Disp: 90 capsule, Rfl: 2    ferrous sulfate 325 (65 FE) MG tablet, Take 1 tablet by mouth Daily With Breakfast., Disp: , Rfl:     fluticasone (FLONASE) 50 MCG/ACT nasal spray, USE 2 SPRAYS IN EACH NOSTRIL EVERY DAY, Disp: 48 g, Rfl: 12    glipizide (GLUCOTROL) 5 MG tablet, TAKE 1 TABLET BY MOUTH EVERY MORNING BEFORE BREAKFAST., Disp: 90 tablet, Rfl: 3    glucagon (GLUCAGEN) 1 MG injection, Inject 1 mg under the skin into the appropriate area as directed 1 (One) Time As Needed (hypoglycemia) for up to 1 dose., Disp: 1 each, Rfl: 0    Glucose Blood (Blood Glucose Test) strip, 1 each by In Vitro route 3 (Three) Times a Day., Disp: 100 each, Rfl: 11    Hydrocod Bernardo-Chlorphe Bernardo ER (TUSSIONEX PENNKINETIC) 10-8 MG/5ML ER suspension, Take 5 mL by mouth Every 12 (Twelve) Hours As Needed (as needed for cough)., Disp: 120 mL, Rfl: 0     HYDROcodone-acetaminophen (NORCO) 7.5-325 MG per tablet, Take 1 tablet by mouth Every 12 (Twelve) Hours As Needed for Moderate Pain., Disp: 20 tablet, Rfl: 0    isosorbide mononitrate (IMDUR) 60 MG 24 hr tablet, Take 1 tablet by mouth Daily., Disp: 90 tablet, Rfl: 3    Lancets (OneTouch Delica Plus [id number]) misc, 3 (Three) Times a Day. as directed, Disp: , Rfl:     levETIRAcetam (KEPPRA) 500 MG tablet, , Disp: , Rfl:     lidocaine (LIDODERM) 5 %, Place 1 patch on the skin as directed by provider Daily. Remove & Discard patch within 12 hours or as directed by MD, Disp: 30 each, Rfl: 11    linaclotide (LINZESS) 145 MCG capsule capsule, Take 1 capsule by mouth Every Morning Before Breakfast., Disp: 30 capsule, Rfl: 11    loratadine (CLARITIN) 10 MG tablet, , Disp: , Rfl:     losartan (COZAAR) 50 MG tablet, Take 1 tablet by mouth Daily., Disp: , Rfl:     multivitamin with minerals tablet tablet, Take 1 tablet by mouth Daily., Disp: , Rfl:     naloxone (NARCAN) 4 MG/0.1ML nasal spray, Administer 1 spray into the nostril(s) as directed by provider As Needed., Disp: , Rfl:     pantoprazole (PROTONIX) 40 MG EC tablet, TAKE ONE TABLET BY MOUTH TWO TIMES A DAY, Disp: 180 tablet, Rfl: 3    simethicone (MYLICON) 80 MG chewable tablet, Chew 1 tablet 4 (Four) Times a Day As Needed for Flatulence., Disp: , Rfl:     SITagliptin (Januvia) 50 MG tablet, TAKE ONE TABLET BY MOUTH EVERY DAY, Disp: 90 tablet, Rfl: 3    tacrolimus (PROTOPIC) 0.1 % ointment, APPLY TO AFFECTED AREA TWO TIMES A DAY, Disp: , Rfl:     True Metrix Blood Glucose Test test strip, Daily. for testing, Disp: , Rfl:     Ventolin  (90 Base) MCG/ACT inhaler, INHALE 2 PUFFS EVERY 6 (SIX) HOURS AS NEEDED FOR WHEEZING., Disp: 18 g, Rfl: 11    QUEtiapine (SEROquel) 25 MG tablet, Take 1 tablet by mouth Every Night., Disp: 30 tablet, Rfl: 6    Allergies:   Allergies   Allergen Reactions    Cefaclor Rash and Unknown - Low Severity       PHQ-9 Total Score:     BA  Fall Risk Assessment was completed, and patient is at HIGH risk for falls. Assessment completed on:5/27/2025    Objective     Physical Exam:     Neurological Exam  Mental Status  Awake, alert and oriented to person, place and time. Recalls 3 of 3 objects immediately. At 5 minutes recalls 2 of 3 objects. Recalls 0 of 3 objects with prompting. Unable to copy figure. Speech is normal. Able to repeat and read. Follows two-step commands. Unable to perform serial calculations. Difficulty spelling words backwards. and 1 backward. MMSE score: 18.    Cranial Nerves  CN II: Visual acuity is normal.  CN III, IV, VI: Extraocular movements intact bilaterally. Pupils equal round and reactive to light bilaterally.  CN V: Facial sensation is normal.  CN VII: Full and symmetric facial movement.  CN IX, X: Palate elevates symmetrically  CN XI: Shoulder shrug strength is normal.  CN XII: Tongue midline without atrophy or fasciculations.    Motor  Normal muscle bulk throughout. No fasciculations present. Normal muscle tone.  Strength is 3/5 in all extremities.    Sensory  Sensation is intact to light touch, pinprick, vibration and proprioception in all four extremities.    Reflexes                                            Right                      Left  Brachioradialis                    2+                         2+  Biceps                                 2+                         2+  Triceps                                2+                         2+  Finger flex                           2+                         2+  Hamstring                            2+                         2+  Patellar                                2+                         2+  Achilles                                2+                         2+    Coordination    Finger-to-nose, rapid alternating movements and heel-to-shin normal bilaterally without dysmetria.    Gait    In wheelchair..        Vital Signs:   Vitals:    05/27/25 0937   BP: 102/64  "  Pulse: 80   SpO2: 95%   Weight: 64.9 kg (143 lb)  Comment: in wheelchair   Height: 152.4 cm (60\")     Body mass index is 27.93 kg/m².     Results:   Results       Imaging:   XR Abdomen 2+ VW with Chest 1 VW  Result Date: 5/9/2025  Tiny right nephrolithiasis versus bowel content.    Images were reviewed, interpreted, and dictated by Dr. Jennifer Church MD Transcribed by Jhony Cardoza PA-C.  This report was signed and finalized on 5/9/2025 9:18 AM by Jennifer Church MD.      XR Shoulder 2+ View Right  Result Date: 4/24/2025  No acute bony abnormality.  Possible tendinous calcification, consider right shoulder MRI if pain persists.      This report was signed and finalized on 4/24/2025 6:40 PM by Jennifer Church MD.      XR Chest 1 View  Result Date: 4/24/2025  Hyperinflation and bronchitis..      This report was signed and finalized on 4/24/2025 6:29 PM by Jennifer Church MD.      XR Ribs Right With PA Chest  Result Date: 4/24/2025  No acute bony process.  Bronchitis.     This report was signed and finalized on 4/24/2025 6:28 PM by Jennifer Church MD.      CT Head Without Contrast  Result Date: 2/28/2025  Impression: No acute intracranial findings. Electronically Signed: Francisco Javier Fuller  2/28/2025 10:30 PM EST  Workstation ID: [id number]       Labs:   No results found for: \"CMP\", \"PROTEIN\", \"ANTIMOGAB\", \"PTUMXJ5NHUM\", \"JCVRESULT\", \"QUANTTBGOLD\", \"CBCDIF\", \"IGGALBSER\"     Assessment / Plan      Assessment/Plan:   Diagnoses and all orders for this visit:    1. Vascular dementia with agitation, unspecified dementia severity (Primary)  Comments:  Start Seroquel    2. New daily persistent headache  Comments:  Nurtec samples    3. Other insomnia  Comments:  Start Seroquel    Other orders  -     QUEtiapine (SEROquel) 25 MG tablet; Take 1 tablet by mouth Every Night.  Dispense: 30 tablet; Refill: 6         Assessment & Plan  1. Headaches.  She experiences daily headaches, described as sharp pain starting at the front of the head and going " down. The headaches last 2-3 hours and sometimes require medication. A sample of Nurtec has been provided, with instructions to place one tablet under the tongue during a headache episode. If this proves effective, prescription will be issued  2. Memory issues.  Her memory score indicates moderate cognitive impairment. She will continue taking donepezil. An additional memory medication will be introduced to stabilize her memory.    3. Sleep disturbances.  She has difficulty sleeping at night and often falls asleep during the day. A prescription for Seroquel has been provided, with instructions to take one tablet at night to aid sleep. She will continue taking melatonin.    Follow-up  The patient will follow up in 3 months.    Patient Education:     Reviewed medications, potential side effects and signs and symptoms to report. Discussed risk versus benefits of treatment plan with patient and/or family-including medications, labs and radiology that may be ordered. Addressed questions and concerns during visit. Patient and/or family verbalized understanding and agree with plan. Instructed to call the office with any questions and report to ER with any life-threatening symptoms.     Follow Up:   Return in about 3 months (around 8/27/2025).    I spent 45 minutes caring for Arminda on this date of service. This time includes time spent by me in the following activities: preparing for the visit, reviewing tests, performing a medically appropriate examination and/or evaluation, counseling and educating the patient/family/caregiver, documenting information in the medical record, and ordering medications.        During this visit the following were done:  Labs Reviewed [x]    Labs Ordered []    Radiology Reports Reviewed [x]    Radiology Ordered []    PCP Records Reviewed []    Referring Provider Records Reviewed []    ER Records Reviewed []    Hospital Records Reviewed [x]    History Obtained From Family []    Radiology Images  Reviewed [x]    Other Reviewed []    Records Requested []      Patient or patient representative verbalized consent for the use of Ambient Listening during the visit with  BALAJI Veronica for chart documentation. 5/27/2025  13:23 EDT    BALAJI Veronica   [facility]  [address] [place] [address]  [facility] [id number] [phone]

## 2025-05-29 ENCOUNTER — TELEPHONE (OUTPATIENT)
Dept: NEUROLOGY | Facility: CLINIC | Age: 80
End: 2025-05-29

## 2025-05-29 ENCOUNTER — PRIOR AUTHORIZATION (OUTPATIENT)
Dept: NEUROLOGY | Facility: CLINIC | Age: 80
End: 2025-05-29
Payer: MEDICARE

## 2025-05-29 NOTE — TELEPHONE ENCOUNTER
Caller: Delbert Mcdermott    Relationship to patient: Emergency Contact      Best call back number: [phone]     Provider: MATTHEWS    Medication PA needed: QUETIAPINE    Reason for call/Prior Auth: INSURANCE REQUIRES A PRIOR AUTHORIZATION       PLEASE REVIEW

## 2025-06-05 DIAGNOSIS — M51.369 DEGENERATIVE DISC DISEASE, LUMBAR: ICD-10-CM

## 2025-06-05 RX ORDER — HYDROCODONE BITARTRATE AND ACETAMINOPHEN 7.5; 325 MG/1; MG/1
1 TABLET ORAL EVERY 12 HOURS PRN
Qty: 20 TABLET | Refills: 0 | Status: SHIPPED | OUTPATIENT
Start: 2025-06-05

## 2025-06-13 ENCOUNTER — TELEPHONE (OUTPATIENT)
Dept: NEUROLOGY | Facility: CLINIC | Age: 80
End: 2025-06-13

## 2025-06-13 ENCOUNTER — TELEPHONE (OUTPATIENT)
Dept: CARDIAC SURGERY | Facility: CLINIC | Age: 80
End: 2025-06-13
Payer: MEDICARE

## 2025-06-13 NOTE — TELEPHONE ENCOUNTER
I called patient's sister to tell her that we need to cancel patient's appointment on 6/19/25 because she has not had her ultrasound done. They do want to r/s the ultrasound so our office will do that and make her an appointment to follow up after the ultrasound is done.

## 2025-06-13 NOTE — TELEPHONE ENCOUNTER
I sent a message to specialty pharmacy to see if we can get it approved.  If she would like to have some of the samples we can put some in the mail for her.

## 2025-06-13 NOTE — TELEPHONE ENCOUNTER
Caller: Delbert Mcdermott    Relationship: Emergency Contact    Best call back number: [phone]    Preferred pharmacy: [place] [address] [address] - [phone] [facility] [phone] FX    What was the call regarding: PT'S S[facility] AND WOULD LIKE TO KNOW IF NURTEC RX CAN BE SENT IN TO THEIR PREFERRED PHARMACY.    [facility]    Do you require a callback: YES, PLEASE.    PLEASE REVIEW AND ADVISE.

## 2025-06-16 ENCOUNTER — SPECIALTY PHARMACY (OUTPATIENT)
Dept: ONCOLOGY | Facility: HOSPITAL | Age: 80
End: 2025-06-16
Payer: MEDICARE

## 2025-06-16 ENCOUNTER — TELEPHONE (OUTPATIENT)
Dept: NEUROLOGY | Facility: CLINIC | Age: 80
End: 2025-06-16
Payer: MEDICARE

## 2025-06-17 ENCOUNTER — SPECIALTY PHARMACY (OUTPATIENT)
Dept: ONCOLOGY | Facility: HOSPITAL | Age: 80
End: 2025-06-17
Payer: MEDICARE

## 2025-06-17 PROBLEM — G44.52 NEW PERSISTENT DAILY HEADACHE: Status: ACTIVE | Noted: 2025-06-17

## 2025-06-17 NOTE — PROGRESS NOTES
Specialty Pharmacy Patient Management Program  Neurology Initial Assessment     Arminda Krishnan is a 80 y.o. female with migraines seen by a Neurology provider and enrolled in the Neurology Patient Management program offered by [facility].  An initial outreach was conducted, including assessment of therapy appropriateness and specialty medication education for Ubrelvy.  Pt given samples of Nurtec, which was effective abortive, but insurance requiring Ubrelvy The patient was introduced to services offered by [facility], including: regular assessments, refill coordination, curbside pick-up or mail order delivery options, prior authorization maintenance, and financial assistance programs as applicable. The patient was also provided with contact information for the pharmacy team.     Insurance Coverage & Financial Support  [employer]    Relevant Past Medical History and Comorbidities  Relevant medical history and concomitant health conditions were discussed with the patient. The patient's chart has been reviewed for relevant past medical history and comorbid health conditions and updated as necessary.   Past Medical History:   Diagnosis Date    Anemia     Arthritis     Back problem     CAD (coronary artery disease)     Cancer     Right breast    Chronic back pain     Chronically on opiate therapy     Depression     Diabetes mellitus     DX 14 years ago- checks fsbs weekly    Fibromyalgia     Gastroparesis     GERD (gastroesophageal reflux disease)     Headache     emotional/tension    History of transfusion     [facility]    HTN (hypertension)     Hypercholesteremia     IBS (irritable bowel syndrome)     Incontinence of urine     urgency    Migraine headache     Myalgia and myositis     Peripheral neuropathy     Sleep apnea     does not wear cpap    UTI (urinary tract infection)      Social History     Socioeconomic History    Marital status:      Number of children: 2   Tobacco Use    Smoking status: Former     Current packs/day: 0.25     Average packs/day: 0.4 packs/day for 128.5 years (47.6 ttl pk-yrs)     Types: Cigarettes     Start date: 1959     Passive exposure: Past    Smokeless tobacco: Never    Tobacco comments:     1/17/2022 quit    Vaping Use    Vaping status: Never Used   Substance and Sexual Activity    Alcohol use: No    Drug use: No    Sexual activity: Not Currently     Problem list reviewed by Ramila Russell, PharmD on 6/17/2025 at  1:02 PM    Allergies  Known allergies and reactions were discussed with the patient. The patient's chart has been reviewed for  allergy information and updated as necessary.   Allergies   Allergen Reactions    Cefaclor Rash and Unknown - Low Severity     Allergies reviewed by Ramila Russell, PharmD on 6/17/2025 at  1:02 PM    Relevant Laboratory Values  Common labs          3/13/2025    14:41 3/13/2025    14:52 4/24/2025    15:43 5/8/2025    17:45   Common Labs   Glucose 135   120  97    BUN 32   35  59    Creatinine 3.38   3.01  3.49    Sodium 138   142  133    Potassium 4.7   4.3  4.8    Chloride 102   113  101    Calcium 9.7   9.5  9.3    Albumin 4.0   3.9  3.7    Total Bilirubin 0.2   <0.2  0.3    Alkaline Phosphatase 89   98  116    AST (SGOT) 25   20  27    ALT (SGPT) 12   11  15    WBC 5.30   7.07  6.87    Hemoglobin 12.0   10.4  10.7    Hematocrit 37.6   33.0  32.4    Platelets 185   211  153    Total Cholesterol 167       Triglycerides 111       HDL Cholesterol 42       LDL Cholesterol  105       Hemoglobin A1C 6.30       Microalbumin, Urine  22.8          Lab Assessment  The above labs have been reviewed. No dose adjustments are needed for the specialty medication(s) based on the labs.     Current Medication List  This medication list has been reviewed with the patient and evaluated for any interactions or necessary modifications/recommendations, and updated to include all prescription medications, OTC  medications, and supplements the patient is currently taking.  This list reflects what is contained in the patient's profile, which has also been marked as reviewed to communicate to other providers it is the most up to date version of the patient's current medication therapy.     Current Outpatient Medications:     acetaminophen (TYLENOL) 325 MG tablet, Take 2 tablets by mouth Every 4 (Four) Hours As Needed for Mild Pain., Disp: , Rfl:     albuterol (PROVENTIL) (2.5 MG/3ML) 0.083% nebulizer solution, Take 2.5 mg by nebulization Every 4 (Four) Hours As Needed for Wheezing or Shortness of Air., Disp: 360 mL, Rfl: 11    aspirin 81 MG chewable tablet, Chew 1 tablet Daily., Disp: , Rfl:     atorvastatin (LIPITOR) 80 MG tablet, Take 1 tablet by mouth Daily., Disp: 90 tablet, Rfl: 3    Blood Glucose Monitoring Suppl (ONE TOUCH ULTRA 2) w/Device kit, USE 1 EACH DAILY., Disp: , Rfl:     Blood Glucose Monitoring Suppl kit, Use 1 each Daily., Disp: 1 each, Rfl: 11    carvedilol (COREG) 25 MG tablet, TAKE ONE TABLET BY MOUTH TWO TIMES A DAY, Disp: 180 tablet, Rfl: 3    clopidogrel (PLAVIX) 75 MG tablet, TAKE ONE TABLET BY MOUTH EVERY DAY, Disp: 30 tablet, Rfl: 11    Comfort EZ Pen Needles 32G X 4 MM misc, Inject 1 each under the skin into the appropriate area as directed 4 (Four) Times a Day. as directed, Disp: 400 each, Rfl: 3    Diclofenac Sodium (VOLTAREN) 1 % gel gel, APPLY TO AFFECTED AREA FOUR TIMES DAILY, Disp: 100 g, Rfl: 12    donepezil (ARICEPT) 10 MG tablet, Take 1 tablet by mouth Every Night. For 2 weeks, then increase to 2 tablets by mouth every night., Disp: 90 tablet, Rfl: 3    DULoxetine (CYMBALTA) 60 MG capsule, TAKE ONE CAPSULE BY MOUTH EVERY DAY, Disp: 90 capsule, Rfl: 2    ferrous sulfate 325 (65 FE) MG tablet, Take 1 tablet by mouth Daily With Breakfast., Disp: , Rfl:     fluticasone (FLONASE) 50 MCG/ACT nasal spray, USE 2 SPRAYS IN EACH NOSTRIL EVERY DAY, Disp: 48 g, Rfl: 12    glipizide (GLUCOTROL) 5 MG  tablet, TAKE 1 TABLET BY MOUTH EVERY MORNING BEFORE BREAKFAST., Disp: 90 tablet, Rfl: 3    glucagon (GLUCAGEN) 1 MG injection, Inject 1 mg under the skin into the appropriate area as directed 1 (One) Time As Needed (hypoglycemia) for up to 1 dose., Disp: 1 each, Rfl: 0    Glucose Blood (Blood Glucose Test) strip, 1 each by In Vitro route 3 (Three) Times a Day., Disp: 100 each, Rfl: 11    Hydrocod Bernardo-Chlorphe Bernardo ER (TUSSIONEX PENNKINETIC) 10-8 MG/5ML ER suspension, Take 5 mL by mouth Every 12 (Twelve) Hours As Needed (as needed for cough)., Disp: 120 mL, Rfl: 0    HYDROcodone-acetaminophen (NORCO) 7.5-325 MG per tablet, TAKE ONE TABLET BY MOUTH EVERY 12 HOURS AS NEEDED FOR MODERATE PAIN, Disp: 20 tablet, Rfl: 0    isosorbide mononitrate (IMDUR) 60 MG 24 hr tablet, Take 1 tablet by mouth Daily., Disp: 90 tablet, Rfl: 3    Lancets (OneTouch Delica Plus [id number]) misc, 3 (Three) Times a Day. as directed, Disp: , Rfl:     levETIRAcetam (KEPPRA) 500 MG tablet, , Disp: , Rfl:     lidocaine (LIDODERM) 5 %, Place 1 patch on the skin as directed by provider Daily. Remove & Discard patch within 12 hours or as directed by MD, Disp: 30 each, Rfl: 11    linaclotide (LINZESS) 145 MCG capsule capsule, Take 1 capsule by mouth Every Morning Before Breakfast., Disp: 30 capsule, Rfl: 11    loratadine (CLARITIN) 10 MG tablet, , Disp: , Rfl:     losartan (COZAAR) 50 MG tablet, Take 1 tablet by mouth Daily., Disp: , Rfl:     multivitamin with minerals tablet tablet, Take 1 tablet by mouth Daily., Disp: , Rfl:     naloxone (NARCAN) 4 MG/0.1ML nasal spray, Administer 1 spray into the nostril(s) as directed by provider As Needed., Disp: , Rfl:     pantoprazole (PROTONIX) 40 MG EC tablet, TAKE ONE TABLET BY MOUTH TWO TIMES A DAY, Disp: 180 tablet, Rfl: 3    QUEtiapine (SEROquel) 25 MG tablet, Take 1 tablet by mouth Every Night., Disp: 30 tablet, Rfl: 6    simethicone (MYLICON) 80 MG chewable tablet, Chew 1 tablet 4 (Four) Times a Day As  Needed for Flatulence., Disp: , Rfl:     SITagliptin (Januvia) 50 MG tablet, TAKE ONE TABLET BY MOUTH EVERY DAY, Disp: 90 tablet, Rfl: 3    tacrolimus (PROTOPIC) 0.1 % ointment, APPLY TO AFFECTED AREA TWO TIMES A DAY, Disp: , Rfl:     True Metrix Blood Glucose Test test strip, Daily. for testing, Disp: , Rfl:     ubrogepant (Ubrelvy) 100 MG tablet, Take 1 tablet by mouth Daily As Needed (migarines). Take at onset of headache - if symptoms persist or return, may repeat dose in 2 hours. Maximum: 200 mg per 24 hours, Disp: 16 tablet, Rfl: 11    Ventolin  (90 Base) MCG/ACT inhaler, INHALE 2 PUFFS EVERY 6 (SIX) HOURS AS NEEDED FOR WHEEZING., Disp: 18 g, Rfl: 11    Medicines reviewed by Ramila Russell, PharmD on 6/17/2025 at  1:02 PM    Drug Interactions  No relevant drug-drug interactions with specialty medication(s):  Ubrelvy.        Initial Education Provided for Specialty Medication  The patient has been provided with  education and any applicable administration techniques (i.e. self-injection) have been demonstrated for the therapies indicated. All questions and concerns have been addressed prior to the patient receiving the medication, and the patient has verbalized understanding of the education and any materials provided.  Additional patient education shall be provided and documented upon request by the patient, provider or payer.        Adherence and Self-Administration  Adherence related to the patient's specialty therapy was discussed with the patient. The Adherence segment of this outreach has been reviewed and updated.   Is there a concern with patient's ability to self administer the medication correctly and without issue?: No  Were any potential barriers to adherence identified during the initial assessment or patient education?: No  Are there any concerns regarding the patient's understanding of the importance of medication adherence?: No  Methods for Supporting Patient Adherence and/or  Self-Administration: n/a    Goals of Therapy  Goals related to the patient's specialty therapy were discussed with the patient. The Patient Goals segment of this outreach has been reviewed and updated.   Goals Addressed Today        Specialty Pharmacy General Goal      On Average, Reduce:   Symptom severity by 75 % within 60 min of taking acute therapy.     Baseline Values/Notes on Enrollment  Frequency: daily  Symptom Severity: severe  Duration: 2-3 hours    Date of Reassessment Notes on Progress Toward Above Goals   6/17/25 Nurtec samples effective abortive, but non-formulary on pt's insurance.  Ubrelvy formulary, and prior auth approved.                                                        Reassessment Plan & Follow-Up  Medication Therapy Changes: D/C Nurtec (non-formulary) and start Ubrelvy 100 mg po once daily as needed for migraines - Take at onset of headache - May repeat x 1 in 2 hours if needed (max of 200 mg/ 24 hrs)  Related Plans, Therapy Recommendations, or Therapy Problems to Be Addressed: none  Pharmacist to perform regular reassessments no more than (6) months from the previous assessment.  Care Coordinator to set up future refill outreaches, coordinate prescription delivery, and escalate clinical questions to pharmacist.   Welcome information and patient satisfaction survey to be sent by specialty pharmacy team with patient's initial fill.    Attestation  Therapeutic appropriateness: Appropriate   I attest the patient was actively involved in and has agreed to the above plan of care. If the prescribed therapy is at any point deemed not appropriate based on the current or future assessments, a consultation will be initiated with the patient's specialty care provider to determine the best course of action. The revised plan of therapy will be documented along with any additional patient education provided. Discussed aforementioned material with patient via telemedicine.    Ramila Russell, PharmD,  BCPS  Clinic Specialty Pharmacist, Neurology  6/17/2025  13:14 EDT

## 2025-06-17 NOTE — PROGRESS NOTES
Specialty Pharmacy Patient Management Program  [facility]      Arminda is a 80 y.o. female contacted today regarding refills of her medication(s).    Specialty medication(s) and dose(s) confirmed: initial fill of Ubrelvy  Other medications being refilled: none        Delivery Questions      Flowsheet Row Most Recent Value   Delivery method UPS   Delivery address verified with patient/caregiver? Yes   Delivery address Home   Number of medications in delivery 1   Medication(s) being filled and delivered Ubrogepant (UBRELVY)   Doses left of specialty medications new start   Copay verified? Yes   Copay amount Ubrelvy co-pay $0   Copay form of payment No copayment ($0)   Delivery Date Selection 06/18/25   Signature Required No   Do you consent to receive electronic handouts?  No                   Follow-up: 25 days     Ramila Russell, PharmD  Specialty Pharmacist  6/17/2025  13:14 EDT

## 2025-06-24 ENCOUNTER — LAB (OUTPATIENT)
Dept: FAMILY MEDICINE CLINIC | Facility: CLINIC | Age: 80
End: 2025-06-24
Payer: MEDICARE

## 2025-06-24 DIAGNOSIS — I10 ESSENTIAL HYPERTENSION, MALIGNANT: ICD-10-CM

## 2025-06-24 DIAGNOSIS — N18.4 CHRONIC KIDNEY DISEASE, STAGE IV (SEVERE): Primary | ICD-10-CM

## 2025-06-24 DIAGNOSIS — E61.1 IRON DEFICIENCY: ICD-10-CM

## 2025-06-24 DIAGNOSIS — D50.9 IRON DEFICIENCY ANEMIA, UNSPECIFIED IRON DEFICIENCY ANEMIA TYPE: ICD-10-CM

## 2025-06-24 LAB
ALBUMIN SERPL-MCNC: 3.9 G/DL (ref 3.5–5.2)
ANION GAP SERPL CALCULATED.3IONS-SCNC: 10.8 MMOL/L (ref 5–15)
BUN SERPL-MCNC: 34 MG/DL (ref 8–23)
BUN/CREAT SERPL: 9.9 (ref 7–25)
CALCIUM SPEC-SCNC: 9 MG/DL (ref 8.6–10.5)
CHLORIDE SERPL-SCNC: 105 MMOL/L (ref 98–107)
CO2 SERPL-SCNC: 22.2 MMOL/L (ref 22–29)
CREAT SERPL-MCNC: 3.42 MG/DL (ref 0.57–1)
DEPRECATED RDW RBC AUTO: 57.8 FL (ref 37–54)
EGFRCR SERPLBLD CKD-EPI 2021: 12.9 ML/MIN/1.73
ERYTHROCYTE [DISTWIDTH] IN BLOOD BY AUTOMATED COUNT: 17.9 % (ref 12.3–15.4)
GLUCOSE SERPL-MCNC: 93 MG/DL (ref 65–99)
HCT VFR BLD AUTO: 24.1 % (ref 34–46.6)
HGB BLD-MCNC: 7.9 G/DL (ref 12–15.9)
MCH RBC QN AUTO: 29.7 PG (ref 26.6–33)
MCHC RBC AUTO-ENTMCNC: 32.8 G/DL (ref 31.5–35.7)
MCV RBC AUTO: 90.6 FL (ref 79–97)
PHOSPHATE SERPL-MCNC: 3.8 MG/DL (ref 2.5–4.5)
PLATELET # BLD AUTO: 197 10*3/MM3 (ref 140–450)
PMV BLD AUTO: 10 FL (ref 6–12)
POTASSIUM SERPL-SCNC: 4.9 MMOL/L (ref 3.5–5.2)
RBC # BLD AUTO: 2.66 10*6/MM3 (ref 3.77–5.28)
SODIUM SERPL-SCNC: 138 MMOL/L (ref 136–145)
WBC NRBC COR # BLD AUTO: 8.28 10*3/MM3 (ref 3.4–10.8)

## 2025-06-24 PROCEDURE — 85027 COMPLETE CBC AUTOMATED: CPT | Performed by: NURSE PRACTITIONER

## 2025-06-24 PROCEDURE — 36415 COLL VENOUS BLD VENIPUNCTURE: CPT | Performed by: FAMILY MEDICINE

## 2025-06-24 PROCEDURE — 80069 RENAL FUNCTION PANEL: CPT | Performed by: NURSE PRACTITIONER

## 2025-06-25 ENCOUNTER — LAB (OUTPATIENT)
Dept: FAMILY MEDICINE CLINIC | Facility: CLINIC | Age: 80
End: 2025-06-25
Payer: MEDICARE

## 2025-06-25 DIAGNOSIS — N18.4 CHRONIC KIDNEY DISEASE, STAGE IV (SEVERE): ICD-10-CM

## 2025-06-25 DIAGNOSIS — I10 ESSENTIAL HYPERTENSION, MALIGNANT: ICD-10-CM

## 2025-06-25 DIAGNOSIS — E61.1 IRON DEFICIENCY: ICD-10-CM

## 2025-06-25 DIAGNOSIS — D50.9 IRON DEFICIENCY ANEMIA, UNSPECIFIED IRON DEFICIENCY ANEMIA TYPE: ICD-10-CM

## 2025-06-25 LAB
BILIRUB UR QL STRIP: NEGATIVE
CLARITY UR: CLEAR
COLOR UR: YELLOW
GLUCOSE UR STRIP-MCNC: NEGATIVE MG/DL
HGB UR QL STRIP.AUTO: ABNORMAL
KETONES UR QL STRIP: NEGATIVE
LEUKOCYTE ESTERASE UR QL STRIP.AUTO: NEGATIVE
NITRITE UR QL STRIP: NEGATIVE
PH UR STRIP.AUTO: 6.5 [PH] (ref 5–8)
PROT UR QL STRIP: ABNORMAL
SP GR UR STRIP: 1.01 (ref 1–1.03)
UROBILINOGEN UR QL STRIP: ABNORMAL

## 2025-06-25 PROCEDURE — 81003 URINALYSIS AUTO W/O SCOPE: CPT | Performed by: NURSE PRACTITIONER

## 2025-06-28 DIAGNOSIS — M51.369 DEGENERATIVE DISC DISEASE, LUMBAR: ICD-10-CM

## 2025-06-29 RX ORDER — HYDROCODONE BITARTRATE AND ACETAMINOPHEN 7.5; 325 MG/1; MG/1
1 TABLET ORAL EVERY 12 HOURS PRN
Qty: 20 TABLET | Refills: 0 | Status: SHIPPED | OUTPATIENT
Start: 2025-06-29

## 2025-07-07 RX ORDER — LOSARTAN POTASSIUM 50 MG/1
50 TABLET ORAL DAILY
Qty: 90 TABLET | Refills: 3 | Status: SHIPPED | OUTPATIENT
Start: 2025-07-07

## 2025-07-07 NOTE — TELEPHONE ENCOUNTER
Rx Refill Note  Requested Prescriptions     Pending Prescriptions Disp Refills    losartan (COZAAR) 50 MG tablet [Pharmacy Med Name: LOSARTAN 50 MG TAB 50 Tablet] 90 tablet 3     Sig: TAKE ONE TABLET BY MOUTH EVERY DAY      Last office visit with prescribing clinician: 5/8/2025   Last telemedicine visit with prescribing clinician: 4/17/2025   Next office visit with prescribing clinician: 7/22/2025                         Would you like a call back once the refill request has been completed: [] Yes [] No    If the office needs to give you a call back, can they leave a voicemail: [] Yes [] No    Jennifer Gómez MA  07/07/25, 10:31 EDT

## 2025-07-16 NOTE — TELEPHONE ENCOUNTER
Called to see if patient had received the samples and been in contact with pharmacy staff.  The mailbox was full so I was unable to leave a voice mail.

## 2025-07-17 ENCOUNTER — SPECIALTY PHARMACY (OUTPATIENT)
Dept: ONCOLOGY | Facility: HOSPITAL | Age: 80
End: 2025-07-17
Payer: MEDICARE

## 2025-07-17 DIAGNOSIS — E78.2 MIXED HYPERLIPIDEMIA: ICD-10-CM

## 2025-07-17 RX ORDER — ATORVASTATIN CALCIUM 80 MG/1
80 TABLET, FILM COATED ORAL DAILY
Qty: 90 TABLET | Refills: 3 | Status: SHIPPED | OUTPATIENT
Start: 2025-07-17

## 2025-07-17 NOTE — PROGRESS NOTES
Specialty Pharmacy Patient Management Program  Refill Outreach     Arminda was contacted today regarding refills of their medication(s).    Refill Questions      Flowsheet Row Most Recent Value   Changes to allergies? No   Changes to medications? No   New conditions or infections since last clinic visit No   Unplanned office visit, urgent care, ED, or hospital admission in the last 4 weeks  No   How does patient/caregiver feel medication is working? Good   Financial problems or insurance changes  No   Since the previous refill, were any specialty medication doses or scheduled injections missed or delayed?  No   Does this patient require a clinical escalation to a pharmacist? No            Delivery Questions      Flowsheet Row Most Recent Value   Delivery method UPS   Delivery address verified with patient/caregiver? Yes   Delivery address Home   Other address preferred N/A   Number of medications in delivery 1   Medication(s) being filled and delivered Ubrogepant (UBRELVY)   Doses left of specialty medications Ubrelvy 2 tablets left   Copay verified? Yes   Copay amount Ubrelvy =$0   Copay form of payment No copayment ($0)   Delivery Date Selection 07/18/25   Signature Required No   Do you consent to receive electronic handouts?  Yes                 Follow-up: 30 day(s)     Cameron Black  7/17/2025  09:03 EDT

## 2025-07-18 DIAGNOSIS — M51.369 DEGENERATIVE DISC DISEASE, LUMBAR: ICD-10-CM

## 2025-07-18 NOTE — TELEPHONE ENCOUNTER
Rx Refill Note  Requested Prescriptions     Pending Prescriptions Disp Refills    HYDROcodone-acetaminophen (NORCO) 7.5-325 MG per tablet [Pharmacy Med Name: HYDROCODON-APAP 7.5-325 7.5-325 Tablet] 20 tablet 0     Sig: TAKE ONE TABLET BY MOUTH EVERY 12 HOURS AS NEEDED FOR MODERATE PAIN      Last office visit with prescribing clinician: 5/8/2025   Last telemedicine visit with prescribing clinician: 4/17/2025   Next office visit with prescribing clinician: 7/22/2025                         Would you like a call back once the refill request has been completed: [] Yes [] No    If the office needs to give you a call back, can they leave a voicemail: [] Yes [] No    Jennifer Gómez MA  07/18/25, 10:04 EDT

## 2025-07-20 RX ORDER — HYDROCODONE BITARTRATE AND ACETAMINOPHEN 7.5; 325 MG/1; MG/1
1 TABLET ORAL EVERY 12 HOURS PRN
Qty: 20 TABLET | Refills: 0 | Status: SHIPPED | OUTPATIENT
Start: 2025-07-20

## 2025-07-22 ENCOUNTER — OFFICE VISIT (OUTPATIENT)
Dept: FAMILY MEDICINE CLINIC | Facility: CLINIC | Age: 80
End: 2025-07-22
Payer: MEDICARE

## 2025-07-22 VITALS
WEIGHT: 143 LBS | SYSTOLIC BLOOD PRESSURE: 130 MMHG | OXYGEN SATURATION: 99 % | TEMPERATURE: 98 F | DIASTOLIC BLOOD PRESSURE: 60 MMHG | HEART RATE: 76 BPM | HEIGHT: 60 IN | RESPIRATION RATE: 16 BRPM | BODY MASS INDEX: 28.07 KG/M2

## 2025-07-22 DIAGNOSIS — E78.2 MIXED HYPERLIPIDEMIA: ICD-10-CM

## 2025-07-22 DIAGNOSIS — M51.362 DEGENERATION OF INTERVERTEBRAL DISC OF LUMBAR REGION WITH DISCOGENIC BACK PAIN AND LOWER EXTREMITY PAIN: ICD-10-CM

## 2025-07-22 DIAGNOSIS — K21.9 GASTROESOPHAGEAL REFLUX DISEASE WITHOUT ESOPHAGITIS: ICD-10-CM

## 2025-07-22 DIAGNOSIS — N18.4 CHRONIC KIDNEY DISEASE, STAGE IV (SEVERE): ICD-10-CM

## 2025-07-22 DIAGNOSIS — R07.2 PRECORDIAL PAIN: ICD-10-CM

## 2025-07-22 DIAGNOSIS — Z79.4 TYPE 2 DIABETES MELLITUS WITH HYPERGLYCEMIA, WITH LONG-TERM CURRENT USE OF INSULIN: Primary | ICD-10-CM

## 2025-07-22 DIAGNOSIS — R06.02 SHORTNESS OF BREATH: ICD-10-CM

## 2025-07-22 DIAGNOSIS — I10 PRIMARY HYPERTENSION: ICD-10-CM

## 2025-07-22 DIAGNOSIS — E11.65 TYPE 2 DIABETES MELLITUS WITH HYPERGLYCEMIA, WITH LONG-TERM CURRENT USE OF INSULIN: Primary | ICD-10-CM

## 2025-07-22 DIAGNOSIS — Z28.21 IMMUNIZATION DECLINED: ICD-10-CM

## 2025-07-22 DIAGNOSIS — E61.1 IRON DEFICIENCY: ICD-10-CM

## 2025-07-22 DIAGNOSIS — I25.118 CORONARY ARTERY DISEASE OF NATIVE ARTERY OF NATIVE HEART WITH STABLE ANGINA PECTORIS: ICD-10-CM

## 2025-07-22 DIAGNOSIS — F01.B18 MODERATE VASCULAR DEMENTIA WITH OTHER BEHAVIORAL DISTURBANCE: ICD-10-CM

## 2025-07-22 PROCEDURE — 1125F AMNT PAIN NOTED PAIN PRSNT: CPT | Performed by: FAMILY MEDICINE

## 2025-07-22 PROCEDURE — 3078F DIAST BP <80 MM HG: CPT | Performed by: FAMILY MEDICINE

## 2025-07-22 PROCEDURE — 3075F SYST BP GE 130 - 139MM HG: CPT | Performed by: FAMILY MEDICINE

## 2025-07-22 PROCEDURE — 1160F RVW MEDS BY RX/DR IN RCRD: CPT | Performed by: FAMILY MEDICINE

## 2025-07-22 PROCEDURE — 99214 OFFICE O/P EST MOD 30 MIN: CPT | Performed by: FAMILY MEDICINE

## 2025-07-22 PROCEDURE — 1159F MED LIST DOCD IN RCRD: CPT | Performed by: FAMILY MEDICINE

## 2025-07-22 PROCEDURE — 93000 ELECTROCARDIOGRAM COMPLETE: CPT | Performed by: FAMILY MEDICINE

## 2025-07-22 RX ORDER — DONEPEZIL HYDROCHLORIDE 10 MG/1
20 TABLET, FILM COATED ORAL EVERY MORNING
Start: 2025-07-22

## 2025-07-22 RX ORDER — NITROGLYCERIN 0.4 MG/1
0.4 TABLET SUBLINGUAL
Qty: 50 TABLET | Refills: 12 | Status: SHIPPED | OUTPATIENT
Start: 2025-07-22

## 2025-07-22 NOTE — PROGRESS NOTES
Follow Up Office Visit      Date: 2025   Patient Name: Arminda Krishnan  : 1945   MRN: [id number]     Chief Complaint:    Chief Complaint   Patient presents with    Follow-up    Diabetes    Chest Pain    Nasal Congestion       History of Present Illness: Arminda Krishnan is a 80 y.o. female who is here today for follow up.    History of Present Illness  The patient is an 80-year-old female who presents for evaluation of pain, chest pain, and memory issues. She is accompanied by her sister.    She experiences constant pain, which she rates as a 6 on a scale of 0 to 10. The pain is severe enough to prevent her from performing daily activities such as dressing and feeding herself. She reports no side effects from the medication, including cognitive or sleep disturbances. She also reports no respiratory issues related to the medication. She finds the hydrocodone effective in managing her pain and wishes to continue its use. She is currently taking hydrocodone once every 3 to 4 weeks.    She experienced chest pain one night, which she attributes to heart issues rather than heartburn. The pain was accompanied by shortness of breath and cold sweats but not nausea. The episodes typically last between 20 to 25 minutes and resolve spontaneously. She has not seen a cardiologist recently and has never been diagnosed with a heart attack. She continues to take aspirin, Plavix, and Imdur.    She is not currently under the care of a psychiatrist or hematologist. Her last iron infusion was administered some time ago. She continues to take her iron supplements.    She is still taking her cholesterol medication and blood pressure medications. Her blood pressure has been stable at home. She is taking carvedilol and losartan for her blood pressure.    She is still taking her diabetes medications. She is taking Januvia once every morning.    She is having problems with reflux and is taking pantoprazole twice a  day.    She is still taking her seizure medications. She is taking Keppra.    She is still taking duloxetine for her pain at bedtime.    She is still taking her memory pills at night. She is taking 2 pills at night.    She had a fall about 2 months ago, which resulted in back, leg, and knee injuries. She is still seeing the kidney doctor.    Tobacco: She smokes cigarettes, approximately 10 or more per day.  Sleep: She reports difficulty sleeping at night and sometimes sleeps during the day.     Patient appears to be doing otherwise well.  They have continue with their medications without any side effects.  They have not had any changes in their usual activity, appetite and sleep.  Patient denies any other cardiovascular, respiratory, gastrointestinal, urologic or neurologic complaints.    Subjective      Review of Systems:   Review of Systems   Constitutional:  Negative for activity change, appetite change and fatigue.   Respiratory:  Negative for cough, chest tightness, shortness of breath and wheezing.    Cardiovascular:  Negative for chest pain, palpitations and leg swelling.   Gastrointestinal:  Negative for abdominal distention, abdominal pain, blood in stool, constipation, diarrhea, nausea, vomiting, GERD and indigestion.   Genitourinary:  Negative for difficulty urinating, dysuria, flank pain, frequency, hematuria and urgency.   Musculoskeletal:  Positive for arthralgias, back pain and myalgias. Negative for gait problem and joint swelling.   Neurological:  Negative for dizziness, tremors, seizures, syncope, weakness, light-headedness, numbness, headache and memory problem.   Psychiatric/Behavioral:  Negative for sleep disturbance and depressed mood. The patient is not nervous/anxious.        I have reviewed the patients family history, social history, past medical history, past surgical history and have updated it as appropriate.     Medications:     Current Outpatient Medications:     donepezil (ARICEPT) 10  MG tablet, Take 2 tablets by mouth Every Morning., Disp: , Rfl:     acetaminophen (TYLENOL) 325 MG tablet, Take 2 tablets by mouth Every 4 (Four) Hours As Needed for Mild Pain., Disp: , Rfl:     albuterol (PROVENTIL) (2.5 MG/3ML) 0.083% nebulizer solution, Take 2.5 mg by nebulization Every 4 (Four) Hours As Needed for Wheezing or Shortness of Air., Disp: 360 mL, Rfl: 11    aspirin 81 MG chewable tablet, Chew 1 tablet Daily., Disp: , Rfl:     atorvastatin (LIPITOR) 80 MG tablet, TAKE 1 TABLET BY MOUTH DAILY., Disp: 90 tablet, Rfl: 3    Blood Glucose Monitoring Suppl (ONE TOUCH ULTRA 2) w/Device kit, USE 1 EACH DAILY., Disp: , Rfl:     Blood Glucose Monitoring Suppl kit, Use 1 each Daily., Disp: 1 each, Rfl: 11    carvedilol (COREG) 25 MG tablet, TAKE ONE TABLET BY MOUTH TWO TIMES A DAY, Disp: 180 tablet, Rfl: 3    clopidogrel (PLAVIX) 75 MG tablet, TAKE ONE TABLET BY MOUTH EVERY DAY, Disp: 30 tablet, Rfl: 11    Comfort EZ Pen Needles 32G X 4 MM misc, Inject 1 each under the skin into the appropriate area as directed 4 (Four) Times a Day. as directed, Disp: 400 each, Rfl: 3    Diclofenac Sodium (VOLTAREN) 1 % gel gel, APPLY TO AFFECTED AREA FOUR TIMES DAILY, Disp: 100 g, Rfl: 12    DULoxetine (CYMBALTA) 60 MG capsule, TAKE ONE CAPSULE BY MOUTH EVERY DAY, Disp: 90 capsule, Rfl: 2    ferrous sulfate 325 (65 FE) MG tablet, Take 1 tablet by mouth Daily With Breakfast., Disp: , Rfl:     fluticasone (FLONASE) 50 MCG/ACT nasal spray, USE 2 SPRAYS IN EACH NOSTRIL EVERY DAY, Disp: 48 g, Rfl: 12    glucagon (GLUCAGEN) 1 MG injection, Inject 1 mg under the skin into the appropriate area as directed 1 (One) Time As Needed (hypoglycemia) for up to 1 dose., Disp: 1 each, Rfl: 0    Glucose Blood (Blood Glucose Test) strip, 1 each by In Vitro route 3 (Three) Times a Day., Disp: 100 each, Rfl: 11    HYDROcodone-acetaminophen (NORCO) 7.5-325 MG per tablet, TAKE ONE TABLET BY MOUTH EVERY 12 HOURS AS NEEDED FOR MODERATE PAIN, Disp: 20  tablet, Rfl: 0    isosorbide mononitrate (IMDUR) 60 MG 24 hr tablet, Take 1 tablet by mouth Daily., Disp: 90 tablet, Rfl: 3    Lancets (OneTouch Delica Plus Spleqn23S) misc, 3 (Three) Times a Day. as directed, Disp: , Rfl:     levETIRAcetam (KEPPRA) 500 MG tablet, , Disp: , Rfl:     lidocaine (LIDODERM) 5 %, Place 1 patch on the skin as directed by provider Daily. Remove & Discard patch within 12 hours or as directed by MD, Disp: 30 each, Rfl: 11    linaclotide (LINZESS) 145 MCG capsule capsule, Take 1 capsule by mouth Every Morning Before Breakfast., Disp: 30 capsule, Rfl: 11    loratadine (CLARITIN) 10 MG tablet, , Disp: , Rfl:     losartan (COZAAR) 50 MG tablet, TAKE ONE TABLET BY MOUTH EVERY DAY, Disp: 90 tablet, Rfl: 3    multivitamin with minerals tablet tablet, Take 1 tablet by mouth Daily., Disp: , Rfl:     naloxone (NARCAN) 4 MG/0.1ML nasal spray, Administer 1 spray into the nostril(s) as directed by provider As Needed., Disp: , Rfl:     nitroglycerin (NITROSTAT) 0.4 MG SL tablet, Place 1 tablet under the tongue Every 5 (Five) Minutes As Needed for Chest Pain. Take no more than 3 doses in 15 minutes., Disp: 50 tablet, Rfl: 12    pantoprazole (PROTONIX) 40 MG EC tablet, TAKE ONE TABLET BY MOUTH TWO TIMES A DAY, Disp: 180 tablet, Rfl: 3    QUEtiapine (SEROquel) 25 MG tablet, Take 1 tablet by mouth Every Night., Disp: 30 tablet, Rfl: 6    simethicone (MYLICON) 80 MG chewable tablet, Chew 1 tablet 4 (Four) Times a Day As Needed for Flatulence., Disp: , Rfl:     SITagliptin (Januvia) 50 MG tablet, TAKE ONE TABLET BY MOUTH EVERY DAY, Disp: 90 tablet, Rfl: 3    tacrolimus (PROTOPIC) 0.1 % ointment, APPLY TO AFFECTED AREA TWO TIMES A DAY, Disp: , Rfl:     True Metrix Blood Glucose Test test strip, Daily. for testing, Disp: , Rfl:     ubrogepant (Ubrelvy) 100 MG tablet, Take 1 tablet by mouth Daily As Needed (migarines). Take at onset of headache - if symptoms persist or return, may repeat dose in 2 hours. Maximum:  "200 mg per 24 hours, Disp: 16 tablet, Rfl: 11    Ventolin  (90 Base) MCG/ACT inhaler, INHALE 2 PUFFS EVERY 6 (SIX) HOURS AS NEEDED FOR WHEEZING., Disp: 18 g, Rfl: 11    Allergies:   Allergies   Allergen Reactions    Cefaclor Rash and Unknown - Low Severity       Immunizations:   Immunization History   Administered Date(s) Administered    COVID-19 (VINNIE) 03/16/2021    COVID-19 (UNSPECIFIED) 03/01/2021, 04/01/2021    Fluzone High-Dose 65+YRS 10/16/2018    Fluzone High-Dose 65+yrs 10/31/2022, 12/06/2023    Fluzone Quad >6mos (Multi-dose) 11/15/2016, 02/05/2020    Pneumococcal Conjugate 13-Valent (PCV13) 07/07/2016    Pneumococcal Polysaccharide (PPSV23) 10/31/2022        Objective     Physical Exam: Please see above  Vital Signs:   Vitals:    07/22/25 1647   BP: 130/60   BP Location: Right arm   Patient Position: Sitting   Cuff Size: Adult   Pulse: 76   Resp: 16   Temp: 98 °F (36.7 °C)   TempSrc: Temporal   SpO2: 99%   Weight: 64.9 kg (143 lb)   Height: 152.4 cm (60\")     Body mass index is 27.93 kg/m².          Physical Exam  Vitals and nursing note reviewed.   Constitutional:       Appearance: Normal appearance.   HENT:      Head: Normocephalic and atraumatic.      Nose: Nose normal.      Mouth/Throat:      Pharynx: Oropharynx is clear.   Eyes:      Extraocular Movements: Extraocular movements intact.      Pupils: Pupils are equal, round, and reactive to light.   Neck:      Thyroid: No thyroid mass or thyromegaly.      Trachea: Trachea normal.   Cardiovascular:      Rate and Rhythm: Normal rate and regular rhythm.      Pulses: Normal pulses. No decreased pulses.      Heart sounds: Normal heart sounds.   Pulmonary:      Effort: Pulmonary effort is normal.      Breath sounds: Normal breath sounds.   Abdominal:      General: Abdomen is flat. Bowel sounds are normal.      Palpations: Abdomen is soft.      Tenderness: There is no abdominal tenderness.   Musculoskeletal:      Cervical back: Neck supple.      " Right lower leg: No edema.      Left lower leg: No edema.   Lymphadenopathy:      Cervical: No cervical adenopathy.   Skin:     General: Skin is warm and dry.   Neurological:      General: No focal deficit present.      Mental Status: She is alert and oriented to person, place, and time.      Sensory: Sensation is intact.      Motor: Motor function is intact.      Coordination: Coordination is intact.   Psychiatric:         Attention and Perception: Attention normal.         Mood and Affect: Mood normal.         Speech: Speech normal.         Behavior: Behavior normal.           ECG 12 Lead    Date/Time: 7/22/2025 5:00 PM  Performed by: Aiden Spencer MD    Authorized by: Aiden Spencer MD  Comparison: not compared with previous ECG   Previous ECG: no previous ECG available  Rhythm: sinus rhythm  Rate: normal  Conduction: conduction normal  ST Segments: ST segments normal  T Waves: T waves normal  QRS axis: normal  Other: no other findings    Clinical impression: normal ECG          Results:   Labs:   Hemoglobin A1C   Date Value Ref Range Status   03/13/2025 6.30 (H) 4.80 - 5.60 % Final   12/26/2024 7.4 (H) <5.7 % Final     TSH   Date Value Ref Range Status   03/13/2025 3.560 0.270 - 4.200 uIU/mL Final        POCT Results (if applicable):   Results for orders placed or performed in visit on 06/25/25   Urinalysis without microscopic (no culture) - Urine, Clean Catch    Collection Time: 06/25/25  8:27 AM    Specimen: Urine, Clean Catch   Result Value Ref Range    Color, UA Yellow Yellow, Straw    Appearance, UA Clear Clear    pH, UA 6.5 5.0 - 8.0    Specific Gravity, UA 1.007 1.005 - 1.030    Glucose, UA Negative Negative    Ketones, UA Negative Negative    Bilirubin, UA Negative Negative    Blood, UA Small (1+) (A) Negative    Protein, UA 30 mg/dL (1+) (A) Negative    Leuk Esterase, UA Negative Negative    Nitrite, UA Negative Negative    Urobilinogen, UA 0.2 E.U./dL 0.2 - 1.0 E.U./dL        Imaging:   No valid procedures specified.     Measures:   Advanced Care Planning:   Patient does not have an advance directive, information provided.    Smoking Cessation:   3-10 mintues spent counseling Will try to cut down    Assessment / Plan      Assessment/Plan:   Diagnoses and all orders for this visit:    1. Type 2 diabetes mellitus with hyperglycemia, with long-term current use of insulin (Primary)  Patient does appear to be doing relatively well with respect to their diabetes.  They are tolerating their medications without complaints.  We will continue to follow their hemoglobin A1c and will make adjustments to keep their level less than 7%.  -     Hemoglobin A1c; Future    2. Primary hypertension  Patient appears to be tolerating their blood pressure medicine without any side effects.  We will continue to monitor their blood pressure and will adjust to keep there is systolic blood pressure less than 130.  We will continue to monitor renal function and will make adjustments based on these findings.  -     Comprehensive Metabolic Panel; Future    3. Gastroesophageal reflux disease without esophagitis   Patient is doing well at present time with respect to the reflux symptomatology.  They are tolerating their medications without any complaints at present time.  We will continue to monitor their symptoms and if they develop dysphagia, alarm symptoms or worsening symptomatology further evaluation and treatment may be necessary as well as possible referral for an EGD.  Patient does not believe that her precordial chest pain is secondary to reflux symptomatology.    4. Mixed hyperlipidemia  Patient does appear to be tolerating their lipid-lowering agent without difficulty.  We will obtain the lipid profile as well as liver function test to observe for any abnormalities.  We will continue to adjust medications to keep their LDL either less than 70 or 45 based on their cardiovascular risk.  -     Lipid Panel;  "Future  -     Lipoprotein A (LPA); Future    5. Immunization declined   Patient would benefit from having immunizations given.  Patient has elected not to receive the recommended vaccines at present time.  They understand the risk of not receiving these vaccine and the disease in which they may incur.  We have discussed other options and will pursue with encouragement of compliance with future appointments.  If patient does change their minds prior to the next scheduled follow-up, they may contact us and we will provide immunization at that time.    6. Precordial pain  Patient is having some ill-defined chest pain that is not necessarily associated with activity but is associated with increase in stress.  She does not have radiation but does have associated shortness of breath with what she describes as a \"cold sweat\".  We have discussed that this could be cardiac in nature.  EKG did not reveal any abnormality.  She has not seen a cardiologist in quite some time.  We will prescribe her nitroglycerin to use when she has symptomatology and will make arrangements for her to see cardiology.  Patient may be a candidate for Ranexa.  We will monitor and will treat accordingly.  If she does have worsening symptoms she will present nearly to the the emergency department for evaluation and treatment.  -     nitroglycerin (NITROSTAT) 0.4 MG SL tablet; Place 1 tablet under the tongue Every 5 (Five) Minutes As Needed for Chest Pain. Take no more than 3 doses in 15 minutes.  Dispense: 50 tablet; Refill: 12  -     ECG 12 Lead    7. Coronary artery disease of native artery of native heart with stable angina pectoris  Patient does have a history of coronary artery disease.  She is having some chest symptomatology.  We will refer her to cardiology.  -     Ambulatory Referral to Cardiology for Chest Pain    8. Chronic kidney disease, stage IV (severe)   Patient does continue to follow-up with the nephrologist.  We will continue to " encourage compliance and if she has worsening complaints prior to her appointment she will contact us immediately for evaluation.    9. Degeneration of intervertebral disc of lumbar region with discogenic back pain and lower extremity pain   Patient appears to be doing well at present time.  Patient has not had any cognitive dysfunction or respiratory suppression patient anxiety/depression/PEG scores were noted.  Their current opioid misuse measure did not reveal any abnormality.  Current Alcides and previous urine drug screen did not reveal any abnormality.  Patient understands the risk and benefits of the medications.  We will continue to monitor symptoms and if there is any other difficulty further assessment treatment will be necessary.    10. Shortness of breath  -     proBNP; Future    11. Iron deficiency  Patient does not follow-up with the hematologist.  We will obtain iron studies to see if she is still doing well with respect to her current regimen.  She has not required iron infusion in quite some time.  We will monitor and may consider further evaluation and treatment if necessary.  -     CBC (No Diff); Future  -     Iron Profile w/o Ferritin; Future  -     Ferritin; Future    12. 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  We have discussed that the Aricept may cause some concern and that she may try this in the morning.  The atypical antipsychotics prescribed by the neurologist appears to be doing well with respect to her sleep.  We will not making changes at present time as she has not had any side effects and does appear to be doing better with respect to her overall sleep.  -     donepezil (ARICEPT) 10 MG tablet; Take 2 tablets by mouth Every Morning.        Follow Up:   Return in about 3 months (around 10/22/2025).      At [facility], we believe that sharing information builds trust and better relationships. You are receiving  this note because you recently visited [facility]. It is possible you will see health information before a provider has talked with you about it. This kind of information can be easy to misunderstand. To help you fully understand what it means for your health, we urge you to discuss this note with your provider.    Aiden Spencer MD  [facility]    Patient or patient representative verbalized consent for the use of Ambient Listening during the visit with  Aiden Spencer MD for chart documentation. 7/23/2025  17:03 EDT

## 2025-07-24 ENCOUNTER — TELEPHONE (OUTPATIENT)
Dept: FAMILY MEDICINE CLINIC | Facility: CLINIC | Age: 80
End: 2025-07-24

## 2025-07-30 ENCOUNTER — TELEPHONE (OUTPATIENT)
Dept: FAMILY MEDICINE CLINIC | Facility: CLINIC | Age: 80
End: 2025-07-30
Payer: MEDICARE

## 2025-07-30 ENCOUNTER — SPECIALTY PHARMACY (OUTPATIENT)
Dept: ONCOLOGY | Facility: HOSPITAL | Age: 80
End: 2025-07-30
Payer: MEDICARE

## 2025-07-30 DIAGNOSIS — F01.B18 MODERATE VASCULAR DEMENTIA WITH OTHER BEHAVIORAL DISTURBANCE: ICD-10-CM

## 2025-07-30 DIAGNOSIS — E11.65 TYPE 2 DIABETES MELLITUS WITH HYPERGLYCEMIA, WITH LONG-TERM CURRENT USE OF INSULIN: Primary | ICD-10-CM

## 2025-07-30 DIAGNOSIS — J41.1 MUCOPURULENT CHRONIC BRONCHITIS: ICD-10-CM

## 2025-07-30 DIAGNOSIS — I25.118 CORONARY ARTERY DISEASE OF NATIVE ARTERY OF NATIVE HEART WITH STABLE ANGINA PECTORIS: ICD-10-CM

## 2025-07-30 DIAGNOSIS — Z79.4 TYPE 2 DIABETES MELLITUS WITH HYPERGLYCEMIA, WITH LONG-TERM CURRENT USE OF INSULIN: Primary | ICD-10-CM

## 2025-07-30 DIAGNOSIS — M51.362 DEGENERATION OF INTERVERTEBRAL DISC OF LUMBAR REGION WITH DISCOGENIC BACK PAIN AND LOWER EXTREMITY PAIN: ICD-10-CM

## 2025-07-30 DIAGNOSIS — Z91.81 AT HIGH RISK FOR FALLS: ICD-10-CM

## 2025-07-30 NOTE — PROGRESS NOTES
Specialty Pharmacy Patient Management Program  [facility]      Arminda is a 80 y.o. female contacted today regarding refills of her medication(s).    Specialty medication(s) and dose(s) confirmed: Nurtec  Other medications being refilled: none    Refill Questions      Flowsheet Row Most Recent Value   Changes to allergies? No   Changes to medications? Yes  [Ubrelvy changing to Nurtec]   New conditions or infections since last clinic visit No   Unplanned office visit, urgent care, ED, or hospital admission in the last 4 weeks  No   How does patient/caregiver feel medication is working? Fair   Financial problems or insurance changes  No   Since the previous refill, were any specialty medication doses or scheduled injections missed or delayed?  No   Does this patient require a clinical escalation to a pharmacist? No            Delivery Questions      Flowsheet Row Most Recent Value   Delivery method [employer]   Delivery address verified with patient/caregiver? Yes   Delivery address Home   Other address preferred N/A   Number of medications in delivery 1   Medication(s) being filled and delivered Rimegepant Sulfate (NURTEC-ODT)   Doses left of specialty medications new start   Copay verified? Yes   Copay amount Nurtec co-pay $   Copay form of payment No copayment ($0)   Delivery Date Selection 07/31/25   Signature Required No   Do you consent to receive electronic handouts?  No                   Follow-up: 25 days     Ramila Russell PharmD  Specialty Pharmacist  7/30/2025  09:15 EDT

## 2025-07-30 NOTE — PROGRESS NOTES
Specialty Pharmacy Patient Management Program  Neurology Initial Assessment     Arminda Krishnan is a 80 y.o. female with migraines seen by a Neurology provider and enrolled in the Neurology Patient Management program offered by [facility].  An initial outreach was conducted, including assessment of therapy appropriateness and specialty medication education for Nurtec. Nurtec originally prescribed, but insurance required a trail and failure of Ublrevy.  Ubrelvy was not effective for pt.  The patient was introduced to services offered by [facility], including: regular assessments, refill coordination, curbside pick-up or mail order delivery options, prior authorization maintenance, and financial assistance programs as applicable. The patient was also provided with contact information for the pharmacy team.     Insurance Coverage & Financial Support  Rewarder    Relevant Past Medical History and Comorbidities  Relevant medical history and concomitant health conditions were discussed with the patient. The patient's chart has been reviewed for relevant past medical history and comorbid health conditions and updated as necessary.   Past Medical History:   Diagnosis Date    Anemia     Arthritis     Back problem     CAD (coronary artery disease)     Cancer     Right breast    Chronic back pain     Chronically on opiate therapy     Depression     Diabetes mellitus     DX 14 years ago- checks fsbs weekly    Fibromyalgia     Gastroparesis     GERD (gastroesophageal reflux disease)     Headache     emotional/tension    History of transfusion     [facility]    HTN (hypertension)     Hypercholesteremia     IBS (irritable bowel syndrome)     Incontinence of urine     urgency    Migraine headache     Myalgia and myositis     Peripheral neuropathy     Sleep apnea     does not wear cpap    UTI (urinary tract infection)      Social History     Socioeconomic  History    Marital status:     Number of children: 2   Tobacco Use    Smoking status: Former     Current packs/day: 0.25     Average packs/day: 0.4 packs/day for 128.6 years (47.6 ttl pk-yrs)     Types: Cigarettes     Start date: 1959     Passive exposure: Past    Smokeless tobacco: Never    Tobacco comments:     1/17/2022 quit    Vaping Use    Vaping status: Never Used   Substance and Sexual Activity    Alcohol use: No    Drug use: No    Sexual activity: Not Currently     Problem list reviewed by Ramila Russell, PharmD on 7/30/2025 at  9:13 AM    Allergies  Known allergies and reactions were discussed with the patient. The patient's chart has been reviewed for  allergy information and updated as necessary.   Allergies   Allergen Reactions    Cefaclor Rash and Unknown - Low Severity     Allergies reviewed by Ramila Russell, PharmD on 7/30/2025 at  9:13 AM    Relevant Laboratory Values  Common labs          4/24/2025    15:43 5/8/2025    17:45 6/24/2025    10:18   Common Labs   Glucose 120  97  93    BUN 35  59  34.0    Creatinine 3.01  3.49  3.42    Sodium 142  133  138    Potassium 4.3  4.8  4.9    Chloride 113  101  105    Calcium 9.5  9.3  9.0    Albumin 3.9  3.7  3.9    Total Bilirubin <0.2  0.3     Alkaline Phosphatase 98  116     AST (SGOT) 20  27     ALT (SGPT) 11  15     WBC 7.07  6.87  8.28    Hemoglobin 10.4  10.7  7.9    Hematocrit 33.0  32.4  24.1    Platelets 211  153  197        Lab Assessment  The above labs have been reviewed. Pt's last srcr of 3.42 puts crcl at 13 ml/min.  Nurtec is not recommended for pts if crcl < 15 ml/min.  Current Medication List  This medication list has been reviewed with the patient and evaluated for any interactions or necessary modifications/recommendations, and updated to include all prescription medications, OTC medications, and supplements the patient is currently taking.  This list reflects what is contained in the patient's profile, which has also been  marked as reviewed to communicate to other providers it is the most up to date version of the patient's current medication therapy.     Current Outpatient Medications:     acetaminophen (TYLENOL) 325 MG tablet, Take 2 tablets by mouth Every 4 (Four) Hours As Needed for Mild Pain., Disp: , Rfl:     albuterol (PROVENTIL) (2.5 MG/3ML) 0.083% nebulizer solution, Take 2.5 mg by nebulization Every 4 (Four) Hours As Needed for Wheezing or Shortness of Air., Disp: 360 mL, Rfl: 11    aspirin 81 MG chewable tablet, Chew 1 tablet Daily., Disp: , Rfl:     atorvastatin (LIPITOR) 80 MG tablet, TAKE 1 TABLET BY MOUTH DAILY., Disp: 90 tablet, Rfl: 3    Blood Glucose Monitoring Suppl (ONE TOUCH ULTRA 2) w/Device kit, USE 1 EACH DAILY., Disp: , Rfl:     Blood Glucose Monitoring Suppl kit, Use 1 each Daily., Disp: 1 each, Rfl: 11    carvedilol (COREG) 25 MG tablet, TAKE ONE TABLET BY MOUTH TWO TIMES A DAY, Disp: 180 tablet, Rfl: 3    clopidogrel (PLAVIX) 75 MG tablet, TAKE ONE TABLET BY MOUTH EVERY DAY, Disp: 30 tablet, Rfl: 11    Comfort EZ Pen Needles 32G X 4 MM misc, Inject 1 each under the skin into the appropriate area as directed 4 (Four) Times a Day. as directed, Disp: 400 each, Rfl: 3    Diclofenac Sodium (VOLTAREN) 1 % gel gel, APPLY TO AFFECTED AREA FOUR TIMES DAILY, Disp: 100 g, Rfl: 12    donepezil (ARICEPT) 10 MG tablet, Take 2 tablets by mouth Every Morning., Disp: , Rfl:     DULoxetine (CYMBALTA) 60 MG capsule, TAKE ONE CAPSULE BY MOUTH EVERY DAY, Disp: 90 capsule, Rfl: 2    ferrous sulfate 325 (65 FE) MG tablet, Take 1 tablet by mouth Daily With Breakfast., Disp: , Rfl:     fluticasone (FLONASE) 50 MCG/ACT nasal spray, USE 2 SPRAYS IN EACH NOSTRIL EVERY DAY, Disp: 48 g, Rfl: 12    glucagon (GLUCAGEN) 1 MG injection, Inject 1 mg under the skin into the appropriate area as directed 1 (One) Time As Needed (hypoglycemia) for up to 1 dose., Disp: 1 each, Rfl: 0    Glucose Blood (Blood Glucose Test) strip, 1 each by In Vitro  route 3 (Three) Times a Day., Disp: 100 each, Rfl: 11    HYDROcodone-acetaminophen (NORCO) 7.5-325 MG per tablet, TAKE ONE TABLET BY MOUTH EVERY 12 HOURS AS NEEDED FOR MODERATE PAIN, Disp: 20 tablet, Rfl: 0    isosorbide mononitrate (IMDUR) 60 MG 24 hr tablet, Take 1 tablet by mouth Daily., Disp: 90 tablet, Rfl: 3    Lancets (OneTouch Delica Plus [place]aukoe00K) misc, 3 (Three) Times a Day. as directed, Disp: , Rfl:     levETIRAcetam (KEPPRA) 500 MG tablet, , Disp: , Rfl:     lidocaine (LIDODERM) 5 %, Place 1 patch on the skin as directed by provider Daily. Remove & Discard patch within 12 hours or as directed by MD, Disp: 30 each, Rfl: 11    linaclotide (LINZESS) 145 MCG capsule capsule, Take 1 capsule by mouth Every Morning Before Breakfast., Disp: 30 capsule, Rfl: 11    loratadine (CLARITIN) 10 MG tablet, , Disp: , Rfl:     losartan (COZAAR) 50 MG tablet, TAKE ONE TABLET BY MOUTH EVERY DAY, Disp: 90 tablet, Rfl: 3    multivitamin with minerals tablet tablet, Take 1 tablet by mouth Daily., Disp: , Rfl:     naloxone (NARCAN) 4 MG/0.1ML nasal spray, Administer 1 spray into the nostril(s) as directed by provider As Needed., Disp: , Rfl:     nitroglycerin (NITROSTAT) 0.4 MG SL tablet, Place 1 tablet under the tongue Every 5 (Five) Minutes As Needed for Chest Pain. Take no more than 3 doses in 15 minutes., Disp: 50 tablet, Rfl: 12    pantoprazole (PROTONIX) 40 MG EC tablet, TAKE ONE TABLET BY MOUTH TWO TIMES A DAY, Disp: 180 tablet, Rfl: 3    QUEtiapine (SEROquel) 25 MG tablet, Take 1 tablet by mouth Every Night., Disp: 30 tablet, Rfl: 6    rimegepant sulfate ODT (Nurtec) 75 MG disintegrating tablet, Place 1 tablet under the tongue Daily As Needed (migraines)., Disp: 8 tablet, Rfl: 11    simethicone (MYLICON) 80 MG chewable tablet, Chew 1 tablet 4 (Four) Times a Day As Needed for Flatulence., Disp: , Rfl:     SITagliptin (Januvia) 50 MG tablet, TAKE ONE TABLET BY MOUTH EVERY DAY, Disp: 90 tablet, Rfl: 3    tacrolimus  (PROTOPIC) 0.1 % ointment, APPLY TO AFFECTED AREA TWO TIMES A DAY, Disp: , Rfl:     True Metrix Blood Glucose Test test strip, Daily. for testing, Disp: , Rfl:     Ventolin  (90 Base) MCG/ACT inhaler, INHALE 2 PUFFS EVERY 6 (SIX) HOURS AS NEEDED FOR WHEEZING., Disp: 18 g, Rfl: 11    Medicines reviewed by Ramila Russell, PharmD on 7/30/2025 at  9:13 AM    Drug Interactions  No relevant drug-drug interactions with specialty medication(s):  Nurtec.        Initial Education Provided for Specialty Medication  The patient has been provided with the  education for Nurtec  and any applicable administration techniques (i.e. self-injection) have been demonstrated for the therapies indicated. All questions and concerns have been addressed prior to the patient receiving the medication, and the patient has verbalized understanding of the education and any materials provided.  Additional patient education shall be provided and documented upon request by the patient, provider or payer.        Adherence and Self-Administration  Adherence related to the patient's specialty therapy was discussed with the patient. The Adherence segment of this outreach has been reviewed and updated.   Is there a concern with patient's ability to self administer the medication correctly and without issue?: No  Were any potential barriers to adherence identified during the initial assessment or patient education?: No  Are there any concerns regarding the patient's understanding of the importance of medication adherence?: No  Methods for Supporting Patient Adherence and/or Self-Administration: n/a    Goals of Therapy  Goals related to the patient's specialty therapy were discussed with the patient. The Patient Goals segment of this outreach has been reviewed and updated.   Goals Addressed Today        Specialty Pharmacy General Goal      On Average, Reduce:   Symptom severity by 75 % within 60 min of taking acute therapy.     Baseline  Values/Notes on Enrollment  Frequency: daily  Symptom Severity: severe  Duration: 2-3 hours    Date of Reassessment Notes on Progress Toward Above Goals   6/17/25 Nurtec samples effective abortive, but non-formulary on pt's insurance.  Ubrelvy formulary, and prior auth approved.   7/30/25 Ubrelvy not effective - Nurtec approved, but pt's renal function is declining.  Current estimated crcl 13/ml/hr (srcr 3.42).  Will pause pt for 6-8 weeks, and see if renal function is improving.                                                    Reassessment Plan & Follow-Up  Medication Therapy Changes: Hold Nurtec and Ubrelvy for now do to declining renal function.  Last SrCr on 6/24/25 was 3.42 with an estimated crcl of 13 ml/min.  Nurtec/Ubrelvy not recommended if CrCl < 15 ml/min.  Related Plans, Therapy Recommendations, or Therapy Problems to Be Addressed: Follow-up renal function in 4-6 weeks.  Pharmacist to perform regular reassessments no more than (6) months from the previous assessment.  Care Coordinator to set up future refill outreaches, coordinate prescription delivery, and escalate clinical questions to pharmacist.   Welcome information and patient satisfaction survey to be sent by specialty pharmacy team with patient's initial fill.    Attestation  Therapeutic appropriateness: Appropriate   I attest the patient was actively involved in and has agreed to the above plan of care. If the prescribed therapy is at any point deemed not appropriate based on the current or future assessments, a consultation will be initiated with the patient's specialty care provider to determine the best course of action. The revised plan of therapy will be documented along with any additional patient education provided. Discussed aforementioned material with patient by phone.    Ramila Russell, PharmD, [facility]  Clinic Specialty Pharmacist, Neurology  7/30/2025  09:32 EDT

## 2025-07-30 NOTE — TELEPHONE ENCOUNTER
There were have to be other reasons before home health can see her.  We could orde physical therapy, Occupational Therapyr to help with fall risk assessment if she would allow.  Please let me know.

## 2025-07-30 NOTE — TELEPHONE ENCOUNTER
Caller: Delbert Mcdermott    Relationship: Emergency Contact    Best call back number: [phone]    What orders are you requesting (i.e. lab or imaging):     LABS    Additional notes:     PATIENT NEEDS HOME HEALTH TO COME DRAW BLOOD

## 2025-08-04 ENCOUNTER — TELEPHONE (OUTPATIENT)
Dept: FAMILY MEDICINE CLINIC | Facility: CLINIC | Age: 80
End: 2025-08-04
Payer: MEDICARE

## 2025-08-07 DIAGNOSIS — M51.369 DEGENERATIVE DISC DISEASE, LUMBAR: ICD-10-CM

## 2025-08-07 RX ORDER — HYDROCODONE BITARTRATE AND ACETAMINOPHEN 7.5; 325 MG/1; MG/1
1 TABLET ORAL EVERY 12 HOURS PRN
Qty: 20 TABLET | Refills: 0 | Status: SHIPPED | OUTPATIENT
Start: 2025-08-07

## 2025-08-09 DIAGNOSIS — K21.9 GASTROESOPHAGEAL REFLUX DISEASE WITHOUT ESOPHAGITIS: ICD-10-CM

## 2025-08-10 RX ORDER — PANTOPRAZOLE SODIUM 40 MG/1
40 TABLET, DELAYED RELEASE ORAL 2 TIMES DAILY
Qty: 180 TABLET | Refills: 3 | Status: SHIPPED | OUTPATIENT
Start: 2025-08-10

## 2025-08-20 ENCOUNTER — TELEPHONE (OUTPATIENT)
Dept: FAMILY MEDICINE CLINIC | Facility: CLINIC | Age: 80
End: 2025-08-20
Payer: MEDICARE

## 2025-08-25 DIAGNOSIS — M51.369 DEGENERATIVE DISC DISEASE, LUMBAR: ICD-10-CM

## 2025-08-25 RX ORDER — HYDROCODONE BITARTRATE AND ACETAMINOPHEN 7.5; 325 MG/1; MG/1
1 TABLET ORAL EVERY 12 HOURS PRN
Qty: 20 TABLET | Refills: 0 | Status: SHIPPED | OUTPATIENT
Start: 2025-08-25

## 2025-08-26 ENCOUNTER — LAB (OUTPATIENT)
Dept: FAMILY MEDICINE CLINIC | Facility: CLINIC | Age: 80
End: 2025-08-26
Payer: MEDICARE

## 2025-08-26 ENCOUNTER — OFFICE VISIT (OUTPATIENT)
Dept: FAMILY MEDICINE CLINIC | Facility: CLINIC | Age: 80
End: 2025-08-26
Payer: MEDICARE

## 2025-08-26 VITALS
OXYGEN SATURATION: 100 % | SYSTOLIC BLOOD PRESSURE: 124 MMHG | WEIGHT: 136 LBS | RESPIRATION RATE: 16 BRPM | BODY MASS INDEX: 26.7 KG/M2 | HEIGHT: 60 IN | TEMPERATURE: 98 F | DIASTOLIC BLOOD PRESSURE: 60 MMHG | HEART RATE: 73 BPM

## 2025-08-26 DIAGNOSIS — Z28.21 IMMUNIZATION DECLINED: ICD-10-CM

## 2025-08-26 DIAGNOSIS — E11.65 TYPE 2 DIABETES MELLITUS WITH HYPERGLYCEMIA, WITH LONG-TERM CURRENT USE OF INSULIN: Primary | ICD-10-CM

## 2025-08-26 DIAGNOSIS — E78.2 MIXED HYPERLIPIDEMIA: ICD-10-CM

## 2025-08-26 DIAGNOSIS — I10 PRIMARY HYPERTENSION: ICD-10-CM

## 2025-08-26 DIAGNOSIS — I25.118 CORONARY ARTERY DISEASE OF NATIVE ARTERY OF NATIVE HEART WITH STABLE ANGINA PECTORIS: ICD-10-CM

## 2025-08-26 DIAGNOSIS — E11.65 TYPE 2 DIABETES MELLITUS WITH HYPERGLYCEMIA, WITH LONG-TERM CURRENT USE OF INSULIN: ICD-10-CM

## 2025-08-26 DIAGNOSIS — M25.562 CHRONIC PAIN OF BOTH KNEES: ICD-10-CM

## 2025-08-26 DIAGNOSIS — Z79.4 TYPE 2 DIABETES MELLITUS WITH HYPERGLYCEMIA, WITH LONG-TERM CURRENT USE OF INSULIN: ICD-10-CM

## 2025-08-26 DIAGNOSIS — G89.29 CHRONIC PAIN OF BOTH KNEES: ICD-10-CM

## 2025-08-26 DIAGNOSIS — M25.561 CHRONIC PAIN OF BOTH KNEES: ICD-10-CM

## 2025-08-26 DIAGNOSIS — M11.262 CHONDROCALCINOSIS DUE TO DICALCIUM PHOSPHATE CRYSTALS, OF THE KNEE, LEFT: ICD-10-CM

## 2025-08-26 DIAGNOSIS — G89.29 CHRONIC RIGHT SHOULDER PAIN: ICD-10-CM

## 2025-08-26 DIAGNOSIS — E61.1 IRON DEFICIENCY: ICD-10-CM

## 2025-08-26 DIAGNOSIS — Z79.4 TYPE 2 DIABETES MELLITUS WITH HYPERGLYCEMIA, WITH LONG-TERM CURRENT USE OF INSULIN: Primary | ICD-10-CM

## 2025-08-26 DIAGNOSIS — R06.02 SHORTNESS OF BREATH: ICD-10-CM

## 2025-08-26 DIAGNOSIS — M25.511 CHRONIC RIGHT SHOULDER PAIN: ICD-10-CM

## 2025-08-26 DIAGNOSIS — Z91.81 AT HIGH RISK FOR FALLS: ICD-10-CM

## 2025-08-26 DIAGNOSIS — N18.4 CHRONIC KIDNEY DISEASE, STAGE IV (SEVERE): ICD-10-CM

## 2025-08-26 LAB
CHOLEST SERPL-MCNC: 120 MG/DL (ref 0–200)
DEPRECATED RDW RBC AUTO: 46.9 FL (ref 37–54)
ERYTHROCYTE [DISTWIDTH] IN BLOOD BY AUTOMATED COUNT: 13.2 % (ref 12.3–15.4)
FERRITIN SERPL-MCNC: 326 NG/ML (ref 13–150)
HBA1C MFR BLD: 5.5 % (ref 4.8–5.6)
HCT VFR BLD AUTO: 25.4 % (ref 34–46.6)
HDLC SERPL-MCNC: 34 MG/DL (ref 40–60)
HGB BLD-MCNC: 8.1 G/DL (ref 12–15.9)
IRON 24H UR-MRATE: 38 MCG/DL (ref 37–145)
IRON SATN MFR SERPL: 15 % (ref 20–50)
LDLC SERPL CALC-MCNC: 65 MG/DL (ref 0–100)
LDLC/HDLC SERPL: 1.88 {RATIO}
MCH RBC QN AUTO: 31.2 PG (ref 26.6–33)
MCHC RBC AUTO-ENTMCNC: 31.9 G/DL (ref 31.5–35.7)
MCV RBC AUTO: 97.7 FL (ref 79–97)
NT-PROBNP SERPL-MCNC: 1032 PG/ML (ref 0–1800)
PLATELET # BLD AUTO: 187 10*3/MM3 (ref 140–450)
PMV BLD AUTO: 10.7 FL (ref 6–12)
RBC # BLD AUTO: 2.6 10*6/MM3 (ref 3.77–5.28)
TIBC SERPL-MCNC: 255 MCG/DL (ref 298–536)
TRANSFERRIN SERPL-MCNC: 171 MG/DL (ref 200–360)
TRIGL SERPL-MCNC: 111 MG/DL (ref 0–150)
VLDLC SERPL-MCNC: 21 MG/DL (ref 5–40)
WBC NRBC COR # BLD AUTO: 6.74 10*3/MM3 (ref 3.4–10.8)

## 2025-08-26 PROCEDURE — 36415 COLL VENOUS BLD VENIPUNCTURE: CPT | Performed by: FAMILY MEDICINE

## 2025-08-26 PROCEDURE — 84100 ASSAY OF PHOSPHORUS: CPT | Performed by: FAMILY MEDICINE

## 2025-08-26 PROCEDURE — 83735 ASSAY OF MAGNESIUM: CPT | Performed by: FAMILY MEDICINE

## 2025-08-26 PROCEDURE — 80061 LIPID PANEL: CPT | Performed by: FAMILY MEDICINE

## 2025-08-26 PROCEDURE — 84466 ASSAY OF TRANSFERRIN: CPT | Performed by: FAMILY MEDICINE

## 2025-08-26 PROCEDURE — 85027 COMPLETE CBC AUTOMATED: CPT | Performed by: FAMILY MEDICINE

## 2025-08-26 PROCEDURE — 84550 ASSAY OF BLOOD/URIC ACID: CPT | Performed by: FAMILY MEDICINE

## 2025-08-26 PROCEDURE — 80053 COMPREHEN METABOLIC PANEL: CPT | Performed by: FAMILY MEDICINE

## 2025-08-26 PROCEDURE — 82728 ASSAY OF FERRITIN: CPT | Performed by: FAMILY MEDICINE

## 2025-08-26 PROCEDURE — 83540 ASSAY OF IRON: CPT | Performed by: FAMILY MEDICINE

## 2025-08-26 PROCEDURE — 83880 ASSAY OF NATRIURETIC PEPTIDE: CPT | Performed by: FAMILY MEDICINE

## 2025-08-26 PROCEDURE — 83695 ASSAY OF LIPOPROTEIN(A): CPT | Performed by: FAMILY MEDICINE

## 2025-08-26 PROCEDURE — 83970 ASSAY OF PARATHORMONE: CPT | Performed by: FAMILY MEDICINE

## 2025-08-26 PROCEDURE — 83036 HEMOGLOBIN GLYCOSYLATED A1C: CPT | Performed by: FAMILY MEDICINE

## 2025-08-27 ENCOUNTER — TELEPHONE (OUTPATIENT)
Dept: FAMILY MEDICINE CLINIC | Facility: CLINIC | Age: 80
End: 2025-08-27
Payer: MEDICARE

## 2025-08-27 LAB
ALBUMIN SERPL-MCNC: 3.8 G/DL (ref 3.5–5.2)
ALBUMIN/GLOB SERPL: 1.1 G/DL
ALP SERPL-CCNC: 90 U/L (ref 39–117)
ALT SERPL W P-5'-P-CCNC: 10 U/L (ref 1–33)
ANION GAP SERPL CALCULATED.3IONS-SCNC: 14.5 MMOL/L (ref 5–15)
AST SERPL-CCNC: 20 U/L (ref 1–32)
BILIRUB SERPL-MCNC: 0.2 MG/DL (ref 0–1.2)
BUN SERPL-MCNC: 34 MG/DL (ref 8–23)
BUN/CREAT SERPL: 11.6 (ref 7–25)
CALCIUM SPEC-SCNC: 9.3 MG/DL (ref 8.6–10.5)
CHLORIDE SERPL-SCNC: 107 MMOL/L (ref 98–107)
CO2 SERPL-SCNC: 18.5 MMOL/L (ref 22–29)
CREAT SERPL-MCNC: 2.92 MG/DL (ref 0.57–1)
EGFRCR SERPLBLD CKD-EPI 2021: 15.6 ML/MIN/1.73
GLOBULIN UR ELPH-MCNC: 3.6 GM/DL
GLUCOSE SERPL-MCNC: 117 MG/DL (ref 65–99)
MAGNESIUM SERPL-MCNC: 1.6 MG/DL (ref 1.6–2.4)
PHOSPHATE SERPL-MCNC: 3.7 MG/DL (ref 2.5–4.5)
POTASSIUM SERPL-SCNC: 4.7 MMOL/L (ref 3.5–5.2)
PROT SERPL-MCNC: 7.4 G/DL (ref 6–8.5)
SODIUM SERPL-SCNC: 140 MMOL/L (ref 136–145)
URATE SERPL-MCNC: 7.3 MG/DL (ref 2.4–5.7)

## 2025-08-28 ENCOUNTER — RESULTS FOLLOW-UP (OUTPATIENT)
Dept: FAMILY MEDICINE CLINIC | Facility: CLINIC | Age: 80
End: 2025-08-28
Payer: MEDICARE

## 2025-08-28 LAB — LPA SERPL-SCNC: 233.4 NMOL/L

## (undated) DEVICE — CATH GUIDE BERN .038 5FR 100CM

## (undated) DEVICE — RADIFOCUS GLIDEWIRE: Brand: GLIDEWIRE

## (undated) DEVICE — GLIDEX™ COATED HYDROPHILIC GUIDEWIRE: Brand: MAGIC TORQUE™

## (undated) DEVICE — GW MAGICTORQUE .035 260CM

## (undated) DEVICE — Device

## (undated) DEVICE — GOWN,NON-REINFORCED,SIRUS,SET IN SLV,XL: Brand: MEDLINE

## (undated) DEVICE — SI AVANTI+ 6F STD W/GW  NO OBT: Brand: AVANTI

## (undated) DEVICE — CATH SZ ACCUVU SEG/20CM PIG 5F 100CM

## (undated) DEVICE — GLV SURG BIOGELULTRATOUCH POLYISPRN PF LF SZ7 STRL

## (undated) DEVICE — LN INJ CONTRST FLXCIL HP BR F/M LL W/ADAPT/ROT 1200PSI 48IN

## (undated) DEVICE — PERCLOSE™ PROSTYLE™ SUTURE-MEDIATED CLOSURE AND REPAIR SYSTEM: Brand: PERCLOSE™ PROSTYLE™

## (undated) DEVICE — PK ANGIO OR 10

## (undated) DEVICE — TBG INJ CONTRL EXCITE RA 1200PSI 48IN

## (undated) DEVICE — CATH GUIDE BERN 5F 65CM

## (undated) DEVICE — AVANTI + 4F STD W/GW: Brand: AVANTI

## (undated) DEVICE — CATH GUIDE SOFTVU FLUSH HT PIG .035 4F 65CM